# Patient Record
Sex: MALE | Race: WHITE | Employment: OTHER | URBAN - METROPOLITAN AREA
[De-identification: names, ages, dates, MRNs, and addresses within clinical notes are randomized per-mention and may not be internally consistent; named-entity substitution may affect disease eponyms.]

---

## 2017-06-01 ENCOUNTER — OFFICE VISIT (OUTPATIENT)
Dept: ORTHOPEDIC SURGERY | Age: 57
End: 2017-06-01

## 2017-06-01 VITALS
HEIGHT: 67 IN | BODY MASS INDEX: 32.96 KG/M2 | DIASTOLIC BLOOD PRESSURE: 79 MMHG | HEART RATE: 75 BPM | SYSTOLIC BLOOD PRESSURE: 125 MMHG | WEIGHT: 210 LBS

## 2017-06-01 DIAGNOSIS — M54.16 LUMBAR RADICULOPATHY: Primary | ICD-10-CM

## 2017-06-01 PROCEDURE — 99203 OFFICE O/P NEW LOW 30 MIN: CPT | Performed by: ORTHOPAEDIC SURGERY

## 2017-06-05 ENCOUNTER — TELEPHONE (OUTPATIENT)
Dept: PAIN MANAGEMENT | Age: 57
End: 2017-06-05

## 2017-06-14 ENCOUNTER — OFFICE VISIT (OUTPATIENT)
Dept: PAIN MANAGEMENT | Age: 57
End: 2017-06-14

## 2017-06-14 VITALS
DIASTOLIC BLOOD PRESSURE: 92 MMHG | HEART RATE: 74 BPM | WEIGHT: 208 LBS | SYSTOLIC BLOOD PRESSURE: 127 MMHG | BODY MASS INDEX: 32.58 KG/M2

## 2017-06-14 DIAGNOSIS — M51.36 DDD (DEGENERATIVE DISC DISEASE), LUMBAR: ICD-10-CM

## 2017-06-14 DIAGNOSIS — M79.18 MYOFASCIAL PAIN: ICD-10-CM

## 2017-06-14 DIAGNOSIS — M54.16 LUMBAR RADICULOPATHY: ICD-10-CM

## 2017-06-14 DIAGNOSIS — G89.4 CHRONIC PAIN SYNDROME: ICD-10-CM

## 2017-06-14 PROBLEM — M19.90 OSTEOARTHRITIS: Status: ACTIVE | Noted: 2017-06-14

## 2017-06-14 PROBLEM — M51.369 DDD (DEGENERATIVE DISC DISEASE), LUMBAR: Status: ACTIVE | Noted: 2017-06-14

## 2017-06-14 PROBLEM — M16.9 OSTEOARTHRITIS OF HIP: Status: ACTIVE | Noted: 2017-06-14

## 2017-06-14 PROCEDURE — 99244 OFF/OP CNSLTJ NEW/EST MOD 40: CPT | Performed by: INTERNAL MEDICINE

## 2017-06-14 RX ORDER — MELOXICAM 15 MG/1
15 TABLET ORAL DAILY
Qty: 30 TABLET | Refills: 1 | Status: SHIPPED | OUTPATIENT
Start: 2017-06-14 | End: 2017-07-26 | Stop reason: SDUPTHER

## 2017-06-14 RX ORDER — GABAPENTIN 300 MG/1
300 CAPSULE ORAL 3 TIMES DAILY
Qty: 90 CAPSULE | Refills: 0 | Status: SHIPPED | OUTPATIENT
Start: 2017-06-14 | End: 2017-07-26 | Stop reason: SDUPTHER

## 2017-06-20 ENCOUNTER — HOSPITAL ENCOUNTER (OUTPATIENT)
Dept: PHYSICAL THERAPY | Age: 57
Discharge: HOME | End: 2017-06-30
Admitting: ORTHOPAEDIC SURGERY

## 2017-06-21 ENCOUNTER — TELEPHONE (OUTPATIENT)
Dept: PAIN MANAGEMENT | Age: 57
End: 2017-06-21

## 2017-06-21 DIAGNOSIS — G89.4 CHRONIC PAIN SYNDROME: ICD-10-CM

## 2017-06-22 ENCOUNTER — TELEPHONE (OUTPATIENT)
Dept: PAIN MANAGEMENT | Age: 57
End: 2017-06-22

## 2017-06-23 ENCOUNTER — HOSPITAL ENCOUNTER (OUTPATIENT)
Dept: PHYSICAL THERAPY | Age: 57
Discharge: HOME | End: 2017-06-24
Admitting: ORTHOPAEDIC SURGERY

## 2017-06-24 ENCOUNTER — HOSPITAL ENCOUNTER (OUTPATIENT)
Dept: MRI IMAGING | Age: 57
Discharge: HOME | End: 2017-06-24
Attending: INTERNAL MEDICINE | Admitting: INTERNAL MEDICINE

## 2017-06-24 DIAGNOSIS — G89.4 CHRONIC PAIN SYNDROME: ICD-10-CM

## 2017-06-26 ENCOUNTER — TELEPHONE (OUTPATIENT)
Dept: PAIN MANAGEMENT | Age: 57
End: 2017-06-26

## 2017-06-27 ENCOUNTER — HOSPITAL ENCOUNTER (OUTPATIENT)
Dept: OTHER | Age: 57
Discharge: HOME | End: 2017-07-04
Attending: ORTHOPAEDIC SURGERY | Admitting: ORTHOPAEDIC SURGERY

## 2017-06-28 ENCOUNTER — TELEPHONE (OUTPATIENT)
Dept: PAIN MANAGEMENT | Age: 57
End: 2017-06-28

## 2017-06-28 DIAGNOSIS — G89.4 CHRONIC PAIN SYNDROME: ICD-10-CM

## 2017-06-29 ENCOUNTER — HOSPITAL ENCOUNTER (OUTPATIENT)
Dept: PHYSICAL THERAPY | Age: 57
Discharge: HOME | End: 2017-06-30
Admitting: ORTHOPAEDIC SURGERY

## 2017-07-05 RX ORDER — HYDROCODONE BITARTRATE AND ACETAMINOPHEN 5; 325 MG/1; MG/1
1 TABLET ORAL EVERY 6 HOURS PRN
Qty: 40 TABLET | Refills: 0 | Status: SHIPPED | OUTPATIENT
Start: 2017-07-05 | End: 2017-07-26 | Stop reason: SDUPTHER

## 2017-07-07 ENCOUNTER — OFFICE VISIT (OUTPATIENT)
Dept: ORTHOPEDIC SURGERY | Age: 57
End: 2017-07-07

## 2017-07-07 VITALS
HEART RATE: 69 BPM | WEIGHT: 209 LBS | SYSTOLIC BLOOD PRESSURE: 139 MMHG | HEIGHT: 66 IN | DIASTOLIC BLOOD PRESSURE: 88 MMHG | BODY MASS INDEX: 33.59 KG/M2

## 2017-07-07 DIAGNOSIS — M48.061 LUMBAR STENOSIS: ICD-10-CM

## 2017-07-07 DIAGNOSIS — M54.16 LUMBAR RADICULOPATHY: Primary | ICD-10-CM

## 2017-07-07 PROCEDURE — 99213 OFFICE O/P EST LOW 20 MIN: CPT | Performed by: ORTHOPAEDIC SURGERY

## 2017-07-07 RX ORDER — LORATADINE 10 MG/1
10 TABLET ORAL DAILY
COMMUNITY
Start: 2017-06-26

## 2017-07-07 RX ORDER — METHOCARBAMOL 500 MG/1
TABLET, FILM COATED ORAL
COMMUNITY
Start: 2017-06-24 | End: 2018-03-11 | Stop reason: ALTCHOICE

## 2017-07-07 RX ORDER — METHYLPREDNISOLONE 4 MG/1
TABLET ORAL
COMMUNITY
Start: 2017-06-24 | End: 2017-07-26

## 2017-07-07 RX ORDER — PEN NEEDLE, DIABETIC 31 G X1/4"
NEEDLE, DISPOSABLE MISCELLANEOUS
COMMUNITY
Start: 2017-06-26 | End: 2019-10-08 | Stop reason: SDUPTHER

## 2017-07-07 RX ORDER — LANCETS
EACH MISCELLANEOUS
COMMUNITY
Start: 2017-06-26 | End: 2020-01-22

## 2017-07-07 RX ORDER — MORPHINE SULFATE 15 MG/1
TABLET ORAL
COMMUNITY
Start: 2017-05-28 | End: 2017-07-26

## 2017-07-07 RX ORDER — BLOOD-GLUCOSE METER
KIT MISCELLANEOUS
COMMUNITY
Start: 2017-06-27 | End: 2018-09-18 | Stop reason: SDUPTHER

## 2017-07-07 RX ORDER — BLOOD-GLUCOSE METER
KIT MISCELLANEOUS
COMMUNITY
Start: 2017-06-26 | End: 2018-09-18 | Stop reason: SDUPTHER

## 2017-07-07 ASSESSMENT — ENCOUNTER SYMPTOMS: BACK PAIN: 1

## 2017-07-18 ENCOUNTER — HOSPITAL ENCOUNTER (OUTPATIENT)
Dept: PHYSICAL THERAPY | Age: 57
Discharge: HOME | End: 2017-07-19
Admitting: ORTHOPAEDIC SURGERY

## 2017-07-25 ENCOUNTER — HOSPITAL ENCOUNTER (OUTPATIENT)
Dept: PHYSICAL THERAPY | Age: 57
Discharge: HOME | End: 2017-07-26
Admitting: ORTHOPAEDIC SURGERY

## 2017-07-26 ENCOUNTER — OFFICE VISIT (OUTPATIENT)
Dept: PAIN MANAGEMENT | Age: 57
End: 2017-07-26

## 2017-07-26 VITALS
SYSTOLIC BLOOD PRESSURE: 127 MMHG | WEIGHT: 211.4 LBS | BODY MASS INDEX: 34.12 KG/M2 | DIASTOLIC BLOOD PRESSURE: 87 MMHG | HEART RATE: 69 BPM

## 2017-07-26 DIAGNOSIS — M54.16 LUMBAR RADICULOPATHY: ICD-10-CM

## 2017-07-26 DIAGNOSIS — M79.18 MYOFASCIAL PAIN: ICD-10-CM

## 2017-07-26 DIAGNOSIS — G89.4 CHRONIC PAIN SYNDROME: ICD-10-CM

## 2017-07-26 DIAGNOSIS — M51.36 DDD (DEGENERATIVE DISC DISEASE), LUMBAR: ICD-10-CM

## 2017-07-26 PROCEDURE — 99213 OFFICE O/P EST LOW 20 MIN: CPT | Performed by: INTERNAL MEDICINE

## 2017-07-26 RX ORDER — HYDROCODONE BITARTRATE AND ACETAMINOPHEN 5; 325 MG/1; MG/1
1 TABLET ORAL EVERY 6 HOURS PRN
Qty: 70 TABLET | Refills: 0 | Status: SHIPPED | OUTPATIENT
Start: 2017-07-26 | End: 2017-08-30 | Stop reason: SDUPTHER

## 2017-07-26 RX ORDER — GABAPENTIN 300 MG/1
300 CAPSULE ORAL 3 TIMES DAILY
Qty: 90 CAPSULE | Refills: 0 | Status: SHIPPED | OUTPATIENT
Start: 2017-07-26 | End: 2017-08-30 | Stop reason: SDUPTHER

## 2017-07-26 RX ORDER — MELOXICAM 15 MG/1
15 TABLET ORAL DAILY
Qty: 30 TABLET | Refills: 1 | Status: SHIPPED | OUTPATIENT
Start: 2017-07-26 | End: 2017-08-30 | Stop reason: SDUPTHER

## 2017-07-27 ENCOUNTER — HOSPITAL ENCOUNTER (OUTPATIENT)
Dept: PHYSICAL THERAPY | Age: 57
Discharge: HOME | End: 2017-07-28
Admitting: ORTHOPAEDIC SURGERY

## 2017-08-01 ENCOUNTER — HOSPITAL ENCOUNTER (OUTPATIENT)
Dept: PHYSICAL THERAPY | Age: 57
Discharge: HOME | End: 2017-08-02
Admitting: ORTHOPAEDIC SURGERY

## 2017-08-03 ENCOUNTER — HOSPITAL ENCOUNTER (OUTPATIENT)
Dept: PHYSICAL THERAPY | Age: 57
Discharge: HOME | End: 2017-08-04
Admitting: ORTHOPAEDIC SURGERY

## 2017-08-10 ENCOUNTER — HOSPITAL ENCOUNTER (OUTPATIENT)
Dept: PHYSICAL THERAPY | Age: 57
Discharge: HOME | End: 2017-08-11
Admitting: ORTHOPAEDIC SURGERY

## 2017-08-15 ENCOUNTER — HOSPITAL ENCOUNTER (OUTPATIENT)
Dept: PHYSICAL THERAPY | Age: 57
Discharge: HOME | End: 2017-08-16
Admitting: ORTHOPAEDIC SURGERY

## 2017-08-17 ENCOUNTER — HOSPITAL ENCOUNTER (OUTPATIENT)
Dept: PHYSICAL THERAPY | Age: 57
Discharge: HOME | End: 2017-08-18
Admitting: ORTHOPAEDIC SURGERY

## 2017-08-22 ENCOUNTER — HOSPITAL ENCOUNTER (OUTPATIENT)
Dept: PHYSICAL THERAPY | Age: 57
Discharge: HOME | End: 2017-08-23
Admitting: ORTHOPAEDIC SURGERY

## 2017-08-30 ENCOUNTER — OFFICE VISIT (OUTPATIENT)
Dept: PAIN MANAGEMENT | Age: 57
End: 2017-08-30

## 2017-08-30 VITALS
SYSTOLIC BLOOD PRESSURE: 136 MMHG | HEART RATE: 76 BPM | WEIGHT: 208 LBS | BODY MASS INDEX: 33.57 KG/M2 | DIASTOLIC BLOOD PRESSURE: 87 MMHG

## 2017-08-30 DIAGNOSIS — G89.4 CHRONIC PAIN SYNDROME: ICD-10-CM

## 2017-08-30 DIAGNOSIS — M79.18 MYOFASCIAL PAIN: ICD-10-CM

## 2017-08-30 DIAGNOSIS — M51.36 DDD (DEGENERATIVE DISC DISEASE), LUMBAR: ICD-10-CM

## 2017-08-30 DIAGNOSIS — M16.12 PRIMARY OSTEOARTHRITIS OF LEFT HIP: ICD-10-CM

## 2017-08-30 DIAGNOSIS — M54.16 LUMBAR RADICULOPATHY: ICD-10-CM

## 2017-08-30 PROCEDURE — 99213 OFFICE O/P EST LOW 20 MIN: CPT | Performed by: INTERNAL MEDICINE

## 2017-08-30 RX ORDER — MELOXICAM 15 MG/1
15 TABLET ORAL DAILY
Qty: 30 TABLET | Refills: 1 | Status: SHIPPED | OUTPATIENT
Start: 2017-08-30 | End: 2017-09-27 | Stop reason: SDUPTHER

## 2017-08-30 RX ORDER — GABAPENTIN 300 MG/1
300 CAPSULE ORAL 3 TIMES DAILY
Qty: 90 CAPSULE | Refills: 0 | Status: SHIPPED | OUTPATIENT
Start: 2017-08-30 | End: 2017-08-30 | Stop reason: CLARIF

## 2017-08-30 RX ORDER — HYDROCODONE BITARTRATE AND ACETAMINOPHEN 5; 325 MG/1; MG/1
1 TABLET ORAL EVERY 6 HOURS PRN
Qty: 70 TABLET | Refills: 0 | Status: SHIPPED | OUTPATIENT
Start: 2017-08-30 | End: 2017-09-27 | Stop reason: SDUPTHER

## 2017-08-30 RX ORDER — GABAPENTIN 300 MG/1
300 CAPSULE ORAL 3 TIMES DAILY
Qty: 90 CAPSULE | Refills: 0 | Status: SHIPPED | OUTPATIENT
Start: 2017-08-30 | End: 2017-09-27 | Stop reason: SDUPTHER

## 2017-08-31 ENCOUNTER — HOSPITAL ENCOUNTER (OUTPATIENT)
Dept: PHYSICAL THERAPY | Age: 57
Discharge: HOME | End: 2017-09-01
Admitting: ORTHOPAEDIC SURGERY

## 2017-09-01 ENCOUNTER — HOSPITAL ENCOUNTER (OUTPATIENT)
Dept: OTHER | Age: 57
Discharge: HOME | End: 2017-09-30
Attending: ORTHOPAEDIC SURGERY | Admitting: ORTHOPAEDIC SURGERY

## 2017-09-05 ENCOUNTER — HOSPITAL ENCOUNTER (OUTPATIENT)
Dept: PHYSICAL THERAPY | Age: 57
Discharge: HOME | End: 2017-09-06
Admitting: ORTHOPAEDIC SURGERY

## 2017-09-07 ENCOUNTER — HOSPITAL ENCOUNTER (OUTPATIENT)
Dept: PHYSICAL THERAPY | Age: 57
Discharge: HOME | End: 2017-09-08
Admitting: ORTHOPAEDIC SURGERY

## 2017-09-11 ENCOUNTER — TELEPHONE (OUTPATIENT)
Dept: ORTHOPEDIC SURGERY | Age: 57
End: 2017-09-11

## 2017-09-11 ENCOUNTER — TELEPHONE (OUTPATIENT)
Dept: PAIN MANAGEMENT | Age: 57
End: 2017-09-11

## 2017-09-11 DIAGNOSIS — M54.16 LUMBAR RADICULOPATHY: ICD-10-CM

## 2017-09-11 DIAGNOSIS — G89.4 CHRONIC PAIN SYNDROME: ICD-10-CM

## 2017-09-11 DIAGNOSIS — M79.18 MYOFASCIAL PAIN: ICD-10-CM

## 2017-09-11 DIAGNOSIS — M16.12 PRIMARY OSTEOARTHRITIS OF LEFT HIP: ICD-10-CM

## 2017-09-11 DIAGNOSIS — M51.36 DDD (DEGENERATIVE DISC DISEASE), LUMBAR: ICD-10-CM

## 2017-09-12 ENCOUNTER — HOSPITAL ENCOUNTER (OUTPATIENT)
Dept: PHYSICAL THERAPY | Age: 57
Discharge: HOME | End: 2017-09-13
Admitting: ORTHOPAEDIC SURGERY

## 2017-09-19 ENCOUNTER — HOSPITAL ENCOUNTER (OUTPATIENT)
Dept: PHYSICAL THERAPY | Age: 57
Discharge: HOME | End: 2017-09-20
Admitting: ORTHOPAEDIC SURGERY

## 2017-09-27 ENCOUNTER — OFFICE VISIT (OUTPATIENT)
Dept: PAIN MANAGEMENT | Age: 57
End: 2017-09-27

## 2017-09-27 VITALS
SYSTOLIC BLOOD PRESSURE: 146 MMHG | DIASTOLIC BLOOD PRESSURE: 91 MMHG | HEART RATE: 71 BPM | BODY MASS INDEX: 33.57 KG/M2 | WEIGHT: 208 LBS

## 2017-09-27 DIAGNOSIS — M16.12 PRIMARY OSTEOARTHRITIS OF LEFT HIP: ICD-10-CM

## 2017-09-27 DIAGNOSIS — G89.4 CHRONIC PAIN SYNDROME: ICD-10-CM

## 2017-09-27 DIAGNOSIS — M51.36 DDD (DEGENERATIVE DISC DISEASE), LUMBAR: ICD-10-CM

## 2017-09-27 DIAGNOSIS — M54.16 LUMBAR RADICULOPATHY: ICD-10-CM

## 2017-09-27 DIAGNOSIS — M79.18 MYOFASCIAL PAIN: ICD-10-CM

## 2017-09-27 PROCEDURE — 99213 OFFICE O/P EST LOW 20 MIN: CPT | Performed by: INTERNAL MEDICINE

## 2017-09-27 RX ORDER — HYDROCODONE BITARTRATE AND ACETAMINOPHEN 5; 325 MG/1; MG/1
1 TABLET ORAL EVERY 6 HOURS PRN
Qty: 80 TABLET | Refills: 0 | Status: SHIPPED | OUTPATIENT
Start: 2017-09-27 | End: 2017-11-01 | Stop reason: SDUPTHER

## 2017-09-27 RX ORDER — GABAPENTIN 300 MG/1
300 CAPSULE ORAL 3 TIMES DAILY
Qty: 90 CAPSULE | Refills: 0 | Status: SHIPPED | OUTPATIENT
Start: 2017-09-27 | End: 2017-11-01 | Stop reason: SDUPTHER

## 2017-09-27 RX ORDER — MELOXICAM 15 MG/1
15 TABLET ORAL DAILY
Qty: 30 TABLET | Refills: 1 | Status: SHIPPED | OUTPATIENT
Start: 2017-09-27 | End: 2017-11-01 | Stop reason: SDUPTHER

## 2017-09-29 ENCOUNTER — HOSPITAL ENCOUNTER (OUTPATIENT)
Dept: PHYSICAL THERAPY | Age: 57
Discharge: HOME | End: 2017-09-30
Admitting: ORTHOPAEDIC SURGERY

## 2017-10-03 ENCOUNTER — HOSPITAL ENCOUNTER (OUTPATIENT)
Dept: PHYSICAL THERAPY | Age: 57
Discharge: HOME | End: 2017-10-04
Admitting: ORTHOPAEDIC SURGERY

## 2017-10-06 ENCOUNTER — HOSPITAL ENCOUNTER (OUTPATIENT)
Dept: PHYSICAL THERAPY | Age: 57
Discharge: HOME | End: 2017-10-07
Admitting: ORTHOPAEDIC SURGERY

## 2017-10-10 ENCOUNTER — HOSPITAL ENCOUNTER (OUTPATIENT)
Dept: PHYSICAL THERAPY | Age: 57
Discharge: HOME | End: 2017-10-11
Admitting: ORTHOPAEDIC SURGERY

## 2017-10-10 DIAGNOSIS — G89.4 CHRONIC PAIN SYNDROME: ICD-10-CM

## 2017-10-10 DIAGNOSIS — M51.36 DDD (DEGENERATIVE DISC DISEASE), LUMBAR: ICD-10-CM

## 2017-10-10 DIAGNOSIS — M54.16 LUMBAR RADICULOPATHY: ICD-10-CM

## 2017-10-10 DIAGNOSIS — M79.18 MYOFASCIAL PAIN: ICD-10-CM

## 2017-10-11 DIAGNOSIS — G89.4 CHRONIC PAIN SYNDROME: ICD-10-CM

## 2017-10-11 DIAGNOSIS — M79.18 MYOFASCIAL PAIN: ICD-10-CM

## 2017-10-11 DIAGNOSIS — M54.16 LUMBAR RADICULOPATHY: ICD-10-CM

## 2017-10-11 DIAGNOSIS — M51.36 DDD (DEGENERATIVE DISC DISEASE), LUMBAR: ICD-10-CM

## 2017-10-11 RX ORDER — MELOXICAM 15 MG/1
TABLET ORAL
Qty: 30 TABLET | Refills: 0 | OUTPATIENT
Start: 2017-10-11

## 2017-10-13 ENCOUNTER — HOSPITAL ENCOUNTER (OUTPATIENT)
Dept: PHYSICAL THERAPY | Age: 57
Discharge: HOME | End: 2017-10-14
Admitting: ORTHOPAEDIC SURGERY

## 2017-10-13 RX ORDER — MELOXICAM 15 MG/1
TABLET ORAL
Qty: 30 TABLET | Refills: 0 | OUTPATIENT
Start: 2017-10-13

## 2017-10-17 ENCOUNTER — HOSPITAL ENCOUNTER (OUTPATIENT)
Dept: PHYSICAL THERAPY | Age: 57
Discharge: HOME | End: 2017-10-18
Admitting: ORTHOPAEDIC SURGERY

## 2017-10-23 ENCOUNTER — HOSPITAL ENCOUNTER (OUTPATIENT)
Dept: PHYSICAL THERAPY | Age: 57
Discharge: HOME | End: 2017-10-24
Admitting: ORTHOPAEDIC SURGERY

## 2017-10-27 ENCOUNTER — HOSPITAL ENCOUNTER (OUTPATIENT)
Dept: PHYSICAL THERAPY | Age: 57
Discharge: HOME | End: 2017-10-28
Admitting: ORTHOPAEDIC SURGERY

## 2017-10-31 ENCOUNTER — HOSPITAL ENCOUNTER (OUTPATIENT)
Dept: PHYSICAL THERAPY | Age: 57
Discharge: HOME | End: 2017-11-01
Admitting: ORTHOPAEDIC SURGERY

## 2017-11-01 ENCOUNTER — HOSPITAL ENCOUNTER (OUTPATIENT)
Dept: OTHER | Age: 57
Discharge: HOME | End: 2017-11-30
Attending: ORTHOPAEDIC SURGERY | Admitting: ORTHOPAEDIC SURGERY

## 2017-11-01 ENCOUNTER — OFFICE VISIT (OUTPATIENT)
Dept: PAIN MANAGEMENT | Age: 57
End: 2017-11-01

## 2017-11-01 VITALS
DIASTOLIC BLOOD PRESSURE: 92 MMHG | HEART RATE: 88 BPM | WEIGHT: 211 LBS | BODY MASS INDEX: 34.06 KG/M2 | SYSTOLIC BLOOD PRESSURE: 139 MMHG

## 2017-11-01 DIAGNOSIS — M51.36 DDD (DEGENERATIVE DISC DISEASE), LUMBAR: ICD-10-CM

## 2017-11-01 DIAGNOSIS — M79.18 MYOFASCIAL PAIN: ICD-10-CM

## 2017-11-01 DIAGNOSIS — M54.16 LUMBAR RADICULOPATHY: ICD-10-CM

## 2017-11-01 DIAGNOSIS — G89.4 CHRONIC PAIN SYNDROME: ICD-10-CM

## 2017-11-01 DIAGNOSIS — M16.12 PRIMARY OSTEOARTHRITIS OF LEFT HIP: ICD-10-CM

## 2017-11-01 PROCEDURE — G8427 DOCREV CUR MEDS BY ELIG CLIN: HCPCS | Performed by: INTERNAL MEDICINE

## 2017-11-01 PROCEDURE — G8417 CALC BMI ABV UP PARAM F/U: HCPCS | Performed by: INTERNAL MEDICINE

## 2017-11-01 PROCEDURE — 3017F COLORECTAL CA SCREEN DOC REV: CPT | Performed by: INTERNAL MEDICINE

## 2017-11-01 PROCEDURE — 1036F TOBACCO NON-USER: CPT | Performed by: INTERNAL MEDICINE

## 2017-11-01 PROCEDURE — 99213 OFFICE O/P EST LOW 20 MIN: CPT | Performed by: INTERNAL MEDICINE

## 2017-11-01 PROCEDURE — G8484 FLU IMMUNIZE NO ADMIN: HCPCS | Performed by: INTERNAL MEDICINE

## 2017-11-01 RX ORDER — GABAPENTIN 300 MG/1
300 CAPSULE ORAL 3 TIMES DAILY
Qty: 90 CAPSULE | Refills: 0 | Status: SHIPPED | OUTPATIENT
Start: 2017-11-01 | End: 2017-12-13 | Stop reason: SDUPTHER

## 2017-11-01 RX ORDER — HYDROCODONE BITARTRATE AND ACETAMINOPHEN 5; 325 MG/1; MG/1
1 TABLET ORAL EVERY 6 HOURS PRN
Qty: 90 TABLET | Refills: 0 | Status: SHIPPED | OUTPATIENT
Start: 2017-11-01 | End: 2017-12-13 | Stop reason: SDUPTHER

## 2017-11-01 RX ORDER — MELOXICAM 15 MG/1
15 TABLET ORAL DAILY
Qty: 30 TABLET | Refills: 1 | Status: SHIPPED | OUTPATIENT
Start: 2017-11-01 | End: 2017-12-13 | Stop reason: SDUPTHER

## 2017-11-01 NOTE — PROGRESS NOTES
Cristian Knee  1960  [id number]    HISTORY OF PRESENT ILLNESS:  Mr. Shital Chou is a 61 y.o. male returns for a follow up visit for multiple medical problems. His current presenting problems are   1. Chronic pain syndrome    2. DDD (degenerative disc disease), lumbar    3. Primary osteoarthritis of left hip    4. Lumbar radiculopathy    5. Myofascial pain    . As per information/history obtained from the PADT(patient assessment and documentation tool) - He complains of pain in the lower back with radiation to the buttocks, hips Bilateral and upper leg Left He rates the pain 6/10 and describes it as sharp, aching, burning. Pain is made worse by: walking, standing, sitting. Current treatment regimen has helped relieve about 50% of the pain. He denies side effects from the current pain regimen. Patient reports that since the last follow up visit the physical functioning is unchanged, family/social relationships are unchanged, mood is unchanged and sleep patterns are unchanged, and that the overall functioning is unchanged. Patient denies neurological bowel or bladder. Patient denies misusing/abusing his narcotic pain medications or using any illegal drugs. There are No indicators for possible drug abuse, addiction or diversion problems. Upon obtaining the medical history from Mr. Shital Chou regarding today's office visit for his presenting problems, Patient states his pain has been worse. He complains of increased pain with changes in weather. Extreme temperatures- cold and damp weather causes increased pain. He states he started PT exercises, which has been helping some. Mr. Shital Chou mentions using Norco along with Mobic and Neurontin. He mentions he has been swimming everyday for 20-30 minutes. Patient denies any GERD symptoms. He reports he lives with his parents and has been managing his home chores. ALLERGIES: Patients list of allergies were reviewed     MEDICATIONS: Mr. Shital Chou list of medications were reviewed. His current medications are   Outpatient Medications Prior to Visit   Medication Sig Dispense Refill    HYDROcodone-acetaminophen (NORCO) 5-325 MG per tablet Take 1 tablet by mouth every 6 hours as needed for Pain (max 2-3 per day) . 80 tablet 0    meloxicam (MOBIC) 15 MG tablet Take 1 tablet by mouth daily 30 tablet 1    gabapentin (NEURONTIN) 300 MG capsule Take 1 capsule by mouth 3 times daily Take one tablet po in the morning and two tablets po in the evening 90 capsule 0    Blood Glucose Monitoring Suppl (FREESTYLE LITE) RO       FREESTYLE LITE strip       Insulin Pen Needle (PEN NEEDLES) 31G X 6 MM MISC       KROGER LANCETS ULTRATHIN 30G MISC       loratadine (CLARITIN) 10 MG tablet       methocarbamol (ROBAXIN) 500 MG tablet       lisinopril (PRINIVIL;ZESTRIL) 5 MG tablet Take 5 mg by mouth daily      metFORMIN (GLUCOPHAGE-XR) 500 MG extended release tablet Take 500 mg by mouth daily (with breakfast)      atorvastatin (LIPITOR) 80 MG tablet Take 80 mg by mouth daily. No facility-administered medications prior to visit. SOCIAL/FAMILY/PAST MEDICAL HISTORY: Mr. Liza Castillo, family and past medical history was reviewed. REVIEW OF SYSTEMS:    Respiratory: Negative for apnea, chest tightness and shortness of breath or change in baseline breathing. Gastrointestinal: Negative for nausea, vomiting, abdominal pain, diarrhea, constipation, blood in stool and abdominal distention. PHYSICAL EXAM:   Nursing note and vitals reviewed. BP (!) 139/92 (Site: Left Arm, Position: Sitting)   Pulse 88   Wt 211 lb (95.7 kg)   BMI 34.06 kg/m²   Constitutional: He appears well-developed and well-nourished. No acute distress. Skin: Skin is warm and dry, good turgor. No rash noted. He is not diaphoretic. Cardiovascular: Normal rate, regular rhythm, normal heart sounds, and does not have murmur. Pulmonary/Chest: Effort normal. No respiratory distress.  He does not have wheezes in the lung fields. He has no rales. Neurological/Psychiatric:He is alert and oriented to person, place, and time. Coordination is  normal. His mood isAppropriate and affect is Flat/blunted and Anxious . IMPRESSION:   1. Chronic pain syndrome    2. DDD (degenerative disc disease), lumbar    3. Primary osteoarthritis of left hip    4. Lumbar radiculopathy    5. Myofascial pain        PLAN:  Informed verbal consent was obtained  -Continue with current regimen   -ROM/stretching exercises as advised   -Continue with Mobic and Neurontin   -He was advised to increase fluids ( 5-7  glasses of fluid daily), limit caffeine, avoid cheese products, increase dietary fiber, increase activity and exercise as tolerated and relax regularly and enjoy meals  -He was advised weight reduction, diet changes- 800-1200 shad diet, diet diary, exercising, nutritional  consult increased physical activity as tolerated     Current Outpatient Prescriptions   Medication Sig Dispense Refill    HYDROcodone-acetaminophen (NORCO) 5-325 MG per tablet Take 1 tablet by mouth every 6 hours as needed for Pain (max 2-3 per day) . 80 tablet 0    meloxicam (MOBIC) 15 MG tablet Take 1 tablet by mouth daily 30 tablet 1    gabapentin (NEURONTIN) 300 MG capsule Take 1 capsule by mouth 3 times daily Take one tablet po in the morning and two tablets po in the evening 90 capsule 0    Blood Glucose Monitoring Suppl (FREESTYLE LITE) RO       FREESTYLE LITE strip       Insulin Pen Needle (PEN NEEDLES) 31G X 6 MM MISC       KROGER LANCETS ULTRATHIN 30G MISC       loratadine (CLARITIN) 10 MG tablet       methocarbamol (ROBAXIN) 500 MG tablet       lisinopril (PRINIVIL;ZESTRIL) 5 MG tablet Take 5 mg by mouth daily      metFORMIN (GLUCOPHAGE-XR) 500 MG extended release tablet Take 500 mg by mouth daily (with breakfast)      atorvastatin (LIPITOR) 80 MG tablet Take 80 mg by mouth daily. No current facility-administered medications for this visit.       I will continue his current medication regimen  which is part of the above treatment schedule. It has been helping with Mr. Juni Alva chronic  medical problems which for this visit include:   Diagnoses of Chronic pain syndrome, DDD (degenerative disc disease), lumbar, Primary osteoarthritis of left hip, Lumbar radiculopathy, and Myofascial pain were pertinent to this visit. Risks and benefits of the medications and other alternative treatments  including no treatment were discussed with the patient. The common side effects of these medications were also explained to the patient. Informed verbal consent was obtained. Goals of current treatment regimen include improvement in pain, restoration of functioning- with focus on improvement in physical performance, general activity, work or disability,emotional distress, health care utilization and  decreased medication consumption. Will continue to monitor progress towards achieving/maintaining therapeutic goals with special emphasis on  1. Improvement in perceived interfernce  of pain with ADL's. Ability to do home exercises independently. Ability to do household chores indoor and/or outdoor work and social and leisure activities. Improve psychosocial and physical functioning. - he is showing progression towards this treatment goal with the current regimen. He was advised against drinking alcohol with the narcotic pain medicines, advised against driving or handling machinery while adjusting the dose of medicines or if having cognitive  issues related to the current medications. Risk of overdose and death, if medicines not taken as prescribed, were also discussed. If the patient develops new symptoms or if the symptoms worsen, the patient should call the office. While transcribing every attempt was made to maintain the accuracy of the note in terms of it's contents,there may have been some errors made inadvertently.   Thank you for allowing me to participate in the care of this patient. Orville Gitelman, MD.    Cc: Cassie Escoto MD    I, Diego Germain am scribing for and in the presence of Dr. Orville Gitelman.    11/01/17  11:56 AM  Edd Burt MA   I, Dr. Orville Gitelman, personally performed the services described in this documentation as scribed by   Diego Germain MA in my presence and it is both accurate and complete

## 2017-11-03 ENCOUNTER — HOSPITAL ENCOUNTER (OUTPATIENT)
Dept: PHYSICAL THERAPY | Age: 57
Discharge: HOME | End: 2017-11-04
Admitting: ORTHOPAEDIC SURGERY

## 2017-11-07 ENCOUNTER — HOSPITAL ENCOUNTER (OUTPATIENT)
Dept: PHYSICAL THERAPY | Age: 57
Discharge: HOME | End: 2017-11-08
Admitting: ORTHOPAEDIC SURGERY

## 2017-11-10 ENCOUNTER — HOSPITAL ENCOUNTER (OUTPATIENT)
Dept: PHYSICAL THERAPY | Age: 57
Discharge: HOME | End: 2017-11-11
Admitting: ORTHOPAEDIC SURGERY

## 2017-12-13 ENCOUNTER — OFFICE VISIT (OUTPATIENT)
Dept: PAIN MANAGEMENT | Age: 57
End: 2017-12-13

## 2017-12-13 VITALS
WEIGHT: 211 LBS | SYSTOLIC BLOOD PRESSURE: 133 MMHG | BODY MASS INDEX: 34.06 KG/M2 | HEART RATE: 72 BPM | DIASTOLIC BLOOD PRESSURE: 89 MMHG

## 2017-12-13 DIAGNOSIS — M54.16 LUMBAR RADICULOPATHY: ICD-10-CM

## 2017-12-13 DIAGNOSIS — M79.18 MYOFASCIAL PAIN: ICD-10-CM

## 2017-12-13 DIAGNOSIS — G89.4 CHRONIC PAIN SYNDROME: ICD-10-CM

## 2017-12-13 DIAGNOSIS — M51.36 DDD (DEGENERATIVE DISC DISEASE), LUMBAR: ICD-10-CM

## 2017-12-13 DIAGNOSIS — M16.12 PRIMARY OSTEOARTHRITIS OF LEFT HIP: ICD-10-CM

## 2017-12-13 PROCEDURE — G8428 CUR MEDS NOT DOCUMENT: HCPCS | Performed by: INTERNAL MEDICINE

## 2017-12-13 PROCEDURE — 99213 OFFICE O/P EST LOW 20 MIN: CPT | Performed by: INTERNAL MEDICINE

## 2017-12-13 PROCEDURE — G8417 CALC BMI ABV UP PARAM F/U: HCPCS | Performed by: INTERNAL MEDICINE

## 2017-12-13 PROCEDURE — G8484 FLU IMMUNIZE NO ADMIN: HCPCS | Performed by: INTERNAL MEDICINE

## 2017-12-13 PROCEDURE — 1036F TOBACCO NON-USER: CPT | Performed by: INTERNAL MEDICINE

## 2017-12-13 PROCEDURE — 3017F COLORECTAL CA SCREEN DOC REV: CPT | Performed by: INTERNAL MEDICINE

## 2017-12-13 RX ORDER — MELOXICAM 15 MG/1
15 TABLET ORAL DAILY
Qty: 30 TABLET | Refills: 1 | Status: SHIPPED | OUTPATIENT
Start: 2017-12-13 | End: 2018-02-21 | Stop reason: SDUPTHER

## 2017-12-13 RX ORDER — GABAPENTIN 300 MG/1
300 CAPSULE ORAL 3 TIMES DAILY
Qty: 90 CAPSULE | Refills: 0 | Status: SHIPPED | OUTPATIENT
Start: 2017-12-13 | End: 2018-02-21 | Stop reason: SDUPTHER

## 2017-12-13 RX ORDER — HYDROCODONE BITARTRATE AND ACETAMINOPHEN 5; 325 MG/1; MG/1
1 TABLET ORAL EVERY 6 HOURS PRN
Qty: 90 TABLET | Refills: 0 | Status: SHIPPED | OUTPATIENT
Start: 2017-12-13 | End: 2018-01-17 | Stop reason: SDUPTHER

## 2017-12-13 NOTE — PROGRESS NOTES
Jessica Brando  1960  [id number]    HISTORY OF PRESENT ILLNESS:  Mr. Aida Onofre is a 61 y.o. male returns for a follow up visit for multiple medical problems. His current presenting problems are   1. Chronic pain syndrome    2. Primary osteoarthritis of left hip    3. Myofascial pain    4. DDD (degenerative disc disease), lumbar    5. Lumbar radiculopathy    . As per information/history obtained from the PADT(patient assessment and documentation tool) - He complains of pain in the upper back and lower back with radiation to the buttocks, hips Bilateral, upper leg Left, knees Left, lower leg Left and feet Bilateral He rates the pain 6/10 and describes it as sharp, aching, burning. Pain is made worse by: movement. Current treatment regimen has helped relieve about 40% of the pain. He denies side effects from the current pain regimen. Patient reports that since the last follow up visit the physical functioning is unchanged, family/social relationships are unchanged, mood is unchanged and sleep patterns are unchanged, and that the overall functioning is unchanged. Patient denies neurological bowel or bladder. Patient denies misusing/abusing his narcotic pain medications or using any illegal drugs. There are No indicators for possible drug abuse, addiction or diversion problems. Upon obtaining the medical history from Mr. Aida Onofre regarding today's office visit for his presenting problems, Patient states his pain has been tolerable with the medications. He complains of pain in the left shoulder, hurts to do his ADL's. Mr. Aida Onofre mentions he did finish Pt on the back. He mentions using Norco 2-3 times per day. Patient denies any constipation symptoms. ALLERGIES: Patients list of allergies were reviewed     MEDICATIONS: Mr. Aida Onofre list of medications were reviewed. His current medications are   Outpatient Medications Prior to Visit   Medication Sig Dispense Refill    HYDROcodone-acetaminophen (NORCO) 5-325 MG per tablet Take 1 tablet by mouth every 6 hours as needed for Pain (max 2-3 per day) . Earliest Fill Date: 11/1/17 90 tablet 0    meloxicam (MOBIC) 15 MG tablet Take 1 tablet by mouth daily 30 tablet 1    gabapentin (NEURONTIN) 300 MG capsule Take 1 capsule by mouth 3 times daily Take one tablet po in the morning and two tablets po in the evening 90 capsule 0    Blood Glucose Monitoring Suppl (FREESTYLE LITE) RO       FREESTYLE LITE strip       Insulin Pen Needle (PEN NEEDLES) 31G X 6 MM MISC       KROGER LANCETS ULTRATHIN 30G MISC       loratadine (CLARITIN) 10 MG tablet       methocarbamol (ROBAXIN) 500 MG tablet       lisinopril (PRINIVIL;ZESTRIL) 5 MG tablet Take 5 mg by mouth daily      metFORMIN (GLUCOPHAGE-XR) 500 MG extended release tablet Take 500 mg by mouth daily (with breakfast)      atorvastatin (LIPITOR) 80 MG tablet Take 80 mg by mouth daily. No facility-administered medications prior to visit. SOCIAL/FAMILY/PAST MEDICAL HISTORY: Mr. Mer Hernandez, family and past medical history was reviewed. REVIEW OF SYSTEMS:    Respiratory: Negative for apnea, chest tightness and shortness of breath or change in baseline breathing. Gastrointestinal: Negative for nausea, vomiting, abdominal pain, diarrhea, constipation, blood in stool and abdominal distention. PHYSICAL EXAM:   Nursing note and vitals reviewed. /89   Pulse 72   Wt 211 lb (95.7 kg)   BMI 34.06 kg/m²   Constitutional: He appears well-developed and well-nourished. No acute distress. Skin: Skin is warm and dry, good turgor. No rash noted. He is not diaphoretic. Cardiovascular: Normal rate, regular rhythm, normal heart sounds, and does not have murmur. Pulmonary/Chest: Effort normal. No respiratory distress. He does not have wheezes in the lung fields. He has no rales. Neurological/Psychiatric:He is alert and oriented to person, place, and time.  Coordination is  normal. His mood isAppropriate and affect is Lumbar radiculopathy were pertinent to this visit. Risks and benefits of the medications and other alternative treatments  including no treatment were discussed with the patient. The common side effects of these medications were also explained to the patient. Informed verbal consent was obtained. Goals of current treatment regimen include improvement in pain, restoration of functioning- with focus on improvement in physical performance, general activity, work or disability,emotional distress, health care utilization and  decreased medication consumption. Will continue to monitor progress towards achieving/maintaining therapeutic goals with special emphasis on  1. Improvement in perceived interfernce  of pain with ADL's. Ability to do home exercises independently. Ability to do household chores indoor and/or outdoor work and social and leisure activities. Improve psychosocial and physical functioning. - he is showing progression towards this treatment goal with the current regimen. 2. Ability to focus/concentrate at work and perform the duties required of him at work  Sit through a workday without lower extremity symptoms. Stand 30-60 minutes without lower extremity symptoms. Ability to lift up to 10-20 lbs. Ability to go up and down stairs. Sit 30-60 minutes  Without having to stand up frequently. - he is maintaining/progressing towards his work related goals with the current regimen. He was advised against drinking alcohol with the narcotic pain medicines, advised against driving or handling machinery while adjusting the dose of medicines or if having cognitive  issues related to the current medications. Risk of overdose and death, if medicines not taken as prescribed, were also discussed. If the patient develops new symptoms or if the symptoms worsen, the patient should call the office.     While transcribing every attempt was made to maintain the accuracy of the note in terms of it's contents,there may

## 2018-01-08 ENCOUNTER — OFFICE VISIT (OUTPATIENT)
Dept: ENDOCRINOLOGY | Age: 58
End: 2018-01-08

## 2018-01-08 VITALS
WEIGHT: 206.6 LBS | SYSTOLIC BLOOD PRESSURE: 132 MMHG | BODY MASS INDEX: 33.35 KG/M2 | TEMPERATURE: 97.8 F | DIASTOLIC BLOOD PRESSURE: 78 MMHG

## 2018-01-08 LAB
CREATININE URINE: 25.6 MG/DL (ref 39–259)
HBA1C MFR BLD: 13.9 %
MICROALBUMIN UR-MCNC: <1.2 MG/DL
MICROALBUMIN/CREAT UR-RTO: ABNORMAL MG/G (ref 0–30)

## 2018-01-08 PROCEDURE — 3046F HEMOGLOBIN A1C LEVEL >9.0%: CPT | Performed by: INTERNAL MEDICINE

## 2018-01-08 PROCEDURE — 3017F COLORECTAL CA SCREEN DOC REV: CPT | Performed by: INTERNAL MEDICINE

## 2018-01-08 PROCEDURE — G8484 FLU IMMUNIZE NO ADMIN: HCPCS | Performed by: INTERNAL MEDICINE

## 2018-01-08 PROCEDURE — 83036 HEMOGLOBIN GLYCOSYLATED A1C: CPT | Performed by: INTERNAL MEDICINE

## 2018-01-08 PROCEDURE — G8417 CALC BMI ABV UP PARAM F/U: HCPCS | Performed by: INTERNAL MEDICINE

## 2018-01-08 PROCEDURE — 1036F TOBACCO NON-USER: CPT | Performed by: INTERNAL MEDICINE

## 2018-01-08 PROCEDURE — 99205 OFFICE O/P NEW HI 60 MIN: CPT | Performed by: INTERNAL MEDICINE

## 2018-01-08 PROCEDURE — G8427 DOCREV CUR MEDS BY ELIG CLIN: HCPCS | Performed by: INTERNAL MEDICINE

## 2018-01-08 RX ORDER — GLIPIZIDE 10 MG/1
10 TABLET, FILM COATED, EXTENDED RELEASE ORAL DAILY
Qty: 30 TABLET | Refills: 3 | Status: SHIPPED | OUTPATIENT
Start: 2018-01-08 | End: 2018-06-14 | Stop reason: SDUPTHER

## 2018-01-08 RX ORDER — METFORMIN HYDROCHLORIDE 500 MG/1
1000 TABLET, EXTENDED RELEASE ORAL
Qty: 60 TABLET | Refills: 2 | Status: SHIPPED | OUTPATIENT
Start: 2018-01-08 | End: 2018-06-14 | Stop reason: SDUPTHER

## 2018-01-08 NOTE — PROGRESS NOTES
daily (with breakfast)      atorvastatin (LIPITOR) 80 MG tablet Take 80 mg by mouth daily.  methocarbamol (ROBAXIN) 500 MG tablet        No current facility-administered medications for this visit. Review of Systems  Please see scanned document dated and signed      Objective:      /78   Temp 97.8 °F (36.6 °C) (Oral)   Wt 206 lb 9.6 oz (93.7 kg)   BMI 33.35 kg/m²   Wt Readings from Last 3 Encounters:   01/08/18 206 lb 9.6 oz (93.7 kg)   12/13/17 211 lb (95.7 kg)   11/01/17 211 lb (95.7 kg)     Constitutional: Well-developed, alert, appears stated age, cooperative, in no acute distress  H/E/N/M/T:atraumatic, normocephalic, external ears, nose, lips normal without lesions  Eyes: Arcus Senilis is not present, extraocular muscles are intact  Neck: supple, trachea midline, acanthosis nigricance is not present. Thyroid: gland size is normal, non-tender on palpation  Respiratory: breathing is unlabored, lungs are clear to auscultations. Cardiovascular: regular rate and rhythm, S1, S2, regular rate and rhythm, no murmur, rub or gallop. Skeletal muscular: no kyphosis, no gross abnormalities  Skin: Xanthoma/Xanthelasmas are  not present  Psychiatric: Judgement and Insight:  judgement and insight appear normal  Neuro: Normal without focal findings, speech is spontaneous, and movements are coordinated, alert and oriented x3   Skeletal foot exam is normal, no skin lesions, toenails are normal, pulses are normal, 10 g monofilament is 8/10 on the right and 8/10 on the left , 128 Hz vibration sense is diminishesd    Lab Reviewed   No components found for: CHLPL  No results found for: TRIG  No results found for: HDL  No results found for: LDLCALC  No results found for: LABVLDL  Lab Results   Component Value Date    LABA1C 6.3 01/16/2015       Assessment:     Ivana Bush is a 61 y.o. male with :    1.T2DM : Longstanding, uncontrolled. On metformin. A1c very high.  Discussed needs insulin given

## 2018-01-17 ENCOUNTER — OFFICE VISIT (OUTPATIENT)
Dept: PAIN MANAGEMENT | Age: 58
End: 2018-01-17

## 2018-01-17 VITALS
HEART RATE: 83 BPM | DIASTOLIC BLOOD PRESSURE: 72 MMHG | BODY MASS INDEX: 33.41 KG/M2 | WEIGHT: 207 LBS | SYSTOLIC BLOOD PRESSURE: 116 MMHG

## 2018-01-17 DIAGNOSIS — M16.12 PRIMARY OSTEOARTHRITIS OF LEFT HIP: ICD-10-CM

## 2018-01-17 DIAGNOSIS — M54.16 LUMBAR RADICULOPATHY: ICD-10-CM

## 2018-01-17 DIAGNOSIS — M51.36 DDD (DEGENERATIVE DISC DISEASE), LUMBAR: ICD-10-CM

## 2018-01-17 DIAGNOSIS — G89.4 CHRONIC PAIN SYNDROME: ICD-10-CM

## 2018-01-17 DIAGNOSIS — M79.18 MYOFASCIAL PAIN: ICD-10-CM

## 2018-01-17 PROCEDURE — 3017F COLORECTAL CA SCREEN DOC REV: CPT | Performed by: INTERNAL MEDICINE

## 2018-01-17 PROCEDURE — 99213 OFFICE O/P EST LOW 20 MIN: CPT | Performed by: INTERNAL MEDICINE

## 2018-01-17 PROCEDURE — G8417 CALC BMI ABV UP PARAM F/U: HCPCS | Performed by: INTERNAL MEDICINE

## 2018-01-17 PROCEDURE — G8427 DOCREV CUR MEDS BY ELIG CLIN: HCPCS | Performed by: INTERNAL MEDICINE

## 2018-01-17 PROCEDURE — G8484 FLU IMMUNIZE NO ADMIN: HCPCS | Performed by: INTERNAL MEDICINE

## 2018-01-17 PROCEDURE — 1036F TOBACCO NON-USER: CPT | Performed by: INTERNAL MEDICINE

## 2018-01-17 RX ORDER — HYDROCODONE BITARTRATE AND ACETAMINOPHEN 5; 325 MG/1; MG/1
1 TABLET ORAL EVERY 6 HOURS PRN
Qty: 90 TABLET | Refills: 0 | Status: SHIPPED | OUTPATIENT
Start: 2018-01-17 | End: 2018-02-21 | Stop reason: SDUPTHER

## 2018-01-17 NOTE — PROGRESS NOTES
glipiZIDE (GLUCOTROL XL) 10 MG extended release tablet Take 1 tablet by mouth daily 30 tablet 3    insulin glargine (BASAGLAR KWIKPEN) 100 UNIT/ML injection pen 20 units daily 5 pen 3    metFORMIN (GLUCOPHAGE-XR) 500 MG extended release tablet Take 2 tablets by mouth daily (with breakfast) 60 tablet 2    HYDROcodone-acetaminophen (NORCO) 5-325 MG per tablet Take 1 tablet by mouth every 6 hours as needed for Pain (max 2-3 per day) . 90 tablet 0    meloxicam (MOBIC) 15 MG tablet Take 1 tablet by mouth daily 30 tablet 1    gabapentin (NEURONTIN) 300 MG capsule Take 1 capsule by mouth 3 times daily Take one tablet po in the morning and two tablets po in the evening 90 capsule 0    Blood Glucose Monitoring Suppl (FREESTYLE LITE) RO       FREESTYLE LITE strip       Insulin Pen Needle (PEN NEEDLES) 31G X 6 MM MISC       KROGER LANCETS ULTRATHIN 30G MISC       loratadine (CLARITIN) 10 MG tablet       methocarbamol (ROBAXIN) 500 MG tablet       lisinopril (PRINIVIL;ZESTRIL) 5 MG tablet Take 5 mg by mouth daily      atorvastatin (LIPITOR) 80 MG tablet Take 80 mg by mouth daily. No facility-administered medications prior to visit. SOCIAL/FAMILY/PAST MEDICAL HISTORY: Mr. Briones, family and past medical history was reviewed. REVIEW OF SYSTEMS:    Respiratory: Negative for apnea, chest tightness and shortness of breath or change in baseline breathing. Gastrointestinal: Negative for nausea, vomiting, abdominal pain, diarrhea, constipation, blood in stool and abdominal distention. PHYSICAL EXAM:   Nursing note and vitals reviewed. /72   Pulse 83   Wt 207 lb (93.9 kg)   BMI 33.41 kg/m²   Constitutional: He appears well-developed and well-nourished. No acute distress. Skin: Skin is warm and dry, good turgor. No rash noted. He is not diaphoretic. Cardiovascular: Normal rate, regular rhythm, normal heart sounds, and does not have murmur.      Pulmonary/Chest: Effort normal. No respiratory distress. He does not have wheezes in the lung fields. He has no rales. Neurological/Psychiatric:He is alert and oriented to person, place, and time. Coordination is  normal. His mood isAppropriate and affect is Neutral/Euthymic(normal) . IMPRESSION:   1. Chronic pain syndrome    2. Myofascial pain    3. DDD (degenerative disc disease), lumbar    4. Primary osteoarthritis of left hip    5. Lumbar radiculopathy        PLAN:  Informed verbal consent was obtained  -Continue with current regimen   -ROM/Stretching exercises   -he was advised proper sleep hygiene-told to avoid:use of caffeine or other stimulants after noon, alcohol use near bedtime, long or frequent naps during the day, erratic sleep schedule, heavy meals near bedtime, vigorous exercise near bedtime and use of electronic devices near bedtime   -Continue with Norco 2-3 per day   -[employer] exercises    -Records from Endocrinologist  Reviewed   Current Outpatient Prescriptions   Medication Sig Dispense Refill    Insulin Pen Needle 32G X 4 MM MISC 1 each by Does not apply route daily 100 each 3    glipiZIDE (GLUCOTROL XL) 10 MG extended release tablet Take 1 tablet by mouth daily 30 tablet 3    insulin glargine (BASAGLAR KWIKPEN) 100 UNIT/ML injection pen 20 units daily 5 pen 3    metFORMIN (GLUCOPHAGE-XR) 500 MG extended release tablet Take 2 tablets by mouth daily (with breakfast) 60 tablet 2    HYDROcodone-acetaminophen (NORCO) 5-325 MG per tablet Take 1 tablet by mouth every 6 hours as needed for Pain (max 2-3 per day) .  90 tablet 0    meloxicam (MOBIC) 15 MG tablet Take 1 tablet by mouth daily 30 tablet 1    gabapentin (NEURONTIN) 300 MG capsule Take 1 capsule by mouth 3 times daily Take one tablet po in the morning and two tablets po in the evening 90 capsule 0    Blood Glucose Monitoring Suppl (FREESTYLE LITE) RO       FREESTYLE LITE strip       Insulin Pen Needle (PEN NEEDLES) 31G X 6 MM MISC       KROGER LANCETS ULTRATHIN 30G MISC       loratadine (CLARITIN) 10 MG tablet       methocarbamol (ROBAXIN) 500 MG tablet       lisinopril (PRINIVIL;ZESTRIL) 5 MG tablet Take 5 mg by mouth daily      atorvastatin (LIPITOR) 80 MG tablet Take 80 mg by mouth daily. No current facility-administered medications for this visit. I will continue his current medication regimen  which is part of the above treatment schedule. It has been helping with Mr. Anabella Hernandez chronic  medical problems which for this visit include:   Diagnoses of Chronic pain syndrome, Myofascial pain, DDD (degenerative disc disease), lumbar, Primary osteoarthritis of left hip, and Lumbar radiculopathy were pertinent to this visit. Risks and benefits of the medications and other alternative treatments  including no treatment were discussed with the patient. The common side effects of these medications were also explained to the patient. Informed verbal consent was obtained. Goals of current treatment regimen include improvement in pain, restoration of functioning- with focus on improvement in physical performance, general activity, work or disability,emotional distress, health care utilization and  decreased medication consumption. Will continue to monitor progress towards achieving/maintaining therapeutic goals with special emphasis on  1. Improvement in perceived interfernce  of pain with ADL's. Ability to do home exercises independently. Ability to do household chores indoor and/or outdoor work and social and leisure activities. Improve psychosocial and physical functioning. - he is showing progression towards this treatment goal with the current regimen. He was advised against drinking alcohol with the narcotic pain medicines, advised against driving or handling machinery while adjusting the dose of medicines or if having cognitive  issues related to the current medications. Risk of overdose and death, if medicines not taken as prescribed, were also

## 2018-02-14 ENCOUNTER — HOSPITAL ENCOUNTER (OUTPATIENT)
Dept: DIABETES SERVICES | Age: 58
Discharge: HOME | End: 2018-02-28
Admitting: FAMILY MEDICINE

## 2018-02-14 DIAGNOSIS — E11.65 TYPE 2 DIABETES MELLITUS WITH HYPERGLYCEMIA (HCC): ICD-10-CM

## 2018-02-14 ASSESSMENT — PATIENT HEALTH QUESTIONNAIRE - PHQ9
1. LITTLE INTEREST OR PLEASURE IN DOING THINGS: 0
SUM OF ALL RESPONSES TO PHQ9 QUESTIONS 1 & 2: 0
2. FEELING DOWN, DEPRESSED OR HOPELESS: 0
SUM OF ALL RESPONSES TO PHQ QUESTIONS 1-9: 0

## 2018-02-21 ENCOUNTER — OFFICE VISIT (OUTPATIENT)
Dept: PAIN MANAGEMENT | Age: 58
End: 2018-02-21

## 2018-02-21 VITALS
HEART RATE: 74 BPM | BODY MASS INDEX: 32.6 KG/M2 | WEIGHT: 202 LBS | DIASTOLIC BLOOD PRESSURE: 77 MMHG | SYSTOLIC BLOOD PRESSURE: 127 MMHG

## 2018-02-21 DIAGNOSIS — M54.16 LUMBAR RADICULOPATHY: ICD-10-CM

## 2018-02-21 DIAGNOSIS — G89.4 CHRONIC PAIN SYNDROME: ICD-10-CM

## 2018-02-21 DIAGNOSIS — M79.18 MYOFASCIAL PAIN: ICD-10-CM

## 2018-02-21 DIAGNOSIS — M51.36 DDD (DEGENERATIVE DISC DISEASE), LUMBAR: ICD-10-CM

## 2018-02-21 DIAGNOSIS — M16.12 PRIMARY OSTEOARTHRITIS OF LEFT HIP: ICD-10-CM

## 2018-02-21 PROCEDURE — G8428 CUR MEDS NOT DOCUMENT: HCPCS | Performed by: INTERNAL MEDICINE

## 2018-02-21 PROCEDURE — G8484 FLU IMMUNIZE NO ADMIN: HCPCS | Performed by: INTERNAL MEDICINE

## 2018-02-21 PROCEDURE — G8417 CALC BMI ABV UP PARAM F/U: HCPCS | Performed by: INTERNAL MEDICINE

## 2018-02-21 PROCEDURE — 1036F TOBACCO NON-USER: CPT | Performed by: INTERNAL MEDICINE

## 2018-02-21 PROCEDURE — 99213 OFFICE O/P EST LOW 20 MIN: CPT | Performed by: INTERNAL MEDICINE

## 2018-02-21 PROCEDURE — 3017F COLORECTAL CA SCREEN DOC REV: CPT | Performed by: INTERNAL MEDICINE

## 2018-02-21 RX ORDER — MELOXICAM 15 MG/1
15 TABLET ORAL DAILY
Qty: 30 TABLET | Refills: 1 | Status: SHIPPED | OUTPATIENT
Start: 2018-02-21 | End: 2018-03-28 | Stop reason: SDUPTHER

## 2018-02-21 RX ORDER — HYDROCODONE BITARTRATE AND ACETAMINOPHEN 5; 325 MG/1; MG/1
1 TABLET ORAL EVERY 6 HOURS PRN
Qty: 90 TABLET | Refills: 0 | Status: SHIPPED | OUTPATIENT
Start: 2018-02-21 | End: 2018-03-28 | Stop reason: SDUPTHER

## 2018-02-21 RX ORDER — GABAPENTIN 300 MG/1
CAPSULE ORAL
Qty: 90 CAPSULE | Refills: 1 | Status: SHIPPED | OUTPATIENT
Start: 2018-02-21 | End: 2018-03-28 | Stop reason: SDUPTHER

## 2018-02-21 NOTE — PROGRESS NOTES
MELATONIN PO Take by mouth      HYDROcodone-acetaminophen (NORCO) 5-325 MG per tablet Take 1 tablet by mouth every 6 hours as needed for Pain (max 2-3 per day) for up to 35 days. 90 tablet 0    Insulin Pen Needle 32G X 4 MM MISC 1 each by Does not apply route daily 100 each 3    glipiZIDE (GLUCOTROL XL) 10 MG extended release tablet Take 1 tablet by mouth daily 30 tablet 3    insulin glargine (BASAGLAR KWIKPEN) 100 UNIT/ML injection pen 20 units daily 5 pen 3    metFORMIN (GLUCOPHAGE-XR) 500 MG extended release tablet Take 2 tablets by mouth daily (with breakfast) 60 tablet 2    meloxicam (MOBIC) 15 MG tablet Take 1 tablet by mouth daily 30 tablet 1    gabapentin (NEURONTIN) 300 MG capsule Take 1 capsule by mouth 3 times daily Take one tablet po in the morning and two tablets po in the evening 90 capsule 0    Blood Glucose Monitoring Suppl (FREESTYLE LITE) RO       FREESTYLE LITE strip       Insulin Pen Needle (PEN NEEDLES) 31G X 6 MM MISC       KROGER LANCETS ULTRATHIN 30G MISC       loratadine (CLARITIN) 10 MG tablet       methocarbamol (ROBAXIN) 500 MG tablet       lisinopril (PRINIVIL;ZESTRIL) 5 MG tablet Take 5 mg by mouth daily      atorvastatin (LIPITOR) 80 MG tablet Take 80 mg by mouth daily. No facility-administered medications prior to visit. SOCIAL/FAMILY/PAST MEDICAL HISTORY: Mr. Valentine Kinsey, family and past medical history was reviewed. REVIEW OF SYSTEMS:    Respiratory: Negative for apnea, chest tightness and shortness of breath or change in baseline breathing. Gastrointestinal: Negative for nausea, vomiting, abdominal pain, diarrhea, constipation, blood in stool and abdominal distention. PHYSICAL EXAM:   Nursing note and vitals reviewed. /77 (Site: Left Arm, Position: Sitting, Cuff Size: Medium Adult)   Pulse 74   Wt 202 lb (91.6 kg)   BMI 32.60 kg/m²   Constitutional: He appears well-developed and well-nourished. No acute distress.    Skin: Skin is warm and dry, good turgor. No rash noted. He is not diaphoretic. Cardiovascular: Normal rate, regular rhythm, normal heart sounds, and does not have murmur. Pulmonary/Chest: Effort normal. No respiratory distress. He does not have wheezes in the lung fields. He has no rales. Neurological/Psychiatric:He is alert and oriented to person, place, and time. Coordination is  normal. His mood isAppropriate and affect is Neutral/Euthymic(normal) . IMPRESSION:   1. Chronic pain syndrome    2. Primary osteoarthritis of left hip    3. Myofascial pain    4. DDD (degenerative disc disease), lumbar    5. Lumbar radiculopathy        PLAN:  Informed verbal consent was obtained  -Continue with current regimen   -ROM/Stretching exercises   -Advised caffeine reduction, dietary changes, elevate head end of bed, NPO after supper, if using alcohol advised reduction of alcohol intake, tobacco cessation if smoking, weight loss   -Continue with Mobic as needed   -He was advised to increase fluids ( 5-7  glasses of fluid daily), limit caffeine, avoid cheese products, increase dietary fiber, increase activity and exercise as tolerated and relax regularly and enjoy meals    Current Outpatient Prescriptions   Medication Sig Dispense Refill    MELATONIN PO Take by mouth      HYDROcodone-acetaminophen (NORCO) 5-325 MG per tablet Take 1 tablet by mouth every 6 hours as needed for Pain (max 2-3 per day) for up to 35 days.  90 tablet 0    Insulin Pen Needle 32G X 4 MM MISC 1 each by Does not apply route daily 100 each 3    glipiZIDE (GLUCOTROL XL) 10 MG extended release tablet Take 1 tablet by mouth daily 30 tablet 3    insulin glargine (BASAGLAR KWIKPEN) 100 UNIT/ML injection pen 20 units daily 5 pen 3    metFORMIN (GLUCOPHAGE-XR) 500 MG extended release tablet Take 2 tablets by mouth daily (with breakfast) 60 tablet 2    meloxicam (MOBIC) 15 MG tablet Take 1 tablet by mouth daily 30 tablet 1    gabapentin (NEURONTIN) 300 MG advised against drinking alcohol with the narcotic pain medicines, advised against driving or handling machinery while adjusting the dose of medicines or if having cognitive  issues related to the current medications. Risk of overdose and death, if medicines not taken as prescribed, were also discussed. If the patient develops new symptoms or if the symptoms worsen, the patient should call the office. While transcribing every attempt was made to maintain the accuracy of the note in terms of it's contents,there may have been some errors made inadvertently. Thank you for allowing me to participate in the care of this patient.     Fallon Carlisle MD.    Cc: Aranza Rich MD    I, Mackenzie Huizar, am scribing for and in the presence of Dr. Fallon Carlisle.   02/21/18  11:19 AM  JAVIER Spence, Dr. Fallon Carlisle, personally performed the services described in this documentation as scribed by   Mackenzie Huizar MA in my presence and it is both accurate and complete

## 2018-03-01 ENCOUNTER — HOSPITAL ENCOUNTER (OUTPATIENT)
Dept: OTHER | Age: 58
Discharge: HOME | End: 2018-03-31
Attending: FAMILY MEDICINE | Admitting: FAMILY MEDICINE

## 2018-03-28 ENCOUNTER — OFFICE VISIT (OUTPATIENT)
Dept: PAIN MANAGEMENT | Age: 58
End: 2018-03-28

## 2018-03-28 VITALS
DIASTOLIC BLOOD PRESSURE: 82 MMHG | HEART RATE: 67 BPM | SYSTOLIC BLOOD PRESSURE: 130 MMHG | WEIGHT: 202 LBS | BODY MASS INDEX: 31.64 KG/M2

## 2018-03-28 DIAGNOSIS — M54.16 LUMBAR RADICULOPATHY: ICD-10-CM

## 2018-03-28 DIAGNOSIS — M16.12 PRIMARY OSTEOARTHRITIS OF LEFT HIP: ICD-10-CM

## 2018-03-28 DIAGNOSIS — M51.36 DDD (DEGENERATIVE DISC DISEASE), LUMBAR: ICD-10-CM

## 2018-03-28 DIAGNOSIS — M79.18 MYOFASCIAL PAIN: ICD-10-CM

## 2018-03-28 DIAGNOSIS — G89.4 CHRONIC PAIN SYNDROME: ICD-10-CM

## 2018-03-28 PROCEDURE — 99213 OFFICE O/P EST LOW 20 MIN: CPT | Performed by: INTERNAL MEDICINE

## 2018-03-28 PROCEDURE — G8417 CALC BMI ABV UP PARAM F/U: HCPCS | Performed by: INTERNAL MEDICINE

## 2018-03-28 PROCEDURE — 3017F COLORECTAL CA SCREEN DOC REV: CPT | Performed by: INTERNAL MEDICINE

## 2018-03-28 PROCEDURE — G8427 DOCREV CUR MEDS BY ELIG CLIN: HCPCS | Performed by: INTERNAL MEDICINE

## 2018-03-28 PROCEDURE — 1036F TOBACCO NON-USER: CPT | Performed by: INTERNAL MEDICINE

## 2018-03-28 PROCEDURE — G8484 FLU IMMUNIZE NO ADMIN: HCPCS | Performed by: INTERNAL MEDICINE

## 2018-03-28 RX ORDER — HYDROCODONE BITARTRATE AND ACETAMINOPHEN 5; 325 MG/1; MG/1
1 TABLET ORAL EVERY 6 HOURS PRN
Qty: 90 TABLET | Refills: 0 | Status: SHIPPED | OUTPATIENT
Start: 2018-03-28 | End: 2018-05-02 | Stop reason: SDUPTHER

## 2018-03-28 RX ORDER — GABAPENTIN 300 MG/1
CAPSULE ORAL
Qty: 90 CAPSULE | Refills: 1 | Status: SHIPPED | OUTPATIENT
Start: 2018-03-28 | End: 2018-05-02 | Stop reason: SDUPTHER

## 2018-03-28 RX ORDER — MELOXICAM 15 MG/1
15 TABLET ORAL DAILY
Qty: 30 TABLET | Refills: 1 | Status: SHIPPED | OUTPATIENT
Start: 2018-03-28 | End: 2018-05-02 | Stop reason: SDUPTHER

## 2018-03-28 RX ORDER — IBUPROFEN AND FAMOTIDINE 26.6; 8 MG/1; MG/1
1 TABLET, FILM COATED ORAL 2 TIMES DAILY
Qty: 90 TABLET | Refills: 0 | Status: SHIPPED | OUTPATIENT
Start: 2018-03-28 | End: 2018-05-02

## 2018-03-28 NOTE — PROGRESS NOTES
medications. Risk of overdose and death, if medicines not taken as prescribed, were also discussed. If the patient develops new symptoms or if the symptoms worsen, the patient should call the office. While transcribing every attempt was made to maintain the accuracy of the note in terms of it's contents,there may have been some errors made inadvertently. Thank you for allowing me to participate in the care of this patient.     Hipolito Zavala MD.    Cc: Nasreen Urban MD    I, Carlito Mckeon, am scribing for and in the presence of Dr. Hipolito Zavala.   03/28/18  10:44 AM  JAVIER Haynes, Dr. Hipolito Zavala, personally performed the services described in this documentation as scribed by   Carlito Mckeon MA in my presence and it is both accurate and complete

## 2018-05-02 ENCOUNTER — OFFICE VISIT (OUTPATIENT)
Dept: PAIN MANAGEMENT | Age: 58
End: 2018-05-02

## 2018-05-02 VITALS
WEIGHT: 199 LBS | BODY MASS INDEX: 31.17 KG/M2 | DIASTOLIC BLOOD PRESSURE: 83 MMHG | HEART RATE: 70 BPM | SYSTOLIC BLOOD PRESSURE: 131 MMHG

## 2018-05-02 DIAGNOSIS — G89.4 CHRONIC PAIN SYNDROME: ICD-10-CM

## 2018-05-02 DIAGNOSIS — M54.16 LUMBAR RADICULOPATHY: ICD-10-CM

## 2018-05-02 DIAGNOSIS — M16.12 PRIMARY OSTEOARTHRITIS OF LEFT HIP: ICD-10-CM

## 2018-05-02 DIAGNOSIS — M51.36 DDD (DEGENERATIVE DISC DISEASE), LUMBAR: ICD-10-CM

## 2018-05-02 DIAGNOSIS — M79.18 MYOFASCIAL PAIN: ICD-10-CM

## 2018-05-02 PROCEDURE — 1036F TOBACCO NON-USER: CPT | Performed by: INTERNAL MEDICINE

## 2018-05-02 PROCEDURE — 99213 OFFICE O/P EST LOW 20 MIN: CPT | Performed by: INTERNAL MEDICINE

## 2018-05-02 PROCEDURE — G8427 DOCREV CUR MEDS BY ELIG CLIN: HCPCS | Performed by: INTERNAL MEDICINE

## 2018-05-02 PROCEDURE — 3017F COLORECTAL CA SCREEN DOC REV: CPT | Performed by: INTERNAL MEDICINE

## 2018-05-02 PROCEDURE — G8417 CALC BMI ABV UP PARAM F/U: HCPCS | Performed by: INTERNAL MEDICINE

## 2018-05-02 RX ORDER — GABAPENTIN 300 MG/1
CAPSULE ORAL
Qty: 90 CAPSULE | Refills: 1 | Status: SHIPPED | OUTPATIENT
Start: 2018-05-02 | End: 2018-06-06 | Stop reason: SDUPTHER

## 2018-05-02 RX ORDER — MELOXICAM 15 MG/1
15 TABLET ORAL DAILY
Qty: 30 TABLET | Refills: 1 | Status: SHIPPED | OUTPATIENT
Start: 2018-05-02 | End: 2018-06-06 | Stop reason: SDUPTHER

## 2018-05-02 RX ORDER — HYDROCODONE BITARTRATE AND ACETAMINOPHEN 5; 325 MG/1; MG/1
1 TABLET ORAL EVERY 6 HOURS PRN
Qty: 90 TABLET | Refills: 0 | Status: SHIPPED | OUTPATIENT
Start: 2018-05-02 | End: 2018-06-06 | Stop reason: SDUPTHER

## 2018-06-06 ENCOUNTER — OFFICE VISIT (OUTPATIENT)
Dept: PAIN MANAGEMENT | Age: 58
End: 2018-06-06

## 2018-06-06 VITALS
SYSTOLIC BLOOD PRESSURE: 139 MMHG | HEART RATE: 72 BPM | WEIGHT: 199 LBS | DIASTOLIC BLOOD PRESSURE: 89 MMHG | BODY MASS INDEX: 31.17 KG/M2

## 2018-06-06 DIAGNOSIS — G89.4 CHRONIC PAIN SYNDROME: ICD-10-CM

## 2018-06-06 DIAGNOSIS — M16.12 PRIMARY OSTEOARTHRITIS OF LEFT HIP: ICD-10-CM

## 2018-06-06 DIAGNOSIS — M79.18 MYOFASCIAL PAIN: ICD-10-CM

## 2018-06-06 DIAGNOSIS — M54.16 LUMBAR RADICULOPATHY: ICD-10-CM

## 2018-06-06 DIAGNOSIS — M51.36 DDD (DEGENERATIVE DISC DISEASE), LUMBAR: ICD-10-CM

## 2018-06-06 PROCEDURE — G8427 DOCREV CUR MEDS BY ELIG CLIN: HCPCS | Performed by: INTERNAL MEDICINE

## 2018-06-06 PROCEDURE — 99213 OFFICE O/P EST LOW 20 MIN: CPT | Performed by: INTERNAL MEDICINE

## 2018-06-06 PROCEDURE — 3017F COLORECTAL CA SCREEN DOC REV: CPT | Performed by: INTERNAL MEDICINE

## 2018-06-06 PROCEDURE — G8417 CALC BMI ABV UP PARAM F/U: HCPCS | Performed by: INTERNAL MEDICINE

## 2018-06-06 PROCEDURE — 1036F TOBACCO NON-USER: CPT | Performed by: INTERNAL MEDICINE

## 2018-06-06 RX ORDER — GABAPENTIN 300 MG/1
CAPSULE ORAL
Qty: 90 CAPSULE | Refills: 1 | Status: SHIPPED | OUTPATIENT
Start: 2018-06-06 | End: 2018-07-11 | Stop reason: SDUPTHER

## 2018-06-06 RX ORDER — MELOXICAM 15 MG/1
15 TABLET ORAL DAILY
Qty: 30 TABLET | Refills: 1 | Status: SHIPPED | OUTPATIENT
Start: 2018-06-06 | End: 2018-07-11 | Stop reason: SDUPTHER

## 2018-06-06 RX ORDER — HYDROCODONE BITARTRATE AND ACETAMINOPHEN 5; 325 MG/1; MG/1
1 TABLET ORAL EVERY 6 HOURS PRN
Qty: 90 TABLET | Refills: 0 | Status: SHIPPED | OUTPATIENT
Start: 2018-06-06 | End: 2018-07-11 | Stop reason: SDUPTHER

## 2018-06-14 ENCOUNTER — TELEPHONE (OUTPATIENT)
Dept: ENDOCRINOLOGY | Age: 58
End: 2018-06-14

## 2018-06-14 RX ORDER — METFORMIN HYDROCHLORIDE 500 MG/1
1000 TABLET, EXTENDED RELEASE ORAL
Qty: 60 TABLET | Refills: 2 | Status: SHIPPED | OUTPATIENT
Start: 2018-06-14 | End: 2018-10-15 | Stop reason: SDUPTHER

## 2018-06-14 RX ORDER — GLIPIZIDE 10 MG/1
10 TABLET, FILM COATED, EXTENDED RELEASE ORAL DAILY
Qty: 30 TABLET | Refills: 2 | Status: SHIPPED | OUTPATIENT
Start: 2018-06-14 | End: 2019-07-30

## 2018-07-11 ENCOUNTER — OFFICE VISIT (OUTPATIENT)
Dept: PAIN MANAGEMENT | Age: 58
End: 2018-07-11

## 2018-07-11 VITALS — SYSTOLIC BLOOD PRESSURE: 138 MMHG | DIASTOLIC BLOOD PRESSURE: 81 MMHG | HEART RATE: 79 BPM

## 2018-07-11 DIAGNOSIS — G89.4 CHRONIC PAIN SYNDROME: ICD-10-CM

## 2018-07-11 DIAGNOSIS — M54.16 LUMBAR RADICULOPATHY: ICD-10-CM

## 2018-07-11 DIAGNOSIS — M79.18 MYOFASCIAL PAIN: ICD-10-CM

## 2018-07-11 DIAGNOSIS — M16.12 PRIMARY OSTEOARTHRITIS OF LEFT HIP: ICD-10-CM

## 2018-07-11 DIAGNOSIS — M51.36 DDD (DEGENERATIVE DISC DISEASE), LUMBAR: ICD-10-CM

## 2018-07-11 PROCEDURE — 99213 OFFICE O/P EST LOW 20 MIN: CPT | Performed by: INTERNAL MEDICINE

## 2018-07-11 PROCEDURE — 3017F COLORECTAL CA SCREEN DOC REV: CPT | Performed by: INTERNAL MEDICINE

## 2018-07-11 PROCEDURE — G8417 CALC BMI ABV UP PARAM F/U: HCPCS | Performed by: INTERNAL MEDICINE

## 2018-07-11 PROCEDURE — G8427 DOCREV CUR MEDS BY ELIG CLIN: HCPCS | Performed by: INTERNAL MEDICINE

## 2018-07-11 PROCEDURE — 1036F TOBACCO NON-USER: CPT | Performed by: INTERNAL MEDICINE

## 2018-07-11 RX ORDER — GABAPENTIN 300 MG/1
CAPSULE ORAL
Qty: 90 CAPSULE | Refills: 1 | Status: SHIPPED | OUTPATIENT
Start: 2018-07-11 | End: 2018-08-15 | Stop reason: SDUPTHER

## 2018-07-11 RX ORDER — MELOXICAM 15 MG/1
15 TABLET ORAL DAILY
Qty: 30 TABLET | Refills: 1 | Status: SHIPPED | OUTPATIENT
Start: 2018-07-11 | End: 2018-08-15 | Stop reason: SDUPTHER

## 2018-07-11 RX ORDER — HYDROCODONE BITARTRATE AND ACETAMINOPHEN 5; 325 MG/1; MG/1
1 TABLET ORAL EVERY 6 HOURS PRN
Qty: 90 TABLET | Refills: 0 | Status: SHIPPED | OUTPATIENT
Start: 2018-07-11 | End: 2018-08-15 | Stop reason: SDUPTHER

## 2018-07-11 NOTE — PROGRESS NOTES
Insulin Pen Needle (PEN NEEDLES) 31G X 6 MM MISC       KROGER LANCETS ULTRATHIN 30G MISC       loratadine (CLARITIN) 10 MG tablet       lisinopril (PRINIVIL;ZESTRIL) 5 MG tablet Take 5 mg by mouth daily      atorvastatin (LIPITOR) 80 MG tablet Take 80 mg by mouth daily.  gabapentin (NEURONTIN) 300 MG capsule Take one tablet po in the morning and two tablets po in the evening. 90 capsule 1     No current facility-administered medications for this visit. I will continue his current medication regimen  which is part of the above treatment schedule. It has been helping with Mr. Phuc Roberto chronic  medical problems which for this visit include:   Diagnoses of Chronic pain syndrome, Primary osteoarthritis of left hip, DDD (degenerative disc disease), lumbar, and Lumbar radiculopathy were pertinent to this visit. Risks and benefits of the medications and other alternative treatments  including no treatment were discussed with the patient. The common side effects of these medications were also explained to the patient. Informed verbal consent was obtained. Goals of current treatment regimen include improvement in pain, restoration of functioning- with focus on improvement in physical performance, general activity, work or disability,emotional distress, health care utilization and  decreased medication consumption. Will continue to monitor progress towards achieving/maintaining therapeutic goals with special emphasis on  1. Improvement in perceived interfernce  of pain with ADL's. Ability to do home exercises independently. Ability to do household chores indoor and/or outdoor work and social and leisure activities. Improve psychosocial and physical functioning. - he is showing progression towards this treatment goal with the current regimen.       He was advised against drinking alcohol with the narcotic pain medicines, advised against driving or handling machinery while adjusting the dose of medicines or if having cognitive  issues related to the current medications. Risk of overdose and death, if medicines not taken as prescribed, were also discussed. If the patient develops new symptoms or if the symptoms worsen, the patient should call the office. While transcribing every attempt was made to maintain the accuracy of the note in terms of it's contents,there may have been some errors made inadvertently. Thank you for allowing me to participate in the care of this patient.     Afshin Baca MD.    Cc: Aster Holloway MD    I, Chris Ennis, am scribing for and in the presence of Dr. Afshin Baca.   07/11/18  2:56 PM  JAVIER Ferro, Dr. Afshin Baca, personally performed the services described in this documentation as scribed by   Chris Ennis MA in my presence and it is both accurate and complete

## 2018-08-15 ENCOUNTER — OFFICE VISIT (OUTPATIENT)
Dept: PAIN MANAGEMENT | Age: 58
End: 2018-08-15

## 2018-08-15 VITALS
HEART RATE: 68 BPM | BODY MASS INDEX: 33.2 KG/M2 | DIASTOLIC BLOOD PRESSURE: 89 MMHG | SYSTOLIC BLOOD PRESSURE: 135 MMHG | WEIGHT: 212 LBS

## 2018-08-15 DIAGNOSIS — M54.16 LUMBAR RADICULOPATHY: ICD-10-CM

## 2018-08-15 DIAGNOSIS — M79.18 MYOFASCIAL PAIN: ICD-10-CM

## 2018-08-15 DIAGNOSIS — M16.12 PRIMARY OSTEOARTHRITIS OF LEFT HIP: ICD-10-CM

## 2018-08-15 DIAGNOSIS — G89.4 CHRONIC PAIN SYNDROME: ICD-10-CM

## 2018-08-15 DIAGNOSIS — M51.36 DDD (DEGENERATIVE DISC DISEASE), LUMBAR: ICD-10-CM

## 2018-08-15 PROCEDURE — 99213 OFFICE O/P EST LOW 20 MIN: CPT | Performed by: INTERNAL MEDICINE

## 2018-08-15 PROCEDURE — 3017F COLORECTAL CA SCREEN DOC REV: CPT | Performed by: INTERNAL MEDICINE

## 2018-08-15 PROCEDURE — G8417 CALC BMI ABV UP PARAM F/U: HCPCS | Performed by: INTERNAL MEDICINE

## 2018-08-15 PROCEDURE — G8427 DOCREV CUR MEDS BY ELIG CLIN: HCPCS | Performed by: INTERNAL MEDICINE

## 2018-08-15 PROCEDURE — 1036F TOBACCO NON-USER: CPT | Performed by: INTERNAL MEDICINE

## 2018-08-15 RX ORDER — GABAPENTIN 300 MG/1
CAPSULE ORAL
Qty: 90 CAPSULE | Refills: 1 | Status: SHIPPED | OUTPATIENT
Start: 2018-08-15 | End: 2018-09-19 | Stop reason: SDUPTHER

## 2018-08-15 RX ORDER — MELOXICAM 15 MG/1
15 TABLET ORAL DAILY
Qty: 30 TABLET | Refills: 1 | Status: SHIPPED | OUTPATIENT
Start: 2018-08-15 | End: 2018-09-19 | Stop reason: SDUPTHER

## 2018-08-15 RX ORDER — HYDROCODONE BITARTRATE AND ACETAMINOPHEN 5; 325 MG/1; MG/1
1 TABLET ORAL EVERY 6 HOURS PRN
Qty: 90 TABLET | Refills: 0 | Status: SHIPPED | OUTPATIENT
Start: 2018-08-15 | End: 2018-09-19 | Stop reason: SDUPTHER

## 2018-08-15 NOTE — PROGRESS NOTES
were reviewed     MEDICATIONS: Mr. Emiliano Martines list of medications were reviewed. His current medications are   Outpatient Medications Prior to Visit   Medication Sig Dispense Refill    HYDROcodone-acetaminophen (NORCO) 5-325 MG per tablet Take 1 tablet by mouth every 6 hours as needed for Pain (max 2-3 per day) for up to 35 days. Alysa Atwood Date: 7/11/18 90 tablet 0    meloxicam (MOBIC) 15 MG tablet Take 1 tablet by mouth daily 30 tablet 1    glipiZIDE (GLUCOTROL XL) 10 MG extended release tablet Take 1 tablet by mouth daily 30 tablet 2    insulin glargine (BASAGLAR KWIKPEN) 100 UNIT/ML injection pen 20 units daily 15 mL 1    metFORMIN (GLUCOPHAGE-XR) 500 MG extended release tablet Take 2 tablets by mouth daily (with breakfast) 60 tablet 2    MELATONIN PO Take by mouth      Insulin Pen Needle 32G X 4 MM MISC 1 each by Does not apply route daily 100 each 3    Blood Glucose Monitoring Suppl (FREESTYLE LITE) RO       FREESTYLE LITE strip       Insulin Pen Needle (PEN NEEDLES) 31G X 6 MM MISC       KROGER LANCETS ULTRATHIN 30G MISC       loratadine (CLARITIN) 10 MG tablet       lisinopril (PRINIVIL;ZESTRIL) 5 MG tablet Take 5 mg by mouth daily      atorvastatin (LIPITOR) 80 MG tablet Take 80 mg by mouth daily.  gabapentin (NEURONTIN) 300 MG capsule Take one tablet po in the morning and two tablets po in the evening. 90 capsule 1     No facility-administered medications prior to visit. SOCIAL/FAMILY/PAST MEDICAL HISTORY: Mr. [facility], family and past medical history was reviewed. REVIEW OF SYSTEMS:    Respiratory: Negative for apnea, chest tightness and shortness of breath or change in baseline breathing. Gastrointestinal: Negative for nausea, vomiting, abdominal pain, diarrhea, constipation, blood in stool and abdominal distention. PHYSICAL EXAM:   Nursing note and vitals reviewed.  /89   Pulse 68   Wt 212 lb (96.2 kg)   BMI 33.20 kg/m²   Constitutional: He appears well-developed and well-nourished. No acute distress. Skin: Skin is warm and dry, good turgor. No rash noted. He is not diaphoretic. Cardiovascular: Normal rate, regular rhythm, normal heart sounds, and does not have murmur. Pulmonary/Chest: Effort normal. No respiratory distress. He does not have wheezes in the lung fields. He has no rales. Neurological/Psychiatric:He is alert and oriented to person, place, and time. Coordination is  normal. His mood isAppropriate and affect is Neutral/Euthymic(normal) . IMPRESSION:   1. Myofascial pain    2. Chronic pain syndrome    3. Primary osteoarthritis of left hip    4. DDD (degenerative disc disease), lumbar    5. Lumbar radiculopathy        PLAN:  Informed verbal consent was obtained  -Continue with current regimen   -ROM/stretching exercises as advised   -Walk 20 minutes daily   -He was advised weight reduction, diet changes- 800-1200 shda diet, diet diary, exercising, nutritional  consult increased physical activity as tolerated  -He was advised to increase fluids ( 5-7  glasses of fluid daily), limit caffeine, avoid cheese products, increase dietary fiber, increase activity and exercise as tolerated and relax regularly and enjoy meals     Current Outpatient Prescriptions   Medication Sig Dispense Refill    HYDROcodone-acetaminophen (NORCO) 5-325 MG per tablet Take 1 tablet by mouth every 6 hours as needed for Pain (max 2-3 per day) for up to 35 days. Jose Lopez Date: 7/11/18 90 tablet 0    meloxicam (MOBIC) 15 MG tablet Take 1 tablet by mouth daily 30 tablet 1    glipiZIDE (GLUCOTROL XL) 10 MG extended release tablet Take 1 tablet by mouth daily 30 tablet 2    insulin glargine (BASAGLAR KWIKPEN) 100 UNIT/ML injection pen 20 units daily 15 mL 1    metFORMIN (GLUCOPHAGE-XR) 500 MG extended release tablet Take 2 tablets by mouth daily (with breakfast) 60 tablet 2    MELATONIN PO Take by mouth      Insulin Pen Needle 32G X 4 MM MISC 1 each by Does Statement Selected

## 2018-09-18 ENCOUNTER — OFFICE VISIT (OUTPATIENT)
Dept: ENDOCRINOLOGY | Age: 58
End: 2018-09-18

## 2018-09-18 ENCOUNTER — TELEPHONE (OUTPATIENT)
Dept: ENDOCRINOLOGY | Age: 58
End: 2018-09-18

## 2018-09-18 VITALS
HEIGHT: 67 IN | RESPIRATION RATE: 16 BRPM | SYSTOLIC BLOOD PRESSURE: 139 MMHG | WEIGHT: 211.6 LBS | DIASTOLIC BLOOD PRESSURE: 84 MMHG | BODY MASS INDEX: 33.21 KG/M2 | OXYGEN SATURATION: 98 % | HEART RATE: 96 BPM

## 2018-09-18 DIAGNOSIS — E78.49 OTHER HYPERLIPIDEMIA: ICD-10-CM

## 2018-09-18 PROCEDURE — 3046F HEMOGLOBIN A1C LEVEL >9.0%: CPT | Performed by: INTERNAL MEDICINE

## 2018-09-18 PROCEDURE — G8427 DOCREV CUR MEDS BY ELIG CLIN: HCPCS | Performed by: INTERNAL MEDICINE

## 2018-09-18 PROCEDURE — 3017F COLORECTAL CA SCREEN DOC REV: CPT | Performed by: INTERNAL MEDICINE

## 2018-09-18 PROCEDURE — 1036F TOBACCO NON-USER: CPT | Performed by: INTERNAL MEDICINE

## 2018-09-18 PROCEDURE — 99214 OFFICE O/P EST MOD 30 MIN: CPT | Performed by: INTERNAL MEDICINE

## 2018-09-18 PROCEDURE — G8417 CALC BMI ABV UP PARAM F/U: HCPCS | Performed by: INTERNAL MEDICINE

## 2018-09-18 PROCEDURE — 2022F DILAT RTA XM EVC RTNOPTHY: CPT | Performed by: INTERNAL MEDICINE

## 2018-09-18 RX ORDER — FLASH GLUCOSE SENSOR
KIT MISCELLANEOUS
Qty: 3 EACH | Refills: 2 | Status: SHIPPED | OUTPATIENT
Start: 2018-09-18 | End: 2019-07-30

## 2018-09-18 RX ORDER — LANCETS 30 GAUGE
1 EACH MISCELLANEOUS 3 TIMES DAILY
Qty: 100 EACH | Refills: 3 | Status: SHIPPED | OUTPATIENT
Start: 2018-09-18 | End: 2018-10-16 | Stop reason: SDUPTHER

## 2018-09-18 RX ORDER — BLOOD-GLUCOSE METER
KIT MISCELLANEOUS
Qty: 1 DEVICE | Refills: 3 | Status: SHIPPED | OUTPATIENT
Start: 2018-09-18 | End: 2020-11-12 | Stop reason: SDUPTHER

## 2018-09-18 RX ORDER — BLOOD-GLUCOSE METER
KIT MISCELLANEOUS
Qty: 100 EACH | Refills: 2 | Status: SHIPPED | OUTPATIENT
Start: 2018-09-18 | End: 2018-10-16 | Stop reason: SDUPTHER

## 2018-09-18 RX ORDER — FLASH GLUCOSE SENSOR
KIT MISCELLANEOUS
Qty: 1 DEVICE | Refills: 0 | Status: SHIPPED | OUTPATIENT
Start: 2018-09-18 | End: 2019-07-30

## 2018-09-18 NOTE — TELEPHONE ENCOUNTER
Pt has questions about his insulin and how to take it. He would like a call back this afternoon as he is due to take his insulin soon and needs to know how to take it.

## 2018-09-18 NOTE — PROGRESS NOTES
Seen as  patient for diabetes    Interim: Seen after 1/18  Not following regularly  Busy with taking care of parents/issues  Er yesterday , felt manic  Blood glucose 364    Diagnosed with Type 2 diabetes mellitus in  2010  Known diabetic complications: none     Current diabetic medications     Metformin ER 500mg 2 tab daily  Glipizide ER 10mg  lantus 20 units    He was started on insulin, did not start it yet, wanted to avoid    Last A1c 11.4% <-----13.9%<----- 14.1<--- 11.4 <--- 9.9 <------6.3%     Prior visit with dietician: no  Current diet: on average, 3 meals per day  No CHO counting  Current exercise:yes  Current monitoring regimen: home blood tests - 1  times daily     Has brought blood glucose log/meter: No   Home blood sugar records:200-300  Any episodes of hypoglycemia? 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 . 90 tablet 0    meloxicam (MOBIC) 15 MG tablet Take 1 tablet by mouth daily 30 tablet 1    glipiZIDE (GLUCOTROL XL) 10 MG extended release tablet Take 1 tablet by mouth daily 30 tablet 2    insulin glargine (BASAGLAR KWIKPEN) 100 UNIT/ML injection pen 20 units daily 15 mL 1    metFORMIN (GLUCOPHAGE-XR) 500 MG extended release tablet Take 2 tablets by mouth daily (with breakfast) 60 tablet 2    MELATONIN PO Take by mouth      Insulin Pen Needle 32G X 4 MM MISC 1 each by Does not apply route daily 100 each 3    Blood Glucose Monitoring Suppl (FREESTYLE LITE) RO       FREESTYLE LITE strip       Insulin Pen Needle (PEN NEEDLES) 31G X 6 MM MISC       KROGER LANCETS ULTRATHIN 30G MISC       loratadine (CLARITIN) 10 MG tablet       lisinopril (PRINIVIL;ZESTRIL) 5 MG tablet Take 20 mg by mouth daily       atorvastatin (LIPITOR) 80 MG tablet Take 80 mg by mouth daily.  gabapentin (NEURONTIN) 300 MG capsule Take one tablet po in the morning and two tablets po in the evening. 90 capsule 1     No current facility-administered medications for this visit. Review of Systems  Please see scanned document dated and signed      Objective:      /84   Pulse 96   Resp 16   Ht 5' 7\" (1.702 m)   Wt 211 lb 9.6 oz (96 kg)   SpO2 98%   BMI 33.14 kg/m²   Wt Readings from Last 3 Encounters:   09/18/18 211 lb 9.6 oz (96 kg)   08/15/18 212 lb (96.2 kg)   06/06/18 199 lb (90.3 kg)     Constitutional: Well-developed, appears stated age, cooperative, in no acute distress  H/E/N/M/T:atraumatic, normocephalic, external ears, nose, lips normal without lesions  Eyes: Lids, lashes, conjunctivae and sclerae normal, No proptosis, no redness  Neck: supple, symmetrical, no swelling  Skin: No obvious rashes or lesions present.   Skin and hair texture normal  Psychiatric: Judgement and Insight:  judgement and insight appear normal  Neuro: Normal without focal findings, speech is normal normal, speech is spontaneous  Chest: No labored breathing, no chest deformity, no stridor  Musculoskeletal: No joint deformity, swelling    Lab Reviewed   No components found for: CHLPL  No results found for: TRIG  No results found for: HDL  No results found for: LDLCALC  No results found for: LABVLDL  Lab Results   Component Value Date    LABA1C 13.9 01/08/2018       Assessment:     Daniel Velasquez is a 60 y.o. male with :    1.T2DM : Longstanding,

## 2018-09-19 ENCOUNTER — OFFICE VISIT (OUTPATIENT)
Dept: PAIN MANAGEMENT | Age: 58
End: 2018-09-19

## 2018-09-19 VITALS
DIASTOLIC BLOOD PRESSURE: 85 MMHG | HEART RATE: 75 BPM | SYSTOLIC BLOOD PRESSURE: 139 MMHG | WEIGHT: 214 LBS | BODY MASS INDEX: 33.52 KG/M2

## 2018-09-19 DIAGNOSIS — M51.36 DDD (DEGENERATIVE DISC DISEASE), LUMBAR: ICD-10-CM

## 2018-09-19 DIAGNOSIS — M54.16 LUMBAR RADICULOPATHY: ICD-10-CM

## 2018-09-19 DIAGNOSIS — M16.12 PRIMARY OSTEOARTHRITIS OF LEFT HIP: ICD-10-CM

## 2018-09-19 DIAGNOSIS — G89.4 CHRONIC PAIN SYNDROME: ICD-10-CM

## 2018-09-19 DIAGNOSIS — M79.18 MYOFASCIAL PAIN: ICD-10-CM

## 2018-09-19 PROCEDURE — G8417 CALC BMI ABV UP PARAM F/U: HCPCS | Performed by: INTERNAL MEDICINE

## 2018-09-19 PROCEDURE — G8427 DOCREV CUR MEDS BY ELIG CLIN: HCPCS | Performed by: INTERNAL MEDICINE

## 2018-09-19 PROCEDURE — 3017F COLORECTAL CA SCREEN DOC REV: CPT | Performed by: INTERNAL MEDICINE

## 2018-09-19 PROCEDURE — 99213 OFFICE O/P EST LOW 20 MIN: CPT | Performed by: INTERNAL MEDICINE

## 2018-09-19 PROCEDURE — 1036F TOBACCO NON-USER: CPT | Performed by: INTERNAL MEDICINE

## 2018-09-19 RX ORDER — HYDROCODONE BITARTRATE AND ACETAMINOPHEN 5; 325 MG/1; MG/1
1 TABLET ORAL EVERY 6 HOURS PRN
Qty: 120 TABLET | Refills: 0 | Status: SHIPPED | OUTPATIENT
Start: 2018-09-19 | End: 2018-10-31 | Stop reason: SDUPTHER

## 2018-09-19 RX ORDER — GABAPENTIN 300 MG/1
CAPSULE ORAL
Qty: 90 CAPSULE | Refills: 1 | Status: SHIPPED | OUTPATIENT
Start: 2018-09-19 | End: 2018-10-31 | Stop reason: SDUPTHER

## 2018-09-19 RX ORDER — MELOXICAM 15 MG/1
15 TABLET ORAL DAILY
Qty: 30 TABLET | Refills: 1 | Status: SHIPPED | OUTPATIENT
Start: 2018-09-19 | End: 2018-10-31 | Stop reason: SDUPTHER

## 2018-09-19 NOTE — PROGRESS NOTES
Makenzie Davenport  1960  [id number]    HISTORY OF PRESENT ILLNESS:  Mr. Nelda Pereira is a 60 y.o. male returns for a follow up visit for multiple medical problems. His current presenting problems are   1. Chronic pain syndrome    2. DDD (degenerative disc disease), lumbar    3. Myofascial pain    4. Lumbar radiculopathy    . As per information/history obtained from the PADT(patient assessment and documentation tool) - He complains of pain in the shoulders Left, lower back and knees Bilateral with radiation to the buttocks, hips Bilateral, upper leg Bilateral, ankles Bilateral and feet Bilateral He rates the pain 7/10 and describes it as aching, burning. Pain is made worse by: walking, standing, sitting, lifting. Current treatment regimen has helped relieve about 60% of the pain. He denies side effects from the current pain regimen. Patient reports that since the last follow up visit the physical functioning is worse, family/social relationships are unchanged, mood is unchanged and sleep patterns are unchanged, and that the overall functioning is unchanged. Patient denies neurological bowel or bladder. Patient denies misusing/abusing his narcotic pain medications or using any illegal drugs. There are No indicators for possible drug abuse, addiction or diversion problems. Dinesh Jones Upon obtaining the medical history from Mr. Nelda Pereira regarding today's office visit for his presenting problems, patient states he has been doing fair, managing with the medications. Mr. Nelda Pereira states he has been using Norco along with Mobic and Neurontin. Patient denies any GERD symptoms. He says he was in the ER for increase blood sugar, he is on insulin also. Patient is complaining of some increase pain in the back/shoulders. ALLERGIES: Patients list of allergies were reviewed     MEDICATIONS: Mr. Nelda Pereira list of medications were reviewed. His current medications are   Outpatient Medications Prior to Visit   Medication Sig Dispense Refill    release tablet Take 1 tablet by mouth daily 30 tablet 2    insulin glargine (BASAGLAR KWIKPEN) 100 UNIT/ML injection pen 20 units daily 15 mL 1    metFORMIN (GLUCOPHAGE-XR) 500 MG extended release tablet Take 2 tablets by mouth daily (with breakfast) 60 tablet 2    MELATONIN PO Take by mouth      Insulin Pen Needle 32G X 4 MM MISC 1 each by Does not apply route daily 100 each 3    Insulin Pen Needle (PEN NEEDLES) 31G X 6 MM MISC       KROGER LANCETS ULTRATHIN 30G MISC       loratadine (CLARITIN) 10 MG tablet       lisinopril (PRINIVIL;ZESTRIL) 5 MG tablet Take 20 mg by mouth daily       atorvastatin (LIPITOR) 80 MG tablet Take 80 mg by mouth daily.  gabapentin (NEURONTIN) 300 MG capsule Take one tablet po in the morning and two tablets po in the evening. 90 capsule 1     No current facility-administered medications for this visit. I will continue his current medication regimen  which is part of the above treatment schedule. It has been helping with Mr. Tijerina Jerson chronic  medical problems which for this visit include:   Diagnoses of Chronic pain syndrome, DDD (degenerative disc disease), lumbar, Myofascial pain, and Lumbar radiculopathy were pertinent to this visit. Risks and benefits of the medications and other alternative treatments  including no treatment were discussed with the patient. The common side effects of these medications were also explained to the patient. Informed verbal consent was obtained. Goals of current treatment regimen include improvement in pain, restoration of functioning- with focus on improvement in physical performance, general activity, work or disability,emotional distress, health care utilization and  decreased medication consumption. Will continue to monitor progress towards achieving/maintaining therapeutic goals with special emphasis on  1. Improvement in perceived interfernce  of pain with ADL's. Ability to do home exercises independently.  Ability to do

## 2018-09-26 ENCOUNTER — HOSPITAL ENCOUNTER (OUTPATIENT)
Dept: PHYSICAL THERAPY | Age: 58
Setting detail: THERAPIES SERIES
Discharge: HOME | End: 2018-09-26
Admitting: INTERNAL MEDICINE
Payer: COMMERCIAL

## 2018-09-26 PROCEDURE — G0283 ELEC STIM OTHER THAN WOUND: HCPCS

## 2018-09-26 PROCEDURE — 97110 THERAPEUTIC EXERCISES: CPT

## 2018-09-26 PROCEDURE — 97140 MANUAL THERAPY 1/> REGIONS: CPT

## 2018-09-26 NOTE — FLOWSHEET NOTE
Physical Therapy Daily Treatment Note  Date:  2018    Patient Name:  Roxana Parker    :  1960  MRN: [id number]    Restrictions/Precautions: Restrictions/Precautions  Restrictions/Precautions: Fall Risk (none)    Pertinent Medical History: Additional Pertinent Hx: DM, OA    Medical/Treatment Diagnosis Information:  · Diagnosis: low back pain: shoulder pain  · Treatment Diagnosis: pain, dec mobility and strength    Insurance/Certification information:     Physician Information:  Referring Practitioner: Dr. Michel Bajwa of care signed (Y/N): Cosign sent    Visit# / total visits:    Pain level: 8/10 LB,  010 shoulder    G-Code (if applicable):      Date / Visit # G-Code Applied:  /  PT G-Codes  Functional Assessment Tool Used: UEFS  Score: 15  Functional Limitation: Changing and maintaining body position  Changing and Maintaining Body Position Discharge Status (): At least 80 percent but less than 100 percent impaired, limited or restricted    Progress Note: []  Yes  [x]  No  Next due by: Visit #10      History of Injury: Patient has history of chronic pain in low back and gluts, he has been under a lot of stresswtih caregiving for his parents, blood sugars out of control and  feels like he is getting close to a manic depression state. He talked about admitting himself to the hospital  Subjective:   Increased LBP, did not take pain meds last night    Objective:  Observation:   Test measurements:  See objective data      Exercises:  Exercise/Equipment Resistance/Repetitions Other comments        NT #7 6 min L-0    Door stretch  20 sec x 3 hep   Codman'sPendulum  x 10 each hep   Chair stretch 20 sec x 3 hep   Wall wash 4 way  X 10 each hep   Theraband  yellow rows  Ext  IR/ER Add          bridging x10                         Manual cervical traction 10 sec x10                 STM  B LB    IFC/HP 15 min  B LB      Other Therapeutic Activities:  Patient was educated on diagnosis, plan of

## 2018-10-02 ENCOUNTER — HOSPITAL ENCOUNTER (OUTPATIENT)
Dept: PHYSICAL THERAPY | Age: 58
Setting detail: THERAPIES SERIES
Discharge: HOME | End: 2018-10-02
Admitting: INTERNAL MEDICINE
Payer: COMMERCIAL

## 2018-10-02 PROCEDURE — G0283 ELEC STIM OTHER THAN WOUND: HCPCS

## 2018-10-02 PROCEDURE — 97110 THERAPEUTIC EXERCISES: CPT

## 2018-10-02 PROCEDURE — 97140 MANUAL THERAPY 1/> REGIONS: CPT

## 2018-10-02 NOTE — FLOWSHEET NOTE
diagnosis, plan of care and prognosis of their complaint. Also, frequency and duration of treatments was discussed. Patient was informed of the attendance policy and issued a copy for their records. Home Exercise Program: Patient given home exercise program and demonstrates correct technique. HEP booklet also issued. Timed Code Treatment Minutes:  35    Total Treatment Minutes:  50  Treatment/Activity Tolerance:  [x] Patient tolerated treatment well [] Patient limited by fatigue  [] Patient limited by pain  [] Patient limited by other medical complications  [] Other:     Prognosis: [x] Good [] Fair  [] Poor    Goals:    Short term goals  Time Frame for Short term goals: 2 weeks  Short term goal 1: Decrease pain to 6/10 at worst  Short term goal 2: Patient independent with hep   Short term goal 3: Patient has increased ROM by 10° all planes      Long term goals  Time Frame for Long term goals : 4 weeks  Long term goal 1: Decrease pain to 3/10 at worst with activity  Long term goal 2: patient able to resume swimming  with overhead motion no increased pain  Long term goal 3: Patient able to control DM with meds, exercises and diet       Patient Requires Follow-up: [x] Yes  [] No    Plan:   [] Continue per plan of care [] Alter current plan (see comments)  [x] Plan of care initiated [] Hold pending MD visit [] Discharge    Plan for Next Session: See above    Electronically signed by:   Sukhwinder Alaniz, [address] [place] [address]

## 2018-10-04 ENCOUNTER — HOSPITAL ENCOUNTER (OUTPATIENT)
Dept: PHYSICAL THERAPY | Age: 58
Setting detail: THERAPIES SERIES
Discharge: HOME | End: 2018-10-04
Admitting: INTERNAL MEDICINE
Payer: COMMERCIAL

## 2018-10-04 PROCEDURE — 97140 MANUAL THERAPY 1/> REGIONS: CPT

## 2018-10-04 PROCEDURE — 97110 THERAPEUTIC EXERCISES: CPT

## 2018-10-04 PROCEDURE — G0283 ELEC STIM OTHER THAN WOUND: HCPCS

## 2018-10-08 ENCOUNTER — HOSPITAL ENCOUNTER (OUTPATIENT)
Dept: PHYSICAL THERAPY | Age: 58
Setting detail: THERAPIES SERIES
Discharge: HOME | End: 2018-10-08
Admitting: INTERNAL MEDICINE
Payer: COMMERCIAL

## 2018-10-08 NOTE — FLOWSHEET NOTE
Physical Therapy  Cancellation/No-show Note  Patient Name:  Herman Oquendo  :  1960   Date:  10/8/2018  Cancelled visits to date: 1  No-shows to date: 0    For today's appointment patient:  [x]  Cancelled  []  Rescheduled appointment  []  No-show     Reason given by patient:  []  Patient ill  []  Conflicting appointment  []  No transportation    []  Conflict with work  [x]  No reason given  []  Other:     Comments:  Not sure why his appointment was on Monday  Electronically signed by:   Brian Andersen [id number]

## 2018-10-10 ENCOUNTER — HOSPITAL ENCOUNTER (OUTPATIENT)
Dept: PHYSICAL THERAPY | Age: 58
Setting detail: THERAPIES SERIES
Discharge: HOME | End: 2018-10-10
Admitting: INTERNAL MEDICINE
Payer: COMMERCIAL

## 2018-10-10 PROCEDURE — G0283 ELEC STIM OTHER THAN WOUND: HCPCS

## 2018-10-10 PROCEDURE — 97140 MANUAL THERAPY 1/> REGIONS: CPT

## 2018-10-10 PROCEDURE — 97110 THERAPEUTIC EXERCISES: CPT

## 2018-10-10 NOTE — FLOWSHEET NOTE
Physical Therapy Daily Treatment Note  Date:  10/10/2018    Patient Name:  Christal Ramirez    :  1960  MRN: [id number]    Restrictions/Precautions: Restrictions/Precautions  Restrictions/Precautions: Fall Risk (none)    Pertinent Medical History: Additional Pertinent Hx: DM, OA    Medical/Treatment Diagnosis Information:  · Diagnosis: low back pain: shoulder pain  · Treatment Diagnosis: pain, dec mobility and strength    Insurance/Certification information:     Physician Information:  Referring Practitioner: Dr. Sima Bell of care signed (Y/N): Cosign sent    Visit# / total visits:    Pain level: 6/10 LB,  0/10 shoulder    G-Code (if applicable):      Date / Visit # G-Code Applied:  /  PT G-Codes  Functional Assessment Tool Used: UEFS  Score: 15  Functional Limitation: Changing and maintaining body position  Changing and Maintaining Body Position Discharge Status (): At least 80 percent but less than 100 percent impaired, limited or restricted    Progress Note: []  Yes  [x]  No  Next due by: Visit #10      History of Injury: Patient has history of chronic pain in low back and gluts, he has been under a lot of stresswtih caregiving for his parents, blood sugars out of control and  feels like he is getting close to a manic depression state. He talked about admitting himself to the hospital  Subjective:  Pain bad yesterday but he thinks he forgot his meds the previous evening.   Better today  Objective:  Observation:   Test measurements:  See objective data      Exercises:  Exercise/Equipment Resistance/Repetitions Other comments        NT #7 6 min L-0         HSS/GSS  Door stretch  20 sec x 3 hep   hep   hep   hep   Wall slides x10      quadriped x5 alt arm/leg Add opp arm/leg   Bridging abd/add x10     Swiss Ball  green 1 min NS  4 way x 10 each  Rows x 10 green  Marching x 10    Seated abdominal 5 sec x 10 Add diagonals   LTR 5 sec x 10                         STM  B LB    IFC/HP 15 min  B LB      Other Therapeutic Activities:  Patient was educated on diagnosis, plan of care and prognosis of their complaint. Also, frequency and duration of treatments was discussed. Patient was informed of the attendance policy and issued a copy for their records. Home Exercise Program: Patient given home exercise program and demonstrates correct technique. HEP booklet also issued. Timed Code Treatment Minutes:  40    Total Treatment Minutes:55  Treatment/Activity Tolerance:  [x] Patient tolerated treatment well [] Patient limited by fatigue  [] Patient limited by pain  [] Patient limited by other medical complications  [] Other:     Prognosis: [x] Good [] Fair  [] Poor    Goals:    Short term goals  Time Frame for Short term goals: 2 weeks  Short term goal 1: Decrease pain to 6/10 at worst  Short term goal 2: Patient independent with hep   Short term goal 3: Patient has increased ROM by 10° all planes      Long term goals  Time Frame for Long term goals : 4 weeks  Long term goal 1: Decrease pain to 3/10 at worst with activity  Long term goal 2: patient able to resume swimming  with overhead motion no increased pain  Long term goal 3: Patient able to control DM with meds, exercises and diet       Patient Requires Follow-up: [x] Yes  [] No    Plan:   [] Continue per plan of care [] Alter current plan (see comments)  [x] Plan of care initiated [] Hold pending MD visit [] Discharge    Plan for Next Session: See above    Electronically signed by:   Tristan Nixon, [address] [place] [address]

## 2018-10-15 ENCOUNTER — HOSPITAL ENCOUNTER (OUTPATIENT)
Dept: PHYSICAL THERAPY | Age: 58
Setting detail: THERAPIES SERIES
Discharge: HOME | End: 2018-10-15
Admitting: INTERNAL MEDICINE
Payer: COMMERCIAL

## 2018-10-15 PROCEDURE — 97140 MANUAL THERAPY 1/> REGIONS: CPT

## 2018-10-15 PROCEDURE — 97110 THERAPEUTIC EXERCISES: CPT

## 2018-10-15 PROCEDURE — G0283 ELEC STIM OTHER THAN WOUND: HCPCS

## 2018-10-15 RX ORDER — METFORMIN HYDROCHLORIDE 500 MG/1
TABLET, EXTENDED RELEASE ORAL
Qty: 60 TABLET | Refills: 3 | Status: SHIPPED | OUTPATIENT
Start: 2018-10-15 | End: 2019-09-13 | Stop reason: SDUPTHER

## 2018-10-16 RX ORDER — LANCETS 30 GAUGE
1 EACH MISCELLANEOUS 3 TIMES DAILY
Qty: 100 EACH | Refills: 3 | Status: SHIPPED | OUTPATIENT
Start: 2018-10-16

## 2018-10-16 RX ORDER — BLOOD-GLUCOSE METER
KIT MISCELLANEOUS
Qty: 100 EACH | Refills: 2 | Status: SHIPPED | OUTPATIENT
Start: 2018-10-16 | End: 2019-08-13 | Stop reason: SDUPTHER

## 2018-10-17 ENCOUNTER — HOSPITAL ENCOUNTER (OUTPATIENT)
Dept: PHYSICAL THERAPY | Age: 58
Setting detail: THERAPIES SERIES
Discharge: HOME | End: 2018-10-17
Admitting: INTERNAL MEDICINE
Payer: COMMERCIAL

## 2018-10-17 PROCEDURE — G0283 ELEC STIM OTHER THAN WOUND: HCPCS

## 2018-10-17 PROCEDURE — 97140 MANUAL THERAPY 1/> REGIONS: CPT

## 2018-10-17 PROCEDURE — 97110 THERAPEUTIC EXERCISES: CPT

## 2018-10-17 NOTE — FLOWSHEET NOTE
Physical Therapy Daily Treatment Note  Date:  10/17/2018    Patient Name:  Tran Mcdaniels    :  1960  MRN: [id number]    Restrictions/Precautions: Restrictions/Precautions  Restrictions/Precautions: Fall Risk (none)    Pertinent Medical History: Additional Pertinent Hx: DM, OA    Medical/Treatment Diagnosis Information:  · Diagnosis: low back pain: shoulder pain  · Treatment Diagnosis: pain, dec mobility and strength    Insurance/Certification information:     Physician Information:  Referring Practitioner: Dr. Peg Peters of care signed (Y/N): Cosign sent    Visit# / total visits:  -  Pain level: 4/10 LB,  0/10 shoulder    G-Code (if applicable):      Date / Visit # G-Code Applied:  /  PT G-Codes  Functional Assessment Tool Used: UEFS  Score: 15  Functional Limitation: Changing and maintaining body position  Changing and Maintaining Body Position Discharge Status (): At least 80 percent but less than 100 percent impaired, limited or restricted    Progress Note: []  Yes  [x]  No  Next due by: Visit #10      History of Injury: Patient has history of chronic pain in low back and gluts, he has been under a lot of stresswtih caregiving for his parents, blood sugars out of control and  feels like he is getting close to a manic depression state. He talked about admitting himself to the hospital  Subjective:  Pain bad yesterday but he thinks he forgot his meds the previous evening.   Better today  Objective:  Observation:   Test measurements:  See objective data      Exercises:  Exercise/Equipment Resistance/Repetitions Other comments        NS #7 6 min L-0         HSS/GSS  Door stretch  20 sec x 3 hep   hep   hep   hep   Wall slides x10      quadriped x5 alt arm/leg Add opp arm/leg   Bridging abd/add x10     Swiss Ball  green 1 min NS  4 way x 10 each  Rows x 10 green  Marching x 10    Seated abdominal 5 sec x 10 Add diagonals   LTR 5 sec x 10                         STM  B LB    IFC/HP 15 min

## 2018-10-24 ENCOUNTER — HOSPITAL ENCOUNTER (OUTPATIENT)
Dept: PHYSICAL THERAPY | Age: 58
Setting detail: THERAPIES SERIES
Discharge: HOME | End: 2018-10-24
Admitting: INTERNAL MEDICINE
Payer: COMMERCIAL

## 2018-10-24 PROCEDURE — 97140 MANUAL THERAPY 1/> REGIONS: CPT

## 2018-10-24 PROCEDURE — G0283 ELEC STIM OTHER THAN WOUND: HCPCS

## 2018-10-24 PROCEDURE — 97110 THERAPEUTIC EXERCISES: CPT

## 2018-10-24 NOTE — FLOWSHEET NOTE
Physical Therapy Daily Treatment Note  Date:  10/24/2018    Patient Name:  Alphonse Hill    :  1960  MRN: [id number]    Restrictions/Precautions: Restrictions/Precautions  Restrictions/Precautions: Fall Risk (none)    Pertinent Medical History: Additional Pertinent Hx: DM, OA    Medical/Treatment Diagnosis Information:  · Diagnosis: low back pain: shoulder pain  · Treatment Diagnosis: pain, dec mobility and strength    Insurance/Certification information:     Physician Information:  Referring Practitioner: Dr. Nieto Peoples of care signed (Y/N): Cosign sent    Visit# / total visits:  -  Pain level: 6/10 LB,  0/10 shoulder    G-Code (if applicable):      Date / Visit # G-Code Applied:  /  PT G-Codes  Functional Assessment Tool Used: UEFS  Score: 15  Functional Limitation: Changing and maintaining body position  Changing and Maintaining Body Position Discharge Status (): At least 80 percent but less than 100 percent impaired, limited or restricted    Progress Note: []  Yes  [x]  No  Next due by: Visit #10      History of Injury: Patient has history of chronic pain in low back and gluts, he has been under a lot of stresswtih caregiving for his parents, blood sugars out of control and  feels like he is getting close to a manic depression state. He talked about admitting himself to the hospital  Subjective:   Increased pain did not take meds this morning  Objective:  Observation:   Test measurements:  See objective data      Exercises:  Exercise/Equipment Resistance/Repetitions Other comments        NS #7 6 min L-0         HSS/GSS  Door stretch  20 sec x 3 hep   hep   hep   hep   Wall slides x10      quadriped x5 opp arm/leg Verbal cues for technique   Bridging abd/add x10     Swiss Ball  green 1 min NS  4 way x 10 each  Rows x 10 green  Marching x 10    Seated abdominal 5 sec x 10 Add diagonals   LTR 5 sec x 10                         STM  B LB    IFC/HP 15 min  B LB      Other Therapeutic Activities:  Patient was educated on diagnosis, plan of care and prognosis of their complaint. Also, frequency and duration of treatments was discussed. Patient was informed of the attendance policy and issued a copy for their records. Home Exercise Program: Patient given home exercise program and demonstrates correct technique. HEP booklet also issued. Timed Code Treatment Minutes:  40    Total Treatment Minutes:55  Treatment/Activity Tolerance:  [x] Patient tolerated treatment well [] Patient limited by fatigue  [] Patient limited by pain  [] Patient limited by other medical complications  [] Other:     Prognosis: [x] Good [] Fair  [] Poor    Goals:    Short term goals  Time Frame for Short term goals: 2 weeks  Short term goal 1: Decrease pain to 6/10 at worst  Short term goal 2: Patient independent with hep   Short term goal 3: Patient has increased ROM by 10° all planes      Long term goals  Time Frame for Long term goals : 4 weeks  Long term goal 1: Decrease pain to 3/10 at worst with activity  Long term goal 2: patient able to resume swimming  with overhead motion no increased pain  Long term goal 3: Patient able to control DM with meds, exercises and diet       Patient Requires Follow-up: [x] Yes  [] No    Plan:   [] Continue per plan of care [] Alter current plan (see comments)  [x] Plan of care initiated [] Hold pending MD visit [] Discharge    Plan for Next Session: See above  TAKE  To [facility] to review exercises    Electronically signed by:   Anjana Linder, [address] [place] [address]

## 2018-10-26 ENCOUNTER — HOSPITAL ENCOUNTER (OUTPATIENT)
Dept: PHYSICAL THERAPY | Age: 58
Setting detail: THERAPIES SERIES
Discharge: HOME | End: 2018-10-26
Admitting: INTERNAL MEDICINE
Payer: COMMERCIAL

## 2018-10-26 NOTE — FLOWSHEET NOTE
Physical Therapy  Cancellation  Patient Name:  Oswaldo Merritt  :  1960   Date:  10/26/2018  Cancelled visits to date: 1  No-shows to date: 0    For today's appointment patient:  [x]  Cancelled  []  Rescheduled appointment  []  No-show     Reason given by patient:  []  Patient ill  []  Conflicting appointment  []  No transportation    []  Conflict with work  [x]  No reason given  []  Other:     Comments:   Electronically signed by:  Tere Toro, [address] [facility] [place]

## 2018-10-29 ENCOUNTER — APPOINTMENT (OUTPATIENT)
Dept: PHYSICAL THERAPY | Age: 58
End: 2018-10-29
Payer: COMMERCIAL

## 2018-10-30 ENCOUNTER — APPOINTMENT (OUTPATIENT)
Dept: PHYSICAL THERAPY | Age: 58
End: 2018-10-30
Payer: COMMERCIAL

## 2018-10-31 ENCOUNTER — OFFICE VISIT (OUTPATIENT)
Dept: PAIN MANAGEMENT | Age: 58
End: 2018-10-31
Payer: COMMERCIAL

## 2018-10-31 ENCOUNTER — APPOINTMENT (OUTPATIENT)
Dept: PHYSICAL THERAPY | Age: 58
End: 2018-10-31
Payer: COMMERCIAL

## 2018-10-31 VITALS
BODY MASS INDEX: 33.83 KG/M2 | SYSTOLIC BLOOD PRESSURE: 139 MMHG | DIASTOLIC BLOOD PRESSURE: 88 MMHG | HEART RATE: 75 BPM | WEIGHT: 216 LBS

## 2018-10-31 DIAGNOSIS — M51.36 DDD (DEGENERATIVE DISC DISEASE), LUMBAR: ICD-10-CM

## 2018-10-31 DIAGNOSIS — M16.12 PRIMARY OSTEOARTHRITIS OF LEFT HIP: ICD-10-CM

## 2018-10-31 DIAGNOSIS — G89.4 CHRONIC PAIN SYNDROME: ICD-10-CM

## 2018-10-31 DIAGNOSIS — M79.18 MYOFASCIAL PAIN: ICD-10-CM

## 2018-10-31 DIAGNOSIS — M54.16 LUMBAR RADICULOPATHY: ICD-10-CM

## 2018-10-31 PROCEDURE — G8484 FLU IMMUNIZE NO ADMIN: HCPCS | Performed by: INTERNAL MEDICINE

## 2018-10-31 PROCEDURE — G8417 CALC BMI ABV UP PARAM F/U: HCPCS | Performed by: INTERNAL MEDICINE

## 2018-10-31 PROCEDURE — 99213 OFFICE O/P EST LOW 20 MIN: CPT | Performed by: INTERNAL MEDICINE

## 2018-10-31 PROCEDURE — 1036F TOBACCO NON-USER: CPT | Performed by: INTERNAL MEDICINE

## 2018-10-31 PROCEDURE — 3017F COLORECTAL CA SCREEN DOC REV: CPT | Performed by: INTERNAL MEDICINE

## 2018-10-31 PROCEDURE — G8427 DOCREV CUR MEDS BY ELIG CLIN: HCPCS | Performed by: INTERNAL MEDICINE

## 2018-10-31 RX ORDER — GABAPENTIN 300 MG/1
CAPSULE ORAL
Qty: 90 CAPSULE | Refills: 1 | Status: SHIPPED | OUTPATIENT
Start: 2018-10-31 | End: 2018-12-05 | Stop reason: SDUPTHER

## 2018-10-31 RX ORDER — HYDROCODONE BITARTRATE AND ACETAMINOPHEN 5; 325 MG/1; MG/1
1 TABLET ORAL EVERY 6 HOURS PRN
Qty: 90 TABLET | Refills: 0 | Status: SHIPPED | OUTPATIENT
Start: 2018-10-31 | End: 2018-12-05 | Stop reason: SDUPTHER

## 2018-10-31 RX ORDER — MELOXICAM 15 MG/1
15 TABLET ORAL DAILY
Qty: 30 TABLET | Refills: 1 | Status: SHIPPED | OUTPATIENT
Start: 2018-10-31 | End: 2018-12-05 | Stop reason: SDUPTHER

## 2018-10-31 NOTE — PROGRESS NOTES
obtained. Goals of current treatment regimen include improvement in pain, restoration of functioning- with focus on improvement in physical performance, general activity, work or disability,emotional distress, health care utilization and  decreased medication consumption. Will continue to monitor progress towards achieving/maintaining therapeutic goals with special emphasis on  1. Improvement in perceived interfernce  of pain with ADL's. Ability to do home exercises independently. Ability to do household chores indoor and/or outdoor work and social and leisure activities. Improve psychosocial and physical functioning. - he is showing progression towards this treatment goal with the current regimen. He was advised against drinking alcohol with the narcotic pain medicines, advised against driving or handling machinery while adjusting the dose of medicines or if having cognitive  issues related to the current medications. Risk of overdose and death, if medicines not taken as prescribed, were also discussed. If the patient develops new symptoms or if the symptoms worsen, the patient should call the office. While transcribing every attempt was made to maintain the accuracy of the note in terms of it's contents,there may have been some errors made inadvertently. Thank you for allowing me to participate in the care of this patient. Kb Bolton MD.    Cc: Robert Obregon MD    I, Pradeep Gaytan, am scribing for and in the presence of Dr. Kb Bolton.    10/31/18  9:16 AM  JAVIER Barillas, Dr. Kb Bolton, personally performed the services described in this documentation as scribed by   Pradeep Gaytan MA in my presence and it is both accurate and complete

## 2018-12-05 ENCOUNTER — OFFICE VISIT (OUTPATIENT)
Dept: PAIN MANAGEMENT | Age: 58
End: 2018-12-05
Payer: COMMERCIAL

## 2018-12-05 VITALS
HEART RATE: 77 BPM | WEIGHT: 206 LBS | DIASTOLIC BLOOD PRESSURE: 95 MMHG | SYSTOLIC BLOOD PRESSURE: 157 MMHG | BODY MASS INDEX: 32.26 KG/M2

## 2018-12-05 DIAGNOSIS — M54.16 LUMBAR RADICULOPATHY: ICD-10-CM

## 2018-12-05 DIAGNOSIS — M16.12 PRIMARY OSTEOARTHRITIS OF LEFT HIP: ICD-10-CM

## 2018-12-05 DIAGNOSIS — M79.18 MYOFASCIAL PAIN: ICD-10-CM

## 2018-12-05 DIAGNOSIS — M51.36 DDD (DEGENERATIVE DISC DISEASE), LUMBAR: ICD-10-CM

## 2018-12-05 DIAGNOSIS — G89.4 CHRONIC PAIN SYNDROME: ICD-10-CM

## 2018-12-05 PROCEDURE — 1036F TOBACCO NON-USER: CPT | Performed by: INTERNAL MEDICINE

## 2018-12-05 PROCEDURE — 99213 OFFICE O/P EST LOW 20 MIN: CPT | Performed by: INTERNAL MEDICINE

## 2018-12-05 PROCEDURE — G8417 CALC BMI ABV UP PARAM F/U: HCPCS | Performed by: INTERNAL MEDICINE

## 2018-12-05 PROCEDURE — 3017F COLORECTAL CA SCREEN DOC REV: CPT | Performed by: INTERNAL MEDICINE

## 2018-12-05 PROCEDURE — G8484 FLU IMMUNIZE NO ADMIN: HCPCS | Performed by: INTERNAL MEDICINE

## 2018-12-05 PROCEDURE — G8427 DOCREV CUR MEDS BY ELIG CLIN: HCPCS | Performed by: INTERNAL MEDICINE

## 2018-12-05 RX ORDER — MELOXICAM 15 MG/1
15 TABLET ORAL DAILY
Qty: 30 TABLET | Refills: 1 | Status: SHIPPED | OUTPATIENT
Start: 2018-12-05 | End: 2019-01-09 | Stop reason: SDUPTHER

## 2018-12-05 RX ORDER — HYDROCODONE BITARTRATE AND ACETAMINOPHEN 5; 325 MG/1; MG/1
1 TABLET ORAL EVERY 6 HOURS PRN
Qty: 90 TABLET | Refills: 0 | Status: SHIPPED | OUTPATIENT
Start: 2018-12-05 | End: 2019-01-09 | Stop reason: SDUPTHER

## 2018-12-05 RX ORDER — GABAPENTIN 300 MG/1
CAPSULE ORAL
Qty: 90 CAPSULE | Refills: 1 | Status: SHIPPED | OUTPATIENT
Start: 2018-12-05 | End: 2019-01-09 | Stop reason: SDUPTHER

## 2018-12-05 NOTE — PROGRESS NOTES
1    MELATONIN PO Take by mouth      Insulin Pen Needle 32G X 4 MM MISC 1 each by Does not apply route daily 100 each 3    Insulin Pen Needle (PEN NEEDLES) 31G X 6 MM MISC       KROGER LANCETS ULTRATHIN 30G MISC       loratadine (CLARITIN) 10 MG tablet       lisinopril (PRINIVIL;ZESTRIL) 5 MG tablet Take 20 mg by mouth daily       atorvastatin (LIPITOR) 80 MG tablet Take 80 mg by mouth daily.  gabapentin (NEURONTIN) 300 MG capsule Take one tablet po in the morning and two tablets po in the evening. 90 capsule 1     No current facility-administered medications for this visit. I will continue his current medication regimen  which is part of the above treatment schedule. It has been helping with Mr. Kimberly Mesa chronic  medical problems which for this visit include:   Diagnoses of Chronic pain syndrome, Myofascial pain, DDD (degenerative disc disease), lumbar, and Lumbar radiculopathy were pertinent to this visit. Risks and benefits of the medications and other alternative treatments  including no treatment were discussed with the patient. The common side effects of these medications were also explained to the patient. Informed verbal consent was obtained. Goals of current treatment regimen include improvement in pain, restoration of functioning- with focus on improvement in physical performance, general activity, work or disability,emotional distress, health care utilization and  decreased medication consumption. Will continue to monitor progress towards achieving/maintaining therapeutic goals with special emphasis on  1. Improvement in perceived interfernce  of pain with ADL's. Ability to do home exercises independently. Ability to do household chores indoor and/or outdoor work and social and leisure activities. Improve psychosocial and physical functioning. - he is showing progression towards this treatment goal with the current regimen.     He was advised against drinking alcohol with the narcotic pain medicines, advised against driving or handling machinery while adjusting the dose of medicines or if having cognitive  issues related to the current medications. Risk of overdose and death, if medicines not taken as prescribed, were also discussed. If the patient develops new symptoms or if the symptoms worsen, the patient should call the office. While transcribing every attempt was made to maintain the accuracy of the note in terms of it's contents,there may have been some errors made inadvertently. Thank you for allowing me to participate in the care of this patient.     Roger Lee MD.    Cc: Jimmy Rae MD

## 2018-12-22 NOTE — DISCHARGE SUMMARY
Physical Therapy Discharge Note  Date: 2018    Patient Name: Jozef Doyle  : 1960  MRN: [id number]    Restrictions/Precautions: Restrictions/Precautions  Restrictions/Precautions: Fall Risk (none)    Pertinent Medical History: Additional Pertinent Hx: DM, OA     Medical/Treatment Diagnosis Information:  · Diagnosis: low back pain: shoulder pain  · Treatment Diagnosis: pain, dec mobility and strength     Insurance/Certification information:     Physician Information:  Referring Practitioner: Dr. Richmond Alvarado of care signed (Y/N): Cosign sent     Visit# / total visits:  -  Pain level:      6/10     LB,  0/10 shoulder  Treatment to Date:  [] Therapeutic Exercise  [] Modalities:  [] Therapeutic Activity     [] Ultrasound  [] Electrical Stim   [] Gait Training      [] Cervical Traction    [] Lumbar Traction  [] Neuromuscular Re-education  [] Cold/hotpack [] Iontophoresis  [] Instruction in HEP      Other:  [] Manual Therapy       []    [] Aquatic Therapy       []                    ? Significant Findings At Last Visit:    Subjective:     patient did not return after cancelling on 10/26/18   Objective:  Observation:  Test measurements:  No re-evaluation performed secondary to patient not returning       Goal Status:  [] Achieved [x] Partially Achieved  [] Not Achieved     Reason for Discharge:  [] Goals Met   [x] Patient did not return after visit on 10/24/18  [] Progress Plateaued [] Missed _____ scheduled appointments   [] No insurance coverage [] Patient terminated therapy   [] Other:        PT recommendation:   [x] Patient now discharged with HEP      [] Other:            Electronically signed by: Haven Nielsen [id number]    If you have any questions or concerns, please don't hesitate to call.   Thank you for your referral.

## 2019-01-09 ENCOUNTER — OFFICE VISIT (OUTPATIENT)
Dept: PAIN MANAGEMENT | Age: 59
End: 2019-01-09
Payer: COMMERCIAL

## 2019-01-09 VITALS
HEART RATE: 75 BPM | DIASTOLIC BLOOD PRESSURE: 72 MMHG | BODY MASS INDEX: 34.3 KG/M2 | WEIGHT: 219 LBS | SYSTOLIC BLOOD PRESSURE: 138 MMHG

## 2019-01-09 DIAGNOSIS — M79.18 MYOFASCIAL PAIN: ICD-10-CM

## 2019-01-09 DIAGNOSIS — G89.4 CHRONIC PAIN SYNDROME: ICD-10-CM

## 2019-01-09 DIAGNOSIS — M16.12 PRIMARY OSTEOARTHRITIS OF LEFT HIP: ICD-10-CM

## 2019-01-09 DIAGNOSIS — M54.16 LUMBAR RADICULOPATHY: ICD-10-CM

## 2019-01-09 DIAGNOSIS — M51.36 DDD (DEGENERATIVE DISC DISEASE), LUMBAR: ICD-10-CM

## 2019-01-09 PROCEDURE — 3017F COLORECTAL CA SCREEN DOC REV: CPT | Performed by: INTERNAL MEDICINE

## 2019-01-09 PROCEDURE — G8427 DOCREV CUR MEDS BY ELIG CLIN: HCPCS | Performed by: INTERNAL MEDICINE

## 2019-01-09 PROCEDURE — G8417 CALC BMI ABV UP PARAM F/U: HCPCS | Performed by: INTERNAL MEDICINE

## 2019-01-09 PROCEDURE — G8484 FLU IMMUNIZE NO ADMIN: HCPCS | Performed by: INTERNAL MEDICINE

## 2019-01-09 PROCEDURE — 1036F TOBACCO NON-USER: CPT | Performed by: INTERNAL MEDICINE

## 2019-01-09 PROCEDURE — 99213 OFFICE O/P EST LOW 20 MIN: CPT | Performed by: INTERNAL MEDICINE

## 2019-01-09 RX ORDER — MELOXICAM 15 MG/1
15 TABLET ORAL DAILY
Qty: 30 TABLET | Refills: 1 | Status: SHIPPED | OUTPATIENT
Start: 2019-01-09 | End: 2019-02-13 | Stop reason: SDUPTHER

## 2019-01-09 RX ORDER — GABAPENTIN 300 MG/1
CAPSULE ORAL
Qty: 90 CAPSULE | Refills: 1 | Status: SHIPPED | OUTPATIENT
Start: 2019-01-09 | End: 2019-02-13 | Stop reason: SDUPTHER

## 2019-01-09 RX ORDER — HYDROCODONE BITARTRATE AND ACETAMINOPHEN 5; 325 MG/1; MG/1
1 TABLET ORAL EVERY 6 HOURS PRN
Qty: 90 TABLET | Refills: 0 | Status: SHIPPED | OUTPATIENT
Start: 2019-01-09 | End: 2019-02-13 | Stop reason: SDUPTHER

## 2019-02-13 ENCOUNTER — OFFICE VISIT (OUTPATIENT)
Dept: PAIN MANAGEMENT | Age: 59
End: 2019-02-13
Payer: COMMERCIAL

## 2019-02-13 VITALS
SYSTOLIC BLOOD PRESSURE: 137 MMHG | WEIGHT: 205 LBS | HEART RATE: 71 BPM | DIASTOLIC BLOOD PRESSURE: 85 MMHG | BODY MASS INDEX: 32.11 KG/M2

## 2019-02-13 DIAGNOSIS — M79.18 MYOFASCIAL PAIN: ICD-10-CM

## 2019-02-13 DIAGNOSIS — M51.36 DDD (DEGENERATIVE DISC DISEASE), LUMBAR: ICD-10-CM

## 2019-02-13 DIAGNOSIS — M16.12 PRIMARY OSTEOARTHRITIS OF LEFT HIP: ICD-10-CM

## 2019-02-13 DIAGNOSIS — M54.16 LUMBAR RADICULOPATHY: ICD-10-CM

## 2019-02-13 DIAGNOSIS — G89.4 CHRONIC PAIN SYNDROME: ICD-10-CM

## 2019-02-13 DIAGNOSIS — M25.512 CHRONIC LEFT SHOULDER PAIN: ICD-10-CM

## 2019-02-13 DIAGNOSIS — G89.29 CHRONIC LEFT SHOULDER PAIN: ICD-10-CM

## 2019-02-13 PROCEDURE — 99213 OFFICE O/P EST LOW 20 MIN: CPT | Performed by: INTERNAL MEDICINE

## 2019-02-13 PROCEDURE — G8417 CALC BMI ABV UP PARAM F/U: HCPCS | Performed by: INTERNAL MEDICINE

## 2019-02-13 PROCEDURE — 3017F COLORECTAL CA SCREEN DOC REV: CPT | Performed by: INTERNAL MEDICINE

## 2019-02-13 PROCEDURE — G8484 FLU IMMUNIZE NO ADMIN: HCPCS | Performed by: INTERNAL MEDICINE

## 2019-02-13 PROCEDURE — 1036F TOBACCO NON-USER: CPT | Performed by: INTERNAL MEDICINE

## 2019-02-13 PROCEDURE — G8427 DOCREV CUR MEDS BY ELIG CLIN: HCPCS | Performed by: INTERNAL MEDICINE

## 2019-02-13 RX ORDER — GABAPENTIN 300 MG/1
CAPSULE ORAL
Qty: 90 CAPSULE | Refills: 1 | Status: SHIPPED | OUTPATIENT
Start: 2019-02-13 | End: 2019-03-20 | Stop reason: SDUPTHER

## 2019-02-13 RX ORDER — HYDROCODONE BITARTRATE AND ACETAMINOPHEN 5; 325 MG/1; MG/1
1 TABLET ORAL EVERY 6 HOURS PRN
Qty: 90 TABLET | Refills: 0 | Status: SHIPPED | OUTPATIENT
Start: 2019-02-13 | End: 2019-03-20 | Stop reason: SDUPTHER

## 2019-02-13 RX ORDER — MELOXICAM 15 MG/1
15 TABLET ORAL DAILY
Qty: 30 TABLET | Refills: 1 | Status: SHIPPED | OUTPATIENT
Start: 2019-02-13 | End: 2019-03-20 | Stop reason: SDUPTHER

## 2019-03-01 ENCOUNTER — TELEPHONE (OUTPATIENT)
Dept: PAIN MANAGEMENT | Age: 59
End: 2019-03-01

## 2019-03-20 ENCOUNTER — OFFICE VISIT (OUTPATIENT)
Dept: PAIN MANAGEMENT | Age: 59
End: 2019-03-20
Payer: COMMERCIAL

## 2019-03-20 VITALS
SYSTOLIC BLOOD PRESSURE: 115 MMHG | WEIGHT: 207 LBS | DIASTOLIC BLOOD PRESSURE: 81 MMHG | HEART RATE: 71 BPM | BODY MASS INDEX: 32.42 KG/M2

## 2019-03-20 DIAGNOSIS — M51.36 DDD (DEGENERATIVE DISC DISEASE), LUMBAR: ICD-10-CM

## 2019-03-20 DIAGNOSIS — M79.18 MYOFASCIAL PAIN: ICD-10-CM

## 2019-03-20 DIAGNOSIS — G89.4 CHRONIC PAIN SYNDROME: ICD-10-CM

## 2019-03-20 DIAGNOSIS — M16.12 PRIMARY OSTEOARTHRITIS OF LEFT HIP: ICD-10-CM

## 2019-03-20 DIAGNOSIS — M25.512 CHRONIC LEFT SHOULDER PAIN: ICD-10-CM

## 2019-03-20 DIAGNOSIS — G89.29 CHRONIC LEFT SHOULDER PAIN: ICD-10-CM

## 2019-03-20 DIAGNOSIS — M54.16 LUMBAR RADICULOPATHY: ICD-10-CM

## 2019-03-20 PROCEDURE — G8484 FLU IMMUNIZE NO ADMIN: HCPCS | Performed by: INTERNAL MEDICINE

## 2019-03-20 PROCEDURE — 1036F TOBACCO NON-USER: CPT | Performed by: INTERNAL MEDICINE

## 2019-03-20 PROCEDURE — G8427 DOCREV CUR MEDS BY ELIG CLIN: HCPCS | Performed by: INTERNAL MEDICINE

## 2019-03-20 PROCEDURE — 3017F COLORECTAL CA SCREEN DOC REV: CPT | Performed by: INTERNAL MEDICINE

## 2019-03-20 PROCEDURE — 99213 OFFICE O/P EST LOW 20 MIN: CPT | Performed by: INTERNAL MEDICINE

## 2019-03-20 PROCEDURE — G8417 CALC BMI ABV UP PARAM F/U: HCPCS | Performed by: INTERNAL MEDICINE

## 2019-03-20 RX ORDER — GABAPENTIN 300 MG/1
CAPSULE ORAL
Qty: 90 CAPSULE | Refills: 1 | Status: SHIPPED | OUTPATIENT
Start: 2019-03-20 | End: 2019-04-24 | Stop reason: SDUPTHER

## 2019-03-20 RX ORDER — HYDROCODONE BITARTRATE AND ACETAMINOPHEN 5; 325 MG/1; MG/1
1 TABLET ORAL EVERY 6 HOURS PRN
Qty: 90 TABLET | Refills: 0 | Status: SHIPPED | OUTPATIENT
Start: 2019-03-20 | End: 2019-04-24 | Stop reason: SDUPTHER

## 2019-03-20 RX ORDER — MELOXICAM 15 MG/1
15 TABLET ORAL DAILY
Qty: 30 TABLET | Refills: 1 | Status: SHIPPED | OUTPATIENT
Start: 2019-03-20 | End: 2019-04-24 | Stop reason: SDUPTHER

## 2019-04-24 ENCOUNTER — OFFICE VISIT (OUTPATIENT)
Dept: PAIN MANAGEMENT | Age: 59
End: 2019-04-24
Payer: COMMERCIAL

## 2019-04-24 VITALS
SYSTOLIC BLOOD PRESSURE: 119 MMHG | WEIGHT: 214 LBS | HEART RATE: 71 BPM | DIASTOLIC BLOOD PRESSURE: 79 MMHG | BODY MASS INDEX: 33.52 KG/M2

## 2019-04-24 DIAGNOSIS — M25.512 CHRONIC LEFT SHOULDER PAIN: ICD-10-CM

## 2019-04-24 DIAGNOSIS — M16.12 PRIMARY OSTEOARTHRITIS OF LEFT HIP: ICD-10-CM

## 2019-04-24 DIAGNOSIS — G89.29 CHRONIC LEFT SHOULDER PAIN: ICD-10-CM

## 2019-04-24 DIAGNOSIS — M51.36 DDD (DEGENERATIVE DISC DISEASE), LUMBAR: ICD-10-CM

## 2019-04-24 DIAGNOSIS — M54.16 LUMBAR RADICULOPATHY: ICD-10-CM

## 2019-04-24 DIAGNOSIS — G89.4 CHRONIC PAIN SYNDROME: ICD-10-CM

## 2019-04-24 DIAGNOSIS — M79.18 MYOFASCIAL PAIN: ICD-10-CM

## 2019-04-24 PROCEDURE — G8427 DOCREV CUR MEDS BY ELIG CLIN: HCPCS | Performed by: INTERNAL MEDICINE

## 2019-04-24 PROCEDURE — 1036F TOBACCO NON-USER: CPT | Performed by: INTERNAL MEDICINE

## 2019-04-24 PROCEDURE — 3017F COLORECTAL CA SCREEN DOC REV: CPT | Performed by: INTERNAL MEDICINE

## 2019-04-24 PROCEDURE — 99213 OFFICE O/P EST LOW 20 MIN: CPT | Performed by: INTERNAL MEDICINE

## 2019-04-24 PROCEDURE — G8417 CALC BMI ABV UP PARAM F/U: HCPCS | Performed by: INTERNAL MEDICINE

## 2019-04-24 RX ORDER — HYDROCODONE BITARTRATE AND ACETAMINOPHEN 5; 325 MG/1; MG/1
1 TABLET ORAL EVERY 6 HOURS PRN
Qty: 90 TABLET | Refills: 0 | Status: SHIPPED | OUTPATIENT
Start: 2019-04-24 | End: 2019-05-29 | Stop reason: SDUPTHER

## 2019-04-24 RX ORDER — MELOXICAM 15 MG/1
15 TABLET ORAL DAILY
Qty: 30 TABLET | Refills: 1 | Status: SHIPPED | OUTPATIENT
Start: 2019-04-24 | End: 2019-05-29 | Stop reason: SDUPTHER

## 2019-04-24 RX ORDER — GABAPENTIN 300 MG/1
CAPSULE ORAL
Qty: 90 CAPSULE | Refills: 1 | Status: SHIPPED | OUTPATIENT
Start: 2019-04-24 | End: 2019-05-29 | Stop reason: SDUPTHER

## 2019-04-24 NOTE — PROGRESS NOTES
Rosalba Roberts  1960  [id number]    HISTORY OF PRESENT ILLNESS:  Mr. Mainor Red is a 60 y.o. male returns for a follow up visit for multiple medical problems. His current presenting problems are   1. Chronic pain syndrome    2. Myofascial pain    3. DDD (degenerative disc disease), lumbar    4. Lumbar radiculopathy    5. Chronic left shoulder pain    6. Primary osteoarthritis of left hip    . As per information/history obtained from the PADT(patient assessment and documentation tool) - He complains of pain in the neck and lower back with radiation to the shoulders Bilateral, arms Bilateral, elbows Bilateral, hands Bilateral, buttocks and hips Right He rates the pain 6/10 and describes it as sharp, aching, burning. Pain is made worse by: movement, walking, standing. Current treatment regimen has helped relieve about 60% of the pain. He denies side effects from the current pain regimen. Patient reports that since the last follow up visit the physical functioning is unchanged, family/social relationships are unchanged, mood is unchanged and sleep patterns are unchanged, and that the overall functioning is unchanged. Patient denies neurological bowel or bladder. Patient denies misusing/abusing his narcotic pain medications or using any illegal drugs. There are No indicators for possible drug abuse, addiction or diversion problems. Upon obtaining the medical history from Mr. Mainor Red regarding today's office visit for his presenting problems, Patient states he is doing fair with the medications. He complains his back and shoulder hurt. He says he is doing water exercises and swimming. He denies any side effects with the medication, and constipation symptoms. He reports he is using Norco 2-3 per day. ALLERGIES: Patients list of allergies were reviewed     MEDICATIONS: Mr. Mainor Red list of medications were reviewed. His current medications are   Outpatient Medications Prior to Visit   Medication Sig Dispense Refill    well-developed and well-nourished. No acute distress. Skin: Skin is warm and dry, good turgor. No rash noted. He is not diaphoretic. Cardiovascular: Normal rate, regular rhythm, normal heart sounds, and does not have murmur. Pulmonary/Chest: Effort normal. No respiratory distress. He does not have wheezes in the lung fields. He has no rales. Neurological/Psychiatric:He is alert and oriented to person, place, and time. Coordination is  normal.  His mood isAppropriate and affect is Neutral/Euthymic(normal) . IMPRESSION:   1. Chronic pain syndrome    2. Myofascial pain    3. DDD (degenerative disc disease), lumbar    4. Lumbar radiculopathy    5. Chronic left shoulder pain    6. Primary osteoarthritis of left hip        PLAN:  Informed verbal consent was obtained   -Urine drug screen with GC/MS confirmation for opiates and drugs of abuse was reviewed and the results are negative for drugs of abuse and the prescribed medications were absent.   He says he usually stretches for 5 weeks   -Urine drug screen with GC/MS for opiates and drugs of abuse was ordered and will follow up on results.   -Will call for a pill count in 2 weeks  -He was advised to increase fluids ( 5-7  glasses of fluid daily), limit caffeine, avoid cheese products, increase dietary fiber, increase activity and exercise as tolerated and relax regularly and enjoy meals   -monitor closely   -Walking/Stretching exercises    Current Outpatient Medications   Medication Sig Dispense Refill    meloxicam (MOBIC) 15 MG tablet Take 1 tablet by mouth daily 30 tablet 1    insulin lispro (HUMALOG KWIKPEN) 100 UNIT/ML pen 4-10 units AC TID 5 pen 3    FREESTYLE LITE strip  each 2    Lancets MISC 1 each by Does not apply route 3 times daily 100 each 3    metFORMIN (GLUCOPHAGE-XR) 500 MG extended release tablet TAKE TWO TABLETS BY MOUTH DAILY WITH BREAKFAST 60 tablet 3    Continuous Blood Gluc  (FREESTYLE MANE READER) RO As needed 1 achieving/maintaining therapeutic goals with special emphasis on  1. Improvement in perceived interfernce  of pain with ADL's. Ability to do home exercises independently. Ability to do household chores indoor and/or outdoor work and social and leisure activities. Improve psychosocial and physical functioning. - he is showing progression towards this treatment goal with the current regimen. He was advised against drinking alcohol with the narcotic pain medicines, advised against driving or handling machinery while adjusting the dose of medicines or if having cognitive  issues related to the current medications. Risk of overdose and death, if medicines not taken as prescribed, were also discussed. If the patient develops new symptoms or if the symptoms worsen, the patient should call the office. While transcribing every attempt was made to maintain the accuracy of the note in terms of it's contents,there may have been some errors made inadvertently. Thank you for allowing me to participate in the care of this patient.     Gideon Conklin MD.    Cc: MD SEAMUS Elliott, Cheryle Arndt, am scribing for and in the presence of Dr. Gideon Conklin.   04/24/19  9:03 AM  JAVIER Del Valle, Dr. Gideon Conklin, personally performed the services described in this documentation as scribed by   Cheryle Arndt MA in my presence and it is both accurate and complete

## 2019-05-29 ENCOUNTER — OFFICE VISIT (OUTPATIENT)
Dept: PAIN MANAGEMENT | Age: 59
End: 2019-05-29
Payer: COMMERCIAL

## 2019-05-29 VITALS
WEIGHT: 214 LBS | HEART RATE: 80 BPM | BODY MASS INDEX: 33.52 KG/M2 | DIASTOLIC BLOOD PRESSURE: 92 MMHG | SYSTOLIC BLOOD PRESSURE: 160 MMHG

## 2019-05-29 DIAGNOSIS — M16.12 PRIMARY OSTEOARTHRITIS OF LEFT HIP: ICD-10-CM

## 2019-05-29 DIAGNOSIS — G89.4 CHRONIC PAIN SYNDROME: ICD-10-CM

## 2019-05-29 DIAGNOSIS — M79.18 MYOFASCIAL PAIN: ICD-10-CM

## 2019-05-29 DIAGNOSIS — M54.16 LUMBAR RADICULOPATHY: ICD-10-CM

## 2019-05-29 DIAGNOSIS — M51.36 DDD (DEGENERATIVE DISC DISEASE), LUMBAR: ICD-10-CM

## 2019-05-29 DIAGNOSIS — G89.29 CHRONIC LEFT SHOULDER PAIN: ICD-10-CM

## 2019-05-29 DIAGNOSIS — M25.512 CHRONIC LEFT SHOULDER PAIN: ICD-10-CM

## 2019-05-29 PROCEDURE — 99213 OFFICE O/P EST LOW 20 MIN: CPT | Performed by: INTERNAL MEDICINE

## 2019-05-29 PROCEDURE — G8417 CALC BMI ABV UP PARAM F/U: HCPCS | Performed by: INTERNAL MEDICINE

## 2019-05-29 PROCEDURE — 3017F COLORECTAL CA SCREEN DOC REV: CPT | Performed by: INTERNAL MEDICINE

## 2019-05-29 PROCEDURE — 1036F TOBACCO NON-USER: CPT | Performed by: INTERNAL MEDICINE

## 2019-05-29 PROCEDURE — G8427 DOCREV CUR MEDS BY ELIG CLIN: HCPCS | Performed by: INTERNAL MEDICINE

## 2019-05-29 RX ORDER — GABAPENTIN 300 MG/1
CAPSULE ORAL
Qty: 90 CAPSULE | Refills: 1 | Status: SHIPPED | OUTPATIENT
Start: 2019-05-29 | End: 2019-07-03 | Stop reason: SDUPTHER

## 2019-05-29 RX ORDER — HYDROCODONE BITARTRATE AND ACETAMINOPHEN 5; 325 MG/1; MG/1
1 TABLET ORAL EVERY 6 HOURS PRN
Qty: 90 TABLET | Refills: 0 | Status: SHIPPED | OUTPATIENT
Start: 2019-05-29 | End: 2019-07-03 | Stop reason: SDUPTHER

## 2019-05-29 RX ORDER — MELOXICAM 15 MG/1
15 TABLET ORAL DAILY
Qty: 30 TABLET | Refills: 1 | Status: SHIPPED | OUTPATIENT
Start: 2019-05-29 | End: 2019-07-03 | Stop reason: SDUPTHER

## 2019-05-29 NOTE — PROGRESS NOTES
[facility]  1960  [id number]    HISTORY OF PRESENT ILLNESS:  Mr. Anuj Harrison is a 60 y.o. male returns for a follow up visit for multiple medical problems. His current presenting problems are   1. Chronic pain syndrome    2. Myofascial pain    3. DDD (degenerative disc disease), lumbar    4. Lumbar radiculopathy    5. Chronic left shoulder pain    6. Primary osteoarthritis of left hip    . As per information/history obtained from the PADT(patient assessment and documentation tool) - He complains of pain in the shoulders Bilateral, lower back and knees Left with radiation to the buttocks, hips Left and upper leg Left He rates the pain 7/10 and describes it as aching, throbbing. Pain is made worse by: walking, standing, sitting. Current treatment regimen has helped relieve about 40% of the pain. He denies side effects from the current pain regimen. Patient reports that since the last follow up visit the physical functioning is unchanged, family/social relationships are unchanged, mood is unchanged and sleep patterns are unchanged, and that the overall functioning is unchanged. Patient denies neurological bowel or bladder. Patient denies misusing/abusing his narcotic pain medications or using any illegal drugs. There are No indicators for possible drug abuse, addiction or diversion problems. Upon obtaining the medical history from Mr. Anuj Harrison regarding today's office visit for his presenting problems, Mr. Anuj Harrison states he has been doing fair, managing ok with medications. He says he has been staying active but having a lot of social stressors. He mentions he is using Norco 2-3 per day along with Mobic and Neurontin. Patient reports her weight has been stable. He reports his blood sugar has been high, his A1C was 11.2. ALLERGIES: Patients list of allergies were reviewed     MEDICATIONS: Mr. Anuj Harrison list of medications were reviewed. His current medications are   Outpatient Medications Prior to Visit   Medication Sig Dispense Refill    meloxicam (MOBIC) 15 MG tablet Take 1 tablet by mouth daily 30 tablet 1    HYDROcodone-acetaminophen (NORCO) 5-325 MG per tablet Take 1 tablet by mouth every 6 hours as needed for Pain (max 2-3 per day) for up to 35 days. 90 tablet 0    insulin lispro (HUMALOG KWIKPEN) 100 UNIT/ML pen 4-10 units AC TID 5 pen 3    FREESTYLE LITE strip  each 2    Lancets MISC 1 each by Does not apply route 3 times daily 100 each 3    metFORMIN (GLUCOPHAGE-XR) 500 MG extended release tablet TAKE TWO TABLETS BY MOUTH DAILY WITH BREAKFAST 60 tablet 3    Continuous Blood Gluc  (FREESTYLE MANE READER) RO As needed 1 Device 0    Continuous Blood Gluc Sensor (FREESTYLE MANE SENSOR SYSTEM) MISC As needed every 10 days 3 each 2    Blood Glucose Monitoring Suppl (FREESTYLE LITE) RO As needed 1 Device 3    glipiZIDE (GLUCOTROL XL) 10 MG extended release tablet Take 1 tablet by mouth daily 30 tablet 2    insulin glargine (BASAGLAR KWIKPEN) 100 UNIT/ML injection pen 20 units daily 15 mL 1    MELATONIN PO Take by mouth      Insulin Pen Needle 32G X 4 MM MISC 1 each by Does not apply route daily 100 each 3    Insulin Pen Needle (PEN NEEDLES) 31G X 6 MM MISC       KROGER LANCETS ULTRATHIN 30G MISC       loratadine (CLARITIN) 10 MG tablet       lisinopril (PRINIVIL;ZESTRIL) 5 MG tablet Take 20 mg by mouth daily       atorvastatin (LIPITOR) 80 MG tablet Take 80 mg by mouth daily.  gabapentin (NEURONTIN) 300 MG capsule Take one tablet po in the morning and two tablets po in the evening 90 capsule 1     No facility-administered medications prior to visit. SOCIAL/FAMILY/PAST MEDICAL HISTORY: Mr. Hanley Nim, family and past medical history was reviewed. REVIEW OF SYSTEMS:    Respiratory: Negative for apnea, chest tightness and shortness of breath or change in baseline breathing.     Gastrointestinal: Negative for nausea, vomiting, abdominal pain, diarrhea, constipation, blood in stool and abdominal distention. PHYSICAL EXAM:   Nursing note and vitals reviewed. BP (!) 160/92   Pulse 80   Wt 214 lb (97.1 kg)   BMI 33.52 kg/m²   Constitutional: He appears well-developed and well-nourished. No acute distress. Skin: Skin is warm and dry, good turgor. No rash noted. He is not diaphoretic. Cardiovascular: Normal rate, regular rhythm, normal heart sounds, and does not have murmur. Pulmonary/Chest: Effort normal. No respiratory distress. He does not have wheezes in the lung fields. He has no rales. Neurological/Psychiatric:He is alert and oriented to person, place, and time. Coordination is  normal.  His mood isAppropriate and affect is Flat/blunted . IMPRESSION:   1. Chronic pain syndrome    2. Myofascial pain    3. DDD (degenerative disc disease), lumbar    4. Lumbar radiculopathy    5. Chronic left shoulder pain    6. Primary osteoarthritis of left hip        PLAN:  Informed verbal consent was obtained  [place] was obtained and reviewed  for the last one year and no indicators of drug misuse  were found. Any other controlled substance prescriptions  seen on the record have been accounted for, I am aware of the patient receiving these medications. José Luis Aguirre [place] record will be rechecked as part of office protocol.    -He was advised to increase fluids ( 5-7  glasses of fluid daily), limit caffeine, avoid cheese products, increase dietary fiber, increase activity and exercise as tolerated and relax regularly and enjoy meals   -Patient's urine drug screen results with GC/MS confirmation were obtained and reviewed and were negative for any illicit drugs. Prescribed medications were within acceptable range.   -He was advised weight reduction, diet changes- 800-1200 shad diet, diet diary, exercising, nutritional  consult increased physical activity as tolerated  -continue with Norco 2-3 per day   -Adv Biofeedback, relaxation and meditation techniques.  Referral to psychologist for CBT was also discussed with patient    Current Outpatient Medications   Medication Sig Dispense Refill    meloxicam (MOBIC) 15 MG tablet Take 1 tablet by mouth daily 30 tablet 1    HYDROcodone-acetaminophen (NORCO) 5-325 MG per tablet Take 1 tablet by mouth every 6 hours as needed for Pain (max 2-3 per day) for up to 35 days. 90 tablet 0    insulin lispro (HUMALOG KWIKPEN) 100 UNIT/ML pen 4-10 units AC TID 5 pen 3    FREESTYLE LITE strip  each 2    Lancets MISC 1 each by Does not apply route 3 times daily 100 each 3    metFORMIN (GLUCOPHAGE-XR) 500 MG extended release tablet TAKE TWO TABLETS BY MOUTH DAILY WITH BREAKFAST 60 tablet 3    Continuous Blood Gluc  (FREESTYLE MANE READER) RO As needed 1 Device 0    Continuous Blood Gluc Sensor (FREESTYLE MANE SENSOR SYSTEM) MISC As needed every 10 days 3 each 2    Blood Glucose Monitoring Suppl (FREESTYLE LITE) RO As needed 1 Device 3    glipiZIDE (GLUCOTROL XL) 10 MG extended release tablet Take 1 tablet by mouth daily 30 tablet 2    insulin glargine (BASAGLAR KWIKPEN) 100 UNIT/ML injection pen 20 units daily 15 mL 1    MELATONIN PO Take by mouth      Insulin Pen Needle 32G X 4 MM MISC 1 each by Does not apply route daily 100 each 3    Insulin Pen Needle (PEN NEEDLES) 31G X 6 MM MISC       KROGER LANCETS ULTRATHIN 30G MISC       loratadine (CLARITIN) 10 MG tablet       lisinopril (PRINIVIL;ZESTRIL) 5 MG tablet Take 20 mg by mouth daily       atorvastatin (LIPITOR) 80 MG tablet Take 80 mg by mouth daily.  gabapentin (NEURONTIN) 300 MG capsule Take one tablet po in the morning and two tablets po in the evening 90 capsule 1     No current facility-administered medications for this visit. I will continue his current medication regimen  which is part of the above treatment schedule.  It has been helping with Mr. Brittany Jansen chronic  medical problems which for this visit include:   Diagnoses of Chronic pain syndrome, Myofascial pain, DDD (degenerative disc disease), lumbar, Lumbar radiculopathy, Chronic left shoulder pain, and Primary osteoarthritis of left hip were pertinent to this visit. Risks and benefits of the medications and other alternative treatments  including no treatment were discussed with the patient. The common side effects of these medications were also explained to the patient. Informed verbal consent was obtained. Goals of current treatment regimen include improvement in pain, restoration of functioning- with focus on improvement in physical performance, general activity, work or disability,emotional distress, health care utilization and  decreased medication consumption. Will continue to monitor progress towards achieving/maintaining therapeutic goals with special emphasis on  1. Improvement in perceived interfernce  of pain with ADL's. Ability to do home exercises independently. Ability to do household chores indoor and/or outdoor work and social and leisure activities. Improve psychosocial and physical functioning. - he is showing progression towards this treatment goal with the current regimen. He was advised against drinking alcohol with the narcotic pain medicines, advised against driving or handling machinery while adjusting the dose of medicines or if having cognitive  issues related to the current medications. Risk of overdose and death, if medicines not taken as prescribed, were also discussed. If the patient develops new symptoms or if the symptoms worsen, the patient should call the office. While transcribing every attempt was made to maintain the accuracy of the note in terms of it's contents,there may have been some errors made inadvertently. Thank you for allowing me to participate in the care of this patient. Marcelino Quiroz MD.    Cc: Larry Patino MD    I, Josette Hood, scribing for in the presence  of Dr. Marcelino Quiroz.   05/29/19  9:26 AM  Dr. Nathanael Fox Cate, personally performed the services described in this documentation as scribed by  Josette Hood MA in my presence and it is both accurate and complete

## 2019-06-05 ENCOUNTER — TELEPHONE (OUTPATIENT)
Dept: ENDOCRINOLOGY | Age: 59
End: 2019-06-05

## 2019-06-05 RX ORDER — BLOOD-GLUCOSE METER
1 EACH MISCELLANEOUS DAILY
Qty: 1 KIT | Refills: 0 | Status: SHIPPED | OUTPATIENT
Start: 2019-06-05

## 2019-06-05 NOTE — TELEPHONE ENCOUNTER
Spoke to patient advised that I would have the doctor change his insulin to something else that may be covered, he said that his one touch meter also stopped working today so will need a new one sent in. Please send for these.  Advised patient that any blood sugars over 300 or having symptoms such as n/v, confusion, ect he needs to go to the ER

## 2019-06-05 NOTE — TELEPHONE ENCOUNTER
Patient stated that his Humalog needs a PA    Also the patient wants to know at what blood sugar should he go to the ER.

## 2019-07-03 ENCOUNTER — OFFICE VISIT (OUTPATIENT)
Dept: PAIN MANAGEMENT | Age: 59
End: 2019-07-03
Payer: COMMERCIAL

## 2019-07-03 VITALS
DIASTOLIC BLOOD PRESSURE: 82 MMHG | BODY MASS INDEX: 33.05 KG/M2 | HEART RATE: 82 BPM | SYSTOLIC BLOOD PRESSURE: 137 MMHG | WEIGHT: 211 LBS

## 2019-07-03 DIAGNOSIS — M16.12 PRIMARY OSTEOARTHRITIS OF LEFT HIP: ICD-10-CM

## 2019-07-03 DIAGNOSIS — M25.512 CHRONIC LEFT SHOULDER PAIN: ICD-10-CM

## 2019-07-03 DIAGNOSIS — M51.36 DDD (DEGENERATIVE DISC DISEASE), LUMBAR: ICD-10-CM

## 2019-07-03 DIAGNOSIS — G89.4 CHRONIC PAIN SYNDROME: ICD-10-CM

## 2019-07-03 DIAGNOSIS — M54.16 LUMBAR RADICULOPATHY: ICD-10-CM

## 2019-07-03 DIAGNOSIS — M79.18 MYOFASCIAL PAIN: ICD-10-CM

## 2019-07-03 DIAGNOSIS — G89.29 CHRONIC LEFT SHOULDER PAIN: ICD-10-CM

## 2019-07-03 PROCEDURE — 3017F COLORECTAL CA SCREEN DOC REV: CPT | Performed by: INTERNAL MEDICINE

## 2019-07-03 PROCEDURE — G8427 DOCREV CUR MEDS BY ELIG CLIN: HCPCS | Performed by: INTERNAL MEDICINE

## 2019-07-03 PROCEDURE — G8417 CALC BMI ABV UP PARAM F/U: HCPCS | Performed by: INTERNAL MEDICINE

## 2019-07-03 PROCEDURE — 1036F TOBACCO NON-USER: CPT | Performed by: INTERNAL MEDICINE

## 2019-07-03 PROCEDURE — 99213 OFFICE O/P EST LOW 20 MIN: CPT | Performed by: INTERNAL MEDICINE

## 2019-07-03 RX ORDER — MELOXICAM 15 MG/1
15 TABLET ORAL DAILY
Qty: 30 TABLET | Refills: 1 | Status: SHIPPED | OUTPATIENT
Start: 2019-07-03 | End: 2019-08-07 | Stop reason: SDUPTHER

## 2019-07-03 RX ORDER — GABAPENTIN 300 MG/1
CAPSULE ORAL
Qty: 90 CAPSULE | Refills: 1 | Status: SHIPPED | OUTPATIENT
Start: 2019-07-03 | End: 2019-08-07 | Stop reason: SDUPTHER

## 2019-07-03 RX ORDER — HYDROCODONE BITARTRATE AND ACETAMINOPHEN 5; 325 MG/1; MG/1
1 TABLET ORAL EVERY 6 HOURS PRN
Qty: 90 TABLET | Refills: 0 | Status: SHIPPED | OUTPATIENT
Start: 2019-07-03 | End: 2019-08-07 | Stop reason: SDUPTHER

## 2019-07-03 NOTE — PROGRESS NOTES
medications were also explained to the patient. Informed verbal consent was obtained. Goals of current treatment regimen include improvement in pain, restoration of functioning- with focus on improvement in physical performance, general activity, work or disability,emotional distress, health care utilization and  decreased medication consumption. Will continue to monitor progress towards achieving/maintaining therapeutic goals with special emphasis on  1. Improvement in perceived interfernce  of pain with ADL's. Ability to do home exercises independently. Ability to do household chores indoor and/or outdoor work and social and leisure activities. Improve psychosocial and physical functioning. - he is showing progression towards this treatment goal with the current regimen. 2. Ability to focus/concentrate at work and perform the duties required of him at work  Sit through a workday without lower extremity symptoms. Stand 30-60 minutes without lower extremity symptoms. Ability to lift up to 10-20 lbs. Ability to go up and down stairs. Sit 30-60 minutes  Without having to stand up frequently. - he is maintaining/progressing towards his work related goals with the current regimen. He was advised against drinking alcohol with the narcotic pain medicines, advised against driving or handling machinery while adjusting the dose of medicines or if having cognitive  issues related to the current medications. Risk of overdose and death, if medicines not taken as prescribed, were also discussed. If the patient develops new symptoms or if the symptoms worsen, the patient should call the office. While transcribing every attempt was made to maintain the accuracy of the note in terms of it's contents,there may have been some errors made inadvertently. Thank you for allowing me to participate in the care of this patient.     Cassandra Matias MD.    Cc: MD SEAMUS Beatty, Audi [place], scribing for in the presence

## 2019-07-30 ENCOUNTER — OFFICE VISIT (OUTPATIENT)
Dept: ENDOCRINOLOGY | Age: 59
End: 2019-07-30
Payer: COMMERCIAL

## 2019-07-30 VITALS
OXYGEN SATURATION: 97 % | SYSTOLIC BLOOD PRESSURE: 146 MMHG | WEIGHT: 221.2 LBS | BODY MASS INDEX: 35.55 KG/M2 | DIASTOLIC BLOOD PRESSURE: 83 MMHG | HEART RATE: 86 BPM | HEIGHT: 66 IN | RESPIRATION RATE: 16 BRPM

## 2019-07-30 DIAGNOSIS — R03.0 ELEVATED BLOOD PRESSURE READING: ICD-10-CM

## 2019-07-30 LAB — HBA1C MFR BLD: 9.7 %

## 2019-07-30 PROCEDURE — G8417 CALC BMI ABV UP PARAM F/U: HCPCS | Performed by: INTERNAL MEDICINE

## 2019-07-30 PROCEDURE — 3017F COLORECTAL CA SCREEN DOC REV: CPT | Performed by: INTERNAL MEDICINE

## 2019-07-30 PROCEDURE — 1036F TOBACCO NON-USER: CPT | Performed by: INTERNAL MEDICINE

## 2019-07-30 PROCEDURE — 83036 HEMOGLOBIN GLYCOSYLATED A1C: CPT | Performed by: INTERNAL MEDICINE

## 2019-07-30 PROCEDURE — G8427 DOCREV CUR MEDS BY ELIG CLIN: HCPCS | Performed by: INTERNAL MEDICINE

## 2019-07-30 PROCEDURE — 2022F DILAT RTA XM EVC RTNOPTHY: CPT | Performed by: INTERNAL MEDICINE

## 2019-07-30 PROCEDURE — 99214 OFFICE O/P EST MOD 30 MIN: CPT | Performed by: INTERNAL MEDICINE

## 2019-07-30 PROCEDURE — 3046F HEMOGLOBIN A1C LEVEL >9.0%: CPT | Performed by: INTERNAL MEDICINE

## 2019-07-30 NOTE — PROGRESS NOTES
w/Device KIT 1 kit by Does not apply route daily 1 kit 0    FREESTYLE LITE strip  each 2    Lancets MISC 1 each by Does not apply route 3 times daily 100 each 3    metFORMIN (GLUCOPHAGE-XR) 500 MG extended release tablet TAKE TWO TABLETS BY MOUTH DAILY WITH BREAKFAST 60 tablet 3    Blood Glucose Monitoring Suppl (FREESTYLE LITE) RO As needed 1 Device 3    insulin glargine (BASAGLAR KWIKPEN) 100 UNIT/ML injection pen 20 units daily (Patient taking differently: 30 Units 20 units daily) 15 mL 1    MELATONIN PO Take by mouth      Insulin Pen Needle 32G X 4 MM MISC 1 each by Does not apply route daily 100 each 3    Insulin Pen Needle (PEN NEEDLES) 31G X 6 MM MISC       KROGER LANCETS ULTRATHIN 30G MISC       loratadine (CLARITIN) 10 MG tablet       lisinopril (PRINIVIL;ZESTRIL) 5 MG tablet Take 20 mg by mouth daily       atorvastatin (LIPITOR) 80 MG tablet Take 80 mg by mouth daily. No current facility-administered medications for this visit. Review of Systems  Please see scanned document dated and signed      Objective:      BP (!) 146/83   Pulse 86   Resp 16   Ht 5' 6\" (1.676 m)   Wt 221 lb 3.2 oz (100.3 kg)   SpO2 97%   BMI 35.70 kg/m²   Wt Readings from Last 3 Encounters:   07/30/19 221 lb 3.2 oz (100.3 kg)   07/03/19 211 lb (95.7 kg)   05/29/19 214 lb (97.1 kg)     Constitutional: Well-developed, appears stated age, cooperative, in no acute distress  H/E/N/M/T:atraumatic, normocephalic, external ears, nose, lips normal without lesions  Eyes: Lids, lashes, conjunctivae and sclerae normal, No proptosis, no redness  Neck: supple, symmetrical, no swelling  Skin: No obvious rashes or lesions present.   Skin and hair texture normal  Psychiatric: Judgement and Insight:  judgement and insight appear normal  Neuro: Normal without focal findings, speech is normal normal, speech is spontaneous  Chest: No labored breathing, no chest deformity, no stridor  Musculoskeletal: No joint

## 2019-08-07 ENCOUNTER — OFFICE VISIT (OUTPATIENT)
Dept: PAIN MANAGEMENT | Age: 59
End: 2019-08-07
Payer: COMMERCIAL

## 2019-08-07 VITALS
HEART RATE: 64 BPM | DIASTOLIC BLOOD PRESSURE: 85 MMHG | BODY MASS INDEX: 34.22 KG/M2 | WEIGHT: 212 LBS | SYSTOLIC BLOOD PRESSURE: 139 MMHG

## 2019-08-07 DIAGNOSIS — M16.12 PRIMARY OSTEOARTHRITIS OF LEFT HIP: ICD-10-CM

## 2019-08-07 DIAGNOSIS — G89.29 CHRONIC LEFT SHOULDER PAIN: ICD-10-CM

## 2019-08-07 DIAGNOSIS — M25.512 CHRONIC LEFT SHOULDER PAIN: ICD-10-CM

## 2019-08-07 DIAGNOSIS — G89.4 CHRONIC PAIN SYNDROME: ICD-10-CM

## 2019-08-07 DIAGNOSIS — M54.16 LUMBAR RADICULOPATHY: ICD-10-CM

## 2019-08-07 DIAGNOSIS — M51.36 DDD (DEGENERATIVE DISC DISEASE), LUMBAR: ICD-10-CM

## 2019-08-07 DIAGNOSIS — M79.18 MYOFASCIAL PAIN: ICD-10-CM

## 2019-08-07 PROCEDURE — 3017F COLORECTAL CA SCREEN DOC REV: CPT | Performed by: INTERNAL MEDICINE

## 2019-08-07 PROCEDURE — 1036F TOBACCO NON-USER: CPT | Performed by: INTERNAL MEDICINE

## 2019-08-07 PROCEDURE — G8427 DOCREV CUR MEDS BY ELIG CLIN: HCPCS | Performed by: INTERNAL MEDICINE

## 2019-08-07 PROCEDURE — G8417 CALC BMI ABV UP PARAM F/U: HCPCS | Performed by: INTERNAL MEDICINE

## 2019-08-07 PROCEDURE — 99213 OFFICE O/P EST LOW 20 MIN: CPT | Performed by: INTERNAL MEDICINE

## 2019-08-07 RX ORDER — GABAPENTIN 300 MG/1
CAPSULE ORAL
Qty: 90 CAPSULE | Refills: 1 | Status: SHIPPED | OUTPATIENT
Start: 2019-08-07 | End: 2019-09-11 | Stop reason: SDUPTHER

## 2019-08-07 RX ORDER — HYDROCODONE BITARTRATE AND ACETAMINOPHEN 5; 325 MG/1; MG/1
1 TABLET ORAL EVERY 6 HOURS PRN
Qty: 90 TABLET | Refills: 0 | Status: SHIPPED | OUTPATIENT
Start: 2019-08-07 | End: 2019-09-11 | Stop reason: SDUPTHER

## 2019-08-07 RX ORDER — MELOXICAM 15 MG/1
15 TABLET ORAL DAILY
Qty: 30 TABLET | Refills: 1 | Status: SHIPPED | OUTPATIENT
Start: 2019-08-07 | End: 2019-09-11 | Stop reason: SDUPTHER

## 2019-08-07 NOTE — PROGRESS NOTES
 Blood Glucose Monitoring Suppl (ONE TOUCH ULTRA 2) w/Device KIT 1 kit by Does not apply route daily 1 kit 0    FREESTYLE LITE strip  each 2    Lancets MISC 1 each by Does not apply route 3 times daily 100 each 3    metFORMIN (GLUCOPHAGE-XR) 500 MG extended release tablet TAKE TWO TABLETS BY MOUTH DAILY WITH BREAKFAST 60 tablet 3    Blood Glucose Monitoring Suppl (FREESTYLE LITE) RO As needed 1 Device 3    insulin glargine (BASAGLAR KWIKPEN) 100 UNIT/ML injection pen 20 units daily (Patient taking differently: 30 Units 20 units daily) 15 mL 1    MELATONIN PO Take by mouth      Insulin Pen Needle 32G X 4 MM MISC 1 each by Does not apply route daily 100 each 3    Insulin Pen Needle (PEN NEEDLES) 31G X 6 MM MISC       KROGER LANCETS ULTRATHIN 30G MISC       loratadine (CLARITIN) 10 MG tablet       lisinopril (PRINIVIL;ZESTRIL) 5 MG tablet Take 20 mg by mouth daily       atorvastatin (LIPITOR) 80 MG tablet Take 80 mg by mouth daily.  gabapentin (NEURONTIN) 300 MG capsule Take one tablet po in the morning and two tablets po in the evening 90 capsule 1     No facility-administered medications prior to visit. SOCIAL/FAMILY/PAST MEDICAL HISTORY: Mr. Carmelo Art, family and past medical history was reviewed. REVIEW OF SYSTEMS:    Respiratory: Negative for apnea, chest tightness and shortness of breath or change in baseline breathing. Gastrointestinal: Negative for nausea, vomiting, abdominal pain, diarrhea, constipation, blood in stool and abdominal distention. PHYSICAL EXAM:   Nursing note and vitals reviewed. /85   Pulse 64   Wt 212 lb (96.2 kg)   BMI 34.22 kg/m²   Constitutional: He appears well-developed and well-nourished. No acute distress. Skin: Skin is warm and dry, good turgor. No rash noted. He is not diaphoretic. Cardiovascular: Normal rate, regular rhythm, normal heart sounds, and does not have murmur.      Pulmonary/Chest: Effort normal. No respiratory distress. He does not have wheezes in the lung fields. He has no rales. Neurological/Psychiatric:He is alert and oriented to person, place, and time. Coordination is  normal.  His mood isAppropriate and affect is Neutral/Euthymic(normal) . IMPRESSION:   1. Chronic pain syndrome    2. Primary osteoarthritis of left hip    3. Myofascial pain    4. DDD (degenerative disc disease), lumbar    5. Lumbar radiculopathy    6. Chronic left shoulder pain        PLAN:  Informed verbal consent was obtained  -continue with current opioid regimen   -He was advised weight reduction, diet changes- 800-1200 shad diet, diet diary, exercising, nutritional  consult increased physical activity as tolerated  -Adv Biofeedback, relaxation and meditation techniques. Referral to psychologist for CBT was also discussed with patient  -continue with Neurontin and Mobic  -Monitor blood sugar regularly, diabetic control- adv diabetic diet. Goal for fasting blood sugars around 120. Follow up with Endocrinologist/PCP also for on going management    -He was advised to increase fluids ( 5-7  glasses of fluid daily), limit caffeine, avoid cheese products, increase dietary fiber, increase activity and exercise as tolerated and relax regularly and enjoy meals      Current Outpatient Medications   Medication Sig Dispense Refill    insulin aspart (NOVOLOG FLEXPEN) 100 UNIT/ML injection pen 8-14 units  AC TID 5 pen 3    meloxicam (MOBIC) 15 MG tablet Take 1 tablet by mouth daily 30 tablet 1    HYDROcodone-acetaminophen (NORCO) 5-325 MG per tablet Take 1 tablet by mouth every 6 hours as needed for Pain (max 2-3 per day) for up to 35 days.  90 tablet 0    Blood Glucose Monitoring Suppl (ONE TOUCH ULTRA 2) w/Device KIT 1 kit by Does not apply route daily 1 kit 0    FREESTYLE LITE strip  each 2    Lancets MISC 1 each by Does not apply route 3 times daily 100 each 3    metFORMIN (GLUCOPHAGE-XR) 500 MG extended release tablet TAKE TWO TABLETS Ability to do home exercises independently. Ability to do household chores indoor and/or outdoor work and social and leisure activities. Improve psychosocial and physical functioning. - he is showing progression towards this treatment goal with the current regimen. 2. Ability to focus/concentrate at work and perform the duties required of him at work  Sit through a workday without lower extremity symptoms. Stand 30-60 minutes without lower extremity symptoms. Ability to lift up to 10-20 lbs. Ability to go up and down stairs. Sit 30-60 minutes  Without having to stand up frequently. - he is maintaining/progressing towards his work related goals with the current regimen. He was advised against drinking alcohol with the narcotic pain medicines, advised against driving or handling machinery while adjusting the dose of medicines or if having cognitive  issues related to the current medications. Risk of overdose and death, if medicines not taken as prescribed, were also discussed. If the patient develops new symptoms or if the symptoms worsen, the patient should call the office. While transcribing every attempt was made to maintain the accuracy of the note in terms of it's contents,there may have been some errors made inadvertently. Thank you for allowing me to participate in the care of this patient.     Topher Amor MD.    Cc: Imelda Fischer MD

## 2019-08-13 RX ORDER — BLOOD-GLUCOSE METER
1 KIT MISCELLANEOUS 4 TIMES DAILY
Qty: 120 EACH | Refills: 5 | Status: SHIPPED | OUTPATIENT
Start: 2019-08-13 | End: 2020-01-22

## 2019-08-21 ENCOUNTER — TELEPHONE (OUTPATIENT)
Dept: PAIN MANAGEMENT | Age: 59
End: 2019-08-21

## 2019-08-21 DIAGNOSIS — G89.29 CHRONIC LEFT SHOULDER PAIN: ICD-10-CM

## 2019-08-21 DIAGNOSIS — M25.512 CHRONIC LEFT SHOULDER PAIN: ICD-10-CM

## 2019-08-21 DIAGNOSIS — G89.4 CHRONIC PAIN SYNDROME: Primary | ICD-10-CM

## 2019-08-21 DIAGNOSIS — M54.16 LUMBAR RADICULOPATHY: ICD-10-CM

## 2019-08-21 DIAGNOSIS — M51.36 DDD (DEGENERATIVE DISC DISEASE), LUMBAR: ICD-10-CM

## 2019-08-23 ENCOUNTER — HOSPITAL ENCOUNTER (OUTPATIENT)
Dept: PHYSICAL THERAPY | Age: 59
Setting detail: THERAPIES SERIES
Discharge: HOME | End: 2019-08-23
Payer: COMMERCIAL

## 2019-08-23 PROCEDURE — 97530 THERAPEUTIC ACTIVITIES: CPT

## 2019-08-23 PROCEDURE — 97162 PT EVAL MOD COMPLEX 30 MIN: CPT

## 2019-08-23 ASSESSMENT — PAIN SCALES - QUEBEC BACK PAIN DISABILITY SCALE
TURN OVER IN BED: 3
GET OUT OF BED: 2
QUEBEC CMS MODIFIER: CK
RUN ONE BLOCK OR 100M: 5
SIT IN A CHAIR FOR SEVERAL HOURS: 2
STAND UP FOR 20 TO 30 MINUTES: 2
BEND OVER TO CLEAN THE BATHTUB: 2
CLIMB ONE FLIGHT OF STAIRS: 3
WALK SEVERAL KILOMETERS  OR MILES: 5
MOVE A CHAIR: 3
TOTAL SCORE: 55
REACH UP TO HIGH SHELVES: 3
CARRY TWO BAGS OF GROCERIES: 2
PUT ON SOCKS OR PANYHOSE: 2
WALK A FEW BLOCKS OR 300 TO 400M: 3
PULL OR PUSH HEAVY DOORS: 2
SLEEP THROUGH THE NIGHT: 4
LIFT AND CARRY A HEAVY SUITCASE: 3
MAKE YOUR BED: 2
THROW A BALL: 3
RIDE IN A CAR: 2
TAKE FOOD OUT OF THE REFRIGERATOR: 2
QUEBEC DISABILITY INDEX: 40-59%

## 2019-08-23 NOTE — PLAN OF CARE
Outpatient Physical Therapy  [x] [facility]    Phone: [phone]   Fax: [phone]   [] [facility]  Phone: [phone]              Fax: [phone]  [] [facility]   Phone: [phone]   Fax: [phone]     To: Referring Practitioner: Farnaz Gomez MD      Patient: Yifan Bradley   : 1960   MRN: [id number]  Evaluation Date: 2019      Diagnosis Information:  · Diagnosis: chronic pain syndrome. ·   Decreased functional mobility 2/2 chronic pain. Physical Therapy Certification  Dear Merlene Carvalho  The following patient has been evaluated for physical therapy services and for therapy to continue, Medicare requires monthly physician review of the treatment plan. Please review the attached evaluation and/or summary of the patient's plan of care, and verify that you agree therapy should continue by signing the attached document and sending it back to our office. Plan of Care/Treatment to date:  [] Therapeutic Exercise    [] Modalities:  [] Therapeutic Activity     [] Ultrasound  [] Electrical Stimulation  [] Gait Training      [] Cervical Traction [] Lumbar Traction  [] Neuromuscular Re-education    [] Cold/hotpack [] Iontophoresis   [] Instruction in HEP     Other:  [] Manual Therapy      []             [x] Aquatic Therapy      []           ? Frequency/Duration:  # Days per week: [] 1 day # Weeks: [] 1 week [] 5 weeks     [x] 2 days? [] 2 weeks [] 6 weeks     [] 3 days   [] 3 weeks [] 7 weeks     [] 4 days   [] 4 weeks [x] 8 weeks    Rehab Potential: [] Excellent [x] Good [] Fair  [] Poor       Electronically signed by:  Myrna Kitchen, PT [id number]      If you have any questions or concerns, please don't hesitate to call.   Thank you for your referral.      Physician Signature:________________________________Date:__________________  By signing above, therapists plan is approved by physician

## 2019-08-29 ENCOUNTER — HOSPITAL ENCOUNTER (OUTPATIENT)
Dept: PHYSICAL THERAPY | Age: 59
Setting detail: THERAPIES SERIES
End: 2019-08-29
Payer: COMMERCIAL

## 2019-09-11 ENCOUNTER — OFFICE VISIT (OUTPATIENT)
Dept: PAIN MANAGEMENT | Age: 59
End: 2019-09-11
Payer: COMMERCIAL

## 2019-09-11 VITALS
DIASTOLIC BLOOD PRESSURE: 73 MMHG | BODY MASS INDEX: 35.67 KG/M2 | HEART RATE: 78 BPM | WEIGHT: 221 LBS | SYSTOLIC BLOOD PRESSURE: 110 MMHG

## 2019-09-11 DIAGNOSIS — M25.512 CHRONIC LEFT SHOULDER PAIN: ICD-10-CM

## 2019-09-11 DIAGNOSIS — M54.16 LUMBAR RADICULOPATHY: ICD-10-CM

## 2019-09-11 DIAGNOSIS — G89.29 CHRONIC LEFT SHOULDER PAIN: ICD-10-CM

## 2019-09-11 DIAGNOSIS — G89.4 CHRONIC PAIN SYNDROME: ICD-10-CM

## 2019-09-11 DIAGNOSIS — M16.12 PRIMARY OSTEOARTHRITIS OF LEFT HIP: ICD-10-CM

## 2019-09-11 DIAGNOSIS — M51.36 DDD (DEGENERATIVE DISC DISEASE), LUMBAR: ICD-10-CM

## 2019-09-11 DIAGNOSIS — M79.18 MYOFASCIAL PAIN: ICD-10-CM

## 2019-09-11 PROCEDURE — 3017F COLORECTAL CA SCREEN DOC REV: CPT | Performed by: INTERNAL MEDICINE

## 2019-09-11 PROCEDURE — 99213 OFFICE O/P EST LOW 20 MIN: CPT | Performed by: INTERNAL MEDICINE

## 2019-09-11 PROCEDURE — 1036F TOBACCO NON-USER: CPT | Performed by: INTERNAL MEDICINE

## 2019-09-11 PROCEDURE — G8417 CALC BMI ABV UP PARAM F/U: HCPCS | Performed by: INTERNAL MEDICINE

## 2019-09-11 PROCEDURE — G8427 DOCREV CUR MEDS BY ELIG CLIN: HCPCS | Performed by: INTERNAL MEDICINE

## 2019-09-11 RX ORDER — GABAPENTIN 300 MG/1
CAPSULE ORAL
Qty: 90 CAPSULE | Refills: 1 | Status: SHIPPED | OUTPATIENT
Start: 2019-09-11 | End: 2019-10-16 | Stop reason: SDUPTHER

## 2019-09-11 RX ORDER — MELOXICAM 15 MG/1
15 TABLET ORAL DAILY
Qty: 30 TABLET | Refills: 1 | Status: SHIPPED | OUTPATIENT
Start: 2019-09-11 | End: 2019-10-16 | Stop reason: SDUPTHER

## 2019-09-11 RX ORDER — HYDROCODONE BITARTRATE AND ACETAMINOPHEN 5; 325 MG/1; MG/1
1 TABLET ORAL EVERY 6 HOURS PRN
Qty: 90 TABLET | Refills: 0 | Status: SHIPPED | OUTPATIENT
Start: 2019-09-11 | End: 2019-10-16 | Stop reason: SDUPTHER

## 2019-09-11 NOTE — PROGRESS NOTES
Cadence Deal  1960  [id number]      HISTORY OF PRESENT ILLNESS:  Mr. Dewayne Weinstein is a 60 y.o. male returns for a follow up visit for pain management  He has a diagnosis of   1. Chronic pain syndrome    2. DDD (degenerative disc disease), lumbar    3. Lumbar radiculopathy    4. Chronic left shoulder pain    5. Primary osteoarthritis of left hip    6. Myofascial pain    . He complains of pain in the chest, left shoulder, lower back, buttock, bilateral hips, bilateral ankles/feet He rates the pain 6/10 and describes it as throbbing. Current treatment regimen has helped relieve about 50% of the pain. He denies any side effects from the current pain regimen. Patient reports that since the last follow up visit the physical functioning is unchanged, family/social relationships are unchanged, mood is unchanged sleep patterns are unchanged, and that the overall functioning is unchanged. Patient denies misusing/abusing his narcotic pain medications or using any illegal drugs. There are No indicators for possible drug abuse, addiction or diversion problems. Mr. Dewayne Weinstein states he has been doing fair. He says he was in the hospital for possible CHF/CP's, he had a stress test done, all was ok. He mentions he had some medication changes. He says he is using Mobic still along with Neurontin, Cardiology was ok with him taking Mobic. ALLERGIES: Patients list of allergies were reviewed     MEDICATIONS: Mr. Dewayne Weinstein list of medications were reviewed. His current medications are   Outpatient Medications Prior to Visit   Medication Sig Dispense Refill    FREESTYLE LITE strip 1 each by Does not apply route 4 times daily  each 5    meloxicam (MOBIC) 15 MG tablet Take 1 tablet by mouth daily 30 tablet 1    HYDROcodone-acetaminophen (NORCO) 5-325 MG per tablet Take 1 tablet by mouth every 6 hours as needed for Pain (max 2-3 per day) for up to 35 days.  90 tablet 0    insulin aspart (NOVOLOG FLEXPEN) 100 UNIT/ML injection pen 8-14 units  AC TID 5 pen 3    Blood Glucose Monitoring Suppl (ONE TOUCH ULTRA 2) w/Device KIT 1 kit by Does not apply route daily 1 kit 0    Lancets MISC 1 each by Does not apply route 3 times daily 100 each 3    metFORMIN (GLUCOPHAGE-XR) 500 MG extended release tablet TAKE TWO TABLETS BY MOUTH DAILY WITH BREAKFAST 60 tablet 3    Blood Glucose Monitoring Suppl (FREESTYLE LITE) RO As needed 1 Device 3    insulin glargine (BASAGLAR KWIKPEN) 100 UNIT/ML injection pen 20 units daily (Patient taking differently: 30 Units 20 units daily) 15 mL 1    MELATONIN PO Take by mouth      Insulin Pen Needle 32G X 4 MM MISC 1 each by Does not apply route daily 100 each 3    Insulin Pen Needle (PEN NEEDLES) 31G X 6 MM MISC       KROGER LANCETS ULTRATHIN 30G MISC       loratadine (CLARITIN) 10 MG tablet       lisinopril (PRINIVIL;ZESTRIL) 5 MG tablet Take 20 mg by mouth daily       atorvastatin (LIPITOR) 80 MG tablet Take 80 mg by mouth daily.  gabapentin (NEURONTIN) 300 MG capsule Take one tablet po in the morning and two tablets po in the evening 90 capsule 1     No facility-administered medications prior to visit. SOCIAL/FAMILY/PAST MEDICAL HISTORY: Mr. Loulou Huerta, family and past medical history was reviewed. REVIEW OF SYSTEMS:    Respiratory: Negative for apnea, chest tightness and shortness of breath or change in baseline breathing. Gastrointestinal: Negative for nausea, vomiting, abdominal pain, diarrhea, constipation, blood in stool and abdominal distention. PHYSICAL EXAM:   Nursing note and vitals reviewed. /73   Pulse 78   Wt 221 lb (100.2 kg)   BMI 35.67 kg/m²   Constitutional: He appears well-developed and well-nourished. No acute distress. Skin: Skin is warm and dry, good turgor. No rash noted. He is not diaphoretic. Cardiovascular: Normal rate, regular rhythm, normal heart sounds, and does not have murmur. Pulmonary/Chest: Effort normal. No respiratory distress.  He does not have wheezes in the lung fields. He has no rales. Neurological/Psychiatric:He is alert and oriented to person, place, and time. Coordination is  normal.  His mood isAppropriate and affect is Neutral/Euthymic(normal) . IMPRESSION:   1. Chronic pain syndrome    2. DDD (degenerative disc disease), lumbar    3. Lumbar radiculopathy    4. Chronic left shoulder pain    5. Primary osteoarthritis of left hip    6. Myofascial pain        PLAN:  Informed verbal consent was obtained  -[place] record was obtained and reviewed  for the last one year and no indicators of drug misuse  were found. Any other controlled substance prescriptions  seen on the record have been accounted for, I am aware of the patient receiving these medications. Domingo Garcia [place] record will be rechecked as part of office protocol.   -continue with current opioid regimen   -Adv Biofeedback, relaxation and meditation techniques. Referral to psychologist for CBT was also discussed with patient  -continue with Norco 2-3 per day  -records from Cardiology/hospital reviewed  -Most recent labs were reviewed and are within normal limits. Will repeat them within next 9-12 months if there is no status change, ok/baseline  -Monitor blood sugar regularly, diabetic control- adv diabetic diet. Goal for fasting blood sugars around 120. Follow up with Endocrinologist/PCP also for on going management     Current Outpatient Medications   Medication Sig Dispense Refill    FREESTYLE LITE strip 1 each by Does not apply route 4 times daily  each 5    meloxicam (MOBIC) 15 MG tablet Take 1 tablet by mouth daily 30 tablet 1    HYDROcodone-acetaminophen (NORCO) 5-325 MG per tablet Take 1 tablet by mouth every 6 hours as needed for Pain (max 2-3 per day) for up to 35 days.  90 tablet 0    insulin aspart (NOVOLOG FLEXPEN) 100 UNIT/ML injection pen 8-14 units  AC TID 5 pen 3    Blood Glucose Monitoring Suppl (ONE TOUCH ULTRA 2) w/Device KIT 1 kit by Does not apply route daily 1 kit 0    Lancets MISC 1 each by Does not apply route 3 times daily 100 each 3    metFORMIN (GLUCOPHAGE-XR) 500 MG extended release tablet TAKE TWO TABLETS BY MOUTH DAILY WITH BREAKFAST 60 tablet 3    Blood Glucose Monitoring Suppl (FREESTYLE LITE) RO As needed 1 Device 3    insulin glargine (BASAGLAR KWIKPEN) 100 UNIT/ML injection pen 20 units daily (Patient taking differently: 30 Units 20 units daily) 15 mL 1    MELATONIN PO Take by mouth      Insulin Pen Needle 32G X 4 MM MISC 1 each by Does not apply route daily 100 each 3    Insulin Pen Needle (PEN NEEDLES) 31G X 6 MM MISC       KROGER LANCETS ULTRATHIN 30G MISC       loratadine (CLARITIN) 10 MG tablet       lisinopril (PRINIVIL;ZESTRIL) 5 MG tablet Take 20 mg by mouth daily       atorvastatin (LIPITOR) 80 MG tablet Take 80 mg by mouth daily.  gabapentin (NEURONTIN) 300 MG capsule Take one tablet po in the morning and two tablets po in the evening 90 capsule 1     No current facility-administered medications for this visit. I will continue his current medication regimen  which is part of the above treatment schedule. It has been helping with Mr. David Becker chronic  medical problems which for this visit include:   Diagnoses of Chronic pain syndrome, DDD (degenerative disc disease), lumbar, Lumbar radiculopathy, Chronic left shoulder pain, Primary osteoarthritis of left hip, and Myofascial pain were pertinent to this visit. Risks and benefits of the medications and other alternative treatments  including no treatment were discussed with the patient. The common side effects of these medications were also explained to the patient. Informed verbal consent was obtained. Goals of current treatment regimen include improvement in pain, restoration of functioning- with focus on improvement in physical performance, general activity, work or disability,emotional distress, health care utilization and  decreased medication consumption.  Will continue to monitor progress towards achieving/maintaining therapeutic goals with special emphasis on  1. Improvement in perceived interfernce  of pain with ADL's. Ability to do home exercises independently. Ability to do household chores indoor and/or outdoor work and social and leisure activities. Improve psychosocial and physical functioning. - he is showing progression towards this treatment goal with the current regimen. He was advised against drinking alcohol with the narcotic pain medicines, advised against driving or handling machinery while adjusting the dose of medicines or if having cognitive  issues related to the current medications. Risk of overdose and death, if medicines not taken as prescribed, were also discussed. If the patient develops new symptoms or if the symptoms worsen, the patient should call the office. While transcribing every attempt was made to maintain the accuracy of the note in terms of it's contents,there may have been some errors made inadvertently. Thank you for allowing me to participate in the care of this patient. Tiki Allen MD.    Cc: MD SEAMUS Mascorro, Christine Valdes, scribing for in the presence  of Dr. Tiki Allen.   09/11/19  9:02 AM  [facility]   I, Dr. Tiki Allen, personally performed the services described in this documentation as scribed by  Christine Valdes MA in my presence and it is both accurate and complete

## 2019-09-16 RX ORDER — METFORMIN HYDROCHLORIDE 500 MG/1
TABLET, EXTENDED RELEASE ORAL
Qty: 60 TABLET | Refills: 3 | Status: SHIPPED | OUTPATIENT
Start: 2019-09-16 | End: 2020-04-04

## 2019-10-01 ENCOUNTER — APPOINTMENT (OUTPATIENT)
Dept: PHYSICAL THERAPY | Age: 59
End: 2019-10-01
Payer: COMMERCIAL

## 2019-10-08 RX ORDER — PEN NEEDLE, DIABETIC 31 G X1/4"
1 NEEDLE, DISPOSABLE MISCELLANEOUS 4 TIMES DAILY
Qty: 200 EACH | Refills: 3 | Status: SHIPPED | OUTPATIENT
Start: 2019-10-08 | End: 2020-09-18 | Stop reason: SDUPTHER

## 2019-10-10 ENCOUNTER — HOSPITAL ENCOUNTER (OUTPATIENT)
Dept: CARDIAC REHAB | Age: 59
Setting detail: THERAPIES SERIES
Discharge: HOME | End: 2019-10-10
Payer: COMMERCIAL

## 2019-10-10 VITALS
OXYGEN SATURATION: 99 % | BODY MASS INDEX: 37.07 KG/M2 | RESPIRATION RATE: 16 BRPM | SYSTOLIC BLOOD PRESSURE: 152 MMHG | DIASTOLIC BLOOD PRESSURE: 88 MMHG | HEART RATE: 72 BPM | WEIGHT: 230.7 LBS | HEIGHT: 66 IN

## 2019-10-10 RX ORDER — INSULIN GLARGINE 100 [IU]/ML
34 INJECTION, SOLUTION SUBCUTANEOUS NIGHTLY
COMMUNITY
End: 2019-10-29

## 2019-10-10 RX ORDER — FUROSEMIDE 20 MG/1
40 TABLET ORAL DAILY
COMMUNITY

## 2019-10-10 ASSESSMENT — PATIENT HEALTH QUESTIONNAIRE - PHQ9: SUM OF ALL RESPONSES TO PHQ QUESTIONS 1-9: 6

## 2019-10-14 RX ORDER — BLOOD-GLUCOSE METER
KIT MISCELLANEOUS
Qty: 100 STRIP | Refills: 5 | Status: SHIPPED | OUTPATIENT
Start: 2019-10-14 | End: 2020-04-27

## 2019-10-16 ENCOUNTER — OFFICE VISIT (OUTPATIENT)
Dept: PAIN MANAGEMENT | Age: 59
End: 2019-10-16
Payer: COMMERCIAL

## 2019-10-16 VITALS
SYSTOLIC BLOOD PRESSURE: 130 MMHG | WEIGHT: 231 LBS | HEART RATE: 80 BPM | BODY MASS INDEX: 37.28 KG/M2 | DIASTOLIC BLOOD PRESSURE: 81 MMHG

## 2019-10-16 DIAGNOSIS — M79.18 MYOFASCIAL PAIN: ICD-10-CM

## 2019-10-16 DIAGNOSIS — M51.36 DDD (DEGENERATIVE DISC DISEASE), LUMBAR: ICD-10-CM

## 2019-10-16 DIAGNOSIS — M25.512 CHRONIC LEFT SHOULDER PAIN: ICD-10-CM

## 2019-10-16 DIAGNOSIS — M16.12 PRIMARY OSTEOARTHRITIS OF LEFT HIP: ICD-10-CM

## 2019-10-16 DIAGNOSIS — M54.16 LUMBAR RADICULOPATHY: ICD-10-CM

## 2019-10-16 DIAGNOSIS — G89.4 CHRONIC PAIN SYNDROME: ICD-10-CM

## 2019-10-16 DIAGNOSIS — G89.29 CHRONIC LEFT SHOULDER PAIN: ICD-10-CM

## 2019-10-16 PROCEDURE — G8417 CALC BMI ABV UP PARAM F/U: HCPCS | Performed by: INTERNAL MEDICINE

## 2019-10-16 PROCEDURE — 3017F COLORECTAL CA SCREEN DOC REV: CPT | Performed by: INTERNAL MEDICINE

## 2019-10-16 PROCEDURE — 1036F TOBACCO NON-USER: CPT | Performed by: INTERNAL MEDICINE

## 2019-10-16 PROCEDURE — G8427 DOCREV CUR MEDS BY ELIG CLIN: HCPCS | Performed by: INTERNAL MEDICINE

## 2019-10-16 PROCEDURE — 99213 OFFICE O/P EST LOW 20 MIN: CPT | Performed by: INTERNAL MEDICINE

## 2019-10-16 PROCEDURE — G8484 FLU IMMUNIZE NO ADMIN: HCPCS | Performed by: INTERNAL MEDICINE

## 2019-10-16 RX ORDER — HYDROCODONE BITARTRATE AND ACETAMINOPHEN 5; 325 MG/1; MG/1
1 TABLET ORAL EVERY 6 HOURS PRN
Qty: 126 TABLET | Refills: 0 | Status: SHIPPED | OUTPATIENT
Start: 2019-10-16 | End: 2019-11-26 | Stop reason: SDUPTHER

## 2019-10-16 RX ORDER — MELOXICAM 15 MG/1
15 TABLET ORAL DAILY
Qty: 30 TABLET | Refills: 1 | Status: SHIPPED | OUTPATIENT
Start: 2019-10-16 | End: 2019-11-26 | Stop reason: SDUPTHER

## 2019-10-16 RX ORDER — GABAPENTIN 300 MG/1
CAPSULE ORAL
Qty: 90 CAPSULE | Refills: 1 | Status: SHIPPED | OUTPATIENT
Start: 2019-10-16 | End: 2019-11-26 | Stop reason: SDUPTHER

## 2019-10-29 ENCOUNTER — OFFICE VISIT (OUTPATIENT)
Dept: ENDOCRINOLOGY | Age: 59
End: 2019-10-29
Payer: COMMERCIAL

## 2019-10-29 VITALS
SYSTOLIC BLOOD PRESSURE: 149 MMHG | RESPIRATION RATE: 20 BRPM | HEIGHT: 66 IN | HEART RATE: 81 BPM | WEIGHT: 224.2 LBS | OXYGEN SATURATION: 96 % | DIASTOLIC BLOOD PRESSURE: 94 MMHG | BODY MASS INDEX: 36.03 KG/M2

## 2019-10-29 DIAGNOSIS — R03.0 ELEVATED BLOOD PRESSURE READING: ICD-10-CM

## 2019-10-29 DIAGNOSIS — E78.49 OTHER HYPERLIPIDEMIA: ICD-10-CM

## 2019-10-29 LAB — HBA1C MFR BLD: 9.7 %

## 2019-10-29 PROCEDURE — G8417 CALC BMI ABV UP PARAM F/U: HCPCS | Performed by: INTERNAL MEDICINE

## 2019-10-29 PROCEDURE — 83036 HEMOGLOBIN GLYCOSYLATED A1C: CPT | Performed by: INTERNAL MEDICINE

## 2019-10-29 PROCEDURE — G8484 FLU IMMUNIZE NO ADMIN: HCPCS | Performed by: INTERNAL MEDICINE

## 2019-10-29 PROCEDURE — 3046F HEMOGLOBIN A1C LEVEL >9.0%: CPT | Performed by: INTERNAL MEDICINE

## 2019-10-29 PROCEDURE — 99214 OFFICE O/P EST MOD 30 MIN: CPT | Performed by: INTERNAL MEDICINE

## 2019-10-29 PROCEDURE — 2022F DILAT RTA XM EVC RTNOPTHY: CPT | Performed by: INTERNAL MEDICINE

## 2019-10-29 PROCEDURE — G8427 DOCREV CUR MEDS BY ELIG CLIN: HCPCS | Performed by: INTERNAL MEDICINE

## 2019-10-29 PROCEDURE — 1036F TOBACCO NON-USER: CPT | Performed by: INTERNAL MEDICINE

## 2019-10-29 PROCEDURE — 3017F COLORECTAL CA SCREEN DOC REV: CPT | Performed by: INTERNAL MEDICINE

## 2019-11-01 ENCOUNTER — HOSPITAL ENCOUNTER (OUTPATIENT)
Dept: PHYSICAL THERAPY | Age: 59
Setting detail: THERAPIES SERIES
Discharge: HOME | End: 2019-11-01
Payer: COMMERCIAL

## 2019-11-01 PROCEDURE — 97162 PT EVAL MOD COMPLEX 30 MIN: CPT

## 2019-11-01 PROCEDURE — 97530 THERAPEUTIC ACTIVITIES: CPT

## 2019-11-04 ENCOUNTER — APPOINTMENT (OUTPATIENT)
Dept: PHYSICAL THERAPY | Age: 59
End: 2019-11-04
Payer: COMMERCIAL

## 2019-11-04 ENCOUNTER — TELEPHONE (OUTPATIENT)
Dept: ENDOCRINOLOGY | Age: 59
End: 2019-11-04

## 2019-11-04 NOTE — TELEPHONE ENCOUNTER
Patient telephoned to report is having terrible reflux from medication Trulicity. Not vomiting yet. Wants suggestions or possible script from Dr. Denae Jose.

## 2019-11-04 NOTE — TELEPHONE ENCOUNTER
Patient called back to inquire about response. Read doctor note to patient. Patient said will continue to use medication and see if side effect lessens.

## 2019-11-05 ENCOUNTER — HOSPITAL ENCOUNTER (OUTPATIENT)
Dept: PHYSICAL THERAPY | Age: 59
Setting detail: THERAPIES SERIES
Discharge: HOME | End: 2019-11-05
Payer: COMMERCIAL

## 2019-11-05 PROCEDURE — 97113 AQUATIC THERAPY/EXERCISES: CPT

## 2019-11-07 ENCOUNTER — HOSPITAL ENCOUNTER (OUTPATIENT)
Dept: PHYSICAL THERAPY | Age: 59
Setting detail: THERAPIES SERIES
Discharge: HOME | End: 2019-11-07
Payer: COMMERCIAL

## 2019-11-07 PROCEDURE — 97113 AQUATIC THERAPY/EXERCISES: CPT

## 2019-11-07 PROCEDURE — 97150 GROUP THERAPEUTIC PROCEDURES: CPT

## 2019-11-08 ENCOUNTER — APPOINTMENT (OUTPATIENT)
Dept: PHYSICAL THERAPY | Age: 59
End: 2019-11-08
Payer: COMMERCIAL

## 2019-11-11 ENCOUNTER — APPOINTMENT (OUTPATIENT)
Dept: PHYSICAL THERAPY | Age: 59
End: 2019-11-11
Payer: COMMERCIAL

## 2019-11-12 ENCOUNTER — HOSPITAL ENCOUNTER (OUTPATIENT)
Dept: PHYSICAL THERAPY | Age: 59
Setting detail: THERAPIES SERIES
Discharge: HOME | End: 2019-11-12
Payer: COMMERCIAL

## 2019-11-12 PROCEDURE — 97150 GROUP THERAPEUTIC PROCEDURES: CPT

## 2019-11-12 PROCEDURE — 97113 AQUATIC THERAPY/EXERCISES: CPT

## 2019-11-14 ENCOUNTER — HOSPITAL ENCOUNTER (OUTPATIENT)
Dept: PHYSICAL THERAPY | Age: 59
Setting detail: THERAPIES SERIES
Discharge: HOME | End: 2019-11-14
Payer: COMMERCIAL

## 2019-11-14 PROCEDURE — 97113 AQUATIC THERAPY/EXERCISES: CPT

## 2019-11-14 PROCEDURE — 97150 GROUP THERAPEUTIC PROCEDURES: CPT

## 2019-11-15 ENCOUNTER — APPOINTMENT (OUTPATIENT)
Dept: PHYSICAL THERAPY | Age: 59
End: 2019-11-15
Payer: COMMERCIAL

## 2019-11-18 ENCOUNTER — APPOINTMENT (OUTPATIENT)
Dept: PHYSICAL THERAPY | Age: 59
End: 2019-11-18
Payer: COMMERCIAL

## 2019-11-19 ENCOUNTER — HOSPITAL ENCOUNTER (OUTPATIENT)
Dept: PHYSICAL THERAPY | Age: 59
Setting detail: THERAPIES SERIES
Discharge: HOME | End: 2019-11-19
Payer: COMMERCIAL

## 2019-11-19 PROCEDURE — 97113 AQUATIC THERAPY/EXERCISES: CPT

## 2019-11-19 PROCEDURE — 97150 GROUP THERAPEUTIC PROCEDURES: CPT

## 2019-11-21 ENCOUNTER — APPOINTMENT (OUTPATIENT)
Dept: PHYSICAL THERAPY | Age: 59
End: 2019-11-21
Payer: COMMERCIAL

## 2019-11-22 ENCOUNTER — APPOINTMENT (OUTPATIENT)
Dept: PHYSICAL THERAPY | Age: 59
End: 2019-11-22
Payer: COMMERCIAL

## 2019-11-26 ENCOUNTER — HOSPITAL ENCOUNTER (OUTPATIENT)
Dept: PHYSICAL THERAPY | Age: 59
Setting detail: THERAPIES SERIES
Discharge: HOME | End: 2019-11-26
Payer: COMMERCIAL

## 2019-11-26 ENCOUNTER — OFFICE VISIT (OUTPATIENT)
Dept: PAIN MANAGEMENT | Age: 59
End: 2019-11-26
Payer: COMMERCIAL

## 2019-11-26 ENCOUNTER — APPOINTMENT (OUTPATIENT)
Dept: PHYSICAL THERAPY | Age: 59
End: 2019-11-26
Payer: COMMERCIAL

## 2019-11-26 VITALS
BODY MASS INDEX: 36.48 KG/M2 | WEIGHT: 226 LBS | DIASTOLIC BLOOD PRESSURE: 90 MMHG | HEART RATE: 80 BPM | SYSTOLIC BLOOD PRESSURE: 154 MMHG

## 2019-11-26 DIAGNOSIS — M79.18 MYOFASCIAL PAIN: ICD-10-CM

## 2019-11-26 DIAGNOSIS — G89.4 CHRONIC PAIN SYNDROME: ICD-10-CM

## 2019-11-26 DIAGNOSIS — M51.36 DDD (DEGENERATIVE DISC DISEASE), LUMBAR: ICD-10-CM

## 2019-11-26 DIAGNOSIS — G89.29 CHRONIC LEFT SHOULDER PAIN: ICD-10-CM

## 2019-11-26 DIAGNOSIS — M54.16 LUMBAR RADICULOPATHY: ICD-10-CM

## 2019-11-26 DIAGNOSIS — M25.512 CHRONIC LEFT SHOULDER PAIN: ICD-10-CM

## 2019-11-26 DIAGNOSIS — M16.12 PRIMARY OSTEOARTHRITIS OF LEFT HIP: ICD-10-CM

## 2019-11-26 PROCEDURE — 1036F TOBACCO NON-USER: CPT | Performed by: INTERNAL MEDICINE

## 2019-11-26 PROCEDURE — G8417 CALC BMI ABV UP PARAM F/U: HCPCS | Performed by: INTERNAL MEDICINE

## 2019-11-26 PROCEDURE — 3017F COLORECTAL CA SCREEN DOC REV: CPT | Performed by: INTERNAL MEDICINE

## 2019-11-26 PROCEDURE — 99213 OFFICE O/P EST LOW 20 MIN: CPT | Performed by: INTERNAL MEDICINE

## 2019-11-26 PROCEDURE — G8427 DOCREV CUR MEDS BY ELIG CLIN: HCPCS | Performed by: INTERNAL MEDICINE

## 2019-11-26 PROCEDURE — G8484 FLU IMMUNIZE NO ADMIN: HCPCS | Performed by: INTERNAL MEDICINE

## 2019-11-26 RX ORDER — HYDROCODONE BITARTRATE AND ACETAMINOPHEN 5; 325 MG/1; MG/1
1 TABLET ORAL EVERY 6 HOURS PRN
Qty: 126 TABLET | Refills: 0 | Status: SHIPPED | OUTPATIENT
Start: 2019-11-26 | End: 2020-01-07 | Stop reason: SDUPTHER

## 2019-11-26 RX ORDER — GABAPENTIN 300 MG/1
CAPSULE ORAL
Qty: 90 CAPSULE | Refills: 1 | Status: SHIPPED | OUTPATIENT
Start: 2019-11-26 | End: 2020-01-07 | Stop reason: SDUPTHER

## 2019-11-26 RX ORDER — MELOXICAM 15 MG/1
15 TABLET ORAL DAILY
Qty: 30 TABLET | Refills: 1 | Status: SHIPPED | OUTPATIENT
Start: 2019-11-26 | End: 2020-01-07 | Stop reason: SDUPTHER

## 2019-12-03 ENCOUNTER — HOSPITAL ENCOUNTER (OUTPATIENT)
Dept: PHYSICAL THERAPY | Age: 59
Setting detail: THERAPIES SERIES
Discharge: HOME | End: 2019-12-03
Payer: COMMERCIAL

## 2019-12-03 PROCEDURE — 97113 AQUATIC THERAPY/EXERCISES: CPT

## 2019-12-03 PROCEDURE — 97530 THERAPEUTIC ACTIVITIES: CPT

## 2019-12-05 ENCOUNTER — HOSPITAL ENCOUNTER (OUTPATIENT)
Dept: PHYSICAL THERAPY | Age: 59
Setting detail: THERAPIES SERIES
Discharge: HOME | End: 2019-12-05
Payer: COMMERCIAL

## 2019-12-05 PROCEDURE — 97113 AQUATIC THERAPY/EXERCISES: CPT

## 2019-12-10 ENCOUNTER — HOSPITAL ENCOUNTER (OUTPATIENT)
Dept: PHYSICAL THERAPY | Age: 59
Setting detail: THERAPIES SERIES
Discharge: HOME | End: 2019-12-10
Payer: COMMERCIAL

## 2019-12-10 PROCEDURE — 97150 GROUP THERAPEUTIC PROCEDURES: CPT

## 2019-12-10 PROCEDURE — 97113 AQUATIC THERAPY/EXERCISES: CPT

## 2019-12-12 ENCOUNTER — HOSPITAL ENCOUNTER (OUTPATIENT)
Dept: PHYSICAL THERAPY | Age: 59
Setting detail: THERAPIES SERIES
Discharge: HOME | End: 2019-12-12
Payer: COMMERCIAL

## 2019-12-17 ENCOUNTER — HOSPITAL ENCOUNTER (OUTPATIENT)
Dept: PHYSICAL THERAPY | Age: 59
Setting detail: THERAPIES SERIES
Discharge: HOME | End: 2019-12-17
Payer: COMMERCIAL

## 2019-12-17 PROCEDURE — 97113 AQUATIC THERAPY/EXERCISES: CPT

## 2019-12-17 PROCEDURE — 97150 GROUP THERAPEUTIC PROCEDURES: CPT

## 2019-12-19 ENCOUNTER — HOSPITAL ENCOUNTER (OUTPATIENT)
Dept: PHYSICAL THERAPY | Age: 59
Setting detail: THERAPIES SERIES
Discharge: HOME | End: 2019-12-19
Payer: COMMERCIAL

## 2019-12-19 PROCEDURE — 97113 AQUATIC THERAPY/EXERCISES: CPT

## 2019-12-19 PROCEDURE — 97150 GROUP THERAPEUTIC PROCEDURES: CPT

## 2019-12-26 ENCOUNTER — HOSPITAL ENCOUNTER (OUTPATIENT)
Dept: PHYSICAL THERAPY | Age: 59
Setting detail: THERAPIES SERIES
Discharge: HOME | End: 2019-12-26
Payer: COMMERCIAL

## 2019-12-26 PROCEDURE — 97113 AQUATIC THERAPY/EXERCISES: CPT

## 2019-12-27 RX ORDER — LANCETS 28 GAUGE
EACH MISCELLANEOUS
Qty: 100 EACH | Refills: 0 | Status: SHIPPED | OUTPATIENT
Start: 2019-12-27 | End: 2020-02-03 | Stop reason: SDUPTHER

## 2020-01-02 ENCOUNTER — HOSPITAL ENCOUNTER (OUTPATIENT)
Dept: PHYSICAL THERAPY | Age: 60
Setting detail: THERAPIES SERIES
Discharge: HOME | End: 2020-01-02
Payer: COMMERCIAL

## 2020-01-02 NOTE — FLOWSHEET NOTE
Physical Therapy  Cancellation/No-show Note  Patient Name:  Guanakito Betancur  :  1960   Date:  2020  Cancelled visits to date: 2  No-shows to date: 0    For today's appointment patient:  [x]  Cancelled  []  Rescheduled appointment  []  No-show     Reason given by patient:  []  Patient ill  []  Conflicting appointment  []  No transportation    []  Conflict with work  []  No reason given  []  Other:     Comments:      Electronically signed by:  Lowell Ma, PTA  [id number]

## 2020-01-07 ENCOUNTER — HOSPITAL ENCOUNTER (OUTPATIENT)
Dept: PHYSICAL THERAPY | Age: 60
Setting detail: THERAPIES SERIES
Discharge: HOME | End: 2020-01-07
Payer: COMMERCIAL

## 2020-01-07 ENCOUNTER — OFFICE VISIT (OUTPATIENT)
Dept: PAIN MANAGEMENT | Age: 60
End: 2020-01-07
Payer: COMMERCIAL

## 2020-01-07 VITALS
BODY MASS INDEX: 36.48 KG/M2 | SYSTOLIC BLOOD PRESSURE: 156 MMHG | DIASTOLIC BLOOD PRESSURE: 90 MMHG | WEIGHT: 226 LBS | HEART RATE: 82 BPM

## 2020-01-07 PROCEDURE — G8427 DOCREV CUR MEDS BY ELIG CLIN: HCPCS | Performed by: INTERNAL MEDICINE

## 2020-01-07 PROCEDURE — 97113 AQUATIC THERAPY/EXERCISES: CPT

## 2020-01-07 PROCEDURE — 97530 THERAPEUTIC ACTIVITIES: CPT

## 2020-01-07 PROCEDURE — G8417 CALC BMI ABV UP PARAM F/U: HCPCS | Performed by: INTERNAL MEDICINE

## 2020-01-07 PROCEDURE — 99213 OFFICE O/P EST LOW 20 MIN: CPT | Performed by: INTERNAL MEDICINE

## 2020-01-07 PROCEDURE — 3017F COLORECTAL CA SCREEN DOC REV: CPT | Performed by: INTERNAL MEDICINE

## 2020-01-07 PROCEDURE — G8484 FLU IMMUNIZE NO ADMIN: HCPCS | Performed by: INTERNAL MEDICINE

## 2020-01-07 PROCEDURE — 1036F TOBACCO NON-USER: CPT | Performed by: INTERNAL MEDICINE

## 2020-01-07 PROCEDURE — 97150 GROUP THERAPEUTIC PROCEDURES: CPT

## 2020-01-07 RX ORDER — METHOCARBAMOL 500 MG/1
500 TABLET, FILM COATED ORAL 2 TIMES DAILY PRN
Qty: 30 TABLET | Refills: 0 | Status: SHIPPED | OUTPATIENT
Start: 2020-01-07 | End: 2020-02-05 | Stop reason: SDUPTHER

## 2020-01-07 RX ORDER — MELOXICAM 15 MG/1
15 TABLET ORAL DAILY
Qty: 30 TABLET | Refills: 0 | Status: SHIPPED | OUTPATIENT
Start: 2020-01-07 | End: 2020-02-04 | Stop reason: SDUPTHER

## 2020-01-07 RX ORDER — HYDROCODONE BITARTRATE AND ACETAMINOPHEN 5; 325 MG/1; MG/1
1 TABLET ORAL EVERY 6 HOURS PRN
Qty: 84 TABLET | Refills: 0 | Status: SHIPPED | OUTPATIENT
Start: 2020-01-07 | End: 2020-02-04 | Stop reason: SDUPTHER

## 2020-01-07 RX ORDER — GABAPENTIN 300 MG/1
CAPSULE ORAL
Qty: 90 CAPSULE | Refills: 0 | Status: SHIPPED | OUTPATIENT
Start: 2020-01-07 | End: 2020-02-04 | Stop reason: SDUPTHER

## 2020-01-07 NOTE — FLOWSHEET NOTE
Next Session:  Continue aquatic therapy for pain reduction and functional mobility for another month      Charges: Therapeutic Exercise:  [] (65745) Provided verbal/tactile cueing for activities to restore or maintain strength, flexibility, endurance, ROM for improvements with self-care, mobility, lifting and ambulation. Neuromuscular Re-Education  [] (29546) Provided verbal/tactile cueing for activities to restore or maintain balance, coordination, kinesthetic sense, posture, motor skill, proprioception for self-care, mobility, lifting, and ambulation. Therapeutic Activities:    [x] (69785) Provided verbal/tactile cueing to address functional limitations related to loss of mobility, strength, balance, and coordination. Gait Training:  [] (24063) Provided training and instruction to the patient for proper postural muscle recruitment and positioning with ambulation re-education     Home Exercise Program:    [] (48687) Reviewed/Progressed HEP activities related to strengthening, flexibility, endurance, ROM for functional self-care, mobility, lifting and ambulation   [] (38975) Reviewed/Progressed HEP activities related to improving balance, coordination, kinesthetic sense, posture, motor skill, proprioception for self-care, mobility, lifting, and ambulation      Manual Treatments:  MFR / STM / Oscillations-Mobs:  G-I, II, III, IV / Manipulation / MLD  [] (92701) Provided manual therapy to mobilize  soft tissue/joints/fluid for the purpose of modulating pain, promoting relaxation, increasing ROM, reducing/eliminating soft tissue swelling/inflammation/restriction, improving soft tissue extensibility and allowing for proper ROM for normal function with self- care, mobility, lifting and ambulation.       Timed Code Treatment Minutes: 25   Total Treatment Minutes: 25     [] EVAL (LOW) 62407   [] EVAL (MOD) 46265   [] EVAL (HIGH) 88250   [] RE-EVAL   [] TE (23666) x     [] Aquatic (89024) x  [] NMR (83870) x  [] Aquatic Group (27289) x  [] Manual (42555) x    [] Ultrasound (52185) x  [x] TA (01951) x 2  [] Mech Traction (22894)  [] Ionto (05946)           [] ES () (86426):   [] Vasopump (49529) [] Other:            Electronically signed by:  Jj Stroud, [address]

## 2020-01-07 NOTE — PROGRESS NOTES
tablet Take 1 tablet by mouth daily 30 tablet 1    Dulaglutide (TRULICITY) 8.00 BE/5.2KC SOPN Inject 0.75 mg into the skin once a week 4 pen 2    FREESTYLE LITE strip TEST THREE TIMES DAILY 100 strip 5    insulin aspart (NOVOLOG FLEXPEN) 100 UNIT/ML injection pen Inject 8-18 Units into the skin 3 times daily (before meals) Insulin sliding scale: BS  = 8units                                         151-200=10 units                                          201-250=12 units                                          251-300=14units                                          301-350=16 units                                          Greater than 350= 18units  Per pt per  Dr. Joshua You Cholecalciferol (VITAMIN D PO) Take 1 tablet by mouth every 7 days Pt to clarify dose      furosemide (LASIX) 20 MG tablet Take 40 mg by mouth daily       Fluticasone Propionate (FLONASE NA) 1 spray by Nasal route daily Each nostril      SALINE MIST SPRAY NA 1 spray by Nasal route 3 times daily as needed Each nostril 2-3 times per day      Insulin Pen Needle (PEN NEEDLES) 31G X 6 MM MISC 1 each by In Vitro route 4 times daily 200 each 3    metFORMIN (GLUCOPHAGE-XR) 500 MG extended release tablet TAKE TWO  TABLETS BY MOUTH DAILY WITH BREAKFAST 60 tablet 3    FREESTYLE LITE strip 1 each by Does not apply route 4 times daily  each 5    Blood Glucose Monitoring Suppl (ONE TOUCH ULTRA 2) w/Device KIT 1 kit by Does not apply route daily 1 kit 0    Lancets MISC 1 each by Does not apply route 3 times daily 100 each 3    Blood Glucose Monitoring Suppl (FREESTYLE LITE) RO As needed 1 Device 3    insulin glargine (BASAGLAR KWIKPEN) 100 UNIT/ML injection pen 20 units daily (Patient taking differently: 34 Units 20 units daily) 15 mL 1    MELATONIN PO Take 1 tablet by mouth nightly       Insulin Pen Needle 32G X 4 MM MISC 1 each by Does not apply route daily 100 each 3    KROGER LANCETS ULTRATHIN 30G MISC       loratadine (CLARITIN) 10 MG tablet Take 10 mg by mouth daily       lisinopril (PRINIVIL;ZESTRIL) 5 MG tablet Take 20 mg by mouth daily       atorvastatin (LIPITOR) 80 MG tablet Take 80 mg by mouth daily.  gabapentin (NEURONTIN) 300 MG capsule Take one tablet po in the morning and two tablets po in the evening 90 capsule 1     No facility-administered medications prior to visit. SOCIAL/FAMILY/PAST MEDICAL HISTORY: Mr. Medina Simms, family and past medical history was reviewed. REVIEW OF SYSTEMS:    Respiratory: Negative for apnea, chest tightness and shortness of breath or change in baseline breathing. Gastrointestinal: Negative for nausea, vomiting, abdominal pain, diarrhea, constipation, blood in stool and abdominal distention. PHYSICAL EXAM:   Nursing note and vitals reviewed. BP (!) 156/90   Pulse 82   Wt 226 lb (102.5 kg)   BMI 36.48 kg/m²   Constitutional: He appears well-developed and well-nourished. No acute distress. Skin: Skin is warm and dry, good turgor. No rash noted. He is not diaphoretic. Cardiovascular: Normal rate, regular rhythm, normal heart sounds, and does not have murmur. Pulmonary/Chest: Effort normal. No respiratory distress. He does not have wheezes in the lung fields. He has no rales. Neurological/Psychiatric:He is alert and oriented to person, place, and time. Coordination is  normal.  His mood isAppropriate and affect is Neutral/Euthymic(normal) . IMPRESSION:   1. Chronic pain syndrome    2. DDD (degenerative disc disease), lumbar    3. Lumbar radiculopathy    4. Chronic left shoulder pain    5. Primary osteoarthritis of left hip    6. Myofascial pain        PLAN:  Informed verbal consent was obtained  -[place] record was obtained and reviewed  for the last one year and no indicators of drug misuse  were found. Any other controlled substance prescriptions  seen on the record have been accounted for, I am aware of the patient receiving these medications. Shi Neal [place] record will be rechecked as part of office protocol.    -continue with current opioid regimen, [place] 3 per day  -Adv Biofeedback, relaxation and meditation techniques.  Referral to psychologist for CBT was also discussed with patient  -continue with Mobic and start Robaxin 500 BID for 10 days      Current Outpatient Medications   Medication Sig Dispense Refill    FREESTYLE LANCETS MISC USE AS DIRECTED 100 each 0    meloxicam (MOBIC) 15 MG tablet Take 1 tablet by mouth daily 30 tablet 1    Dulaglutide (TRULICITY) 4.92 AC/4.7GM SOPN Inject 0.75 mg into the skin once a week 4 pen 2    FREESTYLE LITE strip TEST THREE TIMES DAILY 100 strip 5    insulin aspart (NOVOLOG FLEXPEN) 100 UNIT/ML injection pen Inject 8-18 Units into the skin 3 times daily (before meals) Insulin sliding scale: BS  = 8units                                         151-200=10 units                                          201-250=12 units                                          251-300=14units                                          301-350=16 units                                          Greater than 350= 18units  Per pt per  Dr. Perez Like Cholecalciferol (VITAMIN D PO) Take 1 tablet by mouth every 7 days Pt to clarify dose      furosemide (LASIX) 20 MG tablet Take 40 mg by mouth daily       Fluticasone Propionate (FLONASE NA) 1 spray by Nasal route daily Each nostril      SALINE MIST SPRAY NA 1 spray by Nasal route 3 times daily as needed Each nostril 2-3 times per day      Insulin Pen Needle (PEN NEEDLES) 31G X 6 MM MISC 1 each by In Vitro route 4 times daily 200 each 3    metFORMIN (GLUCOPHAGE-XR) 500 MG extended release tablet TAKE TWO  TABLETS BY MOUTH DAILY WITH BREAKFAST 60 tablet 3    FREESTYLE LITE strip 1 each by Does not apply route 4 times daily  each 5    Blood Glucose Monitoring Suppl (ONE TOUCH ULTRA 2) w/Device KIT 1 kit by Does not apply route daily 1 kit 0    Lancets MISC 1 each by Does not
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
## 2020-01-09 ENCOUNTER — APPOINTMENT (OUTPATIENT)
Dept: PHYSICAL THERAPY | Age: 60
End: 2020-01-09
Payer: COMMERCIAL

## 2020-01-09 ENCOUNTER — HOSPITAL ENCOUNTER (OUTPATIENT)
Dept: PHYSICAL THERAPY | Age: 60
Setting detail: THERAPIES SERIES
Discharge: HOME | End: 2020-01-09
Payer: COMMERCIAL

## 2020-01-09 PROCEDURE — 97113 AQUATIC THERAPY/EXERCISES: CPT

## 2020-01-09 PROCEDURE — 97150 GROUP THERAPEUTIC PROCEDURES: CPT

## 2020-01-09 NOTE — FLOWSHEET NOTE
extended  10   Pec   Small ball pull downs                   Lifts  10   10            Cervical:       AROM Flex       AROM Extension     LEs exercises:   AROM Side Bending    Marching  10 AROM Rotation    Heel Raises  10 Chin tucks    Toe Raises  10 Chin nods     Squats  10      Hamstring Curls  10      Hip Flexion  10 Balance:      Hip Abduction  10 SLS    Hip Circles  10 Tandem stance  30sec L/R   TA set  10 NBOS eyes open    Glut Set  10 NBOS eyes closed    Hip Extension  Hand to Opposite Knee  10   Hip Adduction    Box Step  3 L/R   Hip IR   Noodle Stance  30 sec   Hip ER  Stop/Go Gait    Fig 8's  Switch Gait                Seated:  Functional:    Ankle Pumps  30 Step up forward  10   Ankle circles  10 Step up lateral  10   Knee flex & ext  30 Step down    Hip Abd & Adduction  30 [place] squats  10   Bicycle   30  Crate Lifts  10   Add Set with ball  10 Lunges forward    LX stab with med ball throws  Lunges lateral    Ankle INV  Lunges retro    Ankle EV  Lower ab curl with noodle      Upper ab curl with ball      Med ball straight lifts      Med ball diagonal lifts      Hydrorider          Noodle:      Leg Press  10 Deep Water:    Noodle hang at wall  Jog    Noodle hang deep water  Jumping Jacks    Noodle Bicycle  Heel to toe      Hand to opposite knee      Cross country skier      Rocking Horse        Charges:  Individual Aquatic Therapy:  [x] ([id number]) Provided verbal and tactile cueing for strengthening, flexibility, ROM using the therapeutic properties of water (buoyancy, resistance) for improvements in core control, mobility and ambulation     Aquatic Group:  [x] ([id number]) Provided intermittent verbal and tactile cueing for strengthening, endurance, flexibility and ROM for 2-4 individuals that do not require one-on one patient contact by the therapist     Individual Aquatic Minutes: 20   Aquatic Group Minutes: 30     [x] Aquatic ([id number]) x   [x] Aquatic Group ([id number]) x 1    Treatment/Activity Tolerance:  [x] Patient tolerated treatment well [] Patient limited by fatique  [] Patient limited by pain  [] Patient limited by other medical complications  [x] Other: Cues to count reps     Prognosis: [] Good [] Fair  [] Poor    Patient Requires Follow-up: [] Yes  [] No    Plan:   [x] Continue per plan of care [] Alter current plan (see comments)  [] Plan of care initiated [] Hold pending MD visit [] Discharge    Plan for Next Session:  With emphasis on stabilization, good posture and exercise technique, gradually progress spinal stabilization exercises to increase strength, flexibility and endurance and to decrease pain and improve function.   Add:    ALLEN, figure 8s    Electronically signed by: Olga Delcid, PTA [id number]

## 2020-01-14 ENCOUNTER — HOSPITAL ENCOUNTER (OUTPATIENT)
Dept: PHYSICAL THERAPY | Age: 60
Setting detail: THERAPIES SERIES
Discharge: HOME | End: 2020-01-14
Payer: COMMERCIAL

## 2020-01-14 PROCEDURE — 97113 AQUATIC THERAPY/EXERCISES: CPT

## 2020-01-14 PROCEDURE — 97150 GROUP THERAPEUTIC PROCEDURES: CPT

## 2020-01-14 NOTE — FLOWSHEET NOTE
AROM Side Bending    Marching  10 AROM Rotation    Heel Raises  10 Chin tucks    Toe Raises  10 Chin nods     Squats  10      Hamstring Curls  10      Hip Flexion  10 Balance:      Hip Abduction  10 SLS  30sec L/R   Hip Circles  10 Tandem stance  30sec L/R   TA set  10 NBOS eyes open    Glut Set  10 NBOS eyes closed    Hip Extension  Hand to Opposite Knee  10   Hip Adduction    Box Step  3 L/R   Hip IR   Noodle Stance  30 sec   Hip ER  Stop/Go Gait    Fig 8's  Switch Gait                Seated:  Functional:    Ankle Pumps  30 Step up forward  10   Ankle circles  10 Step up lateral  10   Knee flex & ext  30 Step down  10   Hip Abd & Adduction  30 [place] squats  10   Bicycle   30  Crate Lifts  10   Add Set with ball  10 Lunges forward    LX stab with med ball throws  Lunges lateral    Ankle INV  Lunges retro    Ankle EV  Lower ab curl with noodle      Upper ab curl with ball      Med ball straight lifts      Med ball diagonal lifts      Hydrorider          Noodle:      Leg Press  20 Deep Water:    Noodle hang at wall  Jog    Noodle hang deep water  Jumping Jacks    Noodle Bicycle  Heel to toe      Hand to opposite knee      Cross country skier      Rocking Horse        Charges:  Individual Aquatic Therapy:  [x] ([id number]) Provided verbal and tactile cueing for strengthening, flexibility, ROM using the therapeutic properties of water (buoyancy, resistance) for improvements in core control, mobility and ambulation     Aquatic Group:  [x] ([id number]) Provided intermittent verbal and tactile cueing for strengthening, endurance, flexibility and ROM for 2-4 individuals that do not require one-on one patient contact by the therapist     Individual Aquatic Minutes: 30   Aquatic Group Minutes: 25     [x] Aquatic ([phone]) x   [x] Aquatic Group ([id number]) x 1    Treatment/Activity Tolerance:  [x] Patient tolerated treatment well [] Patient limited by fatique  [] Patient limited by pain  [] Patient limited by other medical complications  [x] Other: Cues to count reps     Prognosis: [] Good [] Fair  [] Poor    Patient Requires Follow-up: [] Yes  [] No    Plan:   [x] Continue per plan of care [] Alter current plan (see comments)  [] Plan of care initiated [] Hold pending MD visit [] Discharge    Plan for Next Session:  With emphasis on stabilization, good posture and exercise technique, gradually progress spinal stabilization exercises to increase strength, flexibility and endurance and to decrease pain and improve function.   Add:   UE oscillations     Electronically signed by: Omar Becker, PTA [id number]

## 2020-01-16 ENCOUNTER — APPOINTMENT (OUTPATIENT)
Dept: PHYSICAL THERAPY | Age: 60
End: 2020-01-16
Payer: COMMERCIAL

## 2020-01-21 ENCOUNTER — HOSPITAL ENCOUNTER (OUTPATIENT)
Dept: PHYSICAL THERAPY | Age: 60
Setting detail: THERAPIES SERIES
Discharge: HOME | End: 2020-01-21
Payer: COMMERCIAL

## 2020-01-21 PROCEDURE — 97150 GROUP THERAPEUTIC PROCEDURES: CPT

## 2020-01-21 PROCEDURE — 97113 AQUATIC THERAPY/EXERCISES: CPT

## 2020-01-21 RX ORDER — DULAGLUTIDE 0.75 MG/.5ML
INJECTION, SOLUTION SUBCUTANEOUS
Qty: 2 ML | Refills: 3 | Status: SHIPPED | OUTPATIENT
Start: 2020-01-21 | End: 2020-05-26

## 2020-01-22 ENCOUNTER — OFFICE VISIT (OUTPATIENT)
Dept: ENDOCRINOLOGY | Age: 60
End: 2020-01-22
Payer: COMMERCIAL

## 2020-01-22 VITALS
HEIGHT: 66 IN | OXYGEN SATURATION: 97 % | HEART RATE: 91 BPM | SYSTOLIC BLOOD PRESSURE: 139 MMHG | BODY MASS INDEX: 36.1 KG/M2 | DIASTOLIC BLOOD PRESSURE: 86 MMHG | WEIGHT: 224.6 LBS

## 2020-01-22 LAB — HBA1C MFR BLD: 8.1 %

## 2020-01-22 PROCEDURE — 2022F DILAT RTA XM EVC RTNOPTHY: CPT | Performed by: INTERNAL MEDICINE

## 2020-01-22 PROCEDURE — G8417 CALC BMI ABV UP PARAM F/U: HCPCS | Performed by: INTERNAL MEDICINE

## 2020-01-22 PROCEDURE — 99214 OFFICE O/P EST MOD 30 MIN: CPT | Performed by: INTERNAL MEDICINE

## 2020-01-22 PROCEDURE — G8427 DOCREV CUR MEDS BY ELIG CLIN: HCPCS | Performed by: INTERNAL MEDICINE

## 2020-01-22 PROCEDURE — 3017F COLORECTAL CA SCREEN DOC REV: CPT | Performed by: INTERNAL MEDICINE

## 2020-01-22 PROCEDURE — 83036 HEMOGLOBIN GLYCOSYLATED A1C: CPT | Performed by: INTERNAL MEDICINE

## 2020-01-22 PROCEDURE — G8484 FLU IMMUNIZE NO ADMIN: HCPCS | Performed by: INTERNAL MEDICINE

## 2020-01-22 PROCEDURE — 1036F TOBACCO NON-USER: CPT | Performed by: INTERNAL MEDICINE

## 2020-01-22 RX ORDER — OMEPRAZOLE 20 MG/1
CAPSULE, DELAYED RELEASE ORAL
COMMUNITY
Start: 2019-12-28

## 2020-01-22 NOTE — PROGRESS NOTES
Seen as  patient for diabetes    Interim:     Tolerating trulicity  Checking glucose once a day   States hospitalized 8/19 for CHF  Busy with taking care of parents/issues    Diagnosed with Type 2 diabetes mellitus in  2010  Known diabetic complications: none     Current diabetic medications     Metformin ER 500mg 2 tab daily  basaglar 40 units   Humalog 15 units AC TID   SSI 2 for 50 >150 combined scale    Trulicity    He was started on insulin, did not start it yet, wanted to avoid    Last A1c 8.1%<------9.7%<----11.4% <-----13.9%<----- 14.1<--- 11.4 <--- 9.9 <------6.3%     Prior visit with dietician: no  Current diet: on average, 3 meals per day  No CHO counting  Current exercise:yes  Current monitoring regimen: home blood tests - 1-3  times daily     Has brought blood glucose log/meter: yes  Home blood sugar records:[id number]  Any episodes of hypoglycemia? 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    SH: Retied [employer], takes care of parents    Past Medical History:   Diagnosis Date    Arthritis     Back, L hip and L shoulder    [facility]estive [facility]) [facility] [address]     diastolic    Diabetes mellitus ([facility] [address]     Hyperlipidemia     Hypertension      Past Surgical History:   Procedure Laterality Date    APPENDECTOMY  1969     Current Outpatient Medications   Medication Sig Dispense Refill    omeprazole (PRILOSEC) 20 MG delayed release capsule TAKE 1 CAPSULE BY MOUTH EVERY DAY      TRULICITY 4.27 ZH/3.9KN SOPN INJECT 0.75 MG INTO THE SKIN ONCE A WEEK.  2 mL 3    meloxicam (MOBIC) 15 MG tablet Take 1 tablet by mouth daily 30 tablet 0    gabapentin (NEURONTIN) 300 MG capsule Take one tablet po in the morning and two tablets po in the evening 90 capsule 0 each by Does not apply route daily 100 each 3    loratadine (CLARITIN) 10 MG tablet Take 10 mg by mouth daily       lisinopril (PRINIVIL;ZESTRIL) 5 MG tablet Take 20 mg by mouth daily       atorvastatin (LIPITOR) 80 MG tablet Take 80 mg by mouth daily. No current facility-administered medications for this visit. Review of Systems  Please see scanned document dated and signed      Objective:      /86 (Site: Right Upper Arm, Position: Sitting, Cuff Size: Large Adult)   Pulse 91   Ht 5' 6\" (1.676 m)   Wt 224 lb 9.6 oz (101.9 kg)   SpO2 97%   BMI 36.25 kg/m²   Wt Readings from Last 3 Encounters:   01/22/20 224 lb 9.6 oz (101.9 kg)   01/07/20 226 lb (102.5 kg)   11/26/19 226 lb (102.5 kg)     Constitutional: Well-developed, appears stated age, cooperative, in no acute distress  H/E/N/M/T:atraumatic, normocephalic, external ears, nose, lips normal without lesions  Eyes: Lids, lashes, conjunctivae and sclerae normal, No proptosis, no redness  Neck: supple, symmetrical, no swelling  Skin: No obvious rashes or lesions present. Skin and hair texture normal  Psychiatric: Judgement and Insight:  judgement and insight appear normal  Neuro: Normal without focal findings, speech is normal normal, speech is spontaneous  Chest: No labored breathing, no chest deformity, no stridor  Musculoskeletal: No joint deformity, swelling    7/19 foot exam : monofilament detected, no ulcer    Lab Reviewed   No components found for: CHLPL  No results found for: TRIG  No results found for: HDL  No results found for: LDLCALC  No results found for: LABVLDL  Lab Results   Component Value Date    LABA1C 9.7 10/29/2019       Assessment:     Clint Bishop is a 60 y.o. male with :    1.T2DM : Longstanding, uncontrolled. On metformin. A1c very high. Discussed needs insulin given hyperglycemia. On basal bolus recommend dietary changes. SGLT-2 inhibitor is not covered, off glipizide.   Reviewed log, given hyperglycemia, adjust dose

## 2020-01-23 ENCOUNTER — HOSPITAL ENCOUNTER (OUTPATIENT)
Dept: PHYSICAL THERAPY | Age: 60
Setting detail: THERAPIES SERIES
Discharge: HOME | End: 2020-01-23
Payer: COMMERCIAL

## 2020-01-23 PROCEDURE — 97150 GROUP THERAPEUTIC PROCEDURES: CPT

## 2020-01-23 PROCEDURE — 97113 AQUATIC THERAPY/EXERCISES: CPT

## 2020-01-28 ENCOUNTER — HOSPITAL ENCOUNTER (OUTPATIENT)
Dept: PHYSICAL THERAPY | Age: 60
Setting detail: THERAPIES SERIES
Discharge: HOME | End: 2020-01-28
Payer: COMMERCIAL

## 2020-01-30 ENCOUNTER — HOSPITAL ENCOUNTER (OUTPATIENT)
Dept: PHYSICAL THERAPY | Age: 60
Setting detail: THERAPIES SERIES
Discharge: HOME | End: 2020-01-30
Payer: COMMERCIAL

## 2020-01-30 PROCEDURE — 97150 GROUP THERAPEUTIC PROCEDURES: CPT

## 2020-01-30 PROCEDURE — 97113 AQUATIC THERAPY/EXERCISES: CPT

## 2020-01-30 NOTE — FLOWSHEET NOTE
Physical Therapy Aquatic Flow Sheet   Date:  2020    Patient Name:  Kenny Hawkins    :  1960    Restrictions/Precautions:    Pertinent Medical History:     Diagnosis:  Chronic pain, lumbar DDD  Treatment Diagnosis:  Decreased functional mobility 2/2 LBP    Insurance/Certification information: [facility]  Referring Physician: Mirella Portillo NP  Plan of care signed (Y/N):      Visit# / total visits:  19/  Pain level: 8/10 LBP     Progress Note: []  Yes  [x]  No      History of Injury:  Currently in pain management for chronic pain syndrome, left hip OA, DDD LS, shoulder pain. Myofascial pain. Per MD note, has about 60% improvement with current pain management regimen. In July, fell and flared up symptoms. Has had to lift father off the floor as well. Was in the hospital in August with new onset CHF. Subjective: In a lot of pain today. Always worse when we're supposed to get rain or anything.  Took muscle relaxers today for pain    Objective:   Observation:  See eval   Test measurements:      Key  B= Belt DB= Dumbells T= Theratube   G=Gloves N= Noodles W= Weights   P= Paddles WW=Water Walker K= Kickboard        Transfers:   steps  (Ind)      % Immersion:  75            Ambulation:  Laps Exercises UE:      Forwards  8 Shoulder Shrugs     Lateral  4 Shoulder circles  10    Marching  2 Scapular Retraction  10    Retro   Rolling  10    C[place]ocas  Push Downs  10   Multifidus walk w/ TB  Red 2 steps x3 ea direct IR/ER  10     Punching  10   Stretching:  Rowing  10   Gastroc/Soleus  [id number] Elbow Flex/Ext  10   Hamstring  [id number] Shldr Flex/Ext  10   Knee flex stretch   Shldr Abd/Add  10   Piriformis   Horiz Abd/Add  10   Hip flexor    Arm Circles  10   SKTC   PNF Diagonals  10   DKTC  UE oscillations f/b, s/s  10 sec each   ITB   Wall Push-ups    Quad  Figure 8's  10   Mid back   Buoy pull/push downs    UT  Tband rows    Rhom  Tband lats    Post Shoulder  Lumbar Rot w/ arms extended  10   Pec   Small 1    Treatment/Activity Tolerance:  [x] Patient tolerated treatment well [] Patient limited by fatique  [] Patient limited by pain  [] Patient limited by other medical complications  [x] Other: Cues for exercise progression and improved technique with exercises    Prognosis: [] Good [] Fair  [] Poor    Patient Requires Follow-up: [] Yes  [] No    Plan:   [x] Continue per plan of care [] Alter current plan (see comments)  [] Plan of care initiated [] Hold pending MD visit [] Discharge    Plan for Next Session:  With emphasis on stabilization, good posture and exercise technique, gradually progress spinal stabilization exercises to increase strength, flexibility and endurance and to decrease pain and improve function.   Add:    Mike bennett and ext    Electronically signed by: Darroll Sport, PTA [id number]

## 2020-02-03 RX ORDER — LANCETS 28 GAUGE
EACH MISCELLANEOUS
Qty: 100 EACH | Refills: 3 | Status: SHIPPED | OUTPATIENT
Start: 2020-02-03 | End: 2020-09-18 | Stop reason: SDUPTHER

## 2020-02-04 ENCOUNTER — HOSPITAL ENCOUNTER (OUTPATIENT)
Dept: PHYSICAL THERAPY | Age: 60
Setting detail: THERAPIES SERIES
Discharge: HOME | End: 2020-02-04
Payer: COMMERCIAL

## 2020-02-04 ENCOUNTER — TELEPHONE (OUTPATIENT)
Dept: PAIN MANAGEMENT | Age: 60
End: 2020-02-04

## 2020-02-04 ENCOUNTER — OFFICE VISIT (OUTPATIENT)
Dept: PAIN MANAGEMENT | Age: 60
End: 2020-02-04
Payer: COMMERCIAL

## 2020-02-04 VITALS
SYSTOLIC BLOOD PRESSURE: 169 MMHG | HEART RATE: 78 BPM | WEIGHT: 224 LBS | BODY MASS INDEX: 36.15 KG/M2 | DIASTOLIC BLOOD PRESSURE: 92 MMHG

## 2020-02-04 PROCEDURE — G8417 CALC BMI ABV UP PARAM F/U: HCPCS | Performed by: INTERNAL MEDICINE

## 2020-02-04 PROCEDURE — 97530 THERAPEUTIC ACTIVITIES: CPT

## 2020-02-04 PROCEDURE — 99213 OFFICE O/P EST LOW 20 MIN: CPT | Performed by: INTERNAL MEDICINE

## 2020-02-04 PROCEDURE — 3017F COLORECTAL CA SCREEN DOC REV: CPT | Performed by: INTERNAL MEDICINE

## 2020-02-04 PROCEDURE — G8427 DOCREV CUR MEDS BY ELIG CLIN: HCPCS | Performed by: INTERNAL MEDICINE

## 2020-02-04 PROCEDURE — 97150 GROUP THERAPEUTIC PROCEDURES: CPT

## 2020-02-04 PROCEDURE — 97113 AQUATIC THERAPY/EXERCISES: CPT

## 2020-02-04 PROCEDURE — G8484 FLU IMMUNIZE NO ADMIN: HCPCS | Performed by: INTERNAL MEDICINE

## 2020-02-04 PROCEDURE — 1036F TOBACCO NON-USER: CPT | Performed by: INTERNAL MEDICINE

## 2020-02-04 RX ORDER — HYDROCODONE BITARTRATE AND ACETAMINOPHEN 5; 325 MG/1; MG/1
1 TABLET ORAL EVERY 6 HOURS PRN
Qty: 84 TABLET | Refills: 0 | Status: SHIPPED | OUTPATIENT
Start: 2020-02-04 | End: 2020-03-03 | Stop reason: SDUPTHER

## 2020-02-04 RX ORDER — MELOXICAM 15 MG/1
TABLET ORAL
Qty: 30 TABLET | Refills: 0 | Status: SHIPPED | OUTPATIENT
Start: 2020-02-04 | End: 2020-03-03 | Stop reason: SDUPTHER

## 2020-02-04 RX ORDER — GABAPENTIN 300 MG/1
CAPSULE ORAL
Qty: 90 CAPSULE | Refills: 0 | Status: SHIPPED | OUTPATIENT
Start: 2020-02-04 | End: 2020-03-03 | Stop reason: SDUPTHER

## 2020-02-04 NOTE — FLOWSHEET NOTE
Physical Therapy Aquatic Flow Sheet   Date:  2020    Patient Name:  Chuyita Carrillo    :  1960    Restrictions/Precautions:    Pertinent Medical History:     Diagnosis:  Chronic pain, lumbar DDD  Treatment Diagnosis:  Decreased functional mobility 2/2 LBP    Insurance/Certification information: [facility]  Referring Physician: Margie Licea NP  Plan of care signed (Y/N):      Visit# / total visits:  20/  Pain level: 5/10 LBP     Progress Note: []  Yes  [x]  No      History of Injury:  Currently in pain management for chronic pain syndrome, left hip OA, DDD LS, shoulder pain. Myofascial pain. Per MD note, has about 60% improvement with current pain management regimen. In July, fell and flared up symptoms. Has had to lift father off the floor as well. Was in the hospital in August with new onset CHF.     Subjective:    See re-eval    Objective:   Observation:  See eval   Test measurements:      Key  B= Belt DB= Dumbells T= Theratube   G=Gloves N= Noodles W= Weights   P= Paddles WW=Water Walker K= Kickboard        Transfers:   steps  (Ind)      % Immersion:  75            Ambulation:  Laps Exercises UE:      Forwards  8 Shoulder Shrugs     Lateral  4 Shoulder circles  10    Marching  2 Scapular Retraction  10    Retro   Rolling  10    [place]  Push Downs  10   Multifidus walk w/ TB  Red 2 steps x3 ea direct IR/ER  10     Punching  10   Stretching:  Rowing  10   Gastroc/Soleus  [id number] Elbow Flex/Ext  10   Hamstring  3g91dal Shldr Flex/Ext  10   Knee flex stretch   Shldr Abd/Add  10   Piriformis   Horiz Abd/Add  10   Hip flexor    Arm Circles  10   SKTC   PNF Diagonals  10   DKTC  UE oscillations f/b, s/s  10 sec each   ITB   Wall Push-ups  10   Quad  Figure 8's  10   Mid back   Buoy pull/push downs    UT  Tband rows    Rhom  Tband lats    Post Shoulder  Lumbar Rot w/ arms extended  10   Pec   Small ball pull downs                    diagonals                    Lifts           10   10   10 Cervical:       AROM Flex       AROM Extension     LEs exercises:   AROM Side Bending    Marching  10 AROM Rotation    Heel Raises  10 Chin tucks    Toe Raises  10 Chin nods     Squats  10      Hamstring Curls  10      Hip Flexion  10 Balance:      Hip Abduction  10 SLS  30sec L/R   Hip Circles  10 Tandem stance  30sec L/R   TA set  10 NBOS eyes open    Glut Set  10 NBOS eyes closed    Hip Extension  Hand to Opposite Knee  10   Hip Adduction    Box Step  3 L/R   Hip IR   Noodle Stance  30 sec   Hip ER  Stop/Go Gait    Fig 8's  Switch Gait                Seated:  Functional:    Ankle Pumps  30 Step up forward  10   Ankle circles  10 Step up lateral  10   Knee flex & ext  30 Step down  10   Hip Abd & Adduction  30 [place] squats  10   Bicycle   30  Crate Lifts  10   Add Set with ball  10 Lunges forward    LX stab with med ball throws  Lunges lateral    Ankle INV  Lunges retro    Ankle EV  Lower ab curl with noodle      Upper ab curl with ball  10     Med ball straight lifts      Med ball diagonal lifts      Hydrorider          Noodle:      Leg Press  20 Deep Water:    Noodle hang at wall  Jog    Noodle hang deep water  Jumping Jacks    Noodle Bicycle  Heel to toe      Hand to opposite knee      Cross country skier      Rocking Horse        Charges:  Individual Aquatic Therapy:  [x] ([id number]) Provided verbal and tactile cueing for strengthening, flexibility, ROM using the therapeutic properties of water (buoyancy, resistance) for improvements in core control, mobility and ambulation     Aquatic Group:  [x] ([id number]) Provided intermittent verbal and tactile cueing for strengthening, endurance, flexibility and ROM for 2-4 individuals that do not require one-on one patient contact by the therapist     Individual Aquatic Minutes: 30   Aquatic Group Minutes: 30     [x] Aquatic ([id number]) x   [x] Aquatic Group ([id number]) x 1    Treatment/Activity Tolerance:  [x] Patient tolerated treatment well [] Patient limited by fatique  [] Patient limited by pain  [] Patient limited by other medical complications  [x] Other: Cues for exercise progression and improved technique with exercises    Prognosis: [] Good [] Fair  [] Poor    Patient Requires Follow-up: [] Yes  [] No    Plan:   [x] Continue per plan of care [] Alter current plan (see comments)  [] Plan of care initiated [] Hold pending MD visit [] Discharge    Plan for Next Session:  With emphasis on stabilization, good posture and exercise technique, gradually progress spinal stabilization exercises to increase strength, flexibility and endurance and to decrease pain and improve function.   Continue in pool x1 visit and then transfer to land    Electronically signed by: Santiago Gonzalez, PTA [id number]

## 2020-02-04 NOTE — PROGRESS NOTES
week  -advised to monitor leg edema, he is on Lasix  -Monitor blood sugar regularly, diabetic control- adv diabetic diet. Goal for fasting blood sugars around 120. Follow up with Endocrinologist/PCP also for on going management       Current Outpatient Medications   Medication Sig Dispense Refill    insulin aspart (NOVOLOG FLEXPEN) 100 UNIT/ML injection pen 18-24 units AC TID 10 pen 3    FreeStyle Lancets MISC USE AS DIRECTED 100 each 3    omeprazole (PRILOSEC) 20 MG delayed release capsule TAKE 1 CAPSULE BY MOUTH EVERY DAY      TRULICITY 8.40 CK/0.1NL SOPN INJECT 0.75 MG INTO THE SKIN ONCE A WEEK. 2 mL 3    meloxicam (MOBIC) 15 MG tablet Take 1 tablet by mouth daily 30 tablet 0    gabapentin (NEURONTIN) 300 MG capsule Take one tablet po in the morning and two tablets po in the evening 90 capsule 0    HYDROcodone-acetaminophen (NORCO) 5-325 MG per tablet Take 1 tablet by mouth every 6 hours as needed for Pain (max 2-3/day) for up to 28 days.  84 tablet 0    methocarbamol (ROBAXIN) 500 MG tablet Take 1 tablet by mouth 2 times daily as needed (muscle stiffness) 30 tablet 0    FREESTYLE LITE strip TEST THREE TIMES DAILY 100 strip 5    Cholecalciferol (VITAMIN D PO) Take 1 tablet by mouth every 7 days Pt to clarify dose      furosemide (LASIX) 20 MG tablet Take 40 mg by mouth daily       Fluticasone Propionate (FLONASE NA) 1 spray by Nasal route daily Each nostril      SALINE MIST SPRAY NA 1 spray by Nasal route 3 times daily as needed Each nostril 2-3 times per day      Insulin Pen Needle (PEN NEEDLES) 31G X 6 MM MISC 1 each by In Vitro route 4 times daily 200 each 3    metFORMIN (GLUCOPHAGE-XR) 500 MG extended release tablet TAKE TWO  TABLETS BY MOUTH DAILY WITH BREAKFAST 60 tablet 3    Blood Glucose Monitoring Suppl (ONE TOUCH ULTRA 2) w/Device KIT 1 kit by Does not apply route daily 1 kit 0    Lancets MISC 1 each by Does not apply route 3 times daily 100 each 3    Blood Glucose Monitoring Suppl (FREESTYLE LITE) RO As needed 1 Device 3    insulin glargine (BASAGLAR KWIKPEN) 100 UNIT/ML injection pen 20 units daily (Patient taking differently: 34 Units 20 units daily) 15 mL 1    MELATONIN PO Take 1 tablet by mouth nightly       Insulin Pen Needle 32G X 4 MM MISC 1 each by Does not apply route daily 100 each 3    loratadine (CLARITIN) 10 MG tablet Take 10 mg by mouth daily       lisinopril (PRINIVIL;ZESTRIL) 5 MG tablet Take 20 mg by mouth daily       atorvastatin (LIPITOR) 80 MG tablet Take 80 mg by mouth daily. No current facility-administered medications for this visit. I will continue his current medication regimen  which is part of the above treatment schedule. It has been helping with Mr. Cueva Bring chronic  medical problems which for this visit include:   Diagnoses of Chronic pain syndrome, DDD (degenerative disc disease), lumbar, Lumbar radiculopathy, Chronic left shoulder pain, Primary osteoarthritis of left hip, and Myofascial pain were pertinent to this visit. Risks and benefits of the medications and other alternative treatments  including no treatment were discussed with the patient. The common side effects of these medications were also explained to the patient. Informed verbal consent was obtained. Goals of current treatment regimen include improvement in pain, restoration of functioning- with focus on improvement in physical performance, general activity, work or disability,emotional distress, health care utilization and  decreased medication consumption. Will continue to monitor progress towards achieving/maintaining therapeutic goals with special emphasis on  1. Improvement in perceived interfernce  of pain with ADL's. Ability to do home exercises independently. Ability to do household chores indoor and/or outdoor work and social and leisure activities. Improve psychosocial and physical functioning. - he is showing progression towards this treatment goal with the current regimen. 2. Ability to focus/concentrate at work and perform the duties required of him at work  Sit through a workday without lower extremity symptoms. Stand 30-60 minutes without lower extremity symptoms. Ability to lift up to 10-20 lbs. Ability to go up and down stairs. Sit 30-60 minutes  Without having to stand up frequently. - he is maintaining/progressing towards his work related goals with the current regimen. He was advised against drinking alcohol with the narcotic pain medicines, advised against driving or handling machinery while adjusting the dose of medicines or if having cognitive  issues related to the current medications. Risk of overdose and death, if medicines not taken as prescribed, were also discussed. If the patient develops new symptoms or if the symptoms worsen, the patient should call the office. While transcribing every attempt was made to maintain the accuracy of the note in terms of it's contents,there may have been some errors made inadvertently. Thank you for allowing me to participate in the care of this patient. Alaina Griffin MD.    Cc: Reginald Lau MD    I, Uma Baptiste, scribing for in the presence  of Dr. Alaina Griffin. 02/04/20  10:56 AM  Maria Luisa Aguayo Assistant    I, Dr. Alaina Griffin, personally performed the services described in this documentation as scribed by  Uma Baptiste MA in my presence and it is both accurate and complete
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
## 2020-02-05 ENCOUNTER — TELEPHONE (OUTPATIENT)
Dept: PAIN MANAGEMENT | Age: 60
End: 2020-02-05

## 2020-02-05 RX ORDER — METHOCARBAMOL 500 MG/1
500 TABLET, FILM COATED ORAL 2 TIMES DAILY PRN
Qty: 30 TABLET | Refills: 0 | Status: SHIPPED | OUTPATIENT
Start: 2020-02-05 | End: 2020-03-03 | Stop reason: SDUPTHER

## 2020-02-05 NOTE — TELEPHONE ENCOUNTER
Patient requesting refill for ROBAXIN) 500 MG to [facility] in [facility].

## 2020-02-06 ENCOUNTER — HOSPITAL ENCOUNTER (OUTPATIENT)
Dept: PHYSICAL THERAPY | Age: 60
Setting detail: THERAPIES SERIES
Discharge: HOME | End: 2020-02-06
Payer: COMMERCIAL

## 2020-02-06 PROCEDURE — 97150 GROUP THERAPEUTIC PROCEDURES: CPT

## 2020-02-06 PROCEDURE — 97113 AQUATIC THERAPY/EXERCISES: CPT

## 2020-02-06 NOTE — FLOWSHEET NOTE
Physical Therapy Aquatic Flow Sheet   Date:  2020    Patient Name:  Keon Ambriz    :  1960    Restrictions/Precautions:    Pertinent Medical History:     Diagnosis:  Chronic pain, lumbar DDD  Treatment Diagnosis:  Decreased functional mobility 2/2 LBP    Insurance/Certification information: [facility]  Referring Physician: Palak Hutchinson NP  Plan of care signed (Y/N):      Visit# / total visits:  21/  Pain level: 7/10 LBP     Progress Note: []  Yes  [x]  No      History of Injury:  Currently in pain management for chronic pain syndrome, left hip OA, DDD LS, shoulder pain. Myofascial pain. Per MD note, has about 60% improvement with current pain management regimen. In July, fell and flared up symptoms. Has had to lift father off the floor as well. Was in the hospital in August with new onset CHF. Subjective:   Doctor prescribed more flexoril, which he only takes at night.  His gabapentin and miloxican has been decreased     Objective:   Observation:  See eval   Test measurements:      Key  B= Belt DB= Dumbells T= Theratube   G=Gloves N= Noodles W= Weights   P= Paddles WW=Water Walker K= Kickboard        Transfers:   steps  (Ind)      % Immersion:  75            Ambulation:  Laps Exercises UE:      Forwards  8 Shoulder Shrugs     Lateral  4 Shoulder circles  10    Marching  2 Scapular Retraction  10    Retro   Rolling  10    [place]as  Push Downs  10   Multifidus walk w/ TB  Red 2 steps x3 ea direct IR/ER  10     Punching  10   Stretching:  Rowing  10   Gastroc/Soleus  [id number] Elbow Flex/Ext  10   Hamstring  [id number] Shldr Flex/Ext  10   Knee flex stretch   Shldr Abd/Add  10   Piriformis   Horiz Abd/Add  10   Hip flexor    Arm Circles  10   SKTC   PNF Diagonals  10   DKTC  UE oscillations f/b, s/s  10 sec each   ITB   Wall Push-ups  10   Quad  Figure 8's  10   Mid back   Buoy pull/push downs    UT  Tband rows    Rhom  Tband lats    Post Shoulder  Lumbar Rot w/ arms extended  10   Pec   Small ball pull downs                    diagonals                    Lifts           10   10   10            Cervical:       AROM Flex       AROM Extension     LEs exercises:   AROM Side Bending    Marching  10 AROM Rotation    Heel Raises  10 Chin tucks    Toe Raises  10 Chin nods     Squats  10      Hamstring Curls  10      Hip Flexion  10 Balance:      Hip Abduction  10 SLS  30sec L/R   Hip Circles  10 Tandem stance  30sec L/R   TA set  10 NBOS eyes open    Glut Set  10 NBOS eyes closed    Hip Extension  Hand to Opposite Knee  10   Hip Adduction    Box Step  3 L/R   Hip IR   Noodle Stance  30 sec   Hip ER  Stop/Go Gait    Fig 8's  Switch Gait                Seated:  Functional:    Ankle Pumps  30 Step up forward  10   Ankle circles  10 Step up lateral  10   Knee flex & ext  30 Step down  10   Hip Abd & Adduction  30 [place] squats  10   Bicycle   30  Crate Lifts  10   Add Set with ball  10 Lunges forward    LX stab with med ball throws  Lunges lateral    Ankle INV  Lunges retro    Ankle EV  Lower ab curl with noodle      Upper ab curl with ball  10     Med ball straight lifts      Med ball diagonal lifts      Hydrorider          Noodle:      Leg Press  20 Deep Water:    Noodle hang at wall  Jog    Noodle hang deep water  Jumping Jacks    Noodle Bicycle  Heel to toe      Hand to opposite knee      Cross country skier      Rocking Horse        Charges:  Individual Aquatic Therapy:  [x] (63356) Provided verbal and tactile cueing for strengthening, flexibility, ROM using the therapeutic properties of water (buoyancy, resistance) for improvements in core control, mobility and ambulation     Aquatic Group:  [x] (44847) Provided intermittent verbal and tactile cueing for strengthening, endurance, flexibility and ROM for 2-4 individuals that do not require one-on one patient contact by the therapist     Individual Aquatic Minutes: 30   Aquatic Group Minutes: 20     [x] Aquatic (05844) x   [x] Aquatic Group (05153) x
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
## 2020-02-11 ENCOUNTER — HOSPITAL ENCOUNTER (OUTPATIENT)
Dept: PHYSICAL THERAPY | Age: 60
Setting detail: THERAPIES SERIES
Discharge: HOME | End: 2020-02-11
Payer: COMMERCIAL

## 2020-02-11 PROCEDURE — 97110 THERAPEUTIC EXERCISES: CPT

## 2020-02-11 RX ORDER — LANCETS 28 GAUGE
EACH MISCELLANEOUS
Qty: 100 EACH | Refills: 5 | Status: SHIPPED | OUTPATIENT
Start: 2020-02-11 | End: 2020-03-27

## 2020-02-13 ENCOUNTER — HOSPITAL ENCOUNTER (OUTPATIENT)
Dept: PHYSICAL THERAPY | Age: 60
Setting detail: THERAPIES SERIES
Discharge: HOME | End: 2020-02-13
Payer: COMMERCIAL

## 2020-02-13 PROCEDURE — 97110 THERAPEUTIC EXERCISES: CPT
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
## 2020-02-18 ENCOUNTER — HOSPITAL ENCOUNTER (OUTPATIENT)
Dept: PHYSICAL THERAPY | Age: 60
Setting detail: THERAPIES SERIES
Discharge: HOME | End: 2020-02-18
Payer: COMMERCIAL

## 2020-02-18 PROCEDURE — 97110 THERAPEUTIC EXERCISES: CPT
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
## 2020-02-20 ENCOUNTER — HOSPITAL ENCOUNTER (OUTPATIENT)
Dept: PHYSICAL THERAPY | Age: 60
Setting detail: THERAPIES SERIES
Discharge: HOME | End: 2020-02-20
Payer: COMMERCIAL

## 2020-02-20 PROCEDURE — 97110 THERAPEUTIC EXERCISES: CPT
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
## 2020-02-21 NOTE — FLOWSHEET NOTE
Physical Therapy Progress  Date:  2020    Patient Name:  Kenny Hawkins    :  1960  MRN: [id number]    Restrictions/Precautions:   low fall risk     Pertinent Medical History: Additional Pertinent Hx: arthritis, DM, HTN,  CHF 2019. Medical/Treatment Diagnosis Information:  · Diagnosis: Chronic pain, Lumbar DDD  · Treatment Diagnosis: Decreased functional mobility 2/ LBP    [facility]    [facility] [place]  Physician Information:  Referring Practitioner: Mirella Portillo NP  Plan of care signed (Y/N):Yes    Visit# / total visits: 22  Pain level: 6/10     Functional Assessment:   Test:  Score: 30 eval -               35  Re-eval    History of Injury:   Currently in pain management for Chronic pain syndrome, left hip OA, DDD LS, shoulder pain. myofascial pain. Per MD note, has about 60% imporvement with current pain  management regimen. In July, fell and flared up symptoms. Has had to lift father off the floor as well. .  Was in the hospital in August with new onset CHF. Subjective:  - notes he will be able to do aquatic indep, would like to be given instruction in land based program for progression to independence. - pt scheduled.  - pain about 6/10 due to cold and rainy weather    - had to pick his father up off the floor again. Objective:   Lumbar ROM  50%, side bending and rotation 50%   LE's  WFL     Cervical ROM- flexion  WFL, side bending right 50%, left 25%, rotation right 75%, left 50%  Still having difficulty lifting his father up off the floor when he falls. Assessment  - Pt is progressing well and is independent with both aquatic therapy and  Land based exercise. He would like several weeks of modality based treatment to decrease his continued LBP    Functional Progress  improved from 30 to 35 on LEFs,  Able to walk a bit more easily, get in and out of shower more easily,and sit more comfortably.   Improved from 60-79% disability to 40-59%.     2/18 -  Doing well at [facility] using nustep and pool      Plan: Plan to add US/ STM/  MHP to decrease pain 2x/week x 3 weeks, then DC onto HEP    [] Continue per plan of care [x] Alter current plan (see comments)   [] Plan of care initiated [] Hold pending MD visit [] Discharge          Please Sign and return to [phone]        Electronically signed by:  Angeles Rosenberg, [address]

## 2020-02-25 ENCOUNTER — HOSPITAL ENCOUNTER (OUTPATIENT)
Dept: PHYSICAL THERAPY | Age: 60
Setting detail: THERAPIES SERIES
Discharge: HOME | End: 2020-02-25
Payer: COMMERCIAL

## 2020-02-25 PROCEDURE — 97140 MANUAL THERAPY 1/> REGIONS: CPT

## 2020-02-25 PROCEDURE — 97035 APP MDLTY 1+ULTRASOUND EA 15: CPT
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
## 2020-02-27 ENCOUNTER — HOSPITAL ENCOUNTER (OUTPATIENT)
Dept: PHYSICAL THERAPY | Age: 60
Setting detail: THERAPIES SERIES
Discharge: HOME | End: 2020-02-27
Payer: COMMERCIAL

## 2020-02-27 PROCEDURE — 97035 APP MDLTY 1+ULTRASOUND EA 15: CPT | Performed by: CHIROPRACTOR

## 2020-02-27 PROCEDURE — 97140 MANUAL THERAPY 1/> REGIONS: CPT | Performed by: CHIROPRACTOR
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
## 2020-03-03 ENCOUNTER — OFFICE VISIT (OUTPATIENT)
Dept: PAIN MANAGEMENT | Age: 60
End: 2020-03-03
Payer: COMMERCIAL

## 2020-03-03 VITALS
HEART RATE: 80 BPM | SYSTOLIC BLOOD PRESSURE: 136 MMHG | WEIGHT: 230 LBS | BODY MASS INDEX: 37.12 KG/M2 | DIASTOLIC BLOOD PRESSURE: 78 MMHG

## 2020-03-03 PROCEDURE — G8417 CALC BMI ABV UP PARAM F/U: HCPCS | Performed by: INTERNAL MEDICINE

## 2020-03-03 PROCEDURE — 99213 OFFICE O/P EST LOW 20 MIN: CPT | Performed by: INTERNAL MEDICINE

## 2020-03-03 PROCEDURE — G8427 DOCREV CUR MEDS BY ELIG CLIN: HCPCS | Performed by: INTERNAL MEDICINE

## 2020-03-03 PROCEDURE — 1036F TOBACCO NON-USER: CPT | Performed by: INTERNAL MEDICINE

## 2020-03-03 PROCEDURE — 3017F COLORECTAL CA SCREEN DOC REV: CPT | Performed by: INTERNAL MEDICINE

## 2020-03-03 PROCEDURE — G8484 FLU IMMUNIZE NO ADMIN: HCPCS | Performed by: INTERNAL MEDICINE

## 2020-03-03 RX ORDER — METHOCARBAMOL 500 MG/1
500 TABLET, FILM COATED ORAL 2 TIMES DAILY PRN
Qty: 30 TABLET | Refills: 0 | Status: SHIPPED | OUTPATIENT
Start: 2020-03-03 | End: 2020-04-01 | Stop reason: SDUPTHER

## 2020-03-03 RX ORDER — MELOXICAM 15 MG/1
TABLET ORAL
Qty: 30 TABLET | Refills: 0 | Status: SHIPPED | OUTPATIENT
Start: 2020-03-03 | End: 2020-04-01 | Stop reason: SDUPTHER

## 2020-03-03 RX ORDER — GABAPENTIN 300 MG/1
CAPSULE ORAL
Qty: 90 CAPSULE | Refills: 0 | Status: SHIPPED | OUTPATIENT
Start: 2020-03-03 | End: 2020-04-01 | Stop reason: SDUPTHER

## 2020-03-03 RX ORDER — HYDROCODONE BITARTRATE AND ACETAMINOPHEN 5; 325 MG/1; MG/1
1 TABLET ORAL EVERY 6 HOURS PRN
Qty: 84 TABLET | Refills: 0 | Status: SHIPPED | OUTPATIENT
Start: 2020-03-03 | End: 2020-04-01 | Stop reason: SDUPTHER
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
## 2020-03-05 ENCOUNTER — HOSPITAL ENCOUNTER (OUTPATIENT)
Dept: PHYSICAL THERAPY | Age: 60
Setting detail: THERAPIES SERIES
Discharge: HOME | End: 2020-03-05
Payer: COMMERCIAL

## 2020-03-05 PROCEDURE — 97035 APP MDLTY 1+ULTRASOUND EA 15: CPT

## 2020-03-05 PROCEDURE — 97140 MANUAL THERAPY 1/> REGIONS: CPT
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
## 2020-03-10 ENCOUNTER — HOSPITAL ENCOUNTER (OUTPATIENT)
Dept: PHYSICAL THERAPY | Age: 60
Setting detail: THERAPIES SERIES
Discharge: HOME | End: 2020-03-10
Payer: COMMERCIAL

## 2020-03-10 PROCEDURE — 97140 MANUAL THERAPY 1/> REGIONS: CPT

## 2020-03-10 PROCEDURE — 97035 APP MDLTY 1+ULTRASOUND EA 15: CPT
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
## 2020-03-12 ENCOUNTER — HOSPITAL ENCOUNTER (OUTPATIENT)
Dept: PHYSICAL THERAPY | Age: 60
Setting detail: THERAPIES SERIES
Discharge: HOME | End: 2020-03-12
Payer: COMMERCIAL

## 2020-03-12 PROCEDURE — 97140 MANUAL THERAPY 1/> REGIONS: CPT

## 2020-03-12 PROCEDURE — 97035 APP MDLTY 1+ULTRASOUND EA 15: CPT
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
## 2020-03-17 ENCOUNTER — HOSPITAL ENCOUNTER (OUTPATIENT)
Dept: PHYSICAL THERAPY | Age: 60
Setting detail: THERAPIES SERIES
Discharge: HOME | End: 2020-03-17
Payer: COMMERCIAL

## 2020-03-27 ENCOUNTER — APPOINTMENT (OUTPATIENT)
Dept: PHYSICAL THERAPY | Age: 60
End: 2020-03-27
Payer: COMMERCIAL

## 2020-03-27 RX ORDER — LANCETS 28 GAUGE
EACH MISCELLANEOUS
Qty: 100 EACH | Refills: 0 | Status: SHIPPED | OUTPATIENT
Start: 2020-03-27 | End: 2020-05-20

## 2020-04-01 ENCOUNTER — OFFICE VISIT (OUTPATIENT)
Dept: PAIN MANAGEMENT | Age: 60
End: 2020-04-01
Payer: COMMERCIAL

## 2020-04-01 VITALS
WEIGHT: 230 LBS | TEMPERATURE: 97.7 F | DIASTOLIC BLOOD PRESSURE: 84 MMHG | HEART RATE: 88 BPM | BODY MASS INDEX: 37.12 KG/M2 | SYSTOLIC BLOOD PRESSURE: 139 MMHG

## 2020-04-01 PROCEDURE — G8427 DOCREV CUR MEDS BY ELIG CLIN: HCPCS | Performed by: INTERNAL MEDICINE

## 2020-04-01 PROCEDURE — G8417 CALC BMI ABV UP PARAM F/U: HCPCS | Performed by: INTERNAL MEDICINE

## 2020-04-01 PROCEDURE — 1036F TOBACCO NON-USER: CPT | Performed by: INTERNAL MEDICINE

## 2020-04-01 PROCEDURE — 3017F COLORECTAL CA SCREEN DOC REV: CPT | Performed by: INTERNAL MEDICINE

## 2020-04-01 PROCEDURE — 99213 OFFICE O/P EST LOW 20 MIN: CPT | Performed by: INTERNAL MEDICINE

## 2020-04-01 RX ORDER — GABAPENTIN 300 MG/1
CAPSULE ORAL
Qty: 90 CAPSULE | Refills: 0 | Status: SHIPPED | OUTPATIENT
Start: 2020-04-01 | End: 2020-04-29 | Stop reason: SDUPTHER

## 2020-04-01 RX ORDER — MELOXICAM 15 MG/1
TABLET ORAL
Qty: 30 TABLET | Refills: 0 | Status: SHIPPED | OUTPATIENT
Start: 2020-04-01 | End: 2020-04-29 | Stop reason: SDUPTHER

## 2020-04-01 RX ORDER — HYDROCODONE BITARTRATE AND ACETAMINOPHEN 5; 325 MG/1; MG/1
1 TABLET ORAL EVERY 6 HOURS PRN
Qty: 84 TABLET | Refills: 0 | Status: SHIPPED | OUTPATIENT
Start: 2020-04-01 | End: 2020-04-29 | Stop reason: SDUPTHER

## 2020-04-01 RX ORDER — METHOCARBAMOL 500 MG/1
500 TABLET, FILM COATED ORAL 2 TIMES DAILY PRN
Qty: 30 TABLET | Refills: 0 | Status: SHIPPED | OUTPATIENT
Start: 2020-04-01 | End: 2020-04-29 | Stop reason: SDUPTHER

## 2020-04-01 NOTE — PROGRESS NOTES
Monday,wednesday, and Friday 30 tablet 0    gabapentin (NEURONTIN) 300 MG capsule Take one tablet po in the morning and two tablets po in the evening 90 capsule 0    insulin glargine (LANTUS) 100 UNIT/ML injection pen INJECT 46 UNITS INTO THE SKIN ONCE DAILY 15 pen 0    insulin aspart (NOVOLOG FLEXPEN) 100 UNIT/ML injection pen 18-24 units AC TID 10 pen 3    FreeStyle Lancets MISC USE AS DIRECTED 100 each 3    omeprazole (PRILOSEC) 20 MG delayed release capsule TAKE 1 CAPSULE BY MOUTH EVERY DAY      TRULICITY 4.23 CR/2.9GV SOPN INJECT 0.75 MG INTO THE SKIN ONCE A WEEK. 2 mL 3    FREESTYLE LITE strip TEST THREE TIMES DAILY 100 strip 5    Cholecalciferol (VITAMIN D PO) Take 1 tablet by mouth every 7 days Pt to clarify dose      furosemide (LASIX) 20 MG tablet Take 40 mg by mouth daily       Fluticasone Propionate (FLONASE NA) 1 spray by Nasal route daily Each nostril      SALINE MIST SPRAY NA 1 spray by Nasal route 3 times daily as needed Each nostril 2-3 times per day      Insulin Pen Needle (PEN NEEDLES) 31G X 6 MM MISC 1 each by In Vitro route 4 times daily 200 each 3    metFORMIN (GLUCOPHAGE-XR) 500 MG extended release tablet TAKE TWO  TABLETS BY MOUTH DAILY WITH BREAKFAST 60 tablet 3    Blood Glucose Monitoring Suppl (ONE TOUCH ULTRA 2) w/Device KIT 1 kit by Does not apply route daily 1 kit 0    Lancets MISC 1 each by Does not apply route 3 times daily 100 each 3    Blood Glucose Monitoring Suppl (FREESTYLE LITE) RO As needed 1 Device 3    MELATONIN PO Take 1 tablet by mouth nightly       Insulin Pen Needle 32G X 4 MM MISC 1 each by Does not apply route daily 100 each 3    loratadine (CLARITIN) 10 MG tablet Take 10 mg by mouth daily       lisinopril (PRINIVIL;ZESTRIL) 5 MG tablet Take 20 mg by mouth daily       atorvastatin (LIPITOR) 80 MG tablet Take 80 mg by mouth daily. No facility-administered medications prior to visit.         SOCIAL/FAMILY/PAST MEDICAL HISTORY: Mr. Fallon Rashid, stiffness) 30 tablet 0    meloxicam (MOBIC) 15 MG tablet Take one tablet po every Monday,wednesday, and Friday 30 tablet 0    gabapentin (NEURONTIN) 300 MG capsule Take one tablet po in the morning and two tablets po in the evening 90 capsule 0    insulin glargine (LANTUS) 100 UNIT/ML injection pen INJECT 46 UNITS INTO THE SKIN ONCE DAILY 15 pen 0    insulin aspart (NOVOLOG FLEXPEN) 100 UNIT/ML injection pen 18-24 units AC TID 10 pen 3    FreeStyle Lancets MISC USE AS DIRECTED 100 each 3    omeprazole (PRILOSEC) 20 MG delayed release capsule TAKE 1 CAPSULE BY MOUTH EVERY DAY      TRULICITY 1.80 FU/6.8UL SOPN INJECT 0.75 MG INTO THE SKIN ONCE A WEEK. 2 mL 3    FREESTYLE LITE strip TEST THREE TIMES DAILY 100 strip 5    Cholecalciferol (VITAMIN D PO) Take 1 tablet by mouth every 7 days Pt to clarify dose      furosemide (LASIX) 20 MG tablet Take 40 mg by mouth daily       Fluticasone Propionate (FLONASE NA) 1 spray by Nasal route daily Each nostril      SALINE MIST SPRAY NA 1 spray by Nasal route 3 times daily as needed Each nostril 2-3 times per day      Insulin Pen Needle (PEN NEEDLES) 31G X 6 MM MISC 1 each by In Vitro route 4 times daily 200 each 3    metFORMIN (GLUCOPHAGE-XR) 500 MG extended release tablet TAKE TWO  TABLETS BY MOUTH DAILY WITH BREAKFAST 60 tablet 3    Blood Glucose Monitoring Suppl (ONE TOUCH ULTRA 2) w/Device KIT 1 kit by Does not apply route daily 1 kit 0    Lancets MISC 1 each by Does not apply route 3 times daily 100 each 3    Blood Glucose Monitoring Suppl (FREESTYLE LITE) RO As needed 1 Device 3    MELATONIN PO Take 1 tablet by mouth nightly       Insulin Pen Needle 32G X 4 MM MISC 1 each by Does not apply route daily 100 each 3    loratadine (CLARITIN) 10 MG tablet Take 10 mg by mouth daily       lisinopril (PRINIVIL;ZESTRIL) 5 MG tablet Take 20 mg by mouth daily       atorvastatin (LIPITOR) 80 MG tablet Take 80 mg by mouth daily.        No current facility-administered medications for this visit. I will continue his current medication regimen  which is part of the above treatment schedule. It has been helping with Mr. Vamsi Tristan chronic  medical problems which for this visit include:   Diagnoses of Chronic pain syndrome, DDD (degenerative disc disease), lumbar, Lumbar radiculopathy, Chronic left shoulder pain, Primary osteoarthritis of left hip, and Myofascial pain were pertinent to this visit. Risks and benefits of the medications and other alternative treatments  including no treatment were discussed with the patient. The common side effects of these medications were also explained to the patient. Informed verbal consent was obtained. Goals of current treatment regimen include improvement in pain, restoration of functioning- with focus on improvement in physical performance, general activity, work or disability,emotional distress, health care utilization and  decreased medication consumption. Will continue to monitor progress towards achieving/maintaining therapeutic goals with special emphasis on  1. Improvement in perceived interfernce  of pain with ADL's. Ability to do home exercises independently. Ability to do household chores indoor and/or outdoor work and social and leisure activities. Improve psychosocial and physical functioning. - he is showing progression towards this treatment goal with the current regimen. He was advised against drinking alcohol with the narcotic pain medicines, advised against driving or handling machinery while adjusting the dose of medicines or if having cognitive  issues related to the current medications. Risk of overdose and death, if medicines not taken as prescribed, were also discussed. If the patient develops new symptoms or if the symptoms worsen, the patient should call the office.     While transcribing every attempt was made to maintain the accuracy of the note in terms of it's contents,there may have been

## 2020-04-04 RX ORDER — METFORMIN HYDROCHLORIDE 500 MG/1
TABLET, EXTENDED RELEASE ORAL
Qty: 60 TABLET | Refills: 2 | Status: SHIPPED | OUTPATIENT
Start: 2020-04-04 | End: 2020-04-14 | Stop reason: SDUPTHER

## 2020-04-14 RX ORDER — METFORMIN HYDROCHLORIDE 500 MG/1
TABLET, EXTENDED RELEASE ORAL
Qty: 60 TABLET | Refills: 2 | Status: SHIPPED | OUTPATIENT
Start: 2020-04-14 | End: 2020-09-18 | Stop reason: SDUPTHER

## 2020-04-22 ENCOUNTER — VIRTUAL VISIT (OUTPATIENT)
Dept: ENDOCRINOLOGY | Age: 60
End: 2020-04-22
Payer: COMMERCIAL

## 2020-04-22 PROCEDURE — 3052F HG A1C>EQUAL 8.0%<EQUAL 9.0%: CPT | Performed by: INTERNAL MEDICINE

## 2020-04-22 PROCEDURE — 3017F COLORECTAL CA SCREEN DOC REV: CPT | Performed by: INTERNAL MEDICINE

## 2020-04-22 PROCEDURE — 2022F DILAT RTA XM EVC RTNOPTHY: CPT | Performed by: INTERNAL MEDICINE

## 2020-04-22 PROCEDURE — 99214 OFFICE O/P EST MOD 30 MIN: CPT | Performed by: INTERNAL MEDICINE

## 2020-04-22 PROCEDURE — G8427 DOCREV CUR MEDS BY ELIG CLIN: HCPCS | Performed by: INTERNAL MEDICINE

## 2020-04-22 RX ORDER — CEPHALEXIN 500 MG/1
500 CAPSULE ORAL EVERY 12 HOURS
COMMUNITY
Start: 2020-04-21 | End: 2020-04-28

## 2020-04-22 NOTE — PROGRESS NOTES
Diabetes Care: recommend yearly eye exam, foot exam and urine microalbumin to   creatinine ratio.  Patient isdue    -Hyperlipidemia, LDL goal is <100 mg/dl   -Daily ASA: 81mg daily

## 2020-04-27 RX ORDER — BLOOD-GLUCOSE METER
KIT MISCELLANEOUS
Qty: 100 STRIP | Refills: 0 | Status: SHIPPED | OUTPATIENT
Start: 2020-04-27 | End: 2020-05-20

## 2020-04-27 NOTE — TELEPHONE ENCOUNTER
Medication:   Requested Prescriptions     Pending Prescriptions Disp Refills    Insulin Pen Needle 32G X 4 MM MISC 120 each 3     Si each by Does not apply route 4 times daily         Last appt: 2020   Next appt: Visit date not found    Last OARRS:   RX Monitoring 2019   Attestation The Prescription Monitoring Report for this patient was reviewed today.

## 2020-04-29 ENCOUNTER — VIRTUAL VISIT (OUTPATIENT)
Dept: PAIN MANAGEMENT | Age: 60
End: 2020-04-29
Payer: COMMERCIAL

## 2020-04-29 PROCEDURE — 99213 OFFICE O/P EST LOW 20 MIN: CPT | Performed by: INTERNAL MEDICINE

## 2020-04-29 PROCEDURE — G8427 DOCREV CUR MEDS BY ELIG CLIN: HCPCS | Performed by: INTERNAL MEDICINE

## 2020-04-29 PROCEDURE — 3017F COLORECTAL CA SCREEN DOC REV: CPT | Performed by: INTERNAL MEDICINE

## 2020-04-29 RX ORDER — METHOCARBAMOL 500 MG/1
500 TABLET, FILM COATED ORAL 2 TIMES DAILY PRN
Qty: 30 TABLET | Refills: 0 | Status: SHIPPED | OUTPATIENT
Start: 2020-04-29 | End: 2020-05-27 | Stop reason: SDUPTHER

## 2020-04-29 RX ORDER — MELOXICAM 15 MG/1
TABLET ORAL
Qty: 30 TABLET | Refills: 0 | Status: SHIPPED | OUTPATIENT
Start: 2020-04-29 | End: 2020-05-27 | Stop reason: SDUPTHER

## 2020-04-29 RX ORDER — HYDROCODONE BITARTRATE AND ACETAMINOPHEN 5; 325 MG/1; MG/1
1 TABLET ORAL EVERY 6 HOURS PRN
Qty: 84 TABLET | Refills: 0 | Status: SHIPPED | OUTPATIENT
Start: 2020-04-29 | End: 2020-05-27 | Stop reason: SDUPTHER

## 2020-04-29 RX ORDER — GABAPENTIN 300 MG/1
CAPSULE ORAL
Qty: 90 CAPSULE | Refills: 0 | Status: SHIPPED | OUTPATIENT
Start: 2020-04-29 | End: 2020-05-27 | Stop reason: SDUPTHER

## 2020-04-29 NOTE — PROGRESS NOTES
evening 90 capsule 0    HYDROcodone-acetaminophen (NORCO) 5-325 MG per tablet Take 1 tablet by mouth every 6 hours as needed for Pain (max 2-3/day) for up to 28 days. 84 tablet 0    FreeStyle Lancets MISC USE FOUR TIMES A  each 0    insulin glargine (LANTUS) 100 UNIT/ML injection pen INJECT 46 UNITS INTO THE SKIN ONCE DAILY 15 pen 0    insulin aspart (NOVOLOG FLEXPEN) 100 UNIT/ML injection pen 18-24 units AC TID 10 pen 3    FreeStyle Lancets MISC USE AS DIRECTED 100 each 3    omeprazole (PRILOSEC) 20 MG delayed release capsule TAKE 1 CAPSULE BY MOUTH EVERY DAY      TRULICITY 2.84 PB/6.5BW SOPN INJECT 0.75 MG INTO THE SKIN ONCE A WEEK. 2 mL 3    Cholecalciferol (VITAMIN D PO) Take 1 tablet by mouth every 7 days Pt to clarify dose      furosemide (LASIX) 20 MG tablet Take 40 mg by mouth daily       Fluticasone Propionate (FLONASE NA) 1 spray by Nasal route daily Each nostril      SALINE MIST SPRAY NA 1 spray by Nasal route 3 times daily as needed Each nostril 2-3 times per day      Insulin Pen Needle (PEN NEEDLES) 31G X 6 MM MISC 1 each by In Vitro route 4 times daily 200 each 3    Blood Glucose Monitoring Suppl (ONE TOUCH ULTRA 2) w/Device KIT 1 kit by Does not apply route daily 1 kit 0    Lancets MISC 1 each by Does not apply route 3 times daily 100 each 3    Blood Glucose Monitoring Suppl (FREESTYLE LITE) RO As needed 1 Device 3    MELATONIN PO Take 1 tablet by mouth nightly       loratadine (CLARITIN) 10 MG tablet Take 10 mg by mouth daily       lisinopril (PRINIVIL;ZESTRIL) 5 MG tablet Take 20 mg by mouth daily       atorvastatin (LIPITOR) 80 MG tablet Take 80 mg by mouth daily. No current facility-administered medications for this visit. I will continue his current medication regimen  which is part of the above treatment schedule.  It has been helping with Mr. Sherrell Celeste chronic  medical problems which for this visit include:   Diagnoses of Chronic pain syndrome, DDD (degenerative disc disease), lumbar, Lumbar radiculopathy, Chronic left shoulder pain, Primary osteoarthritis of left hip, and Myofascial pain were pertinent to this visit. Risks and benefits of the medications and other alternative treatments  including no treatment were discussed with the patient. The common side effects of these medications were also explained to the patient. Informed verbal consent was obtained. Goals of current treatment regimen include improvement in pain, restoration of functioning- with focus on improvement in physical performance, general activity, work or disability,emotional distress, health care utilization and  decreased medication consumption. Will continue to monitor progress towards achieving/maintaining therapeutic goals with special emphasis on  1. Improvement in perceived interfernce  of pain with ADL's. Ability to do home exercises independently. Ability to do household chores indoor and/or outdoor work and social and leisure activities. Improve psychosocial and physical functioning. - he is showing progression towards this treatment goal with the current regimen. He was advised against drinking alcohol with the narcotic pain medicines, advised against driving or handling machinery while adjusting the dose of medicines or if having cognitive  issues related to the current medications. Risk of overdose and death, if medicines not taken as prescribed, were also discussed. If the patient develops new symptoms or if the symptoms worsen, the patient should call the office. While transcribing every attempt was made to maintain the accuracy of the note in terms of it's contents,there may have been some errors made inadvertently. Thank you for allowing me to participate in the care of this patient.     Ju Juarez MD.    Cc: Dinorah Rowell MD

## 2020-05-20 RX ORDER — BLOOD-GLUCOSE METER
KIT MISCELLANEOUS
Qty: 100 STRIP | Refills: 0 | Status: SHIPPED | OUTPATIENT
Start: 2020-05-20 | End: 2020-09-18 | Stop reason: SDUPTHER

## 2020-05-20 RX ORDER — LANCETS 28 GAUGE
EACH MISCELLANEOUS
Qty: 100 EACH | Refills: 0 | Status: SHIPPED | OUTPATIENT
Start: 2020-05-20 | End: 2020-09-18 | Stop reason: SDUPTHER

## 2020-05-26 RX ORDER — DULAGLUTIDE 0.75 MG/.5ML
INJECTION, SOLUTION SUBCUTANEOUS
Qty: 4 PEN | Refills: 1 | Status: SHIPPED | OUTPATIENT
Start: 2020-05-26 | End: 2020-09-15

## 2020-05-27 ENCOUNTER — VIRTUAL VISIT (OUTPATIENT)
Dept: PAIN MANAGEMENT | Age: 60
End: 2020-05-27
Payer: COMMERCIAL

## 2020-05-27 PROCEDURE — 99213 OFFICE O/P EST LOW 20 MIN: CPT | Performed by: INTERNAL MEDICINE

## 2020-05-27 PROCEDURE — 3017F COLORECTAL CA SCREEN DOC REV: CPT | Performed by: INTERNAL MEDICINE

## 2020-05-27 PROCEDURE — G8427 DOCREV CUR MEDS BY ELIG CLIN: HCPCS | Performed by: INTERNAL MEDICINE

## 2020-05-27 RX ORDER — HYDROCODONE BITARTRATE AND ACETAMINOPHEN 5; 325 MG/1; MG/1
1 TABLET ORAL EVERY 6 HOURS PRN
Qty: 84 TABLET | Refills: 0 | Status: SHIPPED | OUTPATIENT
Start: 2020-05-27 | End: 2020-06-26 | Stop reason: SDUPTHER

## 2020-05-27 RX ORDER — GABAPENTIN 300 MG/1
CAPSULE ORAL
Qty: 90 CAPSULE | Refills: 0 | Status: SHIPPED | OUTPATIENT
Start: 2020-05-27 | End: 2020-06-26 | Stop reason: SDUPTHER

## 2020-05-27 RX ORDER — METHOCARBAMOL 500 MG/1
500 TABLET, FILM COATED ORAL 2 TIMES DAILY PRN
Qty: 30 TABLET | Refills: 0 | Status: SHIPPED | OUTPATIENT
Start: 2020-05-27 | End: 2020-06-26 | Stop reason: SDUPTHER

## 2020-05-27 RX ORDER — MELOXICAM 15 MG/1
TABLET ORAL
Qty: 30 TABLET | Refills: 0 | Status: SHIPPED | OUTPATIENT
Start: 2020-05-27 | End: 2020-06-26 | Stop reason: SDUPTHER

## 2020-05-27 NOTE — PROGRESS NOTES
UNIT/ML injection pen 18-24 units AC TID 10 pen 3    FreeStyle Lancets MISC USE AS DIRECTED 100 each 3    omeprazole (PRILOSEC) 20 MG delayed release capsule TAKE 1 CAPSULE BY MOUTH EVERY DAY      Cholecalciferol (VITAMIN D PO) Take 1 tablet by mouth every 7 days Pt to clarify dose      furosemide (LASIX) 20 MG tablet Take 40 mg by mouth daily       Fluticasone Propionate (FLONASE NA) 1 spray by Nasal route daily Each nostril      SALINE MIST SPRAY NA 1 spray by Nasal route 3 times daily as needed Each nostril 2-3 times per day      Insulin Pen Needle (PEN NEEDLES) 31G X 6 MM MISC 1 each by In Vitro route 4 times daily 200 each 3    Blood Glucose Monitoring Suppl (ONE TOUCH ULTRA 2) w/Device KIT 1 kit by Does not apply route daily 1 kit 0    Lancets MISC 1 each by Does not apply route 3 times daily 100 each 3    Blood Glucose Monitoring Suppl (FREESTYLE LITE) RO As needed 1 Device 3    MELATONIN PO Take 1 tablet by mouth nightly       loratadine (CLARITIN) 10 MG tablet Take 10 mg by mouth daily       lisinopril (PRINIVIL;ZESTRIL) 5 MG tablet Take 20 mg by mouth daily       atorvastatin (LIPITOR) 80 MG tablet Take 80 mg by mouth daily. No facility-administered medications prior to visit. SOCIAL/FAMILY/PAST MEDICAL HISTORY: Mr. Raji Sanchez, family and past medical history was reviewed. REVIEW OF SYSTEMS:    Respiratory: Negative for apnea, chest tightness and shortness of breath or change in baseline breathing. Gastrointestinal: Negative for nausea, vomiting, abdominal pain, diarrhea, constipation, blood in stool and abdominal distention. PHYSICAL EXAM:   Nursing note and vitals per patient reviewed. There were no vitals taken for this visit. Constitutional: He appears well-developed and well-nourished. No acute distress. No respiratory distress. Skin: Skin appears to be warm and dry. No rashes or any other marks noted. He is not diaphoretic.   Respiratory/Pulmonary: NO

## 2020-05-29 ENCOUNTER — TELEPHONE (OUTPATIENT)
Dept: PAIN MANAGEMENT | Age: 60
End: 2020-05-29

## 2020-06-03 ENCOUNTER — HOSPITAL ENCOUNTER (OUTPATIENT)
Dept: PHYSICAL THERAPY | Age: 60
Setting detail: THERAPIES SERIES
Discharge: HOME | End: 2020-06-03
Payer: COMMERCIAL

## 2020-06-03 PROCEDURE — 97530 THERAPEUTIC ACTIVITIES: CPT

## 2020-06-03 PROCEDURE — 97161 PT EVAL LOW COMPLEX 20 MIN: CPT

## 2020-06-03 PROCEDURE — 97110 THERAPEUTIC EXERCISES: CPT

## 2020-06-03 NOTE — FLOWSHEET NOTE
[place]:    Mid Back   Jog    UT  Jumping Jacks    Post Shoulder  Heel to Toe    Ladder Pull  Hand to Opposite Knee    Pec Stretch  Rocking Horse      [employer] Skier          Cervical:   Other:     AROM Flexion      AROM Extension      AROM Side Bending      AROM Rotation      Chin Tucks      Chin Nods        Aquatic Abbreviation Key  B= Belt DB= Dumbells T= Theratube   H= Hydrotone N= Noodles W= Weights   P= Paddles S= Speedo equipment K= Kickboard       Pt. Education:  6/3:  -pt educated on diagnosis, prognosis and expectations for rehab  -all pt questions were answered  -Gave pt tour of pool, explained how to use lockers, what to wear, that it would be in group setting, and showed pt aquatic equipment. HEP instruction:   6/3:  -pt provided with written and illustrated instructions for HEP (see scanned image in )   Access Code: [email or web address]   URL: [email or web address]   Date: 06/03/2020   Prepared by: Joon Martinez     Exercises   · Seated Piriformis Stretch with Trunk Bend - 3 sets - 30 hold - 3x daily - 7x weekly   · Seated Scapular Retraction - 10-15 reps - 2x daily - 7x weekly   · Standing Shoulder Horizontal Abduction with Resistance - 10-15 reps - 2x daily - 7x weekly   · Supine Transversus Abdominis Bracing - Hands on Stomach - 10 reps - 2x daily - 7x weekly   · Supine Bridge - 10-15 reps - 2x daily - 7x weekly   · Clamshell - 10-15 reps - 2x daily - 7x weekly     Therapeutic Exercise and NMR EXR  [x] (76114) Provided verbal/tactile cueing for activities related to strengthening, flexibility, endurance, ROM for improvements in  [x] LE / Lumbar: LE, proximal hip, and core control with self care, mobility, lifting, ambulation.   [x] UE / Cervical: cervical, postural, scapular, scapulothoracic and UE control with self care, reaching, carrying, lifting, house/yardwork, driving, computer work.  [] (32186) Provided verbal/tactile cueing for activities related to improving balance, Minutes: 45     [x] EVAL - LOW (05658)   [] EVAL - MOD (93830)  [] EVAL - HIGH (10621)  [] RE-EVAL (90124)  [x] TE (55175) x   1   [] Ionto  [] NMR (94332) x      [] Vaso  [] Manual (40541) x      [] Ultrasound  [x] TA x 1      [] Mech Traction (38843)  [] Gait Training x     [] ES (un) (08564):   [] Aquatic therapy x   [] Other:   [] Group:     GOALS:   Patient stated goal: improve flexibility, manage pain  [] Progressing: [] Met: [] Not Met: [] Adjusted    Therapist goals for Patient:   Short Term Goals: To be achieved in: 2 weeks  1. Independent in HEP and progression per patient tolerance, in order to prevent re-injury. [] Progressing: [] Met: [] Not Met: [] Adjusted  2. Patient will have a decrease in pain to facilitate improvement in movement, function, and ADLs as indicated by Functional Deficits. [] Progressing: [] Met: [] Not Met: [] Adjusted    Long Term Goals: To be achieved in: 6 weeks  1. Disability index score of 25% or less for the NIKUNJ to assist with reaching prior level of function. [] Progressing: [] Met: [] Not Met: [] Adjusted  2. Disability index score of 25% or less for the LEFS to assist with reaching prior level of function. [] Progressing: [] Met: [] Not Met: [] Adjusted  3. Patient will demonstrate increased piriformis AROM to WNL, good LS mobility, good hip ROM to allow for proper joint functioning as indicated by patients Functional Deficits. [] Progressing: [] Met: [] Not Met: [] Adjusted  4. Patient will demonstrate an increase in B hip abduction & extensor strength to 4/5 and good core activation to allow for proper functional mobility as indicated by patients Functional Deficits. [] Progressing: [] Met: [] Not Met: [] Adjusted  5. Patient will return to functional activities including sleeping & driving without increased symptoms or restriction. [] Progressing: [] Met: [] Not Met: [] Adjusted  6.  Patient to improve B hamstring length to lacking 35 deg in order to improve

## 2020-06-10 ENCOUNTER — HOSPITAL ENCOUNTER (OUTPATIENT)
Dept: PHYSICAL THERAPY | Age: 60
Setting detail: THERAPIES SERIES
Discharge: HOME | End: 2020-06-10
Payer: COMMERCIAL

## 2020-06-10 PROCEDURE — 97035 APP MDLTY 1+ULTRASOUND EA 15: CPT

## 2020-06-10 PROCEDURE — 97112 NEUROMUSCULAR REEDUCATION: CPT

## 2020-06-10 PROCEDURE — 97110 THERAPEUTIC EXERCISES: CPT

## 2020-06-10 NOTE — FLOWSHEET NOTE
[address] [facility] Physical Therapy  Phone: [phone]   Fax: [phone]    Physical Therapy Daily Treatment Note  Date:  6/10/2020    Patient Name:  Rachelle Downs    :  1960  MRN: [id number]  Medical/Treatment Diagnosis Information:  · Diagnosis: G89.4 (ICD-10-CM) - Chronic pain syndrome;   M51.36 (ICD-10-CM) - DDD (degenerative disc disease), lumbar;    M54.16 (ICD-10-CM) - Lumbar radiculopathy;    M16.12 (ICD-10-CM) - Primary osteoarthritis of left hip  · Treatment Diagnosis: Decreased B hip and core stability, Decreased B hamstring & piriformis length, Forward rounded shoulder posture  Insurance/Certification information:  [place] [facility] [place]ician Information:  Referring Practitioner: Teressa Connor MD  Plan of care signed (Y/N): []  Yes [x]  No     Date of Patient follow up with Physician:      Progress Report: []  Yes  [x]  No     Date Range for reporting period:  Beginnin/3/2020  Ending    Progress report due (10 Rx/or 30 days whichever is less):      Recertification due (POC duration/ or 90 days whichever is less):     Visit # Insurance Allowable Auth required? Date Range    30 - used 16 prior to COVID-19, 14 remains for this EOC []  Yes  [x]  No pcy     Latex Allergy:  [x]NO      []YES  Preferred Language for Healthcare:   [x]English       []other:    Functional Scale:          Date assessed:     Oswestry: raw score = 24; dysfunction = 53.33%  6/3/2020                                      LEFS: raw score = 24; dysfunction = 70%    6/3/2020    Pain level:  6/10 lumbar spine    SUBJECTIVE:   Having some stiffness this am, not any pain. Felt fine following last session. Was able to perform HEP, no increased difficulty with them. OBJECTIVE: See eval      RESTRICTIONS/PRECAUTIONS: CHF, HTN, DM type II;   Allergic to Tramadol    Land Based Treatment Dates:    6/3, 6/10    Exercises/Interventions:   Therapeutic Exercises (42692) WNL, good LS mobility, good hip ROM to allow for proper joint functioning as indicated by patients Functional Deficits. [] Progressing: [] Met: [] Not Met: [] Adjusted  4. Patient will demonstrate an increase in B hip abduction & extensor strength to 4/5 and good core activation to allow for proper functional mobility as indicated by patients Functional Deficits. [] Progressing: [] Met: [] Not Met: [] Adjusted  5. Patient will return to functional activities including sleeping & driving without increased symptoms or restriction. [] Progressing: [] Met: [] Not Met: [] Adjusted  6. Patient to improve B hamstring length to lacking 35 deg in order to improve standing and walking tolerance to >45 mins   [] Progressing: [] Met: [] Not Met: [] Adjusted    Overall Progression Towards Functional goals/ Treatment Progress Update:  [] Patient is progressing as expected towards functional goals listed. [] Progression is slowed due to complexities/Impairments listed. [] Progression has been slowed due to co-morbidities.   [x] Plan just implemented, too soon to assess goals progression <30days   [] Goals require adjustment due to lack of progress  [] Patient is not progressing as expected and requires additional follow up with physician  [] Other    Persisting Functional Limitations/Impairments:  []Sleeping [x]Sitting               [x]Standing []Transfers        [x]Walking []Kneeling               []Stairs [x]Squatting / bending   []ADLs []Reaching  []Lifting  []Housework  [x]Driving []Job related tasks  []Sports/Recreation []Other:        ASSESSMENT:  See eval    Treatment/Activity Tolerance:  [x] Patient able to complete tx  [] Patient limited by fatique  [] Patient limited by pain  [] Patient limited by other medical complications  [] Other:     Prognosis: [x] Good [] Fair  [] Poor    Patient Requires Follow-up: [x] Yes  [] No         Plan for next treatment session:  Hip & core stabs, LE stretches    PLAN: See eval.

## 2020-06-17 ENCOUNTER — HOSPITAL ENCOUNTER (OUTPATIENT)
Dept: PHYSICAL THERAPY | Age: 60
Setting detail: THERAPIES SERIES
Discharge: HOME | End: 2020-06-17
Payer: COMMERCIAL

## 2020-06-19 RX ORDER — INSULIN GLARGINE 100 [IU]/ML
INJECTION, SOLUTION SUBCUTANEOUS
Qty: 5 PEN | Refills: 0 | Status: SHIPPED | OUTPATIENT
Start: 2020-06-19 | End: 2020-09-18 | Stop reason: SDUPTHER

## 2020-06-24 ENCOUNTER — HOSPITAL ENCOUNTER (OUTPATIENT)
Dept: PHYSICAL THERAPY | Age: 60
Setting detail: THERAPIES SERIES
Discharge: HOME | End: 2020-06-24
Payer: COMMERCIAL

## 2020-06-24 PROCEDURE — 97112 NEUROMUSCULAR REEDUCATION: CPT

## 2020-06-24 PROCEDURE — 97110 THERAPEUTIC EXERCISES: CPT

## 2020-06-24 NOTE — FLOWSHEET NOTE
[address] [facility] Physical Therapy  Phone: [phone]   Fax: [phone]    Physical Therapy Daily Treatment Note  Date:  2020    Patient Name:  Hemanth Montes    :  1960  MRN: [id number]  Medical/Treatment Diagnosis Information:  · Diagnosis: G89.4 (ICD-10-CM) - Chronic pain syndrome;   M51.36 (ICD-10-CM) - DDD (degenerative disc disease), lumbar;    M54.16 (ICD-10-CM) - Lumbar radiculopathy;    M16.12 (ICD-10-CM) - Primary osteoarthritis of left hip  · Treatment Diagnosis: Decreased B hip and core stability, Decreased B hamstring & piriformis length, Forward rounded shoulder posture  Insurance/Certification information:  PT Insurance Information: [facility]  Physician Information:  Referring Practitioner: Augusto Cortez MD  Plan of care signed (Y/N): []  Yes [x]  No     Date of Patient follow up with Physician:      Progress Report: []  Yes  [x]  No     Date Range for reporting period:  Beginnin/3/2020  Ending    Progress report due (10 Rx/or 30 days whichever is less):      Recertification due (POC duration/ or 90 days whichever is less):     Visit # Insurance Allowable Auth required? Date Range   3/12 30 - used 16 prior to COVID-19, 14 remains for this EOC []  Yes  [x]  No pcy     Latex Allergy:  [x]NO      []YES  Preferred Language for Healthcare:   [x]English       []other:    Functional Scale:          Date assessed:     Oswestry: raw score = 24; dysfunction = 53.33%  6/3/2020                                      LEFS: raw score = 24; dysfunction = 70%    6/3/2020    Pain level:  6/10 lumbar spine    SUBJECTIVE:   Woke up with more pain this am, had to take 2 norco pain medications to help manage pain. Feels that he didn't feel well last session due to his GM medications. Feels that land based treatment has been helping but would like to add 1 aquatic session each week.       OBJECTIVE: See eval      RESTRICTIONS/PRECAUTIONS: CHF, HTN, DM type II; scapular, scapulothoracic and UE control with self care, reaching, carrying, lifting, house/yardwork, driving, computer work.      NMR and Therapeutic Activities:    [x] (02323 or 71165) Provided verbal/tactile cueing for activities related to improving balance, coordination, kinesthetic sense, posture, motor skill, proprioception and motor activation to allow for proper function of   [x] LE: / Lumbar core, proximal hip and LE with self care and ADLs  [x] UE / Cervical: cervical, postural, scapular, scapulothoracic and UE control with self care, carrying, lifting, driving, computer work.   [] (68772) Gait Re-education- Provided training and instruction to the patient for proper LE, core and proximal hip recruitment and positioning and eccentric body weight control with ambulation re-education including up and down stairs     Home Exercise Program:    [x] (38459) Reviewed/Progressed HEP activities related to strengthening, flexibility, endurance, ROM of   [x] LE / Lumbar: core, proximal hip and LE for functional self-care, mobility, lifting and ambulation/stair navigation   [x] UE / Cervical: cervical, postural, scapular, scapulothoracic and UE control with self care, reaching, carrying, lifting, house/yardwork, driving, computer work  [] (48799)Reviewed/Progressed HEP activities related to improving balance, coordination, kinesthetic sense, posture, motor skill, proprioception of   [] LE: core, proximal hip and LE for self care, mobility, lifting, and ambulation/stair navigation    [] UE / Cervical: cervical, postural,  scapular, scapulothoracic and UE control with self care, reaching, carrying, lifting, house/yardwork, driving, computer work    Manual Treatments:  PROM / STM / Oscillations-Mobs:  G-I, II, III, IV (PA's, Inf., Post.)  [] (09217) Provided manual therapy to mobilize LE, proximal hip and/or LS spine soft tissue/joints for the purpose of modulating pain, promoting relaxation,  increasing ROM, reducing/eliminating soft tissue swelling/inflammation/restriction, improving soft tissue extensibility and allowing for proper ROM for normal function with   [] LE / lumbar: self care, mobility, lifting and ambulation. [] UE / Cervical: self care, reaching, carrying, lifting, house/yardwork, driving, computer work. Modalities:  [] (83852) Vasopneumatic compression: Utilized vasopneumatic compression to decrease edema / swelling for the purpose of improving mobility and quad tone / recruitment which will allow for increased overall function including but not limited to self-care, transfers, ambulation, and ascending / descending stairs. Modalities:    6/24:  Seated MHP to lower back x15 mins  6/10:  Prone US to L QL, 1.0 MHz, continuous, 1.2 w/cm2 x8 mins    Charges:  Timed Code Treatment Minutes: 42   Total Treatment Minutes: 58     [] EVAL - LOW (45739)   [] EVAL - MOD (52170)  [] EVAL - HIGH (36497)  [] RE-EVAL (17197)  [x] TE (78222) x   2   [] Ionto  [x] NMR (67564) x 1     [] Vaso  [] Manual (16587) x      [x] Ultrasound  [] TA x 1      [] Mech Traction (45172)  [] Gait Training x     [] ES (un) (15178):   [] Aquatic therapy x   [] Other:   [] Group:     GOALS:   Patient stated goal: improve flexibility, manage pain  [] Progressing: [] Met: [] Not Met: [] Adjusted    Therapist goals for Patient:   Short Term Goals: To be achieved in: 2 weeks  1. Independent in HEP and progression per patient tolerance, in order to prevent re-injury. [] Progressing: [] Met: [] Not Met: [] Adjusted  2. Patient will have a decrease in pain to facilitate improvement in movement, function, and ADLs as indicated by Functional Deficits. [] Progressing: [] Met: [] Not Met: [] Adjusted    Long Term Goals: To be achieved in: 6 weeks  1. Disability index score of 25% or less for the NIKUNJ to assist with reaching prior level of function. [] Progressing: [] Met: [] Not Met: [] Adjusted  2.  Disability index score of 25% or

## 2020-06-26 ENCOUNTER — HOSPITAL ENCOUNTER (OUTPATIENT)
Dept: PHYSICAL THERAPY | Age: 60
Setting detail: THERAPIES SERIES
Discharge: HOME | End: 2020-06-26
Payer: COMMERCIAL

## 2020-06-26 ENCOUNTER — VIRTUAL VISIT (OUTPATIENT)
Dept: PAIN MANAGEMENT | Age: 60
End: 2020-06-26
Payer: COMMERCIAL

## 2020-06-26 PROCEDURE — 97113 AQUATIC THERAPY/EXERCISES: CPT

## 2020-06-26 PROCEDURE — 99213 OFFICE O/P EST LOW 20 MIN: CPT | Performed by: INTERNAL MEDICINE

## 2020-06-26 PROCEDURE — G8427 DOCREV CUR MEDS BY ELIG CLIN: HCPCS | Performed by: INTERNAL MEDICINE

## 2020-06-26 PROCEDURE — 3017F COLORECTAL CA SCREEN DOC REV: CPT | Performed by: INTERNAL MEDICINE

## 2020-06-26 RX ORDER — METHOCARBAMOL 500 MG/1
500 TABLET, FILM COATED ORAL 2 TIMES DAILY PRN
Qty: 30 TABLET | Refills: 0 | Status: SHIPPED | OUTPATIENT
Start: 2020-06-26 | End: 2020-07-24 | Stop reason: SDUPTHER

## 2020-06-26 RX ORDER — MELOXICAM 15 MG/1
TABLET ORAL
Qty: 30 TABLET | Refills: 0 | Status: SHIPPED | OUTPATIENT
Start: 2020-06-26 | End: 2020-07-24 | Stop reason: SDUPTHER

## 2020-06-26 RX ORDER — HYDROCODONE BITARTRATE AND ACETAMINOPHEN 5; 325 MG/1; MG/1
1 TABLET ORAL EVERY 6 HOURS PRN
Qty: 84 TABLET | Refills: 0 | Status: SHIPPED | OUTPATIENT
Start: 2020-06-26 | End: 2020-07-24 | Stop reason: SDUPTHER

## 2020-06-26 RX ORDER — GABAPENTIN 300 MG/1
CAPSULE ORAL
Qty: 90 CAPSULE | Refills: 0 | Status: SHIPPED | OUTPATIENT
Start: 2020-06-26 | End: 2020-08-21 | Stop reason: SDUPTHER

## 2020-06-26 NOTE — FLOWSHEET NOTE
10    Pelvic Tilts  10\" x 10  Step down     Fig 8's   Lunges Forward    LE PNF  Lunges Retro      Lunges Lateral     Balance:   Lower ab curl with noodle     SLS  30\" x 2      Tandem Stance 30\" x 2       NBOS eyes open  Seated:     NBOS eyes closed  Ankle pumps     Hand to Opposite Knee X 10  Ankle Circles     Fwd Step ups to SLS  Knee Flex/Ext    Lateral Step ups to SLS  Hip aBd/aDd    Stop/Go Gait   Bicycle       Ankle DF/PF      Ankle Inv/Ev    Stretching:       Gastroc/Soleus 30\" x 2     Hamstring  30\" x 2  Noodle:     Knee Flex Stretch  Leg Press    Piriformis   Noodle Hang at [address]    Hip Flexor  Noodle Hang Deep Water X 5 mins    SKTC  Noodle Bicycle  X ~ 20 along with noodle hang    DKTC       ITB      Quad  Deep Water:    Mid Back   Jog    UT  Jumping Jacks    Post Shoulder  Heel to Toe    Ladder Pull  Hand to Opposite Knee    Pec Stretch  Rocking Horse      [employer] Skier          Cervical:   Other:     AROM Flexion      AROM Extension      AROM Side Bending      AROM Rotation      Chin Tucks      Chin Nods        Aquatic Abbreviation Key  B= Belt DB= Dumbells T= Theratube   H= Hydrotone N= Noodles W= Weights   P= Paddles S= Speedo equipment K= Kickboard       Pt. Education:  6/3:  -pt educated on diagnosis, prognosis and expectations for rehab  -all pt questions were answered  -Gave pt tour of pool, explained how to use lockers, what to wear, that it would be in group setting, and showed pt aquatic equipment. HEP instruction:   6/3:  -pt provided with written and illustrated instructions for HEP (see scanned image in )   Access Code: [email or web address]   URL: [email or web address]   Date: 06/03/2020   Prepared by: Rich Galo     Exercises   · Seated Piriformis Stretch with Trunk Bend - 3 sets - 30 hold - 3x daily - 7x weekly   · Seated Scapular Retraction - 10-15 reps - 2x daily - 7x weekly   · Standing Shoulder Horizontal Abduction with Resistance - 10-15 reps - 2x daily - 7x reducing/eliminating soft tissue swelling/inflammation/restriction, improving soft tissue extensibility and allowing for proper ROM for normal function with   [] LE / lumbar: self care, mobility, lifting and ambulation. [] UE / Cervical: self care, reaching, carrying, lifting, house/yardwork, driving, computer work. Modalities:  [] (38094) Vasopneumatic compression: Utilized vasopneumatic compression to decrease edema / swelling for the purpose of improving mobility and quad tone / recruitment which will allow for increased overall function including but not limited to self-care, transfers, ambulation, and ascending / descending stairs. Modalities:    6/24:  Seated MHP to lower back x15 mins  6/10:  Prone US to L QL, 1.0 MHz, continuous, 1.2 w/cm2 x8 mins    Charges:  Timed Code Treatment Minutes: 60   Total Treatment Minutes: 60     [] EVAL - LOW (84444)   [] EVAL - MOD (58437)  [] EVAL - HIGH (31888)  [] RE-EVAL (84318)  [] TE (05045) x   2   [] Ionto  [] NMR (97251) x 1     [] Vaso  [] Manual (07998) x      [x] Ultrasound  [] TA x 1      [] Mech Traction (07384)  [] Gait Training x     [] ES (un) (82341):   [x] Aquatic therapy x 4  [] Other:   [] Group:     GOALS:   Patient stated goal: improve flexibility, manage pain  [] Progressing: [] Met: [] Not Met: [] Adjusted    Therapist goals for Patient:   Short Term Goals: To be achieved in: 2 weeks  1. Independent in HEP and progression per patient tolerance, in order to prevent re-injury. [] Progressing: [] Met: [] Not Met: [] Adjusted  2. Patient will have a decrease in pain to facilitate improvement in movement, function, and ADLs as indicated by Functional Deficits. [] Progressing: [] Met: [] Not Met: [] Adjusted    Long Term Goals: To be achieved in: 6 weeks  1. Disability index score of 25% or less for the NIKUNJ to assist with reaching prior level of function. [] Progressing: [] Met: [] Not Met: [] Adjusted  2.  Disability index score of 25% or less for the LEFS to assist with reaching prior level of function. [] Progressing: [] Met: [] Not Met: [] Adjusted  3. Patient will demonstrate increased piriformis AROM to WNL, good LS mobility, good hip ROM to allow for proper joint functioning as indicated by patients Functional Deficits. [] Progressing: [] Met: [] Not Met: [] Adjusted  4. Patient will demonstrate an increase in B hip abduction & extensor strength to 4/5 and good core activation to allow for proper functional mobility as indicated by patients Functional Deficits. [] Progressing: [] Met: [] Not Met: [] Adjusted  5. Patient will return to functional activities including sleeping & driving without increased symptoms or restriction. [] Progressing: [] Met: [] Not Met: [] Adjusted  6. Patient to improve B hamstring length to lacking 35 deg in order to improve standing and walking tolerance to >45 mins   [] Progressing: [] Met: [] Not Met: [] Adjusted    Overall Progression Towards Functional goals/ Treatment Progress Update:  [] Patient is progressing as expected towards functional goals listed. [] Progression is slowed due to complexities/Impairments listed. [] Progression has been slowed due to co-morbidities. [x] Plan just implemented, too soon to assess goals progression <30days   [] Goals require adjustment due to lack of progress  [] Patient is not progressing as expected and requires additional follow up with physician  [] Other    Persisting Functional Limitations/Impairments:  []Sleeping [x]Sitting               [x]Standing []Transfers        [x]Walking []Kneeling               []Stairs [x]Squatting / bending   []ADLs []Reaching  []Lifting  []Housework  [x]Driving []Job related tasks  []Sports/Recreation []Other:        ASSESSMENT:  Pt appeared to tolerate first aquatic session well, with no increase in pain or difficulty with performing.   Progress reps/sets as tolerated and as patient's pain allows     Treatment/Activity Tolerance:  [x] Patient able to complete tx  [] Patient limited by fatique  [] Patient limited by pain  [] Patient limited by other medical complications  [] Other:     Prognosis: [x] Good [] Fair  [] Poor    Patient Requires Follow-up: [x] Yes  [] No         Plan for next treatment session:  Hip & core stabs, LE stretches    PLAN: See eval. PT 2x / week for 6 weeks. Patient scheduled sessions at 1x/week x 5 weeks until therapy pool reopens, then will schedule 2x/week for remaining sessions. [x] Continue per plan of care [] Alter current plan (see comments)  [] Plan of care initiated [] Hold pending MD visit [] Discharge    Electronically signed by: Sangeetha Roper PT    Note: If patient does not return for scheduled/ recommended follow up visits, his note will serve as a discharge from care along with most recent update on progress.

## 2020-06-26 NOTE — PROGRESS NOTES
TELE HEALTH VISIT (AUDIO-VISUAL)    Pursuant to the emergency declaration under the [facility], [facility] waiver authority and the [employer] Act, this Virtual  Visit was conducted, with patient's/legal guardian's consent, to reduce the patient's risk of exposure to COVID-19 and provide continuity of care for an established patient. Service is  provided through a video synchronous discussion virtually to substitute for in-person clinic visit. Due to this being a TeleHealth encounter (During EACNB-14 public health emergency), evaluation of the following organ systems was limited: Vitals/Constitutional/EENT/Resp/CV/GI//MS/Neuro/Skin/Tecd-Qnqt-Sqo. [facility]  1960  [id number]    Mr. Nelda Pereira is being seen virtually for a follow up visit using one of the following techniques  Face time   Informed verbal consent to the virtual visit was obtained from Mr. Nelda Pereira. Risks associated with HIPPA compliance with the virtual visit was explained to the patient. Mr. Nelda Pereira is at his residence and Dr. Jamir Larose is in his office. HISTORY OF PRESENT ILLNESS:  Mr. Nelda Pereira is a 63 y.o. male  being assessed for a follow up visit for pain management for evaluation of ongoing care regarding his symptoms and monitoring of compliance with long term use high risk medications. He has a diagnosis of   1. Chronic pain syndrome    2. DDD (degenerative disc disease), lumbar    3. Lumbar radiculopathy    4. Primary osteoarthritis of left hip    5. Myofascial pain    . He complains of pain in the Low back   with radiation to the buttocks and hips Left . He rates the pain 6/10 and describes it as aching, tingling. Current treatment regimen has helped relieve about 60% of the pain. He denies any side effects from the current pain regimen.  Patient reports that since the last follow up visit the physical functioning is unchanged, family/social relationships are worse, mood is better sleep patterns are unchanged, and that the overall functioning is unchanged. Patient denies misusing/abusing his narcotic pain medications or using any illegal drugs. There are No indicators for possible drug abuse, addiction or diversion problems.  reports he has been doing fair and the pain has been tolerable somewhat. He says he is using Norco 3 per day. He mentions his blood sugar has been okay. He states he is using Neurontin along with Robaxin. He denies any constipation symptoms. ALLERGIES: Patients list of allergies were reviewed     MEDICATIONS: Mr. Wendy De Los Santos list of medications were reviewed. His current medications are   Outpatient Medications Prior to Visit   Medication Sig Dispense Refill    insulin glargine (LANTUS SOLOSTAR) 100 UNIT/ML injection pen INJECT 46 UNITS UNDER THE SKIN DAILY 5 pen 0    methocarbamol (ROBAXIN) 500 MG tablet Take 1 tablet by mouth 2 times daily as needed (muscle stiffness) 30 tablet 0    meloxicam (MOBIC) 15 MG tablet Take one tablet po every Monday,wednesday, and Friday 30 tablet 0    TRULICITY 4.84 AB/4.3OD SOPN INJECT 0.75 MG UNDER THE SKIN ONCE WEEKLY 4 pen 1    blood glucose test strips (FREESTYLE LITE) strip USE TO TEST FOUR TIMES A  strip 0    FreeStyle Lancets MISC USE FOUR TIMES A  each 0    Insulin Pen Needle 32G X 4 MM MISC 1 each by Does not apply route 4 times daily 120 each 3    metFORMIN (GLUCOPHAGE-XR) 500 MG extended release tablet 2 tab daily with breakfast 60 tablet 2    insulin aspart (NOVOLOG FLEXPEN) 100 UNIT/ML injection pen 18-24 units AC TID 10 pen 3    FreeStyle Lancets MISC USE AS DIRECTED 100 each 3    omeprazole (PRILOSEC) 20 MG delayed release capsule TAKE 1 CAPSULE BY MOUTH EVERY DAY      Cholecalciferol (VITAMIN D PO) Take 1 tablet by mouth every 7 days Pt to clarify dose      furosemide (LASIX) 20 MG tablet Take 40 mg by mouth daily       Fluticasone Propionate (FLONASE mouth daily       Fluticasone Propionate (FLONASE NA) 1 spray by Nasal route daily Each nostril      SALINE MIST SPRAY NA 1 spray by Nasal route 3 times daily as needed Each nostril 2-3 times per day      Insulin Pen Needle (PEN NEEDLES) 31G X 6 MM MISC 1 each by In Vitro route 4 times daily 200 each 3    Blood Glucose Monitoring Suppl (ONE TOUCH ULTRA 2) w/Device KIT 1 kit by Does not apply route daily 1 kit 0    Lancets MISC 1 each by Does not apply route 3 times daily 100 each 3    Blood Glucose Monitoring Suppl (FREESTYLE LITE) RO As needed 1 Device 3    MELATONIN PO Take 1 tablet by mouth nightly       loratadine (CLARITIN) 10 MG tablet Take 10 mg by mouth daily       lisinopril (PRINIVIL;ZESTRIL) 5 MG tablet Take 20 mg by mouth daily       atorvastatin (LIPITOR) 80 MG tablet Take 80 mg by mouth daily.  gabapentin (NEURONTIN) 300 MG capsule Take one tablet po in the morning and two tablets po in the evening 90 capsule 0     No current facility-administered medications for this visit. I will continue his current medication regimen  which is part of the above treatment schedule. It has been helping with Mr. Tello Kebede chronic  medical problems which for this visit include:   Diagnoses of Chronic pain syndrome, DDD (degenerative disc disease), lumbar, Lumbar radiculopathy, Primary osteoarthritis of left hip, and Myofascial pain were pertinent to this visit. Risks and benefits of the medications and other alternative treatments  including no treatment were discussed with the patient. The common side effects of these medications were also explained to the patient. Informed verbal consent was obtained. Goals of current treatment regimen include improvement in pain, restoration of functioning- with focus on improvement in physical performance, general activity, work or disability,emotional distress, health care utilization and  decreased medication consumption.  Will continue to monitor progress towards achieving/maintaining therapeutic goals with special emphasis on  1. Improvement in perceived interfernce  of pain with ADL's. Ability to do home exercises independently. Ability to do household chores indoor and/or outdoor work and social and leisure activities. Improve psychosocial and physical functioning. - he is showing progression towards this treatment goal with the current regimen. He was advised against drinking alcohol with the narcotic pain medicines, advised against driving or handling machinery while adjusting the dose of medicines or if having cognitive  issues related to the current medications. Risk of overdose and death, if medicines not taken as prescribed, were also discussed. If the patient develops new symptoms or if the symptoms worsen, the patient should call the office. While transcribing every attempt was made to maintain the accuracy of the note in terms of it's contents,there may have been some errors made inadvertently. Thank you for allowing me to participate in the care of this patient.     Ethan Snow MD.    Cc: Henrique Ace MD

## 2020-07-01 ENCOUNTER — HOSPITAL ENCOUNTER (OUTPATIENT)
Dept: PHYSICAL THERAPY | Age: 60
Setting detail: THERAPIES SERIES
Discharge: HOME | End: 2020-07-01
Payer: COMMERCIAL

## 2020-07-01 PROCEDURE — 97110 THERAPEUTIC EXERCISES: CPT

## 2020-07-01 NOTE — FLOWSHEET NOTE
[facility] Physical Therapy  Phone: [phone]   Fax: [phone]    Physical Therapy Daily Treatment Note  Date:  2020    Patient Name:  Chiara Lemos    :  1960  MRN: [id number]  Medical/Treatment Diagnosis Information:  · Diagnosis: G89.4 (ICD-10-CM) - Chronic pain syndrome;   M51.36 (ICD-10-CM) - DDD (degenerative disc disease), lumbar;    M54.16 (ICD-10-CM) - Lumbar radiculopathy;    M16.12 (ICD-10-CM) - Primary osteoarthritis of left hip  · Treatment Diagnosis: Decreased B hip and core stability, Decreased B hamstring & piriformis length, Forward rounded shoulder posture  Insurance/Certification information:  PT Insurance Information: [facility]  Physician Information:  Referring Practitioner: Katelynn Underwood MD  Plan of care signed (Y/N): []  Yes [x]  No     Date of Patient follow up with Physician:      Progress Report: []  Yes  [x]  No     Date Range for reporting period:  Beginnin/3/2020  Ending    Progress report due (10 Rx/or 30 days whichever is less):      Recertification due (POC duration/ or 90 days whichever is less):     Visit # Insurance Allowable Auth required? Date Range    30 - used 16 prior to COVID-19, 14 remains for this EOC []  Yes  [x]  No pcy     Latex Allergy:  [x]NO      []YES  Preferred Language for Healthcare:   [x]English       []other:    Functional Scale:          Date assessed:     Oswestry: raw score = 24; dysfunction = 53.33%  6/3/2020                                      LEFS: raw score = 24; dysfunction = 70%    6/3/2020    Pain level:  8-9/10 L posterolateral hip    SUBJECTIVE:    Reporting increased L hip pain today and has been present since Monday. Can't think of anything that he did over the weekend to aggravate his hip. Pain medication is working so far today, but medication did little to help the last 2 days.   Felt okay following the pool, was hopeful to try the hot tub after treatment to help manage pain but has an activity to get to afterwards. OBJECTIVE: See eval      RESTRICTIONS/PRECAUTIONS: CHF, HTN, DM type II; Allergic to Tramadol    Land Based Treatment Dates:    6/3, 6/10, 6/24, 7/1    Exercises/Interventions:   Therapeutic Exercises (57502) Resistance / level Sets/sec Reps Notes   Bike 1.0 7 mins     IB  HR  HS stretch at step  2x30\"  2     15    Total Gym - squats    B knee joint pain developed at end of set   Swiss ball pelvic tilts  · A/P, M/L, CW & CCW circles  · Marches  · Alt shlder flex     15 ea  15  15    Cable Column  · 3-way hip      · Mid row  · High row  · LPD   1 pl      3.0 pl  3.0 pl  3.0 pl    10 B      15  15  15    Mat table  Hooklying  · TrA Activation  · Bridge    S/L  · Clamshells    Seated  · Piriformis stretch  · B scap retract  · B shoulder horiz abd                                 Therapeutic Activities (97762)       6/3: Discussed improving B shoulder posture with patient       Fwd step-ups  Lat step-ups 6\"  6\"                    Neuromuscular Re-ed (89250)       Kadyjuan Fleming  Blue foam:  · 3-way hip  · Marches  · SLS                         2 finger support                                      Manual Intervention (38248)       Supine stretches:  · Piriformis  · Hamstring      Hip joint long-axis distraction                                      AquaticTherapy Dates of Service:   Aquatic Visits Exercises/Activities: 6/26   94559 and / or 48432   Transfers:          % Immersion:            Ambulation:   UE Exercises:       Forwards  X 1 Shoulder Shrugs      Lateral   x 1 Shoulder Circles      Retro   x 1 Scapular Retraction       Marching   Push Downs       [place]as   Punching     Jog    Rowing     Multifidi walkouts with paddle   Elbow Flex/Ext       Shldr Flex/Ext X 10      Shldr aBd/aDd X 10    LE Exercises:  Shldr Horiz aBd/aDd X 10    HR/TR X 10  Shldr IR/ER    Marches X 10  Arm Circles X 10    Squats X 10  PNF Diagonals    Hamstring Curls X 10  Wall Push Ups    Hip Flexion (SLR) X 10   Figure 8's    Hip aBduction (SLR) X 10 Bilateral Pull Downs    Hip Extension (SLR)       Hip aDduction (SLR) X 10     Hip Circles X 10  Functional:    Hip IR/Er  Step up forward X 10    TrA Set   Step up lateral  X 10    Pelvic Tilts  10\" x 10  Step down     Fig 8's   Lunges Forward    LE PNF  Lunges Retro      Lunges Lateral     Balance:   Lower ab curl with noodle     SLS  30\" x 2      Tandem Stance 30\" x 2       NBOS eyes open  Seated:     NBOS eyes closed  Ankle pumps     Hand to Opposite Knee X 10  Ankle Circles     Fwd Step ups to SLS  Knee Flex/Ext    Lateral Step ups to SLS  Hip aBd/aDd    Stop/Go Gait   Bicycle       Ankle DF/PF      Ankle Inv/Ev    Stretching:       Gastroc/Soleus 30\" x 2     Hamstring  30\" x 2  Noodle:     Knee Flex Stretch  Leg Press    Piriformis   Noodle Hang at [facility]    Hip Flexor  Noodle Hang Deep Water X 5 mins    SKTC  Noodle Bicycle  X ~ 20 along with noodle hang    DKTC       ITB      Quad  Deep Water:    Mid Back   Jog    UT  Jumping Jacks    Post Shoulder  Heel to Toe    Ladder Pull  Hand to Opposite Knee    Pec Stretch  Rocking Horse      [employer] Skier          Cervical:   Other:     AROM Flexion      AROM Extension      AROM Side Bending      AROM Rotation      Chin Tucks      Chin Nods        Aquatic Abbreviation Key  B= Belt DB= Dumbells T= Theratube   H= Hydrotone N= Noodles W= Weights   P= Paddles S= Speedo equipment K= Kickboard       Pt. Education:  6/3:  -pt educated on diagnosis, prognosis and expectations for rehab  -all pt questions were answered  -Gave pt tour of pool, explained how to use lockers, what to wear, that it would be in group setting, and showed pt aquatic equipment. HEP instruction:   6/3:  -pt provided with written and illustrated instructions for HEP (see scanned image in )   Access Code: [email or web address]   URL: [email or web address]   Date: 06/03/2020   Prepared by: Alina Randle   · Seated Piriformis Stretch with Trunk Bend - 3 sets - 30 hold - 3x daily - 7x weekly   · Seated Scapular Retraction - 10-15 reps - 2x daily - 7x weekly   · Standing Shoulder Horizontal Abduction with Resistance - 10-15 reps - 2x daily - 7x weekly   · Supine Transversus Abdominis Bracing - Hands on Stomach - 10 reps - 2x daily - 7x weekly   · Supine Bridge - 10-15 reps - 2x daily - 7x weekly   · Clamshell - 10-15 reps - 2x daily - 7x weekly     Therapeutic Exercise and NMR EXR  [x] (91366) Provided verbal/tactile cueing for activities related to strengthening, flexibility, endurance, ROM for improvements in  [x] LE / Lumbar: LE, proximal hip, and core control with self care, mobility, lifting, ambulation. [x] UE / Cervical: cervical, postural, scapular, scapulothoracic and UE control with self care, reaching, carrying, lifting, house/yardwork, driving, computer work.  [] (82758) Provided verbal/tactile cueing for activities related to improving balance, coordination, kinesthetic sense, posture, motor skill, proprioception to assist with   [] LE / lumbar: LE, proximal hip, and core control in self care, mobility, lifting, ambulation and eccentric single leg control. [] UE / cervical: cervical, scapular, scapulothoracic and UE control with self care, reaching, carrying, lifting, house/yardwork, driving, computer work.   [] (39345) Aquatic therapy with therapeutic exercise. Provided verbal and tactile cueing for activities related to strengthening, flexibility, endurance, ROM for improvements in  [] LE / lumbar: LE, proximal hip, and core control in self care, mobility, lifting, ambulation and eccentric single leg control.    [] UE / cervical: cervical, scapular, scapulothoracic and UE control with self care, reaching, carrying, lifting, house/yardwork, driving, computer work.   [] (21022) Therapist is in constant attendance of 2 or more patients providing skilled therapy interventions, but not providing any significant amount of measurable one-on-one time to either patient, for improvements in  [] LE / lumbar: LE, proximal hip, and core control in self care, mobility, lifting, ambulation and eccentric single leg control. [] UE / cervical: cervical, scapular, scapulothoracic and UE control with self care, reaching, carrying, lifting, house/yardwork, driving, computer work.      NMR and Therapeutic Activities:    [x] (91571 or 59176) Provided verbal/tactile cueing for activities related to improving balance, coordination, kinesthetic sense, posture, motor skill, proprioception and motor activation to allow for proper function of   [x] LE: / Lumbar core, proximal hip and LE with self care and ADLs  [x] UE / Cervical: cervical, postural, scapular, scapulothoracic and UE control with self care, carrying, lifting, driving, computer work.   [] (93147) Gait Re-education- Provided training and instruction to the patient for proper LE, core and proximal hip recruitment and positioning and eccentric body weight control with ambulation re-education including up and down stairs     Home Exercise Program:    [x] (21581) Reviewed/Progressed HEP activities related to strengthening, flexibility, endurance, ROM of   [x] LE / Lumbar: core, proximal hip and LE for functional self-care, mobility, lifting and ambulation/stair navigation   [x] UE / Cervical: cervical, postural, scapular, scapulothoracic and UE control with self care, reaching, carrying, lifting, house/yardwork, driving, computer work  [] (10753)Reviewed/Progressed HEP activities related to improving balance, coordination, kinesthetic sense, posture, motor skill, proprioception of   [] LE: core, proximal hip and LE for self care, mobility, lifting, and ambulation/stair navigation    [] UE / Cervical: cervical, postural,  scapular, scapulothoracic and UE control with self care, reaching, carrying, lifting, house/yardwork, driving, computer work    Manual Treatments:  PROM / STM / Oscillations-Mobs:  G-I, II, III, IV (PA's, Inf., Post.)  [] (54941) Provided manual therapy to mobilize LE, proximal hip and/or LS spine soft tissue/joints for the purpose of modulating pain, promoting relaxation,  increasing ROM, reducing/eliminating soft tissue swelling/inflammation/restriction, improving soft tissue extensibility and allowing for proper ROM for normal function with   [] LE / lumbar: self care, mobility, lifting and ambulation. [] UE / Cervical: self care, reaching, carrying, lifting, house/yardwork, driving, computer work. Modalities:  [] (96823) Vasopneumatic compression: Utilized vasopneumatic compression to decrease edema / swelling for the purpose of improving mobility and quad tone / recruitment which will allow for increased overall function including but not limited to self-care, transfers, ambulation, and ascending / descending stairs. Modalities:    7/1:  Seated MHP to lower back x10 mins    6/24:  Seated MHP to lower back x15 mins  6/10:  Prone US to L QL, 1.0 MHz, continuous, 1.2 w/cm2 x8 mins    Charges:  Timed Code Treatment Minutes: 35   Total Treatment Minutes: 46     [] EVAL - LOW (30807)   [] EVAL - MOD (39897)  [] EVAL - HIGH (77597)  [] RE-EVAL (47900)  [x] TE (75103) x   2   [] Ionto  [] NMR (10625) x 1     [] Vaso  [] Manual (08603) x      [] Ultrasound  [] TA x 1      [] Mech Traction (64425)  [] Gait Training x     [] ES (un) (80381):   [] Aquatic therapy x 4  [] Other:   [] Group:     GOALS:   Patient stated goal: improve flexibility, manage pain  [] Progressing: [] Met: [] Not Met: [] Adjusted    Therapist goals for Patient:   Short Term Goals: To be achieved in: 2 weeks  1. Independent in HEP and progression per patient tolerance, in order to prevent re-injury. [] Progressing: [] Met: [] Not Met: [] Adjusted  2. Patient will have a decrease in pain to facilitate improvement in movement, function, and ADLs as indicated by Functional Deficits.   [] Progressing: [] Met: [] Not Met: [] Adjusted    Long Term Goals: To be achieved in: 6 weeks  1. Disability index score of 25% or less for the NIKUNJ to assist with reaching prior level of function. [] Progressing: [] Met: [] Not Met: [] Adjusted  2. Disability index score of 25% or less for the LEFS to assist with reaching prior level of function. [] Progressing: [] Met: [] Not Met: [] Adjusted  3. Patient will demonstrate increased piriformis AROM to WNL, good LS mobility, good hip ROM to allow for proper joint functioning as indicated by patients Functional Deficits. [] Progressing: [] Met: [] Not Met: [] Adjusted  4. Patient will demonstrate an increase in B hip abduction & extensor strength to 4/5 and good core activation to allow for proper functional mobility as indicated by patients Functional Deficits. [] Progressing: [] Met: [] Not Met: [] Adjusted  5. Patient will return to functional activities including sleeping & driving without increased symptoms or restriction. [] Progressing: [] Met: [] Not Met: [] Adjusted  6. Patient to improve B hamstring length to lacking 35 deg in order to improve standing and walking tolerance to >45 mins   [] Progressing: [] Met: [] Not Met: [] Adjusted    Overall Progression Towards Functional goals/ Treatment Progress Update:  [] Patient is progressing as expected towards functional goals listed. [] Progression is slowed due to complexities/Impairments listed. [] Progression has been slowed due to co-morbidities.   [x] Plan just implemented, too soon to assess goals progression <30days   [] Goals require adjustment due to lack of progress  [] Patient is not progressing as expected and requires additional follow up with physician  [] Other    Persisting Functional Limitations/Impairments:  []Sleeping [x]Sitting               [x]Standing []Transfers        [x]Walking []Kneeling               []Stairs [x]Squatting / bending   []ADLs []Reaching  []Lifting []Housework  [x]Driving []Job related tasks  []Sports/Recreation []Other:        ASSESSMENT:  Increased L lateral hip pain limited participation this date, can continue to benefit from core and hip stability activities. Treatment/Activity Tolerance:  [x] Patient able to complete tx  [] Patient limited by fatique  [] Patient limited by pain  [] Patient limited by other medical complications  [] Other:     Prognosis: [x] Good [] Fair  [] Poor    Patient Requires Follow-up: [x] Yes  [] No         Plan for next treatment session:  Hip & core stabs, LE stretches    PLAN: See eval. PT 2x / week for 6 weeks. Land based & aquatic each weekly  [x] Continue per plan of care [] Alter current plan (see comments)  [] Plan of care initiated [] Hold pending MD visit [] Discharge    Electronically signed by: Alina Nails PT    Note: If patient does not return for scheduled/ recommended follow up visits, his note will serve as a discharge from care along with most recent update on progress.

## 2020-07-08 ENCOUNTER — HOSPITAL ENCOUNTER (OUTPATIENT)
Dept: PHYSICAL THERAPY | Age: 60
Setting detail: THERAPIES SERIES
Discharge: HOME | End: 2020-07-08
Payer: COMMERCIAL

## 2020-07-08 PROCEDURE — 97112 NEUROMUSCULAR REEDUCATION: CPT

## 2020-07-08 PROCEDURE — 97110 THERAPEUTIC EXERCISES: CPT

## 2020-07-08 NOTE — FLOWSHEET NOTE
[facility]  Phone: [phone]   Fax: [phone]    Physical Therapy Daily Treatment Note  Date:  2020    Patient Name:  Belen Burciaga    :  1960  MRN: [id number]  Medical/Treatment Diagnosis Information:  · Diagnosis: G89.4 (ICD-10-CM) - Chronic pain syndrome;   M51.36 (ICD-10-CM) - DDD (degenerative disc disease), lumbar;    M54.16 (ICD-10-CM) - Lumbar radiculopathy;    M16.12 (ICD-10-CM) - Primary osteoarthritis of left hip  · Treatment Diagnosis: Decreased B hip and core stability, Decreased B hamstring & piriformis length, Forward rounded shoulder posture  Insurance/Certification information:  [place] [facility] [place]ician Information:  Referring Practitioner: Kalen Cullen MD  Plan of care signed (Y/N): []  Yes [x]  No     Date of Patient follow up with Physician:      Progress Report: [x]  Yes  []  No     Date Range for reporting period:  Beginnin/3/2020  Ending 2020    Progress report due (10 Rx/or 30 days whichever is less):      Recertification due (POC duration/ or 90 days whichever is less):  2020    Visit # Insurance Allowable Auth required? Date Range    30 - used 16 prior to COVID-19, 14 remains for this EOC []  Yes  [x]  No pcy     Latex Allergy:  [x]NO      []YES  Preferred Language for Healthcare:   [x]English       []other:    Functional Scale:          Date assessed:     Oswestry: raw score = 24; dysfunction = 53.33%  6/3/2020                                      LEFS: raw score = 24; dysfunction = 70%    6/3/2020    Oswestry raw score = 26;  Dysfunction = 52%       LEFS: raw score = 20; dysfunction = 75%        Pain level:  6/10     SUBJECTIVE:     Doing okay now, was having quite a bit of pain earlier today but took 2 Norco pain pills and is better now. Had some B shoulder blade pain. Felt okay following last session.          OBJECTIVE:   :  Seated MMT:  Hip extension 3+/5 Flex/Ext       Shldr Flex/Ext X 10      Shldr aBd/aDd X 10    LE Exercises:  Shldr Horiz aBd/aDd X 10    HR/TR X 10  Shldr IR/ER    Marches X 10  Arm Circles X 10    Squats X 10  PNF Diagonals    Hamstring Curls X 10  Wall Push Ups    Hip Flexion (SLR) X 10   Figure 8's    Hip aBduction (SLR) X 10 Bilateral Pull Downs    Hip Extension (SLR)       Hip aDduction (SLR) X 10     Hip Circles X 10  Functional:    Hip IR/Er  Step up forward X 10    TrA Set   Step up lateral  X 10    Pelvic Tilts  10\" x 10  Step down     Fig 8's   Lunges Forward    LE PNF  Lunges Retro      Lunges Lateral     Balance:   Lower ab curl with noodle     SLS  30\" x 2      Tandem Stance 30\" x 2       NBOS eyes open  Seated:     NBOS eyes closed  Ankle pumps     Hand to Opposite Knee X 10  Ankle Circles     Fwd Step ups to SLS  Knee Flex/Ext    Lateral Step ups to SLS  Hip aBd/aDd    Stop/Go Gait   Bicycle       Ankle DF/PF      Ankle Inv/Ev    Stretching:       Gastroc/Soleus 30\" x 2     Hamstring  30\" x 2  Noodle:     Knee Flex Stretch  Leg Press    Piriformis   Noodle Hang at [employer]    Hip Flexor  Noodle Hang Deep Water X 5 mins    SKTC  Noodle Bicycle  X ~ 20 along with noodle hang    DKTC       ITB      Quad  Deep Water:    Mid Back   Jog    UT  Jumping Jacks    Post Shoulder  Heel to Toe    Ladder Pull  Hand to Opposite Knee    Pec Stretch  Rocking Horse      [employer] Skier          Cervical:   Other:     AROM Flexion      AROM Extension      AROM Side Bending      AROM Rotation      Chin Tucks      Chin Nods        Aquatic Abbreviation Key  B= Belt DB= Dumbells T= Theratube   H= Hydrotone N= Noodles W= Weights   P= Paddles S= Speedo equipment K= Kickboard       Pt. Education:  6/3:  -pt educated on diagnosis, prognosis and expectations for rehab  -all pt questions were answered  -Gave pt tour of pool, explained how to use lockers, what to wear, that it would be in group setting, and showed pt aquatic equipment.     HEP instruction: 6/3:  -pt provided with written and illustrated instructions for HEP (see scanned image in )   Access Code: [email or web address]   URL: [email or web address]   Date: 06/03/2020   Prepared by: Kaleb Leone     Exercises   · Seated Piriformis Stretch with Trunk Bend - 3 sets - 30 hold - 3x daily - 7x weekly   · Seated Scapular Retraction - 10-15 reps - 2x daily - 7x weekly   · Standing Shoulder Horizontal Abduction with Resistance - 10-15 reps - 2x daily - 7x weekly   · Supine Transversus Abdominis Bracing - Hands on Stomach - 10 reps - 2x daily - 7x weekly   · Supine Bridge - 10-15 reps - 2x daily - 7x weekly   · Clamshell - 10-15 reps - 2x daily - 7x weekly     Therapeutic Exercise and NMR EXR  [x] (97719) Provided verbal/tactile cueing for activities related to strengthening, flexibility, endurance, ROM for improvements in  [x] LE / Lumbar: LE, proximal hip, and core control with self care, mobility, lifting, ambulation. [x] UE / Cervical: cervical, postural, scapular, scapulothoracic and UE control with self care, reaching, carrying, lifting, house/yardwork, driving, computer work.  [] (18305) Provided verbal/tactile cueing for activities related to improving balance, coordination, kinesthetic sense, posture, motor skill, proprioception to assist with   [] LE / lumbar: LE, proximal hip, and core control in self care, mobility, lifting, ambulation and eccentric single leg control. [] UE / cervical: cervical, scapular, scapulothoracic and UE control with self care, reaching, carrying, lifting, house/yardwork, driving, computer work.   [] (95304) Aquatic therapy with therapeutic exercise. Provided verbal and tactile cueing for activities related to strengthening, flexibility, endurance, ROM for improvements in  [] LE / lumbar: LE, proximal hip, and core control in self care, mobility, lifting, ambulation and eccentric single leg control.    [] UE / cervical: cervical, scapular, scapulothoracic and UE control with self care, reaching, carrying, lifting, house/yardwork, driving, computer work.   [] (66391) Therapist is in constant attendance of 2 or more patients providing skilled therapy interventions, but not providing any significant amount of measurable one-on-one time to either patient, for improvements in  [] LE / lumbar: LE, proximal hip, and core control in self care, mobility, lifting, ambulation and eccentric single leg control. [] UE / cervical: cervical, scapular, scapulothoracic and UE control with self care, reaching, carrying, lifting, house/yardwork, driving, computer work.      NMR and Therapeutic Activities:    [x] (47403 or 59530) Provided verbal/tactile cueing for activities related to improving balance, coordination, kinesthetic sense, posture, motor skill, proprioception and motor activation to allow for proper function of   [x] LE: / Lumbar core, proximal hip and LE with self care and ADLs  [x] UE / Cervical: cervical, postural, scapular, scapulothoracic and UE control with self care, carrying, lifting, driving, computer work.   [] (26903) Gait Re-education- Provided training and instruction to the patient for proper LE, core and proximal hip recruitment and positioning and eccentric body weight control with ambulation re-education including up and down stairs     Home Exercise Program:    [x] (88819) Reviewed/Progressed HEP activities related to strengthening, flexibility, endurance, ROM of   [x] LE / Lumbar: core, proximal hip and LE for functional self-care, mobility, lifting and ambulation/stair navigation   [x] UE / Cervical: cervical, postural, scapular, scapulothoracic and UE control with self care, reaching, carrying, lifting, house/yardwork, driving, computer work  [] (49626)Reviewed/Progressed HEP activities related to improving balance, coordination, kinesthetic sense, posture, motor skill, proprioception of   [] LE: core, proximal hip and LE for self care, mobility, lifting, and ambulation/stair navigation    [] UE / Cervical: cervical, postural,  scapular, scapulothoracic and UE control with self care, reaching, carrying, lifting, house/yardwork, driving, computer work    Manual Treatments:  PROM / STM / Oscillations-Mobs:  G-I, II, III, IV (PA's, Inf., Post.)  [] (66846) Provided manual therapy to mobilize LE, proximal hip and/or LS spine soft tissue/joints for the purpose of modulating pain, promoting relaxation,  increasing ROM, reducing/eliminating soft tissue swelling/inflammation/restriction, improving soft tissue extensibility and allowing for proper ROM for normal function with   [] LE / lumbar: self care, mobility, lifting and ambulation. [] UE / Cervical: self care, reaching, carrying, lifting, house/yardwork, driving, computer work. Modalities:  [] (33780) Vasopneumatic compression: Utilized vasopneumatic compression to decrease edema / swelling for the purpose of improving mobility and quad tone / recruitment which will allow for increased overall function including but not limited to self-care, transfers, ambulation, and ascending / descending stairs. Modalities:    7/8:  Seated MHP to lower back x15 mins    7/1:  Seated MHP to lower back x10 mins  6/24:  Seated MHP to lower back x15 mins  6/10:  Prone US to L QL, 1.0 MHz, continuous, 1.2 w/cm2 x8 mins    Charges:  Timed Code Treatment Minutes: 40   Total Treatment Minutes: 55     [] EVAL - LOW (29806)   [] EVAL - MOD (95604)  [] EVAL - HIGH (15195)  [] RE-EVAL (51350)  [x] TE (31004) x   2   [] Ionto  [x] NMR (92527) x 1     [] Vaso  [] Manual (75709) x      [] Ultrasound  [] TA x 1      [] Mech Traction (16356)  [] Gait Training x     [] ES (un) ([id number]):   [] Aquatic therapy x 4  [] Other:   [] Group:     GOALS:   Patient stated goal: improve flexibility, manage pain  [] Progressing: [] Met: [x] Not Met: [] Adjusted    Therapist goals for Patient:   Short Term Goals: To be achieved in: 2 weeks  1. and requires additional follow up with physician  [] Other    Persisting Functional Limitations/Impairments:  []Sleeping [x]Sitting               [x]Standing []Transfers        [x]Walking []Kneeling               []Stairs [x]Squatting / bending   []ADLs []Reaching  []Lifting  []Housework  [x]Driving []Job related tasks  []Sports/Recreation []Other:        ASSESSMENT:     Good tolerance to activity this date with manage pain. Overall, pain levels remain unchanged w/PT but is controlled by pain medications; pt's strength and balance are slowly improving. Treatment/Activity Tolerance:  [x] Patient able to complete tx  [] Patient limited by fatique  [] Patient limited by pain  [] Patient limited by other medical complications  [] Other:     Prognosis: [x] Good [] Fair  [] Poor    Patient Requires Follow-up: [x] Yes  [] No         Plan for next treatment session:  Hip & core stabs, LE stretches    PLAN: See eval. PT 2x / week for 6 weeks. Land based & aquatic each weekly  [x] Continue per plan of care [] Alter current plan (see comments)  [] Plan of care initiated [] Hold pending MD visit [] Discharge    Electronically signed by: Micheal Mariscal PT    Note: If patient does not return for scheduled/ recommended follow up visits, his note will serve as a discharge from care along with most recent update on progress.

## 2020-07-10 ENCOUNTER — HOSPITAL ENCOUNTER (OUTPATIENT)
Dept: PHYSICAL THERAPY | Age: 60
Setting detail: THERAPIES SERIES
Discharge: HOME | End: 2020-07-10
Payer: COMMERCIAL

## 2020-07-10 PROCEDURE — 97113 AQUATIC THERAPY/EXERCISES: CPT

## 2020-07-10 PROCEDURE — 97150 GROUP THERAPEUTIC PROCEDURES: CPT

## 2020-07-10 NOTE — FLOWSHEET NOTE
[address] [facility] Physical Therapy  Phone: [phone]   Fax: [phone]    Physical Therapy Daily Treatment Note  Date:  7/10/2020    Patient Name:  Vanessa Diaz    :  1960  MRN: [id number]  Medical/Treatment Diagnosis Information:  · Diagnosis: G89.4 (ICD-10-CM) - Chronic pain syndrome;   M51.36 (ICD-10-CM) - DDD (degenerative disc disease), lumbar;    M54.16 (ICD-10-CM) - Lumbar radiculopathy;    M16.12 (ICD-10-CM) - Primary osteoarthritis of left hip  · Treatment Diagnosis: Decreased B hip and core stability, Decreased B hamstring & piriformis length, Forward rounded shoulder posture  Insurance/Certification information:  PT Insurance Information: [facility]  Physician Information:  Referring Practitioner: Brenda Cabral MD  Plan of care signed (Y/N): []  Yes [x]  No     Date of Patient follow up with Physician:      Progress Report: []  Yes  [x]  No     Date Range for reporting period:  Beginnin/3/2020  Ending 2020    Progress report due (10 Rx/or 30 days whichever is less):      Recertification due (POC duration/ or 90 days whichever is less):  2020    Visit # Insurance Allowable Auth required? Date Range    30 - used 16 prior to COVID-19, 14 remains for this EOC []  Yes  [x]  No pcy     Latex Allergy:  [x]NO      []YES  Preferred Language for Healthcare:   [x]English       []other:    Functional Scale:          Date assessed:     Oswestry: raw score = 24; dysfunction = 53.33%  6/3/2020                                      LEFS: raw score = 24; dysfunction = 70%    6/3/2020    Oswestry raw score = 26;  Dysfunction = 52%       LEFS: raw score = 20; dysfunction = 75%        Pain level:  6/10     SUBJECTIVE:     Doing okay. He states he  took 2 Norco pain pills and is better now. Felt okay following last session.          OBJECTIVE:   :  Seated MMT:  Hip extension 3+/5 R, 3+/5 L     Hip abduction 4-/5 R, 4-/5 L   Piriformis length:  L LE Exercises:  Shldr Horiz aBd/aDd X 12   HR/TR X 12 Shldr IR/ER    Marches X 12  Arm Circles X 12   Squats X 12  PNF Diagonals    Hamstring Curls X 12  Wall Push Ups    Hip Flexion (SLR) X 12   Figure 8's    Hip aBduction (SLR) X 12 Bilateral Pull Downs    Hip Extension (SLR) X12      Hip aDduction (SLR) X 12     Hip Circles X 12  Functional:    Hip IR/Er  Step up forward X 12   TrA Set   Step up lateral  X 12    Pelvic Tilts  10\" x 12 Step down     Fig 8's   Lunges Forward    LE PNF  Lunges Retro      Lunges Lateral     Balance:   Lower ab curl with noodle     SLS  30\" x 2      Tandem Stance 30\" x 2       NBOS eyes open  Seated:     NBOS eyes closed  Ankle pumps     Hand to Opposite Knee X 10  Ankle Circles     Fwd Step ups to SLS  Knee Flex/Ext    Lateral Step ups to SLS  Hip aBd/aDd    Stop/Go Gait   Bicycle       Ankle DF/PF      Ankle Inv/Ev    Stretching:       Gastroc/Soleus 30\" x 2     Hamstring  30\" x 2  Noodle:     Knee Flex Stretch  Leg Press    Piriformis   Noodle Hang at [employer]    Hip Flexor  Noodle Hang Deep Water X 5 mins    SKTC  Noodle Bicycle  X ~ 20 along with noodle hang    DKTC       ITB      Quad  Deep Water:    Mid Back   Jog    UT  Jumping Jacks    Post Shoulder  Heel to Toe    Ladder Pull  Hand to Opposite Knee    Pec Stretch  Rocking Horse      [employer] Skier          Cervical:   Other:     AROM Flexion      AROM Extension      AROM Side Bending      AROM Rotation      Chin Tucks      Chin Nods        Aquatic Abbreviation Key  B= Belt DB= Dumbells T= Theratube   H= Hydrotone N= Noodles W= Weights   P= Paddles S= Speedo equipment K= Kickboard       Pt. Education:  6/3:  -pt educated on diagnosis, prognosis and expectations for rehab  -all pt questions were answered  -Gave pt tour of pool, explained how to use lockers, what to wear, that it would be in group setting, and showed pt aquatic equipment.     HEP instruction:   6/3:  -pt provided with written and illustrated instructions for HEP (see scanned image in )   Access Code: [email or web address]   URL: [email or web address]   Date: 06/03/2020   Prepared by: Dena Krueger     Exercises   · Seated Piriformis Stretch with Trunk Bend - 3 sets - 30 hold - 3x daily - 7x weekly   · Seated Scapular Retraction - 10-15 reps - 2x daily - 7x weekly   · Standing Shoulder Horizontal Abduction with Resistance - 10-15 reps - 2x daily - 7x weekly   · Supine Transversus Abdominis Bracing - Hands on Stomach - 10 reps - 2x daily - 7x weekly   · Supine Bridge - 10-15 reps - 2x daily - 7x weekly   · Clamshell - 10-15 reps - 2x daily - 7x weekly     Therapeutic Exercise and NMR EXR  [x] (17943) Provided verbal/tactile cueing for activities related to strengthening, flexibility, endurance, ROM for improvements in  [x] LE / Lumbar: LE, proximal hip, and core control with self care, mobility, lifting, ambulation. [x] UE / Cervical: cervical, postural, scapular, scapulothoracic and UE control with self care, reaching, carrying, lifting, house/yardwork, driving, computer work.  [] (35972) Provided verbal/tactile cueing for activities related to improving balance, coordination, kinesthetic sense, posture, motor skill, proprioception to assist with   [] LE / lumbar: LE, proximal hip, and core control in self care, mobility, lifting, ambulation and eccentric single leg control. [] UE / cervical: cervical, scapular, scapulothoracic and UE control with self care, reaching, carrying, lifting, house/yardwork, driving, computer work.   [] (91911) Aquatic therapy with therapeutic exercise. Provided verbal and tactile cueing for activities related to strengthening, flexibility, endurance, ROM for improvements in  [] LE / lumbar: LE, proximal hip, and core control in self care, mobility, lifting, ambulation and eccentric single leg control.    [] UE / cervical: cervical, scapular, scapulothoracic and UE control with self care, reaching, carrying, lifting, house/yardwork, driving, computer work.   [] (59143) Therapist is in constant attendance of 2 or more patients providing skilled therapy interventions, but not providing any significant amount of measurable one-on-one time to either patient, for improvements in  [] LE / lumbar: LE, proximal hip, and core control in self care, mobility, lifting, ambulation and eccentric single leg control. [] UE / cervical: cervical, scapular, scapulothoracic and UE control with self care, reaching, carrying, lifting, house/yardwork, driving, computer work.      NMR and Therapeutic Activities:    [x] (57804 or 18285) Provided verbal/tactile cueing for activities related to improving balance, coordination, kinesthetic sense, posture, motor skill, proprioception and motor activation to allow for proper function of   [x] LE: / Lumbar core, proximal hip and LE with self care and ADLs  [x] UE / Cervical: cervical, postural, scapular, scapulothoracic and UE control with self care, carrying, lifting, driving, computer work.   [] (73123) Gait Re-education- Provided training and instruction to the patient for proper LE, core and proximal hip recruitment and positioning and eccentric body weight control with ambulation re-education including up and down stairs     Home Exercise Program:    [x] (74041) Reviewed/Progressed HEP activities related to strengthening, flexibility, endurance, ROM of   [x] LE / Lumbar: core, proximal hip and LE for functional self-care, mobility, lifting and ambulation/stair navigation   [x] UE / Cervical: cervical, postural, scapular, scapulothoracic and UE control with self care, reaching, carrying, lifting, house/yardwork, driving, computer work  [] (68460)Reviewed/Progressed HEP activities related to improving balance, coordination, kinesthetic sense, posture, motor skill, proprioception of   [] LE: core, proximal hip and LE for self care, mobility, lifting, and ambulation/stair navigation    [] UE / Cervical: cervical, postural,  scapular, scapulothoracic and UE control with self care, reaching, carrying, lifting, house/yardwork, driving, computer work    Manual Treatments:  PROM / STM / Oscillations-Mobs:  G-I, II, III, IV (PA's, Inf., Post.)  [] (35857) Provided manual therapy to mobilize LE, proximal hip and/or LS spine soft tissue/joints for the purpose of modulating pain, promoting relaxation,  increasing ROM, reducing/eliminating soft tissue swelling/inflammation/restriction, improving soft tissue extensibility and allowing for proper ROM for normal function with   [] LE / lumbar: self care, mobility, lifting and ambulation. [] UE / Cervical: self care, reaching, carrying, lifting, house/yardwork, driving, computer work. Modalities:  [] (50860) Vasopneumatic compression: Utilized vasopneumatic compression to decrease edema / swelling for the purpose of improving mobility and quad tone / recruitment which will allow for increased overall function including but not limited to self-care, transfers, ambulation, and ascending / descending stairs. Modalities:    7/8:  Seated MHP to lower back x15 mins    7/1:  Seated MHP to lower back x10 mins  6/24:  Seated MHP to lower back x15 mins  6/10:  Prone US to L QL, 1.0 MHz, continuous, 1.2 w/cm2 x8 mins    Charges:  Timed Code Treatment Minutes: 35   Total Treatment Minutes: 35     [] EVAL - LOW (32743)   [] EVAL - MOD (20045)  [] EVAL - HIGH (98552)  [] RE-EVAL (63128)  [] TE (39855) x   2   [] Ionto  [] NMR (20796) x 1     [] Vaso  [] Manual (11833) x      [] Ultrasound  [] TA x 1      [] Mech Traction (49277)  [] Gait Training x     [] ES ([phone]):   [x] Aquatic therapy x 1  [] Other:   [x] Group: 1  GOALS:   Patient stated goal: improve flexibility, manage pain  [] Progressing: [] Met: [x] Not Met: [] Adjusted    Therapist goals for Patient:   Short Term Goals: To be achieved in: 2 weeks  1.  Independent in HEP and progression per patient tolerance, in order to prevent re-injury. [] Progressing: [x] Met: [] Not Met: [] Adjusted  2. Patient will have a decrease in pain to facilitate improvement in movement, function, and ADLs as indicated by Functional Deficits. [] Progressing: [] Met: [x] Not Met: [] Adjusted    Long Term Goals: To be achieved in: 6 weeks  1. Disability index score of 25% or less for the NIKUNJ to assist with reaching prior level of function. [] Progressing: [] Met: [x] Not Met: [] Adjusted  2. Disability index score of 25% or less for the LEFS to assist with reaching prior level of function. [] Progressing: [] Met: [x] Not Met: [] Adjusted  3. Patient will demonstrate increased piriformis AROM to WNL, good LS mobility, good hip ROM to allow for proper joint functioning as indicated by patients Functional Deficits. [x] Progressing: [] Met: [] Not Met: [] Adjusted  4. Patient will demonstrate an increase in B hip abduction & extensor strength to 4/5 and good core activation to allow for proper functional mobility as indicated by patients Functional Deficits. [x] Progressing: [] Met: [] Not Met: [] Adjusted  5. Patient will return to functional activities including sleeping & driving without increased symptoms or restriction. [x] Progressing: [] Met: [] Not Met: [] Adjusted  6. Patient to improve B hamstring length to lacking 35 deg in order to improve standing and walking tolerance to >45 mins   [] Progressing: [] Met: [x] Not Met: [] Adjusted    Overall Progression Towards Functional goals/ Treatment Progress Update:  [] Patient is progressing as expected towards functional goals listed. [x] Progression is slowed due to complexities/Impairments listed. [] Progression has been slowed due to co-morbidities.   [] Plan just implemented, too soon to assess goals progression <30days   [] Goals require adjustment due to lack of progress  [] Patient is not progressing as expected and requires additional follow up with physician  [] Other    Persisting Functional Limitations/Impairments:  []Sleeping [x]Sitting               [x]Standing []Transfers        [x]Walking []Kneeling               []Stairs [x]Squatting / bending   []ADLs []Reaching  []Lifting  []Housework  [x]Driving []Job related tasks  []Sports/Recreation []Other:        ASSESSMENT:     Good tolerance to activity this date with increased repetitions performed this date. Overall, pain levels continue  to remain unchanged but is controlled by pain medications. Patient feels that aquatic PT seems the best way for him to improve core strength without increasing pain levels when exercising. Treatment/Activity Tolerance:  [x] Patient able to complete tx  [] Patient limited by fatique  [] Patient limited by pain  [] Patient limited by other medical complications  [] Other:     Prognosis: [x] Good [] Fair  [] Poor    Patient Requires Follow-up: [x] Yes  [] No         Plan for next treatment session:  Hip & core stabs, LE stretches    PLAN: See eval. PT 2x / week for 6 weeks. Land based & aquatic each weekly  [x] Continue per plan of care [] Alter current plan (see comments)  [] Plan of care initiated [] Hold pending MD visit [] Discharge    Electronically signed by: Gila Taylor PT    Note: If patient does not return for scheduled/ recommended follow up visits, his note will serve as a discharge from care along with most recent update on progress.

## 2020-07-15 ENCOUNTER — HOSPITAL ENCOUNTER (OUTPATIENT)
Dept: PHYSICAL THERAPY | Age: 60
Setting detail: THERAPIES SERIES
Discharge: HOME | End: 2020-07-15
Payer: COMMERCIAL

## 2020-07-15 PROCEDURE — 97110 THERAPEUTIC EXERCISES: CPT

## 2020-07-15 PROCEDURE — 97112 NEUROMUSCULAR REEDUCATION: CPT

## 2020-07-15 NOTE — FLOWSHEET NOTE
maximally impaired      RESTRICTIONS/PRECAUTIONS: CHF, HTN, DM type II; Allergic to Tramadol    Land Based Treatment Dates:    6/3, 6/10, 6/24, 7/1, 7/8, 7/15    Exercises/Interventions:   Therapeutic Exercises (45007) Resistance / level Sets/sec Reps Notes   Bike 1.0 6 mins     IB  HR  HS stretch at step  2x30\"  2     15    Total Gym - squats    B knee joint pain developed at end of set   Swiss ball pelvic tilts  · A/P, M/L, CW & CCW circles  · Marches  · Alt shlder flex         Cable Column  · 3-way hip  · Side steps w/bar      Tandem stance:  · Mid row  · High row  · LPD   1.5 pl  2.5 pl        3.5 pl  3.5 pl  3.5 pl           2  2  2     3 ea way        15  15  15    Mat table  Hooklying  · TrA Activation  · Bridge    S/L  · Clamshells    Seated  · Piriformis stretch  · B scap retract  · B shoulder horiz abd                                 Therapeutic Activities (01521)       6/3: Discussed improving B shoulder posture with patient       Fwd step-ups  Lat step-ups 6\"  6\"                    Neuromuscular Re-ed (53403)       //bars  Blue foam:  · 3-way hip  · Marches  · SLS    BOSU  · Fwd step-ups to SLS  · Marches  · Hip abd  · SLS  · Squats                Blue  Blue  Blue  Blue  Black                2\"      30\"x2 B             12 B  10  10 B    15          2 finger support            Frequent use of hand for balance  Mod cues for improved technique   Shuttle balance  · Fwd step ups  · Rhomberg stance      2x30\"   10 B                                Manual Intervention (81253)       Supine stretches:  · Piriformis  · Hamstring      Hip joint long-axis distraction                                      AquaticTherapy Dates of Service:   Aquatic Visits Exercises/Activities: 6/26, 7/10  82634 and / or 59492   Transfers:          % Immersion:            Ambulation:   UE Exercises:       Forwards  X 2 Shoulder Shrugs      Lateral   x 2 Shoulder Circles      Retro   x 2 Scapular Retraction       Marching   Push Marcia Weiss Cariocas   Punching     Jog    Rowing     Multifidi walkouts with paddle   Elbow Flex/Ext       Shldr Flex/Ext X 12      Shldr aBd/aDd X 12   LE Exercises:  Shldr Horiz aBd/aDd X 12   HR/TR X 12 Shldr IR/ER    Marches X 12  Arm Circles X 12   Squats X 12  PNF Diagonals    Hamstring Curls X 12  Wall Push Ups    Hip Flexion (SLR) X 12   Figure 8's    Hip aBduction (SLR) X 12 Bilateral Pull Downs    Hip Extension (SLR) X12      Hip aDduction (SLR) X 12     Hip Circles X 12  Functional:    Hip IR/Er  Step up forward X 12   TrA Set   Step up lateral  X 12    Pelvic Tilts  10\" x 12 Step down     Fig 8's   Lunges Forward    LE PNF  Lunges Retro      Lunges Lateral     Balance:   Lower ab curl with noodle     SLS  30\" x 2      Tandem Stance 30\" x 2       NBOS eyes open  Seated:     NBOS eyes closed  Ankle pumps     Hand to Opposite Knee X 10  Ankle Circles     Fwd Step ups to SLS  Knee Flex/Ext    Lateral Step ups to SLS  Hip aBd/aDd    Stop/Go Gait   Bicycle       Ankle DF/PF      Ankle Inv/Ev    Stretching:       Gastroc/Soleus 30\" x 2     Hamstring  30\" x 2  Noodle:     Knee Flex Stretch  Leg Press    Piriformis   Noodle Hang at [facility] [employer]    Hip Flexor  Noodle Hang Deep Water X 5 mins    SKTC  Noodle Bicycle  X ~ 20 along with noodle hang    DKTC       ITB      Quad  Deep Water:    Mid Back   Jog    UT  Jumping Jacks    Post Shoulder  Heel to Toe    Ladder Pull  Hand to Opposite Knee    Pec Stretch  Rocking Horse      [employer] Skier          Cervical:   Other:     AROM Flexion      AROM Extension      AROM Side Bending      AROM Rotation      Chin Tucks      Chin Nods        Aquatic Abbreviation Key  B= Belt DB= Dumbells T= Theratube   H= Hydrotone N= Noodles W= Weights   P= Paddles S= Speedo equipment K= Kickboard       Pt.  Education:  6/3:  -pt educated on diagnosis, prognosis and expectations for rehab  -all pt questions were answered  -Gave pt tour of pool, explained how to use lockers, what to wear, that it would be in group setting, and showed pt aquatic equipment. HEP instruction:   6/3:  -pt provided with written and illustrated instructions for HEP (see scanned image in )   Access Code: [email or web address]   URL: [email or web address]   Date: 06/03/2020   Prepared by: Leidy Goyal     Exercises   · Seated Piriformis Stretch with Trunk Bend - 3 sets - 30 hold - 3x daily - 7x weekly   · Seated Scapular Retraction - 10-15 reps - 2x daily - 7x weekly   · Standing Shoulder Horizontal Abduction with Resistance - 10-15 reps - 2x daily - 7x weekly   · Supine Transversus Abdominis Bracing - Hands on Stomach - 10 reps - 2x daily - 7x weekly   · Supine Bridge - 10-15 reps - 2x daily - 7x weekly   · Clamshell - 10-15 reps - 2x daily - 7x weekly     Therapeutic Exercise and NMR EXR  [x] (46230) Provided verbal/tactile cueing for activities related to strengthening, flexibility, endurance, ROM for improvements in  [x] LE / Lumbar: LE, proximal hip, and core control with self care, mobility, lifting, ambulation. [x] UE / Cervical: cervical, postural, scapular, scapulothoracic and UE control with self care, reaching, carrying, lifting, house/yardwork, driving, computer work.  [] (26621) Provided verbal/tactile cueing for activities related to improving balance, coordination, kinesthetic sense, posture, motor skill, proprioception to assist with   [] LE / lumbar: LE, proximal hip, and core control in self care, mobility, lifting, ambulation and eccentric single leg control. [] UE / cervical: cervical, scapular, scapulothoracic and UE control with self care, reaching, carrying, lifting, house/yardwork, driving, computer work.   [] (52899) Aquatic therapy with therapeutic exercise.  Provided verbal and tactile cueing for activities related to strengthening, flexibility, endurance, ROM for improvements in  [] LE / lumbar: LE, proximal hip, and core control in self care, mobility, lifting, ambulation and eccentric single leg control. [] UE / cervical: cervical, scapular, scapulothoracic and UE control with self care, reaching, carrying, lifting, house/yardwork, driving, computer work.   [] (45833) Therapist is in constant attendance of 2 or more patients providing skilled therapy interventions, but not providing any significant amount of measurable one-on-one time to either patient, for improvements in  [] LE / lumbar: LE, proximal hip, and core control in self care, mobility, lifting, ambulation and eccentric single leg control. [] UE / cervical: cervical, scapular, scapulothoracic and UE control with self care, reaching, carrying, lifting, house/yardwork, driving, computer work.      NMR and Therapeutic Activities:    [x] (83432 or 53491) Provided verbal/tactile cueing for activities related to improving balance, coordination, kinesthetic sense, posture, motor skill, proprioception and motor activation to allow for proper function of   [x] LE: / Lumbar core, proximal hip and LE with self care and ADLs  [x] UE / Cervical: cervical, postural, scapular, scapulothoracic and UE control with self care, carrying, lifting, driving, computer work.   [] (75753) Gait Re-education- Provided training and instruction to the patient for proper LE, core and proximal hip recruitment and positioning and eccentric body weight control with ambulation re-education including up and down stairs     Home Exercise Program:    [x] (66324) Reviewed/Progressed HEP activities related to strengthening, flexibility, endurance, ROM of   [x] LE / Lumbar: core, proximal hip and LE for functional self-care, mobility, lifting and ambulation/stair navigation   [x] UE / Cervical: cervical, postural, scapular, scapulothoracic and UE control with self care, reaching, carrying, lifting, house/yardwork, driving, computer work  [] (92185)Reviewed/Progressed HEP activities related to improving balance, coordination, kinesthetic sense, posture, motor skill, proprioception of   [] LE: core, proximal hip and LE for self care, mobility, lifting, and ambulation/stair navigation    [] UE / Cervical: cervical, postural,  scapular, scapulothoracic and UE control with self care, reaching, carrying, lifting, house/yardwork, driving, computer work    Manual Treatments:  PROM / STM / Oscillations-Mobs:  G-I, II, III, IV (PA's, Inf., Post.)  [] (64380) Provided manual therapy to mobilize LE, proximal hip and/or LS spine soft tissue/joints for the purpose of modulating pain, promoting relaxation,  increasing ROM, reducing/eliminating soft tissue swelling/inflammation/restriction, improving soft tissue extensibility and allowing for proper ROM for normal function with   [] LE / lumbar: self care, mobility, lifting and ambulation. [] UE / Cervical: self care, reaching, carrying, lifting, house/yardwork, driving, computer work. Modalities:  [] (36773) Vasopneumatic compression: Utilized vasopneumatic compression to decrease edema / swelling for the purpose of improving mobility and quad tone / recruitment which will allow for increased overall function including but not limited to self-care, transfers, ambulation, and ascending / descending stairs.        Modalities:     7/15:  Seated MHP to lower back x10 mins    7/8:  Seated MHP to lower back x15 mins  7/1:  Seated MHP to lower back x10 mins  6/24:  Seated MHP to lower back x15 mins  6/10:  Prone US to L QL, 1.0 MHz, continuous, 1.2 w/cm2 x8 mins    Charges:  Timed Code Treatment Minutes: 35   Total Treatment Minutes: 42     [] EVAL - LOW (62199)   [] EVAL - MOD (07133)  [] EVAL - HIGH (27148)  [] RE-EVAL (10361)  [x] TE (25353) x   1   [] Ionto  [x] NMR (00879) x 1     [] Vaso  [] Manual (03637) x      [] Ultrasound  [] TA x 1      [] Mech Traction (49667)  [] Gait Training x     [] ES (un) (06077):   [] Aquatic therapy x 1  [] Other:   [] Group: 1    GOALS:   Patient stated goal: improve flexibility, manage pain  [] Progressing: [] Met: [x] Not Met: [] Adjusted    Therapist goals for Patient:   Short Term Goals: To be achieved in: 2 weeks  1. Independent in HEP and progression per patient tolerance, in order to prevent re-injury. [] Progressing: [x] Met: [] Not Met: [] Adjusted  2. Patient will have a decrease in pain to facilitate improvement in movement, function, and ADLs as indicated by Functional Deficits. [] Progressing: [] Met: [x] Not Met: [] Adjusted    Long Term Goals: To be achieved in: 6 weeks  1. Disability index score of 25% or less for the NIKUNJ to assist with reaching prior level of function. [] Progressing: [] Met: [x] Not Met: [] Adjusted  2. Disability index score of 25% or less for the LEFS to assist with reaching prior level of function. [] Progressing: [] Met: [x] Not Met: [] Adjusted  3. Patient will demonstrate increased piriformis AROM to WNL, good LS mobility, good hip ROM to allow for proper joint functioning as indicated by patients Functional Deficits. [x] Progressing: [] Met: [] Not Met: [] Adjusted  4. Patient will demonstrate an increase in B hip abduction & extensor strength to 4/5 and good core activation to allow for proper functional mobility as indicated by patients Functional Deficits. [x] Progressing: [] Met: [] Not Met: [] Adjusted  5. Patient will return to functional activities including sleeping & driving without increased symptoms or restriction. [x] Progressing: [] Met: [] Not Met: [] Adjusted  6. Patient to improve B hamstring length to lacking 35 deg in order to improve standing and walking tolerance to >45 mins   [] Progressing: [] Met: [x] Not Met: [] Adjusted    Overall Progression Towards Functional goals/ Treatment Progress Update:  [] Patient is progressing as expected towards functional goals listed. [x] Progression is slowed due to complexities/Impairments listed. [] Progression has been slowed due to co-morbidities.   [] Plan just implemented, too soon to assess goals progression <30days   [] Goals require adjustment due to lack of progress  [] Patient is not progressing as expected and requires additional follow up with physician  [] Other    Persisting Functional Limitations/Impairments:  []Sleeping [x]Sitting               [x]Standing []Transfers        [x]Walking []Kneeling               []Stairs [x]Squatting / bending   []ADLs []Reaching  []Lifting  []Housework  [x]Driving []Job related tasks  []Sports/Recreation []Other:        ASSESSMENT:       Patient demo'd improved tolerance to activity, still reported fatigue during treatment but did not affect his ability to complete tasks. Treatment/Activity Tolerance:  [x] Patient able to complete tx  [] Patient limited by fatique  [] Patient limited by pain  [] Patient limited by other medical complications  [] Other:     Prognosis: [x] Good [] Fair  [] Poor    Patient Requires Follow-up: [x] Yes  [] No         Plan for next treatment session:  Hip & core stabs, LE stretches    PLAN: See eval. PT 2x / week for 6 weeks. Land based & aquatic each weekly  [x] Continue per plan of care [] Alter current plan (see comments)  [] Plan of care initiated [] Hold pending MD visit [] Discharge    Electronically signed by: Freedom Nam PT    Note: If patient does not return for scheduled/ recommended follow up visits, his note will serve as a discharge from care along with most recent update on progress.

## 2020-07-17 ENCOUNTER — APPOINTMENT (OUTPATIENT)
Dept: PHYSICAL THERAPY | Age: 60
End: 2020-07-17
Payer: COMMERCIAL

## 2020-07-22 ENCOUNTER — HOSPITAL ENCOUNTER (OUTPATIENT)
Dept: PHYSICAL THERAPY | Age: 60
Setting detail: THERAPIES SERIES
Discharge: HOME | End: 2020-07-22
Payer: COMMERCIAL

## 2020-07-22 NOTE — PROGRESS NOTES
[facility]    Physical Therapy  Cancellation/No-show Note  Patient Name:  Elvin Alvarenga  :  1960   Date:  2020    Cancelled visits to date: 2  No-shows to date: 0    For today's appointment patient:  [x]  Cancelled  ,   []  Rescheduled appointment  []  No-show     Reason given by patient:  []  Patient ill  []  Conflicting appointment  []  No transportation    []  Conflict with work  [x]  No reason given  []  Other:     Comments:      Phone call information:   []  Phone call made today to patient at _ time at number provided:      []  Patient answered, conversation as follows:    []  Patient did not answer, message left as follows:  []  Phone call not made today  [x]  Phone call not needed - pt contacted us to cancel and provided reason for cancellation.      Electronically signed by:  Erika Rosenberg PT

## 2020-07-24 ENCOUNTER — APPOINTMENT (OUTPATIENT)
Dept: PHYSICAL THERAPY | Age: 60
End: 2020-07-24
Payer: COMMERCIAL

## 2020-07-24 ENCOUNTER — VIRTUAL VISIT (OUTPATIENT)
Dept: PAIN MANAGEMENT | Age: 60
End: 2020-07-24
Payer: COMMERCIAL

## 2020-07-24 PROCEDURE — 3017F COLORECTAL CA SCREEN DOC REV: CPT | Performed by: INTERNAL MEDICINE

## 2020-07-24 PROCEDURE — 99213 OFFICE O/P EST LOW 20 MIN: CPT | Performed by: INTERNAL MEDICINE

## 2020-07-24 PROCEDURE — G8427 DOCREV CUR MEDS BY ELIG CLIN: HCPCS | Performed by: INTERNAL MEDICINE

## 2020-07-24 RX ORDER — HYDROCODONE BITARTRATE AND ACETAMINOPHEN 5; 325 MG/1; MG/1
1 TABLET ORAL EVERY 6 HOURS PRN
Qty: 84 TABLET | Refills: 0 | Status: SHIPPED | OUTPATIENT
Start: 2020-07-24 | End: 2020-08-21 | Stop reason: SDUPTHER

## 2020-07-24 RX ORDER — MELOXICAM 15 MG/1
TABLET ORAL
Qty: 30 TABLET | Refills: 0 | Status: SHIPPED | OUTPATIENT
Start: 2020-07-24 | End: 2020-08-21 | Stop reason: SDUPTHER

## 2020-07-24 RX ORDER — METHOCARBAMOL 500 MG/1
500 TABLET, FILM COATED ORAL 2 TIMES DAILY PRN
Qty: 30 TABLET | Refills: 0 | Status: SHIPPED | OUTPATIENT
Start: 2020-07-24 | End: 2020-08-21 | Stop reason: SDUPTHER

## 2020-07-24 NOTE — PROGRESS NOTES
TELE HEALTH VISIT (AUDIO-VISUAL)    Pursuant to the emergency declaration under the [facility] waiver authority and the [employer] Act, this Virtual  Visit was conducted, with patient's/legal guardian's consent, to reduce the patient's risk of exposure to COVID-19 and provide continuity of care for an established patient. Service is  provided through a video synchronous discussion virtually to substitute for in-person clinic visit. Due to this being a TeleHealth encounter (During [address] emergency), evaluation of the following organ systems was limited: Vitals/Constitutional/EENT/Resp/CV/GI//MS/Neuro/Skin/Awyw-Tocc-Nfn. Emily Levy  1960  [id number]    Mr. Kit Packer is being seen virtually for a follow up visit using one of the following techniques  Face time   Informed verbal consent to the virtual visit was obtained from Mr. Kit Packer. Risks associated with HIPPA compliance with the virtual visit was explained to the patient. Mr. Kit Packer is at his residence and Dr. Kemar Escobedo is in his office. HISTORY OF PRESENT ILLNESS:  Mr. Kit Packer is a 63 y.o. male  being assessed for a follow up visit for pain management for evaluation of ongoing care regarding his symptoms and monitoring of compliance with long term use high risk medications. He has a diagnosis of   1. Chronic pain syndrome    2. DDD (degenerative disc disease), lumbar    3. Lumbar radiculopathy    4. Primary osteoarthritis of left hip    5. Myofascial pain    6. Chronic left shoulder pain    . He complains of pain in the Low back   with radiation to the buttocks, hips Left, upper leg Left, knees Left, lower leg Left, ankles Left and feet Left . He rates the pain 7/10 and describes it as aching. Current treatment regimen has helped relieve about 50% of the pain. He denies any side effects from the current pain regimen.  Patient reports that since 3    FreeStyle Lancets MISC USE AS DIRECTED 100 each 3    omeprazole (PRILOSEC) 20 MG delayed release capsule TAKE 1 CAPSULE BY MOUTH EVERY DAY      Cholecalciferol (VITAMIN D PO) Take 1 tablet by mouth every 7 days Pt to clarify dose      furosemide (LASIX) 20 MG tablet Take 40 mg by mouth daily       Fluticasone Propionate (FLONASE NA) 1 spray by Nasal route daily Each nostril      SALINE MIST SPRAY NA 1 spray by Nasal route 3 times daily as needed Each nostril 2-3 times per day      Insulin Pen Needle (PEN NEEDLES) 31G X 6 MM MISC 1 each by In Vitro route 4 times daily 200 each 3    Blood Glucose Monitoring Suppl (ONE TOUCH ULTRA 2) w/Device KIT 1 kit by Does not apply route daily 1 kit 0    Lancets MISC 1 each by Does not apply route 3 times daily 100 each 3    Blood Glucose Monitoring Suppl (FREESTYLE LITE) RO As needed 1 Device 3    MELATONIN PO Take 1 tablet by mouth nightly       loratadine (CLARITIN) 10 MG tablet Take 10 mg by mouth daily       lisinopril (PRINIVIL;ZESTRIL) 5 MG tablet Take 20 mg by mouth daily       atorvastatin (LIPITOR) 80 MG tablet Take 80 mg by mouth daily. No facility-administered medications prior to visit. SOCIAL/FAMILY/PAST MEDICAL HISTORY: Mr. Rachelle Gibson, family and past medical history was reviewed. REVIEW OF SYSTEMS:    Respiratory: Negative for apnea, chest tightness and shortness of breath or change in baseline breathing. Gastrointestinal: Negative for nausea, vomiting, abdominal pain, diarrhea, constipation, blood in stool and abdominal distention. PHYSICAL EXAM:   Nursing note and vitals per patient reviewed. There were no vitals taken for this visit. Constitutional: He appears well-developed and well-nourished. No acute distress. No respiratory distress. Skin: Skin appears to be warm and dry. No rashes or any other marks noted. He is not diaphoretic.   Respiratory/Pulmonary: NO conversational dyspnea, no accessory muscle use, no coughing during exam. He does not appear to be in labored breathing. Neurological/Psychiatric:He is alert and oriented to person, place, and time. Coordination is  normal.  His mood isAppropriate and affect is Neutral/Euthymic(normal). Musculoskeletal / Extremities: Range of motion is normal. Gait is normal, assistive devices use: none. IMPRESSION:   1. Chronic pain syndrome    2. DDD (degenerative disc disease), lumbar    3. Lumbar radiculopathy    4. Primary osteoarthritis of left hip    5. Myofascial pain    6. Chronic left shoulder pain        PLAN:  Informed verbal consent regarding treatment was obtained  -Continue with current opioid regimen Norco 3 per day   -Monitor blood sugar regularly, diabetic control- adv diabetic diet. Goal for fasting blood sugars around 120. Follow up with Endocrinologist/PCP also for on going management    -He was advised weight reduction, diet changes- 800-1200 shad diet, diet diary, exercising, nutritional  consult increased physical activity as tolerated   -Walking/Stretching exercises as advised    Current Outpatient Medications   Medication Sig Dispense Refill    methocarbamol (ROBAXIN) 500 MG tablet Take 1 tablet by mouth 2 times daily as needed (muscle stiffness) 30 tablet 0    meloxicam (MOBIC) 15 MG tablet Take one tablet po every Monday,wednesday, and Friday 30 tablet 0    gabapentin (NEURONTIN) 300 MG capsule Take one tablet po in the morning and two tablets po in the evening 90 capsule 0    HYDROcodone-acetaminophen (NORCO) 5-325 MG per tablet Take 1 tablet by mouth every 6 hours as needed for Pain (max 2-3/day) for up to 28 days.  84 tablet 0    insulin glargine (LANTUS SOLOSTAR) 100 UNIT/ML injection pen INJECT 46 UNITS UNDER THE SKIN DAILY 5 pen 0    TRULICITY 0.66 LM/0.0ZH SOPN INJECT 0.75 MG UNDER THE SKIN ONCE WEEKLY 4 pen 1    blood glucose test strips (FREESTYLE LITE) strip USE TO TEST FOUR TIMES A  strip 0    FreeStyle Lancets MISC USE FOUR TIMES A  each 0    Insulin Pen Needle 32G X 4 MM MISC 1 each by Does not apply route 4 times daily 120 each 3    metFORMIN (GLUCOPHAGE-XR) 500 MG extended release tablet 2 tab daily with breakfast 60 tablet 2    insulin aspart (NOVOLOG FLEXPEN) 100 UNIT/ML injection pen 18-24 units AC TID 10 pen 3    FreeStyle Lancets MISC USE AS DIRECTED 100 each 3    omeprazole (PRILOSEC) 20 MG delayed release capsule TAKE 1 CAPSULE BY MOUTH EVERY DAY      Cholecalciferol (VITAMIN D PO) Take 1 tablet by mouth every 7 days Pt to clarify dose      furosemide (LASIX) 20 MG tablet Take 40 mg by mouth daily       Fluticasone Propionate (FLONASE NA) 1 spray by Nasal route daily Each nostril      SALINE MIST SPRAY NA 1 spray by Nasal route 3 times daily as needed Each nostril 2-3 times per day      Insulin Pen Needle (PEN NEEDLES) 31G X 6 MM MISC 1 each by In Vitro route 4 times daily 200 each 3    Blood Glucose Monitoring Suppl (ONE TOUCH ULTRA 2) w/Device KIT 1 kit by Does not apply route daily 1 kit 0    Lancets MISC 1 each by Does not apply route 3 times daily 100 each 3    Blood Glucose Monitoring Suppl (FREESTYLE LITE) RO As needed 1 Device 3    MELATONIN PO Take 1 tablet by mouth nightly       loratadine (CLARITIN) 10 MG tablet Take 10 mg by mouth daily       lisinopril (PRINIVIL;ZESTRIL) 5 MG tablet Take 20 mg by mouth daily       atorvastatin (LIPITOR) 80 MG tablet Take 80 mg by mouth daily. No current facility-administered medications for this visit. I will continue his current medication regimen  which is part of the above treatment schedule. It has been helping with Mr. Linda Myrick chronic  medical problems which for this visit include:   Diagnoses of Chronic pain syndrome, DDD (degenerative disc disease), lumbar, Lumbar radiculopathy, Primary osteoarthritis of left hip, Myofascial pain, and Chronic left shoulder pain were pertinent to this visit.    Risks and benefits of the medications and other alternative treatments  including no treatment were discussed with the patient. The common side effects of these medications were also explained to the patient. Informed verbal consent was obtained. Goals of current treatment regimen include improvement in pain, restoration of functioning- with focus on improvement in physical performance, general activity, work or disability,emotional distress, health care utilization and  decreased medication consumption. Will continue to monitor progress towards achieving/maintaining therapeutic goals with special emphasis on  1. Improvement in perceived interfernce  of pain with ADL's. Ability to do home exercises independently. Ability to do household chores indoor and/or outdoor work and social and leisure activities. Improve psychosocial and physical functioning. - he is showing progression towards this treatment goal with the current regimen. He was advised against drinking alcohol with the narcotic pain medicines, advised against driving or handling machinery while adjusting the dose of medicines or if having cognitive  issues related to the current medications. Risk of overdose and death, if medicines not taken as prescribed, were also discussed. If the patient develops new symptoms or if the symptoms worsen, the patient should call the office. While transcribing every attempt was made to maintain the accuracy of the note in terms of it's contents,there may have been some errors made inadvertently. Thank you for allowing me to participate in the care of this patient.     Chapis Bennett MD.    Cc: Oleg Guzman MD

## 2020-07-27 ENCOUNTER — HOSPITAL ENCOUNTER (OUTPATIENT)
Dept: PHYSICAL THERAPY | Age: 60
Setting detail: THERAPIES SERIES
Discharge: HOME | End: 2020-07-27
Payer: COMMERCIAL

## 2020-07-27 NOTE — PROGRESS NOTES
[address] [facility]    Physical Therapy  Cancellation/No-show Note  Patient Name:  Tu Bass  :  1960   Date:  2020    Cancelled visits to date: 3  No-shows to date: 0    For today's appointment patient:  [x]  Cancelled  , ,   []  Rescheduled appointment  []  No-show     Reason given by patient:  []  Patient ill  [x]  Conflicting appointment  []  No transportation    []  Conflict with work  []  No reason given  []  Other:     Comments:      Phone call information:   []  Phone call made today to patient at _ time at number provided:      []  Patient answered, conversation as follows:    []  Patient did not answer, message left as follows:  []  Phone call not made today  [x]  Phone call not needed - pt contacted us to cancel and provided reason for cancellation.      Electronically signed by:  Margaret Chamorro PT

## 2020-07-29 ENCOUNTER — HOSPITAL ENCOUNTER (OUTPATIENT)
Dept: PHYSICAL THERAPY | Age: 60
Setting detail: THERAPIES SERIES
Discharge: HOME | End: 2020-07-29
Payer: COMMERCIAL

## 2020-07-29 NOTE — PROGRESS NOTES
[address] [facility]    Physical Therapy  Cancellation/No-show Note  Patient Name:  Caleb Diaz  :  1960   Date:  2020    Cancelled visits to date: 4  No-shows to date: 0    For today's appointment patient:  [x]  Cancelled  , , ,   []  Rescheduled appointment  []  No-show     Reason given by patient:  []  Patient ill  []  Conflicting appointment  []  No transportation    []  Conflict with work  []  No reason given  [x]  Other:  Did not want to wear mask appropriately, chose to leave instead of receiving treatment    Comments:      Phone call information:   []  Phone call made today to patient at _ time at number provided:      []  Patient answered, conversation as follows:    []  Patient did not answer, message left as follows:  []  Phone call not made today  [x]  Phone call not needed -.     Electronically signed by:  Liu Love, PT

## 2020-08-21 ENCOUNTER — VIRTUAL VISIT (OUTPATIENT)
Dept: PAIN MANAGEMENT | Age: 60
End: 2020-08-21
Payer: COMMERCIAL

## 2020-08-21 PROCEDURE — G8427 DOCREV CUR MEDS BY ELIG CLIN: HCPCS | Performed by: INTERNAL MEDICINE

## 2020-08-21 PROCEDURE — 99213 OFFICE O/P EST LOW 20 MIN: CPT | Performed by: INTERNAL MEDICINE

## 2020-08-21 PROCEDURE — 3017F COLORECTAL CA SCREEN DOC REV: CPT | Performed by: INTERNAL MEDICINE

## 2020-08-21 RX ORDER — GABAPENTIN 300 MG/1
CAPSULE ORAL
Qty: 90 CAPSULE | Refills: 0 | Status: SHIPPED | OUTPATIENT
Start: 2020-08-21 | End: 2020-09-18 | Stop reason: SDUPTHER

## 2020-08-21 RX ORDER — MELOXICAM 15 MG/1
TABLET ORAL
Qty: 30 TABLET | Refills: 0 | Status: SHIPPED | OUTPATIENT
Start: 2020-08-21 | End: 2020-09-18 | Stop reason: SDUPTHER

## 2020-08-21 RX ORDER — METHOCARBAMOL 500 MG/1
500 TABLET, FILM COATED ORAL 2 TIMES DAILY PRN
Qty: 30 TABLET | Refills: 0 | Status: SHIPPED | OUTPATIENT
Start: 2020-08-21 | End: 2020-09-20

## 2020-08-21 RX ORDER — HYDROCODONE BITARTRATE AND ACETAMINOPHEN 5; 325 MG/1; MG/1
1 TABLET ORAL EVERY 6 HOURS PRN
Qty: 84 TABLET | Refills: 0 | Status: SHIPPED | OUTPATIENT
Start: 2020-08-21 | End: 2020-09-18 | Stop reason: SDUPTHER

## 2020-08-21 NOTE — PROGRESS NOTES
TELE HEALTH VISIT (AUDIO-VISUAL)    Pursuant to the emergency declaration under the [facility] waiver authority and the [employer] and [employer] Act, this Virtual  Visit was conducted, with patient's/legal guardian's consent, to reduce the patient's risk of exposure to COVID-19 and provide continuity of care for an established patient. Service is  provided through a video synchronous discussion virtually to substitute for in-person clinic visit. Due to this being a TeleHealth encounter (During TYJJJ-09 public health emergency), evaluation of the following organ systems was limited: Vitals/Constitutional/EENT/Resp/CV/GI//MS/Neuro/Skin/Msyr-Ulba-Ngr. Reji Lockhart  1960  [id number]    Mr. Sin Webb is being seen virtually for a follow up visit using one of the following techniques  Face time   Informed verbal consent to the virtual visit was obtained from Mr. Sin Webb. Risks associated with HIPPA compliance with the virtual visit was explained to the patient. Mr. Sin Webb is at his residence and Dr. Latia Durham is in his office. HISTORY OF PRESENT ILLNESS:  Mr. Sin Webb is a 63 y.o. male  being assessed for a follow up visit for pain management for evaluation of ongoing care regarding his symptoms and monitoring of compliance with long term use high risk medications. He has a diagnosis of   1. Chronic pain syndrome    2. DDD (degenerative disc disease), lumbar    3. Lumbar radiculopathy    4. Myofascial pain    . He complains of pain in the Low back  with radiation to the buttocks, hips Left, upper leg Left, knees Left, lower leg Left, ankles Left and feet Left . He rates the pain 6/10 and describes it as sharp, aching, burning. Current treatment regimen has helped relieve about 50% of the pain. He denies any side effects from the current pain regimen.  Patient reports that since the last follow up visit the physical functioning is better, family/social relationships are better, mood is better sleep patterns are better, and that the overall functioning is better. Patient denies misusing/abusing his narcotic pain medications or using any illegal drugs. There are No indicators for possible drug abuse, addiction or diversion problems.  reports he is doing fair, still working at [employer]. He mentions he is using Mobic along with Norco along with Neurontin. He denies any constipation symptoms. He says his weight has been stable. ALLERGIES: Patients list of allergies were reviewed     MEDICATIONS: Mr. Csae Zarate list of medications were reviewed. His current medications are   Outpatient Medications Prior to Visit   Medication Sig Dispense Refill    methocarbamol (ROBAXIN) 500 MG tablet Take 1 tablet by mouth 2 times daily as needed (muscle stiffness) 30 tablet 0    meloxicam (MOBIC) 15 MG tablet Take one tablet po every Monday,wednesday, and Friday 30 tablet 0    HYDROcodone-acetaminophen (NORCO) 5-325 MG per tablet Take 1 tablet by mouth every 6 hours as needed for Pain (max 2-3/day) for up to 28 days.  84 tablet 0    insulin glargine (LANTUS SOLOSTAR) 100 UNIT/ML injection pen INJECT 46 UNITS UNDER THE SKIN DAILY 5 pen 0    TRULICITY 5.29 YJ/7.1DE SOPN INJECT 0.75 MG UNDER THE SKIN ONCE WEEKLY 4 pen 1    blood glucose test strips (FREESTYLE LITE) strip USE TO TEST FOUR TIMES A  strip 0    FreeStyle Lancets MISC USE FOUR TIMES A  each 0    Insulin Pen Needle 32G X 4 MM MISC 1 each by Does not apply route 4 times daily 120 each 3    metFORMIN (GLUCOPHAGE-XR) 500 MG extended release tablet 2 tab daily with breakfast 60 tablet 2    insulin aspart (NOVOLOG FLEXPEN) 100 UNIT/ML injection pen 18-24 units AC TID 10 pen 3    FreeStyle Lancets MISC USE AS DIRECTED 100 each 3    omeprazole (PRILOSEC) 20 MG delayed release capsule TAKE 1 CAPSULE BY MOUTH EVERY DAY      Cholecalciferol (VITAMIN D PO) Take 1 tablet by mouth every 7 days Pt to clarify dose      furosemide (LASIX) 20 MG tablet Take 40 mg by mouth daily       Fluticasone Propionate (FLONASE NA) 1 spray by Nasal route daily Each nostril      SALINE MIST SPRAY NA 1 spray by Nasal route 3 times daily as needed Each nostril 2-3 times per day      Insulin Pen Needle (PEN NEEDLES) 31G X 6 MM MISC 1 each by In Vitro route 4 times daily 200 each 3    Blood Glucose Monitoring Suppl (ONE TOUCH ULTRA 2) w/Device KIT 1 kit by Does not apply route daily 1 kit 0    Lancets MISC 1 each by Does not apply route 3 times daily 100 each 3    Blood Glucose Monitoring Suppl (FREESTYLE LITE) RO As needed 1 Device 3    MELATONIN PO Take 1 tablet by mouth nightly       loratadine (CLARITIN) 10 MG tablet Take 10 mg by mouth daily       lisinopril (PRINIVIL;ZESTRIL) 5 MG tablet Take 20 mg by mouth daily       atorvastatin (LIPITOR) 80 MG tablet Take 80 mg by mouth daily.  gabapentin (NEURONTIN) 300 MG capsule Take one tablet po in the morning and two tablets po in the evening 90 capsule 0     No facility-administered medications prior to visit. SOCIAL/FAMILY/PAST MEDICAL HISTORY: Mr. Aleman Livers, family and past medical history was reviewed. REVIEW OF SYSTEMS:    Respiratory: Negative for apnea, chest tightness and shortness of breath or change in baseline breathing. Gastrointestinal: Negative for nausea, vomiting, abdominal pain, diarrhea, constipation, blood in stool and abdominal distention. PHYSICAL EXAM:   Nursing note and vitals per patient reviewed. There were no vitals taken for this visit. Constitutional: He appears well-developed and well-nourished. No acute distress. No respiratory distress. Skin: Skin appears to be warm and dry. No rashes or any other marks noted. He is not diaphoretic.   Respiratory/Pulmonary: NO conversational dyspnea, no accessory muscle use, no coughing during exam. He does not appear to be in labored breakfast 60 tablet 2    insulin aspart (NOVOLOG FLEXPEN) 100 UNIT/ML injection pen 18-24 units AC TID 10 pen 3    FreeStyle Lancets MISC USE AS DIRECTED 100 each 3    omeprazole (PRILOSEC) 20 MG delayed release capsule TAKE 1 CAPSULE BY MOUTH EVERY DAY      Cholecalciferol (VITAMIN D PO) Take 1 tablet by mouth every 7 days Pt to clarify dose      furosemide (LASIX) 20 MG tablet Take 40 mg by mouth daily       Fluticasone Propionate (FLONASE NA) 1 spray by Nasal route daily Each nostril      SALINE MIST SPRAY NA 1 spray by Nasal route 3 times daily as needed Each nostril 2-3 times per day      Insulin Pen Needle (PEN NEEDLES) 31G X 6 MM MISC 1 each by In Vitro route 4 times daily 200 each 3    Blood Glucose Monitoring Suppl (ONE TOUCH ULTRA 2) w/Device KIT 1 kit by Does not apply route daily 1 kit 0    Lancets MISC 1 each by Does not apply route 3 times daily 100 each 3    Blood Glucose Monitoring Suppl (FREESTYLE LITE) RO As needed 1 Device 3    MELATONIN PO Take 1 tablet by mouth nightly       loratadine (CLARITIN) 10 MG tablet Take 10 mg by mouth daily       lisinopril (PRINIVIL;ZESTRIL) 5 MG tablet Take 20 mg by mouth daily       atorvastatin (LIPITOR) 80 MG tablet Take 80 mg by mouth daily.  gabapentin (NEURONTIN) 300 MG capsule Take one tablet po in the morning and two tablets po in the evening 90 capsule 0     No current facility-administered medications for this visit. I will continue his current medication regimen  which is part of the above treatment schedule. It has been helping with Mr. Traci Hernandez chronic  medical problems which for this visit include:   Diagnoses of Chronic pain syndrome, DDD (degenerative disc disease), lumbar, Lumbar radiculopathy, and Myofascial pain were pertinent to this visit. Risks and benefits of the medications and other alternative treatments  including no treatment were discussed with the patient. The common side effects of these medications were also explained to the patient. Informed verbal consent was obtained. Goals of current treatment regimen include improvement in pain, restoration of functioning- with focus on improvement in physical performance, general activity, work or disability,emotional distress, health care utilization and  decreased medication consumption. Will continue to monitor progress towards achieving/maintaining therapeutic goals with special emphasis on  1. Improvement in perceived interfernce  of pain with ADL's. Ability to do home exercises independently. Ability to do household chores indoor and/or outdoor work and social and leisure activities. Improve psychosocial and physical functioning. - he is showing progression towards this treatment goal with the current regimen. He was advised against drinking alcohol with the narcotic pain medicines, advised against driving or handling machinery while adjusting the dose of medicines or if having cognitive  issues related to the current medications. Risk of overdose and death, if medicines not taken as prescribed, were also discussed. If the patient develops new symptoms or if the symptoms worsen, the patient should call the office. While transcribing every attempt was made to maintain the accuracy of the note in terms of it's contents,there may have been some errors made inadvertently. Thank you for allowing me to participate in the care of this patient.     Eddy Squires MD.    Cc: Barbara Russ MD

## 2020-09-15 RX ORDER — DULAGLUTIDE 0.75 MG/.5ML
INJECTION, SOLUTION SUBCUTANEOUS
Qty: 4 PEN | Refills: 3 | Status: SHIPPED | OUTPATIENT
Start: 2020-09-15 | End: 2020-09-18 | Stop reason: SDUPTHER

## 2020-09-18 ENCOUNTER — VIRTUAL VISIT (OUTPATIENT)
Dept: PAIN MANAGEMENT | Age: 60
End: 2020-09-18
Payer: COMMERCIAL

## 2020-09-18 ENCOUNTER — PATIENT MESSAGE (OUTPATIENT)
Dept: ENDOCRINOLOGY | Age: 60
End: 2020-09-18

## 2020-09-18 PROCEDURE — G8427 DOCREV CUR MEDS BY ELIG CLIN: HCPCS | Performed by: INTERNAL MEDICINE

## 2020-09-18 PROCEDURE — 99213 OFFICE O/P EST LOW 20 MIN: CPT | Performed by: INTERNAL MEDICINE

## 2020-09-18 PROCEDURE — 3017F COLORECTAL CA SCREEN DOC REV: CPT | Performed by: INTERNAL MEDICINE

## 2020-09-18 RX ORDER — GABAPENTIN 300 MG/1
CAPSULE ORAL
Qty: 90 CAPSULE | Refills: 0 | Status: SHIPPED | OUTPATIENT
Start: 2020-09-18 | End: 2020-10-15 | Stop reason: SDUPTHER

## 2020-09-18 RX ORDER — METHOCARBAMOL 500 MG/1
500 TABLET, FILM COATED ORAL 2 TIMES DAILY PRN
Qty: 30 TABLET | Refills: 0 | Status: SHIPPED | OUTPATIENT
Start: 2020-09-18 | End: 2020-10-15 | Stop reason: SDUPTHER

## 2020-09-18 RX ORDER — LANCETS 28 GAUGE
EACH MISCELLANEOUS
Qty: 100 EACH | Refills: 3 | Status: SHIPPED | OUTPATIENT
Start: 2020-09-18

## 2020-09-18 RX ORDER — DULAGLUTIDE 0.75 MG/.5ML
0.75 INJECTION, SOLUTION SUBCUTANEOUS WEEKLY
Qty: 4 PEN | Refills: 3 | Status: SHIPPED | OUTPATIENT
Start: 2020-09-18 | End: 2020-11-12

## 2020-09-18 RX ORDER — BLOOD-GLUCOSE METER
KIT MISCELLANEOUS
Qty: 100 STRIP | Refills: 0 | Status: SHIPPED | OUTPATIENT
Start: 2020-09-18 | End: 2020-11-12 | Stop reason: SDUPTHER

## 2020-09-18 RX ORDER — LANCETS 28 GAUGE
EACH MISCELLANEOUS
Qty: 100 EACH | Refills: 0 | Status: SHIPPED | OUTPATIENT
Start: 2020-09-18 | End: 2021-11-08 | Stop reason: SDUPTHER

## 2020-09-18 RX ORDER — METFORMIN HYDROCHLORIDE 500 MG/1
TABLET, EXTENDED RELEASE ORAL
Qty: 60 TABLET | Refills: 2 | Status: SHIPPED | OUTPATIENT
Start: 2020-09-18 | End: 2021-03-23

## 2020-09-18 RX ORDER — MELOXICAM 15 MG/1
TABLET ORAL
Qty: 30 TABLET | Refills: 0 | Status: SHIPPED | OUTPATIENT
Start: 2020-09-18 | End: 2020-10-15 | Stop reason: SDUPTHER

## 2020-09-18 RX ORDER — HYDROCODONE BITARTRATE AND ACETAMINOPHEN 5; 325 MG/1; MG/1
1 TABLET ORAL EVERY 6 HOURS PRN
Qty: 84 TABLET | Refills: 0 | Status: SHIPPED | OUTPATIENT
Start: 2020-09-18 | End: 2020-10-15 | Stop reason: SDUPTHER

## 2020-09-18 RX ORDER — INSULIN GLARGINE 100 [IU]/ML
INJECTION, SOLUTION SUBCUTANEOUS
Qty: 5 PEN | Refills: 0 | Status: SHIPPED | OUTPATIENT
Start: 2020-09-18 | End: 2021-05-28 | Stop reason: SDUPTHER

## 2020-09-18 RX ORDER — PEN NEEDLE, DIABETIC 31 G X1/4"
1 NEEDLE, DISPOSABLE MISCELLANEOUS 4 TIMES DAILY
Qty: 200 EACH | Refills: 3 | Status: SHIPPED | OUTPATIENT
Start: 2020-09-18

## 2020-09-18 RX ORDER — INSULIN ASPART 100 [IU]/ML
INJECTION, SOLUTION INTRAVENOUS; SUBCUTANEOUS
Qty: 10 PEN | Refills: 3 | Status: SHIPPED | OUTPATIENT
Start: 2020-09-18 | End: 2021-07-01 | Stop reason: SDUPTHER

## 2020-09-18 NOTE — PROGRESS NOTES
TELE HEALTH VISIT (AUDIO-VISUAL)    Pursuant to the emergency declaration under the [address] [facility], [facility] waiver authority and the [employer] and [employer] Act, this Virtual  Visit was conducted, with patient's/legal guardian's consent, to reduce the patient's risk of exposure to COVID-19 and provide continuity of care for an established patient. Service is  provided through a video synchronous discussion virtually to substitute for in-person clinic visit. Due to this being a TeleHealth encounter (During IVBPD-67 public health emergency), evaluation of the following organ systems was limited: Vitals/Constitutional/EENT/Resp/CV/GI//MS/Neuro/Skin/Uiwp-Gdaw-Aou. Emily Levy  1960  [id number]    Mr. Kit Packer is being seen virtually for a follow up visit using one of the following techniques  Face time   Informed verbal consent to the virtual visit was obtained from Mr. Kit Packer. Risks associated with HIPPA compliance with the virtual visit was explained to the patient. Mr. Kit Packer is at his residence and Dr. Kemar Escobedo is in his office. HISTORY OF PRESENT ILLNESS:  Mr. Kit Packer is a 63 y.o. male  being assessed for a follow up visit for pain management for evaluation of ongoing care regarding his symptoms and monitoring of compliance with long term use high risk medications. He has a diagnosis of   1. Chronic pain syndrome    2. DDD (degenerative disc disease), lumbar    3. Lumbar radiculopathy    4. Myofascial pain    5. Chronic left shoulder pain    6. Primary osteoarthritis of left hip    . He complains of pain in the Low back   with radiation to the buttocks, hips Left, upper leg Left, knees Left, lower leg Left, ankles Left and feet Left . He rates the pain 6/10 and describes it as sharp, aching. Current treatment regimen has helped relieve about 50% of the pain. He denies any side effects from the current pain regimen.  Patient reports that since the last follow up visit the physical functioning is unchanged, family/social relationships are worse, mood is worse sleep patterns are worse, and that the overall functioning is unchanged. Patient denies misusing/abusing his narcotic pain medications or using any illegal drugs. There are No indicators for possible drug abuse, addiction or diversion problems.  states he has been doing fair. He says he is having a lot of social stressors, has been taking care of his mother. He mention he is using Neurontin along with Mobic and Norco 3 per day. He denies any constipation symptoms. He reports he is working as a  still. ALLERGIES: Patients list of allergies were reviewed     MEDICATIONS: Mr. Librado Mcdermott list of medications were reviewed. His current medications are   Outpatient Medications Prior to Visit   Medication Sig Dispense Refill    FreeStyle Lancets MISC USE AS DIRECTED 100 each 3    insulin aspart (NOVOLOG FLEXPEN) 100 UNIT/ML injection pen 18-24 units AC TID 10 pen 3    metFORMIN (GLUCOPHAGE-XR) 500 MG extended release tablet 2 tab daily with breakfast 60 tablet 2    Insulin Pen Needle 32G X 4 MM MISC 1 each by Does not apply route 4 times daily 120 each 3    blood glucose test strips (FREESTYLE LITE) strip USE TO TEST FOUR TIMES A  strip 0    FreeStyle Lancets MISC USE FOUR TIMES A  each 0    insulin glargine (LANTUS SOLOSTAR) 100 UNIT/ML injection pen INJECT 46 UNITS UNDER THE SKIN DAILY 5 pen 0    Dulaglutide (TRULICITY) 1.48 NZ/4.5FW SOPN Inject 0.75 mg into the skin once a week 4 pen 3    Insulin Pen Needle (PEN NEEDLES) 31G X 6 MM MISC 1 each by In Vitro route 4 times daily 200 each 3    methocarbamol (ROBAXIN) 500 MG tablet Take 1 tablet by mouth 2 times daily as needed (muscle stiffness) 30 tablet 0    meloxicam (MOBIC) 15 MG tablet Take one tablet po every Monday,wednesday, and Friday 30 tablet 0    HYDROcodone-acetaminophen (NORCO) 5-325 MG per tablet Take 1 tablet by mouth every 6 hours as needed for Pain (max 2-3/day) for up to 28 days. 84 tablet 0    gabapentin (NEURONTIN) 300 MG capsule Take one tablet po in the morning and two tablets po in the evening 90 capsule 0    omeprazole (PRILOSEC) 20 MG delayed release capsule TAKE 1 CAPSULE BY MOUTH EVERY DAY      Cholecalciferol (VITAMIN D PO) Take 1 tablet by mouth every 7 days Pt to clarify dose      furosemide (LASIX) 20 MG tablet Take 40 mg by mouth daily       Fluticasone Propionate (FLONASE NA) 1 spray by Nasal route daily Each nostril      SALINE MIST SPRAY NA 1 spray by Nasal route 3 times daily as needed Each nostril 2-3 times per day      Blood Glucose Monitoring Suppl (ONE TOUCH ULTRA 2) w/Device KIT 1 kit by Does not apply route daily 1 kit 0    Lancets MISC 1 each by Does not apply route 3 times daily 100 each 3    Blood Glucose Monitoring Suppl (FREESTYLE LITE) RO As needed 1 Device 3    MELATONIN PO Take 1 tablet by mouth nightly       loratadine (CLARITIN) 10 MG tablet Take 10 mg by mouth daily       lisinopril (PRINIVIL;ZESTRIL) 5 MG tablet Take 20 mg by mouth daily       atorvastatin (LIPITOR) 80 MG tablet Take 80 mg by mouth daily. No facility-administered medications prior to visit. SOCIAL/FAMILY/PAST MEDICAL HISTORY: Mr. Aicha Gil, family and past medical history was reviewed. REVIEW OF SYSTEMS:    Respiratory: Negative for apnea, chest tightness and shortness of breath or change in baseline breathing. Gastrointestinal: Negative for nausea, vomiting, abdominal pain, diarrhea, constipation, blood in stool and abdominal distention. PHYSICAL EXAM:   Nursing note and vitals per patient reviewed. There were no vitals taken for this visit. Constitutional: He appears well-developed and well-nourished. No acute distress. No respiratory distress. Skin: Skin appears to be warm and dry. No rashes or any other marks noted.  He is not current medication regimen  which is part of the above treatment schedule. It has been helping with Mr. Urbano Ramírez chronic  medical problems which for this visit include:   Diagnoses of Chronic pain syndrome, DDD (degenerative disc disease), lumbar, Lumbar radiculopathy, Myofascial pain, Chronic left shoulder pain, and Primary osteoarthritis of left hip were pertinent to this visit. Risks and benefits of the medications and other alternative treatments  including no treatment were discussed with the patient. The common side effects of these medications were also explained to the patient. Informed verbal consent was obtained. Goals of current treatment regimen include improvement in pain, restoration of functioning- with focus on improvement in physical performance, general activity, work or disability,emotional distress, health care utilization and  decreased medication consumption. Will continue to monitor progress towards achieving/maintaining therapeutic goals with special emphasis on  1. Improvement in perceived interfernce  of pain with ADL's. Ability to do home exercises independently. Ability to do household chores indoor and/or outdoor work and social and leisure activities. Improve psychosocial and physical functioning. - he is showing progression towards this treatment goal with the current regimen. 2.Ability to focus/concentrate at work and perform the duties required of him at work  Sit through a workday without lower extremity symptoms. Stand 30-60 minutes without lower extremity symptoms. Ability to lift up to 10-20 lbs. Ability to go up and down stairs. Sit 30-60 minutes  Without having to stand up frequently. - he is maintaining/progressing towards his work related goals with the current regimen.      He was advised against drinking alcohol with the narcotic pain medicines, advised against driving or handling machinery while adjusting the dose of medicines or if having cognitive  issues related to the current medications. Risk of overdose and death, if medicines not taken as prescribed, were also discussed. If the patient develops new symptoms or if the symptoms worsen, the patient should call the office. While transcribing every attempt was made to maintain the accuracy of the note in terms of it's contents,there may have been some errors made inadvertently. Thank you for allowing me to participate in the care of this patient.     Kalen Cullen MD.    Cc: Kai Weinstein MD

## 2020-09-18 NOTE — TELEPHONE ENCOUNTER
From: Desi Weathers  To: Jessica Tate MD  Sent: 9/18/2020 9:29 AM EDT  Subject: Prescription Question    Can you please specify name brand only for diabetic pen needles. The [employer] generic pen needles malfunction often and due to this I do not know if the dosed injection was received in my body. I need refills ASAP on pen needles as [employer] auto malfunction sent request in error to Dr. Chinyere Nicole and not your office.     Eloy Harmon [phone]

## 2020-09-22 RX ORDER — PEN NEEDLE, DIABETIC 31 G X1/4"
1 NEEDLE, DISPOSABLE MISCELLANEOUS 4 TIMES DAILY
Qty: 200 EACH | Refills: 3 | Status: CANCELLED | OUTPATIENT
Start: 2020-09-22

## 2020-10-15 ENCOUNTER — VIRTUAL VISIT (OUTPATIENT)
Dept: PAIN MANAGEMENT | Age: 60
End: 2020-10-15
Payer: COMMERCIAL

## 2020-10-15 PROCEDURE — G8427 DOCREV CUR MEDS BY ELIG CLIN: HCPCS | Performed by: INTERNAL MEDICINE

## 2020-10-15 PROCEDURE — 99213 OFFICE O/P EST LOW 20 MIN: CPT | Performed by: INTERNAL MEDICINE

## 2020-10-15 PROCEDURE — 3017F COLORECTAL CA SCREEN DOC REV: CPT | Performed by: INTERNAL MEDICINE

## 2020-10-15 RX ORDER — GABAPENTIN 300 MG/1
CAPSULE ORAL
Qty: 90 CAPSULE | Refills: 0 | Status: SHIPPED | OUTPATIENT
Start: 2020-10-15 | End: 2020-11-12 | Stop reason: SDUPTHER

## 2020-10-15 RX ORDER — METHOCARBAMOL 500 MG/1
500 TABLET, FILM COATED ORAL 2 TIMES DAILY PRN
Qty: 30 TABLET | Refills: 0 | Status: SHIPPED | OUTPATIENT
Start: 2020-10-15 | End: 2020-11-12 | Stop reason: SDUPTHER

## 2020-10-15 RX ORDER — HYDROCODONE BITARTRATE AND ACETAMINOPHEN 5; 325 MG/1; MG/1
1 TABLET ORAL EVERY 6 HOURS PRN
Qty: 84 TABLET | Refills: 0 | Status: SHIPPED | OUTPATIENT
Start: 2020-10-15 | End: 2020-11-12 | Stop reason: SDUPTHER

## 2020-10-15 RX ORDER — MELOXICAM 15 MG/1
TABLET ORAL
Qty: 30 TABLET | Refills: 0 | Status: SHIPPED | OUTPATIENT
Start: 2020-10-15 | End: 2020-11-12 | Stop reason: SDUPTHER

## 2020-10-15 NOTE — PROGRESS NOTES
the last follow up visit the physical functioning is unchanged, family/social relationships are unchanged, mood is unchanged sleep patterns are unchanged, and that the overall functioning is unchanged. Patient denies misusing/abusing his narcotic pain medications or using any illegal drugs. There are No indicators for possible drug abuse, addiction or diversion problems. Patient states he has been doing fair. He mentions the pain has been manageable with the medications. He denies any side effects with the medication. He reports he is using Norco along with Mobic and Neurontin. He denies any constipation symptoms. He reports his weight has been stable. ALLERGIES: Patients list of allergies were reviewed     MEDICATIONS: Mr. David Jose list of medications were reviewed. His current medications are   Outpatient Medications Prior to Visit   Medication Sig Dispense Refill    FreeStyle Lancets MISC USE AS DIRECTED 100 each 3    insulin aspart (NOVOLOG FLEXPEN) 100 UNIT/ML injection pen 18-24 units AC TID 10 pen 3    metFORMIN (GLUCOPHAGE-XR) 500 MG extended release tablet 2 tab daily with breakfast 60 tablet 2    Insulin Pen Needle 32G X 4 MM MISC 1 each by Does not apply route 4 times daily 120 each 3    blood glucose test strips (FREESTYLE LITE) strip USE TO TEST FOUR TIMES A  strip 0    FreeStyle Lancets MISC USE FOUR TIMES A  each 0    insulin glargine (LANTUS SOLOSTAR) 100 UNIT/ML injection pen INJECT 46 UNITS UNDER THE SKIN DAILY 5 pen 0    Dulaglutide (TRULICITY) 2.48 RJ/8.4KZ SOPN Inject 0.75 mg into the skin once a week 4 pen 3    Insulin Pen Needle (PEN NEEDLES) 31G X 6 MM MISC 1 each by In Vitro route 4 times daily 200 each 3    meloxicam (MOBIC) 15 MG tablet Take one tablet po every Monday,wednesday, and Friday 30 tablet 0    HYDROcodone-acetaminophen (NORCO) 5-325 MG per tablet Take 1 tablet by mouth every 6 hours as needed for Pain (max 2-3/day) for up to 28 days.  84 tablet 0    gabapentin (NEURONTIN) 300 MG capsule Take one tablet po in the morning and two tablets po in the evening 90 capsule 0    methocarbamol (ROBAXIN) 500 MG tablet Take 1 tablet by mouth 2 times daily as needed (muscle stiffness) 30 tablet 0    omeprazole (PRILOSEC) 20 MG delayed release capsule TAKE 1 CAPSULE BY MOUTH EVERY DAY      Cholecalciferol (VITAMIN D PO) Take 1 tablet by mouth every 7 days Pt to clarify dose      furosemide (LASIX) 20 MG tablet Take 40 mg by mouth daily       Fluticasone Propionate (FLONASE NA) 1 spray by Nasal route daily Each nostril      SALINE MIST SPRAY NA 1 spray by Nasal route 3 times daily as needed Each nostril 2-3 times per day      Blood Glucose Monitoring Suppl (ONE TOUCH ULTRA 2) w/Device KIT 1 kit by Does not apply route daily 1 kit 0    Lancets MISC 1 each by Does not apply route 3 times daily 100 each 3    Blood Glucose Monitoring Suppl (FREESTYLE LITE) RO As needed 1 Device 3    MELATONIN PO Take 1 tablet by mouth nightly       loratadine (CLARITIN) 10 MG tablet Take 10 mg by mouth daily       lisinopril (PRINIVIL;ZESTRIL) 5 MG tablet Take 20 mg by mouth daily       atorvastatin (LIPITOR) 80 MG tablet Take 80 mg by mouth daily. No facility-administered medications prior to visit. SOCIAL/FAMILY/PAST MEDICAL HISTORY: Mr. Pablo Flanagan, family and past medical history was reviewed. REVIEW OF SYSTEMS:    Respiratory: Negative for apnea, chest tightness and shortness of breath or change in baseline breathing. Gastrointestinal: Negative for nausea, vomiting, abdominal pain, diarrhea, constipation, blood in stool and abdominal distention. PHYSICAL EXAM:   Nursing note and vitals per patient reviewed. There were no vitals taken for this visit. Constitutional: He appears well-developed and well-nourished. No acute distress. No respiratory distress. Skin: Skin appears to be warm and dry. No rashes or any other marks noted.  He is not diaphoretic. Respiratory/Pulmonary: NO conversational dyspnea, no accessory muscle use, no coughing during exam. He does not appear to be in labored breathing. Neurological/Psychiatric:He is alert and oriented to person, place, and time. Coordination is  normal.  His mood isAppropriate and affect is Neutral/Euthymic(normal). Musculoskeletal / Extremities: Range of motion is normal. Gait is normal, assistive devices use: none. IMPRESSION:   1. Chronic pain syndrome    2. DDD (degenerative disc disease), lumbar    3. Lumbar radiculopathy    4. Primary osteoarthritis of left hip    5. Chronic left shoulder pain    6. Myofascial pain        PLAN:  Informed verbal consent regarding treatment was obtained  -continue with current opioid regimen Norco 3 per day  -back postural exercises as advised  -Adv Biofeedback, relaxation and meditation techniques.  Referral to psychologist for CBT was also discussed with patient  -He was advised to increase fluids ( 5-7  glasses of fluid daily), limit caffeine, avoid cheese products, increase dietary fiber, increase activity and exercise as tolerated and relax regularly and enjoy meals      Current Outpatient Medications   Medication Sig Dispense Refill    FreeStyle Lancets MISC USE AS DIRECTED 100 each 3    insulin aspart (NOVOLOG FLEXPEN) 100 UNIT/ML injection pen 18-24 units AC TID 10 pen 3    metFORMIN (GLUCOPHAGE-XR) 500 MG extended release tablet 2 tab daily with breakfast 60 tablet 2    Insulin Pen Needle 32G X 4 MM MISC 1 each by Does not apply route 4 times daily 120 each 3    blood glucose test strips (FREESTYLE LITE) strip USE TO TEST FOUR TIMES A  strip 0    FreeStyle Lancets MISC USE FOUR TIMES A  each 0    insulin glargine (LANTUS SOLOSTAR) 100 UNIT/ML injection pen INJECT 46 UNITS UNDER THE SKIN DAILY 5 pen 0    Dulaglutide (TRULICITY) 0.91 PJ/9.8QD SOPN Inject 0.75 mg into the skin once a week 4 pen 3    Insulin Pen Needle (PEN NEEDLES) 31G X 6 MM MISC 1 each by In Vitro route 4 times daily 200 each 3    meloxicam (MOBIC) 15 MG tablet Take one tablet po every Monday,wednesday, and Friday 30 tablet 0    HYDROcodone-acetaminophen (NORCO) 5-325 MG per tablet Take 1 tablet by mouth every 6 hours as needed for Pain (max 2-3/day) for up to 28 days. 84 tablet 0    gabapentin (NEURONTIN) 300 MG capsule Take one tablet po in the morning and two tablets po in the evening 90 capsule 0    methocarbamol (ROBAXIN) 500 MG tablet Take 1 tablet by mouth 2 times daily as needed (muscle stiffness) 30 tablet 0    omeprazole (PRILOSEC) 20 MG delayed release capsule TAKE 1 CAPSULE BY MOUTH EVERY DAY      Cholecalciferol (VITAMIN D PO) Take 1 tablet by mouth every 7 days Pt to clarify dose      furosemide (LASIX) 20 MG tablet Take 40 mg by mouth daily       Fluticasone Propionate (FLONASE NA) 1 spray by Nasal route daily Each nostril      SALINE MIST SPRAY NA 1 spray by Nasal route 3 times daily as needed Each nostril 2-3 times per day      Blood Glucose Monitoring Suppl (ONE TOUCH ULTRA 2) w/Device KIT 1 kit by Does not apply route daily 1 kit 0    Lancets MISC 1 each by Does not apply route 3 times daily 100 each 3    Blood Glucose Monitoring Suppl (FREESTYLE LITE) RO As needed 1 Device 3    MELATONIN PO Take 1 tablet by mouth nightly       loratadine (CLARITIN) 10 MG tablet Take 10 mg by mouth daily       lisinopril (PRINIVIL;ZESTRIL) 5 MG tablet Take 20 mg by mouth daily       atorvastatin (LIPITOR) 80 MG tablet Take 80 mg by mouth daily. No current facility-administered medications for this visit. I will continue his current medication regimen  which is part of the above treatment schedule.  It has been helping with Mr. Nallely Youssef chronic  medical problems which for this visit include:   Diagnoses of Chronic pain syndrome, DDD (degenerative disc disease), lumbar, Lumbar radiculopathy, Primary osteoarthritis of left hip, Chronic left shoulder pain, and Myofascial pain were pertinent to this visit. Risks and benefits of the medications and other alternative treatments  including no treatment were discussed with the patient. The common side effects of these medications were also explained to the patient. Informed verbal consent was obtained. Goals of current treatment regimen include improvement in pain, restoration of functioning- with focus on improvement in physical performance, general activity, work or disability,emotional distress, health care utilization and  decreased medication consumption. Will continue to monitor progress towards achieving/maintaining therapeutic goals with special emphasis on  1. Improvement in perceived interfernce  of pain with ADL's. Ability to do home exercises independently. Ability to do household chores indoor and/or outdoor work and social and leisure activities. Improve psychosocial and physical functioning. - he is showing progression towards this treatment goal with the current regimen. He was advised against drinking alcohol with the narcotic pain medicines, advised against driving or handling machinery while adjusting the dose of medicines or if having cognitive  issues related to the current medications. Risk of overdose and death, if medicines not taken as prescribed, were also discussed. If the patient develops new symptoms or if the symptoms worsen, the patient should call the office. While transcribing every attempt was made to maintain the accuracy of the note in terms of it's contents,there may have been some errors made inadvertently. Thank you for allowing me to participate in the care of this patient.     Katelynn Ascencio MD.    Cc: Amilcar George MD

## 2020-11-12 ENCOUNTER — VIRTUAL VISIT (OUTPATIENT)
Dept: PAIN MANAGEMENT | Age: 60
End: 2020-11-12
Payer: COMMERCIAL

## 2020-11-12 ENCOUNTER — OFFICE VISIT (OUTPATIENT)
Dept: ENDOCRINOLOGY | Age: 60
End: 2020-11-12
Payer: COMMERCIAL

## 2020-11-12 VITALS
SYSTOLIC BLOOD PRESSURE: 138 MMHG | WEIGHT: 235.2 LBS | HEART RATE: 82 BPM | BODY MASS INDEX: 37.8 KG/M2 | RESPIRATION RATE: 18 BRPM | DIASTOLIC BLOOD PRESSURE: 74 MMHG | OXYGEN SATURATION: 97 % | HEIGHT: 66 IN

## 2020-11-12 LAB — HBA1C MFR BLD: 9.1 %

## 2020-11-12 PROCEDURE — 99214 OFFICE O/P EST MOD 30 MIN: CPT | Performed by: INTERNAL MEDICINE

## 2020-11-12 PROCEDURE — 99213 OFFICE O/P EST LOW 20 MIN: CPT | Performed by: INTERNAL MEDICINE

## 2020-11-12 PROCEDURE — 3017F COLORECTAL CA SCREEN DOC REV: CPT | Performed by: INTERNAL MEDICINE

## 2020-11-12 PROCEDURE — 1036F TOBACCO NON-USER: CPT | Performed by: INTERNAL MEDICINE

## 2020-11-12 PROCEDURE — G8484 FLU IMMUNIZE NO ADMIN: HCPCS | Performed by: INTERNAL MEDICINE

## 2020-11-12 PROCEDURE — G8417 CALC BMI ABV UP PARAM F/U: HCPCS | Performed by: INTERNAL MEDICINE

## 2020-11-12 PROCEDURE — G8427 DOCREV CUR MEDS BY ELIG CLIN: HCPCS | Performed by: INTERNAL MEDICINE

## 2020-11-12 PROCEDURE — 2022F DILAT RTA XM EVC RTNOPTHY: CPT | Performed by: INTERNAL MEDICINE

## 2020-11-12 PROCEDURE — 3052F HG A1C>EQUAL 8.0%<EQUAL 9.0%: CPT | Performed by: INTERNAL MEDICINE

## 2020-11-12 PROCEDURE — 83036 HEMOGLOBIN GLYCOSYLATED A1C: CPT | Performed by: INTERNAL MEDICINE

## 2020-11-12 RX ORDER — DULAGLUTIDE 1.5 MG/.5ML
1.5 INJECTION, SOLUTION SUBCUTANEOUS WEEKLY
Qty: 4 PEN | Refills: 2 | Status: SHIPPED | OUTPATIENT
Start: 2020-11-12 | End: 2021-06-21

## 2020-11-12 RX ORDER — GABAPENTIN 300 MG/1
CAPSULE ORAL
Qty: 90 CAPSULE | Refills: 0 | Status: SHIPPED | OUTPATIENT
Start: 2020-11-12 | End: 2020-12-17 | Stop reason: SDUPTHER

## 2020-11-12 RX ORDER — MELOXICAM 15 MG/1
TABLET ORAL
Qty: 30 TABLET | Refills: 0 | Status: SHIPPED | OUTPATIENT
Start: 2020-11-12 | End: 2020-12-17 | Stop reason: SDUPTHER

## 2020-11-12 RX ORDER — BLOOD-GLUCOSE METER
KIT MISCELLANEOUS
Qty: 100 STRIP | Refills: 0 | Status: SHIPPED | OUTPATIENT
Start: 2020-11-12 | End: 2021-11-08 | Stop reason: SDUPTHER

## 2020-11-12 RX ORDER — HYDROCODONE BITARTRATE AND ACETAMINOPHEN 5; 325 MG/1; MG/1
1 TABLET ORAL EVERY 6 HOURS PRN
Qty: 120 TABLET | Refills: 0 | Status: SHIPPED | OUTPATIENT
Start: 2020-11-12 | End: 2020-12-17 | Stop reason: SDUPTHER

## 2020-11-12 RX ORDER — METHOCARBAMOL 500 MG/1
500 TABLET, FILM COATED ORAL 2 TIMES DAILY PRN
Qty: 30 TABLET | Refills: 0 | Status: SHIPPED | OUTPATIENT
Start: 2020-11-12 | End: 2020-12-17 | Stop reason: SDUPTHER

## 2020-11-12 RX ORDER — BLOOD-GLUCOSE METER
KIT MISCELLANEOUS
Qty: 1 DEVICE | Refills: 3 | Status: SHIPPED | OUTPATIENT
Start: 2020-11-12 | End: 2021-11-08 | Stop reason: SDUPTHER

## 2020-11-12 NOTE — PROGRESS NOTES
Seen as  patient for diabetes      Interim:     Seen after 4/20  Needs meter  Less active    Dad passed away  Tolerating trulicity      Diagnosed with Type 2 diabetes mellitus in  2010  Known diabetic complications: none     Current diabetic medications     Metformin ER 500mg 2 tab daily  basaglar 47 units   Humalog 20 units AC TID   SSI 2 for 50 >150 combined scale    Trulicity        Last R4N 9.1%<----8.1%<------9.7%<----11.4% <-----13.9%<----- 14.1<--- 11.4 <--- 9.9 <------6.3%     Prior visit with dietician: no  Current diet: on average, 3 meals per day  No CHO counting  Current exercise:yes  Current monitoring regimen: home blood tests - 1-3  times daily     Has brought blood glucose log/meter:no  Home blood sugar records:100-200s  Any episodes of hypoglycemia? 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    SH: [employer], takes care of parents    Past Medical History:   Diagnosis Date    Arthritis     Back, L hip and L shoulder    CHF (congestive heart failure) ([facility] [address]     diastolic    Diabetes mellitus ([facility] [address]     Hyperlipidemia     Hypertension      Past Surgical History:   Procedure Laterality Date    APPENDECTOMY  1969     Current Outpatient Medications   Medication Sig Dispense Refill    meloxicam (MOBIC) 15 MG tablet Take one tablet po every Monday,wednesday, and Friday 30 tablet 0    HYDROcodone-acetaminophen (NORCO) 5-325 MG per tablet Take 1 tablet by mouth every 6 hours as needed for Pain (max 2-3/day) for up to 28 days.  84 tablet 0    methocarbamol (ROBAXIN) 500 MG tablet Take 1 tablet by mouth 2 times daily as needed (muscle stiffness) 30 tablet 0    gabapentin (NEURONTIN) 300 MG capsule Take one tablet po in the morning and two tablets po in the evening 90 capsule 0    FreeStyle Lancets MISC USE AS DIRECTED 100 each 3    insulin aspart (NOVOLOG FLEXPEN) 100 UNIT/ML injection pen 18-24 units AC TID 10 pen 3    metFORMIN (GLUCOPHAGE-XR) 500 MG extended release tablet 2 tab daily with breakfast 60 tablet 2    Insulin Pen Needle 32G X 4 MM MISC 1 each by Does not apply route 4 times daily 120 each 3    blood glucose test strips (FREESTYLE LITE) strip USE TO TEST FOUR TIMES A  strip 0    FreeStyle Lancets MISC USE FOUR TIMES A  each 0    insulin glargine (LANTUS SOLOSTAR) 100 UNIT/ML injection pen INJECT 46 UNITS UNDER THE SKIN DAILY 5 pen 0    Dulaglutide (TRULICITY) 1.58 JL/9.7JQ SOPN Inject 0.75 mg into the skin once a week 4 pen 3    Insulin Pen Needle (PEN NEEDLES) 31G X 6 MM MISC 1 each by In Vitro route 4 times daily 200 each 3    omeprazole (PRILOSEC) 20 MG delayed release capsule TAKE 1 CAPSULE BY MOUTH EVERY DAY      Cholecalciferol (VITAMIN D PO) Take 1 tablet by mouth every 7 days Pt to clarify dose      furosemide (LASIX) 20 MG tablet Take 40 mg by mouth daily       Fluticasone Propionate (FLONASE NA) 1 spray by Nasal route daily Each nostril      SALINE MIST SPRAY NA 1 spray by Nasal route 3 times daily as needed Each nostril 2-3 times per day      Blood Glucose Monitoring Suppl (ONE TOUCH ULTRA 2) w/Device KIT 1 kit by Does not apply route daily 1 kit 0    Lancets MISC 1 each by Does not apply route 3 times daily 100 each 3    Blood Glucose Monitoring Suppl (FREESTYLE LITE) RO As needed 1 Device 3    MELATONIN PO Take 1 tablet by mouth nightly       loratadine (CLARITIN) 10 MG tablet Take 10 mg by mouth daily       lisinopril (PRINIVIL;ZESTRIL) 5 MG tablet Take 20 mg by mouth daily       atorvastatin (LIPITOR) 80 MG tablet Take 80 mg by mouth daily. No current facility-administered medications for this visit.         Review of Systems  Scanned, reviewed    Vitals:    11/12/20 1220   BP:

## 2020-11-13 NOTE — PROGRESS NOTES
injection pen 18-24 units AC TID 10 pen 3    metFORMIN (GLUCOPHAGE-XR) 500 MG extended release tablet 2 tab daily with breakfast 60 tablet 2    Insulin Pen Needle 32G X 4 MM MISC 1 each by Does not apply route 4 times daily 120 each 3    FreeStyle Lancets MISC USE FOUR TIMES A  each 0    insulin glargine (LANTUS SOLOSTAR) 100 UNIT/ML injection pen INJECT 46 UNITS UNDER THE SKIN DAILY 5 pen 0    Insulin Pen Needle (PEN NEEDLES) 31G X 6 MM MISC 1 each by In Vitro route 4 times daily 200 each 3    omeprazole (PRILOSEC) 20 MG delayed release capsule TAKE 1 CAPSULE BY MOUTH EVERY DAY      Cholecalciferol (VITAMIN D PO) Take 1 tablet by mouth every 7 days Pt to clarify dose      furosemide (LASIX) 20 MG tablet Take 40 mg by mouth daily       Fluticasone Propionate (FLONASE NA) 1 spray by Nasal route daily Each nostril      SALINE MIST SPRAY NA 1 spray by Nasal route 3 times daily as needed Each nostril 2-3 times per day      Blood Glucose Monitoring Suppl (ONE TOUCH ULTRA 2) w/Device KIT 1 kit by Does not apply route daily 1 kit 0    Lancets MISC 1 each by Does not apply route 3 times daily 100 each 3    MELATONIN PO Take 1 tablet by mouth nightly       loratadine (CLARITIN) 10 MG tablet Take 10 mg by mouth daily       lisinopril (PRINIVIL;ZESTRIL) 5 MG tablet Take 20 mg by mouth daily       atorvastatin (LIPITOR) 80 MG tablet Take 80 mg by mouth daily.  meloxicam (MOBIC) 15 MG tablet Take one tablet po every Monday,wednesday, and Friday 30 tablet 0    HYDROcodone-acetaminophen (NORCO) 5-325 MG per tablet Take 1 tablet by mouth every 6 hours as needed for Pain (max 2-3/day) for up to 28 days.  84 tablet 0    methocarbamol (ROBAXIN) 500 MG tablet Take 1 tablet by mouth 2 times daily as needed (muscle stiffness) 30 tablet 0    gabapentin (NEURONTIN) 300 MG capsule Take one tablet po in the morning and two tablets po in the evening 90 capsule 0     No facility-administered medications prior to visit.        SOCIAL/FAMILY/PAST MEDICAL HISTORY: Mr. Martinez Favorite, family and past medical history was reviewed. REVIEW OF SYSTEMS:    Respiratory: Negative for apnea, chest tightness and shortness of breath or change in baseline breathing. Gastrointestinal: Negative for nausea, vomiting, abdominal pain, diarrhea, constipation, blood in stool and abdominal distention. PHYSICAL EXAM:   Nursing note and vitals reviewed. There were no vitals taken for this visit. Constitutional: He appears well-developed and well-nourished. No acute distress. Skin: Skin is warm and dry, good turgor. No rash noted. He is not diaphoretic. Cardiovascular: Normal rate, regular rhythm, normal heart sounds, and does not have murmur. Pulmonary/Chest: Effort normal. No respiratory distress. He does not have wheezes in the lung fields. He has no rales. Neurological/Psychiatric:He is alert and oriented to person, place, and time. Coordination is  normal.  His mood isAppropriate and affect is Neutral/Euthymic(normal) . IMPRESSION:   1. Chronic pain syndrome    2. Lumbar radiculopathy    3. Myofascial pain    4. Primary osteoarthritis of left hip    5. Chronic left shoulder pain    6. DDD (degenerative disc disease), lumbar        PLAN:  Informed verbal consent was obtained  -He was advised weight reduction, diet changes- 800-1200 shad diet, diet diary, exercising, nutritional  consult increased physical activity as tolerated   -Continue with current opioid regimen Norco 3-4 per day   -Adv Biofeedback, relaxation and meditation techniques.  Referral to psychologist for CBT was also discussed with patient   -He was advised to increase fluids ( 5-7  glasses of fluid daily), limit caffeine, avoid cheese products, increase dietary fiber, increase activity and exercise as tolerated and relax regularly and enjoy meals   -Walking/Stretching exercises as advised    Current Outpatient Medications   Medication Sig Dispense Refill  blood glucose test strips (FREESTYLE LITE) strip USE TO TEST FOUR TIMES A  strip 0    Blood Glucose Monitoring Suppl (FREESTYLE LITE) RO As needed 1 Device 3    Dulaglutide (TRULICITY) 1.5 NQ/9.6ZM SOPN Inject 1.5 mg into the skin once a week 4 pen 2    meloxicam (MOBIC) 15 MG tablet Take one tablet po every Monday,wednesday, and Friday 30 tablet 0    HYDROcodone-acetaminophen (NORCO) 5-325 MG per tablet Take 1 tablet by mouth every 6 hours as needed for Pain (max 3-4 day) for up to 35 days.  120 tablet 0    methocarbamol (ROBAXIN) 500 MG tablet Take 1 tablet by mouth 2 times daily as needed (muscle stiffness) 30 tablet 0    gabapentin (NEURONTIN) 300 MG capsule Take one tablet po in the morning and two tablets po in the evening 90 capsule 0    FreeStyle Lancets MISC USE AS DIRECTED 100 each 3    insulin aspart (NOVOLOG FLEXPEN) 100 UNIT/ML injection pen 18-24 units AC TID 10 pen 3    metFORMIN (GLUCOPHAGE-XR) 500 MG extended release tablet 2 tab daily with breakfast 60 tablet 2    Insulin Pen Needle 32G X 4 MM MISC 1 each by Does not apply route 4 times daily 120 each 3    FreeStyle Lancets MISC USE FOUR TIMES A  each 0    insulin glargine (LANTUS SOLOSTAR) 100 UNIT/ML injection pen INJECT 46 UNITS UNDER THE SKIN DAILY 5 pen 0    Insulin Pen Needle (PEN NEEDLES) 31G X 6 MM MISC 1 each by In Vitro route 4 times daily 200 each 3    omeprazole (PRILOSEC) 20 MG delayed release capsule TAKE 1 CAPSULE BY MOUTH EVERY DAY      Cholecalciferol (VITAMIN D PO) Take 1 tablet by mouth every 7 days Pt to clarify dose      furosemide (LASIX) 20 MG tablet Take 40 mg by mouth daily       Fluticasone Propionate (FLONASE NA) 1 spray by Nasal route daily Each nostril      SALINE MIST SPRAY NA 1 spray by Nasal route 3 times daily as needed Each nostril 2-3 times per day      Blood Glucose Monitoring Suppl (ONE TOUCH ULTRA 2) w/Device KIT 1 kit by Does not apply route daily 1 kit 0    Lancets MISC 1 each by Does not apply route 3 times daily 100 each 3    MELATONIN PO Take 1 tablet by mouth nightly       loratadine (CLARITIN) 10 MG tablet Take 10 mg by mouth daily       lisinopril (PRINIVIL;ZESTRIL) 5 MG tablet Take 20 mg by mouth daily       atorvastatin (LIPITOR) 80 MG tablet Take 80 mg by mouth daily. No current facility-administered medications for this visit. I will continue his current medication regimen  which is part of the above treatment schedule. It has been helping with Mr. Luciano Mars chronic  medical problems which for this visit include:   Diagnoses of Chronic pain syndrome, Lumbar radiculopathy, Myofascial pain, Primary osteoarthritis of left hip, Chronic left shoulder pain, and DDD (degenerative disc disease), lumbar were pertinent to this visit. Risks and benefits of the medications and other alternative treatments  including no treatment were discussed with the patient. The common side effects of these medications were also explained to the patient. Informed verbal consent was obtained. Goals of current treatment regimen include improvement in pain, restoration of functioning- with focus on improvement in physical performance, general activity, work or disability,emotional distress, health care utilization and  decreased medication consumption. Will continue to monitor progress towards achieving/maintaining therapeutic goals with special emphasis on  1. Improvement in perceived interfernce  of pain with ADL's. Ability to do home exercises independently. Ability to do household chores indoor and/or outdoor work and social and leisure activities. Improve psychosocial and physical functioning. - he is showing progression towards this treatment goal with the current regimen.        He was advised against drinking alcohol with the narcotic pain medicines, advised against driving or handling machinery while adjusting the dose of medicines or if having cognitive  issues related to the current medications. Risk of overdose and death, if medicines not taken as prescribed, were also discussed. If the patient develops new symptoms or if the symptoms worsen, the patient should call the office. While transcribing every attempt was made to maintain the accuracy of the note in terms of it's contents,there may have been some errors made inadvertently. Thank you for allowing me to participate in the care of this patient.     Sadie Porras MD.    Cc: Niles Gaines MD

## 2020-12-17 ENCOUNTER — VIRTUAL VISIT (OUTPATIENT)
Dept: PAIN MANAGEMENT | Age: 60
End: 2020-12-17
Payer: COMMERCIAL

## 2020-12-17 PROCEDURE — 3017F COLORECTAL CA SCREEN DOC REV: CPT | Performed by: INTERNAL MEDICINE

## 2020-12-17 PROCEDURE — 99213 OFFICE O/P EST LOW 20 MIN: CPT | Performed by: INTERNAL MEDICINE

## 2020-12-17 PROCEDURE — G8427 DOCREV CUR MEDS BY ELIG CLIN: HCPCS | Performed by: INTERNAL MEDICINE

## 2020-12-17 RX ORDER — HYDROCODONE BITARTRATE AND ACETAMINOPHEN 5; 325 MG/1; MG/1
1 TABLET ORAL EVERY 6 HOURS PRN
Qty: 120 TABLET | Refills: 0 | Status: SHIPPED | OUTPATIENT
Start: 2020-12-17 | End: 2021-01-21 | Stop reason: SDUPTHER

## 2020-12-17 RX ORDER — MELOXICAM 15 MG/1
TABLET ORAL
Qty: 30 TABLET | Refills: 1 | Status: SHIPPED | OUTPATIENT
Start: 2020-12-17 | End: 2021-02-25 | Stop reason: SDUPTHER

## 2020-12-17 RX ORDER — GABAPENTIN 300 MG/1
CAPSULE ORAL
Qty: 90 CAPSULE | Refills: 1 | Status: SHIPPED | OUTPATIENT
Start: 2020-12-17 | End: 2021-03-25 | Stop reason: SDUPTHER

## 2020-12-17 RX ORDER — METHOCARBAMOL 500 MG/1
500 TABLET, FILM COATED ORAL 2 TIMES DAILY PRN
Qty: 60 TABLET | Refills: 1 | Status: SHIPPED | OUTPATIENT
Start: 2020-12-17 | End: 2021-02-25 | Stop reason: SDUPTHER

## 2020-12-17 NOTE — PROGRESS NOTES
As per information/history obtained from the PADT(patient assessment and documentation tool) - He complains of pain in the shoulders Right, mid back and lower back with radiation to the upper leg Left and lower leg Left He rates the pain 5/10 and describes it as aching, burning, numbness. Pain is made worse by: walking. Current treatment regimen has helped relieve about 60% of the pain. He denies side effects from the current pain regimen. Patient reports that since the last follow up visit the physical functioning is unchanged, family/social relationships are unchanged, mood is unchanged and sleep patterns are unchanged, and that the overall functioning is unchanged. Patient denies neurological bowel or bladder. Patient denies misusing/abusing his narcotic pain medications or using any illegal drugs. There are No indicators for possible drug abuse, addiction or diversion problems. Upon obtaining the medical history from Mr. Megan Mendiola regarding today's office visit for his presenting problems.  states he has been doing fair and the pian has been baseline. He says he has been gaining weight. He denies any constipation symptoms. He mentions he has been using Norco 3-4 per day. He reports he has been 10-15 pills left. He denies any GERD symptoms. He states he has been managing his ADLs. ALLERGIES: Patients list of allergies were reviewed     MEDICATIONS: Mr. Megan Mendiola list of medications were reviewed. His current medications are   Outpatient Medications Prior to Visit   Medication Sig Dispense Refill    blood glucose test strips (FREESTYLE LITE) strip USE TO TEST FOUR TIMES A  strip 0    Blood Glucose Monitoring Suppl (FREESTYLE LITE) RO As needed 1 Device 3    Dulaglutide (TRULICITY) 1.5 SI/0.4JC SOPN Inject 1.5 mg into the skin once a week 4 pen 2    meloxicam (MOBIC) 15 MG tablet Take one tablet po every Monday,wednesday, and Friday 30 tablet 0  HYDROcodone-acetaminophen (NORCO) 5-325 MG per tablet Take 1 tablet by mouth every 6 hours as needed for Pain (max 3-4 day) for up to 35 days. 120 tablet 0    FreeStyle Lancets MISC USE AS DIRECTED 100 each 3    insulin aspart (NOVOLOG FLEXPEN) 100 UNIT/ML injection pen 18-24 units AC TID 10 pen 3    metFORMIN (GLUCOPHAGE-XR) 500 MG extended release tablet 2 tab daily with breakfast 60 tablet 2    Insulin Pen Needle 32G X 4 MM MISC 1 each by Does not apply route 4 times daily 120 each 3    FreeStyle Lancets MISC USE FOUR TIMES A  each 0    insulin glargine (LANTUS SOLOSTAR) 100 UNIT/ML injection pen INJECT 46 UNITS UNDER THE SKIN DAILY 5 pen 0    Insulin Pen Needle (PEN NEEDLES) 31G X 6 MM MISC 1 each by In Vitro route 4 times daily 200 each 3    omeprazole (PRILOSEC) 20 MG delayed release capsule TAKE 1 CAPSULE BY MOUTH EVERY DAY      Cholecalciferol (VITAMIN D PO) Take 1 tablet by mouth every 7 days Pt to clarify dose      furosemide (LASIX) 20 MG tablet Take 40 mg by mouth daily       Fluticasone Propionate (FLONASE NA) 1 spray by Nasal route daily Each nostril      SALINE MIST SPRAY NA 1 spray by Nasal route 3 times daily as needed Each nostril 2-3 times per day      Blood Glucose Monitoring Suppl (ONE TOUCH ULTRA 2) w/Device KIT 1 kit by Does not apply route daily 1 kit 0    Lancets MISC 1 each by Does not apply route 3 times daily 100 each 3    MELATONIN PO Take 1 tablet by mouth nightly       loratadine (CLARITIN) 10 MG tablet Take 10 mg by mouth daily       lisinopril (PRINIVIL;ZESTRIL) 5 MG tablet Take 20 mg by mouth daily       atorvastatin (LIPITOR) 80 MG tablet Take 80 mg by mouth daily.  gabapentin (NEURONTIN) 300 MG capsule Take one tablet po in the morning and two tablets po in the evening 90 capsule 0     No facility-administered medications prior to visit.          REVIEW OF SYSTEMS: Respiratory: Negative for apnea, chest tightness and shortness of breath or change in baseline breathing. PHYSICAL EXAM:   Nursing note and vitals reviewed. There were no vitals taken for this visit. Constitutional: He appears well-developed and well-nourished. No acute distress. Cardiovascular: Normal rate, regular rhythm, normal heart sounds, and does not have murmur. Pulmonary/Chest: Effort normal. No respiratory distress. He does not have wheezes in the lung fields. He has no rales. Neurological/Psychiatric:He is alert and oriented to person, place, and time. Coordination is  normal.  His mood isAppropriate and affect is Neutral/Euthymic(normal) . His    IMPRESSION:   1. Chronic pain syndrome    2. Lumbar radiculopathy    3. Myofascial pain    4. Primary osteoarthritis of left hip    5. Chronic left shoulder pain    6.  DDD (degenerative disc disease), lumbar        PLAN:  Informed verbal consent was obtained  -continue with current opioid regimen Norco 3-4 per day  -He was advised weight reduction, diet changes- 800-1200 shad diet, diet diary, exercising, nutritional  consult increased physical activity as tolerated   -walking/stretching exercises as advised    -He was advised to increase fluids ( 5-7  glasses of fluid daily), limit caffeine, avoid cheese products, increase dietary fiber, increase activity and exercise as tolerated and relax regularly and enjoy meals    Current Outpatient Medications   Medication Sig Dispense Refill    blood glucose test strips (FREESTYLE LITE) strip USE TO TEST FOUR TIMES A  strip 0    Blood Glucose Monitoring Suppl (FREESTYLE LITE) RO As needed 1 Device 3    Dulaglutide (TRULICITY) 1.5 MN/1.8IV SOPN Inject 1.5 mg into the skin once a week 4 pen 2    meloxicam (MOBIC) 15 MG tablet Take one tablet po every Monday,wednesday, and Friday 30 tablet 0  HYDROcodone-acetaminophen (NORCO) 5-325 MG per tablet Take 1 tablet by mouth every 6 hours as needed for Pain (max 3-4 day) for up to 35 days. 120 tablet 0    FreeStyle Lancets MISC USE AS DIRECTED 100 each 3    insulin aspart (NOVOLOG FLEXPEN) 100 UNIT/ML injection pen 18-24 units AC TID 10 pen 3    metFORMIN (GLUCOPHAGE-XR) 500 MG extended release tablet 2 tab daily with breakfast 60 tablet 2    Insulin Pen Needle 32G X 4 MM MISC 1 each by Does not apply route 4 times daily 120 each 3    FreeStyle Lancets MISC USE FOUR TIMES A  each 0    insulin glargine (LANTUS SOLOSTAR) 100 UNIT/ML injection pen INJECT 46 UNITS UNDER THE SKIN DAILY 5 pen 0    Insulin Pen Needle (PEN NEEDLES) 31G X 6 MM MISC 1 each by In Vitro route 4 times daily 200 each 3    omeprazole (PRILOSEC) 20 MG delayed release capsule TAKE 1 CAPSULE BY MOUTH EVERY DAY      Cholecalciferol (VITAMIN D PO) Take 1 tablet by mouth every 7 days Pt to clarify dose      furosemide (LASIX) 20 MG tablet Take 40 mg by mouth daily       Fluticasone Propionate (FLONASE NA) 1 spray by Nasal route daily Each nostril      SALINE MIST SPRAY NA 1 spray by Nasal route 3 times daily as needed Each nostril 2-3 times per day      Blood Glucose Monitoring Suppl (ONE TOUCH ULTRA 2) w/Device KIT 1 kit by Does not apply route daily 1 kit 0    Lancets MISC 1 each by Does not apply route 3 times daily 100 each 3    MELATONIN PO Take 1 tablet by mouth nightly       loratadine (CLARITIN) 10 MG tablet Take 10 mg by mouth daily       lisinopril (PRINIVIL;ZESTRIL) 5 MG tablet Take 20 mg by mouth daily       atorvastatin (LIPITOR) 80 MG tablet Take 80 mg by mouth daily.  gabapentin (NEURONTIN) 300 MG capsule Take one tablet po in the morning and two tablets po in the evening 90 capsule 0     No current facility-administered medications for this visit. I will continue his current medication regimen  which is part of the above treatment schedule. It has been helping with Mr. Posada Forget chronic  medical problems which for this visit include:   Diagnoses of Chronic pain syndrome, Lumbar radiculopathy, Myofascial pain, Primary osteoarthritis of left hip, Chronic left shoulder pain, and DDD (degenerative disc disease), lumbar were pertinent to this visit. Risks and benefits of the medications and other alternative treatments  including no treatment were discussed with the patient. The common side effects of these medications were also explained to the patient. Informed verbal consent was obtained. Goals of current treatment regimen include improvement in pain, restoration of functioning- with focus on improvement in physical performance, general activity, work or disability,emotional distress, health care utilization and  decreased medication consumption. Will continue to monitor progress towards achieving/maintaining therapeutic goals with special emphasis on  1. Improvement in perceived interfernce  of pain with ADL's. Ability to do home exercises independently. Ability to do household chores indoor and/or outdoor work and social and leisure activities. Improve psychosocial and physical functioning. - he is showing progression towards this treatment goal with the current regimen. He was advised against drinking alcohol with the narcotic pain medicines, advised against driving or handling machinery while adjusting the dose of medicines or if having cognitive  issues related to the current medications. Risk of overdose and death, if medicines not taken as prescribed, were also discussed. If the patient develops new symptoms or if the symptoms worsen, the patient should call the office. While transcribing every attempt was made to maintain the accuracy of the note in terms of it's contents,there may have been some errors made inadvertently. Thank you for allowing me to participate in the care of this patient.     Shan Dunham MD.    Cc: Christiane Martinez MD

## 2021-01-21 ENCOUNTER — OFFICE VISIT (OUTPATIENT)
Dept: PAIN MANAGEMENT | Age: 61
End: 2021-01-21
Payer: COMMERCIAL

## 2021-01-21 VITALS
BODY MASS INDEX: 38.22 KG/M2 | SYSTOLIC BLOOD PRESSURE: 154 MMHG | OXYGEN SATURATION: 95 % | TEMPERATURE: 98.2 F | HEART RATE: 99 BPM | DIASTOLIC BLOOD PRESSURE: 88 MMHG | HEIGHT: 66 IN | WEIGHT: 237.8 LBS

## 2021-01-21 DIAGNOSIS — M54.16 LUMBAR RADICULOPATHY: ICD-10-CM

## 2021-01-21 DIAGNOSIS — G89.29 CHRONIC LEFT SHOULDER PAIN: ICD-10-CM

## 2021-01-21 DIAGNOSIS — M79.18 MYOFASCIAL PAIN: ICD-10-CM

## 2021-01-21 DIAGNOSIS — G89.4 CHRONIC PAIN SYNDROME: ICD-10-CM

## 2021-01-21 DIAGNOSIS — M16.12 PRIMARY OSTEOARTHRITIS OF LEFT HIP: ICD-10-CM

## 2021-01-21 DIAGNOSIS — Z51.81 ENCOUNTER FOR THERAPEUTIC DRUG MONITORING: Primary | ICD-10-CM

## 2021-01-21 DIAGNOSIS — M51.36 DDD (DEGENERATIVE DISC DISEASE), LUMBAR: ICD-10-CM

## 2021-01-21 DIAGNOSIS — M25.512 CHRONIC LEFT SHOULDER PAIN: ICD-10-CM

## 2021-01-21 PROCEDURE — G8427 DOCREV CUR MEDS BY ELIG CLIN: HCPCS | Performed by: INTERNAL MEDICINE

## 2021-01-21 PROCEDURE — 1036F TOBACCO NON-USER: CPT | Performed by: INTERNAL MEDICINE

## 2021-01-21 PROCEDURE — 3017F COLORECTAL CA SCREEN DOC REV: CPT | Performed by: INTERNAL MEDICINE

## 2021-01-21 PROCEDURE — G8417 CALC BMI ABV UP PARAM F/U: HCPCS | Performed by: INTERNAL MEDICINE

## 2021-01-21 PROCEDURE — G8484 FLU IMMUNIZE NO ADMIN: HCPCS | Performed by: INTERNAL MEDICINE

## 2021-01-21 PROCEDURE — 99213 OFFICE O/P EST LOW 20 MIN: CPT | Performed by: INTERNAL MEDICINE

## 2021-01-21 RX ORDER — LISINOPRIL 30 MG/1
30 TABLET ORAL DAILY
COMMUNITY
Start: 2020-12-22

## 2021-01-21 RX ORDER — HYDROCODONE BITARTRATE AND ACETAMINOPHEN 5; 325 MG/1; MG/1
1 TABLET ORAL EVERY 6 HOURS PRN
Qty: 120 TABLET | Refills: 0 | Status: SHIPPED | OUTPATIENT
Start: 2021-01-21 | End: 2021-02-25 | Stop reason: SDUPTHER

## 2021-01-21 NOTE — PROGRESS NOTES
Harsh Sales  1960  [id number]      HISTORY OF PRESENT ILLNESS:  Mr. Cecil Benites is a 63 y.o. male returns for a follow up visit for pain management  He has a diagnosis of   1. Chronic pain syndrome    2. Lumbar radiculopathy    3. Myofascial pain    4. Primary osteoarthritis of left hip    5. Chronic left shoulder pain    6. DDD (degenerative disc disease), lumbar    . He complains of pain in the right shoulder, upper and lower back, right upper leg, right knee He rates the pain 7/10 and describes it as sharp, aching, numbness. Current treatment regimen has helped relieve about 50% of the pain. He denies any side effects from the current pain regimen. Patient reports that since the last follow up visit the physical functioning is worse, family/social relationships are unchanged, mood is unchanged sleep patterns are unchanged, and that the overall functioning is unchanged. Patient denies misusing/abusing his narcotic pain medications or using any illegal drugs. There are No indicators for possible drug abuse, addiction or diversion problems. Mr. Cecil Benites states he has been doing fair, pain has been baseline, manageable with the medications. He states he has been compliant with his regimen. He mentions he is using Norco along with Neurontin. He states he saw his PCP recently. Patient reports he has been having elevated blood sugar. ALLERGIES: Patients list of allergies were reviewed     MEDICATIONS: Mr. Cecil Benites list of medications were reviewed. His current medications are   Outpatient Medications Prior to Visit   Medication Sig Dispense Refill    lisinopril (PRINIVIL;ZESTRIL) 30 MG tablet Take 30 mg by mouth daily      meloxicam (MOBIC) 15 MG tablet Take one tablet po every Monday,wednesday, and Friday 30 tablet 1    HYDROcodone-acetaminophen (NORCO) 5-325 MG per tablet Take 1 tablet by mouth every 6 hours as needed for Pain (max 3-4 day) for up to 35 days.  120 tablet 0  methocarbamol (ROBAXIN) 500 MG tablet Take 1 tablet by mouth 2 times daily as needed (muscle stiffness) 60 tablet 1    blood glucose test strips (FREESTYLE LITE) strip USE TO TEST FOUR TIMES A  strip 0    Blood Glucose Monitoring Suppl (FREESTYLE LITE) RO As needed 1 Device 3    Dulaglutide (TRULICITY) 1.5 STEPHANIE/0.9FK SOPN Inject 1.5 mg into the skin once a week 4 pen 2    FreeStyle Lancets MISC USE AS DIRECTED 100 each 3    insulin aspart (NOVOLOG FLEXPEN) 100 UNIT/ML injection pen 18-24 units AC TID 10 pen 3    metFORMIN (GLUCOPHAGE-XR) 500 MG extended release tablet 2 tab daily with breakfast 60 tablet 2    Insulin Pen Needle 32G X 4 MM MISC 1 each by Does not apply route 4 times daily 120 each 3    FreeStyle Lancets MISC USE FOUR TIMES A  each 0    insulin glargine (LANTUS SOLOSTAR) 100 UNIT/ML injection pen INJECT 46 UNITS UNDER THE SKIN DAILY 5 pen 0    Insulin Pen Needle (PEN NEEDLES) 31G X 6 MM MISC 1 each by In Vitro route 4 times daily 200 each 3    omeprazole (PRILOSEC) 20 MG delayed release capsule TAKE 1 CAPSULE BY MOUTH EVERY DAY      Cholecalciferol (VITAMIN D PO) Take 1 tablet by mouth every 7 days Pt to clarify dose      furosemide (LASIX) 20 MG tablet Take 40 mg by mouth daily       Fluticasone Propionate (FLONASE NA) 1 spray by Nasal route daily Each nostril      SALINE MIST SPRAY NA 1 spray by Nasal route 3 times daily as needed Each nostril 2-3 times per day      Blood Glucose Monitoring Suppl (ONE TOUCH ULTRA 2) w/Device KIT 1 kit by Does not apply route daily 1 kit 0    Lancets MISC 1 each by Does not apply route 3 times daily 100 each 3    MELATONIN PO Take 1 tablet by mouth nightly       loratadine (CLARITIN) 10 MG tablet Take 10 mg by mouth daily       atorvastatin (LIPITOR) 80 MG tablet Take 80 mg by mouth daily.       gabapentin (NEURONTIN) 300 MG capsule Take one tablet po in the morning and two tablets po in the evening 90 capsule 1  lisinopril (PRINIVIL;ZESTRIL) 5 MG tablet Take 20 mg by mouth daily        No facility-administered medications prior to visit. REVIEW OF SYSTEMS:    Respiratory: Negative for apnea, chest tightness and shortness of breath or change in baseline breathing. PHYSICAL EXAM:   Nursing note and vitals reviewed. BP (!) 154/88   Pulse 99   Temp 98.2 °F (36.8 °C)   Ht 5' 6\" (1.676 m)   Wt 237 lb 12.8 oz (107.9 kg)   SpO2 95%   BMI 38.38 kg/m²   Constitutional: He appears well-developed and well-nourished. No acute distress. Cardiovascular: Normal rate, regular rhythm, normal heart sounds, and does not have murmur. Pulmonary/Chest: Effort normal. No respiratory distress. He does not have wheezes in the lung fields. He has no rales. Neurological/Psychiatric:He is alert and oriented to person, place, and time. Coordination is  normal.  His mood isAppropriate and affect is Neutral/Euthymic(normal) . His    IMPRESSION:   1. Chronic pain syndrome    2. Lumbar radiculopathy    3. Myofascial pain    4. Primary osteoarthritis of left hip    5. Chronic left shoulder pain    6. DDD (degenerative disc disease), lumbar        PLAN:  Informed verbal consent was obtained  -ROM/Stretching exercises as advised   -He was advised weight reduction, diet changes- 800-1200 shad diet, diet diary, exercising, nutritional  consult increased physical activity as tolerated   -Most recent labs were reviewed and are abnormal. Patient was advised to see their PCP regarding follow up on the lab results for further plan of action, baseline. shows elevated TG and elevated A1C  -Urine drug screen with GC/MS for opiates and drugs of abuse was ordered and will follow up on results.   -continue with Norco 3-4 per day -[place] was obtained and reviewed  for the last one year and no indicators of drug misuse  were found. Any other controlled substance prescriptions  seen on the record have been accounted for, I am aware of the patient receiving these medications. Shanon Campbell [facility] record will be rechecked as part of office protocol. Current Outpatient Medications   Medication Sig Dispense Refill    lisinopril (PRINIVIL;ZESTRIL) 30 MG tablet Take 30 mg by mouth daily      meloxicam (MOBIC) 15 MG tablet Take one tablet po every Monday,wednesday, and Friday 30 tablet 1    HYDROcodone-acetaminophen (NORCO) 5-325 MG per tablet Take 1 tablet by mouth every 6 hours as needed for Pain (max 3-4 day) for up to 35 days.  120 tablet 0    methocarbamol (ROBAXIN) 500 MG tablet Take 1 tablet by mouth 2 times daily as needed (muscle stiffness) 60 tablet 1    blood glucose test strips (FREESTYLE LITE) strip USE TO TEST FOUR TIMES A  strip 0    Blood Glucose Monitoring Suppl (FREESTYLE LITE) RO As needed 1 Device 3    Dulaglutide (TRULICITY) 1.5 CK/2.1JH SOPN Inject 1.5 mg into the skin once a week 4 pen 2    FreeStyle Lancets MISC USE AS DIRECTED 100 each 3    insulin aspart (NOVOLOG FLEXPEN) 100 UNIT/ML injection pen 18-24 units AC TID 10 pen 3    metFORMIN (GLUCOPHAGE-XR) 500 MG extended release tablet 2 tab daily with breakfast 60 tablet 2    Insulin Pen Needle 32G X 4 MM MISC 1 each by Does not apply route 4 times daily 120 each 3    FreeStyle Lancets MISC USE FOUR TIMES A  each 0    insulin glargine (LANTUS SOLOSTAR) 100 UNIT/ML injection pen INJECT 46 UNITS UNDER THE SKIN DAILY 5 pen 0    Insulin Pen Needle (PEN NEEDLES) 31G X 6 MM MISC 1 each by In Vitro route 4 times daily 200 each 3    omeprazole (PRILOSEC) 20 MG delayed release capsule TAKE 1 CAPSULE BY MOUTH EVERY DAY      Cholecalciferol (VITAMIN D PO) Take 1 tablet by mouth every 7 days Pt to clarify dose  furosemide (LASIX) 20 MG tablet Take 40 mg by mouth daily       Fluticasone Propionate (FLONASE NA) 1 spray by Nasal route daily Each nostril      SALINE MIST SPRAY NA 1 spray by Nasal route 3 times daily as needed Each nostril 2-3 times per day      Blood Glucose Monitoring Suppl (ONE TOUCH ULTRA 2) w/Device KIT 1 kit by Does not apply route daily 1 kit 0    Lancets MISC 1 each by Does not apply route 3 times daily 100 each 3    MELATONIN PO Take 1 tablet by mouth nightly       loratadine (CLARITIN) 10 MG tablet Take 10 mg by mouth daily       atorvastatin (LIPITOR) 80 MG tablet Take 80 mg by mouth daily.  gabapentin (NEURONTIN) 300 MG capsule Take one tablet po in the morning and two tablets po in the evening 90 capsule 1     No current facility-administered medications for this visit. I will continue his current medication regimen  which is part of the above treatment schedule. It has been helping with Mr. Dominique Farah chronic  medical problems which for this visit include:   Diagnoses of Chronic pain syndrome, Lumbar radiculopathy, Myofascial pain, Primary osteoarthritis of left hip, Chronic left shoulder pain, and DDD (degenerative disc disease), lumbar were pertinent to this visit. Risks and benefits of the medications and other alternative treatments  including no treatment were discussed with the patient. The common side effects of these medications were also explained to the patient. Informed verbal consent was obtained. Goals of current treatment regimen include improvement in pain, restoration of functioning- with focus on improvement in physical performance, general activity, work or disability,emotional distress, health care utilization and  decreased medication consumption.  Will continue to monitor progress towards achieving/maintaining therapeutic goals with special emphasis on 1. Improvement in perceived interfernce  of pain with ADL's. Ability to do home exercises independently. Ability to do household chores indoor and/or outdoor work and social and leisure activities. Improve psychosocial and physical functioning. - he is showing progression towards this treatment goal with the current regimen. He was advised against drinking alcohol with the narcotic pain medicines, advised against driving or handling machinery while adjusting the dose of medicines or if having cognitive  issues related to the current medications. Risk of overdose and death, if medicines not taken as prescribed, were also discussed. If the patient develops new symptoms or if the symptoms worsen, the patient should call the office. While transcribing every attempt was made to maintain the accuracy of the note in terms of it's contents,there may have been some errors made inadvertently. Thank you for allowing me to participate in the care of this patient.     Darryl Mckeon MD.    Cc: Arya Flor MD

## 2021-02-25 ENCOUNTER — VIRTUAL VISIT (OUTPATIENT)
Dept: PAIN MANAGEMENT | Age: 61
End: 2021-02-25
Payer: COMMERCIAL

## 2021-02-25 DIAGNOSIS — M54.16 LUMBAR RADICULOPATHY: ICD-10-CM

## 2021-02-25 DIAGNOSIS — Z51.81 ENCOUNTER FOR THERAPEUTIC DRUG MONITORING: ICD-10-CM

## 2021-02-25 DIAGNOSIS — G89.4 CHRONIC PAIN SYNDROME: ICD-10-CM

## 2021-02-25 DIAGNOSIS — M16.12 PRIMARY OSTEOARTHRITIS OF LEFT HIP: ICD-10-CM

## 2021-02-25 DIAGNOSIS — M51.36 DDD (DEGENERATIVE DISC DISEASE), LUMBAR: ICD-10-CM

## 2021-02-25 DIAGNOSIS — M79.18 MYOFASCIAL PAIN: ICD-10-CM

## 2021-02-25 DIAGNOSIS — G89.29 CHRONIC LEFT SHOULDER PAIN: ICD-10-CM

## 2021-02-25 DIAGNOSIS — M25.512 CHRONIC LEFT SHOULDER PAIN: ICD-10-CM

## 2021-02-25 PROCEDURE — 3017F COLORECTAL CA SCREEN DOC REV: CPT | Performed by: INTERNAL MEDICINE

## 2021-02-25 PROCEDURE — 99213 OFFICE O/P EST LOW 20 MIN: CPT | Performed by: INTERNAL MEDICINE

## 2021-02-25 PROCEDURE — G8427 DOCREV CUR MEDS BY ELIG CLIN: HCPCS | Performed by: INTERNAL MEDICINE

## 2021-02-25 RX ORDER — HYDROCODONE BITARTRATE AND ACETAMINOPHEN 5; 325 MG/1; MG/1
1 TABLET ORAL EVERY 6 HOURS PRN
Qty: 100 TABLET | Refills: 0 | Status: SHIPPED | OUTPATIENT
Start: 2021-02-25 | End: 2021-03-25 | Stop reason: SDUPTHER

## 2021-02-25 RX ORDER — MELOXICAM 15 MG/1
TABLET ORAL
Qty: 30 TABLET | Refills: 1 | Status: SHIPPED | OUTPATIENT
Start: 2021-02-25 | End: 2021-04-22 | Stop reason: SDUPTHER

## 2021-02-25 RX ORDER — METHOCARBAMOL 500 MG/1
500 TABLET, FILM COATED ORAL 2 TIMES DAILY PRN
Qty: 60 TABLET | Refills: 1 | Status: SHIPPED | OUTPATIENT
Start: 2021-02-25 | End: 2021-04-22 | Stop reason: SDUPTHER

## 2021-02-25 NOTE — PROGRESS NOTES
TELE HEALTH VISIT (AUDIO-VISUAL)    Pursuant to the emergency declaration under the [facility], [facility] waiver authority and the [employer] Act, this Virtual  Visit was conducted, with patient's/legal guardian's consent, to reduce the patient's risk of exposure to COVID-19 and provide continuity of care for an established patient. Service is  provided through a video synchronous discussion virtually to substitute for in-person clinic visit. Due to this being a TeleHealth encounter (During UPYWO-99 public health emergency), evaluation of the following organ systems was limited: Vitals/Constitutional/EENT/Resp/CV/GI//MS/Neuro/Skin/Cpss-Irss-Xfh. Zabrina Frye  1960  [id number]    Mr. Sin Webb is being seen virtually for a follow up visit using one of the following techniques  Google Duo Face time Doxy. me  Informed verbal consent to the virtual visit was obtained from Mr. Sin Webb. Risks associated with HIPPA compliance with the virtual visit was explained to the patient. Mr. Sin Webb is at his residence and Dr. Dottie Krishnan is in his office. HISTORY OF PRESENT ILLNESS:  Mr. Sin Webb is a 57 y.o. male returns for a follow up visit for multiple medical problems. His current presenting problems are   1. Encounter for therapeutic drug monitoring    2. Chronic pain syndrome    3. Lumbar radiculopathy    4. Myofascial pain    5. Primary osteoarthritis of left hip    6. Chronic left shoulder pain    7. DDD (degenerative disc disease), lumbar    . As per information/history obtained from the PADT(patient assessment and documentation tool) - He complains of pain in the lower back with radiation to the hips Left, upper leg Left, knees Left and lower leg Left He rates the pain 6/10 and describes it as aching, stabbing. Pain is made worse by: walking, standing. Current treatment regimen has helped relieve about 60% of the pain. He denies side effects from the current pain regimen. Patient reports that since the last follow up visit the physical functioning is unchanged, family/social relationships are unchanged, mood is unchanged and sleep patterns are unchanged, and that the overall functioning is unchanged. Patient denies neurological bowel or bladder. Patient denies misusing/abusing his narcotic pain medications or using any illegal drugs. There are No indicators for possible drug abuse, addiction or diversion problems. Upon obtaining the medical history from Mr. Tim Bobby regarding today's office visit for his presenting problems.  reports he has been doing fair, pain has been manageable with the medications. He says he is using Norco and Neurontin along with Mobic. He denies any constipation symptoms. ALLERGIES: Patients list of allergies were reviewed     MEDICATIONS: Mr. Tim Bobby list of medications were reviewed. His current medications are   Outpatient Medications Prior to Visit   Medication Sig Dispense Refill    lisinopril (PRINIVIL;ZESTRIL) 30 MG tablet Take 30 mg by mouth daily      HYDROcodone-acetaminophen (NORCO) 5-325 MG per tablet Take 1 tablet by mouth every 6 hours as needed for Pain (max 3-4 day) for up to 35 days.  120 tablet 0    meloxicam (MOBIC) 15 MG tablet Take one tablet po every Monday,wednesday, and Friday 30 tablet 1    methocarbamol (ROBAXIN) 500 MG tablet Take 1 tablet by mouth 2 times daily as needed (muscle stiffness) 60 tablet 1  blood glucose test strips (FREESTYLE LITE) strip USE TO TEST FOUR TIMES A  strip 0    Blood Glucose Monitoring Suppl (FREESTYLE LITE) RO As needed 1 Device 3    Dulaglutide (TRULICITY) 1.5 TC/2.0LX SOPN Inject 1.5 mg into the skin once a week 4 pen 2    FreeStyle Lancets MISC USE AS DIRECTED 100 each 3    insulin aspart (NOVOLOG FLEXPEN) 100 UNIT/ML injection pen 18-24 units AC TID 10 pen 3    metFORMIN (GLUCOPHAGE-XR) 500 MG extended release tablet 2 tab daily with breakfast 60 tablet 2    Insulin Pen Needle 32G X 4 MM MISC 1 each by Does not apply route 4 times daily 120 each 3    FreeStyle Lancets MISC USE FOUR TIMES A  each 0    insulin glargine (LANTUS SOLOSTAR) 100 UNIT/ML injection pen INJECT 46 UNITS UNDER THE SKIN DAILY 5 pen 0    Insulin Pen Needle (PEN NEEDLES) 31G X 6 MM MISC 1 each by In Vitro route 4 times daily 200 each 3    omeprazole (PRILOSEC) 20 MG delayed release capsule TAKE 1 CAPSULE BY MOUTH EVERY DAY      Cholecalciferol (VITAMIN D PO) Take 1 tablet by mouth every 7 days Pt to clarify dose      furosemide (LASIX) 20 MG tablet Take 40 mg by mouth daily       Fluticasone Propionate (FLONASE NA) 1 spray by Nasal route daily Each nostril      SALINE MIST SPRAY NA 1 spray by Nasal route 3 times daily as needed Each nostril 2-3 times per day      Blood Glucose Monitoring Suppl (ONE TOUCH ULTRA 2) w/Device KIT 1 kit by Does not apply route daily 1 kit 0    Lancets MISC 1 each by Does not apply route 3 times daily 100 each 3    MELATONIN PO Take 1 tablet by mouth nightly       loratadine (CLARITIN) 10 MG tablet Take 10 mg by mouth daily       atorvastatin (LIPITOR) 80 MG tablet Take 80 mg by mouth daily.  gabapentin (NEURONTIN) 300 MG capsule Take one tablet po in the morning and two tablets po in the evening 90 capsule 1     No facility-administered medications prior to visit.          REVIEW OF SYSTEMS: Respiratory: Negative for apnea, chest tightness and shortness of breath or change in baseline breathing. PHYSICAL EXAM:   Nursing note and vitals reviewed. There were no vitals taken for this visit. Constitutional: He appears well-developed and well-nourished. No acute distress. Cardiovascular: Normal rate, regular rhythm, normal heart sounds, and does not have murmur. Pulmonary/Chest: Effort normal. No respiratory distress. He does not have wheezes in the lung fields. He has no rales. Neurological/Psychiatric:He is alert and oriented to person, place, and time. Coordination is  normal.  His mood isAppropriate and affect is Neutral/Euthymic(normal) . His    IMPRESSION:   1. Encounter for therapeutic drug monitoring    2. Chronic pain syndrome    3. Lumbar radiculopathy    4. Myofascial pain    5. Primary osteoarthritis of left hip    6. Chronic left shoulder pain    7. DDD (degenerative disc disease), lumbar        PLAN:  Informed verbal consent was obtained  -Continue with all other adjuvant medications as before   -Patient's urine drug screen results with GC/MS confirmation were obtained and reviewed and were negative for any illicit drugs. Prescribed medications were within acceptable range.   -continue with Norco 3-4 per day  -He was advised to increase fluids ( 5-7  glasses of fluid daily), limit caffeine, avoid cheese products, increase dietary fiber, increase activity and exercise as tolerated and relax regularly and enjoy meals      Current Outpatient Medications   Medication Sig Dispense Refill    lisinopril (PRINIVIL;ZESTRIL) 30 MG tablet Take 30 mg by mouth daily      HYDROcodone-acetaminophen (NORCO) 5-325 MG per tablet Take 1 tablet by mouth every 6 hours as needed for Pain (max 3-4 day) for up to 35 days.  120 tablet 0    meloxicam (MOBIC) 15 MG tablet Take one tablet po every Monday,wednesday, and Friday 30 tablet 1  methocarbamol (ROBAXIN) 500 MG tablet Take 1 tablet by mouth 2 times daily as needed (muscle stiffness) 60 tablet 1    blood glucose test strips (FREESTYLE LITE) strip USE TO TEST FOUR TIMES A  strip 0    Blood Glucose Monitoring Suppl (FREESTYLE LITE) RO As needed 1 Device 3    Dulaglutide (TRULICITY) 1.5 PK/8.2RH SOPN Inject 1.5 mg into the skin once a week 4 pen 2    FreeStyle Lancets MISC USE AS DIRECTED 100 each 3    insulin aspart (NOVOLOG FLEXPEN) 100 UNIT/ML injection pen 18-24 units AC TID 10 pen 3    metFORMIN (GLUCOPHAGE-XR) 500 MG extended release tablet 2 tab daily with breakfast 60 tablet 2    Insulin Pen Needle 32G X 4 MM MISC 1 each by Does not apply route 4 times daily 120 each 3    FreeStyle Lancets MISC USE FOUR TIMES A  each 0    insulin glargine (LANTUS SOLOSTAR) 100 UNIT/ML injection pen INJECT 46 UNITS UNDER THE SKIN DAILY 5 pen 0    Insulin Pen Needle (PEN NEEDLES) 31G X 6 MM MISC 1 each by In Vitro route 4 times daily 200 each 3    omeprazole (PRILOSEC) 20 MG delayed release capsule TAKE 1 CAPSULE BY MOUTH EVERY DAY      Cholecalciferol (VITAMIN D PO) Take 1 tablet by mouth every 7 days Pt to clarify dose      furosemide (LASIX) 20 MG tablet Take 40 mg by mouth daily       Fluticasone Propionate (FLONASE NA) 1 spray by Nasal route daily Each nostril      SALINE MIST SPRAY NA 1 spray by Nasal route 3 times daily as needed Each nostril 2-3 times per day      Blood Glucose Monitoring Suppl (ONE TOUCH ULTRA 2) w/Device KIT 1 kit by Does not apply route daily 1 kit 0    Lancets MISC 1 each by Does not apply route 3 times daily 100 each 3    MELATONIN PO Take 1 tablet by mouth nightly       loratadine (CLARITIN) 10 MG tablet Take 10 mg by mouth daily       atorvastatin (LIPITOR) 80 MG tablet Take 80 mg by mouth daily.       gabapentin (NEURONTIN) 300 MG capsule Take one tablet po in the morning and two tablets po in the evening 90 capsule 1 No current facility-administered medications for this visit. I will continue his current medication regimen  which is part of the above treatment schedule. It has been helping with Mr. Michela Norman chronic  medical problems which for this visit include:   Diagnoses of Encounter for therapeutic drug monitoring, Chronic pain syndrome, Lumbar radiculopathy, Myofascial pain, Primary osteoarthritis of left hip, Chronic left shoulder pain, and DDD (degenerative disc disease), lumbar were pertinent to this visit. Risks and benefits of the medications and other alternative treatments  including no treatment were discussed with the patient. The common side effects of these medications were also explained to the patient. Informed verbal consent was obtained. Goals of current treatment regimen include improvement in pain, restoration of functioning- with focus on improvement in physical performance, general activity, work or disability,emotional distress, health care utilization and  decreased medication consumption. Will continue to monitor progress towards achieving/maintaining therapeutic goals with special emphasis on  1. Improvement in perceived interfernce  of pain with ADL's. Ability to do home exercises independently. Ability to do household chores indoor and/or outdoor work and social and leisure activities. Improve psychosocial and physical functioning. - he is showing progression towards this treatment goal with the current regimen. He was advised against drinking alcohol with the narcotic pain medicines, advised against driving or handling machinery while adjusting the dose of medicines or if having cognitive  issues related to the current medications. Risk of overdose and death, if medicines not taken as prescribed, were also discussed. If the patient develops new symptoms or if the symptoms worsen, the patient should call the office. While transcribing every attempt was made to maintain the accuracy of the note in terms of it's contents,there may have been some errors made inadvertently. Thank you for allowing me to participate in the care of this patient.     Radha Obrien MD.    Cc: Gina Bailon MD

## 2021-03-23 RX ORDER — METFORMIN HYDROCHLORIDE 500 MG/1
TABLET, EXTENDED RELEASE ORAL
Qty: 60 TABLET | Refills: 1 | Status: SHIPPED | OUTPATIENT
Start: 2021-03-23 | End: 2021-05-21

## 2021-03-25 ENCOUNTER — VIRTUAL VISIT (OUTPATIENT)
Dept: PAIN MANAGEMENT | Age: 61
End: 2021-03-25
Payer: COMMERCIAL

## 2021-03-25 DIAGNOSIS — G89.4 CHRONIC PAIN SYNDROME: ICD-10-CM

## 2021-03-25 DIAGNOSIS — G89.29 CHRONIC LEFT SHOULDER PAIN: ICD-10-CM

## 2021-03-25 DIAGNOSIS — M16.12 PRIMARY OSTEOARTHRITIS OF LEFT HIP: ICD-10-CM

## 2021-03-25 DIAGNOSIS — M54.16 LUMBAR RADICULOPATHY: ICD-10-CM

## 2021-03-25 DIAGNOSIS — M51.36 DDD (DEGENERATIVE DISC DISEASE), LUMBAR: ICD-10-CM

## 2021-03-25 DIAGNOSIS — M79.18 MYOFASCIAL PAIN: ICD-10-CM

## 2021-03-25 DIAGNOSIS — M25.512 CHRONIC LEFT SHOULDER PAIN: ICD-10-CM

## 2021-03-25 PROCEDURE — 99213 OFFICE O/P EST LOW 20 MIN: CPT | Performed by: INTERNAL MEDICINE

## 2021-03-25 PROCEDURE — 3017F COLORECTAL CA SCREEN DOC REV: CPT | Performed by: INTERNAL MEDICINE

## 2021-03-25 PROCEDURE — G8428 CUR MEDS NOT DOCUMENT: HCPCS | Performed by: INTERNAL MEDICINE

## 2021-03-25 RX ORDER — GABAPENTIN 300 MG/1
CAPSULE ORAL
Qty: 90 CAPSULE | Refills: 1 | Status: SHIPPED | OUTPATIENT
Start: 2021-03-25 | End: 2021-04-22 | Stop reason: SDUPTHER

## 2021-03-25 RX ORDER — HYDROCODONE BITARTRATE AND ACETAMINOPHEN 5; 325 MG/1; MG/1
1 TABLET ORAL EVERY 6 HOURS PRN
Qty: 100 TABLET | Refills: 0 | Status: SHIPPED | OUTPATIENT
Start: 2021-03-25 | End: 2021-04-22 | Stop reason: SDUPTHER

## 2021-03-25 NOTE — PROGRESS NOTES
TELE HEALTH VISIT (AUDIO-VISUAL)    Pursuant to the emergency declaration under the [facility], [facility] waiver authority and the [employer] Act, this Virtual  Visit was conducted, with patient's/legal guardian's consent, to reduce the patient's risk of exposure to COVID-19 and provide continuity of care for an established patient. Service is  provided through a video synchronous discussion virtually to substitute for in-person clinic visit. Due to this being a TeleHealth encounter (During EEDYR-08 public health emergency), evaluation of the following organ systems was limited: Vitals/Constitutional/EENT/Resp/CV/GI//MS/Neuro/Skin/Mxrj-Ydfd-Pzk. Mike Thornton  1960  [id number]    Mr. Steve Hickman is being seen virtually for a follow up visit using one of the following techniques  Google Duo, Face time or Doxy. me  Informed verbal consent to the virtual visit was obtained from Mr. Steve Hickman. Risks associated with HIPPA compliance with the virtual visit was explained to the patient. Mr. Steve Hickman is at his residence and Dr. Aliza Blanchard is in his office. HISTORY OF PRESENT ILLNESS:  Mr. Steve Hickman is a 57 y.o. male returns for a follow up visit for multiple medical problems. His current presenting problems are   1. Chronic pain syndrome    2. Myofascial pain    3. Lumbar radiculopathy    4. Primary osteoarthritis of left hip    . As per information/history obtained from the PADT(patient assessment and documentation tool) - He complains of pain in the shoulders Left, lower back and lower leg Left  He rates the pain 7/10 and describes it as aching. Pain is made worse by: walking, standing. Current treatment regimen has helped relieve about 60% of the pain. He denies side effects from the current pain regimen. Patient reports that since the last follow up visit the physical functioning is better, family/social relationships are unchanged, mood is unchanged and sleep patterns are unchanged, and that the overall functioning is better. Patient denies neurological bowel or bladder. Patient denies misusing/abusing his narcotic pain medications or using any illegal drugs. There are No indicators for possible drug abuse, addiction or diversion problems. Upon obtaining the medical history from Mr. Moon Wagner regarding today's office visit for his presenting problems, patient states he has been doing fair. He states he ahs been managing his ADL's and working full time still. He mentions he is using Norco along with Mobic and Robaxin. Mr. Moon Wagner states he is still using Neurontin as before. Patient denies any constipation symptoms. He states he is thinking of starting Gym. ALLERGIES: Patients list of allergies were reviewed     MEDICATIONS: Mr. Moon Wagner list of medications were reviewed. His current medications are   Outpatient Medications Prior to Visit   Medication Sig Dispense Refill    metFORMIN (GLUCOPHAGE-XR) 500 MG extended release tablet TAKE TWO TABLETS BY MOUTH DAILY WITH BREAKFAST 60 tablet 1    HYDROcodone-acetaminophen (NORCO) 5-325 MG per tablet Take 1 tablet by mouth every 6 hours as needed for Pain (max 3-4 day) for up to 28 days.  100 tablet 0    meloxicam (MOBIC) 15 MG tablet Take one tablet po every Monday,wednesday, and Friday 30 tablet 1    methocarbamol (ROBAXIN) 500 MG tablet Take 1 tablet by mouth 2 times daily as needed (muscle stiffness) 60 tablet 1    lisinopril (PRINIVIL;ZESTRIL) 30 MG tablet Take 30 mg by mouth daily      blood glucose test strips (FREESTYLE LITE) strip USE TO TEST FOUR TIMES A  strip 0    Blood Glucose Monitoring Suppl (FREESTYLE LITE) RO As needed 1 Device 3    Dulaglutide (TRULICITY) 1.5 UU/3.1KD SOPN Inject 1.5 mg into the skin once a week 4 pen 2    FreeStyle Lancets MISC USE AS DIRECTED 100 each 3    insulin aspart (NOVOLOG FLEXPEN) 100 UNIT/ML injection pen 18-24 units AC TID 10 pen 3    Insulin place, and time. Coordination is  normal.  His mood isAppropriate and affect is Neutral/Euthymic(normal) . His    IMPRESSION:   1. Chronic pain syndrome    2. Myofascial pain    3. Lumbar radiculopathy    4. Primary osteoarthritis of left hip    5. DDD (degenerative disc disease), lumbar    6. Chronic left shoulder pain        PLAN:  Informed verbal consent was obtained  -continue with current opioid regimen Norco 3-4 per day  -He was advised weight reduction, diet changes- 800-1200 shad diet, diet diary, exercising, nutritional  consult increased physical activity as tolerated   -Monitor blood sugar regularly, diabetic control- adv diabetic diet. Goal for fasting blood sugars around 120. Follow up with Endocrinologist/PCP also for on going management    -walking/stretching exercises as advised    -He was advised to increase fluids ( 5-7  glasses of fluid daily), limit caffeine, avoid cheese products, increase dietary fiber, increase activity and exercise as tolerated and relax regularly and enjoy meals      Current Outpatient Medications   Medication Sig Dispense Refill    metFORMIN (GLUCOPHAGE-XR) 500 MG extended release tablet TAKE TWO TABLETS BY MOUTH DAILY WITH BREAKFAST 60 tablet 1    HYDROcodone-acetaminophen (NORCO) 5-325 MG per tablet Take 1 tablet by mouth every 6 hours as needed for Pain (max 3-4 day) for up to 28 days.  100 tablet 0    meloxicam (MOBIC) 15 MG tablet Take one tablet po every Monday,wednesday, and Friday 30 tablet 1    methocarbamol (ROBAXIN) 500 MG tablet Take 1 tablet by mouth 2 times daily as needed (muscle stiffness) 60 tablet 1    lisinopril (PRINIVIL;ZESTRIL) 30 MG tablet Take 30 mg by mouth daily      blood glucose test strips (FREESTYLE LITE) strip USE TO TEST FOUR TIMES A  strip 0    Blood Glucose Monitoring Suppl (FREESTYLE LITE) RO As needed 1 Device 3    Dulaglutide (TRULICITY) 1.5 AISSATOU/1.9OR SOPN Inject 1.5 mg into the skin once a week 4 pen 2    FreeStyle Lancets MISC USE AS DIRECTED 100 each 3    insulin aspart (NOVOLOG FLEXPEN) 100 UNIT/ML injection pen 18-24 units AC TID 10 pen 3    Insulin Pen Needle 32G X 4 MM MISC 1 each by Does not apply route 4 times daily 120 each 3    FreeStyle Lancets MISC USE FOUR TIMES A  each 0    insulin glargine (LANTUS SOLOSTAR) 100 UNIT/ML injection pen INJECT 46 UNITS UNDER THE SKIN DAILY 5 pen 0    Insulin Pen Needle (PEN NEEDLES) 31G X 6 MM MISC 1 each by In Vitro route 4 times daily 200 each 3    omeprazole (PRILOSEC) 20 MG delayed release capsule TAKE 1 CAPSULE BY MOUTH EVERY DAY      Cholecalciferol (VITAMIN D PO) Take 1 tablet by mouth every 7 days Pt to clarify dose      furosemide (LASIX) 20 MG tablet Take 40 mg by mouth daily       Fluticasone Propionate (FLONASE NA) 1 spray by Nasal route daily Each nostril      SALINE MIST SPRAY NA 1 spray by Nasal route 3 times daily as needed Each nostril 2-3 times per day      Blood Glucose Monitoring Suppl (ONE TOUCH ULTRA 2) w/Device KIT 1 kit by Does not apply route daily 1 kit 0    Lancets MISC 1 each by Does not apply route 3 times daily 100 each 3    MELATONIN PO Take 1 tablet by mouth nightly       loratadine (CLARITIN) 10 MG tablet Take 10 mg by mouth daily       atorvastatin (LIPITOR) 80 MG tablet Take 80 mg by mouth daily.  gabapentin (NEURONTIN) 300 MG capsule Take one tablet po in the morning and two tablets po in the evening 90 capsule 1     No current facility-administered medications for this visit. I will continue his current medication regimen  which is part of the above treatment schedule. It has been helping with Mr. Antonio Garcia chronic  medical problems which for this visit include:   Diagnoses of Chronic pain syndrome, Myofascial pain, Lumbar radiculopathy, and Primary osteoarthritis of left hip were pertinent to this visit.    Risks and benefits of the medications and other alternative treatments  including no treatment were discussed with the patient. The common side effects of these medications were also explained to the patient. Informed verbal consent was obtained. Goals of current treatment regimen include improvement in pain, restoration of functioning- with focus on improvement in physical performance, general activity, work or disability,emotional distress, health care utilization and  decreased medication consumption. Will continue to monitor progress towards achieving/maintaining therapeutic goals with special emphasis on  1. Improvement in perceived interfernce  of pain with ADL's. Ability to do home exercises independently. Ability to do household chores indoor and/or outdoor work and social and leisure activities. Improve psychosocial and physical functioning. - he is showing progression towards this treatment goal with the current regimen. He was advised against drinking alcohol with the narcotic pain medicines, advised against driving or handling machinery while adjusting the dose of medicines or if having cognitive  issues related to the current medications. Risk of overdose and death, if medicines not taken as prescribed, were also discussed. If the patient develops new symptoms or if the symptoms worsen, the patient should call the office. While transcribing every attempt was made to maintain the accuracy of the note in terms of it's contents,there may have been some errors made inadvertently. Thank you for allowing me to participate in the care of this patient.     Saji Yee MD.    Cc: Ronda Glover MD

## 2021-04-22 ENCOUNTER — VIRTUAL VISIT (OUTPATIENT)
Dept: PAIN MANAGEMENT | Age: 61
End: 2021-04-22
Payer: COMMERCIAL

## 2021-04-22 DIAGNOSIS — G89.29 CHRONIC LEFT SHOULDER PAIN: ICD-10-CM

## 2021-04-22 DIAGNOSIS — M16.12 PRIMARY OSTEOARTHRITIS OF LEFT HIP: ICD-10-CM

## 2021-04-22 DIAGNOSIS — M25.512 CHRONIC LEFT SHOULDER PAIN: ICD-10-CM

## 2021-04-22 DIAGNOSIS — M51.36 DDD (DEGENERATIVE DISC DISEASE), LUMBAR: ICD-10-CM

## 2021-04-22 DIAGNOSIS — M79.18 MYOFASCIAL PAIN: ICD-10-CM

## 2021-04-22 DIAGNOSIS — M54.16 LUMBAR RADICULOPATHY: ICD-10-CM

## 2021-04-22 DIAGNOSIS — G89.4 CHRONIC PAIN SYNDROME: ICD-10-CM

## 2021-04-22 PROCEDURE — 99213 OFFICE O/P EST LOW 20 MIN: CPT | Performed by: INTERNAL MEDICINE

## 2021-04-22 PROCEDURE — 3017F COLORECTAL CA SCREEN DOC REV: CPT | Performed by: INTERNAL MEDICINE

## 2021-04-22 PROCEDURE — G8427 DOCREV CUR MEDS BY ELIG CLIN: HCPCS | Performed by: INTERNAL MEDICINE

## 2021-04-22 RX ORDER — METHOCARBAMOL 500 MG/1
500 TABLET, FILM COATED ORAL 2 TIMES DAILY PRN
Qty: 60 TABLET | Refills: 1 | Status: SHIPPED | OUTPATIENT
Start: 2021-04-22 | End: 2021-05-27 | Stop reason: SDUPTHER

## 2021-04-22 RX ORDER — HYDROCODONE BITARTRATE AND ACETAMINOPHEN 5; 325 MG/1; MG/1
1 TABLET ORAL EVERY 6 HOURS PRN
Qty: 120 TABLET | Refills: 0 | Status: SHIPPED | OUTPATIENT
Start: 2021-04-22 | End: 2021-05-27 | Stop reason: SDUPTHER

## 2021-04-22 RX ORDER — GABAPENTIN 300 MG/1
CAPSULE ORAL
Qty: 90 CAPSULE | Refills: 1 | Status: SHIPPED | OUTPATIENT
Start: 2021-04-22 | End: 2021-05-27 | Stop reason: SDUPTHER

## 2021-04-22 RX ORDER — MELOXICAM 15 MG/1
TABLET ORAL
Qty: 30 TABLET | Refills: 1 | Status: SHIPPED | OUTPATIENT
Start: 2021-04-22 | End: 2021-05-27 | Stop reason: SDUPTHER

## 2021-04-22 NOTE — PROGRESS NOTES
skin once a week 4 pen 2    FreeStyle Lancets MISC USE AS DIRECTED 100 each 3    insulin aspart (NOVOLOG FLEXPEN) 100 UNIT/ML injection pen 18-24 units AC TID 10 pen 3    Insulin Pen Needle 32G X 4 MM MISC 1 each by Does not apply route 4 times daily 120 each 3    FreeStyle Lancets MISC USE FOUR TIMES A  each 0    insulin glargine (LANTUS SOLOSTAR) 100 UNIT/ML injection pen INJECT 46 UNITS UNDER THE SKIN DAILY 5 pen 0    Insulin Pen Needle (PEN NEEDLES) 31G X 6 MM MISC 1 each by In Vitro route 4 times daily 200 each 3    omeprazole (PRILOSEC) 20 MG delayed release capsule TAKE 1 CAPSULE BY MOUTH EVERY DAY      Cholecalciferol (VITAMIN D PO) Take 1 tablet by mouth every 7 days Pt to clarify dose      furosemide (LASIX) 20 MG tablet Take 40 mg by mouth daily       Fluticasone Propionate (FLONASE NA) 1 spray by Nasal route daily Each nostril      SALINE MIST SPRAY NA 1 spray by Nasal route 3 times daily as needed Each nostril 2-3 times per day      Blood Glucose Monitoring Suppl (ONE TOUCH ULTRA 2) w/Device KIT 1 kit by Does not apply route daily 1 kit 0    Lancets MISC 1 each by Does not apply route 3 times daily 100 each 3    MELATONIN PO Take 1 tablet by mouth nightly       loratadine (CLARITIN) 10 MG tablet Take 10 mg by mouth daily       atorvastatin (LIPITOR) 80 MG tablet Take 80 mg by mouth daily. No facility-administered medications prior to visit. REVIEW OF SYSTEMS:    Respiratory: Negative for apnea, chest tightness and shortness of breath or change in baseline breathing. PHYSICAL EXAM:   Nursing note and vitals reviewed. There were no vitals taken for this visit. Constitutional: He appears well-developed and well-nourished. No acute distress. Cardiovascular: Normal rate, regular rhythm, normal heart sounds, and does not have murmur. Pulmonary/Chest: Effort normal. No respiratory distress. He does not have wheezes in the lung fields. He has no rales. Neurological/Psychiatric:He is alert and oriented to person, place, and time. Coordination is  normal.  His mood isAppropriate and affect is Neutral/Euthymic(normal) . His    IMPRESSION:   1. Chronic pain syndrome    2. Myofascial pain    3. Lumbar radiculopathy    4. Chronic left shoulder pain    5. Primary osteoarthritis of left hip    6. DDD (degenerative disc disease), lumbar        PLAN:  Informed verbal consent was obtained  -[place] was obtained and reviewed  for the last one year and no indicators of drug misuse  were found. Any other controlled substance prescriptions  seen on the record have been accounted for, I am aware of the patient receiving these medications. Inda Brittle [place] record will be rechecked as part of office protocol.    -continue with current opioid regimen Norco 3-4 per day  -walking/stretching exercises as advised    -Continue with all other adjuvant medications as before   -continue with Neurontin 900 mg along with Mobic PRN      Current Outpatient Medications   Medication Sig Dispense Refill    HYDROcodone-acetaminophen (NORCO) 5-325 MG per tablet Take 1 tablet by mouth every 6 hours as needed for Pain (max 3-4 day) for up to 28 days.  100 tablet 0    gabapentin (NEURONTIN) 300 MG capsule Take one tablet po in the morning and two tablets po in the evening 90 capsule 1    metFORMIN (GLUCOPHAGE-XR) 500 MG extended release tablet TAKE TWO TABLETS BY MOUTH DAILY WITH BREAKFAST 60 tablet 1    meloxicam (MOBIC) 15 MG tablet Take one tablet po every Monday,wednesday, and Friday 30 tablet 1    methocarbamol (ROBAXIN) 500 MG tablet Take 1 tablet by mouth 2 times daily as needed (muscle stiffness) 60 tablet 1    lisinopril (PRINIVIL;ZESTRIL) 30 MG tablet Take 30 mg by mouth daily      blood glucose test strips (FREESTYLE LITE) strip USE TO TEST FOUR TIMES A  strip 0    Blood Glucose Monitoring Suppl (FREESTYLE LITE) RO As needed 1 Device 3    Dulaglutide (TRULICITY) 1.5 LV/1.5PP SOPN Inject 1.5 mg into the skin once a week 4 pen 2    FreeStyle Lancets MISC USE AS DIRECTED 100 each 3    insulin aspart (NOVOLOG FLEXPEN) 100 UNIT/ML injection pen 18-24 units AC TID 10 pen 3    Insulin Pen Needle 32G X 4 MM MISC 1 each by Does not apply route 4 times daily 120 each 3    FreeStyle Lancets MISC USE FOUR TIMES A  each 0    insulin glargine (LANTUS SOLOSTAR) 100 UNIT/ML injection pen INJECT 46 UNITS UNDER THE SKIN DAILY 5 pen 0    Insulin Pen Needle (PEN NEEDLES) 31G X 6 MM MISC 1 each by In Vitro route 4 times daily 200 each 3    omeprazole (PRILOSEC) 20 MG delayed release capsule TAKE 1 CAPSULE BY MOUTH EVERY DAY      Cholecalciferol (VITAMIN D PO) Take 1 tablet by mouth every 7 days Pt to clarify dose      furosemide (LASIX) 20 MG tablet Take 40 mg by mouth daily       Fluticasone Propionate (FLONASE NA) 1 spray by Nasal route daily Each nostril      SALINE MIST SPRAY NA 1 spray by Nasal route 3 times daily as needed Each nostril 2-3 times per day      Blood Glucose Monitoring Suppl (ONE TOUCH ULTRA 2) w/Device KIT 1 kit by Does not apply route daily 1 kit 0    Lancets MISC 1 each by Does not apply route 3 times daily 100 each 3    MELATONIN PO Take 1 tablet by mouth nightly       loratadine (CLARITIN) 10 MG tablet Take 10 mg by mouth daily       atorvastatin (LIPITOR) 80 MG tablet Take 80 mg by mouth daily. No current facility-administered medications for this visit. I will continue his current medication regimen  which is part of the above treatment schedule. It has been helping with Mr. Grace Campbell chronic  medical problems which for this visit include:   Diagnoses of Chronic pain syndrome, Myofascial pain, Lumbar radiculopathy, Chronic left shoulder pain, and Primary osteoarthritis of left hip were pertinent to this visit. Risks and benefits of the medications and other alternative treatments  including no treatment were discussed with the patient. The common side effects of these medications were also explained to the patient. Informed verbal consent was obtained. Goals of current treatment regimen include improvement in pain, restoration of functioning- with focus on improvement in physical performance, general activity, work or disability,emotional distress, health care utilization and  decreased medication consumption. Will continue to monitor progress towards achieving/maintaining therapeutic goals with special emphasis on  1. Improvement in perceived interfernce  of pain with ADL's. Ability to do home exercises independently. Ability to do household chores indoor and/or outdoor work and social and leisure activities. Improve psychosocial and physical functioning. - he is showing progression towards this treatment goal with the current regimen. He was advised against drinking alcohol with the narcotic pain medicines, advised against driving or handling machinery while adjusting the dose of medicines or if having cognitive  issues related to the current medications. Risk of overdose and death, if medicines not taken as prescribed, were also discussed. If the patient develops new symptoms or if the symptoms worsen, the patient should call the office. While transcribing every attempt was made to maintain the accuracy of the note in terms of it's contents,there may have been some errors made inadvertently. Thank you for allowing me to participate in the care of this patient.     Maggi Negro MD.    Cc: Liu Chiu MD

## 2021-05-21 RX ORDER — METFORMIN HYDROCHLORIDE 500 MG/1
TABLET, EXTENDED RELEASE ORAL
Qty: 60 TABLET | Refills: 0 | Status: SHIPPED | OUTPATIENT
Start: 2021-05-21 | End: 2021-06-23

## 2021-05-27 ENCOUNTER — VIRTUAL VISIT (OUTPATIENT)
Dept: PAIN MANAGEMENT | Age: 61
End: 2021-05-27
Payer: COMMERCIAL

## 2021-05-27 DIAGNOSIS — M54.16 LUMBAR RADICULOPATHY: ICD-10-CM

## 2021-05-27 DIAGNOSIS — G89.29 CHRONIC LEFT SHOULDER PAIN: ICD-10-CM

## 2021-05-27 DIAGNOSIS — G89.4 CHRONIC PAIN SYNDROME: ICD-10-CM

## 2021-05-27 DIAGNOSIS — M25.512 CHRONIC LEFT SHOULDER PAIN: ICD-10-CM

## 2021-05-27 DIAGNOSIS — M51.36 DDD (DEGENERATIVE DISC DISEASE), LUMBAR: ICD-10-CM

## 2021-05-27 DIAGNOSIS — M16.12 PRIMARY OSTEOARTHRITIS OF LEFT HIP: ICD-10-CM

## 2021-05-27 DIAGNOSIS — M79.18 MYOFASCIAL PAIN: ICD-10-CM

## 2021-05-27 PROCEDURE — 3017F COLORECTAL CA SCREEN DOC REV: CPT | Performed by: INTERNAL MEDICINE

## 2021-05-27 PROCEDURE — 99213 OFFICE O/P EST LOW 20 MIN: CPT | Performed by: INTERNAL MEDICINE

## 2021-05-27 PROCEDURE — G8428 CUR MEDS NOT DOCUMENT: HCPCS | Performed by: INTERNAL MEDICINE

## 2021-05-27 RX ORDER — GABAPENTIN 300 MG/1
CAPSULE ORAL
Qty: 90 CAPSULE | Refills: 1 | Status: SHIPPED | OUTPATIENT
Start: 2021-05-27 | End: 2021-07-01 | Stop reason: SDUPTHER

## 2021-05-27 RX ORDER — METHOCARBAMOL 500 MG/1
500 TABLET, FILM COATED ORAL 2 TIMES DAILY PRN
Qty: 60 TABLET | Refills: 1 | Status: SHIPPED | OUTPATIENT
Start: 2021-05-27 | End: 2021-07-01 | Stop reason: SDUPTHER

## 2021-05-27 RX ORDER — HYDROCODONE BITARTRATE AND ACETAMINOPHEN 5; 325 MG/1; MG/1
1 TABLET ORAL EVERY 6 HOURS PRN
Qty: 120 TABLET | Refills: 0 | Status: SHIPPED | OUTPATIENT
Start: 2021-05-27 | End: 2021-07-01 | Stop reason: SDUPTHER

## 2021-05-27 RX ORDER — MELOXICAM 15 MG/1
TABLET ORAL
Qty: 30 TABLET | Refills: 1 | Status: SHIPPED | OUTPATIENT
Start: 2021-05-27 | End: 2021-07-01 | Stop reason: SDUPTHER

## 2021-05-27 NOTE — PROGRESS NOTES
better, and that the overall functioning is better. Patient denies neurological bowel or bladder. Patient denies misusing/abusing his narcotic pain medications or using any illegal drugs. There are No indicators for possible drug abuse, addiction or diversion problems. Upon obtaining the medical history from Mr. Heather Moran regarding today's office visit for his presenting problems, patient states he has been doing fair, his pain has been manageable with the medications. He mentions he is using Norco 3 per day, he says he has around 30 pills left from before. Patient denies any constipation symptoms. Patient reports his weight has been stable. ALLERGIES: Patients list of allergies were reviewed     MEDICATIONS: Mr. Heather Moran list of medications were reviewed. His current medications are   Outpatient Medications Prior to Visit   Medication Sig Dispense Refill    metFORMIN (GLUCOPHAGE-XR) 500 MG extended release tablet TAKE TWO TABLETS BY MOUTH DAILY WITH BREAKFAST 60 tablet 0    HYDROcodone-acetaminophen (NORCO) 5-325 MG per tablet Take 1 tablet by mouth every 6 hours as needed for Pain (max 3-4 day) for up to 35 days.  120 tablet 0    gabapentin (NEURONTIN) 300 MG capsule Take one tablet po in the morning and two tablets po in the evening 90 capsule 1    meloxicam (MOBIC) 15 MG tablet Take one tablet po every Monday,wednesday, and Friday 30 tablet 1    methocarbamol (ROBAXIN) 500 MG tablet Take 1 tablet by mouth 2 times daily as needed (muscle stiffness) 60 tablet 1    lisinopril (PRINIVIL;ZESTRIL) 30 MG tablet Take 30 mg by mouth daily      blood glucose test strips (FREESTYLE LITE) strip USE TO TEST FOUR TIMES A  strip 0    Blood Glucose Monitoring Suppl (FREESTYLE LITE) RO As needed 1 Device 3    Dulaglutide (TRULICITY) 1.5 YY/7.3RT SOPN Inject 1.5 mg into the skin once a week 4 pen 2    FreeStyle Lancets MISC USE AS DIRECTED 100 each 3    insulin aspart (NOVOLOG FLEXPEN) 100 UNIT/ML injection Neutral/Euthymic(normal) . His    IMPRESSION:   1. Chronic pain syndrome    2. Myofascial pain    3. Lumbar radiculopathy    4. Chronic left shoulder pain    5. Primary osteoarthritis of left hip    6. DDD (degenerative disc disease), lumbar        PLAN:  Informed verbal consent was obtained  -continue with current opioid regimen Norco 3 per day  -He was advised to increase fluids ( 5-7  glasses of fluid daily), limit caffeine, avoid cheese products, increase dietary fiber, increase activity and exercise as tolerated and relax regularly and enjoy meals   -walking/stretching exercises as advised       Current Outpatient Medications   Medication Sig Dispense Refill    metFORMIN (GLUCOPHAGE-XR) 500 MG extended release tablet TAKE TWO TABLETS BY MOUTH DAILY WITH BREAKFAST 60 tablet 0    HYDROcodone-acetaminophen (NORCO) 5-325 MG per tablet Take 1 tablet by mouth every 6 hours as needed for Pain (max 3-4 day) for up to 35 days.  120 tablet 0    gabapentin (NEURONTIN) 300 MG capsule Take one tablet po in the morning and two tablets po in the evening 90 capsule 1    meloxicam (MOBIC) 15 MG tablet Take one tablet po every Monday,wednesday, and Friday 30 tablet 1    methocarbamol (ROBAXIN) 500 MG tablet Take 1 tablet by mouth 2 times daily as needed (muscle stiffness) 60 tablet 1    lisinopril (PRINIVIL;ZESTRIL) 30 MG tablet Take 30 mg by mouth daily      blood glucose test strips (FREESTYLE LITE) strip USE TO TEST FOUR TIMES A  strip 0    Blood Glucose Monitoring Suppl (FREESTYLE LITE) RO As needed 1 Device 3    Dulaglutide (TRULICITY) 1.5 GH/1.7YJ SOPN Inject 1.5 mg into the skin once a week 4 pen 2    FreeStyle Lancets MISC USE AS DIRECTED 100 each 3    insulin aspart (NOVOLOG FLEXPEN) 100 UNIT/ML injection pen 18-24 units AC TID 10 pen 3    Insulin Pen Needle 32G X 4 MM MISC 1 each by Does not apply route 4 times daily 120 each 3    FreeStyle Lancets MISC USE FOUR TIMES A  each 0    insulin glargine (LANTUS SOLOSTAR) 100 UNIT/ML injection pen INJECT 46 UNITS UNDER THE SKIN DAILY 5 pen 0    Insulin Pen Needle (PEN NEEDLES) 31G X 6 MM MISC 1 each by In Vitro route 4 times daily 200 each 3    omeprazole (PRILOSEC) 20 MG delayed release capsule TAKE 1 CAPSULE BY MOUTH EVERY DAY      Cholecalciferol (VITAMIN D PO) Take 1 tablet by mouth every 7 days Pt to clarify dose      furosemide (LASIX) 20 MG tablet Take 40 mg by mouth daily       Fluticasone Propionate (FLONASE NA) 1 spray by Nasal route daily Each nostril      SALINE MIST SPRAY NA 1 spray by Nasal route 3 times daily as needed Each nostril 2-3 times per day      Blood Glucose Monitoring Suppl (ONE TOUCH ULTRA 2) w/Device KIT 1 kit by Does not apply route daily 1 kit 0    Lancets MISC 1 each by Does not apply route 3 times daily 100 each 3    MELATONIN PO Take 1 tablet by mouth nightly       loratadine (CLARITIN) 10 MG tablet Take 10 mg by mouth daily       atorvastatin (LIPITOR) 80 MG tablet Take 80 mg by mouth daily. No current facility-administered medications for this visit. I will continue his current medication regimen  which is part of the above treatment schedule. It has been helping with Mr. Sai Basurto chronic  medical problems which for this visit include:   Diagnoses of Chronic pain syndrome, Myofascial pain, Lumbar radiculopathy, Chronic left shoulder pain, Primary osteoarthritis of left hip, and DDD (degenerative disc disease), lumbar were pertinent to this visit. Risks and benefits of the medications and other alternative treatments  including no treatment were discussed with the patient. The common side effects of these medications were also explained to the patient. Informed verbal consent was obtained.    Goals of current treatment regimen include improvement in pain, restoration of functioning- with focus on improvement in physical performance, general activity, work or disability,emotional distress, health care utilization and  decreased medication consumption. Will continue to monitor progress towards achieving/maintaining therapeutic goals with special emphasis on  1. Improvement in perceived interfernce  of pain with ADL's. Ability to do home exercises independently. Ability to do household chores indoor and/or outdoor work and social and leisure activities. Improve psychosocial and physical functioning. - he is showing progression towards this treatment goal with the current regimen. He was advised against drinking alcohol with the narcotic pain medicines, advised against driving or handling machinery while adjusting the dose of medicines or if having cognitive  issues related to the current medications. Risk of overdose and death, if medicines not taken as prescribed, were also discussed. If the patient develops new symptoms or if the symptoms worsen, the patient should call the office. While transcribing every attempt was made to maintain the accuracy of the note in terms of it's contents,there may have been some errors made inadvertently. Thank you for allowing me to participate in the care of this patient.     Bere Whatley MD.    Cc: Edna Baker MD

## 2021-05-28 ENCOUNTER — VIRTUAL VISIT (OUTPATIENT)
Dept: ENDOCRINOLOGY | Age: 61
End: 2021-05-28
Payer: COMMERCIAL

## 2021-05-28 DIAGNOSIS — E11.65 UNCONTROLLED TYPE 2 DIABETES MELLITUS WITH HYPERGLYCEMIA (HCC): Primary | ICD-10-CM

## 2021-05-28 PROCEDURE — 3046F HEMOGLOBIN A1C LEVEL >9.0%: CPT | Performed by: INTERNAL MEDICINE

## 2021-05-28 PROCEDURE — 3017F COLORECTAL CA SCREEN DOC REV: CPT | Performed by: INTERNAL MEDICINE

## 2021-05-28 PROCEDURE — 2022F DILAT RTA XM EVC RTNOPTHY: CPT | Performed by: INTERNAL MEDICINE

## 2021-05-28 PROCEDURE — G8427 DOCREV CUR MEDS BY ELIG CLIN: HCPCS | Performed by: INTERNAL MEDICINE

## 2021-05-28 PROCEDURE — 99214 OFFICE O/P EST MOD 30 MIN: CPT | Performed by: INTERNAL MEDICINE

## 2021-05-28 RX ORDER — INSULIN GLARGINE 100 [IU]/ML
INJECTION, SOLUTION SUBCUTANEOUS
Qty: 5 PEN | Refills: 2 | Status: SHIPPED | OUTPATIENT
Start: 2021-05-28 | End: 2021-11-08 | Stop reason: SDUPTHER

## 2021-05-28 NOTE — PROGRESS NOTES
Seen as  patient for diabetes      Interim:     Seen after 11/20  Tolerating higher dose of trulicity      Diagnosed with Type 2 diabetes mellitus in  2010  Known diabetic complications: none     Current diabetic medications     Metformin ER 500mg 2 tab daily  basaglar 40 units   Humalog 22 units AC TID  But starting at 17-18   SSI 2 for 50 >150 combined scale    Trulicity 1.5        Last A1c 9.1%<----8.1%<------9.7%<----11.4% <-----13.9%<----- 14.1<--- 11.4 <--- 9.9 <------6.3%     Prior visit with dietician: no  Current diet: on average, 3 meals per day  No CHO counting  Current exercise:yes  Current monitoring regimen: home blood tests - 1-3  times daily     Has brought blood glucose log/meter:yes  Home blood sugar records:100s  Any episodes of hypoglycemia? 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  120 tablet 0    gabapentin (NEURONTIN) 300 MG capsule Take one tablet po in the morning and two tablets po in the evening 90 capsule 1    meloxicam (MOBIC) 15 MG tablet Take one tablet po every Monday,wednesday, and Friday 30 tablet 1    methocarbamol (ROBAXIN) 500 MG tablet Take 1 tablet by mouth 2 times daily as needed (muscle stiffness) 60 tablet 1    metFORMIN (GLUCOPHAGE-XR) 500 MG extended release tablet TAKE TWO TABLETS BY MOUTH DAILY WITH BREAKFAST 60 tablet 0    lisinopril (PRINIVIL;ZESTRIL) 30 MG tablet Take 30 mg by mouth daily      blood glucose test strips (FREESTYLE LITE) strip USE TO TEST FOUR TIMES A  strip 0    Blood Glucose Monitoring Suppl (FREESTYLE LITE) RO As needed 1 Device 3    Dulaglutide (TRULICITY) 1.5 YE/5.3PJ SOPN Inject 1.5 mg into the skin once a week 4 pen 2    FreeStyle Lancets MISC USE AS DIRECTED 100 each 3    insulin aspart (NOVOLOG FLEXPEN) 100 UNIT/ML injection pen 18-24 units AC TID 10 pen 3    Insulin Pen Needle 32G X 4 MM MISC 1 each by Does not apply route 4 times daily 120 each 3    FreeStyle Lancets MISC USE FOUR TIMES A  each 0    insulin glargine (LANTUS SOLOSTAR) 100 UNIT/ML injection pen INJECT 46 UNITS UNDER THE SKIN DAILY 5 pen 0    Insulin Pen Needle (PEN NEEDLES) 31G X 6 MM MISC 1 each by In Vitro route 4 times daily 200 each 3    omeprazole (PRILOSEC) 20 MG delayed release capsule TAKE 1 CAPSULE BY MOUTH EVERY DAY      Cholecalciferol (VITAMIN D PO) Take 1 tablet by mouth every 7 days Pt to clarify dose      furosemide (LASIX) 20 MG tablet Take 40 mg by mouth daily       Fluticasone Propionate (FLONASE NA) 1 spray by Nasal route daily Each nostril      SALINE MIST SPRAY NA 1 spray by Nasal route 3 times daily as needed Each nostril 2-3 times per day      Lancets MISC 1 each by Does not apply route 3 times daily 100 each 3    MELATONIN PO Take 1 tablet by mouth nightly       loratadine (CLARITIN) 10 MG tablet Take 10 mg by mouth daily       atorvastatin (LIPITOR) 80 MG tablet Take 80 mg by mouth daily.  Blood Glucose Monitoring Suppl (ONE TOUCH ULTRA 2) w/Device KIT 1 kit by Does not apply route daily (Patient not taking: Reported on 5/28/2021) 1 kit 0     No current facility-administered medications for this visit.        Review of Systems  Scanned, reviewed    There were no vitals filed for this visit. Constitutional: Well-developed, appears stated age, cooperative, in no acute distress  H/E/N/M/T:atraumatic, normocephalic, external ears, nose, lips normal without lesions  Eyes: Lids, lashes, conjunctivae and sclerae normal, No proptosis, no redness  Neck: supple, symmetrical, no swelling  Skin: No obvious rashes or lesions present. Skin and hair texture normal  Psychiatric: Judgement and Insight:  judgement and insight appear normal  Neuro: Normal without focal findings, speech is normal normal, speech is spontaneous  Chest: No labored breathing, no chest deformity, no stridor  Musculoskeletal: No joint deformity, swelling      11/20 foot exam : monofilament detected, no ulcer    Lab Reviewed   No components found for: CHLPL  No results found for: TRIG  No results found for: HDL  No results found for: LDLCALC  No results found for: LABVLDL  Lab Results   Component Value Date    LABA1C 9.1 11/12/2020       Assessment:     Varsha Griffin is a 57 y.o. male with :    1.T2DM : Longstanding, uncontrolled. On metformin. A1c  high. Discussed needs insulin given hyperglycemia. On basal bolus recommend dietary changes. SGLT-2 inhibitor is not covered, off glipizide. Glucose better per log, check A1c  2. Chronic pain syndrome: On neurontin  3. HLD ; On lipitor, LDL at goal  4. Obesity  5. Elevated BP: At goal, on lisinopril     Plan:      Basaglar 40 units   Humalog 18 units AC TID   SSI 2 for 50 >150 combined scale   Advise to check blood sugar 3 times a day   Patient to send blood sugar log for titration every week   Advise to exercise regularly. Advise to low simple carbohydrate and protein with each  meal diet. Diabetes Care: recommend yearly eye exam, foot exam and urine microalbumin to   creatinine ratio.  Patient isdue    -Hyperlipidemia, LDL goal is <100 mg/dl   -Daily ASA: 81mg daily

## 2021-06-21 RX ORDER — DULAGLUTIDE 1.5 MG/.5ML
INJECTION, SOLUTION SUBCUTANEOUS
Qty: 4 PEN | Refills: 1 | Status: SHIPPED | OUTPATIENT
Start: 2021-06-21 | End: 2021-11-08 | Stop reason: SDUPTHER

## 2021-06-21 NOTE — TELEPHONE ENCOUNTER
Medication:   Requested Prescriptions     Pending Prescriptions Disp Refills    TRULICITY 1.5 QN/8.4TY SOPN [Pharmacy Med Name: Jason Finkell 1.5 VG/3.5 ML PEN] 4 pen 1     Sig: INJECT 1.5 MG UNDER THE SKIN ONCE WEEKLY       Last Filled:      Patient Phone Number: [phone] (home)     Last appt: 05/28/2021  Next appt: Visit date not found    Last Labs DM:   Lab Results   Component Value Date    LABA1C 9.1 11/12/2020

## 2021-06-23 DIAGNOSIS — E11.65 UNCONTROLLED TYPE 2 DIABETES MELLITUS WITH HYPERGLYCEMIA (HCC): Primary | ICD-10-CM

## 2021-06-23 RX ORDER — METFORMIN HYDROCHLORIDE 500 MG/1
TABLET, EXTENDED RELEASE ORAL
Qty: 60 TABLET | Refills: 0 | Status: SHIPPED | OUTPATIENT
Start: 2021-06-23 | End: 2021-07-28

## 2021-06-23 NOTE — TELEPHONE ENCOUNTER
Requested Prescriptions     Pending Prescriptions Disp Refills    metFORMIN (GLUCOPHAGE-XR) 500 MG extended release tablet [Pharmacy Med Name: metFORMIN HCL  MG TABLET] 60 tablet 0     Sig: TAKE TWO TABLETS BY MOUTH DAILY WITH BREAKFAST       FOV: none     LOV:  05/28/2021

## 2021-07-01 ENCOUNTER — VIRTUAL VISIT (OUTPATIENT)
Dept: PAIN MANAGEMENT | Age: 61
End: 2021-07-01
Payer: COMMERCIAL

## 2021-07-01 DIAGNOSIS — M54.16 LUMBAR RADICULOPATHY: ICD-10-CM

## 2021-07-01 DIAGNOSIS — M79.18 MYOFASCIAL PAIN: ICD-10-CM

## 2021-07-01 DIAGNOSIS — G89.4 CHRONIC PAIN SYNDROME: ICD-10-CM

## 2021-07-01 DIAGNOSIS — G89.29 CHRONIC LEFT SHOULDER PAIN: ICD-10-CM

## 2021-07-01 DIAGNOSIS — M16.12 PRIMARY OSTEOARTHRITIS OF LEFT HIP: ICD-10-CM

## 2021-07-01 DIAGNOSIS — M25.512 CHRONIC LEFT SHOULDER PAIN: ICD-10-CM

## 2021-07-01 DIAGNOSIS — M51.36 DDD (DEGENERATIVE DISC DISEASE), LUMBAR: ICD-10-CM

## 2021-07-01 DIAGNOSIS — E11.65 UNCONTROLLED TYPE 2 DIABETES MELLITUS WITH HYPERGLYCEMIA (HCC): ICD-10-CM

## 2021-07-01 PROCEDURE — G8427 DOCREV CUR MEDS BY ELIG CLIN: HCPCS | Performed by: INTERNAL MEDICINE

## 2021-07-01 PROCEDURE — 3017F COLORECTAL CA SCREEN DOC REV: CPT | Performed by: INTERNAL MEDICINE

## 2021-07-01 PROCEDURE — 99213 OFFICE O/P EST LOW 20 MIN: CPT | Performed by: INTERNAL MEDICINE

## 2021-07-01 RX ORDER — GABAPENTIN 300 MG/1
CAPSULE ORAL
Qty: 90 CAPSULE | Refills: 1 | Status: SHIPPED | OUTPATIENT
Start: 2021-07-01 | End: 2021-08-05 | Stop reason: SDUPTHER

## 2021-07-01 RX ORDER — HYDROCODONE BITARTRATE AND ACETAMINOPHEN 5; 325 MG/1; MG/1
1 TABLET ORAL EVERY 6 HOURS PRN
Qty: 120 TABLET | Refills: 0 | Status: SHIPPED | OUTPATIENT
Start: 2021-07-01 | End: 2021-08-05 | Stop reason: SDUPTHER

## 2021-07-01 RX ORDER — INSULIN ASPART 100 [IU]/ML
INJECTION, SOLUTION INTRAVENOUS; SUBCUTANEOUS
Qty: 10 PEN | Refills: 3 | Status: SHIPPED | OUTPATIENT
Start: 2021-07-01 | End: 2021-11-08 | Stop reason: SDUPTHER

## 2021-07-01 RX ORDER — MELOXICAM 15 MG/1
TABLET ORAL
Qty: 30 TABLET | Refills: 1 | Status: SHIPPED | OUTPATIENT
Start: 2021-07-01 | End: 2021-08-05 | Stop reason: SDUPTHER

## 2021-07-01 RX ORDER — METHOCARBAMOL 500 MG/1
500 TABLET, FILM COATED ORAL 2 TIMES DAILY PRN
Qty: 60 TABLET | Refills: 1 | Status: SHIPPED | OUTPATIENT
Start: 2021-07-01 | End: 2021-08-05 | Stop reason: SDUPTHER

## 2021-07-01 NOTE — PROGRESS NOTES
TELE HEALTH VISIT (AUDIO-VISUAL)    Pursuant to the emergency declaration under the [facility] waiver authority and the [email or web address] Act, this Virtual  Visit was conducted, with patient's/legal guardian's consent, to reduce the patient's risk of exposure to COVID-19 and provide continuity of care for an established patient. Service is  provided through a video synchronous discussion virtually to substitute for in-person clinic visit. Due to this being a TeleHealth encounter (During NWP-78 public health emergency), evaluation of the following organ systems was limited: Vitals/Constitutional/EENT/Resp/CV/GI//MS/Neuro/Skin/Ilkf-Rzle-Ryg. Brianevelyn Licea  1960  [id number]    Mr. Gage Crowell is being seen virtually for a follow up visit using one of the following techniques  Google Duo Face time [email or web address] me  Informed verbal consent to the virtual visit was obtained from Mr. Gage Crowell. Risks associated with HIPPA compliance with the virtual visit was explained to the patient. Mr. Gage Crowell is at his residence and Dr. April Blandon is in his office. HISTORY OF PRESENT ILLNESS:  Mr. Gage Crowell is a 57 y.o. male returns for a follow up visit for multiple medical problems. His current presenting problems are   1. Chronic pain syndrome    2. Myofascial pain    3. Lumbar radiculopathy    4. DDD (degenerative disc disease), lumbar    5. Primary osteoarthritis of left hip    6. Chronic left shoulder pain    . As per information/history obtained from the PADT(patient assessment and documentation tool) - He complains of pain in the lower back with radiation to the hips Left and lower leg Left He rates the pain 6/10 and describes it as aching. Pain is made worse by: walking, standing. Current treatment regimen has helped relieve about 70% of the pain. He denies side effects from the current pain regimen. Patient reports that since the last follow up visit the physical functioning is unchanged, family/social relationships are unchanged, mood is unchanged and sleep patterns are unchanged, and that the overall functioning is unchanged. Patient denies neurological bowel or bladder. Patient denies misusing/abusing his narcotic pain medications or using any illegal drugs. There are No indicators for possible drug abuse, addiction or diversion problems. Upon obtaining the medical history from Mr. Short regarding today's office visit for his presenting problems, Mr. Short states he has been doing fair. He states he has been using all his medications as before. Patient denies any constipation symptoms. He mentions he is using Mobic along with Robaxin. Patient reports he has been working at [employer] still. He states he started swimming again. ALLERGIES: Patients list of allergies were reviewed     MEDICATIONS: Mr. Short list of medications were reviewed. His current medications are   Outpatient Medications Prior to Visit   Medication Sig Dispense Refill    metFORMIN (GLUCOPHAGE-XR) 500 MG extended release tablet TAKE TWO TABLETS BY MOUTH DAILY WITH BREAKFAST 60 tablet 0    TRULICITY 1.5 JA/1.0IO SOPN INJECT 1.5 MG UNDER THE SKIN ONCE WEEKLY 4 pen 1    insulin glargine (LANTUS SOLOSTAR) 100 UNIT/ML injection pen INJECT 46 UNITS UNDER THE SKIN DAILY 5 pen 2    HYDROcodone-acetaminophen (NORCO) 5-325 MG per tablet Take 1 tablet by mouth every 6 hours as needed for Pain (max 3-4 day) for up to 35 days.  120 tablet 0    meloxicam (MOBIC) 15 MG tablet Take one tablet po every Monday,wednesday, and Friday 30 tablet 1    methocarbamol (ROBAXIN) 500 MG tablet Take 1 tablet by mouth 2 times daily as needed (muscle stiffness) 60 tablet 1    lisinopril (PRINIVIL;ZESTRIL) 30 MG tablet Take 30 mg by mouth daily      blood glucose test strips (FREESTYLE LITE) strip USE TO TEST FOUR TIMES A  strip 0    Blood Glucose Monitoring Suppl (FREESTYLE LITE) RO As needed 1 Device 3    FreeStyle Lancets MISC USE AS DIRECTED 100 each 3    insulin aspart (NOVOLOG FLEXPEN) 100 UNIT/ML injection pen 18-24 units AC TID 10 pen 3    Insulin Pen Needle 32G X 4 MM MISC 1 each by Does not apply route 4 times daily 120 each 3    FreeStyle Lancets MISC USE FOUR TIMES A  each 0    Insulin Pen Needle (PEN NEEDLES) 31G X 6 MM MISC 1 each by In Vitro route 4 times daily 200 each 3    omeprazole (PRILOSEC) 20 MG delayed release capsule TAKE 1 CAPSULE BY MOUTH EVERY DAY      Cholecalciferol (VITAMIN D PO) Take 1 tablet by mouth every 7 days Pt to clarify dose      furosemide (LASIX) 20 MG tablet Take 40 mg by mouth daily       Fluticasone Propionate (FLONASE NA) 1 spray by Nasal route daily Each nostril      SALINE MIST SPRAY NA 1 spray by Nasal route 3 times daily as needed Each nostril 2-3 times per day      Blood Glucose Monitoring Suppl (ONE TOUCH ULTRA 2) w/Device KIT 1 kit by Does not apply route daily 1 kit 0    Lancets MISC 1 each by Does not apply route 3 times daily 100 each 3    MELATONIN PO Take 1 tablet by mouth nightly       loratadine (CLARITIN) 10 MG tablet Take 10 mg by mouth daily       atorvastatin (LIPITOR) 80 MG tablet Take 80 mg by mouth daily.  gabapentin (NEURONTIN) 300 MG capsule Take one tablet po in the morning and two tablets po in the evening 90 capsule 1     No facility-administered medications prior to visit. REVIEW OF SYSTEMS:    Respiratory: Negative for apnea, chest tightness and shortness of breath or change in baseline breathing. PHYSICAL EXAM:   Nursing note and vitals reviewed. There were no vitals taken for this visit. Constitutional: He appears well-developed and well-nourished. No acute distress. Cardiovascular: Normal rate, regular rhythm, normal heart sounds, and does not have murmur. Pulmonary/Chest: Effort normal. No respiratory distress. He does not have wheezes in the lung fields. He has no rales. Neurological/Psychiatric:He is alert and oriented to person, place, and time. Coordination is  normal.  His mood isAppropriate and affect is Neutral/Euthymic(normal) . His    IMPRESSION:   1. Chronic pain syndrome    2. Myofascial pain    3. Lumbar radiculopathy    4. DDD (degenerative disc disease), lumbar    5. Primary osteoarthritis of left hip    6. Chronic left shoulder pain        PLAN:  Informed verbal consent was obtained  -continue with current opioid regimen Norco 3-4 per day  -He was advised to increase fluids ( 5-7  glasses of fluid daily), limit caffeine, avoid cheese products, increase dietary fiber, increase activity and exercise as tolerated and relax regularly and enjoy meals   -advised to walk 20-30 minutes daily as tolerated      Current Outpatient Medications   Medication Sig Dispense Refill    metFORMIN (GLUCOPHAGE-XR) 500 MG extended release tablet TAKE TWO TABLETS BY MOUTH DAILY WITH BREAKFAST 60 tablet 0    TRULICITY 1.5 HD/0.7OR SOPN INJECT 1.5 MG UNDER THE SKIN ONCE WEEKLY 4 pen 1    insulin glargine (LANTUS SOLOSTAR) 100 UNIT/ML injection pen INJECT 46 UNITS UNDER THE SKIN DAILY 5 pen 2    HYDROcodone-acetaminophen (NORCO) 5-325 MG per tablet Take 1 tablet by mouth every 6 hours as needed for Pain (max 3-4 day) for up to 35 days.  120 tablet 0    meloxicam (MOBIC) 15 MG tablet Take one tablet po every Monday,wednesday, and Friday 30 tablet 1    methocarbamol (ROBAXIN) 500 MG tablet Take 1 tablet by mouth 2 times daily as needed (muscle stiffness) 60 tablet 1    lisinopril (PRINIVIL;ZESTRIL) 30 MG tablet Take 30 mg by mouth daily      blood glucose test strips (FREESTYLE LITE) strip USE TO TEST FOUR TIMES A  strip 0    Blood Glucose Monitoring Suppl (FREESTYLE LITE) RO As needed 1 Device 3    FreeStyle Lancets MISC USE AS DIRECTED 100 each 3    insulin aspart (NOVOLOG FLEXPEN) 100 UNIT/ML injection pen 18-24 units AC TID 10 pen 3    Insulin Pen Needle 32G X 4 MM MISC 1 each by Does not apply route 4 times daily 120 each 3    FreeStyle Lancets MISC USE FOUR TIMES A  each 0    Insulin Pen Needle (PEN NEEDLES) 31G X 6 MM MISC 1 each by In Vitro route 4 times daily 200 each 3    omeprazole (PRILOSEC) 20 MG delayed release capsule TAKE 1 CAPSULE BY MOUTH EVERY DAY      Cholecalciferol (VITAMIN D PO) Take 1 tablet by mouth every 7 days Pt to clarify dose      furosemide (LASIX) 20 MG tablet Take 40 mg by mouth daily       Fluticasone Propionate (FLONASE NA) 1 spray by Nasal route daily Each nostril      SALINE MIST SPRAY NA 1 spray by Nasal route 3 times daily as needed Each nostril 2-3 times per day      Blood Glucose Monitoring Suppl (ONE TOUCH ULTRA 2) w/Device KIT 1 kit by Does not apply route daily 1 kit 0    Lancets MISC 1 each by Does not apply route 3 times daily 100 each 3    MELATONIN PO Take 1 tablet by mouth nightly       loratadine (CLARITIN) 10 MG tablet Take 10 mg by mouth daily       atorvastatin (LIPITOR) 80 MG tablet Take 80 mg by mouth daily.  gabapentin (NEURONTIN) 300 MG capsule Take one tablet po in the morning and two tablets po in the evening 90 capsule 1     No current facility-administered medications for this visit. I will continue his current medication regimen  which is part of the above treatment schedule. It has been helping with Mr. Nallely Youssef chronic  medical problems which for this visit include:   Diagnoses of Chronic pain syndrome, Myofascial pain, Lumbar radiculopathy, DDD (degenerative disc disease), lumbar, Primary osteoarthritis of left hip, and Chronic left shoulder pain were pertinent to this visit. Risks and benefits of the medications and other alternative treatments  including no treatment were discussed with the patient. The common side effects of these medications were also explained to the patient. Informed verbal consent was obtained.    Goals of current treatment regimen include improvement in pain, restoration of functioning- with focus on improvement in physical performance, general activity, work or disability,emotional distress, health care utilization and  decreased medication consumption. Will continue to monitor progress towards achieving/maintaining therapeutic goals with special emphasis on  1. Improvement in perceived interfernce  of pain with ADL's. Ability to do home exercises independently. Ability to do household chores indoor and/or outdoor work and social and leisure activities. Improve psychosocial and physical functioning. - he is showing progression towards this treatment goal with the current regimen. He was advised against drinking alcohol with the narcotic pain medicines, advised against driving or handling machinery while adjusting the dose of medicines or if having cognitive  issues related to the current medications. Risk of overdose and death, if medicines not taken as prescribed, were also discussed. If the patient develops new symptoms or if the symptoms worsen, the patient should call the office. While transcribing every attempt was made to maintain the accuracy of the note in terms of it's contents,there may have been some errors made inadvertently. Thank you for allowing me to participate in the care of this patient.     Nan Kurtz MD.    Cc: Tahira Molina MD

## 2021-07-26 DIAGNOSIS — E11.65 UNCONTROLLED TYPE 2 DIABETES MELLITUS WITH HYPERGLYCEMIA (HCC): ICD-10-CM

## 2021-07-28 RX ORDER — METFORMIN HYDROCHLORIDE 500 MG/1
TABLET, EXTENDED RELEASE ORAL
Qty: 60 TABLET | Refills: 0 | Status: SHIPPED | OUTPATIENT
Start: 2021-07-28 | End: 2021-08-02

## 2021-07-28 NOTE — TELEPHONE ENCOUNTER
Medication:   Requested Prescriptions     Pending Prescriptions Disp Refills    metFORMIN (GLUCOPHAGE-XR) 500 MG extended release tablet [Pharmacy Med Name: METFORMIN HCL  MG TABLET] 60 tablet 0     Sig: TAKE TWO TABLETS BY MOUTH DAILY WITH BREAKFAST       Last Filled:      Patient Phone Number: [phone] (home)     Last appt: 5/28/2021   Next appt: Visit date not found    Last Labs DM:   Lab Results   Component Value Date    LABA1C 9.1 11/12/2020

## 2021-07-31 DIAGNOSIS — E11.65 UNCONTROLLED TYPE 2 DIABETES MELLITUS WITH HYPERGLYCEMIA (HCC): ICD-10-CM

## 2021-08-02 RX ORDER — METFORMIN HYDROCHLORIDE 500 MG/1
TABLET, EXTENDED RELEASE ORAL
Qty: 60 TABLET | Refills: 0 | Status: SHIPPED | OUTPATIENT
Start: 2021-08-02 | End: 2021-08-25

## 2021-08-05 ENCOUNTER — OFFICE VISIT (OUTPATIENT)
Dept: PAIN MANAGEMENT | Age: 61
End: 2021-08-05
Payer: COMMERCIAL

## 2021-08-05 VITALS
BODY MASS INDEX: 36.45 KG/M2 | TEMPERATURE: 98.6 F | WEIGHT: 225.8 LBS | SYSTOLIC BLOOD PRESSURE: 151 MMHG | HEART RATE: 81 BPM | DIASTOLIC BLOOD PRESSURE: 93 MMHG

## 2021-08-05 DIAGNOSIS — M54.16 LUMBAR RADICULOPATHY: ICD-10-CM

## 2021-08-05 DIAGNOSIS — G89.4 CHRONIC PAIN SYNDROME: ICD-10-CM

## 2021-08-05 DIAGNOSIS — G89.29 CHRONIC LEFT SHOULDER PAIN: ICD-10-CM

## 2021-08-05 DIAGNOSIS — M16.12 PRIMARY OSTEOARTHRITIS OF LEFT HIP: ICD-10-CM

## 2021-08-05 DIAGNOSIS — M25.512 CHRONIC LEFT SHOULDER PAIN: ICD-10-CM

## 2021-08-05 DIAGNOSIS — M79.18 MYOFASCIAL PAIN: ICD-10-CM

## 2021-08-05 DIAGNOSIS — M51.36 DDD (DEGENERATIVE DISC DISEASE), LUMBAR: ICD-10-CM

## 2021-08-05 PROCEDURE — 3017F COLORECTAL CA SCREEN DOC REV: CPT | Performed by: INTERNAL MEDICINE

## 2021-08-05 PROCEDURE — G8417 CALC BMI ABV UP PARAM F/U: HCPCS | Performed by: INTERNAL MEDICINE

## 2021-08-05 PROCEDURE — 99213 OFFICE O/P EST LOW 20 MIN: CPT | Performed by: INTERNAL MEDICINE

## 2021-08-05 PROCEDURE — 1036F TOBACCO NON-USER: CPT | Performed by: INTERNAL MEDICINE

## 2021-08-05 PROCEDURE — G8427 DOCREV CUR MEDS BY ELIG CLIN: HCPCS | Performed by: INTERNAL MEDICINE

## 2021-08-05 RX ORDER — GABAPENTIN 300 MG/1
CAPSULE ORAL
Qty: 90 CAPSULE | Refills: 1 | Status: SHIPPED | OUTPATIENT
Start: 2021-08-05 | End: 2021-09-09 | Stop reason: SDUPTHER

## 2021-08-05 RX ORDER — METHOCARBAMOL 500 MG/1
500 TABLET, FILM COATED ORAL 2 TIMES DAILY PRN
Qty: 60 TABLET | Refills: 1 | Status: SHIPPED | OUTPATIENT
Start: 2021-08-05 | End: 2021-09-09 | Stop reason: SDUPTHER

## 2021-08-05 RX ORDER — HYDROCODONE BITARTRATE AND ACETAMINOPHEN 5; 325 MG/1; MG/1
1 TABLET ORAL EVERY 6 HOURS PRN
Qty: 120 TABLET | Refills: 0 | Status: SHIPPED | OUTPATIENT
Start: 2021-08-05 | End: 2021-09-09 | Stop reason: SDUPTHER

## 2021-08-05 RX ORDER — MELOXICAM 15 MG/1
TABLET ORAL
Qty: 30 TABLET | Refills: 1 | Status: SHIPPED | OUTPATIENT
Start: 2021-08-05 | End: 2021-09-09 | Stop reason: SDUPTHER

## 2021-08-05 NOTE — PROGRESS NOTES
tablet by mouth 2 times daily as needed (muscle stiffness) 60 tablet 1    insulin aspart (NOVOLOG FLEXPEN) 100 UNIT/ML injection pen 18-24 units AC TID 10 pen 3    Insulin Pen Needle 32G X 4 MM MISC 1 each by Does not apply route 4 times daily 092 each 3    TRULICITY 1.5 HL/4.4UF SOPN INJECT 1.5 MG UNDER THE SKIN ONCE WEEKLY 4 pen 1    insulin glargine (LANTUS SOLOSTAR) 100 UNIT/ML injection pen INJECT 46 UNITS UNDER THE SKIN DAILY 5 pen 2    lisinopril (PRINIVIL;ZESTRIL) 30 MG tablet Take 30 mg by mouth daily      blood glucose test strips (FREESTYLE LITE) strip USE TO TEST FOUR TIMES A  strip 0    Blood Glucose Monitoring Suppl (FREESTYLE LITE) RO As needed 1 Device 3    FreeStyle Lancets MISC USE AS DIRECTED 100 each 3    FreeStyle Lancets MISC USE FOUR TIMES A  each 0    Insulin Pen Needle (PEN NEEDLES) 31G X 6 MM MISC 1 each by In Vitro route 4 times daily 200 each 3    omeprazole (PRILOSEC) 20 MG delayed release capsule TAKE 1 CAPSULE BY MOUTH EVERY DAY      Cholecalciferol (VITAMIN D PO) Take 1 tablet by mouth every 7 days Pt to clarify dose      furosemide (LASIX) 20 MG tablet Take 40 mg by mouth daily       Fluticasone Propionate (FLONASE NA) 1 spray by Nasal route daily Each nostril      SALINE MIST SPRAY NA 1 spray by Nasal route 3 times daily as needed Each nostril 2-3 times per day      Blood Glucose Monitoring Suppl (ONE TOUCH ULTRA 2) w/Device KIT 1 kit by Does not apply route daily 1 kit 0    Lancets MISC 1 each by Does not apply route 3 times daily 100 each 3    MELATONIN PO Take 1 tablet by mouth nightly       loratadine (CLARITIN) 10 MG tablet Take 10 mg by mouth daily       atorvastatin (LIPITOR) 80 MG tablet Take 80 mg by mouth daily.  gabapentin (NEURONTIN) 300 MG capsule Take one tablet po in the morning and two tablets po in the evening 90 capsule 1     No facility-administered medications prior to visit.         REVIEW OF SYSTEMS:    Respiratory: Negative for apnea, chest tightness and shortness of breath or change in baseline breathing. PHYSICAL EXAM:   Nursing note and vitals reviewed. BP (!) 151/93   Pulse 81   Temp 98.6 °F (37 °C)   Wt 225 lb 12.8 oz (102.4 kg)   BMI 36.45 kg/m²   Constitutional: He appears well-developed and well-nourished. No acute distress. Cardiovascular: Normal rate, regular rhythm, normal heart sounds, and does not have murmur. Pulmonary/Chest: Effort normal. No respiratory distress. He does not have wheezes in the lung fields. He has no rales. Neurological/Psychiatric:He is alert and oriented to person, place, and time. Coordination is  normal.  His mood isAppropriate and affect is Neutral/Euthymic(normal) . His    IMPRESSION:   1. Chronic pain syndrome    2. Myofascial pain    3. Lumbar radiculopathy    4. Chronic left shoulder pain    5. Primary osteoarthritis of left hip    6. DDD (degenerative disc disease), lumbar        PLAN:  Informed verbal consent was obtained  -[place] was obtained and reviewed  for the last one year and no indicators of drug misuse  were found. Any other controlled substance prescriptions  seen on the record have been accounted for, I am aware of the patient receiving these medications. Dillon Ra  [place] record will be rechecked as part of office protocol.    -Continue with current opioid regimen   -Continue with Norco 3-4 per day  -He was advised to increase fluids ( 5-7  glasses of fluid daily), limit caffeine, avoid cheese products, increase dietary fiber, increase activity and exercise as tolerated and relax regularly and enjoy meals   -Advised to walk 20-30 minutes daily as tolerated      Current Outpatient Medications   Medication Sig Dispense Refill    metFORMIN (GLUCOPHAGE-XR) 500 MG extended release tablet TAKE TWO TABLETS BY MOUTH DAILY WITH BREAKFAST 60 tablet 0    HYDROcodone-acetaminophen (NORCO) 5-325 MG per tablet Take 1 tablet by mouth every 6 hours as needed for Pain (max 3-4 day) for up to 35 days. 120 tablet 0    meloxicam (MOBIC) 15 MG tablet Take one tablet po every Monday,wednesday, and Friday 30 tablet 1    methocarbamol (ROBAXIN) 500 MG tablet Take 1 tablet by mouth 2 times daily as needed (muscle stiffness) 60 tablet 1    insulin aspart (NOVOLOG FLEXPEN) 100 UNIT/ML injection pen 18-24 units AC TID 10 pen 3    Insulin Pen Needle 32G X 4 MM MISC 1 each by Does not apply route 4 times daily 143 each 3    TRULICITY 1.5 JI/3.3JJ SOPN INJECT 1.5 MG UNDER THE SKIN ONCE WEEKLY 4 pen 1    insulin glargine (LANTUS SOLOSTAR) 100 UNIT/ML injection pen INJECT 46 UNITS UNDER THE SKIN DAILY 5 pen 2    lisinopril (PRINIVIL;ZESTRIL) 30 MG tablet Take 30 mg by mouth daily      blood glucose test strips (FREESTYLE LITE) strip USE TO TEST FOUR TIMES A  strip 0    Blood Glucose Monitoring Suppl (FREESTYLE LITE) RO As needed 1 Device 3    FreeStyle Lancets MISC USE AS DIRECTED 100 each 3    FreeStyle Lancets MISC USE FOUR TIMES A  each 0    Insulin Pen Needle (PEN NEEDLES) 31G X 6 MM MISC 1 each by In Vitro route 4 times daily 200 each 3    omeprazole (PRILOSEC) 20 MG delayed release capsule TAKE 1 CAPSULE BY MOUTH EVERY DAY      Cholecalciferol (VITAMIN D PO) Take 1 tablet by mouth every 7 days Pt to clarify dose      furosemide (LASIX) 20 MG tablet Take 40 mg by mouth daily       Fluticasone Propionate (FLONASE NA) 1 spray by Nasal route daily Each nostril      SALINE MIST SPRAY NA 1 spray by Nasal route 3 times daily as needed Each nostril 2-3 times per day      Blood Glucose Monitoring Suppl (ONE TOUCH ULTRA 2) w/Device KIT 1 kit by Does not apply route daily 1 kit 0    Lancets MISC 1 each by Does not apply route 3 times daily 100 each 3    MELATONIN PO Take 1 tablet by mouth nightly       loratadine (CLARITIN) 10 MG tablet Take 10 mg by mouth daily       atorvastatin (LIPITOR) 80 MG tablet Take 80 mg by mouth daily.       gabapentin (NEURONTIN) 300 MG capsule Take one tablet po in the morning and two tablets po in the evening 90 capsule 1     No current facility-administered medications for this visit. I will continue his current medication regimen  which is part of the above treatment schedule. It has been helping with Mr. Kylie Zapien chronic  medical problems which for this visit include:   Diagnoses of Chronic pain syndrome, Myofascial pain, Lumbar radiculopathy, Chronic left shoulder pain, Primary osteoarthritis of left hip, and DDD (degenerative disc disease), lumbar were pertinent to this visit. Risks and benefits of the medications and other alternative treatments  including no treatment were discussed with the patient. The common side effects of these medications were also explained to the patient. Informed verbal consent was obtained. Goals of current treatment regimen include improvement in pain, restoration of functioning- with focus on improvement in physical performance, general activity, work or disability,emotional distress, health care utilization and  decreased medication consumption. Will continue to monitor progress towards achieving/maintaining therapeutic goals with special emphasis on  1. Improvement in perceived interfernce  of pain with ADL's. Ability to do home exercises independently. Ability to do household chores indoor and/or outdoor work and social and leisure activities. Improve psychosocial and physical functioning. - he is showing progression towards this treatment goal with the current regimen. He was advised against drinking alcohol with the narcotic pain medicines, advised against driving or handling machinery while adjusting the dose of medicines or if having cognitive  issues related to the current medications. Risk of overdose and death, if medicines not taken as prescribed, were also discussed. If the patient develops new symptoms or if the symptoms worsen, the patient should call the office.     While transcribing every attempt was made to maintain the accuracy of the note in terms of it's contents,there may have been some errors made inadvertently. Thank you for allowing me to participate in the care of this patient.     Alyssia Godoy MD.    Cc: Irving Lopes MD

## 2021-08-25 DIAGNOSIS — E11.65 UNCONTROLLED TYPE 2 DIABETES MELLITUS WITH HYPERGLYCEMIA (HCC): ICD-10-CM

## 2021-08-25 RX ORDER — METFORMIN HYDROCHLORIDE 500 MG/1
TABLET, EXTENDED RELEASE ORAL
Qty: 60 TABLET | Refills: 0 | Status: SHIPPED | OUTPATIENT
Start: 2021-08-25 | End: 2021-11-12 | Stop reason: SDUPTHER

## 2021-08-25 NOTE — TELEPHONE ENCOUNTER
Medication:   Requested Prescriptions     Pending Prescriptions Disp Refills    metFORMIN (GLUCOPHAGE-XR) 500 MG extended release tablet [Pharmacy Med Name: METFORMIN HCL  MG TABLET] 60 tablet 0     Sig: TAKE TWO TABLETS BY MOUTH DAILY WITH BREAKFAST       Last Filled:      Patient Phone Number: [phone] (home)     Last appt: 5/28/2021   Next appt: Visit date not found    Last Labs DM:   Lab Results   Component Value Date    LABA1C 9.1 11/12/2020

## 2021-09-09 ENCOUNTER — OFFICE VISIT (OUTPATIENT)
Dept: PAIN MANAGEMENT | Age: 61
End: 2021-09-09
Payer: COMMERCIAL

## 2021-09-09 VITALS
HEART RATE: 95 BPM | DIASTOLIC BLOOD PRESSURE: 94 MMHG | OXYGEN SATURATION: 98 % | BODY MASS INDEX: 37.06 KG/M2 | WEIGHT: 229.6 LBS | TEMPERATURE: 98.1 F | SYSTOLIC BLOOD PRESSURE: 175 MMHG

## 2021-09-09 DIAGNOSIS — M51.36 DDD (DEGENERATIVE DISC DISEASE), LUMBAR: ICD-10-CM

## 2021-09-09 DIAGNOSIS — M79.18 MYOFASCIAL PAIN: ICD-10-CM

## 2021-09-09 DIAGNOSIS — M54.16 LUMBAR RADICULOPATHY: ICD-10-CM

## 2021-09-09 DIAGNOSIS — M25.512 CHRONIC LEFT SHOULDER PAIN: ICD-10-CM

## 2021-09-09 DIAGNOSIS — G89.29 CHRONIC LEFT SHOULDER PAIN: ICD-10-CM

## 2021-09-09 DIAGNOSIS — G89.4 CHRONIC PAIN SYNDROME: ICD-10-CM

## 2021-09-09 DIAGNOSIS — M16.12 PRIMARY OSTEOARTHRITIS OF LEFT HIP: ICD-10-CM

## 2021-09-09 PROCEDURE — 3017F COLORECTAL CA SCREEN DOC REV: CPT | Performed by: INTERNAL MEDICINE

## 2021-09-09 PROCEDURE — 1036F TOBACCO NON-USER: CPT | Performed by: INTERNAL MEDICINE

## 2021-09-09 PROCEDURE — G8428 CUR MEDS NOT DOCUMENT: HCPCS | Performed by: INTERNAL MEDICINE

## 2021-09-09 PROCEDURE — G8417 CALC BMI ABV UP PARAM F/U: HCPCS | Performed by: INTERNAL MEDICINE

## 2021-09-09 PROCEDURE — 99213 OFFICE O/P EST LOW 20 MIN: CPT | Performed by: INTERNAL MEDICINE

## 2021-09-09 RX ORDER — GABAPENTIN 300 MG/1
CAPSULE ORAL
Qty: 90 CAPSULE | Refills: 1 | Status: SHIPPED | OUTPATIENT
Start: 2021-09-09 | End: 2021-10-14 | Stop reason: SDUPTHER

## 2021-09-09 RX ORDER — MELOXICAM 15 MG/1
TABLET ORAL
Qty: 30 TABLET | Refills: 1 | Status: SHIPPED | OUTPATIENT
Start: 2021-09-09 | End: 2021-10-14 | Stop reason: SDUPTHER

## 2021-09-09 RX ORDER — HYDROCODONE BITARTRATE AND ACETAMINOPHEN 5; 325 MG/1; MG/1
1 TABLET ORAL EVERY 6 HOURS PRN
Qty: 120 TABLET | Refills: 0 | Status: SHIPPED | OUTPATIENT
Start: 2021-09-09 | End: 2021-10-14 | Stop reason: SDUPTHER

## 2021-09-09 RX ORDER — METHOCARBAMOL 500 MG/1
500 TABLET, FILM COATED ORAL 2 TIMES DAILY PRN
Qty: 60 TABLET | Refills: 1 | Status: SHIPPED | OUTPATIENT
Start: 2021-09-09 | End: 2021-10-14 | Stop reason: SDUPTHER

## 2021-09-09 NOTE — PROGRESS NOTES
Karla Ham  1960  [id number]      HISTORY OF PRESENT ILLNESS:  Mr. Holley Winslow is a 57 y.o. male returns for a follow up visit for pain management  He has a diagnosis of   1. Chronic pain syndrome    2. Myofascial pain    3. Lumbar radiculopathy    4. Chronic left shoulder pain    5. Primary osteoarthritis of left hip    6. DDD (degenerative disc disease), lumbar    . He complains of pain in the right shoulder, lower back with radiation to the left knee, bilteral feet He rates the pain 7/10 and describes it as burning, pins and needles. Current treatment regimen has helped relieve about 60% of the pain. He denies any side effects from the current pain regimen. Patient reports that since the last follow up visit the physical functioning is unchanged, family/social relationships are worse, mood is unchanged sleep patterns are unchanged, and that the overall functioning is unchanged. Patient denies misusing/abusing his narcotic pain medications or using any illegal drugs. There are No indicators for possible drug abuse, addiction or diversion problems. Patient states his blood pressure has been high, he has been on medications and seeing his PCP. He states his pain has been baseline, he still hurts. Mr. Holley Winslow is complaining of his back and hips hurting. He mentions he is using Norco 3-4 per day along with the adjuvants. Patient reports he is working full time still. ALLERGIES: Patients list of allergies were reviewed     MEDICATIONS: Mr. Holley Winslow list of medications were reviewed. His current medications are   Outpatient Medications Prior to Visit   Medication Sig Dispense Refill    metFORMIN (GLUCOPHAGE-XR) 500 MG extended release tablet TAKE TWO TABLETS BY MOUTH DAILY WITH BREAKFAST 60 tablet 0    HYDROcodone-acetaminophen (NORCO) 5-325 MG per tablet Take 1 tablet by mouth every 6 hours as needed for Pain (max 3-4 day) for up to 35 days.  120 tablet 0    meloxicam (MOBIC) 15 MG tablet Take 1 tablet po every Monday,Wednesday, and Friday 30 tablet 1    methocarbamol (ROBAXIN) 500 MG tablet Take 1 tablet by mouth 2 times daily as needed (muscle stiffness) 60 tablet 1    insulin aspart (NOVOLOG FLEXPEN) 100 UNIT/ML injection pen 18-24 units AC TID 10 pen 3    Insulin Pen Needle 32G X 4 MM MISC 1 each by Does not apply route 4 times daily 327 each 3    TRULICITY 1.5 FW/4.9WR SOPN INJECT 1.5 MG UNDER THE SKIN ONCE WEEKLY 4 pen 1    insulin glargine (LANTUS SOLOSTAR) 100 UNIT/ML injection pen INJECT 46 UNITS UNDER THE SKIN DAILY 5 pen 2    lisinopril (PRINIVIL;ZESTRIL) 30 MG tablet Take 30 mg by mouth daily      blood glucose test strips (FREESTYLE LITE) strip USE TO TEST FOUR TIMES A  strip 0    Blood Glucose Monitoring Suppl (FREESTYLE LITE) RO As needed 1 Device 3    FreeStyle Lancets MISC USE AS DIRECTED 100 each 3    FreeStyle Lancets MISC USE FOUR TIMES A  each 0    Insulin Pen Needle (PEN NEEDLES) 31G X 6 MM MISC 1 each by In Vitro route 4 times daily 200 each 3    omeprazole (PRILOSEC) 20 MG delayed release capsule TAKE 1 CAPSULE BY MOUTH EVERY DAY      Cholecalciferol (VITAMIN D PO) Take 1 tablet by mouth every 7 days Pt to clarify dose      furosemide (LASIX) 20 MG tablet Take 40 mg by mouth daily       Fluticasone Propionate (FLONASE NA) 1 spray by Nasal route daily Each nostril      SALINE MIST SPRAY NA 1 spray by Nasal route 3 times daily as needed Each nostril 2-3 times per day      Blood Glucose Monitoring Suppl (ONE TOUCH ULTRA 2) w/Device KIT 1 kit by Does not apply route daily 1 kit 0    Lancets MISC 1 each by Does not apply route 3 times daily 100 each 3    MELATONIN PO Take 1 tablet by mouth nightly       loratadine (CLARITIN) 10 MG tablet Take 10 mg by mouth daily       atorvastatin (LIPITOR) 80 MG tablet Take 80 mg by mouth daily.       gabapentin (NEURONTIN) 300 MG capsule Take 1 tablet po in the morning and take 2 tablets po in the evening 90 capsule 1     No facility-administered medications prior to visit. REVIEW OF SYSTEMS:    Respiratory: Negative for apnea, chest tightness and shortness of breath or change in baseline breathing. PHYSICAL EXAM:   Nursing note and vitals reviewed. BP (!) 175/94   Pulse 95   Temp 98.1 °F (36.7 °C) (Temporal)   Wt 229 lb 9.6 oz (104.1 kg)   SpO2 98%   BMI 37.06 kg/m²   Constitutional: He appears well-developed and well-nourished. No acute distress. Cardiovascular: Normal rate, regular rhythm, normal heart sounds, and does not have murmur. Pulmonary/Chest: Effort normal. No respiratory distress. He does not have wheezes in the lung fields. He has no rales. Neurological/Psychiatric:He is alert and oriented to person, place, and time. Coordination is  normal.  His mood isAppropriate and affect is Neutral/Euthymic(normal) . His    IMPRESSION:   1. Chronic pain syndrome    2. Myofascial pain    3. Lumbar radiculopathy    4. Chronic left shoulder pain    5. Primary osteoarthritis of left hip    6. DDD (degenerative disc disease), lumbar        PLAN:  Informed verbal consent was obtained  -[place] was obtained and reviewed  for the last one year and no indicators of drug misuse  were found. Any other controlled substance prescriptions  seen on the record have been accounted for, I am aware of the patient receiving these medications. Barby Flanagan [place] record will be rechecked as part of office protocol.     -CBT techniques- relaxation therapies such as biofeedback, mindfulness based stress reduction, imagery, cognitive restructuring, problem solving discussed with patient   -ROM/Stretching exercises as advised   -He was advised to increase fluids ( 5-7  glasses of fluid daily), limit caffeine, avoid cheese products, increase dietary fiber, increase activity and exercise as tolerated and relax regularly and enjoy meals   -Continue with Norco 3-4 per day   -Walking 20-30 minutes daily as tolerated      Current Outpatient Medications   Medication Sig Dispense Refill    metFORMIN (GLUCOPHAGE-XR) 500 MG extended release tablet TAKE TWO TABLETS BY MOUTH DAILY WITH BREAKFAST 60 tablet 0    HYDROcodone-acetaminophen (NORCO) 5-325 MG per tablet Take 1 tablet by mouth every 6 hours as needed for Pain (max 3-4 day) for up to 35 days.  120 tablet 0    meloxicam (MOBIC) 15 MG tablet Take 1 tablet po every Monday,Wednesday, and Friday 30 tablet 1    methocarbamol (ROBAXIN) 500 MG tablet Take 1 tablet by mouth 2 times daily as needed (muscle stiffness) 60 tablet 1    insulin aspart (NOVOLOG FLEXPEN) 100 UNIT/ML injection pen 18-24 units AC TID 10 pen 3    Insulin Pen Needle 32G X 4 MM MISC 1 each by Does not apply route 4 times daily 959 each 3    TRULICITY 1.5 CH/2.4WO SOPN INJECT 1.5 MG UNDER THE SKIN ONCE WEEKLY 4 pen 1    insulin glargine (LANTUS SOLOSTAR) 100 UNIT/ML injection pen INJECT 46 UNITS UNDER THE SKIN DAILY 5 pen 2    lisinopril (PRINIVIL;ZESTRIL) 30 MG tablet Take 30 mg by mouth daily      blood glucose test strips (FREESTYLE LITE) strip USE TO TEST FOUR TIMES A  strip 0    Blood Glucose Monitoring Suppl (FREESTYLE LITE) RO As needed 1 Device 3    FreeStyle Lancets MISC USE AS DIRECTED 100 each 3    FreeStyle Lancets MISC USE FOUR TIMES A  each 0    Insulin Pen Needle (PEN NEEDLES) 31G X 6 MM MISC 1 each by In Vitro route 4 times daily 200 each 3    omeprazole (PRILOSEC) 20 MG delayed release capsule TAKE 1 CAPSULE BY MOUTH EVERY DAY      Cholecalciferol (VITAMIN D PO) Take 1 tablet by mouth every 7 days Pt to clarify dose      furosemide (LASIX) 20 MG tablet Take 40 mg by mouth daily       Fluticasone Propionate (FLONASE NA) 1 spray by Nasal route daily Each nostril      SALINE MIST SPRAY NA 1 spray by Nasal route 3 times daily as needed Each nostril 2-3 times per day      Blood Glucose Monitoring Suppl (ONE TOUCH ULTRA 2) w/Device KIT 1 kit by Does not apply route daily 1 kit 0    Lancets MISC 1 each by Does not apply route 3 times daily 100 each 3    MELATONIN PO Take 1 tablet by mouth nightly       loratadine (CLARITIN) 10 MG tablet Take 10 mg by mouth daily       atorvastatin (LIPITOR) 80 MG tablet Take 80 mg by mouth daily.  gabapentin (NEURONTIN) 300 MG capsule Take 1 tablet po in the morning and take 2 tablets po in the evening 90 capsule 1     No current facility-administered medications for this visit. I will continue his current medication regimen  which is part of the above treatment schedule. It has been helping with Mr. Addy David chronic  medical problems which for this visit include:   Diagnoses of Chronic pain syndrome, Myofascial pain, Lumbar radiculopathy, Chronic left shoulder pain, Primary osteoarthritis of left hip, and DDD (degenerative disc disease), lumbar were pertinent to this visit. Risks and benefits of the medications and other alternative treatments  including no treatment were discussed with the patient. The common side effects of these medications were also explained to the patient. Informed verbal consent was obtained. Goals of current treatment regimen include improvement in pain, restoration of functioning- with focus on improvement in physical performance, general activity, work or disability,emotional distress, health care utilization and  decreased medication consumption. Will continue to monitor progress towards achieving/maintaining therapeutic goals with special emphasis on  1. Improvement in perceived interfernce  of pain with ADL's. Ability to do home exercises independently. Ability to do household chores indoor and/or outdoor work and social and leisure activities. Improve psychosocial and physical functioning. - he is showing progression towards this treatment goal with the current regimen.     He was advised against drinking alcohol with the narcotic pain medicines, advised against driving or handling machinery while adjusting the dose of medicines or if having cognitive  issues related to the current medications. Risk of overdose and death, if medicines not taken as prescribed, were also discussed. If the patient develops new symptoms or if the symptoms worsen, the patient should call the office. While transcribing every attempt was made to maintain the accuracy of the note in terms of it's contents,there may have been some errors made inadvertently. Thank you for allowing me to participate in the care of this patient.     Malvin Clifton MD.    Cc: Jeff Case MD

## 2021-10-14 ENCOUNTER — OFFICE VISIT (OUTPATIENT)
Dept: PAIN MANAGEMENT | Age: 61
End: 2021-10-14
Payer: COMMERCIAL

## 2021-10-14 VITALS
TEMPERATURE: 97.9 F | SYSTOLIC BLOOD PRESSURE: 153 MMHG | HEART RATE: 87 BPM | OXYGEN SATURATION: 97 % | WEIGHT: 224.8 LBS | BODY MASS INDEX: 36.28 KG/M2 | DIASTOLIC BLOOD PRESSURE: 86 MMHG

## 2021-10-14 DIAGNOSIS — G89.29 CHRONIC LEFT SHOULDER PAIN: ICD-10-CM

## 2021-10-14 DIAGNOSIS — M54.16 LUMBAR RADICULOPATHY: ICD-10-CM

## 2021-10-14 DIAGNOSIS — M16.12 PRIMARY OSTEOARTHRITIS OF LEFT HIP: ICD-10-CM

## 2021-10-14 DIAGNOSIS — M51.36 DDD (DEGENERATIVE DISC DISEASE), LUMBAR: ICD-10-CM

## 2021-10-14 DIAGNOSIS — M79.18 MYOFASCIAL PAIN: ICD-10-CM

## 2021-10-14 DIAGNOSIS — G89.4 CHRONIC PAIN SYNDROME: ICD-10-CM

## 2021-10-14 DIAGNOSIS — M25.512 CHRONIC LEFT SHOULDER PAIN: ICD-10-CM

## 2021-10-14 PROCEDURE — 3017F COLORECTAL CA SCREEN DOC REV: CPT | Performed by: INTERNAL MEDICINE

## 2021-10-14 PROCEDURE — 99213 OFFICE O/P EST LOW 20 MIN: CPT | Performed by: INTERNAL MEDICINE

## 2021-10-14 PROCEDURE — 1036F TOBACCO NON-USER: CPT | Performed by: INTERNAL MEDICINE

## 2021-10-14 PROCEDURE — G8427 DOCREV CUR MEDS BY ELIG CLIN: HCPCS | Performed by: INTERNAL MEDICINE

## 2021-10-14 PROCEDURE — G8417 CALC BMI ABV UP PARAM F/U: HCPCS | Performed by: INTERNAL MEDICINE

## 2021-10-14 PROCEDURE — G8484 FLU IMMUNIZE NO ADMIN: HCPCS | Performed by: INTERNAL MEDICINE

## 2021-10-14 RX ORDER — METHOCARBAMOL 500 MG/1
500 TABLET, FILM COATED ORAL 2 TIMES DAILY PRN
Qty: 60 TABLET | Refills: 1 | Status: SHIPPED | OUTPATIENT
Start: 2021-10-14 | End: 2021-11-18 | Stop reason: SDUPTHER

## 2021-10-14 RX ORDER — HYDROCODONE BITARTRATE AND ACETAMINOPHEN 5; 325 MG/1; MG/1
1 TABLET ORAL EVERY 6 HOURS PRN
Qty: 120 TABLET | Refills: 0 | Status: SHIPPED | OUTPATIENT
Start: 2021-10-14 | End: 2021-11-18 | Stop reason: SDUPTHER

## 2021-10-14 RX ORDER — GABAPENTIN 300 MG/1
CAPSULE ORAL
Qty: 90 CAPSULE | Refills: 1 | Status: SHIPPED | OUTPATIENT
Start: 2021-10-14 | End: 2021-11-18 | Stop reason: SDUPTHER

## 2021-10-14 RX ORDER — MELOXICAM 15 MG/1
TABLET ORAL
Qty: 30 TABLET | Refills: 1 | Status: SHIPPED | OUTPATIENT
Start: 2021-10-14 | End: 2021-11-18 | Stop reason: SDUPTHER

## 2021-10-14 NOTE — PROGRESS NOTES
Vikash Duran  1960  [id number]      HISTORY OF PRESENT ILLNESS:  Mr. Short is a 57 y.o. male returns for a follow up visit for pain management  He has a diagnosis of   1. Chronic pain syndrome    2. Myofascial pain    3. Lumbar radiculopathy    4. Chronic left shoulder pain    5. Primary osteoarthritis of left hip    6. DDD (degenerative disc disease), lumbar    . He complains of pain in the left shoulder, right elbow, lower back with radiation to the left knee He rates the pain 7/10 and describes it as sharp, burning. Current treatment regimen has helped relieve about 50% of the pain. He denies any side effects from the current pain regimen. Patient reports that since the last follow up visit the physical functioning is worse, family/social relationships are worse, mood is unchanged sleep patterns are better, and that the overall functioning is better. Patient denies misusing/abusing his narcotic pain medications or using any illegal drugs. There are No indicators for possible drug abuse, addiction or diversion problems. Patient reports he is doing fair,, pain has been baseline. He mentions he is working full time still. He denies any constipations symptoms. He says he is using Norco 3-4 per day along with the other adjuvants. She he states his weight has been stable. ALLERGIES: Patients list of allergies were reviewed     MEDICATIONS: Mr. Short list of medications were reviewed. His current medications are   Outpatient Medications Prior to Visit   Medication Sig Dispense Refill    HYDROcodone-acetaminophen (NORCO) 5-325 MG per tablet Take 1 tablet by mouth every 6 hours as needed for Pain (max 3-4 day) for up to 35 days.  120 tablet 0    meloxicam (MOBIC) 15 MG tablet Take 1 tablet po every Monday,Wednesday, and Friday 30 tablet 1    methocarbamol (ROBAXIN) 500 MG tablet Take 1 tablet by mouth 2 times daily as needed (muscle stiffness) 60 tablet 1    metFORMIN (GLUCOPHAGE-XR) 500 MG extended release tablet TAKE TWO TABLETS BY MOUTH DAILY WITH BREAKFAST 60 tablet 0    insulin aspart (NOVOLOG FLEXPEN) 100 UNIT/ML injection pen 18-24 units AC TID 10 pen 3    Insulin Pen Needle 32G X 4 MM MISC 1 each by Does not apply route 4 times daily 047 each 3    TRULICITY 1.5 VA/0.4WI SOPN INJECT 1.5 MG UNDER THE SKIN ONCE WEEKLY 4 pen 1    insulin glargine (LANTUS SOLOSTAR) 100 UNIT/ML injection pen INJECT 46 UNITS UNDER THE SKIN DAILY 5 pen 2    lisinopril (PRINIVIL;ZESTRIL) 30 MG tablet Take 30 mg by mouth daily      blood glucose test strips (FREESTYLE LITE) strip USE TO TEST FOUR TIMES A  strip 0    Blood Glucose Monitoring Suppl (FREESTYLE LITE) RO As needed 1 Device 3    FreeStyle Lancets MISC USE AS DIRECTED 100 each 3    FreeStyle Lancets MISC USE FOUR TIMES A  each 0    Insulin Pen Needle (PEN NEEDLES) 31G X 6 MM MISC 1 each by In Vitro route 4 times daily 200 each 3    omeprazole (PRILOSEC) 20 MG delayed release capsule TAKE 1 CAPSULE BY MOUTH EVERY DAY      Cholecalciferol (VITAMIN D PO) Take 1 tablet by mouth every 7 days Pt to clarify dose      furosemide (LASIX) 20 MG tablet Take 40 mg by mouth daily       Fluticasone Propionate (FLONASE NA) 1 spray by Nasal route daily Each nostril      SALINE MIST SPRAY NA 1 spray by Nasal route 3 times daily as needed Each nostril 2-3 times per day      Blood Glucose Monitoring Suppl (ONE TOUCH ULTRA 2) w/Device KIT 1 kit by Does not apply route daily 1 kit 0    Lancets MISC 1 each by Does not apply route 3 times daily 100 each 3    MELATONIN PO Take 1 tablet by mouth nightly       loratadine (CLARITIN) 10 MG tablet Take 10 mg by mouth daily       atorvastatin (LIPITOR) 80 MG tablet Take 80 mg by mouth daily.  gabapentin (NEURONTIN) 300 MG capsule Take 1 tablet po in the morning and take 2 tablets po in the evening 90 capsule 1     No facility-administered medications prior to visit.         REVIEW OF SYSTEMS:    Respiratory: Negative for apnea, chest tightness and shortness of breath or change in baseline breathing. PHYSICAL EXAM:   Nursing note and vitals reviewed. BP (!) 153/86   Pulse 87   Temp 97.9 °F (36.6 °C) (Temporal)   Wt 224 lb 12.8 oz (102 kg)   SpO2 97%   BMI 36.28 kg/m²   Constitutional: He appears well-developed and well-nourished. No acute distress. Cardiovascular: Normal rate, regular rhythm, normal heart sounds, and does not have murmur. Pulmonary/Chest: Effort normal. No respiratory distress. He does not have wheezes in the lung fields. He has no rales. Neurological/Psychiatric:He is alert and oriented to person, place, and time. Coordination is  normal.  His mood isAppropriate and affect is Neutral/Euthymic(normal) . His    IMPRESSION:   1. Chronic pain syndrome    2. Myofascial pain    3. Lumbar radiculopathy    4. Chronic left shoulder pain    5. Primary osteoarthritis of left hip    6. DDD (degenerative disc disease), lumbar        PLAN:  Informed verbal consent was obtained  -ROM/Stretching exercises as advised   -He was advised to increase fluids ( 5-7  glasses of fluid daily), limit caffeine, avoid cheese products, increase dietary fiber, increase activity and exercise as tolerated and relax regularly and enjoy meals   -Continue with Norco 3-4 per day  -He was advised weight reduction, diet changes- 800-1200 shad diet, diet diary, exercising, nutritional  consult increased physical activity as tolerated   -Walking as tolerated   -Continue with all other adjuvant medications as before      Current Outpatient Medications   Medication Sig Dispense Refill    HYDROcodone-acetaminophen (NORCO) 5-325 MG per tablet Take 1 tablet by mouth every 6 hours as needed for Pain (max 3-4 day) for up to 35 days.  120 tablet 0    meloxicam (MOBIC) 15 MG tablet Take 1 tablet po every Monday,Wednesday, and Friday 30 tablet 1    methocarbamol (ROBAXIN) 500 MG tablet Take 1 tablet by mouth 2 times daily as needed (muscle stiffness) 60 tablet 1    metFORMIN (GLUCOPHAGE-XR) 500 MG extended release tablet TAKE TWO TABLETS BY MOUTH DAILY WITH BREAKFAST 60 tablet 0    insulin aspart (NOVOLOG FLEXPEN) 100 UNIT/ML injection pen 18-24 units AC TID 10 pen 3    Insulin Pen Needle 32G X 4 MM MISC 1 each by Does not apply route 4 times daily 667 each 3    TRULICITY 1.5 XR/7.0KU SOPN INJECT 1.5 MG UNDER THE SKIN ONCE WEEKLY 4 pen 1    insulin glargine (LANTUS SOLOSTAR) 100 UNIT/ML injection pen INJECT 46 UNITS UNDER THE SKIN DAILY 5 pen 2    lisinopril (PRINIVIL;ZESTRIL) 30 MG tablet Take 30 mg by mouth daily      blood glucose test strips (FREESTYLE LITE) strip USE TO TEST FOUR TIMES A  strip 0    Blood Glucose Monitoring Suppl (FREESTYLE LITE) RO As needed 1 Device 3    FreeStyle Lancets MISC USE AS DIRECTED 100 each 3    FreeStyle Lancets MISC USE FOUR TIMES A  each 0    Insulin Pen Needle (PEN NEEDLES) 31G X 6 MM MISC 1 each by In Vitro route 4 times daily 200 each 3    omeprazole (PRILOSEC) 20 MG delayed release capsule TAKE 1 CAPSULE BY MOUTH EVERY DAY      Cholecalciferol (VITAMIN D PO) Take 1 tablet by mouth every 7 days Pt to clarify dose      furosemide (LASIX) 20 MG tablet Take 40 mg by mouth daily       Fluticasone Propionate (FLONASE NA) 1 spray by Nasal route daily Each nostril      SALINE MIST SPRAY NA 1 spray by Nasal route 3 times daily as needed Each nostril 2-3 times per day      Blood Glucose Monitoring Suppl (ONE TOUCH ULTRA 2) w/Device KIT 1 kit by Does not apply route daily 1 kit 0    Lancets MISC 1 each by Does not apply route 3 times daily 100 each 3    MELATONIN PO Take 1 tablet by mouth nightly       loratadine (CLARITIN) 10 MG tablet Take 10 mg by mouth daily       atorvastatin (LIPITOR) 80 MG tablet Take 80 mg by mouth daily.       gabapentin (NEURONTIN) 300 MG capsule Take 1 tablet po in the morning and take 2 tablets po in the evening 90 capsule 1     No current facility-administered medications for this visit. I will continue his current medication regimen  which is part of the above treatment schedule. It has been helping with Mr. Jernigan Began chronic  medical problems which for this visit include:   Diagnoses of Chronic pain syndrome, Myofascial pain, Lumbar radiculopathy, Chronic left shoulder pain, Primary osteoarthritis of left hip, and DDD (degenerative disc disease), lumbar were pertinent to this visit. Risks and benefits of the medications and other alternative treatments  including no treatment were discussed with the patient. The common side effects of these medications were also explained to the patient. Informed verbal consent was obtained. Goals of current treatment regimen include improvement in pain, restoration of functioning- with focus on improvement in physical performance, general activity, work or disability,emotional distress, health care utilization and  decreased medication consumption. Will continue to monitor progress towards achieving/maintaining therapeutic goals with special emphasis on  1. Improvement in perceived interfernce  of pain with ADL's. Ability to do home exercises independently. Ability to do household chores indoor and/or outdoor work and social and leisure activities. Improve psychosocial and physical functioning. - he is showing progression towards this treatment goal with the current regimen. He was advised against drinking alcohol with the narcotic pain medicines, advised against driving or handling machinery while adjusting the dose of medicines or if having cognitive  issues related to the current medications. Risk of overdose and death, if medicines not taken as prescribed, were also discussed. If the patient develops new symptoms or if the symptoms worsen, the patient should call the office.     While transcribing every attempt was made to maintain the accuracy of the note in terms of it's contents,there may have been some errors made inadvertently. Thank you for allowing me to participate in the care of this patient.     Malathi Aguilar MD.    Cc: Felipe Allan MD

## 2021-10-19 ENCOUNTER — TELEPHONE (OUTPATIENT)
Dept: PAIN MANAGEMENT | Age: 61
End: 2021-10-19

## 2021-10-19 DIAGNOSIS — M54.16 LUMBAR RADICULOPATHY: ICD-10-CM

## 2021-10-19 DIAGNOSIS — G89.4 CHRONIC PAIN SYNDROME: Primary | ICD-10-CM

## 2021-10-19 DIAGNOSIS — M47.817 LUMBOSACRAL SPONDYLOSIS WITHOUT MYELOPATHY: ICD-10-CM

## 2021-10-19 DIAGNOSIS — M79.7 FIBROMYALGIA: ICD-10-CM

## 2021-10-19 DIAGNOSIS — M51.36 DDD (DEGENERATIVE DISC DISEASE), LUMBAR: ICD-10-CM

## 2021-10-19 NOTE — TELEPHONE ENCOUNTER
Patient called wondering if Dr. Sienna Mejia would be willing to write him an order for aquatic therapy.

## 2021-10-21 ENCOUNTER — TELEPHONE (OUTPATIENT)
Dept: PAIN MANAGEMENT | Age: 61
End: 2021-10-21

## 2021-10-21 DIAGNOSIS — M79.18 MYOFASCIAL PAIN: ICD-10-CM

## 2021-10-21 DIAGNOSIS — M51.36 DDD (DEGENERATIVE DISC DISEASE), LUMBAR: ICD-10-CM

## 2021-10-21 DIAGNOSIS — M16.12 PRIMARY OSTEOARTHRITIS OF LEFT HIP: ICD-10-CM

## 2021-10-21 DIAGNOSIS — M54.16 LUMBAR RADICULOPATHY: ICD-10-CM

## 2021-10-21 DIAGNOSIS — G89.4 CHRONIC PAIN SYNDROME: Primary | ICD-10-CM

## 2021-10-21 DIAGNOSIS — M47.817 LUMBOSACRAL SPONDYLOSIS WITHOUT MYELOPATHY: ICD-10-CM

## 2021-10-21 DIAGNOSIS — G89.29 CHRONIC LEFT SHOULDER PAIN: ICD-10-CM

## 2021-10-21 DIAGNOSIS — M79.7 FIBROMYALGIA: ICD-10-CM

## 2021-10-21 DIAGNOSIS — M25.512 CHRONIC LEFT SHOULDER PAIN: ICD-10-CM

## 2021-10-21 NOTE — TELEPHONE ENCOUNTER
Called patient to advise him that we placed the order for aquatic therapy.  He voiced his understanding

## 2021-10-21 NOTE — TELEPHONE ENCOUNTER
[address] called and [facility]  needs to change order in system to  read \"PT and aquatic therapy\" (patient Ju Croft )  For purposes of range of motion per the therapist.

## 2021-10-21 NOTE — TELEPHONE ENCOUNTER
Called patient to advise him that per [facility] okay to order PT also. Patient did not answer LVM.  PT order was placed

## 2021-11-03 ENCOUNTER — HOSPITAL ENCOUNTER (OUTPATIENT)
Dept: PHYSICAL THERAPY | Age: 61
Setting detail: THERAPIES SERIES
Discharge: HOME | End: 2021-11-03
Payer: COMMERCIAL

## 2021-11-03 PROCEDURE — 97162 PT EVAL MOD COMPLEX 30 MIN: CPT

## 2021-11-03 PROCEDURE — 97110 THERAPEUTIC EXERCISES: CPT

## 2021-11-03 NOTE — FLOWSHEET NOTE
[facility]  Phone: [phone]   Fax: [phone]    Physical Therapy Treatment Note/ Progress Report:     Date:  11/3/2021    Patient Name:  Marietta Bonilla    :  1960  MRN: [id number]  Restrictions/Precautions:    Medical/Treatment Diagnosis Information:  · Diagnosis: G89.4 (ICD-10-CM) - Chronic pain syndrome; M51.36 (ICD-10-CM) - DDD (degenerative disc disease), lumbar; M79.7 (ICD-10-CM) - Fibromyalgia; M54.16 (ICD-10-CM) - Lumbar radiculopathy; M47.817 (ICD-10-CM) - Lumbosacral spondylosis without myelopathy; M79.18 (ICD-10-CM) - Myofascial pain; M25.512, G89.29 (ICD-10-CM) - Chronic left shoulder pain;; M16.12 (ICD-10-CM) - Primary osteoarthritis of left hip  · Treatment Diagnosis: dec ROM and strength in B UE and LE, impaired posture, balance, and gait limiting functional mobility  Insurance/Certification information:  PT Insurance Information: [employer] - 30 visits/yr, auth req  Physician Information:  Referring Practitioner: Bonnee Siemens, MD  Plan of care signed (Y/N): []  Yes [x]  No    Date of Patient follow up with Physician:      Progress Report: []  Yes  [x]  No     Date Range for reporting period:  Beginnin/3  Ending:     Progress report due (10 Rx/or 30 days whichever is less): visit #10 or  (date)     Recertification due (POC duration/ or 90 days whichever is less): visit #12 or  (date)     Visit # Insurance Allowable Auth required?  Date Range    TBD [x]  Yes - caresource  []  No TBD       Units approved Units used Date Range          Latex Allergy:  [x]NO      []YES  Preferred Language for Healthcare:   [x]English       []other:    Functional Scale:       Date assessed:  Oswestry: raw score = 27/45; dysfunction = 60%  11/3/1    Pain level:  6-7/10     SUBJECTIVE:  See eval    OBJECTIVE: See eval   Observation:    Test measurements:      RESTRICTIONS/PRECAUTIONS: chronic pain syndrome    Exercises/Interventions:     Therapeutic Exercise (30184) Resistance / level Sets / Seconds Reps Notes / Cues                                                                  Therapeutic Activities (06784)                                          Neuromuscular Re-ed (18900)                                          Manual Intervention (53534)       Prone PA       GISTM/STM       Lumbar Manip       SI Manip       Hip belt mobs       Hip LA distraction                              AquaticTherapy Dates of Service:   Aquatic Visits Exercises/Activities:   Transfers:  [] Stairs   [] Ramp  [] Chair Lift   % Immersion:            Ambulation/ Warm up:   UE Exercises:       Forward   x Shoulder Shrugs      Lateral   x Shoulder Circles      Retro   x Scapular Retraction      [place]    Push Downs      Heel/Toe Walking    Punching       Rowing       Elbow Flex/Ext       Shldr Flex/Ext x      Shldr aBd/aDd x   LE Exercises:  Shldr Horiz aBd/aDd x   HR/TR x Shldr IR/ER    Marches x Arm Circles    Squats  x PNF Diagonals    Hamstring Curls x Wall Push Ups    Hip Flexion (SLR) x     Hip aBduction (SLR) x     Hip Extension (SLR) x      Hip aDduction (SLR)      Hip Circles  Functional:    Hip IR/Er  Step up forward    Hip Hikes  Step up lateral       Step down        Lunges Forward      Lunges Retro      Lunges Lateral     Balance:        SLS  x      Tandem Stance x      NBOS eyes open x Seated:     NBOS eyes closed x Ankle pumps     Hand to Opposite Knee  Ankle Circles     Fwd Step ups to SLS  Knee Flex/Ext    Lateral Step ups to SLS  Hip aBd/aDd    Stop/Go Gait   Bicycle       Ankle DF/PF      Ankle Inv/Ev    Stretching:       Gastroc/Soleus x     Hamstring  x Deep Water:    Knee Flex Stretch x Jog    Piriformis  x Noodle Hang x   Hip Flexor x Traction at [facility]       ITB  Cool Down:    Quad  Fwd Walking    Mid Back   Lat Walking    UT  Retro Walking    Post Shoulder  Noodle float    Ladder Pull      Pec Stretch            Core: Other:    TrA set x     Pelvic Tilts x     Multifidi Walk outs c paddle      PNF Chop/Lifts                          Aquatic Abbreviation Key  B= Belt DB= Dumbells T= Theratube   H= Hydrotone N= Noodles W= Weights   P= Paddles S= Speedo equipment K= Kickboard      Pt. Education: Gave pt tour of pool, explained how to use lockers, what to wear, that it would be in group setting, and showed pt aquatic equipment. Modalities:     Pt. Education:  -pt educated on diagnosis, prognosis and expectations for rehab  -all pt questions were answered    Home Exercise Prorgam:  Access Code: [email or web address]  URL: [email or web address]  Date: 11/03/2021  Prepared by: Ric Zaragoza Humjosselyn    Exercises  Seated Table Hamstring Stretch - 3 x daily - 7 x weekly - 1 sets - 3 reps - 20 hold  Supine Piriformis Stretch with Foot on Ground - 3 x daily - 7 x weekly - 1 sets - 3 reps - 20 hold  Supine Lower Trunk Rotation - 2 x daily - 7 x weekly - 1 sets - 20 reps  Supine Shoulder Flexion with Dowel - 2 x daily - 7 x weekly - 1 sets - 10-20 reps  Prone Press Up on Elbows - 2 x daily - 7 x weekly - 1 sets - 10-15 reps      Therapeutic Exercise and NMR EXR  [x] (04859) Provided verbal/tactile cueing for activities related to strengthening, flexibility, endurance, ROM  for improvements in proximal hip and core control with self care, mobility, lifting and ambulation.  [] (24136) Provided verbal/tactile cueing for activities related to improving balance, coordination, kinesthetic sense, posture, motor skill, proprioception  to assist with core control in self care, mobility, lifting, and ambulation.   [] (93520) Therapist is in constant attendance of 2 or more patients providing skilled therapy interventions, but not providing any significant amount of measurable one-on-one time to either patient, for improvements in LE, proximal hip, and core control in self care, mobility, lifting, ambulation and eccentric single leg control. Therapeutic Activities:    [] (85765 or 63831) Provided verbal/tactile cueing for activities related to improving balance, coordination, kinesthetic sense, posture, motor skill, proprioception and motor activation to allow for proper function  with self care and ADLs  [] (56945) Provided training and instruction to the patient for proper core and proximal hip recruitment and positioning with ambulation re-education     Home Exercise Program:    [x] (00465) Reviewed/Progressed HEP activities related to strengthening, flexibility, endurance, ROM of core, proximal hip and LE for functional self-care, mobility, lifting and ambulation   [] (59235) Reviewed/Progressed HEP activities related to improving balance, coordination, kinesthetic sense, posture, motor skill, proprioception of core, proximal hip and LE for self care, mobility, lifting, and ambulation      Manual Treatments:  PROM / STM / Oscillations-Mobs:  G-I, II, III, IV (PA's, Inf., Post.)  [] (59642) Provided manual therapy to mobilize proximal hip and LS spine soft tissue/joints for the purpose of modulating pain, promoting relaxation,  increasing ROM, reducing/eliminating soft tissue swelling/inflammation/restriction, improving soft tissue extensibility and allowing for proper ROM for normal function with self care, mobility, lifting and ambulation.        Charges:  Timed Code Treatment Minutes: 15   Total Treatment Minutes: 45       [] EVAL - LOW (29968)   [x] EVAL - MOD (93775)  [] EVAL - HIGH (48722)  [] RE-EVAL (47361)  [x] RH(64557) x   1    [] Ionto  [] NMR (47545) x       [] Vaso  [] Manual (80419) x       [] Ultrasound  [] TA x        [] Mech Traction (17821)  [] Aquatic Therapy x     [] ES (un) (72706):   [] Home Management Training x  [] ES(attended) (32884)   [] Dry Needling 1-2 muscles (74748):  [] Dry Needling 3+ muscles (487927  [] Group:      [] Other:     Goals:   Patient stated goal: independent with HEP to self-manage pain  [] Progressing: [] Met: [] Not Met: [] Adjusted    Therapist goals for Patient:   Short Term Goals: To be achieved in: 2 weeks  1. Independent in HEP and progression per patient tolerance, in order to prevent re-injury. [] Progressing: [] Met: [] Not Met: [] Adjusted  2. Patient will have a decrease in pain to facilitate improvement in movement, function, and ADLs as indicated by Functional Deficits. [] Progressing: [] Met: [] Not Met: [] Adjusted    Long Term Goals: To be achieved in: 6 weeks  1. Disability index score of 40% or less for the NIKUNJ to assist with reaching prior level of function. [] Progressing: [] Met: [] Not Met: [] Adjusted  2. Patient will demonstrate increased AROM to WNL, good LS mobility, good hip ROM to allow for proper joint functioning as indicated by patients Functional Deficits. [] Progressing: [] Met: [] Not Met: [] Adjusted  3. Patient will demonstrate an increase in Strength to good proximal hip and core activation to allow for proper functional mobility as indicated by patients Functional Deficits. [] Progressing: [] Met: [] Not Met: [] Adjusted  4. Patient will return to functional activities including sleeping, sitting, and walking without increased symptoms or restriction. [] Progressing: [] Met: [] Not Met: [] Adjusted    Overall Progression Towards Functional goals/ Treatment Progress Update:  [] Patient is progressing as expected towards functional goals listed. [] Progression is slowed due to complexities/Impairments listed. [] Progression has been slowed due to co-morbidities.   [x] Plan just implemented, too soon to assess goals progression <30days   [] Goals require adjustment due to lack of progress  [] Patient is not progressing as expected and requires additional follow up with physician  [] Other    Persisting Functional Limitations/Impairments:  [x]Sitting [x]Standing   [x]Walking []Squatting/bending    []Stairs []ADL's [x]Transfers []Reaching  []Housework []Job related tasks  []Driving []Sports/Recreation   [x]Sleeping []Other:    ASSESSMENT:  See eval  Treatment/Activity Tolerance:  [x] Patient able to complete tx  [] Patient limited by fatique  [] Patient limited by pain  [] Patient limited by other medical complications  [] Other:     Prognosis: [] Good [x] Fair  [] Poor    Patient Requires Follow-up: [x] Yes  [] No    PLAN: See eval. PT 2x / week for 6 weeks. [] Continue per plan of care [] Alter current plan (see comments)  [x] Plan of care initiated [] Hold pending MD visit [] Discharge    Electronically signed by: Eliseo Montes, PT PT, DPT      Note: If patient does not return for scheduled/ recommended follow up visits, this note will serve as a discharge from care along with most recent update on progress.

## 2021-11-03 NOTE — PLAN OF CARE
[place], [facility], [address]  Phone: [phone]   Fax: [phone]     Physical Therapy Certification    Dear Referring Practitioner: Elijah Su MD,    We had the pleasure of evaluating the following patient for physical therapy services at [address] [place] [address]. A summary of our findings can be found in the initial assessment below. This includes our plan of care. If you have any questions or concerns regarding these findings, please do not hesitate to contact me at the office phone number checked above.   Thank you for the referral.       Physician Signature:_______________________________Date:__________________  By signing above (or electronic signature), therapists plan is approved by physician      Patient: Luis Angel Naik   : 1960   MRN: [id number]  Referring Physician: Referring Practitioner: Elijah Su MD      Evaluation Date: 11/3/2021      Medical Diagnosis Information:  Diagnosis: G89.4 (ICD-10-CM) - Chronic pain syndrome; M51.36 (ICD-10-CM) - DDD (degenerative disc disease), lumbar; M79.7 (ICD-10-CM) - Fibromyalgia; M54.16 (ICD-10-CM) - Lumbar radiculopathy; M47.817 (ICD-10-CM) - Lumbosacral spondylosis without myelopathy; M79.18 (ICD-10-CM) - Myofascial pain; M25.512, G89.29 (ICD-10-CM) - Chronic left shoulder pain;; M16.12 (ICD-10-CM) - Primary osteoarthritis of left hip   Treatment Diagnosis: dec ROM and strength in B UE and LE, impaired posture, balance, and gait limiting functional mobility                                         Insurance information: PT Insurance Information: [employer] - 30 visits/yr, auth req     Precautions/ Contra-indications: chronic pain syndrome  Latex Allergy:  [x]NO      []YES  Preferred Language for Healthcare:   [x]English       []Other:    C-SSRS Triggered by Intake questionnaire (Past 2 wk assessment ):   [x] No, Questionnaire did not trigger screening.   [] Yes, Patient intake triggered C-SSRS Screening     [] Completed, no further action required. [] Completed, PCP notified via Epic    SUBJECTIVE: Per patient, patient with spinal stenosis L4, L5, and S1, OA in L hip. Pt has been less active since COVID-19 started as he is higher risk. Pt has history of L sciatica. Pt reports pain in low back, B hips L>R, B knees, L sciatica, and L shoulder currently. Pt has been cautious about his activity level to avoid exacerbating his pain. Pt is a member of the [employer] and would like to learn how to manage his pain independently. Fear avoidance: I should not do physical activities that (might) make my pain worse   [x] True   [] False     Relevant Medical History: DM, HTN, CHF, OA, anxiety  Functional Outcome: Oswestry: raw score = 27/45; dysfunction = 60%    Pain Scale: 6-7/10  Easing factors: pain medication, heating pad, position changes  Provocative factors: standing and sitting for long periods of time     Type: [x]Constant   []Intermittent  []Radiating []Localized []other:     Numbness/Tingling: diabetic neuropathy    Occupation/School: retired and on disability    Living Status/Prior Level of Function: Prior to this injury / incident, pt was independent with ADLs and IADLs, pt is a member of [employer].  Pain at mild-moderate levels at baseline    OBJECTIVE:   Palpation: TTP B lumber paraspinals, trigger point noted over R PSIS    Functional Mobility/Transfers: independent, slow to perform supine <> sit and sit <> stand transfers    Posture: dec lumbar lordosis    Gait: (include devices/WB status) slow mark, inc ANTONIO, no AD    Bandages/Dressings/Incisions: NA    Dermatomes Normal Abnormal Comments   inguinal area (L1)  x     anterior mid-thigh (L2) x     distal ant thigh/med knee (L3) x     medial lower leg and foot (L4) x     lateral lower leg and foot (L5) x     posterior calf (S1) x     medial calcaneus (S2) x         Reflexes Normal Abnormal primary care physician regarding ROS issues if not already being addressed at this time.       Co-morbidities/Complexities (which will affect course of rehabilitation):   []None        []Hx of COVID   Arthritic conditions   []Rheumatoid arthritis (M05.9)  [x]Osteoarthritis (M19.91)  []Gout   Cardiovascular conditions   [x]Hypertension (I10)  []Hyperlipidemia (E78.5)  []Angina pectoris (I20)  []Atherosclerosis (I70)  []Pacemaker  []Hx of CABG/stent/  cardiac surgeries   Musculoskeletal conditions   []Disc pathology   []Congenital spine pathologies   []Osteoporosis (M81.8)  []Osteopenia (M85.8)  []Scoliosis       Endocrine conditions   []Hypothyroid (E03.9)  []Hyperthyroid Gastrointestinal conditions   []Constipation (Q99.91)   Metabolic conditions   []Morbid obesity (E66.01)  [x]Diabetes type 1(E10.65) or 2 (E11.65)   []Neuropathy (G60.9)     Cardio/Pulmonary conditions   []Asthma (J45)  []Coughing   []COPD (J44.9)  [x]CHF  []A-fib   Psychological Disorders  [x]Anxiety (F41.9)  []Depression (F32.9)   []Other:   Developmental Disorders  []Autism (F84.0)  []CP (G80)  []Down Syndrome (Q90.9)  []Developmental delay     Neurological conditions  []Prior Stroke (I69.30)  []Parkinson's (G20)  []Encephalopathy (G93.40)  []MS (G35)  []Post-polio (G14)  []SCI  []TBI  []ALS Other conditions  [x]Fibromyalgia (M79.7)  []Vertigo  []Syncope  []Kidney Failure  []Cancer      []currently undergoing                treatment  []Pregnancy  []Incontinence   Prior surgeries  []involved limb  []previous spinal surgery  [] section birth  []hysterectomy  []bowel / bladder surgery  []other relevant surgeries   []Other:               Barriers to/and or personal factors that will affect rehab potential:              []Age  []Sex    []Smoker              []Motivation/Lack of Motivation                        [x]Co-Morbidities              []Cognitive Function, education/learning barriers              []Environmental, home barriers []profession/work barriers  [x]past PT/medical experience  []other:  Justification:     Falls Risk Assessment (30 days):   [x] Falls Risk assessed and no intervention required. [] Falls Risk assessed and Patient requires intervention due to being higher risk   TUG score (>12s at risk):     [] Falls education provided, including         ASSESSMENT: Leonard Chambers is a 57 y.o. male presenting to physical therapy with c/o LBP, L hip, B knees, and L shoulder pain, chronic in nature. Pt demonstrates dec ROM and strength in B UE and LE, impaired posture, balance, and gait limiting functional mobility. Pt would benefit from skilled PT to return to PLOF and dec pain with all functional activities.     Functional Impairments:     []Noted lumbar/proximal hip hypomobility   []Noted lumbosacral and/or generalized hypermobility   [x]Decreased Lumbosacral/hip/LE functional ROM   [x]Decreased core/proximal hip strength and neuromuscular control    [x]Decreased LE functional strength    []Abnormal reflexes/sensation/myotomal/dermatomal deficits  [x]Reduced balance/proprioceptive control    []other:      Functional Activity Limitations (from functional questionnaire and intake)   [x]Reduced ability to tolerate prolonged functional positions   [x]Reduced ability or difficulty with changes of positions or transfers between positions   []Reduced ability to maintain good posture and demonstrate good body mechanics with sitting, bending, and lifting   [x]Reduced ability to sleep   []Reduced ability or tolerance with driving and/or computer work   []Reduced ability to perform lifting, reaching, carrying tasks   []Reduced ability to squat   []Reduced ability to forward bend   [x]Reduced ability to ambulate prolonged functional periods/distances/surfaces   []Reduced ability to ascend/descend stairs   []other:       Participation Restrictions   []Reduced participation in self care activities   [x]Reduced participation in home management activities   [x]Reduced participation in work activities   [x]Reduced participation in social activities. []Reduced participation in sport/recreational activities. Classification:   []Signs/symptoms consistent with Lumbar instability/stabilization subgroup. []Signs/symptoms consistent with Lumbar mobilization/manipulation subgroup, myotomes and dermatomes intact. Meets manipulation criteria. []Signs/symptoms consistent with Lumbar direction specific/centralization subgroup   []Signs/symptoms consistent with Lumbar traction subgroup     []Signs/symptoms consistent with lumbar facet dysfunction   []Signs/symptoms consistent with lumbar stenosis type dysfunction   []Signs/symptoms consistent with nerve root involvement including myotome & dermatome dysfunction   []Signs/symptoms consistent with post-surgical status including: decreased ROM, strength and function.    []signs/symptoms consistent with pathology which may benefit from Dry needling     [x]other:  signs/symptoms consistent with myofascial dysfunction and multijoint OA    Prognosis/Rehab Potential:      []Excellent   []Good    [x]Fair   []Poor    Tolerance of evaluation/treatment:    []Excellent   [x]Good    []Fair   []Poor     Physical Therapy Evaluation Complexity Justification  [x] A history of present problem with:  [] no personal factors and/or comorbidities that impact the plan of care;  []1-2 personal factors and/or comorbidities that impact the plan of care  [x]3 personal factors and/or comorbidities that impact the plan of care  [x] An examination of body systems using standardized tests and measures addressing any of the following: body structures and functions (impairments), activity limitations, and/or participation restrictions;:  [] a total of 1-2 or more elements   [x] a total of 3 or more elements   [] a total of 4 or more elements   [x] A clinical presentation with:  [] stable and/or uncomplicated characteristics   [x] evolving clinical presentation with changing characteristics  [] unstable and unpredictable characteristics;   [x] Clinical decision making of [] low, [x] moderate, [] high complexity using standardized patient assessment instrument and/or measurable assessment of functional outcome. [] EVAL (LOW) 84619 (typically 15 minutes face-to-face)  [x] EVAL (MOD) 53752 (typically 30 minutes face-to-face)  [] EVAL (HIGH) 34837 (typically 45 minutes face-to-face)  [] RE-EVAL     PLAN: Begin PT focusing on: proximal hip mobilizations, LB mobs, LB core activation, proximal hip activation, and HEP    Frequency/Duration:  2 days per week for 6 Weeks:  Interventions:  [x]  Therapeutic exercise including: strength training, ROM, for LE, Glutes and core   [x]  NMR activation and proprioception for glutes , LE and Core   [x]  Manual therapy as indicated for Hip complex, LE and spine to include: Dry Needling/IASTM, STM, PROM, Gr I-IV mobilizations, manipulation. [x]  Modalities as needed that may include: thermal agents, E-stim, Biofeedback, US, iontophoresis as indicated  [x]  Patient education on joint protection, postural re-education, activity modification, progression of HEP. HEP instruction: Written HEP instructions provided and reviewed. GOALS:  Patient stated goal: independent with HEP to self-manage pain  [] Progressing: [] Met: [] Not Met: [] Adjusted    Therapist goals for Patient:   Short Term Goals: To be achieved in: 2 weeks  1. Independent in HEP and progression per patient tolerance, in order to prevent re-injury. [] Progressing: [] Met: [] Not Met: [] Adjusted  2. Patient will have a decrease in pain to facilitate improvement in movement, function, and ADLs as indicated by Functional Deficits. [] Progressing: [] Met: [] Not Met: [] Adjusted    Long Term Goals: To be achieved in: 6 weeks  1. Disability index score of 40% or less for the NIKUNJ to assist with reaching prior level of function.    [] Progressing: [] Met: [] Not Met: [] Adjusted  2. Patient will demonstrate increased AROM to WNL, good LS mobility, good hip ROM to allow for proper joint functioning as indicated by patients Functional Deficits. [] Progressing: [] Met: [] Not Met: [] Adjusted  3. Patient will demonstrate an increase in Strength to good proximal hip and core activation to allow for proper functional mobility as indicated by patients Functional Deficits. [] Progressing: [] Met: [] Not Met: [] Adjusted  4. Patient will return to functional activities including sleeping, sitting, and walking without increased symptoms or restriction.    [] Progressing: [] Met: [] Not Met: [] Adjusted       Electronically signed by:  Kylah Mendoza PT

## 2021-11-04 RX ORDER — DULAGLUTIDE 1.5 MG/.5ML
INJECTION, SOLUTION SUBCUTANEOUS
Qty: 4 PEN | Refills: 3 | OUTPATIENT
Start: 2021-11-04

## 2021-11-06 DIAGNOSIS — E11.65 UNCONTROLLED TYPE 2 DIABETES MELLITUS WITH HYPERGLYCEMIA (HCC): ICD-10-CM

## 2021-11-08 DIAGNOSIS — E11.65 UNCONTROLLED TYPE 2 DIABETES MELLITUS WITH HYPERGLYCEMIA (HCC): ICD-10-CM

## 2021-11-08 RX ORDER — INSULIN GLARGINE 100 [IU]/ML
INJECTION, SOLUTION SUBCUTANEOUS
Qty: 5 PEN | Refills: 3 | OUTPATIENT
Start: 2021-11-08

## 2021-11-08 RX ORDER — LANCETS 28 GAUGE
EACH MISCELLANEOUS
Qty: 100 EACH | Refills: 0 | Status: SHIPPED | OUTPATIENT
Start: 2021-11-08 | End: 2022-07-21 | Stop reason: SDUPTHER

## 2021-11-08 RX ORDER — INSULIN GLARGINE 100 [IU]/ML
INJECTION, SOLUTION SUBCUTANEOUS
Qty: 5 PEN | Refills: 2 | Status: SHIPPED | OUTPATIENT
Start: 2021-11-08 | End: 2021-11-10 | Stop reason: SDUPTHER

## 2021-11-08 RX ORDER — BLOOD-GLUCOSE METER
KIT MISCELLANEOUS
Qty: 100 STRIP | Refills: 0 | Status: SHIPPED | OUTPATIENT
Start: 2021-11-08 | End: 2022-07-21 | Stop reason: SDUPTHER

## 2021-11-08 RX ORDER — METFORMIN HYDROCHLORIDE 500 MG/1
TABLET, EXTENDED RELEASE ORAL
Qty: 60 TABLET | Refills: 0 | OUTPATIENT
Start: 2021-11-08

## 2021-11-08 RX ORDER — INSULIN ASPART 100 [IU]/ML
INJECTION, SOLUTION INTRAVENOUS; SUBCUTANEOUS
Qty: 10 PEN | Refills: 3 | Status: SHIPPED | OUTPATIENT
Start: 2021-11-08 | End: 2021-11-23 | Stop reason: SDUPTHER

## 2021-11-08 RX ORDER — BLOOD-GLUCOSE METER
KIT MISCELLANEOUS
Qty: 1 EACH | Refills: 0 | Status: SHIPPED | OUTPATIENT
Start: 2021-11-08

## 2021-11-08 RX ORDER — DULAGLUTIDE 1.5 MG/.5ML
INJECTION, SOLUTION SUBCUTANEOUS
Qty: 4 PEN | Refills: 1 | Status: SHIPPED | OUTPATIENT
Start: 2021-11-08 | End: 2022-03-24 | Stop reason: SDUPTHER

## 2021-11-08 NOTE — TELEPHONE ENCOUNTER
Medication:   Requested Prescriptions     Pending Prescriptions Disp Refills    metFORMIN (GLUCOPHAGE-XR) 500 MG extended release tablet [Pharmacy Med Name: METFORMIN HCL  MG TABLET] 60 tablet 0     Sig: TAKE TWO TABLETS BY MOUTH DAILY WITH BREAKFAST    LANTUS SOLOSTAR 100 UNIT/ML injection pen [Pharmacy Med Name: Estrella Conception 100 UNIT/ML] 5 pen 3     Sig: INJECT 46 UNITS UNDER THE SKIN DAILY       Last Filled:      Patient Phone Number: [phone] (home)     Last appt: 5/28/2021   Next appt: Visit date not found    Last Labs DM:   Lab Results   Component Value Date    LABA1C 9.1 11/12/2020

## 2021-11-10 DIAGNOSIS — E11.65 UNCONTROLLED TYPE 2 DIABETES MELLITUS WITH HYPERGLYCEMIA (HCC): ICD-10-CM

## 2021-11-10 RX ORDER — INSULIN GLARGINE 100 [IU]/ML
INJECTION, SOLUTION SUBCUTANEOUS
Qty: 5 PEN | Refills: 2 | Status: SHIPPED | OUTPATIENT
Start: 2021-11-10 | End: 2021-11-23 | Stop reason: SDUPTHER

## 2021-11-10 NOTE — TELEPHONE ENCOUNTER
Medication:   Requested Prescriptions      No prescriptions requested or ordered in this encounter       Last Filled:      Patient Phone Number: [phone] (home)     Last appt: 5/28/2021   Next appt: 11/23/2021    Last Labs DM:   Lab Results   Component Value Date    LABA1C 9.1 11/12/2020

## 2021-11-12 ENCOUNTER — HOSPITAL ENCOUNTER (OUTPATIENT)
Dept: PHYSICAL THERAPY | Age: 61
Setting detail: THERAPIES SERIES
Discharge: HOME | End: 2021-11-12
Payer: COMMERCIAL

## 2021-11-12 DIAGNOSIS — E11.65 UNCONTROLLED TYPE 2 DIABETES MELLITUS WITH HYPERGLYCEMIA (HCC): ICD-10-CM

## 2021-11-12 PROCEDURE — 97113 AQUATIC THERAPY/EXERCISES: CPT

## 2021-11-12 PROCEDURE — 97150 GROUP THERAPEUTIC PROCEDURES: CPT

## 2021-11-12 NOTE — FLOWSHEET NOTE
Flor Medley  Phone: [phone]   Fax: [phone]    Physical Therapy Treatment Note/ Progress Report:     Date:  2021    Patient Name:  Makayla Pablo    :  1960  MRN: [id number]  Restrictions/Precautions:    Medical/Treatment Diagnosis Information:  · Diagnosis: G89.4 (ICD-10-CM) - Chronic pain syndrome; M51.36 (ICD-10-CM) - DDD (degenerative disc disease), lumbar; M79.7 (ICD-10-CM) - Fibromyalgia; M54.16 (ICD-10-CM) - Lumbar radiculopathy; M47.817 (ICD-10-CM) - Lumbosacral spondylosis without myelopathy; M79.18 (ICD-10-CM) - Myofascial pain; M25.512, G89.29 (ICD-10-CM) - Chronic left shoulder pain;; M16.12 (ICD-10-CM) - Primary osteoarthritis of left hip  · Treatment Diagnosis: dec ROM and strength in B UE and LE, impaired posture, balance, and gait limiting functional mobility  Insurance/Certification information:  PT Insurance Information: [employer] - 30 visits/yr, auth req  Physician Information:  Referring Practitioner: Jenny Castrejon MD  Plan of care signed (Y/N): []  Yes [x]  No    Date of Patient follow up with Physician:      Progress Report: []  Yes  [x]  No     Date Range for reporting period:  Beginnin/3  Ending:     Progress report due (10 Rx/or 30 days whichever is less): visit #10 or  (date)     Recertification due (POC duration/ or 90 days whichever is less): visit #12 or  (date)     Visit # Insurance Allowable Auth required? Date Range    TBD [x]  Yes - [employer]  []  No TBD       Units approved Units used Date Range          Latex Allergy:  [x]NO      []YES  Preferred Language for Healthcare:   [x]English       []other:    Functional Scale:       Date assessed:  Oswestry: raw score = 27/45; dysfunction = 60%  11/3/1    Pain level:  7/10     SUBJECTIVE:  Reports having moderate LPB this morning. First aquatic session.      OBJECTIVE: See eval   Observation:    Test measurements: RESTRICTIONS/PRECAUTIONS: chronic pain syndrome    Exercises/Interventions:     Therapeutic Exercise (73036) Resistance / level Sets / Seconds Reps Notes / Cues                                                                  Therapeutic Activities (24952)                                          Neuromuscular Re-ed (85723)                                          Manual Intervention (11298)       Prone PA       GISTM/STM       Lumbar Manip       SI Manip       Hip belt mobs       Hip LA distraction                              AquaticTherapy Dates of Service: 11/12,   Aquatic Visits Exercises/Activities:   Transfers:  [] Stairs   [] Ramp  [] Chair Lift   % Immersion:            Ambulation/ Warm up:   UE Exercises:       Forward  x1 Shoulder Shrugs      Lateral   x1 Shoulder Circles      Retro   x1 Scapular Retraction      [place]    Push Downs      Heel/Toe Walking    Punching       Rowing x10      Elbow Flex/Ext       Shldr Flex/Ext x10      Shldr aBd/aDd x   LE Exercises:  Shldr Horiz aBd/aDd x   HR/TR x Shldr IR/ER    Marches x10 Arm Circles    Squats x10 PNF Diagonals    Hamstring Curls x10 Wall Push Ups    Hip Flexion (SLR) x10     Hip aBduction (SLR) x10     Hip Extension (SLR) x10      Hip aDduction (SLR)      Hip Circles x10 Functional:    Hip IR/Er  Step up forward    Hip Hikes  Step up lateral       Step down        Lunges Forward      Lunges Retro      Lunges Lateral     Balance:        SLS  10x10\"      Tandem Stance 2x20\"      NBOS eyes open x Seated:     NBOS eyes closed x Ankle pumps     Hand to Opposite Knee  Ankle Circles     Fwd Step ups to SLS  Knee Flex/Ext    Lateral Step ups to SLS  Hip aBd/aDd    Stop/Go Gait   Bicycle       Ankle DF/PF      Ankle Inv/Ev    Stretching:       Gastroc/Soleus x     Hamstring  x Deep Water:    Knee Flex Stretch x Jog    Piriformis  x Noodle Hang x10 minutes   Hip Flexor x Traction at Wall    [facility]      DKTC       ITB  Cool Down:    Quad  Fwd Walking    Mid Back Lat Walking    UT  Retro Walking    Post Shoulder  Noodle float    Ladder Pull      Pec Stretch            Core: Other:    TrA set 10x10\"     Pelvic Tilts x10     Multifidi Walk outs c paddle      PNF Chop/Lifts                          Aquatic Abbreviation Key  B= Belt DB= Dumbells T= Theratube   H= Hydrotone N= Noodles W= Weights   P= Paddles S= Speedo equipment K= Kickboard      Pt. Education: Gave pt tour of pool, explained how to use lockers, what to wear, that it would be in group setting, and showed pt aquatic equipment. Modalities:     Pt. Education:  -pt educated on diagnosis, prognosis and expectations for rehab  -all pt questions were answered    Home Exercise Prorgam:  Access Code: [email or web address]  URL: [email or web address]  Date: 11/03/2021  Prepared by: David Morley    Exercises  Seated Table Hamstring Stretch - 3 x daily - 7 x weekly - 1 sets - 3 reps - 20 hold  Supine Piriformis Stretch with Foot on Ground - 3 x daily - 7 x weekly - 1 sets - 3 reps - 20 hold  Supine Lower Trunk Rotation - 2 x daily - 7 x weekly - 1 sets - 20 reps  Supine Shoulder Flexion with Dowel - 2 x daily - 7 x weekly - 1 sets - 10-20 reps  Prone Press Up on Elbows - 2 x daily - 7 x weekly - 1 sets - 10-15 reps      Therapeutic Exercise and NMR EXR  [x] (49777) Provided verbal/tactile cueing for activities related to strengthening, flexibility, endurance, ROM  for improvements in proximal hip and core control with self care, mobility, lifting and ambulation.  [] (32243) Provided verbal/tactile cueing for activities related to improving balance, coordination, kinesthetic sense, posture, motor skill, proprioception  to assist with core control in self care, mobility, lifting, and ambulation.   [] (62355) Therapist is in constant attendance of 2 or more patients providing skilled therapy interventions, but not providing any significant amount of measurable one-on-one time to either patient, for improvements in LE, proximal hip, and core control in self care, mobility, lifting, ambulation and eccentric single leg control. Therapeutic Activities:    [] (24652 or 83673) Provided verbal/tactile cueing for activities related to improving balance, coordination, kinesthetic sense, posture, motor skill, proprioception and motor activation to allow for proper function  with self care and ADLs  [] (70561) Provided training and instruction to the patient for proper core and proximal hip recruitment and positioning with ambulation re-education     Home Exercise Program:    [x] (98250) Reviewed/Progressed HEP activities related to strengthening, flexibility, endurance, ROM of core, proximal hip and LE for functional self-care, mobility, lifting and ambulation   [] (68273) Reviewed/Progressed HEP activities related to improving balance, coordination, kinesthetic sense, posture, motor skill, proprioception of core, proximal hip and LE for self care, mobility, lifting, and ambulation      Manual Treatments:  PROM / STM / Oscillations-Mobs:  G-I, II, III, IV (PA's, Inf., Post.)  [] (69400) Provided manual therapy to mobilize proximal hip and LS spine soft tissue/joints for the purpose of modulating pain, promoting relaxation,  increasing ROM, reducing/eliminating soft tissue swelling/inflammation/restriction, improving soft tissue extensibility and allowing for proper ROM for normal function with self care, mobility, lifting and ambulation.        Charges:  Timed Code Treatment Minutes: 15   Total Treatment Minutes: 30       [] EVAL - LOW (25550)   [] EVAL - MOD (14454)  [] EVAL - HIGH (78859)  [] RE-EVAL (55282)  [] CS(06707) x       [] Ionto  [] NMR (79589) x       [] Vaso  [] Manual (23918) x       [] Ultrasound  [] TA x        [] Mech Traction (07527)  [x] Aquatic Therapy x 1     [] ES (un) (66574):   [] Home Management Training x  [] ES(attended) (48610)   [] Dry Needling 1-2 muscles (75603):  [] Dry Needling 3+ muscles (962989  [x] Group:      [] Other:     Goals:   Patient stated goal: independent with HEP to self-manage pain  [] Progressing: [] Met: [] Not Met: [] Adjusted    Therapist goals for Patient:   Short Term Goals: To be achieved in: 2 weeks  1. Independent in HEP and progression per patient tolerance, in order to prevent re-injury. [] Progressing: [] Met: [] Not Met: [] Adjusted  2. Patient will have a decrease in pain to facilitate improvement in movement, function, and ADLs as indicated by Functional Deficits. [] Progressing: [] Met: [] Not Met: [] Adjusted    Long Term Goals: To be achieved in: 6 weeks  1. Disability index score of 40% or less for the NIKUNJ to assist with reaching prior level of function. [] Progressing: [] Met: [] Not Met: [] Adjusted  2. Patient will demonstrate increased AROM to WNL, good LS mobility, good hip ROM to allow for proper joint functioning as indicated by patients Functional Deficits. [] Progressing: [] Met: [] Not Met: [] Adjusted  3. Patient will demonstrate an increase in Strength to good proximal hip and core activation to allow for proper functional mobility as indicated by patients Functional Deficits. [] Progressing: [] Met: [] Not Met: [] Adjusted  4. Patient will return to functional activities including sleeping, sitting, and walking without increased symptoms or restriction. [] Progressing: [] Met: [] Not Met: [] Adjusted    Overall Progression Towards Functional goals/ Treatment Progress Update:  [] Patient is progressing as expected towards functional goals listed. [] Progression is slowed due to complexities/Impairments listed. [] Progression has been slowed due to co-morbidities.   [x] Plan just implemented, too soon to assess goals progression <30days   [] Goals require adjustment due to lack of progress  [] Patient is not progressing as expected and requires additional follow up with physician  [] Other    Persisting Functional Limitations/Impairments:  [x]Sitting [x]Standing   [x]Walking []Squatting/bending    []Stairs []ADL's    [x]Transfers []Reaching  []Housework []Job related tasks  []Driving []Sports/Recreation   [x]Sleeping []Other:    ASSESSMENT: Pt's first visit in the pool went well today. Reports much relief post noodle hang. Continue with upgrades to program as tolerated for progression toward goals. Treatment/Activity Tolerance:  [x] Patient able to complete tx  [] Patient limited by fatique  [] Patient limited by pain  [] Patient limited by other medical complications  [] Other:     Prognosis: [] Good [x] Fair  [] Poor    Patient Requires Follow-up: [x] Yes  [] No    PLAN: See eval. PT 2x / week for 6 weeks. [] Continue per plan of care [] Alter current plan (see comments)  [x] Plan of care initiated [] Hold pending MD visit [] Discharge    Electronically signed by: Amalia De Leon PTA [id number]      Note: If patient does not return for scheduled/ recommended follow up visits, this note will serve as a discharge from care along with most recent update on progress.

## 2021-11-15 RX ORDER — METFORMIN HYDROCHLORIDE 500 MG/1
TABLET, EXTENDED RELEASE ORAL
Qty: 60 TABLET | Refills: 0 | Status: SHIPPED | OUTPATIENT
Start: 2021-11-15 | End: 2021-12-27

## 2021-11-15 NOTE — TELEPHONE ENCOUNTER
Medication:   Requested Prescriptions     Pending Prescriptions Disp Refills    metFORMIN (GLUCOPHAGE-XR) 500 MG extended release tablet 60 tablet 0       Last Filled:  08/25/2021    Patient Phone Number: [phone] (home)     Last appt: 5/28/2021   Next appt: 11/23/2021    Last Labs DM:   Lab Results   Component Value Date    LABA1C 9.1 11/12/2020

## 2021-11-18 ENCOUNTER — OFFICE VISIT (OUTPATIENT)
Dept: PAIN MANAGEMENT | Age: 61
End: 2021-11-18
Payer: COMMERCIAL

## 2021-11-18 VITALS
WEIGHT: 227 LBS | DIASTOLIC BLOOD PRESSURE: 85 MMHG | TEMPERATURE: 96.8 F | OXYGEN SATURATION: 96 % | BODY MASS INDEX: 36.64 KG/M2 | SYSTOLIC BLOOD PRESSURE: 135 MMHG | HEART RATE: 80 BPM

## 2021-11-18 DIAGNOSIS — M51.36 DDD (DEGENERATIVE DISC DISEASE), LUMBAR: ICD-10-CM

## 2021-11-18 DIAGNOSIS — M25.512 CHRONIC LEFT SHOULDER PAIN: ICD-10-CM

## 2021-11-18 DIAGNOSIS — G89.29 CHRONIC LEFT SHOULDER PAIN: ICD-10-CM

## 2021-11-18 DIAGNOSIS — M54.16 LUMBAR RADICULOPATHY: ICD-10-CM

## 2021-11-18 DIAGNOSIS — M16.12 PRIMARY OSTEOARTHRITIS OF LEFT HIP: ICD-10-CM

## 2021-11-18 DIAGNOSIS — M79.7 FIBROMYALGIA: ICD-10-CM

## 2021-11-18 DIAGNOSIS — G89.4 CHRONIC PAIN SYNDROME: ICD-10-CM

## 2021-11-18 DIAGNOSIS — M47.817 LUMBOSACRAL SPONDYLOSIS WITHOUT MYELOPATHY: ICD-10-CM

## 2021-11-18 PROCEDURE — 3017F COLORECTAL CA SCREEN DOC REV: CPT | Performed by: INTERNAL MEDICINE

## 2021-11-18 PROCEDURE — 99213 OFFICE O/P EST LOW 20 MIN: CPT | Performed by: INTERNAL MEDICINE

## 2021-11-18 PROCEDURE — G8417 CALC BMI ABV UP PARAM F/U: HCPCS | Performed by: INTERNAL MEDICINE

## 2021-11-18 PROCEDURE — G8427 DOCREV CUR MEDS BY ELIG CLIN: HCPCS | Performed by: INTERNAL MEDICINE

## 2021-11-18 PROCEDURE — G8484 FLU IMMUNIZE NO ADMIN: HCPCS | Performed by: INTERNAL MEDICINE

## 2021-11-18 PROCEDURE — 1036F TOBACCO NON-USER: CPT | Performed by: INTERNAL MEDICINE

## 2021-11-18 RX ORDER — HYDROCODONE BITARTRATE AND ACETAMINOPHEN 5; 325 MG/1; MG/1
1 TABLET ORAL EVERY 6 HOURS PRN
Qty: 120 TABLET | Refills: 0 | Status: SHIPPED | OUTPATIENT
Start: 2021-11-18 | End: 2021-12-23 | Stop reason: SDUPTHER

## 2021-11-18 RX ORDER — METHOCARBAMOL 500 MG/1
500 TABLET, FILM COATED ORAL 2 TIMES DAILY PRN
Qty: 60 TABLET | Refills: 1 | Status: SHIPPED | OUTPATIENT
Start: 2021-11-18 | End: 2021-12-23 | Stop reason: SDUPTHER

## 2021-11-18 RX ORDER — GABAPENTIN 300 MG/1
CAPSULE ORAL
Qty: 90 CAPSULE | Refills: 1 | Status: SHIPPED | OUTPATIENT
Start: 2021-11-18 | End: 2021-12-23 | Stop reason: SDUPTHER

## 2021-11-18 RX ORDER — MELOXICAM 15 MG/1
TABLET ORAL
Qty: 30 TABLET | Refills: 1 | Status: SHIPPED | OUTPATIENT
Start: 2021-11-18 | End: 2021-12-23 | Stop reason: SDUPTHER

## 2021-11-18 NOTE — PROGRESS NOTES
Selina Aviva  1960  [id number]    HISTORY OF PRESENT ILLNESS:  Mr. Omer More is a 57 y.o. male returns for a follow up visit for multiple medical problems. His current presenting problems are   1. Chronic pain syndrome    2. Lumbar radiculopathy    3. Chronic left shoulder pain    4. DDD (degenerative disc disease), lumbar    5. Lumbosacral spondylosis without myelopathy    6. Primary osteoarthritis of left hip    7. Fibromyalgia    8. Myofascial pain    . As per information/history obtained from the PADT(patient assessment and documentation tool) - He complains of pain in the shoulders Left and lower back with radiation to the hips Left, upper leg Left, knees Left, lower leg Left and ankles Left He rates the pain 6/10 and describes it as aching, throbbing, stabbing. Pain is made worse by: walking, standing. Current treatment regimen has helped relieve about 50%-60%of the pain. He denies side effects from the current pain regimen. Patient reports that since the last follow up visit the physical functioning is better, family/social relationships are unchanged, mood is better and sleep patterns are unchanged, and that the overall functioning is unchanged. Patient denies neurological bowel or bladder. Patient denies misusing/abusing his narcotic pain medications or using any illegal drugs. There are No indicators for possible drug abuse, addiction or diversion problems. Upon obtaining the medical history from Mr. Omer More regarding today's office visit for his presenting problems, patient states  He has been doing fair, He mentions the Aquatic therapy is helping with the pain. He says his blood sugar has been okay. He complains the pain is greater in the legs than the back. He says the pain goes in the left leg. He reports her weight has been has stable. He states he is using Norco 3-4 per day   ALLERGIES: Patients list of allergies were reviewed     MEDICATIONS: Mr. Omer More list of medications were reviewed. His current medications are   Outpatient Medications Prior to Visit   Medication Sig Dispense Refill    metFORMIN (GLUCOPHAGE-XR) 500 MG extended release tablet Two tablets with breakfast 60 tablet 0    insulin glargine (LANTUS SOLOSTAR) 100 UNIT/ML injection pen INJECT 46 UNITS UNDER THE SKIN DAILY 5 pen 2    insulin aspart (NOVOLOG FLEXPEN) 100 UNIT/ML injection pen 18-24 units AC TID 10 pen 3    Insulin Pen Needle 32G X 4 MM MISC 1 each by Does not apply route 4 times daily 120 each 3    Dulaglutide (TRULICITY) 1.5 XT/2.1AJ SOPN INJECT 1.5 MG UNDER THE SKIN ONCE WEEKLY 4 pen 1    Blood Glucose Monitoring Suppl (FREESTYLE LITE) RO As needed 1 each 0    blood glucose test strips (FREESTYLE LITE) strip USE TO TEST FOUR TIMES A  strip 0    FreeStyle Lancets MISC USE FOUR TIMES A  each 0    HYDROcodone-acetaminophen (NORCO) 5-325 MG per tablet Take 1 tablet by mouth every 6 hours as needed for Pain (max 3-4 day) for up to 35 days.  120 tablet 0    meloxicam (MOBIC) 15 MG tablet Take 1 tablet po every Monday,Wednesday, and Friday 30 tablet 1    methocarbamol (ROBAXIN) 500 MG tablet Take 1 tablet by mouth 2 times daily as needed (muscle stiffness) 60 tablet 1    lisinopril (PRINIVIL;ZESTRIL) 30 MG tablet Take 30 mg by mouth daily      FreeStyle Lancets MISC USE AS DIRECTED 100 each 3    Insulin Pen Needle (PEN NEEDLES) 31G X 6 MM MISC 1 each by In Vitro route 4 times daily 200 each 3    omeprazole (PRILOSEC) 20 MG delayed release capsule TAKE 1 CAPSULE BY MOUTH EVERY DAY      Cholecalciferol (VITAMIN D PO) Take 1 tablet by mouth every 7 days Pt to clarify dose      furosemide (LASIX) 20 MG tablet Take 40 mg by mouth daily       Fluticasone Propionate (FLONASE NA) 1 spray by Nasal route daily Each nostril      SALINE MIST SPRAY NA 1 spray by Nasal route 3 times daily as needed Each nostril 2-3 times per day      Blood Glucose Monitoring Suppl (ONE TOUCH ULTRA 2) w/Device KIT 1 kit by Does not apply route daily 1 kit 0    Lancets MISC 1 each by Does not apply route 3 times daily 100 each 3    MELATONIN PO Take 1 tablet by mouth nightly       loratadine (CLARITIN) 10 MG tablet Take 10 mg by mouth daily       atorvastatin (LIPITOR) 80 MG tablet Take 80 mg by mouth daily.  gabapentin (NEURONTIN) 300 MG capsule Take 1 tablet po in the morning and take 2 tablets po in the evening 90 capsule 1     No facility-administered medications prior to visit. REVIEW OF SYSTEMS:    Respiratory: Negative for apnea, chest tightness and shortness of breath or change in baseline breathing. PHYSICAL EXAM:   Nursing note and vitals reviewed. /85   Pulse 80   Temp 96.8 °F (36 °C)   Wt 227 lb (103 kg)   SpO2 96%   BMI 36.64 kg/m²   Constitutional: He appears well-developed and well-nourished. No acute distress. Cardiovascular: Normal rate, regular rhythm, normal heart sounds, and does not have murmur. Pulmonary/Chest: Effort normal. No respiratory distress. He does not have wheezes in the lung fields. He has no rales. Neurological/Psychiatric:He is alert and oriented to person, place, and time. Coordination is  normal.  His mood isAppropriate and affect is Neutral/Euthymic(normal) . IMPRESSION:   1. Chronic pain syndrome    2. Lumbar radiculopathy    3. Chronic left shoulder pain    4. DDD (degenerative disc disease), lumbar    5. Lumbosacral spondylosis without myelopathy    6. Primary osteoarthritis of left hip    7.  Fibromyalgia        PLAN:  Informed verbal consent was obtained  -ROM/Stretching exercises as advised   -He was advised weight reduction, diet changes- 800-1200 shad diet, diet diary, exercising, nutritional  consult increased physical activity as tolerated   -Continue with Norco 3-4 per day   -He was advised to increase fluids ( 5-7  glasses of fluid daily), limit caffeine, avoid cheese products, increase dietary fiber, increase activity and exercise as tolerated and relax regularly and enjoy meals   -Continue with all other adjuvants   Current Outpatient Medications   Medication Sig Dispense Refill    metFORMIN (GLUCOPHAGE-XR) 500 MG extended release tablet Two tablets with breakfast 60 tablet 0    insulin glargine (LANTUS SOLOSTAR) 100 UNIT/ML injection pen INJECT 46 UNITS UNDER THE SKIN DAILY 5 pen 2    insulin aspart (NOVOLOG FLEXPEN) 100 UNIT/ML injection pen 18-24 units AC TID 10 pen 3    Insulin Pen Needle 32G X 4 MM MISC 1 each by Does not apply route 4 times daily 120 each 3    Dulaglutide (TRULICITY) 1.5 AI/7.3JV SOPN INJECT 1.5 MG UNDER THE SKIN ONCE WEEKLY 4 pen 1    Blood Glucose Monitoring Suppl (FREESTYLE LITE) RO As needed 1 each 0    blood glucose test strips (FREESTYLE LITE) strip USE TO TEST FOUR TIMES A  strip 0    FreeStyle Lancets MISC USE FOUR TIMES A  each 0    HYDROcodone-acetaminophen (NORCO) 5-325 MG per tablet Take 1 tablet by mouth every 6 hours as needed for Pain (max 3-4 day) for up to 35 days.  120 tablet 0    meloxicam (MOBIC) 15 MG tablet Take 1 tablet po every Monday,Wednesday, and Friday 30 tablet 1    methocarbamol (ROBAXIN) 500 MG tablet Take 1 tablet by mouth 2 times daily as needed (muscle stiffness) 60 tablet 1    lisinopril (PRINIVIL;ZESTRIL) 30 MG tablet Take 30 mg by mouth daily      FreeStyle Lancets MISC USE AS DIRECTED 100 each 3    Insulin Pen Needle (PEN NEEDLES) 31G X 6 MM MISC 1 each by In Vitro route 4 times daily 200 each 3    omeprazole (PRILOSEC) 20 MG delayed release capsule TAKE 1 CAPSULE BY MOUTH EVERY DAY      Cholecalciferol (VITAMIN D PO) Take 1 tablet by mouth every 7 days Pt to clarify dose      furosemide (LASIX) 20 MG tablet Take 40 mg by mouth daily       Fluticasone Propionate (FLONASE NA) 1 spray by Nasal route daily Each nostril      SALINE MIST SPRAY NA 1 spray by Nasal route 3 times daily as needed Each nostril 2-3 times per day      Blood Glucose Monitoring Suppl (ONE TOUCH ULTRA 2) w/Device KIT 1 kit by Does not apply route daily 1 kit 0    Lancets MISC 1 each by Does not apply route 3 times daily 100 each 3    MELATONIN PO Take 1 tablet by mouth nightly       loratadine (CLARITIN) 10 MG tablet Take 10 mg by mouth daily       atorvastatin (LIPITOR) 80 MG tablet Take 80 mg by mouth daily.  gabapentin (NEURONTIN) 300 MG capsule Take 1 tablet po in the morning and take 2 tablets po in the evening 90 capsule 1     No current facility-administered medications for this visit. I will continue his current medication regimen  which is part of the above treatment schedule. It has been helping with Mr. Aisha Maciel chronic  medical problems which for this visit include:   Diagnoses of Chronic pain syndrome, Lumbar radiculopathy, Chronic left shoulder pain, DDD (degenerative disc disease), lumbar, Lumbosacral spondylosis without myelopathy, Primary osteoarthritis of left hip, Fibromyalgia, and Myofascial pain were pertinent to this visit. Risks and benefits of the medications and other alternative treatments  including no treatment were discussed with the patient. The common side effects of these medications were also explained to the patient. Informed verbal consent was obtained. Goals of current treatment regimen include improvement in pain, restoration of functioning- with focus on improvement in physical performance, general activity, work or disability,emotional distress, health care utilization and  decreased medication consumption. Will continue to monitor progress towards achieving/maintaining therapeutic goals with special emphasis on  1. Improvement in perceived interfernce  of pain with ADL's. Ability to do home exercises independently. Ability to do household chores indoor and/or outdoor work and social and leisure activities. Improve psychosocial and physical functioning. - he is showing progression towards this treatment goal with the current regimen.      He was advised against drinking alcohol with the narcotic pain medicines, advised against driving or handling machinery while adjusting the dose of medicines or if having cognitive  issues related to the current medications. Risk of overdose and death, if medicines not taken as prescribed, were also discussed. If the patient develops new symptoms or if the symptoms worsen, the patient should call the office. While transcribing every attempt was made to maintain the accuracy of the note in terms of it's contents,there may have been some errors made inadvertently. Thank you for allowing me to participate in the care of this patient.     Nelly De Dios MD.    Cc: Harika Atkins MD

## 2021-11-23 ENCOUNTER — OFFICE VISIT (OUTPATIENT)
Dept: ENDOCRINOLOGY | Age: 61
End: 2021-11-23
Payer: COMMERCIAL

## 2021-11-23 VITALS
OXYGEN SATURATION: 98 % | HEIGHT: 66 IN | HEART RATE: 91 BPM | DIASTOLIC BLOOD PRESSURE: 7 MMHG | BODY MASS INDEX: 36.83 KG/M2 | WEIGHT: 229.2 LBS | SYSTOLIC BLOOD PRESSURE: 131 MMHG

## 2021-11-23 DIAGNOSIS — E78.49 OTHER HYPERLIPIDEMIA: ICD-10-CM

## 2021-11-23 DIAGNOSIS — E11.65 UNCONTROLLED TYPE 2 DIABETES MELLITUS WITH HYPERGLYCEMIA (HCC): Primary | ICD-10-CM

## 2021-11-23 LAB — HBA1C MFR BLD: 8.4 %

## 2021-11-23 PROCEDURE — 1036F TOBACCO NON-USER: CPT | Performed by: INTERNAL MEDICINE

## 2021-11-23 PROCEDURE — G8417 CALC BMI ABV UP PARAM F/U: HCPCS | Performed by: INTERNAL MEDICINE

## 2021-11-23 PROCEDURE — 99214 OFFICE O/P EST MOD 30 MIN: CPT | Performed by: INTERNAL MEDICINE

## 2021-11-23 PROCEDURE — 2022F DILAT RTA XM EVC RTNOPTHY: CPT | Performed by: INTERNAL MEDICINE

## 2021-11-23 PROCEDURE — 83036 HEMOGLOBIN GLYCOSYLATED A1C: CPT | Performed by: INTERNAL MEDICINE

## 2021-11-23 PROCEDURE — 3017F COLORECTAL CA SCREEN DOC REV: CPT | Performed by: INTERNAL MEDICINE

## 2021-11-23 PROCEDURE — G8427 DOCREV CUR MEDS BY ELIG CLIN: HCPCS | Performed by: INTERNAL MEDICINE

## 2021-11-23 PROCEDURE — 3046F HEMOGLOBIN A1C LEVEL >9.0%: CPT | Performed by: INTERNAL MEDICINE

## 2021-11-23 PROCEDURE — G8484 FLU IMMUNIZE NO ADMIN: HCPCS | Performed by: INTERNAL MEDICINE

## 2021-11-23 RX ORDER — INSULIN GLARGINE 100 [IU]/ML
INJECTION, SOLUTION SUBCUTANEOUS
Qty: 5 PEN | Refills: 5 | Status: SHIPPED | OUTPATIENT
Start: 2021-11-23 | End: 2021-12-16 | Stop reason: SDUPTHER

## 2021-11-23 RX ORDER — INSULIN ASPART 100 [IU]/ML
INJECTION, SOLUTION INTRAVENOUS; SUBCUTANEOUS
Qty: 10 PEN | Refills: 5 | Status: SHIPPED | OUTPATIENT
Start: 2021-11-23 | End: 2022-07-21 | Stop reason: SDUPTHER

## 2021-11-23 NOTE — PROGRESS NOTES
Seen as  patient for diabetes      Interim:     Forgot log  Seen after 5/21    Diagnosed with Type 2 diabetes mellitus in  2010  Known diabetic complications: none     Current diabetic medications     Metformin ER 500mg 2 tab daily  basaglar 45 units   Humalog 18 units AC TID    SSI 2 for 50 >150 combined scale    Trulicity 1.5        Last A1c 8.4%<-----9.1%<----8.1%<------9.7%<----11.4% <-----13.9%<----- 14.1<--- 11.4 <--- 9.9 <------6.3%     Prior visit with dietician: no  Current diet: on average, 3 meals per day  No CHO counting  Current exercise:yes  Current monitoring regimen: home blood tests - 1-3  times daily     Has brought blood glucose log/meter:yes  Home blood sugar records:  Any episodes of hypoglycemia? 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    SH: [employer], takes care of parents    Past Medical History:   Diagnosis Date    Arthritis     Back, L hip and L shoulder    CHF [facility]congestive heart failure) [facility] [address]     diastolic    Diabetes mellitus ([facility] [address]     Hyperlipidemia     Hypertension      Past Surgical History:   Procedure Laterality Date    APPENDECTOMY  1969     Current Outpatient Medications   Medication Sig Dispense Refill    HYDROcodone-acetaminophen (NORCO) 5-325 MG per tablet Take 1 tablet by mouth every 6 hours as needed for Pain (max 3-4 day) for up to 35 days.  120 tablet 0    meloxicam (MOBIC) 15 MG tablet Take 1 tablet po every Monday,Wednesday, and Friday 30 tablet 1    methocarbamol (ROBAXIN) 500 MG tablet Take 1 tablet by mouth 2 times daily as needed (muscle stiffness) 60 tablet 1    gabapentin (NEURONTIN) 300 MG capsule Take 1 tablet po in the morning and take 2 tablets po in the evening 90 capsule 1    metFORMIN (GLUCOPHAGE-XR) 500 MG extended release tablet Two tablets with breakfast 60 tablet 0    insulin glargine (LANTUS SOLOSTAR) 100 UNIT/ML injection pen INJECT 46 UNITS UNDER THE SKIN DAILY 5 pen 2    insulin aspart (NOVOLOG FLEXPEN) 100 UNIT/ML injection pen 18-24 units AC TID 10 pen 3    Insulin Pen Needle 32G X 4 MM MISC 1 each by Does not apply route 4 times daily 120 each 3    Dulaglutide (TRULICITY) 1.5 MO/2.9BE SOPN INJECT 1.5 MG UNDER THE SKIN ONCE WEEKLY 4 pen 1    Blood Glucose Monitoring Suppl (FREESTYLE LITE) RO As needed 1 each 0    blood glucose test strips (FREESTYLE LITE) strip USE TO TEST FOUR TIMES A  strip 0    FreeStyle Lancets MISC USE FOUR TIMES A  each 0    lisinopril (PRINIVIL;ZESTRIL) 30 MG tablet Take 30 mg by mouth daily      FreeStyle Lancets MISC USE AS DIRECTED 100 each 3    Insulin Pen Needle (PEN NEEDLES) 31G X 6 MM MISC 1 each by In Vitro route 4 times daily 200 each 3    omeprazole (PRILOSEC) 20 MG delayed release capsule TAKE 1 CAPSULE BY MOUTH EVERY DAY      Cholecalciferol (VITAMIN D PO) Take 1 tablet by mouth every 7 days Pt to clarify dose      furosemide (LASIX) 20 MG tablet Take 40 mg by mouth daily       Fluticasone Propionate (FLONASE NA) 1 spray by Nasal route daily Each nostril      SALINE MIST SPRAY NA 1 spray by Nasal route 3 times daily as needed Each nostril 2-3 times per day      Blood Glucose Monitoring Suppl (ONE TOUCH ULTRA 2) w/Device KIT 1 kit by Does not apply route daily 1 kit 0    Lancets MISC 1 each by Does not apply route 3 times daily 100 each 3    MELATONIN PO Take 1 tablet by mouth nightly       loratadine (CLARITIN) 10 MG tablet Take 10 mg by mouth daily       atorvastatin (LIPITOR) 80 MG tablet Take 80 mg by mouth daily. No current facility-administered medications for this visit.        Review of Systems  Scanned, reviewed    Vitals:    11/23/21 1128   BP: (!) 131/7   Pulse: 91   SpO2: 98%

## 2021-11-24 ENCOUNTER — APPOINTMENT (OUTPATIENT)
Dept: PHYSICAL THERAPY | Age: 61
End: 2021-11-24
Payer: COMMERCIAL

## 2021-11-26 ENCOUNTER — HOSPITAL ENCOUNTER (OUTPATIENT)
Dept: PHYSICAL THERAPY | Age: 61
Setting detail: THERAPIES SERIES
Discharge: HOME | End: 2021-11-26
Payer: COMMERCIAL

## 2021-11-26 PROCEDURE — 97150 GROUP THERAPEUTIC PROCEDURES: CPT

## 2021-11-26 PROCEDURE — 97113 AQUATIC THERAPY/EXERCISES: CPT

## 2021-11-26 NOTE — FLOWSHEET NOTE
Flor Medley  Phone: [phone]   Fax: [phone]    Physical Therapy Treatment Note/ Progress Report:     Date:  2021    Patient Name:  Maria Isabel Kirby    :  1960  MRN: [id number]  Restrictions/Precautions:    Medical/Treatment Diagnosis Information:  · Diagnosis: G89.4 (ICD-10-CM) - Chronic pain syndrome; M51.36 (ICD-10-CM) - DDD (degenerative disc disease), lumbar; M79.7 (ICD-10-CM) - Fibromyalgia; M54.16 (ICD-10-CM) - Lumbar radiculopathy; M47.817 (ICD-10-CM) - Lumbosacral spondylosis without myelopathy; M79.18 (ICD-10-CM) - Myofascial pain; M25.512, G89.29 (ICD-10-CM) - Chronic left shoulder pain;; M16.12 (ICD-10-CM) - Primary osteoarthritis of left hip  · Treatment Diagnosis: dec ROM and strength in B UE and LE, impaired posture, balance, and gait limiting functional mobility  Insurance/Certification information:  PT Insurance Information: [employer] - 30 visits/yr, auth req  Physician Information:  Referring Practitioner: Sanket Schneider MD  Plan of care signed (Y/N): []  Yes [x]  No    Date of Patient follow up with Physician:      Progress Report: []  Yes  [x]  No     Date Range for reporting period:  Beginnin/3  Ending:     Progress report due (10 Rx/or 30 days whichever is less): visit #10 or  (date)     Recertification due (POC duration/ or 90 days whichever is less): visit #12 or  (date)     Visit # Insurance Allowable Auth required? Date Range   3/12 TBD [x]  Yes - [employer]  []  No TBD       Units approved Units used Date Range          Latex Allergy:  [x]NO      []YES  Preferred Language for Healthcare:   [x]English       []other:    Functional Scale:       Date assessed:  Oswestry: raw score = 27/45; dysfunction = 60%  11/3/1    Pain level:  7/10     SUBJECTIVE:  Still the pain in the back.      OBJECTIVE: See eval   Observation:    Test measurements:      RESTRICTIONS/PRECAUTIONS: chronic pain Fwd Walking    Mid Back   Lat Walking    UT  Retro Walking    Post Shoulder  Noodle float    Ladder Pull      Pec Stretch            Core: Other:    TrA set 10x10\"     Pelvic Tilts x10     Multifidi Walk outs c paddle      PNF Chop/Lifts                          Aquatic Abbreviation Key  B= Belt DB= Dumbells T= Theratube   H= Hydrotone N= Noodles W= Weights   P= Paddles S= Speedo equipment K= Kickboard      Pt. Education: Gave pt tour of pool, explained how to use lockers, what to wear, that it would be in group setting, and showed pt aquatic equipment. Modalities:     Pt. Education:  -pt educated on diagnosis, prognosis and expectations for rehab  -all pt questions were answered    Home Exercise Prorgam:  Access Code: [email or web address]  URL: [email or web address]  Date: 11/03/2021  Prepared by: Anmol Morley    Exercises  Seated Table Hamstring Stretch - 3 x daily - 7 x weekly - 1 sets - 3 reps - 20 hold  Supine Piriformis Stretch with Foot on Ground - 3 x daily - 7 x weekly - 1 sets - 3 reps - 20 hold  Supine Lower Trunk Rotation - 2 x daily - 7 x weekly - 1 sets - 20 reps  Supine Shoulder Flexion with Dowel - 2 x daily - 7 x weekly - 1 sets - 10-20 reps  Prone Press Up on Elbows - 2 x daily - 7 x weekly - 1 sets - 10-15 reps      Therapeutic Exercise and NMR EXR  [x] (97340) Provided verbal/tactile cueing for activities related to strengthening, flexibility, endurance, ROM  for improvements in proximal hip and core control with self care, mobility, lifting and ambulation.  [] (98335) Provided verbal/tactile cueing for activities related to improving balance, coordination, kinesthetic sense, posture, motor skill, proprioception  to assist with core control in self care, mobility, lifting, and ambulation.   [] (01840) Therapist is in constant attendance of 2 or more patients providing skilled therapy interventions, but not providing any significant amount of measurable one-on-one time to either patient, for improvements in LE, proximal hip, and core control in self care, mobility, lifting, ambulation and eccentric single leg control. Therapeutic Activities:    [] (28132 or 57259) Provided verbal/tactile cueing for activities related to improving balance, coordination, kinesthetic sense, posture, motor skill, proprioception and motor activation to allow for proper function  with self care and ADLs  [] (13412) Provided training and instruction to the patient for proper core and proximal hip recruitment and positioning with ambulation re-education     Home Exercise Program:    [x] (04018) Reviewed/Progressed HEP activities related to strengthening, flexibility, endurance, ROM of core, proximal hip and LE for functional self-care, mobility, lifting and ambulation   [] (78402) Reviewed/Progressed HEP activities related to improving balance, coordination, kinesthetic sense, posture, motor skill, proprioception of core, proximal hip and LE for self care, mobility, lifting, and ambulation      Manual Treatments:  PROM / STM / Oscillations-Mobs:  G-I, II, III, IV (PA's, Inf., Post.)  [] (46220) Provided manual therapy to mobilize proximal hip and LS spine soft tissue/joints for the purpose of modulating pain, promoting relaxation,  increasing ROM, reducing/eliminating soft tissue swelling/inflammation/restriction, improving soft tissue extensibility and allowing for proper ROM for normal function with self care, mobility, lifting and ambulation.        Charges:  Timed Code Treatment Minutes: 28   Total Treatment Minutes: 49       [] EVAL - LOW (67336)   [] EVAL - MOD (89657)  [] EVAL - HIGH (45237)  [] RE-EVAL (88296)  [] ZB(16760) x       [] Ionto  [] NMR (23467) x       [] Vaso  [] Manual (72929) x       [] Ultrasound  [] TA x        [] Mech Traction (14753)  [x] Aquatic Therapy x 2     [] ES (un) (54002):   [] Home Management Training x  [] ES(attended) (83966)   [] Dry Needling 1-2 muscles (54341):  [] Dry Needling 3+ muscles (021418  [x] Group:      [] Other:     Goals:   Patient stated goal: independent with HEP to self-manage pain  [] Progressing: [] Met: [] Not Met: [] Adjusted    Therapist goals for Patient:   Short Term Goals: To be achieved in: 2 weeks  1. Independent in HEP and progression per patient tolerance, in order to prevent re-injury. [] Progressing: [] Met: [] Not Met: [] Adjusted  2. Patient will have a decrease in pain to facilitate improvement in movement, function, and ADLs as indicated by Functional Deficits. [] Progressing: [] Met: [] Not Met: [] Adjusted    Long Term Goals: To be achieved in: 6 weeks  1. Disability index score of 40% or less for the NIKUNJ to assist with reaching prior level of function. [] Progressing: [] Met: [] Not Met: [] Adjusted  2. Patient will demonstrate increased AROM to WNL, good LS mobility, good hip ROM to allow for proper joint functioning as indicated by patients Functional Deficits. [] Progressing: [] Met: [] Not Met: [] Adjusted  3. Patient will demonstrate an increase in Strength to good proximal hip and core activation to allow for proper functional mobility as indicated by patients Functional Deficits. [] Progressing: [] Met: [] Not Met: [] Adjusted  4. Patient will return to functional activities including sleeping, sitting, and walking without increased symptoms or restriction. [] Progressing: [] Met: [] Not Met: [] Adjusted    Overall Progression Towards Functional goals/ Treatment Progress Update:  [] Patient is progressing as expected towards functional goals listed. [] Progression is slowed due to complexities/Impairments listed. [] Progression has been slowed due to co-morbidities.   [x] Plan just implemented, too soon to assess goals progression <30days   [] Goals require adjustment due to lack of progress  [] Patient is not progressing as expected and requires additional follow up with physician  [] Other    Persisting Functional Limitations/Impairments:  [x]Sitting [x]Standing   [x]Walking []Squatting/bending    []Stairs []ADL's    [x]Transfers []Reaching  []Housework []Job related tasks  []Driving []Sports/Recreation   [x]Sleeping []Other:    ASSESSMENT: Pt's second visit in the pool went well today. Reports much relief post noodle hang. Progressed exercises as noted in bold above. Continue to progress as tolerated     Treatment/Activity Tolerance:  [x] Patient able to complete tx  [] Patient limited by fatique  [] Patient limited by pain  [] Patient limited by other medical complications  [] Other:     Prognosis: [] Good [x] Fair  [] Poor    Patient Requires Follow-up: [x] Yes  [] No    PLAN: See renuka. PT 2x / week for 6 weeks. [x] Continue per plan of care [] Alter current plan (see comments)  [] Plan of care initiated [] Hold pending MD visit [] Discharge    Electronically signed by: Bharat Vaughan PT PT      Note: If patient does not return for scheduled/ recommended follow up visits, this note will serve as a discharge from care along with most recent update on progress.

## 2021-11-29 ENCOUNTER — HOSPITAL ENCOUNTER (OUTPATIENT)
Dept: PHYSICAL THERAPY | Age: 61
Setting detail: THERAPIES SERIES
Discharge: HOME | End: 2021-11-29
Payer: COMMERCIAL

## 2021-11-29 NOTE — PROGRESS NOTES
Physical Therapy    Physical Therapy  Cancellation/No-show Note  Patient Name:  Pearl Monreal  :  1960   Date:  2021  Cancelled visits to date: 1  No-shows to date: 0    Patient status for today's appointment patient:  [x]  Cancelled   []  Rescheduled appointment  []  No-show     Reason given by patient:  []  Patient ill  []  Conflicting appointment  []  No transportation    []  Conflict with work  []  No reason given  [x]  Other:     Comments:  No reason given    Phone call information:   []  Phone call made today to patient at _ time at number provided:      []  Patient answered, conversation as follows:    []  Patient did not answer, message left as follows:  []  Phone call not made today  [x]  Phone call not needed - pt contacted us to cancel and provided reason for cancellation.      Electronically signed by:  Shailesh Cruz PT

## 2021-12-03 ENCOUNTER — HOSPITAL ENCOUNTER (OUTPATIENT)
Dept: PHYSICAL THERAPY | Age: 61
Setting detail: THERAPIES SERIES
Discharge: HOME | End: 2021-12-03
Payer: COMMERCIAL

## 2021-12-03 NOTE — PROGRESS NOTES
Physical Therapy    Physical Therapy  Cancellation/No-show Note  Patient Name:  Selina Chicas  :  1960   Date:  12/3/2021  Cancelled visits to date: 2  No-shows to date:     Patient status for today's appointment patient:  [x]  Cancelled , 12/3  []  Rescheduled appointment  []  No-show     Reason given by patient:  []  Patient ill  []  Conflicting appointment  []  No transportation    []  Conflict with work  []  No reason given  [x]  Other:     Comments:  Pt was here for appointment, but was in the parking lot as he had a work phone call that occurred, and had to take the phone call. He came back 45 mins  Later to the pool and notified the therapist and apologized. Is aware of his next scheduled pool appointment date. Phone call information:   []  Phone call made today to patient at _ time at number provided:      []  Patient answered, conversation as follows:    []  Patient did not answer, message left as follows:  [x]  Phone call not made today  []  Phone call not needed - pt contacted us to cancel and provided reason for cancellation.      Electronically signed by:  Mark Diaz PT

## 2021-12-06 ENCOUNTER — HOSPITAL ENCOUNTER (OUTPATIENT)
Dept: PHYSICAL THERAPY | Age: 61
Setting detail: THERAPIES SERIES
Discharge: HOME | End: 2021-12-06
Payer: COMMERCIAL

## 2021-12-06 PROCEDURE — 97113 AQUATIC THERAPY/EXERCISES: CPT

## 2021-12-06 NOTE — FLOWSHEET NOTE
Flor Medley  Phone: [phone]   Fax: [phone]    Physical Therapy Treatment Note/ Progress Report:     Date:  2021    Patient Name:  Kimberly Zapien    :  1960  MRN: [id number]  Restrictions/Precautions:    Medical/Treatment Diagnosis Information:  · Diagnosis: G89.4 (ICD-10-CM) - Chronic pain syndrome; M51.36 (ICD-10-CM) - DDD (degenerative disc disease), lumbar; M79.7 (ICD-10-CM) - Fibromyalgia; M54.16 (ICD-10-CM) - Lumbar radiculopathy; M47.817 (ICD-10-CM) - Lumbosacral spondylosis without myelopathy; M79.18 (ICD-10-CM) - Myofascial pain; M25.512, G89.29 (ICD-10-CM) - Chronic left shoulder pain;; M16.12 (ICD-10-CM) - Primary osteoarthritis of left hip  · Treatment Diagnosis: dec ROM and strength in B UE and LE, impaired posture, balance, and gait limiting functional mobility  Insurance/Certification information:  PT Insurance Information: [employer] - 30 visits/yr, auth req  Physician Information:  Referring Practitioner: Amrit Jung MD  Plan of care signed (Y/N): []  Yes [x]  No    Date of Patient follow up with Physician:      Progress Report: []  Yes  [x]  No     Date Range for reporting period:  Beginnin/3  Ending:     Progress report due (10 Rx/or 30 days whichever is less): visit #10 or 58/0 (date)     Recertification due (POC duration/ or 90 days whichever is less): visit #12 or  (date)     Visit # Insurance Allowable Auth required? Date Range    TBD [x]  Yes - caresource  []  No TBD       Units approved Units used Date Range          Latex Allergy:  [x]NO      []YES  Preferred Language for Healthcare:   [x]English       []other:    Functional Scale:       Date assessed:  Oswestry: raw score = 27/45; dysfunction = 60%  11/3/1    Pain level:  7-8/10     SUBJECTIVE:  Pt had to run back out to his car right before apt so is a little more painful now.      OBJECTIVE: See eval   Observation:    Test measurements:      RESTRICTIONS/PRECAUTIONS: chronic pain syndrome    Exercises/Interventions:     Therapeutic Exercise (30977) Resistance / level Sets / Seconds Reps Notes / Cues                                                                  Therapeutic Activities (94195)                                          Neuromuscular Re-ed (85776)                                          Manual Intervention (40921)       Prone PA       GISTM/STM       Lumbar Manip       SI Manip       Hip belt mobs       Hip LA distraction                              AquaticTherapy Dates of Service: 11/12, 11/26, 12/6  Aquatic Visits Exercises/Activities:   Transfers:  [] Stairs   [] Ramp  [] Chair Lift   % Immersion:            Ambulation/ Warm up:   UE Exercises:       Forward  x1 N Shoulder Shrugs      Lateral   x1 N Shoulder Circles      Retro   x1 N Scapular Retraction      Cariocas    Push Downs      Heel/Toe Walking    Punching       Rowing X 10 P      Elbow Flex/Ext       Shldr Flex/Ext X 10 P      Shldr aBd/aDd X 10 P   LE Exercises:  Shldr Horiz aBd/aDd X 10 P   HR/TR X12 Shldr IR/ER    Marches x12 Arm Circles    Squats x12 PNF Diagonals    Hamstring Curls x12 Wall Push Ups    Hip Flexion (SLR) x12     Hip aBduction (SLR) x12     Hip Extension (SLR) x12      Hip aDduction (SLR)      Hip Circles x12 Functional:    Hip IR/Er  Step up forward    Hip Hikes  Step up lateral       Step down        Lunges Forward      Lunges Retro      Lunges Lateral     Balance:        SLS  10x10\"      Tandem Stance 2x30\"      NBOS eyes open x Seated:     NBOS eyes closed x Ankle pumps     Hand to Opposite Knee X 10  Ankle Circles     Fwd Step ups to SLS  Knee Flex/Ext    Lateral Step ups to SLS  Hip aBd/aDd    Stop/Go Gait   Bicycle       Ankle DF/PF      Ankle Inv/Ev    Stretching:       Gastroc/Soleus     Hamstring  Deep Water:    Knee Flex Stretch x Jog    Piriformis  x Noodle Hang X 10 minutes with 2 noodles    Hip Flexor x Traction at [facility]      DKTC       ITB  Cool Down:    Quad  Fwd Walking    Mid Back   Lat Walking    UT  Retro Walking    Post Shoulder  Noodle float    Ladder Pull      Pec Stretch            Core: Other:    TrA set 10x10\"     Pelvic Tilts x10     Multifidi Walk outs c paddle      PNF Chop/Lifts                          Aquatic Abbreviation Key  B= Belt DB= Dumbells T= Theratube   H= Hydrotone N= Noodles W= Weights   P= Paddles S= Speedo equipment K= Kickboard      Pt. Education: Gave pt tour of pool, explained how to use lockers, what to wear, that it would be in group setting, and showed pt aquatic equipment. Modalities:     Pt. Education:  -pt educated on diagnosis, prognosis and expectations for rehab  -all pt questions were answered    Home Exercise Prorgam:  Access Code: [email or web address]  URL: [email or web address]  Date: 11/03/2021  Prepared by: Lois Joseph Huml    Exercises  Seated Table Hamstring Stretch - 3 x daily - 7 x weekly - 1 sets - 3 reps - 20 hold  Supine Piriformis Stretch with Foot on Ground - 3 x daily - 7 x weekly - 1 sets - 3 reps - 20 hold  Supine Lower Trunk Rotation - 2 x daily - 7 x weekly - 1 sets - 20 reps  Supine Shoulder Flexion with Dowel - 2 x daily - 7 x weekly - 1 sets - 10-20 reps  Prone Press Up on Elbows - 2 x daily - 7 x weekly - 1 sets - 10-15 reps      Therapeutic Exercise and NMR EXR  [] (41942) Provided verbal/tactile cueing for activities related to strengthening, flexibility, endurance, ROM  for improvements in proximal hip and core control with self care, mobility, lifting and ambulation.  [] (83451) Provided verbal/tactile cueing for activities related to improving balance, coordination, kinesthetic sense, posture, motor skill, proprioception  to assist with core control in self care, mobility, lifting, and ambulation. [x] (06393) Aquatic therapy with therapeutic exercise.  Provided verbal and tactile cueing for activities related to strengthening, flexibility, endurance, ROM for improvements in  [x] LE / lumbar: LE, proximal hip, and core control in self care, mobility, lifting, ambulation and eccentric single leg control. [x] UE / cervical: cervical, scapular, scapulothoracic and UE control with self care, reaching, carrying, lifting, house/yardwork, driving, computer work. [x] (59617) Therapist is in constant attendance of 2 or more patients providing skilled therapy interventions, but not providing any significant amount of measurable one-on-one time to either patient, for improvements in  [x] LE / lumbar: LE, proximal hip, and core control in self care, mobility, lifting, ambulation and eccentric single leg control. [x] UE / cervical: cervical, scapular, scapulothoracic and UE control with self care, reaching, carrying, lifting, house/yardwork, driving, computer work.        Therapeutic Activities:    [] (30872 or 95253) Provided verbal/tactile cueing for activities related to improving balance, coordination, kinesthetic sense, posture, motor skill, proprioception and motor activation to allow for proper function  with self care and ADLs  [] (27924) Provided training and instruction to the patient for proper core and proximal hip recruitment and positioning with ambulation re-education     Home Exercise Program:    [] (17145) Reviewed/Progressed HEP activities related to strengthening, flexibility, endurance, ROM of core, proximal hip and LE for functional self-care, mobility, lifting and ambulation   [] (76412) Reviewed/Progressed HEP activities related to improving balance, coordination, kinesthetic sense, posture, motor skill, proprioception of core, proximal hip and LE for self care, mobility, lifting, and ambulation      Manual Treatments:  PROM / STM / Oscillations-Mobs:  G-I, II, III, IV (PA's, Inf., Post.)  [] (20161) Provided manual therapy to mobilize proximal hip and LS spine soft tissue/joints for the purpose of modulating pain, promoting relaxation,  increasing ROM, reducing/eliminating soft tissue swelling/inflammation/restriction, improving soft tissue extensibility and allowing for proper ROM for normal function with self care, mobility, lifting and ambulation. Charges:  Timed Code Treatment Minutes: 25   Total Treatment Minutes: 34       [] EVAL - LOW (49827)   [] EVAL - MOD (92857)  [] EVAL - HIGH (53587)  [] RE-EVAL (99450)  [] HL(56553) x       [] Ionto  [] NMR (19739) x       [] Vaso  [] Manual (52034) x       [] Ultrasound  [] TA x        [] Mech Traction (48980)  [x] Aquatic Therapy x 2     [] ES (un) (47257):   [] Home Management Training x  [] ES(attended) (20195)   [] Dry Needling 1-2 muscles (37454):  [] Dry Needling 3+ muscles (587418  [] Group:      [] Other:     Goals:   Patient stated goal: independent with HEP to self-manage pain  [] Progressing: [] Met: [] Not Met: [] Adjusted    Therapist goals for Patient:   Short Term Goals: To be achieved in: 2 weeks  1. Independent in HEP and progression per patient tolerance, in order to prevent re-injury. [] Progressing: [] Met: [] Not Met: [] Adjusted  2. Patient will have a decrease in pain to facilitate improvement in movement, function, and ADLs as indicated by Functional Deficits. [] Progressing: [] Met: [] Not Met: [] Adjusted    Long Term Goals: To be achieved in: 6 weeks  1. Disability index score of 40% or less for the NIKUNJ to assist with reaching prior level of function. [] Progressing: [] Met: [] Not Met: [] Adjusted  2. Patient will demonstrate increased AROM to WNL, good LS mobility, good hip ROM to allow for proper joint functioning as indicated by patients Functional Deficits. [] Progressing: [] Met: [] Not Met: [] Adjusted  3. Patient will demonstrate an increase in Strength to good proximal hip and core activation to allow for proper functional mobility as indicated by patients Functional Deficits. [] Progressing: [] Met: [] Not Met: [] Adjusted  4.  Patient will return to functional

## 2021-12-10 ENCOUNTER — HOSPITAL ENCOUNTER (OUTPATIENT)
Dept: PHYSICAL THERAPY | Age: 61
Setting detail: THERAPIES SERIES
Discharge: HOME | End: 2021-12-10
Payer: COMMERCIAL

## 2021-12-10 PROCEDURE — 97113 AQUATIC THERAPY/EXERCISES: CPT

## 2021-12-10 PROCEDURE — 97150 GROUP THERAPEUTIC PROCEDURES: CPT

## 2021-12-10 NOTE — FLOWSHEET NOTE
Flor Medley  Phone: [phone]   Fax: [phone]    Physical Therapy Treatment Note/ Progress Report:     Date:  12/10/2021    Patient Name:  Julian Candelaria    :  1960  MRN: [id number]  Restrictions/Precautions:    Medical/Treatment Diagnosis Information:  · Diagnosis: G89.4 (ICD-10-CM) - Chronic pain syndrome; M51.36 (ICD-10-CM) - DDD (degenerative disc disease), lumbar; M79.7 (ICD-10-CM) - Fibromyalgia; M54.16 (ICD-10-CM) - Lumbar radiculopathy; M47.817 (ICD-10-CM) - Lumbosacral spondylosis without myelopathy; M79.18 (ICD-10-CM) - Myofascial pain; M25.512, G89.29 (ICD-10-CM) - Chronic left shoulder pain;; M16.12 (ICD-10-CM) - Primary osteoarthritis of left hip  · Treatment Diagnosis: dec ROM and strength in B UE and LE, impaired posture, balance, and gait limiting functional mobility  Insurance/Certification information:  PT Insurance Information: [employer] - 30 visits/yr, auth req  Physician Information:  Referring Practitioner: Juan R Wagner MD  Plan of care signed (Y/N): []  Yes [x]  No    Date of Patient follow up with Physician:      Progress Report: []  Yes  [x]  No     Date Range for reporting period:  Beginnin/3  Ending:     Progress report due (10 Rx/or 30 days whichever is less): visit #10 or  (date)     Recertification due (POC duration/ or 90 days whichever is less): visit #12 or  (date)     Visit # Insurance Allowable Auth required? Date Range    TBD [x]  Yes - [employer]ource  []  No TBD       Units approved Units used Date Range          Latex Allergy:  [x]NO      []YES  Preferred Language for Healthcare:   [x]English       []other:    Functional Scale:       Date assessed:  Oswestry: raw score = 27/45; dysfunction = 60%  11/3/1    Pain level:  8/10     SUBJECTIVE:  Pt had to take norco prior to coming to therapy this date.      OBJECTIVE: See eval   Observation:    Test measurements: RESTRICTIONS/PRECAUTIONS: chronic pain syndrome    Exercises/Interventions:     Therapeutic Exercise (50472) Resistance / level Sets / Seconds Reps Notes / Cues                                                                  Therapeutic Activities (80309)                                          Neuromuscular Re-ed (70788)                                          Manual Intervention (98314)       Prone PA       GISTM/STM       Lumbar Manip       SI Manip       Hip belt mobs       Hip LA distraction                              AquaticTherapy Dates of Service: 11/12, 11/26, 12/6, 12/10  Aquatic Visits Exercises/Activities:   Transfers:  [] Stairs   [] Ramp  [] Chair Lift   % Immersion:            Ambulation/ Warm up:   UE Exercises:       Forward  x1 N Shoulder Shrugs      Lateral   x1 N Shoulder Circles      Retro   x1 N Scapular Retraction      Cariocas    Push Downs      Heel/Toe Walking    Punching       Rowing X 10 P      Elbow Flex/Ext       Shldr Flex/Ext X 10 P      Shldr aBd/aDd X 10 P   LE Exercises:  Shldr Horiz aBd/aDd X 10 P   HR/TR X10 Shldr IR/ER    Marches x10 Arm Circles    Squats x10 PNF Diagonals    Hamstring Curls x10 Wall Push Ups    Hip Flexion (SLR) x10     Hip aBduction (SLR) x10     Hip Extension (SLR) x10      Hip aDduction (SLR)      Hip Circles x10 Functional:    Hip IR/Er  Step up forward    Hip Hikes  Step up lateral       Step down        Lunges Forward      Lunges Retro      Lunges Lateral     Balance:        SLS  10x10\"      Tandem Stance 2x30\"      NBOS eyes open x Seated:     NBOS eyes closed x Ankle pumps     Hand to Opposite Knee X 10  Ankle Circles     Fwd Step ups to SLS  Knee Flex/Ext    Lateral Step ups to SLS  Hip aBd/aDd    Stop/Go Gait   Bicycle       Ankle DF/PF      Ankle Inv/Ev    Stretching:       Gastroc/Soleus     Hamstring  Deep Water:    Knee Flex Stretch x Jog    Piriformis  x Noodle Hang    Hip Flexor x Traction at [facility]       ITB  Cool Down: Quad  Fwd Walking    Mid Back   Lat Walking    UT  Retro Walking    Post Shoulder  Noodle float    Ladder Pull      Pec Stretch            Core: Other:    TrA set 10x10\"     Pelvic Tilts x10     Multifidi Walk outs c paddle      PNF Chop/Lifts                          Aquatic Abbreviation Key  B= Belt DB= Dumbells T= Theratube   H= Hydrotone N= Noodles W= Weights   P= Paddles S= Speedo equipment K= Kickboard      Pt. Education: Gave pt tour of pool, explained how to use lockers, what to wear, that it would be in group setting, and showed pt aquatic equipment. Modalities:     Pt. Education:  -pt educated on diagnosis, prognosis and expectations for rehab  -all pt questions were answered    Home Exercise ProrgaSpootr:  Access Code: [email or web address]  URL: [email or web address]  Date: 11/03/2021  Prepared by: [employer] Huml    Exercises  Seated Table Hamstring Stretch - 3 x daily - 7 x weekly - 1 sets - 3 reps - 20 hold  Supine Piriformis Stretch with Foot on Ground - 3 x daily - 7 x weekly - 1 sets - 3 reps - 20 hold  Supine Lower Trunk Rotation - 2 x daily - 7 x weekly - 1 sets - 20 reps  Supine Shoulder Flexion with Dowel - 2 x daily - 7 x weekly - 1 sets - 10-20 reps  Prone Press Up on Elbows - 2 x daily - 7 x weekly - 1 sets - 10-15 reps      Therapeutic Exercise and NMR EXR  [] (06344) Provided verbal/tactile cueing for activities related to strengthening, flexibility, endurance, ROM  for improvements in proximal hip and core control with self care, mobility, lifting and ambulation.  [] (68411) Provided verbal/tactile cueing for activities related to improving balance, coordination, kinesthetic sense, posture, motor skill, proprioception  to assist with core control in self care, mobility, lifting, and ambulation. [x] (10004) Aquatic therapy with therapeutic exercise.  Provided verbal and tactile cueing for activities related to strengthening, flexibility, endurance, ROM for improvements in  [x] LE / lumbar: LE, proximal hip, and core control in self care, mobility, lifting, ambulation and eccentric single leg control. [x] UE / cervical: cervical, scapular, scapulothoracic and UE control with self care, reaching, carrying, lifting, house/yardwork, driving, computer work. [x] (14289) Therapist is in constant attendance of 2 or more patients providing skilled therapy interventions, but not providing any significant amount of measurable one-on-one time to either patient, for improvements in  [x] LE / lumbar: LE, proximal hip, and core control in self care, mobility, lifting, ambulation and eccentric single leg control. [x] UE / cervical: cervical, scapular, scapulothoracic and UE control with self care, reaching, carrying, lifting, house/yardwork, driving, computer work.        Therapeutic Activities:    [] (01603 or 65989) Provided verbal/tactile cueing for activities related to improving balance, coordination, kinesthetic sense, posture, motor skill, proprioception and motor activation to allow for proper function  with self care and ADLs  [] (61806) Provided training and instruction to the patient for proper core and proximal hip recruitment and positioning with ambulation re-education     Home Exercise Program:    [] (25901) Reviewed/Progressed HEP activities related to strengthening, flexibility, endurance, ROM of core, proximal hip and LE for functional self-care, mobility, lifting and ambulation   [] (54885) Reviewed/Progressed HEP activities related to improving balance, coordination, kinesthetic sense, posture, motor skill, proprioception of core, proximal hip and LE for self care, mobility, lifting, and ambulation      Manual Treatments:  PROM / STM / Oscillations-Mobs:  G-I, II, III, IV (PA's, Inf., Post.)  [] (76165) Provided manual therapy to mobilize proximal hip and LS spine soft tissue/joints for the purpose of modulating pain, promoting relaxation,  increasing ROM, reducing/eliminating soft tissue swelling/inflammation/restriction, improving soft tissue extensibility and allowing for proper ROM for normal function with self care, mobility, lifting and ambulation. Charges:  Timed Code Treatment Minutes: 8   Total Treatment Minutes: 31       [] EVAL - LOW (16878)   [] EVAL - MOD (54918)  [] EVAL - HIGH (91647)  [] RE-EVAL (56027)  [] TQ(36483) x       [] Ionto  [] NMR (03500) x       [] Vaso  [] Manual (65444) x       [] Ultrasound  [] TA x        [] Mech Traction (39474)  [x] Aquatic Therapy x 1     [] ES (un) (63612):   [] Home Management Training x  [] ES(attended) (24371)   [] Dry Needling 1-2 muscles (40305):  [] Dry Needling 3+ muscles (836235  [x] Group: 1     [] Other:     Goals:   Patient stated goal: independent with HEP to self-manage pain  [] Progressing: [] Met: [] Not Met: [] Adjusted    Therapist goals for Patient:   Short Term Goals: To be achieved in: 2 weeks  1. Independent in HEP and progression per patient tolerance, in order to prevent re-injury. [] Progressing: [] Met: [] Not Met: [] Adjusted  2. Patient will have a decrease in pain to facilitate improvement in movement, function, and ADLs as indicated by Functional Deficits. [] Progressing: [] Met: [] Not Met: [] Adjusted    Long Term Goals: To be achieved in: 6 weeks  1. Disability index score of 40% or less for the NIKUNJ to assist with reaching prior level of function. [] Progressing: [] Met: [] Not Met: [] Adjusted  2. Patient will demonstrate increased AROM to WNL, good LS mobility, good hip ROM to allow for proper joint functioning as indicated by patients Functional Deficits. [] Progressing: [] Met: [] Not Met: [] Adjusted  3. Patient will demonstrate an increase in Strength to good proximal hip and core activation to allow for proper functional mobility as indicated by patients Functional Deficits. [] Progressing: [] Met: [] Not Met: [] Adjusted  4.  Patient will return to functional activities including sleeping, sitting, and walking without increased symptoms or restriction. [] Progressing: [] Met: [] Not Met: [] Adjusted    Overall Progression Towards Functional goals/ Treatment Progress Update:  [] Patient is progressing as expected towards functional goals listed. [] Progression is slowed due to complexities/Impairments listed. [] Progression has been slowed due to co-morbidities. [x] Plan just implemented, too soon to assess goals progression <30days   [] Goals require adjustment due to lack of progress  [] Patient is not progressing as expected and requires additional follow up with physician  [] Other    Persisting Functional Limitations/Impairments:  [x]Sitting [x]Standing   [x]Walking []Squatting/bending    []Stairs []ADL's    [x]Transfers []Reaching  []Housework []Job related tasks  []Driving []Sports/Recreation   [x]Sleeping []Other:    ASSESSMENT: Pt today modified reps of exercises down to 10 due to high pain levels. No increase in pain after session this date. Will progress as tolerated. Treatment/Activity Tolerance:  [x] Patient able to complete tx  [] Patient limited by fatique  [] Patient limited by pain  [] Patient limited by other medical complications  [] Other:     Prognosis: [] Good [x] Fair  [] Poor    Patient Requires Follow-up: [x] Yes  [] No    PLAN: See eval. PT 2x / week for 6 weeks. [x] Continue per plan of care [] Alter current plan (see comments)  [] Plan of care initiated [] Hold pending MD visit [] Discharge    Electronically signed by: Zhang Ritter, PT, DPT      Note: If patient does not return for scheduled/ recommended follow up visits, this note will serve as a discharge from care along with most recent update on progress.

## 2021-12-13 ENCOUNTER — HOSPITAL ENCOUNTER (OUTPATIENT)
Dept: PHYSICAL THERAPY | Age: 61
Setting detail: THERAPIES SERIES
Discharge: HOME | End: 2021-12-13
Payer: COMMERCIAL

## 2021-12-13 PROCEDURE — 97113 AQUATIC THERAPY/EXERCISES: CPT

## 2021-12-13 PROCEDURE — 97150 GROUP THERAPEUTIC PROCEDURES: CPT

## 2021-12-13 NOTE — FLOWSHEET NOTE
[facility]  Phone: [phone]   Fax: [phone]    Physical Therapy Treatment Note/ Progress Report:     Date:  2021    Patient Name:  Cholo Siddiqui    :  1960  MRN: [id number]  Restrictions/Precautions:    Medical/Treatment Diagnosis Information:  · Diagnosis: G89.4 (ICD-10-CM) - Chronic pain syndrome; M51.36 (ICD-10-CM) - DDD (degenerative disc disease), lumbar; M79.7 (ICD-10-CM) - Fibromyalgia; M54.16 (ICD-10-CM) - Lumbar radiculopathy; M47.817 (ICD-10-CM) - Lumbosacral spondylosis without myelopathy; M79.18 (ICD-10-CM) - Myofascial pain; M25.512, G89.29 (ICD-10-CM) - Chronic left shoulder pain;; M16.12 (ICD-10-CM) - Primary osteoarthritis of left hip  · Treatment Diagnosis: dec ROM and strength in B UE and LE, impaired posture, balance, and gait limiting functional mobility  Insurance/Certification information:  PT Insurance Information: [employer] - 30 visits/yr, auth req  Physician Information:  Referring Practitioner: Ayan Santamaria MD  Plan of care signed (Y/N): []  Yes [x]  No    Date of Patient follow up with Physician:      Progress Report: []  Yes  [x]  No     Date Range for reporting period:  Beginnin/3  Ending:     Progress report due (10 Rx/or 30 days whichever is less): visit #10 or  (date)     Recertification due (POC duration/ or 90 days whichever is less): visit #12 or  (date)     Visit # Insurance Allowable Auth required? Date Range    TBD [x]  Yes - caresource  []  No TBD       Units approved Units used Date Range          Latex Allergy:  [x]NO      []YES  Preferred Language for Healthcare:   [x]English       []other:    Functional Scale:       Date assessed:  Oswestry: raw score = 27/45; dysfunction = 60%  11/3/1    Pain level:  6/10     SUBJECTIVE:  Pt reports he took pain meds before he came. Was sore after last session.      OBJECTIVE: See eval   Observation:    Test measurements: RESTRICTIONS/PRECAUTIONS: chronic pain syndrome    Exercises/Interventions:     Therapeutic Exercise (83597) Resistance / level Sets / Seconds Reps Notes / Cues                                                                  Therapeutic Activities (40437)                                          Neuromuscular Re-ed (20737)                                          Manual Intervention (57165)       Prone PA       GISTM/STM       Lumbar Manip       SI Manip       Hip belt mobs       Hip LA distraction                              AquaticTherapy Dates of Service: 11/12, 11/26, 12/6, 12/10, 12/13  Aquatic Visits Exercises/Activities:   Transfers:  [x] Stairs   [] Ramp  [] Chair Lift   % Immersion:            Ambulation/ Warm up:   UE Exercises:       Forward  x2 N Shoulder Shrugs      Lateral   x2 N Shoulder Circles      Retro   x2 N Scapular Retraction      Cariocas    Push Downs      Heel/Toe Walking    Punching       Rowing X 10 P      Elbow Flex/Ext       Shldr Flex/Ext X 10 P      Shldr aBd/aDd X 10 P   LE Exercises:  Shldr Horiz aBd/aDd X 10 P   HR/TR X10 Shldr IR/ER    Marches x10 Arm Circles    Squats x10 PNF Diagonals    Hamstring Curls x10 Wall Push Ups    Hip Flexion (SLR) x10     Hip aBduction (SLR) x10     Hip Extension (SLR) x10      Hip aDduction (SLR)      Hip Circles x10 Functional:    Hip IR/Er  Step up forward    Hip Hikes  Step up lateral       Step down        Lunges Forward      Lunges Retro      Lunges Lateral     Balance:        SLS  10x10\"      Tandem Stance 2x30\"      NBOS eyes open x Seated:     NBOS eyes closed x Ankle pumps     Hand to Opposite Knee X 10  Ankle Circles     Fwd Step ups to SLS  Knee Flex/Ext    Lateral Step ups to SLS  Hip aBd/aDd    Stop/Go Gait   Bicycle       Ankle DF/PF      Ankle Inv/Ev    Stretching:       Gastroc/Soleus 30\" x 2     Hamstring  30\" x 2  Deep Water:    Knee Flex Stretch x Jog    Piriformis  x Noodle Hang    Hip Flexor x Traction at [address] [facility]       ITB  Cool Down:    Quad  Fwd Walking    Mid Back   Lat Walking    UT  Retro Walking    Post Shoulder  Noodle float    Ladder Pull      Pec Stretch            Core: Other:    TrA set     Pelvic Tilts     Multifidi Walk outs c paddle      PNF Chop/Lifts                          Aquatic Abbreviation Key  B= Belt DB= Dumbells T= Theratube   H= Hydrotone N= Noodles W= Weights   P= Paddles S= Speedo equipment K= Kickboard       Pt. Education: Gave pt tour of pool, explained how to use lockers, what to wear, that it would be in group setting, and showed pt aquatic equipment. Modalities:     Pt. Education:  -pt educated on diagnosis, prognosis and expectations for rehab  -all pt questions were answered    Home Exercise Prorgam:  Access Code: [email or web address]  URL: [email or web address]  Date: 11/03/2021  Prepared by: Samina Mooney Humjosselyn    Exercises  Seated Table Hamstring Stretch - 3 x daily - 7 x weekly - 1 sets - 3 reps - 20 hold  Supine Piriformis Stretch with Foot on Ground - 3 x daily - 7 x weekly - 1 sets - 3 reps - 20 hold  Supine Lower Trunk Rotation - 2 x daily - 7 x weekly - 1 sets - 20 reps  Supine Shoulder Flexion with Dowel - 2 x daily - 7 x weekly - 1 sets - 10-20 reps  Prone Press Up on Elbows - 2 x daily - 7 x weekly - 1 sets - 10-15 reps      Therapeutic Exercise and NMR EXR  [] (09171) Provided verbal/tactile cueing for activities related to strengthening, flexibility, endurance, ROM  for improvements in proximal hip and core control with self care, mobility, lifting and ambulation.  [] (72223) Provided verbal/tactile cueing for activities related to improving balance, coordination, kinesthetic sense, posture, motor skill, proprioception  to assist with core control in self care, mobility, lifting, and ambulation. [x] (22416) Aquatic therapy with therapeutic exercise.  Provided verbal and tactile cueing for activities related to strengthening, flexibility, endurance, ROM for improvements in  [x] LE / lumbar: LE, proximal hip, and core control in self care, mobility, lifting, ambulation and eccentric single leg control. [x] UE / cervical: cervical, scapular, scapulothoracic and UE control with self care, reaching, carrying, lifting, house/yardwork, driving, computer work. [x] (37888) Therapist is in constant attendance of 2 or more patients providing skilled therapy interventions, but not providing any significant amount of measurable one-on-one time to either patient, for improvements in  [x] LE / lumbar: LE, proximal hip, and core control in self care, mobility, lifting, ambulation and eccentric single leg control. [x] UE / cervical: cervical, scapular, scapulothoracic and UE control with self care, reaching, carrying, lifting, house/yardwork, driving, computer work.        Therapeutic Activities:    [] (63928 or 42666) Provided verbal/tactile cueing for activities related to improving balance, coordination, kinesthetic sense, posture, motor skill, proprioception and motor activation to allow for proper function  with self care and ADLs  [] (56446) Provided training and instruction to the patient for proper core and proximal hip recruitment and positioning with ambulation re-education     Home Exercise Program:    [] (33748) Reviewed/Progressed HEP activities related to strengthening, flexibility, endurance, ROM of core, proximal hip and LE for functional self-care, mobility, lifting and ambulation   [] (06474) Reviewed/Progressed HEP activities related to improving balance, coordination, kinesthetic sense, posture, motor skill, proprioception of core, proximal hip and LE for self care, mobility, lifting, and ambulation      Manual Treatments:  PROM / STM / Oscillations-Mobs:  G-I, II, III, IV (PA's, Inf., Post.)  [] (91062) Provided manual therapy to mobilize proximal hip and LS spine soft tissue/joints for the purpose of modulating pain, promoting relaxation,  increasing ROM, reducing/eliminating soft tissue swelling/inflammation/restriction, improving soft tissue extensibility and allowing for proper ROM for normal function with self care, mobility, lifting and ambulation. Charges:  Timed Code Treatment Minutes: 10   Total Treatment Minutes: 32       [] EVAL - LOW (72494)   [] EVAL - MOD (00539)  [] EVAL - HIGH (54398)  [] RE-EVAL (26399)  [] AX(69576) x       [] Ionto  [] NMR (22832) x       [] Vaso  [] Manual (64915) x       [] Ultrasound  [] TA x        [] Mech Traction (04299)  [x] Aquatic Therapy x 1     [] ES (un) (34669):   [] Home Management Training x  [] ES(attended) (06560)   [] Dry Needling 1-2 muscles (92902):  [] Dry Needling 3+ muscles (320597  [x] Group: 1     [] Other:     Goals:   Patient stated goal: independent with HEP to self-manage pain  [] Progressing: [] Met: [] Not Met: [] Adjusted    Therapist goals for Patient:   Short Term Goals: To be achieved in: 2 weeks  1. Independent in HEP and progression per patient tolerance, in order to prevent re-injury. [] Progressing: [] Met: [] Not Met: [] Adjusted  2. Patient will have a decrease in pain to facilitate improvement in movement, function, and ADLs as indicated by Functional Deficits. [] Progressing: [] Met: [] Not Met: [] Adjusted    Long Term Goals: To be achieved in: 6 weeks  1. Disability index score of 40% or less for the NIKUNJ to assist with reaching prior level of function. [] Progressing: [] Met: [] Not Met: [] Adjusted  2. Patient will demonstrate increased AROM to WNL, good LS mobility, good hip ROM to allow for proper joint functioning as indicated by patients Functional Deficits. [] Progressing: [] Met: [] Not Met: [] Adjusted  3. Patient will demonstrate an increase in Strength to good proximal hip and core activation to allow for proper functional mobility as indicated by patients Functional Deficits. [] Progressing: [] Met: [] Not Met: [] Adjusted  4.  Patient will return to functional activities including sleeping, sitting, and walking without increased symptoms or restriction. [] Progressing: [] Met: [] Not Met: [] Adjusted    Overall Progression Towards Functional goals/ Treatment Progress Update:  [] Patient is progressing as expected towards functional goals listed. [] Progression is slowed due to complexities/Impairments listed. [] Progression has been slowed due to co-morbidities. [x] Plan just implemented, too soon to assess goals progression <30days   [] Goals require adjustment due to lack of progress  [] Patient is not progressing as expected and requires additional follow up with physician  [] Other    Persisting Functional Limitations/Impairments:  [x]Sitting [x]Standing   [x]Walking []Squatting/bending    []Stairs []ADL's    [x]Transfers []Reaching  []Housework []Job related tasks  []Driving []Sports/Recreation   [x]Sleeping []Other:    ASSESSMENT: Pt did well with 10 reps today. Does feel better once leaving pool. Progressing slowly. Plan to continue as above. Treatment/Activity Tolerance:  [x] Patient able to complete tx  [] Patient limited by fatique  [] Patient limited by pain  [] Patient limited by other medical complications  [] Other:     Prognosis: [] Good [x] Fair  [] Poor    Patient Requires Follow-up: [x] Yes  [] No    PLAN: See eval. PT 2x / week for 6 weeks. [x] Continue per plan of care [] Alter current plan (see comments)  [] Plan of care initiated [] Hold pending MD visit [] Discharge    Electronically signed by: Macrina Roberts, PT, DPT      Note: If patient does not return for scheduled/ recommended follow up visits, this note will serve as a discharge from care along with most recent update on progress.

## 2021-12-16 DIAGNOSIS — E11.65 UNCONTROLLED TYPE 2 DIABETES MELLITUS WITH HYPERGLYCEMIA (HCC): ICD-10-CM

## 2021-12-16 RX ORDER — INSULIN GLARGINE 100 [IU]/ML
INJECTION, SOLUTION SUBCUTANEOUS
Qty: 5 PEN | Refills: 5 | Status: SHIPPED | OUTPATIENT
Start: 2021-12-16 | End: 2022-09-14 | Stop reason: SDUPTHER

## 2021-12-16 NOTE — TELEPHONE ENCOUNTER
Medication:   Requested Prescriptions     Pending Prescriptions Disp Refills    insulin glargine (LANTUS SOLOSTAR) 100 UNIT/ML injection pen 5 pen 5     Sig: INJECT 46 UNITS UNDER THE SKIN DAILY       Last Filled:  11/23/2021    Patient Phone Number: [phone] (home)     Last appt: 11/23/2021   Next appt: Visit date not found    Last Labs DM:   Lab Results   Component Value Date    LABA1C 8.4 11/23/2021

## 2021-12-17 ENCOUNTER — HOSPITAL ENCOUNTER (OUTPATIENT)
Dept: PHYSICAL THERAPY | Age: 61
Setting detail: THERAPIES SERIES
Discharge: HOME | End: 2021-12-17
Payer: COMMERCIAL

## 2021-12-17 PROCEDURE — 97150 GROUP THERAPEUTIC PROCEDURES: CPT

## 2021-12-17 PROCEDURE — 97113 AQUATIC THERAPY/EXERCISES: CPT

## 2021-12-17 NOTE — FLOWSHEET NOTE
Flor Medley  Phone: [phone]   Fax: [phone]    Physical Therapy Treatment Note/ Progress Report:     Date:  2021    Patient Name:  Adela Gu    :  1960  MRN: [id number]  Restrictions/Precautions:    Medical/Treatment Diagnosis Information:  · Diagnosis: G89.4 (ICD-10-CM) - Chronic pain syndrome; M51.36 (ICD-10-CM) - DDD (degenerative disc disease), lumbar; M79.7 (ICD-10-CM) - Fibromyalgia; M54.16 (ICD-10-CM) - Lumbar radiculopathy; M47.817 (ICD-10-CM) - Lumbosacral spondylosis without myelopathy; M79.18 (ICD-10-CM) - Myofascial pain; M25.512, G89.29 (ICD-10-CM) - Chronic left shoulder pain;; M16.12 (ICD-10-CM) - Primary osteoarthritis of left hip  · Treatment Diagnosis: dec ROM and strength in B UE and LE, impaired posture, balance, and gait limiting functional mobility  Insurance/Certification information:  PT Insurance Information: [employer] - 30 visits/yr, auth req  Physician Information:  Referring Practitioner: Dimitrios Trevizo MD  Plan of care signed (Y/N): []  Yes [x]  No    Date of Patient follow up with Physician:      Progress Report: []  Yes  [x]  No     Date Range for reporting period:  Beginnin/3  Ending:     Progress report due (10 Rx/or 30 days whichever is less): visit #10 or  (date)     Recertification due (POC duration/ or 90 days whichever is less): visit #12 or  (date)     Visit # Insurance Allowable Auth required? Date Range    TBD [x]  Yes - [employer]ource  []  No TBD       Units approved Units used Date Range          Latex Allergy:  [x]NO      []YES  Preferred Language for Healthcare:   [x]English       []other:    Functional Scale:       Date assessed:  Oswestry: raw score = 27/45; dysfunction = 60%  11/3/1    Pain level:  6/10 12/17     SUBJECTIVE:  Pt reports that his pain overall is not too bad today.      OBJECTIVE: See eval   Observation:    Test measurements: RESTRICTIONS/PRECAUTIONS: chronic pain syndrome    Exercises/Interventions:     Therapeutic Exercise (86961) Resistance / level Sets / Seconds Reps Notes / Cues                                                                  Therapeutic Activities (44508)                                          Neuromuscular Re-ed (77222)                                          Manual Intervention (69777)       Prone PA       GISTM/STM       Lumbar Manip       SI Manip       Hip belt mobs       Hip LA distraction                              AquaticTherapy Dates of Service: 11/12, 11/26, 12/6, 12/10, 12/13, 12/17  Aquatic Visits Exercises/Activities:   Transfers:  [x] Stairs   [] Ramp  [] Chair Lift   % Immersion:            Ambulation/ Warm up:   UE Exercises:       Forward  x2 N Shoulder Shrugs      Lateral   x2 N Shoulder Circles      Retro   x2 N Scapular Retraction      Cariocas    Push Downs      Heel/Toe Walking    Punching       Rowing X 10 P      Elbow Flex/Ext       Shldr Flex/Ext X 10 P      Shldr aBd/aDd X 10 P   LE Exercises:  Shldr Horiz aBd/aDd X 10 P   HR/TR X10 Shldr IR/ER    Marches x10 Arm Circles    Squats x10 PNF Diagonals    Hamstring Curls x10 Wall Push Ups    Hip Flexion (SLR) x10     Hip aBduction (SLR) x10     Hip Extension (SLR) x10      Hip aDduction (SLR)      Hip Circles x10 Functional:    Hip IR/Er  Step up forward 10 added 12/17   Hip Hikes  Step up lateral  10 added 12/17     Step down        Lunges Forward      Lunges Retro      Lunges Lateral     Balance:        SLS  10x10\"      Tandem Stance 2x30\"      NBOS eyes open x Seated:     NBOS eyes closed x Ankle pumps     Hand to Opposite Knee X 10  Ankle Circles     Fwd Step ups to SLS  Knee Flex/Ext    Lateral Step ups to SLS  Hip aBd/aDd    Stop/Go Gait   Bicycle       Ankle DF/PF      Ankle Inv/Ev    Stretching:       Gastroc/Soleus 30\" x 2     Hamstring  30\" x 2  Deep Water:    Knee Flex Stretch x Jog    Piriformis  x Noodle Hang    Hip Flexor x Traction at [address]       ITB  Cool Down:    Quad  Fwd Walking    Mid Back   Lat Walking    UT  Retro Walking    Post Shoulder  Noodle float    Ladder Pull      Pec Stretch            Core: Other:    TrA set     Pelvic Tilts     Multifidi Walk outs c paddle      PNF Chop/Lifts                          Aquatic Abbreviation Key  B= Belt DB= Dumbells T= Theratube   H= Hydrotone N= Noodles W= Weights   P= Paddles S= Speedo equipment K= Kickboard       Pt. Education: Gave pt tour of pool, explained how to use lockers, what to wear, that it would be in group setting, and showed pt aquatic equipment. Modalities:     Pt. Education:  -pt educated on diagnosis, prognosis and expectations for rehab  -all pt questions were answered    Home Exercise Prorgam:  Access Code: [email or web address]  URL: [email or web address]  Date: 11/03/2021  Prepared by: Hipolito Morley    Exercises  Seated Table Hamstring Stretch - 3 x daily - 7 x weekly - 1 sets - 3 reps - 20 hold  Supine Piriformis Stretch with Foot on Ground - 3 x daily - 7 x weekly - 1 sets - 3 reps - 20 hold  Supine Lower Trunk Rotation - 2 x daily - 7 x weekly - 1 sets - 20 reps  Supine Shoulder Flexion with Dowel - 2 x daily - 7 x weekly - 1 sets - 10-20 reps  Prone Press Up on Elbows - 2 x daily - 7 x weekly - 1 sets - 10-15 reps      Therapeutic Exercise and NMR EXR  [] (53654) Provided verbal/tactile cueing for activities related to strengthening, flexibility, endurance, ROM  for improvements in proximal hip and core control with self care, mobility, lifting and ambulation.  [] (37941) Provided verbal/tactile cueing for activities related to improving balance, coordination, kinesthetic sense, posture, motor skill, proprioception  to assist with core control in self care, mobility, lifting, and ambulation. [x] (81435) Aquatic therapy with therapeutic exercise.  Provided verbal and tactile cueing for activities related to strengthening, flexibility, endurance, ROM for improvements in  [x] LE / lumbar: LE, proximal hip, and core control in self care, mobility, lifting, ambulation and eccentric single leg control. [x] UE / cervical: cervical, scapular, scapulothoracic and UE control with self care, reaching, carrying, lifting, house/yardwork, driving, computer work. [x] (33446) Therapist is in constant attendance of 2 or more patients providing skilled therapy interventions, but not providing any significant amount of measurable one-on-one time to either patient, for improvements in  [x] LE / lumbar: LE, proximal hip, and core control in self care, mobility, lifting, ambulation and eccentric single leg control. [x] UE / cervical: cervical, scapular, scapulothoracic and UE control with self care, reaching, carrying, lifting, house/yardwork, driving, computer work.        Therapeutic Activities:    [] (76620 or 63055) Provided verbal/tactile cueing for activities related to improving balance, coordination, kinesthetic sense, posture, motor skill, proprioception and motor activation to allow for proper function  with self care and ADLs  [] (59413) Provided training and instruction to the patient for proper core and proximal hip recruitment and positioning with ambulation re-education     Home Exercise Program:    [] (30128) Reviewed/Progressed HEP activities related to strengthening, flexibility, endurance, ROM of core, proximal hip and LE for functional self-care, mobility, lifting and ambulation   [] (53198) Reviewed/Progressed HEP activities related to improving balance, coordination, kinesthetic sense, posture, motor skill, proprioception of core, proximal hip and LE for self care, mobility, lifting, and ambulation      Manual Treatments:  PROM / STM / Oscillations-Mobs:  G-I, II, III, IV (PA's, Inf., Post.)  [] (00821) Provided manual therapy to mobilize proximal hip and LS spine soft tissue/joints for the purpose of modulating pain, promoting relaxation,  increasing ROM, reducing/eliminating soft tissue swelling/inflammation/restriction, improving soft tissue extensibility and allowing for proper ROM for normal function with self care, mobility, lifting and ambulation. Charges:  Timed Code Treatment Minutes: 8   Total Treatment Minutes: 30       [] EVAL - LOW (76111)   [] EVAL - MOD (06448)  [] EVAL - HIGH (64034)  [] RE-EVAL (27251)  [] BA(45523) x       [] Ionto  [] NMR (51446) x       [] Vaso  [] Manual (68091) x       [] Ultrasound  [] TA x        [] Mech Traction (16503)  [x] Aquatic Therapy x 1     [] ES (un) (66124):   [] Home Management Training x  [] ES(attended) (47578)   [] Dry Needling 1-2 muscles (96095):  [] Dry Needling 3+ muscles (610890  [x] Group: 1     [] Other:     Goals:   Patient stated goal: independent with HEP to self-manage pain  [] Progressing: [] Met: [] Not Met: [] Adjusted    Therapist goals for Patient:   Short Term Goals: To be achieved in: 2 weeks  1. Independent in HEP and progression per patient tolerance, in order to prevent re-injury. [] Progressing: [] Met: [] Not Met: [] Adjusted  2. Patient will have a decrease in pain to facilitate improvement in movement, function, and ADLs as indicated by Functional Deficits. [] Progressing: [] Met: [] Not Met: [] Adjusted    Long Term Goals: To be achieved in: 6 weeks  1. Disability index score of 40% or less for the NIKUNJ to assist with reaching prior level of function. [] Progressing: [] Met: [] Not Met: [] Adjusted  2. Patient will demonstrate increased AROM to WNL, good LS mobility, good hip ROM to allow for proper joint functioning as indicated by patients Functional Deficits. [] Progressing: [] Met: [] Not Met: [] Adjusted  3. Patient will demonstrate an increase in Strength to good proximal hip and core activation to allow for proper functional mobility as indicated by patients Functional Deficits. [] Progressing: [] Met: [] Not Met: [] Adjusted  4.  Patient will return to functional activities including sleeping, sitting, and walking without increased symptoms or restriction. [] Progressing: [] Met: [] Not Met: [] Adjusted    Overall Progression Towards Functional goals/ Treatment Progress Update:  [] Patient is progressing as expected towards functional goals listed. [] Progression is slowed due to complexities/Impairments listed. [] Progression has been slowed due to co-morbidities. [x] Plan just implemented, too soon to assess goals progression <30days   [] Goals require adjustment due to lack of progress  [] Patient is not progressing as expected and requires additional follow up with physician  [] Other    Persisting Functional Limitations/Impairments:  [x]Sitting [x]Standing   [x]Walking []Squatting/bending    []Stairs []ADL's    [x]Transfers []Reaching  []Housework []Job related tasks  []Driving []Sports/Recreation   [x]Sleeping []Other:    ASSESSMENT: 12/17 Patient tolerated added FSU/ LSU on Box without complaints this date. Patient is beginning to get through exercises relatively quickly will need to progress as tolerated. Treatment/Activity Tolerance:  [x] Patient able to complete tx  [] Patient limited by fatique  [] Patient limited by pain  [] Patient limited by other medical complications  [] Other:     Prognosis: [] Good [x] Fair  [] Poor    Patient Requires Follow-up: [x] Yes  [] No    PLAN: See ije[facility] PT 2x / week for 6 weeks. [x] Continue per plan of care [] Alter current plan (see comments)  [] Plan of care initiated [] Hold pending MD visit [] Discharge    Electronically signed by: Andrea Pearce PT, DPT OMT-C      Note: If patient does not return for scheduled/ recommended follow up visits, this note will serve as a discharge from care along with most recent update on progress.

## 2021-12-20 ENCOUNTER — HOSPITAL ENCOUNTER (OUTPATIENT)
Dept: PHYSICAL THERAPY | Age: 61
Setting detail: THERAPIES SERIES
Discharge: HOME | End: 2021-12-20
Payer: COMMERCIAL

## 2021-12-20 PROCEDURE — 97530 THERAPEUTIC ACTIVITIES: CPT

## 2021-12-20 NOTE — PLAN OF CARE
Flor Martinez  Phone: [phone]   Fax: [phone]    Physical Therapy Re-Certification Plan of Care    Dear Ann Marie Rey MD,    We had the pleasure of treating the following patient for physical therapy services at [facility]. A summary of our findings can be found in the updated assessment below. This includes our plan of care. If you have any questions or concerns regarding these findings, please do not hesitate to contact me at the office phone number checked above. Thank you for the referral.     Physician Signature:________________________________Date:__________________  By signing above (or electronic signature), therapist's plan is approved by physician      Functional Outcome:                      Owsestry Disability Total Scores: 24                             Sub-sections:   ;   ;       Overall Response to Treatment:   []Patient is responding well to treatment and improvement is noted with regards  to goals   []Patient should continue to improve in reasonable time if they continue HEP   []Patient has plateaued and is no longer responding to skilled PT intervention    []Patient is getting worse and would benefit from return to referring MD   []Patient unable to adhere to initial POC   [x]Other: Pt is demonstrating improved ROM, strength, and flexibility and is reporting dec pain since [facility]. Pt states aquatic PT has been helping improve sleep, walking, and standing tolerance. Pt would like to continue PT to further address deficits to return to PLOF. Date range of Visits: 11/3-21  Total Visits: 7    Recommendation:    [x]Continue PT 2x / wk for 6 weeks.                []Hold PT, pending MD visit        Physical Therapy Treatment Note/ Progress Report:     Date:  2021    Patient Name:  Osiris Portillo    :  1960  MRN: [id number]  Restrictions/Precautions: Medical/Treatment Diagnosis Information:  · Diagnosis: G89.4 (ICD-10-CM) - Chronic pain syndrome; M51.36 (ICD-10-CM) - DDD (degenerative disc disease), lumbar; M79.7 (ICD-10-CM) - Fibromyalgia; M54.16 (ICD-10-CM) - Lumbar radiculopathy; M47.817 (ICD-10-CM) - Lumbosacral spondylosis without myelopathy; M79.18 (ICD-10-CM) - Myofascial pain; M25.512, G89.29 (ICD-10-CM) - Chronic left shoulder pain;; M16.12 (ICD-10-CM) - Primary osteoarthritis of left hip  · Treatment Diagnosis: dec ROM and strength in B UE and LE, impaired posture, balance, and gait limiting functional mobility  Insurance/Certification information:  PT Insurance Information: [employer] - 30 visits/yr, auth req  Physician Information:  Referring Practitioner: Roni Garcia MD  Plan of care signed (Y/N): []  Yes [x]  No    Date of Patient follow up with Physician: 21     Progress Report: []  Yes  [x]  No     Date Range for reporting period:  Beginnin/3  Ending:     Progress report due (10 Rx/or 30 days whichever is less): visit #10 or  (date)     Recertification due (POC duration/ or 90 days whichever is less): visit #12 or  (date)     Visit # Insurance Allowable Auth required? Date Range    TBD [x]  Yes - [employer]  []  No TBD       Units approved Units used Date Range          Latex Allergy:  [x]NO      []YES  Preferred Language for Healthcare:   [x]English       []other:    Functional Scale:       Date assessed:  Oswestry: raw score = 27/45; dysfunction = 60%  11/3/1  Oswestry: raw score = 24/45; dysfunction = 53%  21    Pain level:  6/10 12/17     SUBJECTIVE:  Pt reports his medicine hasn't kicked in yet today. Pt has been using pillow between his knees to help take pressure off while he's sleeping. Pt's sitting tolerance is about the same as when he started PT. Pt reports CHF limits his walking tolerance, but this has mildly improved [facility]. Pt states the pool has been helping a lot.  [facility]. OBJECTIVE:   12/20:  Palpation: TTP B lumber paraspinals  Functional Mobility/Transfers: independent, slow to perform supine <> sit and sit <> stand transfers  Posture: dec lumbar lordosis  Gait: (include devices/WB status) slow mark, inc ANTONIO, no AD    ROM   Comments   Lumbar Flex To top of ankles     Lumbar Ext Limited 25%        ROM LEFT RIGHT Comments   Lumbar Side Bend To lateral epicondyle (+) To lateral epicondyle L \"because of the hip\"    Lumbar Rotation         Hip Flexion [facility] WFL     Hip Abd         Hip ER [facility] WFL     Hip IR WFL WFL     Hip Extension         Knee Ext         Knee Flex         Hamstring Flex Limited 35 deg Limited 30 deg     Piriformis                   Shoulder flexion WFL (pain at [facility]) [facility]     MMT: grossly 4+/5 BLE's      RESTRICTIONS/PRECAUTIONS: chronic pain syndrome    Exercises/Interventions:     Therapeutic Exercise (04957) Resistance / level Sets / Seconds Reps Notes / Cues                                                                  Therapeutic Activities (30047)       Reassessment of objective measures and updated HEP to allow for independence with manage of symptoms  X 30 min  12/20                               Neuromuscular Re-ed (44443)                                          Manual Intervention (30174)       Prone PA       GISTM/STM       Lumbar Manip       SI Manip       Hip belt mobs       Hip LA distraction                              AquaticTherapy Dates of Service: 11/12, 11/26, 12/6, 12/10, 12/13, 12/17  Aquatic Visits Exercises/Activities:   Transfers:  [x] Stairs   [] Ramp  [] Chair Lift   % Immersion:            Ambulation/ Warm up:   UE Exercises:       Forward  x2 N Shoulder Shrugs      Lateral   x2 N Shoulder Circles      Retro   x2 N Scapular Retraction      [place]    Push Downs      Heel/Toe Walking    Punching       Rowing X 10 P      Elbow Flex/Ext       Shldr Flex/Ext X 10 P      Shldr aBd/aDd X 10 P   LE Exercises: Shldr Horiz aBd/aDd X 10 P   HR/TR X10 Shldr IR/ER    Marches x10 Arm Circles    Squats x10 PNF Diagonals    Hamstring Curls x10 Wall Push Ups    Hip Flexion (SLR) x10     Hip aBduction (SLR) x10     Hip Extension (SLR) x10      Hip aDduction (SLR)      Hip Circles x10 Functional:    Hip IR/Er  Step up forward 10 added 12/17   Hip Hikes  Step up lateral  10 added 12/17     Step down        Lunges Forward      Lunges Retro      Lunges Lateral     Balance:        SLS  10x10\"      Tandem Stance 2x30\"      NBOS eyes open x Seated:     NBOS eyes closed x Ankle pumps     Hand to Opposite Knee X 10  Ankle Circles     Fwd Step ups to SLS  Knee Flex/Ext    Lateral Step ups to SLS  Hip aBd/aDd    Stop/Go Gait   Bicycle       Ankle DF/PF      Ankle Inv/Ev    Stretching:       Gastroc/Soleus 30\" x 2     Hamstring  30\" x 2  Deep Water:    Knee Flex Stretch x Jog    Piriformis  x Noodle Hang    Hip Flexor x Traction at [facility]       ITB  Cool Down:    Quad  Fwd Walking    Mid Back   Lat Walking    UT  Retro Walking    Post Shoulder  Noodle float    Ladder Pull      Pec Stretch            Core: Other:    TrA set     Pelvic Tilts     Multifidi Walk outs c paddle      PNF Chop/Lifts                          Aquatic Abbreviation Key  B= Belt DB= Dumbells T= Theratube   H= Hydrotone N= Noodles W= Weights   P= Paddles S= Speedo equipment K= Kickboard       Pt. Education: Gave pt tour of pool, explained how to use lockers, what to wear, that it would be in group setting, and showed pt aquatic equipment. Modalities:     Pt. Education:  -pt educated on diagnosis, prognosis and expectations for rehab  -all pt questions were answered    Home Exercise Prorgam:  Access Code: [email or web address]  URL: [email or web address]  Date: 12/20/2021  Prepared by: Annabella Swan    Exercises  Forward and Backward Stepping at [facility] - 1 x daily - 7 x weekly - 3 sets - 10 reps  Lateral Stepping at [facility] - 1 x daily - 7 x weekly - 3 sets - 10 reps  Heel Toe Raises at [facility] - 1 x daily - 7 x weekly - 3 sets - 10 reps  Standing March at [facility] - 1 x daily - 7 x weekly - 3 sets - 10 reps  Standing Hip Abduction Adduction at [facility] - 1 x daily - 7 x weekly - 3 sets - 10 reps  Standing Hip Flexion Extension at [facility] - 1 x daily - 7 x weekly - 3 sets - 10 reps  Standing Hip Circles at [facility] - 1 x daily - 7 x weekly - 3 sets - 10 reps  Single Leg Stance at [facility] - 1 x daily - 7 x weekly - 1 sets - 10 reps - 10 hold  Tandem Stance - 1 x daily - 7 x weekly - 1 sets - 2 reps - 30 hold  Bilateral Shoulder Horizontal Abduction Adduction AROM at [facility] - 1 x daily - 7 x weekly - 3 sets - 10 reps  Bilateral Shoulder Flexion Extension AROM at [facility] - 1 x daily - 7 x weekly - 3 sets - 10 reps  Bilateral Shoulder Abduction Adduction AROM at Pool Wall - 1 x daily - 7 x weekly - 3 sets - 10 reps  Seated Table Hamstring Stretch - 1 x daily - 7 x weekly - 1 sets - 2 reps - 30 hold  Gastroc Stretch on Wall - 1 x daily - 7 x weekly - 1 sets - 2 reps - 30 hold  Step Up - 1 x daily - 7 x weekly - 3 sets - 10 reps  Lateral Step Up - 1 x daily - 7 x weekly - 3 sets - 10 reps      Access Code: [email or web address]  URL: [email or web address]  Date: 11/03/2021  Prepared by: Steph Morley    Exercises  Seated Table Hamstring Stretch - 3 x daily - 7 x weekly - 1 sets - 3 reps - 20 hold  Supine Piriformis Stretch with Foot on Ground - 3 x daily - 7 x weekly - 1 sets - 3 reps - 20 hold  Supine Lower Trunk Rotation - 2 x daily - 7 x weekly - 1 sets - 20 reps  Supine Shoulder Flexion with Dowel - 2 x daily - 7 x weekly - 1 sets - 10-20 reps  Prone Press Up on Elbows - 2 x daily - 7 x weekly - 1 sets - 10-15 reps      Therapeutic Exercise and NMR EXR  [] (23113) Provided verbal/tactile cueing for activities related to strengthening, flexibility, endurance, ROM  for improvements in proximal hip and core control with self care, mobility, lifting and ambulation.   [] (44895) Provided verbal/tactile cueing for activities related to improving balance, coordination, kinesthetic sense, posture, motor skill, proprioception  to assist with core control in self care, mobility, lifting, and ambulation. [x] (05845) Aquatic therapy with therapeutic exercise. Provided verbal and tactile cueing for activities related to strengthening, flexibility, endurance, ROM for improvements in  [x] LE / lumbar: LE, proximal hip, and core control in self care, mobility, lifting, ambulation and eccentric single leg control. [x] UE / cervical: cervical, scapular, scapulothoracic and UE control with self care, reaching, carrying, lifting, house/yardwork, driving, computer work. [x] (84959) Therapist is in constant attendance of 2 or more patients providing skilled therapy interventions, but not providing any significant amount of measurable one-on-one time to either patient, for improvements in  [x] LE / lumbar: LE, proximal hip, and core control in self care, mobility, lifting, ambulation and eccentric single leg control. [x] UE / cervical: cervical, scapular, scapulothoracic and UE control with self care, reaching, carrying, lifting, house/yardwork, driving, computer work.        Therapeutic Activities:    [] (01834 or 88381) Provided verbal/tactile cueing for activities related to improving balance, coordination, kinesthetic sense, posture, motor skill, proprioception and motor activation to allow for proper function  with self care and ADLs  [] (63432) Provided training and instruction to the patient for proper core and proximal hip recruitment and positioning with ambulation re-education     Home Exercise Program:    [] (74636) Reviewed/Progressed HEP activities related to strengthening, flexibility, endurance, ROM of core, proximal hip and LE for functional self-care, mobility, lifting and ambulation   [] (99813) Reviewed/Progressed HEP activities related to improving balance, coordination, kinesthetic sense, posture, motor skill, proprioception of core, proximal hip and LE for self care, mobility, lifting, and ambulation      Manual Treatments:  PROM / STM / Oscillations-Mobs:  G-I, II, III, IV (PA's, Inf., Post.)  [] (51696) Provided manual therapy to mobilize proximal hip and LS spine soft tissue/joints for the purpose of modulating pain, promoting relaxation,  increasing ROM, reducing/eliminating soft tissue swelling/inflammation/restriction, improving soft tissue extensibility and allowing for proper ROM for normal function with self care, mobility, lifting and ambulation. Charges:  Timed Code Treatment Minutes: 30   Total Treatment Minutes: 30       [] EVAL - LOW (21786)   [] EVAL - MOD (91516)  [] EVAL - HIGH (33557)  [] RE-EVAL (71420)  [] SQ(06871) x       [] Ionto  [] NMR (79956) x       [] Vaso  [] Manual (53467) x       [] Ultrasound  [x] TA x  2      [] Mech Traction (22133)  [] Aquatic Therapy x      [] ES (un) (31021):   [] Home Management Training x  [] ES(attended) (13473)   [] Dry Needling 1-2 muscles (23935):  [] Dry Needling 3+ muscles (139092  [] Group:      [] Other:     Goals:   Patient stated goal: independent with HEP to self-manage pain  [x] Progressing: [] Met: [] Not Met: [] Adjusted    Therapist goals for Patient:   Short Term Goals: To be achieved in: 2 weeks  1. Independent in HEP and progression per patient tolerance, in order to prevent re-injury. [] Progressing: [x] Met: [] Not Met: [] Adjusted  2. Patient will have a decrease in pain to facilitate improvement in movement, function, and ADLs as indicated by Functional Deficits. [] Progressing: [x] Met: [] Not Met: [] Adjusted    Long Term Goals: To be achieved in: 6 weeks  1. Disability index score of 40% or less for the NIKUNJ to assist with reaching prior level of function. [x] Progressing: [] Met: [] Not Met: [] Adjusted  2.  Patient will demonstrate increased AROM to WNL, good LS mobility, good hip ROM to allow for proper joint functioning as indicated by patients Functional Deficits. [] Progressing: [] Met: [] Not Met: [] Adjusted  3. Patient will demonstrate an increase in Strength to good proximal hip and core activation to allow for proper functional mobility as indicated by patients Functional Deficits. [] Progressing: [] Met: [] Not Met: [] Adjusted  4. Patient will return to functional activities including sleeping, sitting, and walking without increased symptoms or restriction. [x] Progressing:  [] Met: [] Not Met: [] Adjusted    Overall Progression Towards Functional goals/ Treatment Progress Update:  [] Patient is progressing as expected towards functional goals listed. [] Progression is slowed due to complexities/Impairments listed. [] Progression has been slowed due to co-morbidities. [x] Plan just implemented, too soon to assess goals progression <30days   [] Goals require adjustment due to lack of progress  [] Patient is not progressing as expected and requires additional follow up with physician  [] Other    Persisting Functional Limitations/Impairments:  [x]Sitting [x]Standing   [x]Walking []Squatting/bending    []Stairs []ADL's    [x]Transfers []Reaching  []Housework []Job related tasks  []Driving []Sports/Recreation   [x]Sleeping []Other:    ASSESSMENT: 12/20 POC updated. See above for details. Auth req through [facility] for continued PT.    12/17 Patient tolerated added FSU/ LSU on Box without complaints this date. Patient is beginning to get through exercises relatively quickly will need to progress as tolerated. Treatment/Activity Tolerance:  [x] Patient able to complete tx  [] Patient limited by fatique  [] Patient limited by pain  [] Patient limited by other medical complications  [] Other:     Prognosis: [] Good [x] Fair  [] Poor    Patient Requires Follow-up: [x] Yes  [] No    PLAN: See eval. PT 2x / week for 6 weeks.    [x] Continue per plan of care [] Alter current plan (see comments)  [] Plan of care initiated [] Hold pending MD visit [] Discharge    Electronically signed by: Shayy Boyd PT, DPT       Note: If patient does not return for scheduled/ recommended follow up visits, this note will serve as a discharge from care along with most recent update on progress.

## 2021-12-22 ENCOUNTER — HOSPITAL ENCOUNTER (OUTPATIENT)
Dept: PHYSICAL THERAPY | Age: 61
Setting detail: THERAPIES SERIES
Discharge: HOME | End: 2021-12-22
Payer: COMMERCIAL

## 2021-12-22 NOTE — PROGRESS NOTES
Physical Therapy    Physical Therapy  Cancellation/No-show Note  Patient Name:  Makayla Pablo  :  1960   Date:  2021  Cancelled visits to date: 3  No-shows to date:     Patient status for today's appointment patient:  [x]  Cancelled , 12/3,   []  Rescheduled appointment  []  No-show     Reason given by patient:  [x]  Patient ill  []  Conflicting appointment  []  No transportation    []  Conflict with work  []  No reason given  []  Other:     Comments:     Phone call information:   []  Phone call made today to patient at _ time at number provided:      []  Patient answered, conversation as follows:    []  Patient did not answer, message left as follows:  []  Phone call not made today  [x]  Phone call not needed - pt contacted us to cancel and provided reason for cancellation.      Electronically signed by:  Romelia Hernandez, PT DPT

## 2021-12-23 ENCOUNTER — OFFICE VISIT (OUTPATIENT)
Dept: PAIN MANAGEMENT | Age: 61
End: 2021-12-23
Payer: COMMERCIAL

## 2021-12-23 VITALS
SYSTOLIC BLOOD PRESSURE: 160 MMHG | BODY MASS INDEX: 36.8 KG/M2 | DIASTOLIC BLOOD PRESSURE: 90 MMHG | TEMPERATURE: 97.2 F | OXYGEN SATURATION: 97 % | HEART RATE: 73 BPM | WEIGHT: 228 LBS

## 2021-12-23 DIAGNOSIS — M47.817 LUMBOSACRAL SPONDYLOSIS WITHOUT MYELOPATHY: ICD-10-CM

## 2021-12-23 DIAGNOSIS — M16.12 PRIMARY OSTEOARTHRITIS OF LEFT HIP: ICD-10-CM

## 2021-12-23 DIAGNOSIS — G89.29 CHRONIC LEFT SHOULDER PAIN: ICD-10-CM

## 2021-12-23 DIAGNOSIS — G89.4 CHRONIC PAIN SYNDROME: ICD-10-CM

## 2021-12-23 DIAGNOSIS — M51.36 DDD (DEGENERATIVE DISC DISEASE), LUMBAR: ICD-10-CM

## 2021-12-23 DIAGNOSIS — M25.512 CHRONIC LEFT SHOULDER PAIN: ICD-10-CM

## 2021-12-23 DIAGNOSIS — M54.16 LUMBAR RADICULOPATHY: ICD-10-CM

## 2021-12-23 DIAGNOSIS — M79.7 FIBROMYALGIA: ICD-10-CM

## 2021-12-23 PROCEDURE — 99213 OFFICE O/P EST LOW 20 MIN: CPT | Performed by: INTERNAL MEDICINE

## 2021-12-23 PROCEDURE — G8484 FLU IMMUNIZE NO ADMIN: HCPCS | Performed by: INTERNAL MEDICINE

## 2021-12-23 PROCEDURE — 3017F COLORECTAL CA SCREEN DOC REV: CPT | Performed by: INTERNAL MEDICINE

## 2021-12-23 PROCEDURE — 1036F TOBACCO NON-USER: CPT | Performed by: INTERNAL MEDICINE

## 2021-12-23 PROCEDURE — G8427 DOCREV CUR MEDS BY ELIG CLIN: HCPCS | Performed by: INTERNAL MEDICINE

## 2021-12-23 PROCEDURE — G8417 CALC BMI ABV UP PARAM F/U: HCPCS | Performed by: INTERNAL MEDICINE

## 2021-12-23 RX ORDER — METHOCARBAMOL 500 MG/1
500 TABLET, FILM COATED ORAL 2 TIMES DAILY PRN
Qty: 60 TABLET | Refills: 1 | Status: SHIPPED | OUTPATIENT
Start: 2021-12-23 | End: 2022-01-20 | Stop reason: SDUPTHER

## 2021-12-23 RX ORDER — GABAPENTIN 300 MG/1
CAPSULE ORAL
Qty: 90 CAPSULE | Refills: 1 | Status: SHIPPED | OUTPATIENT
Start: 2021-12-23 | End: 2022-01-20 | Stop reason: SDUPTHER

## 2021-12-23 RX ORDER — HYDROCODONE BITARTRATE AND ACETAMINOPHEN 5; 325 MG/1; MG/1
1 TABLET ORAL EVERY 6 HOURS PRN
Qty: 120 TABLET | Refills: 0 | Status: SHIPPED | OUTPATIENT
Start: 2021-12-23 | End: 2022-01-20 | Stop reason: SDUPTHER

## 2021-12-23 RX ORDER — MELOXICAM 15 MG/1
TABLET ORAL
Qty: 30 TABLET | Refills: 1 | Status: SHIPPED | OUTPATIENT
Start: 2021-12-23 | End: 2022-01-20 | Stop reason: SDUPTHER

## 2021-12-23 NOTE — PROGRESS NOTES
Marietta Late  1960  [id number]    HISTORY OF PRESENT ILLNESS:  Mr. Radha Chiu is a 57 y.o. male returns for a follow up visit for multiple medical problems. His current presenting problems are   1. Chronic pain syndrome    2. Chronic left shoulder pain    3. Lumbosacral spondylosis without myelopathy    4. Fibromyalgia    5. Lumbar radiculopathy    6. DDD (degenerative disc disease), lumbar    7. Primary osteoarthritis of left hip    8. Myofascial pain    . As per information/history obtained from the PADT(patient assessment and documentation tool) - He complains of pain in the shoulders Right and lower back with radiation to the buttocks, hips Left and upper leg Left He rates the pain 6/10 and describes it as sharp, stabbing. Pain is made worse by: standing, sitting. Current treatment regimen has helped relieve about 50%-60% of the pain. He denies side effects from the current pain regimen. Patient reports that since the last follow up visit the physical functioning is better, family/social relationships are better, mood is better and sleep patterns are better, and that the overall functioning is better. Patient denies neurological bowel or bladder. Patient denies misusing/abusing his narcotic pain medications or using any illegal drugs. There are No indicators for possible drug abuse, addiction or diversion problems. Upon obtaining the medical history from Mr. Radha Chiu regarding today's office visit for his presenting problems, patient states he has been doing fair, his pain has been manageable with the medications. Mr. Radha Chiu mentions he is using Mobic along with the Neurontin and Norco 3-4 per day. Patient denies any constipation symptoms. ALLERGIES: Patients list of allergies were reviewed     MEDICATIONS: Mr. Radha Chiu list of medications were reviewed. His current medications are   Outpatient Medications Prior to Visit   Medication Sig Dispense Refill    insulin glargine (LANTUS SOLOSTAR) 100 UNIT/ML kit 0    Lancets MISC 1 each by Does not apply route 3 times daily 100 each 3    MELATONIN PO Take 1 tablet by mouth nightly       loratadine (CLARITIN) 10 MG tablet Take 10 mg by mouth daily       atorvastatin (LIPITOR) 80 MG tablet Take 80 mg by mouth daily. No facility-administered medications prior to visit. REVIEW OF SYSTEMS:    Respiratory: Negative for apnea, chest tightness and shortness of breath or change in baseline breathing. PHYSICAL EXAM:   Nursing note and vitals reviewed. BP (!) 160/90   Pulse 73   Temp 97.2 °F (36.2 °C)   Wt 228 lb (103.4 kg)   SpO2 97%   BMI 36.80 kg/m²   Constitutional: He appears well-developed and well-nourished. No acute distress. Cardiovascular: Normal rate, regular rhythm, normal heart sounds, and does not have murmur. Pulmonary/Chest: Effort normal. No respiratory distress. He does not have wheezes in the lung fields. He has no rales. Neurological/Psychiatric:He is alert and oriented to person, place, and time. Coordination is  normal.  His mood isAppropriate and affect is Neutral/Euthymic(normal) . IMPRESSION:   1. Chronic pain syndrome    2. Chronic left shoulder pain    3. Lumbosacral spondylosis without myelopathy    4. Fibromyalgia    5. Lumbar radiculopathy    6. DDD (degenerative disc disease), lumbar    7. Primary osteoarthritis of left hip        PLAN:  Informed verbal consent was obtained  -[place] record was obtained and reviewed  for the last one year and no indicators of drug misuse  were found. Any other controlled substance prescriptions  seen on the record have been accounted for, I am aware of the patient receiving these medications. [facility] [place] record will be rechecked as part of office protocol.     -ROM/Strething exercises as advised   -He was advised to increase fluids ( 5-7  glasses of fluid daily), limit caffeine, avoid cheese products, increase dietary fiber, increase activity and exercise as tolerated and relax regularly and enjoy meals   -Continue with Norco 3-4 per day   -Walking as tolerated 20-30 minutes daily    Current Outpatient Medications   Medication Sig Dispense Refill    insulin glargine (LANTUS SOLOSTAR) 100 UNIT/ML injection pen INJECT 46 UNITS UNDER THE SKIN DAILY 5 pen 5    insulin aspart (NOVOLOG FLEXPEN) 100 UNIT/ML injection pen 18-24 units AC TID 10 pen 5    HYDROcodone-acetaminophen (NORCO) 5-325 MG per tablet Take 1 tablet by mouth every 6 hours as needed for Pain (max 3-4 day) for up to 35 days.  120 tablet 0    meloxicam (MOBIC) 15 MG tablet Take 1 tablet po every Monday,Wednesday, and Friday 30 tablet 1    methocarbamol (ROBAXIN) 500 MG tablet Take 1 tablet by mouth 2 times daily as needed (muscle stiffness) 60 tablet 1    gabapentin (NEURONTIN) 300 MG capsule Take 1 tablet po in the morning and take 2 tablets po in the evening 90 capsule 1    metFORMIN (GLUCOPHAGE-XR) 500 MG extended release tablet Two tablets with breakfast 60 tablet 0    Insulin Pen Needle 32G X 4 MM MISC 1 each by Does not apply route 4 times daily 120 each 3    Dulaglutide (TRULICITY) 1.5 DW/2.6WF SOPN INJECT 1.5 MG UNDER THE SKIN ONCE WEEKLY 4 pen 1    Blood Glucose Monitoring Suppl (FREESTYLE LITE) RO As needed 1 each 0    blood glucose test strips (FREESTYLE LITE) strip USE TO TEST FOUR TIMES A  strip 0    FreeStyle Lancets MISC USE FOUR TIMES A  each 0    lisinopril (PRINIVIL;ZESTRIL) 30 MG tablet Take 30 mg by mouth daily      FreeStyle Lancets MISC USE AS DIRECTED 100 each 3    Insulin Pen Needle (PEN NEEDLES) 31G X 6 MM MISC 1 each by In Vitro route 4 times daily 200 each 3    omeprazole (PRILOSEC) 20 MG delayed release capsule TAKE 1 CAPSULE BY MOUTH EVERY DAY      Cholecalciferol (VITAMIN D PO) Take 1 tablet by mouth every 7 days Pt to clarify dose      furosemide (LASIX) 20 MG tablet Take 40 mg by mouth daily       Fluticasone Propionate (FLONASE NA) 1 spray by Nasal route daily Each nostril      SALINE MIST SPRAY NA 1 spray by Nasal route 3 times daily as needed Each nostril 2-3 times per day      Blood Glucose Monitoring Suppl (ONE TOUCH ULTRA 2) w/Device KIT 1 kit by Does not apply route daily 1 kit 0    Lancets MISC 1 each by Does not apply route 3 times daily 100 each 3    MELATONIN PO Take 1 tablet by mouth nightly       loratadine (CLARITIN) 10 MG tablet Take 10 mg by mouth daily       atorvastatin (LIPITOR) 80 MG tablet Take 80 mg by mouth daily. No current facility-administered medications for this visit. I will continue his current medication regimen  which is part of the above treatment schedule. It has been helping with Mr. Veronica Jeong chronic  medical problems which for this visit include:   Diagnoses of Chronic pain syndrome, Chronic left shoulder pain, Lumbosacral spondylosis without myelopathy, Fibromyalgia, Lumbar radiculopathy, DDD (degenerative disc disease), lumbar, Primary osteoarthritis of left hip, and Myofascial pain were pertinent to this visit. Risks and benefits of the medications and other alternative treatments  including no treatment were discussed with the patient. The common side effects of these medications were also explained to the patient. Informed verbal consent was obtained. Goals of current treatment regimen include improvement in pain, restoration of functioning- with focus on improvement in physical performance, general activity, work or disability,emotional distress, health care utilization and  decreased medication consumption. Will continue to monitor progress towards achieving/maintaining therapeutic goals with special emphasis on  1. Improvement in perceived interfernce  of pain with ADL's. Ability to do home exercises independently. Ability to do household chores indoor and/or outdoor work and social and leisure activities. Improve psychosocial and physical functioning. - he is showing progression towards this treatment goal with the current regimen. He was advised against drinking alcohol with the narcotic pain medicines, advised against driving or handling machinery while adjusting the dose of medicines or if having cognitive  issues related to the current medications. Risk of overdose and death, if medicines not taken as prescribed, were also discussed. If the patient develops new symptoms or if the symptoms worsen, the patient should call the office. While transcribing every attempt was made to maintain the accuracy of the note in terms of it's contents,there may have been some errors made inadvertently. Thank you for allowing me to participate in the care of this patient.     Norma Mistry MD.    Cc: Kristine Lala MD

## 2021-12-26 DIAGNOSIS — E11.65 UNCONTROLLED TYPE 2 DIABETES MELLITUS WITH HYPERGLYCEMIA (HCC): ICD-10-CM

## 2021-12-27 ENCOUNTER — HOSPITAL ENCOUNTER (OUTPATIENT)
Dept: PHYSICAL THERAPY | Age: 61
Setting detail: THERAPIES SERIES
Discharge: HOME | End: 2021-12-27
Payer: COMMERCIAL

## 2021-12-27 PROCEDURE — 97110 THERAPEUTIC EXERCISES: CPT

## 2021-12-27 RX ORDER — METFORMIN HYDROCHLORIDE 500 MG/1
TABLET, EXTENDED RELEASE ORAL
Qty: 60 TABLET | Refills: 0 | Status: SHIPPED | OUTPATIENT
Start: 2021-12-27 | End: 2022-01-31

## 2021-12-27 NOTE — FLOWSHEET NOTE
[facility]  Phone: [phone]   Fax: [phone]    Physical Therapy Treatment Note/ Progress Report:     Date:  2021    Patient Name:  Natalio Bautista    :  1960  MRN: [id number]  Restrictions/Precautions:    Medical/Treatment Diagnosis Information:  · Diagnosis: G89.4 (ICD-10-CM) - Chronic pain syndrome; M51.36 (ICD-10-CM) - DDD (degenerative disc disease), lumbar; M79.7 (ICD-10-CM) - Fibromyalgia; M54.16 (ICD-10-CM) - Lumbar radiculopathy; M47.817 (ICD-10-CM) - Lumbosacral spondylosis without myelopathy; M79.18 (ICD-10-CM) - Myofascial pain; M25.512, G89.29 (ICD-10-CM) - Chronic left shoulder pain;; M16.12 (ICD-10-CM) - Primary osteoarthritis of left hip  · Treatment Diagnosis: dec ROM and strength in B UE and LE, impaired posture, balance, and gait limiting functional mobility  Insurance/Certification information:  PT Insurance Information: [employer] - 30 visits/yr, auth req  Physician Information:  Referring Practitioner: Jesus Matos MD  Plan of care signed (Y/N): []  Yes [x]  No    Date of Patient follow up with Physician: 21     Progress Report: []  Yes  [x]  No     Date Range for reporting period:  Beginnin/3  POC:   Ending:     Progress report due (10 Rx/or 30 days whichever is less): visit #10 or / (date)     Recertification due (POC duration/ or 90 days whichever is less): visit #12 or  (date)     Visit # Insurance Allowable Auth required?  Date Range    + 0 TBD [x]  Yes - [employer]ource  []  No 21 -22     UPDATED 21  Units approved Units used Date Range   96 17 21 -22     Latex Allergy:  [x]NO      []YES  Preferred Language for Healthcare:   [x]English       []other:    Functional Scale:       Date assessed:  Oswestry: raw score = 27/45; dysfunction = 60%  11/3/1  Oswestry: raw score = 24/45; dysfunction = 53%  21    Pain level:  0/10      SUBJECTIVE:  Pt reports he is \"feeling the way he always feels\". No pain right now as he took a pain pill prior to therapy today. Pt planning on using the whirlpool/steam room following therapy session today. Patient is [employer] member.      OBJECTIVE:   12/20:  Palpation: TTP B lumber paraspinals  Functional Mobility/Transfers: independent, slow to perform supine <> sit and sit <> stand transfers  Posture: dec lumbar lordosis  Gait: (include devices/WB status) slow mark, inc ANTONIO, no AD    ROM   Comments   Lumbar Flex To top of ankles     Lumbar Ext Limited 25%        ROM LEFT RIGHT Comments   Lumbar Side Bend To lateral epicondyle (+) To lateral epicondyle L \"because of the hip\"    Lumbar Rotation         Hip Flexion [facility] WFL     Hip Abd         Hip ER [facility] WFL     Hip IR WFL WFL     Hip Extension         Knee Ext         Knee Flex         Hamstring Flex Limited 35 deg Limited 30 deg     Piriformis                   Shoulder flexion WFL (pain at [facility]) [facility]     MMT: grossly 4+/5 BLE's      RESTRICTIONS/PRECAUTIONS: chronic pain syndrome    Exercises/Interventions:     Therapeutic Exercise (25448) Resistance / level Sets / Seconds Reps Notes / Cues   Nustep L1 X 5 min      IB  HR  2X30\"   X10    HSS 6\" stairs 2x30\"      CC 3 way hip              [facility]:  -FM (silver/yellow)     - LP 80#     - HS curl 20#     - Quad 10#     - Chest Press 20#     - Lateral raise 10#     - Lat-high row 20#  Cybex (white/black)     - Hip abd 40#     - Hip add 40#  FM (silver/red)     - row 25#     See column to left 1 set of each     10  10  10  10  10  10    10  10    10                         Therapeutic Activities (83946)       Reassessment of objective measures and updated HEP to allow for independence with manage of symptoms    12/20                               Neuromuscular Re-ed (52511)                                          Manual Intervention (52451)       Prone PA       GISTM/STM       Lumbar Manip       SI Manip       Hip belt mobs       Hip LA tour of pool, explained how to use lockers, what to wear, that it would be in group setting, and showed pt aquatic equipment. Modalities:     Pt. Education:  -pt educated on diagnosis, prognosis and expectations for rehab  -all pt questions were answered    Home Exercise Prorgam:  Access Code: [email or web address]  URL: [email or web address]  Date: 12/20/2021  Prepared by: Tricia Lung    Exercises  Forward and Backward Stepping at [facility] - 1 x daily - 7 x weekly - 3 sets - 10 reps  Lateral Stepping at [facility] - 1 x daily - 7 x weekly - 3 sets - 10 reps  Heel Toe Raises at [facility] - 1 x daily - 7 x weekly - 3 sets - 10 reps  Standing March at [facility] - 1 x daily - 7 x weekly - 3 sets - 10 reps  Standing Hip Abduction Adduction at [facility] - 1 x daily - 7 x weekly - 3 sets - 10 reps  Standing Hip Flexion Extension at [facility] - 1 x daily - 7 x weekly - 3 sets - 10 reps  Standing Hip Circles at [facility] - 1 x daily - 7 x weekly - 3 sets - 10 reps  Single Leg Stance at [facility] - 1 x daily - 7 x weekly - 1 sets - 10 reps - 10 hold  Tandem Stance - 1 x daily - 7 x weekly - 1 sets - 2 reps - 30 hold  Bilateral Shoulder Horizontal Abduction Adduction AROM at [facility] - 1 x daily - 7 x weekly - 3 sets - 10 reps  Bilateral Shoulder Flexion Extension AROM at [facility] - 1 x daily - 7 x weekly - 3 sets - 10 reps  Bilateral Shoulder Abduction Adduction AROM at Pool Wall - 1 x daily - 7 x weekly - 3 sets - 10 reps  Seated Table Hamstring Stretch - 1 x daily - 7 x weekly - 1 sets - 2 reps - 30 hold  Gastroc Stretch on Wall - 1 x daily - 7 x weekly - 1 sets - 2 reps - 30 hold  Step Up - 1 x daily - 7 x weekly - 3 sets - 10 reps  Lateral Step Up - 1 x daily - 7 x weekly - 3 sets - 10 reps      Access Code: [email or web address]  URL: [email or web address]  Date: 11/03/2021  Prepared by: Teresa Garcia Huml    Exercises  Seated Table Hamstring Stretch - 3 x daily - 7 x weekly - 1 sets - 3 reps - 20 hold  Supine Piriformis Stretch with Foot on Ground - 3 x daily - 7 x weekly - 1 sets - 3 reps - 20 hold  Supine Lower Trunk Rotation - 2 x daily - 7 x weekly - 1 sets - 20 reps  Supine Shoulder Flexion with Dowel - 2 x daily - 7 x weekly - 1 sets - 10-20 reps  Prone Press Up on Elbows - 2 x daily - 7 x weekly - 1 sets - 10-15 reps      Therapeutic Exercise and NMR EXR  [] (90917) Provided verbal/tactile cueing for activities related to strengthening, flexibility, endurance, ROM  for improvements in proximal hip and core control with self care, mobility, lifting and ambulation.  [] (83138) Provided verbal/tactile cueing for activities related to improving balance, coordination, kinesthetic sense, posture, motor skill, proprioception  to assist with core control in self care, mobility, lifting, and ambulation. [x] (23240) Aquatic therapy with therapeutic exercise. Provided verbal and tactile cueing for activities related to strengthening, flexibility, endurance, ROM for improvements in  [x] LE / lumbar: LE, proximal hip, and core control in self care, mobility, lifting, ambulation and eccentric single leg control. [x] UE / cervical: cervical, scapular, scapulothoracic and UE control with self care, reaching, carrying, lifting, house/yardwork, driving, computer work. [x] (75992) Therapist is in constant attendance of 2 or more patients providing skilled therapy interventions, but not providing any significant amount of measurable one-on-one time to either patient, for improvements in  [x] LE / lumbar: LE, proximal hip, and core control in self care, mobility, lifting, ambulation and eccentric single leg control. [x] UE / cervical: cervical, scapular, scapulothoracic and UE control with self care, reaching, carrying, lifting, house/yardwork, driving, computer work.        Therapeutic Activities:    [] (93215 or 74414) Provided verbal/tactile cueing for activities related to improving balance, coordination, kinesthetic sense, posture, motor skill, proprioception and motor activation to allow for proper function  with self care and ADLs  [] (82024) Provided training and instruction to the patient for proper core and proximal hip recruitment and positioning with ambulation re-education     Home Exercise Program:    [] (54887) Reviewed/Progressed HEP activities related to strengthening, flexibility, endurance, ROM of core, proximal hip and LE for functional self-care, mobility, lifting and ambulation   [] (36433) Reviewed/Progressed HEP activities related to improving balance, coordination, kinesthetic sense, posture, motor skill, proprioception of core, proximal hip and LE for self care, mobility, lifting, and ambulation      Manual Treatments:  PROM / STM / Oscillations-Mobs:  G-I, II, III, IV (PA's, Inf., Post.)  [] (03022) Provided manual therapy to mobilize proximal hip and LS spine soft tissue/joints for the purpose of modulating pain, promoting relaxation,  increasing ROM, reducing/eliminating soft tissue swelling/inflammation/restriction, improving soft tissue extensibility and allowing for proper ROM for normal function with self care, mobility, lifting and ambulation. Charges:  Timed Code Treatment Minutes: 33   Total Treatment Minutes: 33       [] EVAL - LOW (64069)   [] EVAL - MOD (18560)  [] EVAL - HIGH (25308)  [] RE-EVAL (44356)  [x] IB(18313) x 2      [] Ionto  [] NMR (62546) x       [] Vaso  [] Manual (39063) x       [] Ultrasound  [] TA x       [] Mech Traction (45224)  [] Aquatic Therapy x      [] ES (un) (13062):   [] Home Management Training x  [] ES(attended) (19663)   [] Dry Needling 1-2 muscles (19906):  [] Dry Needling 3+ muscles (837575  [] Group:      [] Other:     Goals:   Patient stated goal: independent with HEP to self-manage pain  [x] Progressing: [] Met: [] Not Met: [] Adjusted    Therapist goals for Patient:   Short Term Goals: To be achieved in: 2 weeks  1.  Independent in HEP and progression per patient tolerance, in order to prevent re-injury. [] Progressing: [x] Met: [] Not Met: [] Adjusted  2. Patient will have a decrease in pain to facilitate improvement in movement, function, and ADLs as indicated by Functional Deficits. [] Progressing: [x] Met: [] Not Met: [] Adjusted    Long Term Goals: To be achieved in: 6 weeks  1. Disability index score of 40% or less for the NIKUNJ to assist with reaching prior level of function. [x] Progressing: [] Met: [] Not Met: [] Adjusted  2. Patient will demonstrate increased AROM to WNL, good LS mobility, good hip ROM to allow for proper joint functioning as indicated by patients Functional Deficits. [] Progressing: [] Met: [] Not Met: [] Adjusted  3. Patient will demonstrate an increase in Strength to good proximal hip and core activation to allow for proper functional mobility as indicated by patients Functional Deficits. [] Progressing: [] Met: [] Not Met: [] Adjusted  4. Patient will return to functional activities including sleeping, sitting, and walking without increased symptoms or restriction. [x] Progressing:  [] Met: [] Not Met: [] Adjusted    Overall Progression Towards Functional goals/ Treatment Progress Update:  [] Patient is progressing as expected towards functional goals listed. [] Progression is slowed due to complexities/Impairments listed. [] Progression has been slowed due to co-morbidities. [x] Plan just implemented, too soon to assess goals progression <30days   [] Goals require adjustment due to lack of progress  [] Patient is not progressing as expected and requires additional follow up with physician  [] Other    Persisting Functional Limitations/Impairments:  [x]Sitting [x]Standing   [x]Walking []Squatting/bending    []Stairs []ADL's    [x]Transfers []Reaching  []Housework []Job related tasks  []Driving []Sports/Recreation   [x]Sleeping []Other:    ASSESSMENT: Pt is [employer] member but generally just uses pool or steam room and whirlpool.  Instructed pt on set up and use of machines listed above so pt can trial on own. Pt agreeable to alternating land and pool for remainder of POC (as schedules allow). Will continue with strengthening and building home program as pt tolerates. Treatment/Activity Tolerance:  [x] Patient able to complete tx  [] Patient limited by fatique  [] Patient limited by pain  [] Patient limited by other medical complications  [] Other:     Prognosis: [] Good [x] Fair  [] Poor    Patient Requires Follow-up: [x] Yes  [] No    PLAN: See eval. PT 2x / week for 6 weeks. [x] Continue per plan of care [] Alter current plan (see comments)  [] Plan of care initiated [] Hold pending MD visit [] Discharge    Electronically signed by: Bony Hernandez, PT, DPT       Note: If patient does not return for scheduled/ recommended follow up visits, this note will serve as a discharge from care along with most recent update on progress.

## 2021-12-29 ENCOUNTER — HOSPITAL ENCOUNTER (OUTPATIENT)
Dept: PHYSICAL THERAPY | Age: 61
Setting detail: THERAPIES SERIES
Discharge: HOME | End: 2021-12-29
Payer: COMMERCIAL

## 2022-01-07 ENCOUNTER — HOSPITAL ENCOUNTER (OUTPATIENT)
Dept: PHYSICAL THERAPY | Age: 62
Setting detail: THERAPIES SERIES
Discharge: HOME | End: 2022-01-07
Payer: COMMERCIAL

## 2022-01-07 NOTE — FLOWSHEET NOTE
Physical Therapy    Physical Therapy  Cancellation/No-show Note  Patient Name:  Farideh Hernandez  :  1960   Date:  2022  Cancelled visits to date: 4  No-shows to date: 1    Patient status for today's appointment patient:  [x]  Cancelled , 12/3, , 22  []  Rescheduled appointment  [x]  No-show      Reason given by patient:  [x]  Patient ill  []  Conflicting appointment  []  No transportation    []  Conflict with work  []  No reason given  []  Other:     Comments:     Phone call information:   []  Phone call made today to patient at _ time at number provided:      []  Patient answered, conversation as follows:    []  Patient did not answer, message left as follows:  []  Phone call not made today  [x]  Phone call not needed - pt contacted us to cancel and provided reason for cancellation.      Electronically signed by:  Charmaine Burr PT DPT

## 2022-01-10 ENCOUNTER — HOSPITAL ENCOUNTER (OUTPATIENT)
Dept: PHYSICAL THERAPY | Age: 62
Setting detail: THERAPIES SERIES
Discharge: HOME | End: 2022-01-10
Payer: COMMERCIAL

## 2022-01-10 NOTE — FLOWSHEET NOTE
Physical Therapy    Physical Therapy  Cancellation/No-show Note  Patient Name:  Tameka Dunne  :  1960   Date:  1/10/2022  Cancelled visits to date: 5  No-shows to date: 1    Patient status for today's appointment patient:  [x]  Cancelled , 12/3, , 22, 1/10  []  Rescheduled appointment  [x]  No-show      Reason given by patient:  [x]  Patient ill  []  Conflicting appointment  []  No transportation    []  Conflict with work  []  No reason given  []  Other:     Comments:     Phone call information:   []  Phone call made today to patient at _ time at number provided:      []  Patient answered, conversation as follows:    []  Patient did not answer, message left as follows:  []  Phone call not made today  [x]  Phone call not needed - pt contacted us to cancel and provided reason for cancellation.      Electronically signed by:  Darylene Portal, PT DPT

## 2022-01-14 ENCOUNTER — APPOINTMENT (OUTPATIENT)
Dept: PHYSICAL THERAPY | Age: 62
End: 2022-01-14
Payer: COMMERCIAL

## 2022-01-17 ENCOUNTER — APPOINTMENT (OUTPATIENT)
Dept: PHYSICAL THERAPY | Age: 62
End: 2022-01-17
Payer: COMMERCIAL

## 2022-01-20 ENCOUNTER — OFFICE VISIT (OUTPATIENT)
Dept: PAIN MANAGEMENT | Age: 62
End: 2022-01-20
Payer: COMMERCIAL

## 2022-01-20 VITALS
WEIGHT: 233.4 LBS | BODY MASS INDEX: 37.67 KG/M2 | DIASTOLIC BLOOD PRESSURE: 95 MMHG | SYSTOLIC BLOOD PRESSURE: 159 MMHG | OXYGEN SATURATION: 98 % | HEART RATE: 99 BPM

## 2022-01-20 DIAGNOSIS — M79.7 FIBROMYALGIA: ICD-10-CM

## 2022-01-20 DIAGNOSIS — M47.817 LUMBOSACRAL SPONDYLOSIS WITHOUT MYELOPATHY: ICD-10-CM

## 2022-01-20 DIAGNOSIS — M25.512 CHRONIC LEFT SHOULDER PAIN: ICD-10-CM

## 2022-01-20 DIAGNOSIS — M54.16 LUMBAR RADICULOPATHY: ICD-10-CM

## 2022-01-20 DIAGNOSIS — G89.4 CHRONIC PAIN SYNDROME: ICD-10-CM

## 2022-01-20 DIAGNOSIS — M51.36 DDD (DEGENERATIVE DISC DISEASE), LUMBAR: ICD-10-CM

## 2022-01-20 DIAGNOSIS — G89.29 CHRONIC LEFT SHOULDER PAIN: ICD-10-CM

## 2022-01-20 DIAGNOSIS — M16.12 PRIMARY OSTEOARTHRITIS OF LEFT HIP: ICD-10-CM

## 2022-01-20 PROCEDURE — G8484 FLU IMMUNIZE NO ADMIN: HCPCS | Performed by: INTERNAL MEDICINE

## 2022-01-20 PROCEDURE — G8427 DOCREV CUR MEDS BY ELIG CLIN: HCPCS | Performed by: INTERNAL MEDICINE

## 2022-01-20 PROCEDURE — 99213 OFFICE O/P EST LOW 20 MIN: CPT | Performed by: INTERNAL MEDICINE

## 2022-01-20 PROCEDURE — 3017F COLORECTAL CA SCREEN DOC REV: CPT | Performed by: INTERNAL MEDICINE

## 2022-01-20 PROCEDURE — 1036F TOBACCO NON-USER: CPT | Performed by: INTERNAL MEDICINE

## 2022-01-20 PROCEDURE — G8417 CALC BMI ABV UP PARAM F/U: HCPCS | Performed by: INTERNAL MEDICINE

## 2022-01-20 RX ORDER — MELOXICAM 15 MG/1
TABLET ORAL
Qty: 30 TABLET | Refills: 1 | Status: SHIPPED | OUTPATIENT
Start: 2022-01-20 | End: 2022-02-24 | Stop reason: SDUPTHER

## 2022-01-20 RX ORDER — GABAPENTIN 300 MG/1
CAPSULE ORAL
Qty: 90 CAPSULE | Refills: 1 | Status: SHIPPED | OUTPATIENT
Start: 2022-01-20 | End: 2022-02-24 | Stop reason: SDUPTHER

## 2022-01-20 RX ORDER — METHOCARBAMOL 500 MG/1
500 TABLET, FILM COATED ORAL 2 TIMES DAILY PRN
Qty: 60 TABLET | Refills: 1 | Status: SHIPPED | OUTPATIENT
Start: 2022-01-20 | End: 2022-02-24 | Stop reason: SDUPTHER

## 2022-01-20 RX ORDER — HYDROCODONE BITARTRATE AND ACETAMINOPHEN 5; 325 MG/1; MG/1
1 TABLET ORAL EVERY 6 HOURS PRN
Qty: 120 TABLET | Refills: 0 | Status: SHIPPED | OUTPATIENT
Start: 2022-01-20 | End: 2022-02-24 | Stop reason: SDUPTHER

## 2022-01-20 NOTE — PROGRESS NOTES
Alexandra Robins  1960  [id number]    HISTORY OF PRESENT ILLNESS:  Mr. Sin Webb is a 57 y.o. male returns for a follow up visit for multiple medical problems. His current presenting problems are   1. Chronic pain syndrome    2. Lumbosacral spondylosis without myelopathy    3. Lumbar radiculopathy    4. Primary osteoarthritis of left hip    5. Chronic left shoulder pain    6. Fibromyalgia    7. DDD (degenerative disc disease), lumbar    8. Myofascial pain    . As per information/history obtained from the PADT(patient assessment and documentation tool) - He complains of pain in the lower back with radiation to the shoulders Right, hips Left, upper leg Left, knees Bilateral, lower leg Left and ankles Left He rates the pain 7/10 and describes it as aching. Pain is made worse by: walking, standing, lifting. Current treatment regimen has helped relieve about 60% of the pain. He denies side effects from the current pain regimen. Patient reports that since the last follow up visit the physical functioning is worse, family/social relationships are unchanged, mood is unchanged and sleep patterns are worse, and that the overall functioning is unchanged. Patient denies neurological bowel or bladder. Patient denies misusing/abusing his narcotic pain medications or using any illegal drugs. There are No indicators for possible drug abuse, addiction or diversion problems. Upon obtaining the medical history from Mr. Sin Webb regarding today's office visit for his presenting problems, patient states he has been doing fair. Mr. Sin Webb states he has been compliant with the medications. He mentions he is using Mobic along with Robaxin and Norco 3-4 per day. Patient denies any constipation symptoms. ALLERGIES: Patients list of allergies were reviewed     MEDICATIONS: Mr. Sin Webb list of medications were reviewed. His current medications are   Outpatient Medications Prior to Visit   Medication Sig Dispense Refill    metFORMIN (GLUCOPHAGE-XR) 500 MG extended release tablet TAKE TWO TABLETS BY MOUTH DAILY WITH BREAKFAST 60 tablet 0    HYDROcodone-acetaminophen (NORCO) 5-325 MG per tablet Take 1 tablet by mouth every 6 hours as needed for Pain (max 3-4 day) for up to 35 days.  120 tablet 0    meloxicam (MOBIC) 15 MG tablet Take 1 tablet po every Monday,Wednesday, and Friday 30 tablet 1    methocarbamol (ROBAXIN) 500 MG tablet Take 1 tablet by mouth 2 times daily as needed (muscle stiffness) 60 tablet 1    gabapentin (NEURONTIN) 300 MG capsule Take 1 tablet po in the morning and take 2 tablets po in the evening 90 capsule 1    insulin glargine (LANTUS SOLOSTAR) 100 UNIT/ML injection pen INJECT 46 UNITS UNDER THE SKIN DAILY 5 pen 5    insulin aspart (NOVOLOG FLEXPEN) 100 UNIT/ML injection pen 18-24 units AC TID 10 pen 5    Insulin Pen Needle 32G X 4 MM MISC 1 each by Does not apply route 4 times daily 120 each 3    Dulaglutide (TRULICITY) 1.5 FE/4.6UZ SOPN INJECT 1.5 MG UNDER THE SKIN ONCE WEEKLY 4 pen 1    Blood Glucose Monitoring Suppl (FREESTYLE LITE) RO As needed 1 each 0    blood glucose test strips (FREESTYLE LITE) strip USE TO TEST FOUR TIMES A  strip 0    FreeStyle Lancets MISC USE FOUR TIMES A  each 0    lisinopril (PRINIVIL;ZESTRIL) 30 MG tablet Take 30 mg by mouth daily      FreeStyle Lancets MISC USE AS DIRECTED 100 each 3    Insulin Pen Needle (PEN NEEDLES) 31G X 6 MM MISC 1 each by In Vitro route 4 times daily 200 each 3    omeprazole (PRILOSEC) 20 MG delayed release capsule TAKE 1 CAPSULE BY MOUTH EVERY DAY      Cholecalciferol (VITAMIN D PO) Take 1 tablet by mouth every 7 days Pt to clarify dose      furosemide (LASIX) 20 MG tablet Take 40 mg by mouth daily       Fluticasone Propionate (FLONASE NA) 1 spray by Nasal route daily Each nostril      SALINE MIST SPRAY NA 1 spray by Nasal route 3 times daily as needed Each nostril 2-3 times per day      Blood Glucose Monitoring Suppl (ONE TOUCH ULTRA 2) w/Device KIT 1 kit by Does not apply route daily 1 kit 0    Lancets MISC 1 each by Does not apply route 3 times daily 100 each 3    MELATONIN PO Take 1 tablet by mouth nightly       loratadine (CLARITIN) 10 MG tablet Take 10 mg by mouth daily       atorvastatin (LIPITOR) 80 MG tablet Take 80 mg by mouth daily. No facility-administered medications prior to visit. REVIEW OF SYSTEMS:    Respiratory: Negative for apnea, chest tightness and shortness of breath or change in baseline breathing. PHYSICAL EXAM:   Nursing note and vitals reviewed. BP (!) 159/95   Pulse 99   Wt 233 lb 6.4 oz (105.9 kg)   SpO2 98%   BMI 37.67 kg/m²   Constitutional: He appears well-developed and well-nourished. No acute distress. Cardiovascular: Normal rate, regular rhythm, normal heart sounds, and does not have murmur. Pulmonary/Chest: Effort normal. No respiratory distress. He does not have wheezes in the lung fields. He has no rales. Neurological/Psychiatric:He is alert and oriented to person, place, and time. Coordination is  normal.  His mood isAppropriate and affect is Neutral/Euthymic(normal) . Other: + 1 edema LE     IMPRESSION:   1. Chronic pain syndrome    2. Lumbosacral spondylosis without myelopathy    3. Lumbar radiculopathy    4. Primary osteoarthritis of left hip    5. Chronic left shoulder pain    6. Fibromyalgia    7. DDD (degenerative disc disease), lumbar        PLAN:  Informed verbal consent was obtained  -[place] was obtained and reviewed  for the last one year and no indicators of drug misuse  were found. Any other controlled substance prescriptions  seen on the record have been accounted for, I am aware of the patient receiving these medications. Denice Hannahivelisse  [place] record will be rechecked as part of office protocol.    -Interim history reviewed   -Continue with Norco 3-4 per day   -He was advised to increase fluids ( 5-7  glasses of fluid daily), limit caffeine, avoid cheese products, increase dietary fiber, increase activity and exercise as tolerated and relax regularly and enjoy meals   -He was advised weight reduction, diet changes- 800-1200 shad diet, diet diary, exercising, nutritional  consult increased physical activity as tolerated    Current Outpatient Medications   Medication Sig Dispense Refill    metFORMIN (GLUCOPHAGE-XR) 500 MG extended release tablet TAKE TWO TABLETS BY MOUTH DAILY WITH BREAKFAST 60 tablet 0    HYDROcodone-acetaminophen (NORCO) 5-325 MG per tablet Take 1 tablet by mouth every 6 hours as needed for Pain (max 3-4 day) for up to 35 days.  120 tablet 0    meloxicam (MOBIC) 15 MG tablet Take 1 tablet po every Monday,Wednesday, and Friday 30 tablet 1    methocarbamol (ROBAXIN) 500 MG tablet Take 1 tablet by mouth 2 times daily as needed (muscle stiffness) 60 tablet 1    gabapentin (NEURONTIN) 300 MG capsule Take 1 tablet po in the morning and take 2 tablets po in the evening 90 capsule 1    insulin glargine (LANTUS SOLOSTAR) 100 UNIT/ML injection pen INJECT 46 UNITS UNDER THE SKIN DAILY 5 pen 5    insulin aspart (NOVOLOG FLEXPEN) 100 UNIT/ML injection pen 18-24 units AC TID 10 pen 5    Insulin Pen Needle 32G X 4 MM MISC 1 each by Does not apply route 4 times daily 120 each 3    Dulaglutide (TRULICITY) 1.5 MZ/5.1PI SOPN INJECT 1.5 MG UNDER THE SKIN ONCE WEEKLY 4 pen 1    Blood Glucose Monitoring Suppl (FREESTYLE LITE) RO As needed 1 each 0    blood glucose test strips (FREESTYLE LITE) strip USE TO TEST FOUR TIMES A  strip 0    FreeStyle Lancets MISC USE FOUR TIMES A  each 0    lisinopril (PRINIVIL;ZESTRIL) 30 MG tablet Take 30 mg by mouth daily      FreeStyle Lancets MISC USE AS DIRECTED 100 each 3    Insulin Pen Needle (PEN NEEDLES) 31G X 6 MM MISC 1 each by In Vitro route 4 times daily 200 each 3    omeprazole (PRILOSEC) 20 MG delayed release capsule TAKE 1 CAPSULE BY MOUTH EVERY DAY      Cholecalciferol (VITAMIN D PO) Take 1 tablet by mouth every 7 days Pt to clarify dose      furosemide (LASIX) 20 MG tablet Take 40 mg by mouth daily       Fluticasone Propionate (FLONASE NA) 1 spray by Nasal route daily Each nostril      SALINE MIST SPRAY NA 1 spray by Nasal route 3 times daily as needed Each nostril 2-3 times per day      Blood Glucose Monitoring Suppl (ONE TOUCH ULTRA 2) w/Device KIT 1 kit by Does not apply route daily 1 kit 0    Lancets MISC 1 each by Does not apply route 3 times daily 100 each 3    MELATONIN PO Take 1 tablet by mouth nightly       loratadine (CLARITIN) 10 MG tablet Take 10 mg by mouth daily       atorvastatin (LIPITOR) 80 MG tablet Take 80 mg by mouth daily. No current facility-administered medications for this visit. I will continue his current medication regimen  which is part of the above treatment schedule. It has been helping with Mr. Andi Liang chronic  medical problems which for this visit include:   Diagnoses of Chronic pain syndrome, Lumbosacral spondylosis without myelopathy, Lumbar radiculopathy, Primary osteoarthritis of left hip, Chronic left shoulder pain, Fibromyalgia, DDD (degenerative disc disease), lumbar, and Myofascial pain were pertinent to this visit. Risks and benefits of the medications and other alternative treatments  including no treatment were discussed with the patient. The common side effects of these medications were also explained to the patient. Informed verbal consent was obtained. Goals of current treatment regimen include improvement in pain, restoration of functioning- with focus on improvement in physical performance, general activity, work or disability,emotional distress, health care utilization and  decreased medication consumption. Will continue to monitor progress towards achieving/maintaining therapeutic goals with special emphasis on  1. Improvement in perceived interfernce  of pain with ADL's. Ability to do home exercises independently.  Ability to do household chores indoor and/or outdoor work and social and leisure activities. Improve psychosocial and physical functioning. - he is showing progression towards this treatment goal with the current regimen. He was advised against drinking alcohol with the narcotic pain medicines, advised against driving or handling machinery while adjusting the dose of medicines or if having cognitive  issues related to the current medications. Risk of overdose and death, if medicines not taken as prescribed, were also discussed. If the patient develops new symptoms or if the symptoms worsen, the patient should call the office. While transcribing every attempt was made to maintain the accuracy of the note in terms of it's contents,there may have been some errors made inadvertently. Thank you for allowing me to participate in the care of this patient.     Odessa Luu MD.    Cc: Boone Bustillo MD

## 2022-01-21 ENCOUNTER — HOSPITAL ENCOUNTER (OUTPATIENT)
Dept: PHYSICAL THERAPY | Age: 62
Setting detail: THERAPIES SERIES
Discharge: HOME | End: 2022-01-21
Payer: COMMERCIAL

## 2022-01-21 PROCEDURE — 97150 GROUP THERAPEUTIC PROCEDURES: CPT

## 2022-01-21 PROCEDURE — 97113 AQUATIC THERAPY/EXERCISES: CPT

## 2022-01-21 NOTE — FLOWSHEET NOTE
Flor Medley  Phone: [phone]   Fax: [phone]    Physical Therapy Treatment Note/ Progress Report:     Date:  2022    Patient Name:  Maira Tyson    :  1960  MRN: [id number]  Restrictions/Precautions:    Medical/Treatment Diagnosis Information:  · Diagnosis: G89.4 (ICD-10-CM) - Chronic pain syndrome; M51.36 (ICD-10-CM) - DDD (degenerative disc disease), lumbar; M79.7 (ICD-10-CM) - Fibromyalgia; M54.16 (ICD-10-CM) - Lumbar radiculopathy; M47.817 (ICD-10-CM) - Lumbosacral spondylosis without myelopathy; M79.18 (ICD-10-CM) - Myofascial pain; M25.512, G89.29 (ICD-10-CM) - Chronic left shoulder pain;; M16.12 (ICD-10-CM) - Primary osteoarthritis of left hip  · Treatment Diagnosis: dec ROM and strength in B UE and LE, impaired posture, balance, and gait limiting functional mobility  Insurance/Certification information:  PT Insurance Information: [employer] - 30 visits/yr, auth req  Physician Information:  Referring Practitioner: Amanda Barraza MD  Plan of care signed (Y/N): []  Yes [x]  No    Date of Patient follow up with Physician: 21     Progress Report: []  Yes  [x]  No     Date Range for reporting period:  Beginnin/3  POC:   Ending:     Progress report due (10 Rx/or 30 days whichever is less): visit #10 or / (date)     Recertification due (POC duration/ or 90 days whichever is less): visit #12 or  (date)     Visit # Insurance Allowable Auth required?  Date Range    +  TBD [x]  Yes - [employer]ource  []  No 21 -22     UPDATED 21  Units approved Units used Date Range   96 17 21 -22     Latex Allergy:  [x]NO      []YES  Preferred Language for Healthcare:   [x]English       []other:    Functional Scale:       Date assessed:  Oswestry: raw score = 27/45; dysfunction = 60%  11/3/1  Oswestry: raw score = 24/45; dysfunction = 53%  21    Pain level:  6/10      SUBJECTIVE:  Pt is holding approx 20# extra weight from fluid retention with being sick, this has caused inc pain levels.  Pt would like today's session to be a little easier to manage pain    OBJECTIVE:   12/20:  Palpation: TTP B lumber paraspinals  Functional Mobility/Transfers: independent, slow to perform supine <> sit and sit <> stand transfers  Posture: dec lumbar lordosis  Gait: (include devices/WB status) slow mark, inc ANTONIO, no AD    ROM   Comments   Lumbar Flex To top of ankles     Lumbar Ext Limited 25%        ROM LEFT RIGHT Comments   Lumbar Side Bend To lateral epicondyle (+) To lateral epicondyle L \"because of the hip\"    Lumbar Rotation         Hip Flexion [facility] WFL     Hip Abd         Hip ER [facility] WFL     Hip IR WFL WFL     Hip Extension         Knee Ext         Knee Flex         Hamstring Flex Limited 35 deg Limited 30 deg     Piriformis                   Shoulder flexion WFL (pain at [facility]) [facility]     MMT: grossly 4+/5 BLE's      RESTRICTIONS/PRECAUTIONS: chronic pain syndrome    Exercises/Interventions:     Therapeutic Exercise (86634) Resistance / level Sets / Seconds Reps Notes / Cues   Nustep L1 X 5 min      IB  HR  2X30\"   X10    HSS 6\" stairs 2x30\"      CC 3 way hip              [facility]:  -FM (silver/yellow)     - LP 80#     - HS curl 20#     - Quad 10#     - Chest Press 20#     - Lateral raise 10#     - Lat-high row 20#  Cybex (white/black)     - Hip abd 40#     - Hip add 40#  FM (silver/red)     - row 25#     See column to left 1 set of each     10  10  10  10  10  10    10  10    10                         Therapeutic Activities (56403)       Reassessment of objective measures and updated HEP to allow for independence with manage of symptoms    12/20                               Neuromuscular Re-ed (36369)                                          Manual Intervention (73668)       Prone PA       GISTM/STM       Lumbar Manip       SI Manip       Hip belt mobs       Hip LA distraction [facility] Dates of Service: 11/12, 11/26, 12/6, 12/10, 12/13, 12/17, 1/21  Aquatic Visits Exercises/Activities:   Transfers:  [x] Stairs   [] Ramp  [] Chair Lift   % Immersion:            Ambulation/ Warm up:   UE Exercises: Forward  x3 N Shoulder Shrugs      Lateral   x3 N Shoulder Circles      Retro   x3 N Scapular Retraction      [place]ari[place]as    Push Downs      Heel/Toe Walking    Punching       Rowing      Elbow Flex/Ext      Shldr Flex/Ext      [employer] aBd/aDd   LE Exercises:  Shldr Horiz aBd/aDd   HR/TR x7 Shldr IR/ER    Marches x7 Arm Circles    Squats x7 PNF Diagonals    Hamstring Curls x7 Wall Push Ups    Hip Flexion (SLR) x7     Hip aBduction (SLR) x7     Hip Extension (SLR) x7      Hip aDduction (SLR)      Hip Circles x7 Functional:    Hip IR/Er  Step up forward 7 added 12/17   Hip Hikes  Step up lateral  7 added 12/17     Step down        Lunges Forward      Lunges Retro      Lunges Lateral     Balance:        SLS  10x10\"      Tandem Stance 2x30\"      NBOS eyes open x Seated:     NBOS eyes closed x Ankle pumps     Hand to Opposite Knee X 10  Ankle Circles     Fwd Step ups to SLS  Knee Flex/Ext    Lateral Step ups to SLS  Hip aBd/aDd    Stop/Go Gait   Bicycle       Ankle DF/PF      Ankle Inv/Ev    Stretching:       Gastroc/Soleus 30\" x 2     Hamstring  30\" x 2  Deep Water:    Knee Flex Stretch x Jog    Piriformis  x Noodle Hang    Hip Flexor x Traction at [facility]       ITB  Cool Down:    Quad  Fwd Walking    Mid Back   Lat Walking    UT  Retro Walking    Post Shoulder  Noodle float    Ladder Pull      Pec Stretch            Core: Other:    TrA set     Pelvic Tilts     Multifidi Walk outs c paddle      PNF Chop/Lifts                          Aquatic Abbreviation Key  B= Belt DB= Dumbells T= Theratube   H= Hydrotone N= Noodles W= Weights   P= Paddles S= Speedo equipment K= Kickboard       Pt.  Education: Gave pt tour of pool, explained how to use lockers, what to wear, that it would be in group setting, and showed pt aquatic equipment. Modalities:     Pt. Education:  -pt educated on diagnosis, prognosis and expectations for rehab  -all pt questions were answered    Home Exercise Prorgam:  Access Code: [email or web address]  URL: [email or web address]  Date: 12/20/2021  Prepared by: Casper Duane    Exercises  Forward and Backward Stepping at [facility] - 1 x daily - 7 x weekly - 3 sets - 10 reps  Lateral Stepping at [facility] - 1 x daily - 7 x weekly - 3 sets - 10 reps  Heel Toe Raises at [facility] - 1 x daily - 7 x weekly - 3 sets - 10 reps  Standing March at [facility] - 1 x daily - 7 x weekly - 3 sets - 10 reps  Standing Hip Abduction Adduction at [facility] - 1 x daily - 7 x weekly - 3 sets - 10 reps  Standing Hip Flexion Extension at [facility] - 1 x daily - 7 x weekly - 3 sets - 10 reps  Standing Hip Circles at [facility] - 1 x daily - 7 x weekly - 3 sets - 10 reps  Single Leg Stance at [facility] - 1 x daily - 7 x weekly - 1 sets - 10 reps - 10 hold  Tandem Stance - 1 x daily - 7 x weekly - 1 sets - 2 reps - 30 hold  Bilateral Shoulder Horizontal Abduction Adduction AROM at [facility] - 1 x daily - 7 x weekly - 3 sets - 10 reps  Bilateral Shoulder Flexion Extension AROM at [facility] - 1 x daily - 7 x weekly - 3 sets - 10 reps  Bilateral Shoulder Abduction Adduction AROM at Pool Wall - 1 x daily - 7 x weekly - 3 sets - 10 reps  Seated Table Hamstring Stretch - 1 x daily - 7 x weekly - 1 sets - 2 reps - 30 hold  Gastroc Stretch on Wall - 1 x daily - 7 x weekly - 1 sets - 2 reps - 30 hold  Step Up - 1 x daily - 7 x weekly - 3 sets - 10 reps  Lateral Step Up - 1 x daily - 7 x weekly - 3 sets - 10 reps      Access Code: [email or web address]  URL: [email or web address]  Date: 11/03/2021  Prepared by: Teri Morley    Exercises  Seated Table Hamstring Stretch - 3 x daily - 7 x weekly - 1 sets - 3 reps - 20 hold  Supine Piriformis Stretch with Foot on Ground - 3 x daily - 7 x weekly - 1 sets - 3 reps - 20 hold  Supine Lower Trunk Rotation - 2 x daily - 7 x weekly - 1 sets - 20 reps  Supine Shoulder Flexion with Dowel - 2 x daily - 7 x weekly - 1 sets - 10-20 reps  Prone Press Up on Elbows - 2 x daily - 7 x weekly - 1 sets - 10-15 reps      Therapeutic Exercise and NMR EXR  [] (08352) Provided verbal/tactile cueing for activities related to strengthening, flexibility, endurance, ROM  for improvements in proximal hip and core control with self care, mobility, lifting and ambulation.  [] (27569) Provided verbal/tactile cueing for activities related to improving balance, coordination, kinesthetic sense, posture, motor skill, proprioception  to assist with core control in self care, mobility, lifting, and ambulation. [x] (83140) Aquatic therapy with therapeutic exercise. Provided verbal and tactile cueing for activities related to strengthening, flexibility, endurance, ROM for improvements in  [x] LE / lumbar: LE, proximal hip, and core control in self care, mobility, lifting, ambulation and eccentric single leg control. [x] UE / cervical: cervical, scapular, scapulothoracic and UE control with self care, reaching, carrying, lifting, house/yardwork, driving, computer work. [x] (23454) Therapist is in constant attendance of 2 or more patients providing skilled therapy interventions, but not providing any significant amount of measurable one-on-one time to either patient, for improvements in  [x] LE / lumbar: LE, proximal hip, and core control in self care, mobility, lifting, ambulation and eccentric single leg control. [x] UE / cervical: cervical, scapular, scapulothoracic and UE control with self care, reaching, carrying, lifting, house/yardwork, driving, computer work.        Therapeutic Activities:    [] (74031 or 23596) Provided verbal/tactile cueing for activities related to improving balance, coordination, kinesthetic sense, posture, motor skill, proprioception and motor activation to allow for proper function  with self care and ADLs  [] (41329) Provided training and instruction to the patient for proper core and proximal hip recruitment and positioning with ambulation re-education     Home Exercise Program:    [] (91977) Reviewed/Progressed HEP activities related to strengthening, flexibility, endurance, ROM of core, proximal hip and LE for functional self-care, mobility, lifting and ambulation   [] (50538) Reviewed/Progressed HEP activities related to improving balance, coordination, kinesthetic sense, posture, motor skill, proprioception of core, proximal hip and LE for self care, mobility, lifting, and ambulation      Manual Treatments:  PROM / STM / Oscillations-Mobs:  G-I, II, III, IV (PA's, Inf., Post.)  [] (34706) Provided manual therapy to mobilize proximal hip and LS spine soft tissue/joints for the purpose of modulating pain, promoting relaxation,  increasing ROM, reducing/eliminating soft tissue swelling/inflammation/restriction, improving soft tissue extensibility and allowing for proper ROM for normal function with self care, mobility, lifting and ambulation. Charges:  Timed Code Treatment Minutes: 10   Total Treatment Minutes: 50       [] EVAL - LOW (85580)   [] EVAL - MOD (65772)  [] EVAL - HIGH (39352)  [] RE-EVAL (34355)  [] QT(01022) x       [] Ionto  [] NMR (05469) x       [] Vaso  [] Manual (91513) x       [] Ultrasound  [] TA x       [] Mech Traction (20820)  [x] Aquatic Therapy x 1     [] ES (un) (06480):   [] Home Management Training x  [] ES(attended) (69392)   [] Dry Needling 1-2 muscles (03138):  [] Dry Needling 3+ muscles (348255  [x] Group: 1     [] Other:     Goals:   Patient stated goal: independent with HEP to self-manage pain  [x] Progressing: [] Met: [] Not Met: [] Adjusted    Therapist goals for Patient:   Short Term Goals: To be achieved in: 2 weeks  1. Independent in HEP and progression per patient tolerance, in order to prevent re-injury.    [] Progressing: [x] Met: [] Not Met: [] Adjusted  2. Patient will have a decrease in pain to facilitate improvement in movement, function, and ADLs as indicated by Functional Deficits. [] Progressing: [x] Met: [] Not Met: [] Adjusted    Long Term Goals: To be achieved in: 6 weeks  1. Disability index score of 40% or less for the NIKUNJ to assist with reaching prior level of function. [x] Progressing: [] Met: [] Not Met: [] Adjusted  2. Patient will demonstrate increased AROM to WNL, good LS mobility, good hip ROM to allow for proper joint functioning as indicated by patients Functional Deficits. [] Progressing: [] Met: [] Not Met: [] Adjusted  3. Patient will demonstrate an increase in Strength to good proximal hip and core activation to allow for proper functional mobility as indicated by patients Functional Deficits. [] Progressing: [] Met: [] Not Met: [] Adjusted  4. Patient will return to functional activities including sleeping, sitting, and walking without increased symptoms or restriction. [x] Progressing:  [] Met: [] Not Met: [] Adjusted    Overall Progression Towards Functional goals/ Treatment Progress Update:  [] Patient is progressing as expected towards functional goals listed. [] Progression is slowed due to complexities/Impairments listed. [] Progression has been slowed due to co-morbidities. [x] Plan just implemented, too soon to assess goals progression <30days   [] Goals require adjustment due to lack of progress  [] Patient is not progressing as expected and requires additional follow up with physician  [] Other    Persisting Functional Limitations/Impairments:   [x]Sitting [x]Standing   [x]Walking []Squatting/bending    []Stairs []ADL's    [x]Transfers []Reaching  []Housework []Job related tasks  []Driving []Sports/Recreation   [x]Sleeping []Other:    ASSESSMENT: Regressed repetitions this date to manage pt's pain levels, soreness, and endurance as pt just returning to PT after recovering from [place].  Pt performing routine near independently, only requires guidance on progression or regression based on current symptoms and pain levels. Will continue PT alternating land and pool visits for remainder of POC as schedule allows. Continue with strengthening and progress to independence with HEP as tolerated. Treatment/Activity Tolerance:  [x] Patient able to complete tx  [] Patient limited by fatique  [] Patient limited by pain  [] Patient limited by other medical complications  [] Other:     Prognosis: [] Good [x] Fair  [] Poor    Patient Requires Follow-up: [x] Yes  [] No    PLAN: See eval. PT 2x / week for 6 weeks. [x] Continue per plan of care [] Alter current plan (see comments)  [] Plan of care initiated [] Hold pending MD visit [] Discharge    Electronically signed by: Angela Francois, PT, DPT       Note: If patient does not return for scheduled/ recommended follow up visits, this note will serve as a discharge from care along with most recent update on progress.

## 2022-01-24 ENCOUNTER — HOSPITAL ENCOUNTER (OUTPATIENT)
Dept: PHYSICAL THERAPY | Age: 62
Setting detail: THERAPIES SERIES
Discharge: HOME | End: 2022-01-24
Payer: COMMERCIAL

## 2022-01-24 PROCEDURE — 97530 THERAPEUTIC ACTIVITIES: CPT

## 2022-01-24 NOTE — FLOWSHEET NOTE
= 67%  1/24/21    Pain level:  8/10      SUBJECTIVE:   Pt reports he is still getting over being sick and did not sleep well last night. Pt reports he has retained a lot of fluid from being sick and is still dealing with that. Pt reports he just took another Norco prior to the session.      OBJECTIVE:   1/24:   ROM   Comments   Lumbar Flex To shins  decreased from last PN   Lumbar Ext WNL       ROM LEFT RIGHT Comments   Lumbar Side Bend To mid thigh To lateral epicondyle Decreased on L from last PN    Hip Flexion [facility]     Hip ER [facility]     Hip IR WFL WFL     Hamstring Flex NT NT     Piriformis                   Shoulder flexion WFL (pain at [facility]) [facility]  R shoulder - decreased, with functional IR   LE MMT:(seated) Right:  Left:  Hip Flexion:  4  4  Knee Extension: 5  5  Knee Flexion:  4+  4+    Pitting Edema present in R foot 2+  Good capillary refill in B great toes, but increased redness noted B/L  No signs of open wounds on bottoms of feet     12/20:  Palpation: TTP B lumber paraspinals  Functional Mobility/Transfers: independent, slow to perform supine <> sit and sit <> stand transfers  Posture: dec lumbar lordosis  Gait: (include devices/WB status) slow mark, inc ANTONIO, no AD    ROM   Comments   Lumbar Flex To top of ankles     Lumbar Ext Limited 25%        ROM LEFT RIGHT Comments   Lumbar Side Bend To lateral epicondyle (+) To lateral epicondyle L \"because of the hip\"    Lumbar Rotation         Hip Flexion [facility] WFL     Hip Abd         Hip ER [facility]     Hip IR WFL WFL     Hip Extension         Knee Ext         Knee Flex         Hamstring Flex Limited 35 deg Limited 30 deg     Piriformis                   Shoulder flexion WFL (pain at [facility]) [facility]     MMT: grossly 4+/5 BLE's      RESTRICTIONS/PRECAUTIONS: chronic pain syndrome    Exercises/Interventions:     Therapeutic Exercise (84827) Resistance / level Sets / Seconds Reps Notes / Cues   Nustep L1    IB  HR     HSS 6\" stairs    CC 3 way hip              In Stretching:       Gastroc/Soleus 30\" x 2     Hamstring  30\" x 2  Deep Water:    Knee Flex Stretch x Jog    Piriformis  x Noodle Hang    Hip Flexor x Traction at [facility]      DKTC       ITB  Cool Down:    Quad  Fwd Walking    Mid Back   Lat Walking    UT  Retro Walking    Post Shoulder  Noodle float    Ladder Pull      Pec Stretch            Core: Other:    TrA set     Pelvic Tilts     Multifidi Walk outs c paddle      PNF Chop/Lifts                          Aquatic Abbreviation Key  B= Belt DB= Dumbells T= Theratube   H= Hydrotone N= Noodles W= Weights   P= Paddles S= Speedo equipment K= Kickboard       Pt. Education: Gave pt tour of pool, explained how to use lockers, what to wear, that it would be in group setting, and showed pt aquatic equipment. Modalities: * ASK IF PT WANTS [facility] FOLLOWING SESSION*    Pt. Education:  -pt educated on diagnosis, prognosis and expectations for rehab  -all pt questions were answered    Home Exercise Prorgam:  Access Code: [email or web address]  URL: [email or web address]  Date: 12/20/2021  Prepared by: Drea Hightower    Exercises  Forward and Backward Stepping at [facility] - 1 x daily - 7 x weekly - 3 sets - 10 reps  Lateral Stepping at [facility] - 1 x daily - 7 x weekly - 3 sets - 10 reps  Heel Toe Raises at [facility] - 1 x daily - 7 x weekly - 3 sets - 10 reps  Standing March at [address] [place] [address] Rd 1 x daily - 7 x weekly - 3 sets - 10 reps  Standing Hip Abduction Adduction at [facility] - 1 x daily - 7 x weekly - 3 sets - 10 reps  Standing Hip Flexion Extension at [facility] - 1 x daily - 7 x weekly - 3 sets - 10 reps  Standing Hip Circles at [facility] - 1 x daily - 7 x weekly - 3 sets - 10 reps  Single Leg Stance at Pool Wall - 1 x daily - 7 x weekly - 1 sets - 10 reps - 10 hold  Tandem Stance - 1 x daily - 7 x weekly - 1 sets - 2 reps - 30 hold  Bilateral Shoulder Horizontal Abduction Adduction AROM at [employer] - 1 x daily - 7 x weekly - 3 sets - 10 reps  Bilateral Shoulder Flexion Extension AROM at [employer] - 1 x daily - 7 x weekly - 3 sets - 10 reps  Bilateral Shoulder Abduction Adduction AROM at Pool Wall - 1 x daily - 7 x weekly - 3 sets - 10 reps  Seated Table Hamstring Stretch - 1 x daily - 7 x weekly - 1 sets - 2 reps - 30 hold  Gastroc Stretch on Wall - 1 x daily - 7 x weekly - 1 sets - 2 reps - 30 hold  Step Up - 1 x daily - 7 x weekly - 3 sets - 10 reps  Lateral Step Up - 1 x daily - 7 x weekly - 3 sets - 10 reps      Access Code: [email or web address]  URL: [email or web address]  Date: 11/03/2021  Prepared by: Geetha Morley    Exercises  Seated Table Hamstring Stretch - 3 x daily - 7 x weekly - 1 sets - 3 reps - 20 hold  Supine Piriformis Stretch with Foot on Ground - 3 x daily - 7 x weekly - 1 sets - 3 reps - 20 hold  Supine Lower Trunk Rotation - 2 x daily - 7 x weekly - 1 sets - 20 reps  Supine Shoulder Flexion with Dowel - 2 x daily - 7 x weekly - 1 sets - 10-20 reps  Prone Press Up on Elbows - 2 x daily - 7 x weekly - 1 sets - 10-15 reps      Therapeutic Exercise and NMR EXR  [] (18639) Provided verbal/tactile cueing for activities related to strengthening, flexibility, endurance, ROM  for improvements in proximal hip and core control with self care, mobility, lifting and ambulation.  [] (93091) Provided verbal/tactile cueing for activities related to improving balance, coordination, kinesthetic sense, posture, motor skill, proprioception  to assist with core control in self care, mobility, lifting, and ambulation. [x] (54520) Aquatic therapy with therapeutic exercise. Provided verbal and tactile cueing for activities related to strengthening, flexibility, endurance, ROM for improvements in  [x] LE / lumbar: LE, proximal hip, and core control in self care, mobility, lifting, ambulation and eccentric single leg control. [x] UE / cervical: cervical, scapular, scapulothoracic and UE control with self care, reaching, carrying, lifting, house/yardwork, driving, computer work.    [x] (15018) Therapist is in constant attendance of 2 or more patients providing skilled therapy interventions, but not providing any significant amount of measurable one-on-one time to either patient, for improvements in  [x] LE / lumbar: LE, proximal hip, and core control in self care, mobility, lifting, ambulation and eccentric single leg control. [x] UE / cervical: cervical, scapular, scapulothoracic and UE control with self care, reaching, carrying, lifting, house/yardwork, driving, computer work. Therapeutic Activities:    [] (15025 or 21629) Provided verbal/tactile cueing for activities related to improving balance, coordination, kinesthetic sense, posture, motor skill, proprioception and motor activation to allow for proper function  with self care and ADLs  [] (64366) Provided training and instruction to the patient for proper core and proximal hip recruitment and positioning with ambulation re-education     Home Exercise Program:    [] (89661) Reviewed/Progressed HEP activities related to strengthening, flexibility, endurance, ROM of core, proximal hip and LE for functional self-care, mobility, lifting and ambulation   [] (09492) Reviewed/Progressed HEP activities related to improving balance, coordination, kinesthetic sense, posture, motor skill, proprioception of core, proximal hip and LE for self care, mobility, lifting, and ambulation      Manual Treatments:  PROM / STM / Oscillations-Mobs:  G-I, II, III, IV (PA's, Inf., Post.)  [] (29417) Provided manual therapy to mobilize proximal hip and LS spine soft tissue/joints for the purpose of modulating pain, promoting relaxation,  increasing ROM, reducing/eliminating soft tissue swelling/inflammation/restriction, improving soft tissue extensibility and allowing for proper ROM for normal function with self care, mobility, lifting and ambulation.        Charges:  Timed Code Treatment Minutes: 30   Total Treatment Minutes: 30       [] EVAL - LOW (20802) [] EVAL - MOD (08796)  [] EVAL - HIGH (51353)  [] RE-EVAL (29636)  [] KQ(52862) x       [] Ionto  [] NMR (82622) x       [] Vaso  [] Manual (58873) x       [] Ultrasound  [x] TA x  2     [] Mech Traction (96748)  [] Aquatic Therapy x 1     [] ES (un) (72812):   [] Home Management Training x  [] ES(attended) (79393)   [] Dry Needling 1-2 muscles (41137):  [] Dry Needling 3+ muscles (484588  [] Group: 1     [] Other:     Goals:   Patient stated goal: independent with HEP to self-manage pain  [x] Progressing: [] Met: [] Not Met: [] Adjusted    Therapist goals for Patient:   Short Term Goals: To be achieved in: 2 weeks  1. Independent in HEP and progression per patient tolerance, in order to prevent re-injury. [] Progressing: [x] Met: [] Not Met: [] Adjusted  2. Patient will have a decrease in pain to facilitate improvement in movement, function, and ADLs as indicated by Functional Deficits. [] Progressing: [x] Met: [] Not Met: [] Adjusted    Long Term Goals: To be achieved in: 6 weeks  1. Disability index score of 40% or less for the NIKUNJ to assist with reaching prior level of function. [x] Progressing: [] Met: [] Not Met: [] Adjusted  2. Patient will demonstrate increased AROM to WNL, good LS mobility, good hip ROM to allow for proper joint functioning as indicated by patients Functional Deficits. [x] Progressing: [] Met: [] Not Met: [] Adjusted  3. Patient will demonstrate an increase in Strength to good proximal hip and core activation to allow for proper functional mobility as indicated by patients Functional Deficits. [x] Progressing: [] Met: [] Not Met: [] Adjusted  4. Patient will return to functional activities including sleeping, sitting, and walking without increased symptoms or restriction. [x] Progressing:  [] Met: [] Not Met: [] Adjusted    Overall Progression Towards Functional goals/ Treatment Progress Update:  [] Patient is progressing as expected towards functional goals listed.     [] Progression is slowed due to complexities/Impairments listed. [] Progression has been slowed due to co-morbidities. [x] Plan just implemented, too soon to assess goals progression <30days   [] Goals require adjustment due to lack of progress  [] Patient is not progressing as expected and requires additional follow up with physician  [] Other    Persisting Functional Limitations/Impairments:   [x]Sitting [x]Standing   [x]Walking []Squatting/bending    []Stairs []ADL's    [x]Transfers []Reaching  []Housework []Job related tasks  []Driving []Sports/Recreation   [x]Sleeping []Other:    ASSESSMENT: PN completed this date as pt returning to therapy s/p COVID19. Pt is still battling increased fluid from being sick and has had some decrease in ROM and LE strength, which may be due to decreased activity over the last few weeks. Plan is to finish current POC with alternating land and pool appts when possible to facilitate pt's independence with both routines in preparation for DC. Treatment/Activity Tolerance:  [x] Patient able to complete tx  [] Patient limited by fatique  [] Patient limited by pain  [] Patient limited by other medical complications  [] Other:     Prognosis: [] Good [x] Fair  [] Poor    Patient Requires Follow-up: [x] Yes  [] No    PLAN: See eval. PT 2x / week for 6 weeks. [x] Continue per plan of care [] Alter current plan (see comments)  [] Plan of care initiated [] Hold pending MD visit [] Discharge    Electronically signed by: Mahi Rodriguez, PT, DPT       Note: If patient does not return for scheduled/ recommended follow up visits, this note will serve as a discharge from care along with most recent update on progress.

## 2022-01-28 ENCOUNTER — HOSPITAL ENCOUNTER (OUTPATIENT)
Dept: PHYSICAL THERAPY | Age: 62
Setting detail: THERAPIES SERIES
Discharge: HOME | End: 2022-01-28
Payer: COMMERCIAL

## 2022-01-28 NOTE — FLOWSHEET NOTE
Physical Therapy    Physical Therapy  Cancellation/No-show Note  Patient Name:  Milly Wayne  :  1960   Date:  2022  Cancelled visits to date: 6  No-shows to date: 1    Patient status for today's appointment patient:  [x]  Cancelled , 12/3, , 22, 1/10,   []  Rescheduled appointment  [x]  No-show      Reason given by patient:  []  Patient ill  []  Conflicting appointment  []  No transportation    []  Conflict with work  [x]  No reason given  []  Other:     Comments:     Phone call information:   []  Phone call made today to patient at _ time at number provided:      []  Patient answered, conversation as follows:    []  Patient did not answer, message left as follows:  []  Phone call not made today  [x]  Phone call not needed - pt contacted us to cancel and provided reason for cancellation.      Electronically signed by:  Rebekah Owen PT DPT

## 2022-01-29 DIAGNOSIS — E11.65 UNCONTROLLED TYPE 2 DIABETES MELLITUS WITH HYPERGLYCEMIA (HCC): ICD-10-CM

## 2022-01-31 ENCOUNTER — HOSPITAL ENCOUNTER (OUTPATIENT)
Dept: PHYSICAL THERAPY | Age: 62
Setting detail: THERAPIES SERIES
Discharge: HOME | End: 2022-01-31
Payer: COMMERCIAL

## 2022-01-31 PROCEDURE — 97112 NEUROMUSCULAR REEDUCATION: CPT

## 2022-01-31 PROCEDURE — 97110 THERAPEUTIC EXERCISES: CPT

## 2022-01-31 RX ORDER — METFORMIN HYDROCHLORIDE 500 MG/1
TABLET, EXTENDED RELEASE ORAL
Qty: 60 TABLET | Refills: 0 | Status: SHIPPED | OUTPATIENT
Start: 2022-01-31 | End: 2022-02-25

## 2022-01-31 NOTE — FLOWSHEET NOTE
= 67%  1/24/21    Pain level:  7/10      SUBJECTIVE:   Pt reports pain in the low back today. He took his Norco that he normally takes and is planning on going to the [facility] after therapy. Last Friday had a vascular US; pt states it was negative for DVT. Continues to feel weak after having COVID.      OBJECTIVE:   1/24:   ROM   Comments   Lumbar Flex To shins  decreased from last PN   Lumbar Ext WNL       ROM LEFT RIGHT Comments   Lumbar Side Bend To mid thigh To lateral epicondyle Decreased on L from last PN    Hip Flexion [facility]     Hip ER [facility]     Hip IR WFL WFL     Hamstring Flex NT NT     Piriformis                   Shoulder flexion WFL (pain at [facility]) [facility]  R shoulder - decreased, with functional IR   LE MMT:(seated) Right:  Left:  Hip Flexion:  4  4  Knee Extension: 5  5  Knee Flexion:  4+  4+    Pitting Edema present in R foot 2+  Good capillary refill in B great toes, but increased redness noted B/L  No signs of open wounds on bottoms of feet     12/20:  Palpation: TTP B lumber paraspinals  Functional Mobility/Transfers: independent, slow to perform supine <> sit and sit <> stand transfers  Posture: dec lumbar lordosis  Gait: (include devices/WB status) slow mark, inc ANTONIO, no AD    ROM   Comments   Lumbar Flex To top of ankles     Lumbar Ext Limited 25%        ROM LEFT RIGHT Comments   Lumbar Side Bend To lateral epicondyle (+) To lateral epicondyle L \"because of the hip\"    Lumbar Rotation         Hip Flexion [facility] WFL     Hip Abd         Hip ER [facility]     Hip IR WFL WFL     Hip Extension         Knee Ext         Knee Flex         Hamstring Flex Limited 35 deg Limited 30 deg     Piriformis                   Shoulder flexion WFL (pain at [facility]) [facility]     MMT: grossly 4+/5 BLE's      RESTRICTIONS/PRECAUTIONS: chronic pain syndrome    Exercises/Interventions:     Therapeutic Exercise (68243) Resistance / level Sets / Seconds Reps Notes / Cues   Nustep L1 X 5 min    IB  HR  2X30\"   X10    HSS 6\" stairs 2x30\"    CC 3 way hip              [facility]:  -FM (silver/yellow)     - LP 80#     - HS curl 20#     - Quad 10#     - Chest Press 20#     - Lateral raise 10#     - Lat-high row 20#  Cybex (white/black)     - Hip abd 40#     - Hip add 40#  FM (silver/red)     - row 25#     See column to left                         Therapeutic Activities (84201)       Reassessment of objective measures and scheduling of remainder of POC with discussion of plan for remaining visits   1/24   Forward step ups with opp knee drive 6\"  10 B Light hand assist    Lateral step ups with hip abd kick 6\"  10 B                  Neuromuscular Re-ed (75097)       CC weighted walkouts 2.5pl   5 ea  Fwd, retro, R, L    Tiltboard taps - A/P, M/L   20 ea    Tiltboard balance - A/P, M/L  60\" each                   Manual Intervention (12712)       Prone PA       GISTM/STM       Lumbar Manip       SI Manip       Hip belt mobs       Hip LA distraction                              AquaticTherapy Dates of Service: 11/12, 11/26, 12/6, 12/10, 12/13, 12/17, 1/21  Aquatic Visits Exercises/Activities:   Transfers:  [x] Stairs   [] Ramp  [] Chair Lift   % Immersion:            Ambulation/ Warm up:   UE Exercises:       Forward  x3 N Shoulder Shrugs      Lateral   x3 N Shoulder Circles      Retro   x3 N Scapular Retraction      [place]as    Push Downs      Heel/Toe Walking    Punching       Rowing      Elbow Flex/Ext      Shldr Flex/Ext      [employer] aBd/aDd   LE Exercises:  Shldr Horiz aBd/aDd   HR/TR x7 Shldr IR/ER    Marches x7 Arm Circles    Squats x7 PNF Diagonals    Hamstring Curls x7 Wall Push Ups    Hip Flexion (SLR) x7     Hip aBduction (SLR) x7     Hip Extension (SLR) x7      Hip aDduction (SLR)      Hip Circles x7 Functional:    Hip IR/Er  Step up forward 7 added 12/17   Hip Hikes  Step up lateral  7 added 12/17     Step down        Lunges Forward      Lunges Retro      Lunges Lateral     Balance:        SLS  10x10\"      Tandem Stance 2x30\"      NBOS eyes open x Seated:     NBOS eyes closed x Ankle pumps     Hand to Opposite Knee X 10  Ankle Circles     Fwd Step ups to SLS  Knee Flex/Ext    Lateral Step ups to SLS  Hip aBd/aDd    Stop/Go Gait   Bicycle       Ankle DF/PF      Ankle Inv/Ev    Stretching:       Gastroc/Soleus 30\" x 2     Hamstring  30\" x 2  Deep Water:    Knee Flex Stretch x Jog    Piriformis  x Noodle Hang    Hip Flexor x Traction at [facility]       ITB  Cool Down:    Quad  Fwd Walking    Mid Back   Lat Walking    UT  Retro Walking    Post Shoulder  Noodle float    Ladder Pull      Pec Stretch            Core: Other:    TrA set     Pelvic Tilts     Multifidi Walk outs c paddle      PNF Chop/Lifts                          Aquatic Abbreviation Key  B= Belt DB= Dumbells T= Theratube   H= Hydrotone N= Noodles W= Weights   P= Paddles S= Speedo equipment K= Kickboard       Pt. Education: Gave pt tour of pool, explained how to use lockers, what to wear, that it would be in group setting, and showed pt aquatic equipment. Modalities: * ASK IF PT WANTS MHP FOLLOWING SESSION*  1/31/22: MHP to low back in supine with large bolster x 15 min     Pt. Education:  -pt educated on diagnosis, prognosis and expectations for rehab  -all pt questions were answered    Home Exercise Prorgam:  Access Code: [email or web address]  URL: [email or web address]  Date: 12/20/2021  Prepared by: Robert Lyman    Exercises  Forward and Backward Stepping at [facility] - 1 x daily - 7 x weekly - 3 sets - 10 reps  Lateral Stepping at [facility] - 1 x daily - 7 x weekly - 3 sets - 10 reps  Heel Toe Raises at [facility] - 1 x daily - 7 x weekly - 3 sets - 10 reps  Standing March at [address] [place] [address] Rd 1 x daily - 7 x weekly - 3 sets - 10 reps  Standing Hip Abduction Adduction at [facility] - 1 x daily - 7 x weekly - 3 sets - 10 reps  Standing Hip Flexion Extension at [facility] - 1 x daily - 7 x weekly - 3 sets - 10 reps  Standing Hip Circles at [facility] - 1 x daily - 7 x weekly - 3 sets - 10 reps  Single Leg Stance at [place] - 1 x daily - 7 x weekly - 1 sets - 10 reps - 10 hold  Tandem Stance - 1 x daily - 7 x weekly - 1 sets - 2 reps - 30 hold  Bilateral Shoulder Horizontal Abduction Adduction AROM at [facility] - 1 x daily - 7 x weekly - 3 sets - 10 reps  Bilateral Shoulder Flexion Extension AROM at [address] [place] [address] - 1 x daily - 7 x weekly - 3 sets - 10 reps  Bilateral Shoulder Abduction Adduction AROM at [facility] - 1 x daily - 7 x weekly - 3 sets - 10 reps  Seated Table Hamstring Stretch - 1 x daily - 7 x weekly - 1 sets - 2 reps - 30 hold  Gastroc Stretch on Wall - 1 x daily - 7 x weekly - 1 sets - 2 reps - 30 hold  Step Up - 1 x daily - 7 x weekly - 3 sets - 10 reps  Lateral Step Up - 1 x daily - 7 x weekly - 3 sets - 10 reps      Access Code: [email or web address]  URL: [email or web address]  Date: 11/03/2021  Prepared by: [employer]    Exercises  Seated Table Hamstring Stretch - 3 x daily - 7 x weekly - 1 sets - 3 reps - 20 hold  Supine Piriformis Stretch with Foot on Ground - 3 x daily - 7 x weekly - 1 sets - 3 reps - 20 hold  Supine Lower Trunk Rotation - 2 x daily - 7 x weekly - 1 sets - 20 reps  Supine Shoulder Flexion with Dowel - 2 x daily - 7 x weekly - 1 sets - 10-20 reps  Prone Press Up on Elbows - 2 x daily - 7 x weekly - 1 sets - 10-15 reps      Therapeutic Exercise and NMR EXR  [] (52389) Provided verbal/tactile cueing for activities related to strengthening, flexibility, endurance, ROM  for improvements in proximal hip and core control with self care, mobility, lifting and ambulation.  [] (46121) Provided verbal/tactile cueing for activities related to improving balance, coordination, kinesthetic sense, posture, motor skill, proprioception  to assist with core control in self care, mobility, lifting, and ambulation. [x] (79996) Aquatic therapy with therapeutic exercise.  Provided verbal and tactile cueing for activities related to strengthening, flexibility, endurance, ROM for improvements in  [x] LE / lumbar: LE, proximal hip, and core control in self care, mobility, lifting, ambulation and eccentric single leg control. [x] UE / cervical: cervical, scapular, scapulothoracic and UE control with self care, reaching, carrying, lifting, house/yardwork, driving, computer work. [x] (90660) Therapist is in constant attendance of 2 or more patients providing skilled therapy interventions, but not providing any significant amount of measurable one-on-one time to either patient, for improvements in  [x] LE / lumbar: LE, proximal hip, and core control in self care, mobility, lifting, ambulation and eccentric single leg control. [x] UE / cervical: cervical, scapular, scapulothoracic and UE control with self care, reaching, carrying, lifting, house/yardwork, driving, computer work.        Therapeutic Activities:    [] (38568 or 83095) Provided verbal/tactile cueing for activities related to improving balance, coordination, kinesthetic sense, posture, motor skill, proprioception and motor activation to allow for proper function  with self care and ADLs  [] (01327) Provided training and instruction to the patient for proper core and proximal hip recruitment and positioning with ambulation re-education     Home Exercise Program:    [] (63028) Reviewed/Progressed HEP activities related to strengthening, flexibility, endurance, ROM of core, proximal hip and LE for functional self-care, mobility, lifting and ambulation   [] (45316) Reviewed/Progressed HEP activities related to improving balance, coordination, kinesthetic sense, posture, motor skill, proprioception of core, proximal hip and LE for self care, mobility, lifting, and ambulation      Manual Treatments:  PROM / STM / Oscillations-Mobs:  G-I, II, III, IV (PA's, Inf., Post.)  [] (74792) Provided manual therapy to mobilize proximal hip and LS spine soft tissue/joints for the purpose of modulating pain, promoting relaxation,  increasing ROM, reducing/eliminating soft tissue swelling/inflammation/restriction, improving soft tissue extensibility and allowing for proper ROM for normal function with self care, mobility, lifting and ambulation. Charges:  Timed Code Treatment Minutes: 35   Total Treatment Minutes: 50       [] EVAL - LOW (23079)   [] EVAL - MOD (42404)  [] EVAL - HIGH (79231)  [] RE-EVAL (49763)  [x] QE(94663) x 1      [] Ionto  [x] NMR (73132) x 1      [] Vaso  [] Manual (49526) x       [] Ultrasound  [] TA x       [] Mech Traction (68303)  [] Aquatic Therapy x 1     [] ES (un) (18408):   [] Home Management Training x  [] ES(attended) (21214)   [] Dry Needling 1-2 muscles (48176):  [] Dry Needling 3+ muscles (485699  [] Group: 1     [] Other:     Goals:   Patient stated goal: independent with HEP to self-manage pain  [x] Progressing: [] Met: [] Not Met: [] Adjusted    Therapist goals for Patient:   Short Term Goals: To be achieved in: 2 weeks  1. Independent in HEP and progression per patient tolerance, in order to prevent re-injury. [] Progressing: [x] Met: [] Not Met: [] Adjusted  2. Patient will have a decrease in pain to facilitate improvement in movement, function, and ADLs as indicated by Functional Deficits. [] Progressing: [x] Met: [] Not Met: [] Adjusted    Long Term Goals: To be achieved in: 6 weeks  1. Disability index score of 40% or less for the NIKUNJ to assist with reaching prior level of function. [x] Progressing: [] Met: [] Not Met: [] Adjusted  2. Patient will demonstrate increased AROM to WNL, good LS mobility, good hip ROM to allow for proper joint functioning as indicated by patients Functional Deficits. [x] Progressing: [] Met: [] Not Met: [] Adjusted  3. Patient will demonstrate an increase in Strength to good proximal hip and core activation to allow for proper functional mobility as indicated by patients Functional Deficits. [x] Progressing: [] Met: [] Not Met: [] Adjusted  4.  Patient will return to functional activities including sleeping, sitting, and walking without increased symptoms or restriction. [x] Progressing:  [] Met: [] Not Met: [] Adjusted    Overall Progression Towards Functional goals/ Treatment Progress Update:  [] Patient is progressing as expected towards functional goals listed. [] Progression is slowed due to complexities/Impairments listed. [] Progression has been slowed due to co-morbidities. [x] Plan just implemented, too soon to assess goals progression <30days   [] Goals require adjustment due to lack of progress  [] Patient is not progressing as expected and requires additional follow up with physician  [] Other    Persisting Functional Limitations/Impairments:   [x]Sitting [x]Standing   [x]Walking []Squatting/bending    []Stairs []ADL's    [x]Transfers []Reaching  []Housework []Job related tasks  []Driving []Sports/Recreation   [x]Sleeping []Other:    ASSESSMENT: Session was shorter this date as patient fatigued quickly. Pt has been dealing with low stamina and weakness since [place] [id number]. Will continue with alternating land and pool appts to improve strength and stamina. Treatment/Activity Tolerance:  [x] Patient able to complete tx  [] Patient limited by fatique  [] Patient limited by pain  [] Patient limited by other medical complications  [] Other:     Prognosis: [] Good [x] Fair  [] Poor    Patient Requires Follow-up: [x] Yes  [] No    PLAN: See eval. PT 2x / week for 6 weeks. [x] Continue per plan of care [] Alter current plan (see comments)  [] Plan of care initiated [] Hold pending MD visit [] Discharge    Electronically signed by: Charmaine Burr, PT, DPT       Note: If patient does not return for scheduled/ recommended follow up visits, this note will serve as a discharge from care along with most recent update on progress.

## 2022-01-31 NOTE — TELEPHONE ENCOUNTER
Medication:   Requested Prescriptions     Pending Prescriptions Disp Refills    metFORMIN (GLUCOPHAGE-XR) 500 MG extended release tablet [Pharmacy Med Name: METFORMIN HCL  MG TABLET] 60 tablet 0     Sig: TAKE 2 TABLETS BY MOUTH EVERY DAY WITH BREAKFAST       Last Filled:      Patient Phone Number: [phone] (home)     Last appt: 11/23/2021   Next appt: Visit date not found    Last Labs DM:   Lab Results   Component Value Date    LABA1C 8.4 11/23/2021

## 2022-02-07 ENCOUNTER — HOSPITAL ENCOUNTER (OUTPATIENT)
Dept: PHYSICAL THERAPY | Age: 62
Setting detail: THERAPIES SERIES
Discharge: HOME | End: 2022-02-07
Payer: COMMERCIAL

## 2022-02-07 NOTE — FLOWSHEET NOTE
Physical Therapy    Physical Therapy  Cancellation/No-show Note  Patient Name:  Ida Borges  :  1960   Date:  2022  Cancelled visits to date: 7  No-shows to date: 1    Patient status for today's appointment patient:  [x]  Cancelled , 12/3, , 22, 1/10, ,   []  Rescheduled appointment  [x]  No-show      Reason given by patient:  []  Patient ill  [x]  Conflicting appointment  []  No transportation    []  Conflict with work  []  No reason given  []  Other:     Comments:     Phone call information:   []  Phone call made today to patient at _ time at number provided:      []  Patient answered, conversation as follows:    []  Patient did not answer, message left as follows:  []  Phone call not made today  [x]  Phone call not needed - pt contacted us to cancel and provided reason for cancellation.      Electronically signed by:  Burgess Armstrong, PT,  DPT

## 2022-02-09 ENCOUNTER — HOSPITAL ENCOUNTER (OUTPATIENT)
Dept: PHYSICAL THERAPY | Age: 62
Setting detail: THERAPIES SERIES
Discharge: HOME | End: 2022-02-09
Payer: COMMERCIAL

## 2022-02-09 PROCEDURE — 97113 AQUATIC THERAPY/EXERCISES: CPT

## 2022-02-09 PROCEDURE — 97150 GROUP THERAPEUTIC PROCEDURES: CPT

## 2022-02-09 NOTE — FLOWSHEET NOTE
Flor Medley  Phone: [phone]   Fax: [phone]    Physical Therapy Treatment Note/ Progress Report:     Date:  2022    Patient Name:  Brandon Clemons    :  1960  MRN: [id number]  Restrictions/Precautions:    Medical/Treatment Diagnosis Information:  · Diagnosis: G89.4 (ICD-10-CM) - Chronic pain syndrome; M51.36 (ICD-10-CM) - DDD (degenerative disc disease), lumbar; M79.7 (ICD-10-CM) - Fibromyalgia; M54.16 (ICD-10-CM) - Lumbar radiculopathy; M47.817 (ICD-10-CM) - Lumbosacral spondylosis without myelopathy; M79.18 (ICD-10-CM) - Myofascial pain; M25.512, G89.29 (ICD-10-CM) - Chronic left shoulder pain;; M16.12 (ICD-10-CM) - Primary osteoarthritis of left hip  · Treatment Diagnosis: dec ROM and strength in B UE and LE, impaired posture, balance, and gait limiting functional mobility  Insurance/Certification information:  PT Insurance Information: [employer] - 30 visits/yr, auth req  Physician Information:  Referring Practitioner: Deloris Larose MD  Plan of care signed (Y/N): []  Yes [x]  No    Date of Patient follow up with Physician: 21     Progress Report: []  Yes  [x]  No     Date Range for reporting period:  Beginnin/3  POC:    PN:   Ending:     Progress report due (10 Rx/or 30 days whichever is less): visit #10 or / (date)     Recertification due (POC duration/ or 90 days whichever is less): visit #12 or  (date)     Visit # Insurance Allowable Auth required?  Date Range    + 3/12 TBD [x]  Yes - [employer]  []  No 21 -22     UPDATED 22  Units approved Units used Date Range   96 23 21 -22     Latex Allergy:  [x]NO      []YES  Preferred Language for Healthcare:   [x]English       []other:    Functional Scale:       Date assessed:  Oswestry: raw score = 27/45; dysfunction = 60%  11/3/1  Oswestry: raw score = 24/45; dysfunction = 53%  21  Oswestry: raw score = 30/45; dysfunction = 67%  1/24/21    Pain level:  6/10      SUBJECTIVE:   Pt states that he fell on Saturday with the ice and his shoulder has been more sore.      OBJECTIVE:   1/24:   ROM   Comments   Lumbar Flex To shins  decreased from last PN   Lumbar Ext WNL       ROM LEFT RIGHT Comments   Lumbar Side Bend To mid thigh To lateral epicondyle Decreased on L from last PN    Hip Flexion [facility]     Hip ER [facility]     Hip IR WFL WFL     Hamstring Flex NT NT     Piriformis                   Shoulder flexion WFL (pain at [facility]) [facility]  R shoulder - decreased, with functional IR   LE MMT:(seated) Right:  Left:  Hip Flexion:  4  4  Knee Extension: 5  5  Knee Flexion:  4+  4+    Pitting Edema present in R foot 2+  Good capillary refill in B great toes, but increased redness noted B/L  No signs of open wounds on bottoms of feet     12/20:  Palpation: TTP B lumber paraspinals  Functional Mobility/Transfers: independent, slow to perform supine <> sit and sit <> stand transfers  Posture: dec lumbar lordosis  Gait: (include devices/WB status) slow mark, inc ANTONIO, no AD    ROM   Comments   Lumbar Flex To top of ankles     Lumbar Ext Limited 25%        ROM LEFT RIGHT Comments   Lumbar Side Bend To lateral epicondyle (+) To lateral epicondyle L \"because of the hip\"    Lumbar Rotation         Hip Flexion [facility] WFL     Hip Abd         Hip ER [facility]     Hip IR WFL WFL     Hip Extension         Knee Ext         Knee Flex         Hamstring Flex Limited 35 deg Limited 30 deg     Piriformis                   Shoulder flexion WFL (pain at [facility]) [facility]     MMT: grossly 4+/5 BLE's      RESTRICTIONS/PRECAUTIONS: chronic pain syndrome    Exercises/Interventions:     Therapeutic Exercise (34607) Resistance / level Sets / Seconds Reps Notes / Cues   Nustep L1    IB  HR     HSS 6\" stairs    CC 3 way hip              [facility]:  -FM (silver/yellow)     - LP 80#     - HS curl 20#     - Quad 10#     - Chest Press 20#     - Lateral raise 10#     - Lat-high row 20#  Cybex (white/black)     - Hip abd 40#     - Hip add 40#  FM (silver/red)     - row 25#     See column to left                         Therapeutic Activities (98696)       Reassessment of objective measures and scheduling of remainder of POC with discussion of plan for remaining visits   1/24   Forward step ups with opp knee drive 6\" Light hand assist    Lateral step ups with hip abd kick 6\"              Neuromuscular Re-ed (65596)     CC weighted walkouts 2.5pl  Fwd, retro, R, L    Tiltboard taps - A/P, M/L     Tiltboard balance - A/P, M/L                   Manual Intervention (87921)       Prone PA       GISTM/STM       Lumbar Manip       SI Manip       Hip belt mobs       Hip LA distraction                              AquaticTherapy Dates of Service: 11/12, 11/26, 12/6, 12/10, 12/13, 12/17, 1/21, 2/9  Aquatic Visits Exercises/Activities:   Transfers:  [x] Stairs   [] Ramp  [] Chair Lift   % Immersion:            Ambulation/ Warm up:   UE Exercises:       Forward  x3 N Shoulder Shrugs      Lateral   x3 N Shoulder Circles      Retro   x3 N Scapular Retraction      [place]    Push Downs      Heel/Toe Walking    Punching       Rowing      Elbow Flex/Ext      Shldr Flex/Ext      [employer] aBd/aDd   LE Exercises:  Shldr Horiz aBd/aDd   HR/TR x7 Shldr IR/ER    Marches x7 Arm Circles    Squats x7 PNF Diagonals    Hamstring Curls x7 Wall Push Ups    Hip Flexion (SLR) x7     Hip aBduction (SLR) x7     Hip Extension (SLR) x7      Hip aDduction (SLR)      Hip Circles x7 Functional:    Hip IR/Er  Step up forward 7 added 12/17   Hip Hikes  Step up lateral  7 added 12/17     Step down        Lunges Forward      Lunges Retro      Lunges Lateral     Balance:        SLS  10x10\"      Tandem Stance 2x30\"      NBOS eyes open x Seated:     NBOS eyes closed x Ankle pumps     Hand to Opposite Knee X 10  Ankle Circles     Fwd Step ups to SLS  Knee Flex/Ext    Lateral Step ups to SLS  Hip aBd/aDd    Stop/Go Gait   Bicycle       Ankle DF/PF      Ankle Inv/Ev    Stretching:       Gastroc/Soleus 30\" x 2     Hamstring  30\" x 2  [place]:    Knee Flex Stretch x Jog    Piriformis  x Noodle Hang    Hip Flexor x Traction at [facility]       ITB  Cool Down:    Quad  Fwd Walking    Mid Back   Lat Walking    UT  Retro Walking    Post Shoulder  Noodle float    Ladder Pull      Pec Stretch            Core: Other:    TrA set     Pelvic Tilts     Multifidi Walk outs c paddle      PNF Chop/Lifts                          Aquatic Abbreviation Key  B= Belt DB= Dumbells T= Theratube   H= Hydrotone N= Noodles W= Weights   P= Paddles S= Speedo equipment K= Kickboard       Pt. Education: Gave pt tour of pool, explained how to use lockers, what to wear, that it would be in group setting, and showed pt aquatic equipment. Modalities: * ASK IF PT WANTS MHP FOLLOWING SESSION*  1/31/22: MHP to low back in supine with large bolster x 15 min     Pt. Education:  -pt educated on diagnosis, prognosis and expectations for rehab  -all pt questions were answered    Home Exercise Prorgam:  Access Code: [email or web address]  URL: [email or web address]  Date: 12/20/2021  Prepared by: Angela Francois    Exercises  Forward and Backward Stepping at [facility] - 1 x daily - 7 x weekly - 3 sets - 10 reps  Lateral Stepping at [facility] - 1 x daily - 7 x weekly - 3 sets - 10 reps  Heel Toe Raises at [facility] - 1 x daily - 7 x weekly - 3 sets - 10 reps  Standing March at [address] [place] [address] Rd 1 x daily - 7 x weekly - 3 sets - 10 reps  Standing Hip Abduction Adduction at [facility] - 1 x daily - 7 x weekly - 3 sets - 10 reps  Standing Hip Flexion Extension at [facility] - 1 x daily - 7 x weekly - 3 sets - 10 reps  Standing Hip Circles at [facility] - 1 x daily - 7 x weekly - 3 sets - 10 reps  Single Leg Stance at Pool Wall - 1 x daily - 7 x weekly - 1 sets - 10 reps - 10 hold  Tandem Stance - 1 x daily - 7 x weekly - 1 sets - 2 reps - 30 hold  Bilateral Shoulder Horizontal Abduction Adduction AROM at [facility] - 1 x daily - 7 x weekly - 3 sets - 10 reps  Bilateral Shoulder Flexion Extension AROM at Pool Wall - 1 x daily - 7 x weekly - 3 sets - 10 reps  Bilateral Shoulder Abduction Adduction AROM at Pool Wall - 1 x daily - 7 x weekly - 3 sets - 10 reps  Seated Table Hamstring Stretch - 1 x daily - 7 x weekly - 1 sets - 2 reps - 30 hold  Gastroc Stretch on Wall - 1 x daily - 7 x weekly - 1 sets - 2 reps - 30 hold  Step Up - 1 x daily - 7 x weekly - 3 sets - 10 reps  Lateral Step Up - 1 x daily - 7 x weekly - 3 sets - 10 reps      Access Code: [email or web address]  URL: [email or web address]  Date: 11/03/2021  Prepared by: Rommel Morley    Exercises  Seated Table Hamstring Stretch - 3 x daily - 7 x weekly - 1 sets - 3 reps - 20 hold  Supine Piriformis Stretch with Foot on Ground - 3 x daily - 7 x weekly - 1 sets - 3 reps - 20 hold  Supine Lower Trunk Rotation - 2 x daily - 7 x weekly - 1 sets - 20 reps  Supine Shoulder Flexion with Dowel - 2 x daily - 7 x weekly - 1 sets - 10-20 reps  Prone Press Up on Elbows - 2 x daily - 7 x weekly - 1 sets - 10-15 reps      Therapeutic Exercise and NMR EXR  [] (73745) Provided verbal/tactile cueing for activities related to strengthening, flexibility, endurance, ROM  for improvements in proximal hip and core control with self care, mobility, lifting and ambulation.  [] (71970) Provided verbal/tactile cueing for activities related to improving balance, coordination, kinesthetic sense, posture, motor skill, proprioception  to assist with core control in self care, mobility, lifting, and ambulation. [x] (33846) Aquatic therapy with therapeutic exercise. Provided verbal and tactile cueing for activities related to strengthening, flexibility, endurance, ROM for improvements in  [x] LE / lumbar: LE, proximal hip, and core control in self care, mobility, lifting, ambulation and eccentric single leg control.    [x] UE / cervical: cervical, scapular, scapulothoracic and UE control with self care, reaching, carrying, lifting, house/yardwork, driving, computer work. [x] (25693) Therapist is in constant attendance of 2 or more patients providing skilled therapy interventions, but not providing any significant amount of measurable one-on-one time to either patient, for improvements in  [x] LE / lumbar: LE, proximal hip, and core control in self care, mobility, lifting, ambulation and eccentric single leg control. [x] UE / cervical: cervical, scapular, scapulothoracic and UE control with self care, reaching, carrying, lifting, house/yardwork, driving, computer work. Therapeutic Activities:    [] (51380 or 88256) Provided verbal/tactile cueing for activities related to improving balance, coordination, kinesthetic sense, posture, motor skill, proprioception and motor activation to allow for proper function  with self care and ADLs  [] (76279) Provided training and instruction to the patient for proper core and proximal hip recruitment and positioning with ambulation re-education     Home Exercise Program:    [] (93149) Reviewed/Progressed HEP activities related to strengthening, flexibility, endurance, ROM of core, proximal hip and LE for functional self-care, mobility, lifting and ambulation   [] (47827) Reviewed/Progressed HEP activities related to improving balance, coordination, kinesthetic sense, posture, motor skill, proprioception of core, proximal hip and LE for self care, mobility, lifting, and ambulation      Manual Treatments:  PROM / STM / Oscillations-Mobs:  G-I, II, III, IV (PA's, Inf., Post.)  [] (17403) Provided manual therapy to mobilize proximal hip and LS spine soft tissue/joints for the purpose of modulating pain, promoting relaxation,  increasing ROM, reducing/eliminating soft tissue swelling/inflammation/restriction, improving soft tissue extensibility and allowing for proper ROM for normal function with self care, mobility, lifting and ambulation.        Charges:  Timed Code Treatment Minutes: 15   Total Treatment Minutes: 26       [] EVAL - LOW (66674)   [] EVAL - MOD (71968)  [] EVAL - HIGH (29752)  [] RE-EVAL (58671)  [] HN(31582) x 1      [] Ionto  [] NMR (37703) x 1      [] Vaso  [] Manual (82975) x       [] Ultrasound  [] TA x       [] Mech Traction (62014)  [x] Aquatic Therapy x 1     [] ES (un) (01202):   [] Home Management Training x  [] ES(attended) (14916)   [] Dry Needling 1-2 muscles (24904):  [] Dry Needling 3+ muscles (486243  [x] Group: 1     [] Other:     Goals:   Patient stated goal: independent with HEP to self-manage pain  [x] Progressing: [] Met: [] Not Met: [] Adjusted    Therapist goals for Patient:   Short Term Goals: To be achieved in: 2 weeks  1. Independent in HEP and progression per patient tolerance, in order to prevent re-injury. [] Progressing: [x] Met: [] Not Met: [] Adjusted  2. Patient will have a decrease in pain to facilitate improvement in movement, function, and ADLs as indicated by Functional Deficits. [] Progressing: [x] Met: [] Not Met: [] Adjusted    Long Term Goals: To be achieved in: 6 weeks  1. Disability index score of 40% or less for the NIKUNJ to assist with reaching prior level of function. [x] Progressing: [] Met: [] Not Met: [] Adjusted  2. Patient will demonstrate increased AROM to WNL, good LS mobility, good hip ROM to allow for proper joint functioning as indicated by patients Functional Deficits. [x] Progressing: [] Met: [] Not Met: [] Adjusted  3. Patient will demonstrate an increase in Strength to good proximal hip and core activation to allow for proper functional mobility as indicated by patients Functional Deficits. [x] Progressing: [] Met: [] Not Met: [] Adjusted  4. Patient will return to functional activities including sleeping, sitting, and walking without increased symptoms or restriction.    [x] Progressing:  [] Met: [] Not Met: [] Adjusted    Overall Progression Towards Functional goals/ Treatment Progress Update:  [] Patient is progressing as expected towards functional goals listed. [] Progression is slowed due to complexities/Impairments listed. [] Progression has been slowed due to co-morbidities. [x] Plan just implemented, too soon to assess goals progression <30days   [] Goals require adjustment due to lack of progress  [] Patient is not progressing as expected and requires additional follow up with physician  [] Other    Persisting Functional Limitations/Impairments:   [x]Sitting [x]Standing   [x]Walking []Squatting/bending    []Stairs []ADL's    [x]Transfers []Reaching  []Housework []Job related tasks  []Driving []Sports/Recreation   [x]Sleeping []Other:    ASSESSMENT: Pt able to complete aquatic exercises without increased pain or symptoms. Continued to perform exercises with 7 reps due to poor stamina regarding endurance to exercise. NPV in pool increased reps with exercise as Pt tolerates. Treatment/Activity Tolerance:  [x] Patient able to complete tx  [] Patient limited by fatique  [] Patient limited by pain  [] Patient limited by other medical complications  [] Other:     Prognosis: [] Good [x] Fair  [] Poor    Patient Requires Follow-up: [x] Yes  [] No    PLAN: See eval. PT 2x / week for 6 weeks. [x] Continue per plan of care [] Alter current plan (see comments)  [] Plan of care initiated [] Hold pending MD visit [] Discharge    Electronically signed by: Shanthi Garland, PT, DPT       Note: If patient does not return for scheduled/ recommended follow up visits, this note will serve as a discharge from care along with most recent update on progress.

## 2022-02-11 ENCOUNTER — HOSPITAL ENCOUNTER (OUTPATIENT)
Dept: PHYSICAL THERAPY | Age: 62
Setting detail: THERAPIES SERIES
Discharge: HOME | End: 2022-02-11
Payer: COMMERCIAL

## 2022-02-11 PROCEDURE — 97110 THERAPEUTIC EXERCISES: CPT

## 2022-02-11 PROCEDURE — 97112 NEUROMUSCULAR REEDUCATION: CPT

## 2022-02-11 NOTE — FLOWSHEET NOTE
Flor Medley  Phone: [phone]   Fax: [phone]    Physical Therapy Treatment Note/ Progress Report:     Date:  2022    Patient Name:  Richard Hawkins    :  1960  MRN: [id number]  Restrictions/Precautions:    Medical/Treatment Diagnosis Information:  · Diagnosis: G89.4 (ICD-10-CM) - Chronic pain syndrome; M51.36 (ICD-10-CM) - DDD (degenerative disc disease), lumbar; M79.7 (ICD-10-CM) - Fibromyalgia; M54.16 (ICD-10-CM) - Lumbar radiculopathy; M47.817 (ICD-10-CM) - Lumbosacral spondylosis without myelopathy; M79.18 (ICD-10-CM) - Myofascial pain; M25.512, G89.29 (ICD-10-CM) - Chronic left shoulder pain;; M16.12 (ICD-10-CM) - Primary osteoarthritis of left hip  · Treatment Diagnosis: dec ROM and strength in B UE and LE, impaired posture, balance, and gait limiting functional mobility  Insurance/Certification information:  PT Insurance Information: [employer] - 30 visits/yr, auth req  Physician Information:  Referring Practitioner: Rey Pollard MD  Plan of care signed (Y/N): []  Yes [x]  No    Date of Patient follow up with Physician: 21     Progress Report: []  Yes  [x]  No     Date Range for reporting period:  Beginnin/3  POC:    PN:   Ending:     Progress report due (10 Rx/or 30 days whichever is less): visit #10 or / (date)     Recertification due (POC duration/ or 90 days whichever is less): visit #12 or  (date)     Visit # Insurance Allowable Auth required?  Date Range    +  TBD [x]  Yes - [employer]  []  No 21 -22     UPDATED 22  Units approved Units used Date Range   96 25 21 -22     Latex Allergy:  [x]NO      []YES  Preferred Language for Healthcare:   [x]English       []other:    Functional Scale:       Date assessed:  Oswestry: raw score = 27/45; dysfunction = 60%  11/3/1  Oswestry: raw score = 24/45; dysfunction = 53%  21  Oswestry: raw score = 30/45; dysfunction = 67%  1/24/21    Pain level:  6/10      SUBJECTIVE:   Pt reports that he is still a little sore in the shoulder from his fall on ice. No other significant change in symptoms, plans to go to  after therapy session.      OBJECTIVE:   1/24:   ROM   Comments   Lumbar Flex To shins  decreased from last PN   Lumbar Ext WNL       ROM LEFT RIGHT Comments   Lumbar Side Bend To mid thigh To lateral epicondyle Decreased on L from last PN    Hip Flexion [facility]     Hip ER [facility]     Hip IR WFL WFL     Hamstring Flex NT NT     Piriformis                   Shoulder flexion WFL (pain at [facility]) [facility]  R shoulder - decreased, with functional IR   LE MMT:(seated) Right:  Left:  Hip Flexion:  4  4  Knee Extension: 5  5  Knee Flexion:  4+  4+    Pitting Edema present in R foot 2+  Good capillary refill in B great toes, but increased redness noted B/L  No signs of open wounds on bottoms of feet     12/20:  Palpation: TTP B lumber paraspinals  Functional Mobility/Transfers: independent, slow to perform supine <> sit and sit <> stand transfers  Posture: dec lumbar lordosis  Gait: (include devices/WB status) slow mark, inc ANTONIO, no AD    ROM   Comments   Lumbar Flex To top of ankles     Lumbar Ext Limited 25%        ROM LEFT RIGHT Comments   Lumbar Side Bend To lateral epicondyle (+) To lateral epicondyle L \"because of the hip\"    Lumbar Rotation         Hip Flexion [facility] WFL     Hip Abd         Hip ER [facility]     Hip IR WFL WFL     Hip Extension         Knee Ext         Knee Flex         Hamstring Flex Limited 35 deg Limited 30 deg     Piriformis                   Shoulder flexion WFL (pain at [facility]) [facility]     MMT: grossly 4+/5 BLE's      RESTRICTIONS/PRECAUTIONS: chronic pain syndrome    Exercises/Interventions:     Therapeutic Exercise (53833) Resistance / level Sets / Seconds Reps Notes / Cues   Nustep L1 X 5 min 2/11: used Bike this date d/t equipment availability   IB  HR  2X30\"   X10    HSS 6\" stairs    CC 3 way hip  NV            [facility]:  -FM (silver/yellow)     - LP 80#     - HS curl 20#     - Quad 10#     - Chest Press 20#     - Lateral raise 10#     - Lat-high row 20#  Cybex (white/black)     - Hip abd 40#     - Hip add 40#  FM (silver/red)     - row 25#     See column to left    Wall walks   x10 B                  Therapeutic Activities (23999)       Reassessment of objective measures and scheduling of remainder of POC with discussion of plan for remaining visits   1/24   Forward step ups with opp knee drive 6\"  10 B Light hand assist    Lateral step ups with hip abd kick 6\"  10 B                  Neuromuscular Re-ed (26813)       CC weighted walkouts 2.5pl   5 ea  Fwd, retro, R, L    Tiltboard taps - A/P, M/L   20 ea    Tiltboard balance - A/P, M/L  60\" each                   Manual Intervention (13055)       Prone PA       GISTM/STM       Lumbar Manip       SI Manip       Hip belt mobs       Hip LA distraction                              AquaticTherapy Dates of Service: 11/12, 11/26, 12/6, 12/10, 12/13, 12/17, 1/21, 2/9  Aquatic Visits Exercises/Activities:   Transfers:  [x] Stairs   [] Ramp  [] Chair Lift   % Immersion:            Ambulation/ Warm up:   UE Exercises:       Forward  x3 N Shoulder Shrugs      Lateral   x3 N Shoulder Circles      Retro   x3 N Scapular Retraction      [place]ari[place]as    Push Downs      Heel/Toe Walking    Punching       Rowing      Elbow Flex/Ext      Shldr Flex/Ext      [employer] aBd/aDd   LE Exercises:  Shldr Horiz aBd/aDd   HR/TR x7 Shldr IR/ER    Marches x7 Arm Circles    Squats x7 PNF Diagonals    Hamstring Curls x7 Wall Push Ups    Hip Flexion (SLR) x7     Hip aBduction (SLR) x7     Hip Extension (SLR) x7      Hip aDduction (SLR)      Hip Circles x7 Functional:    Hip IR/Er  Step up forward 7 added 12/17   Hip Hikes  Step up lateral  7 added 12/17     Step down        Lunges Forward      Lunges Retro      Lunges Lateral     Balance:        SLS  10x10\"      Tandem Stance 2x30\"      NBOS eyes open x Seated: NBOS eyes closed x Ankle pumps     Hand to Opposite Knee X 10  Ankle Circles     Fwd Step ups to SLS  Knee Flex/Ext    Lateral Step ups to SLS  Hip aBd/aDd    Stop/Go Gait   Bicycle       Ankle DF/PF      Ankle Inv/Ev    Stretching:       Gastroc/Soleus 30\" x 2     Hamstring  30\" x 2  Deep Water:    Knee Flex Stretch x Jog    Piriformis  x Noodle Hang    Hip Flexor x Traction at [address]    SKTC      DKTC       ITB  Cool Down:    Quad  Fwd Walking    Mid Back   Lat Walking    UT  Retro Walking    Post Shoulder  Noodle float    Ladder Pull      Pec Stretch            Core: Other:    TrA set     Pelvic Tilts     Multifidi Walk outs c paddle      PNF Chop/Lifts                          Aquatic Abbreviation Key  B= Belt DB= Dumbells T= Theratube   H= Hydrotone N= Noodles W= Weights   P= Paddles S= Speedo equipment K= Kickboard       Pt. Education: Gave pt tour of pool, explained how to use lockers, what to wear, that it would be in group setting, and showed pt aquatic equipment. Modalities: * ASK IF PT WANTS MHP FOLLOWING SESSION*  2/11: MHP to low back and R GH in supine with large bolster x 15 min  1/31/22: MHP to low back in supine with large bolster x 15 min     Pt. Education:  -pt educated on diagnosis, prognosis and expectations for rehab  -all pt questions were answered    Home Exercise Prorgam:  Access Code: [email or web address]  URL: [email or web address]  Date: 12/20/2021  Prepared by: Herrera Mayorga    Exercises  Forward and Backward Stepping at [facility] - 1 x daily - 7 x weekly - 3 sets - 10 reps  Lateral Stepping at [facility] - 1 x daily - 7 x weekly - 3 sets - 10 reps  Heel Toe Raises at [facility] - 1 x daily - 7 x weekly - 3 sets - 10 reps  Standing March at [address] 1 x daily - 7 x weekly - 3 sets - 10 reps  Standing Hip Abduction Adduction at Pool Wall - 1 x daily - 7 x weekly - 3 sets - 10 reps  Standing Hip Flexion Extension at [facility] - 1 x daily - 7 x weekly - 3 sets - 10 reps  Standing Hip Circles at [facility] - 1 x daily - 7 x weekly - 3 sets - 10 reps  Single Leg Stance at [facility] - 1 x daily - 7 x weekly - 1 sets - 10 reps - 10 hold  Tandem Stance - 1 x daily - 7 x weekly - 1 sets - 2 reps - 30 hold  Bilateral Shoulder Horizontal Abduction Adduction AROM at [facility] - 1 x daily - 7 x weekly - 3 sets - 10 reps  Bilateral Shoulder Flexion Extension AROM at [facility] - 1 x daily - 7 x weekly - 3 sets - 10 reps  Bilateral Shoulder Abduction Adduction AROM at Pool Wall - 1 x daily - 7 x weekly - 3 sets - 10 reps  Seated Table Hamstring Stretch - 1 x daily - 7 x weekly - 1 sets - 2 reps - 30 hold  Gastroc Stretch on Wall - 1 x daily - 7 x weekly - 1 sets - 2 reps - 30 hold  Step Up - 1 x daily - 7 x weekly - 3 sets - 10 reps  Lateral Step Up - 1 x daily - 7 x weekly - 3 sets - 10 reps      Access Code: [email or web address]  URL: [email or web address]  Date: 11/03/2021  Prepared by: Torey Morley    Exercises  Seated Table Hamstring Stretch - 3 x daily - 7 x weekly - 1 sets - 3 reps - 20 hold  Supine Piriformis Stretch with Foot on Ground - 3 x daily - 7 x weekly - 1 sets - 3 reps - 20 hold  Supine Lower Trunk Rotation - 2 x daily - 7 x weekly - 1 sets - 20 reps  Supine Shoulder Flexion with Dowel - 2 x daily - 7 x weekly - 1 sets - 10-20 reps  Prone Press Up on Elbows - 2 x daily - 7 x weekly - 1 sets - 10-15 reps      Therapeutic Exercise and NMR EXR  [] (51460) Provided verbal/tactile cueing for activities related to strengthening, flexibility, endurance, ROM  for improvements in proximal hip and core control with self care, mobility, lifting and ambulation.  [] (57151) Provided verbal/tactile cueing for activities related to improving balance, coordination, kinesthetic sense, posture, motor skill, proprioception  to assist with core control in self care, mobility, lifting, and ambulation. [x] (74055) Aquatic therapy with therapeutic exercise.  Provided verbal and tactile cueing for activities related to strengthening, flexibility, endurance, ROM for improvements in  [x] LE / lumbar: LE, proximal hip, and core control in self care, mobility, lifting, ambulation and eccentric single leg control. [x] UE / cervical: cervical, scapular, scapulothoracic and UE control with self care, reaching, carrying, lifting, house/yardwork, driving, computer work. [x] (47260) Therapist is in constant attendance of 2 or more patients providing skilled therapy interventions, but not providing any significant amount of measurable one-on-one time to either patient, for improvements in  [x] LE / lumbar: LE, proximal hip, and core control in self care, mobility, lifting, ambulation and eccentric single leg control. [x] UE / cervical: cervical, scapular, scapulothoracic and UE control with self care, reaching, carrying, lifting, house/yardwork, driving, computer work.        Therapeutic Activities:    [] (94289 or 06505) Provided verbal/tactile cueing for activities related to improving balance, coordination, kinesthetic sense, posture, motor skill, proprioception and motor activation to allow for proper function  with self care and ADLs  [] (71099) Provided training and instruction to the patient for proper core and proximal hip recruitment and positioning with ambulation re-education     Home Exercise Program:    [] (14315) Reviewed/Progressed HEP activities related to strengthening, flexibility, endurance, ROM of core, proximal hip and LE for functional self-care, mobility, lifting and ambulation   [] (63647) Reviewed/Progressed HEP activities related to improving balance, coordination, kinesthetic sense, posture, motor skill, proprioception of core, proximal hip and LE for self care, mobility, lifting, and ambulation      Manual Treatments:  PROM / STM / Oscillations-Mobs:  G-I, II, III, IV (PA's, Inf., Post.)  [] (81200) Provided manual therapy to mobilize proximal hip and LS spine soft tissue/joints for the purpose of modulating pain, promoting relaxation,  increasing ROM, reducing/eliminating soft tissue swelling/inflammation/restriction, improving soft tissue extensibility and allowing for proper ROM for normal function with self care, mobility, lifting and ambulation. Charges:  Timed Code Treatment Minutes: 25   Total Treatment Minutes: 40       [] EVAL - LOW (20643)   [] EVAL - MOD (69732)  [] EVAL - HIGH (95757)  [] RE-EVAL (07472)  [x] IG(93233) x 1      [] Ionto  [x] NMR (05681) x 1      [] Vaso  [] Manual (90442) x       [] Ultrasound  [] TA x       [] Mech Traction (88923)  [] Aquatic Therapy x 1     [] ES (un) (97475):   [] Home Management Training x  [] ES(attended) (04535)   [] Dry Needling 1-2 muscles (49101):  [] Dry Needling 3+ muscles (299057  [] Group: 1     [] Other:     Goals:   Patient stated goal: independent with HEP to self-manage pain  [x] Progressing: [] Met: [] Not Met: [] Adjusted    Therapist goals for Patient:   Short Term Goals: To be achieved in: 2 weeks  1. Independent in HEP and progression per patient tolerance, in order to prevent re-injury. [] Progressing: [x] Met: [] Not Met: [] Adjusted  2. Patient will have a decrease in pain to facilitate improvement in movement, function, and ADLs as indicated by Functional Deficits. [] Progressing: [x] Met: [] Not Met: [] Adjusted    Long Term Goals: To be achieved in: 6 weeks  1. Disability index score of 40% or less for the NIKUNJ to assist with reaching prior level of function. [x] Progressing: [] Met: [] Not Met: [] Adjusted  2. Patient will demonstrate increased AROM to WNL, good LS mobility, good hip ROM to allow for proper joint functioning as indicated by patients Functional Deficits. [x] Progressing: [] Met: [] Not Met: [] Adjusted  3. Patient will demonstrate an increase in Strength to good proximal hip and core activation to allow for proper functional mobility as indicated by patients Functional Deficits.    [x] Progressing: [] Met: [] Not Met: [] Adjusted  4. Patient will return to functional activities including sleeping, sitting, and walking without increased symptoms or restriction. [x] Progressing:  [] Met: [] Not Met: [] Adjusted    Overall Progression Towards Functional goals/ Treatment Progress Update:  [] Patient is progressing as expected towards functional goals listed. [] Progression is slowed due to complexities/Impairments listed. [] Progression has been slowed due to co-morbidities. [x] Plan just implemented, too soon to assess goals progression <30days   [] Goals require adjustment due to lack of progress  [] Patient is not progressing as expected and requires additional follow up with physician  [] Other    Persisting Functional Limitations/Impairments:   [x]Sitting [x]Standing   [x]Walking []Squatting/bending    []Stairs []ADL's    [x]Transfers []Reaching  []Housework []Job related tasks  []Driving []Sports/Recreation   [x]Sleeping []Other:    ASSESSMENT:  Pt performed session without complication. Reports that his shoulder remains sore. Did not progress this date as it appears that pt requires slow progression, consider gentle land progression NV. Discussed addition of PROM to improve shoulder symptoms however pt reports that he does not typically respond well to PROM or massage, prefers heat only. Treatment/Activity Tolerance:  [x] Patient able to complete tx  [] Patient limited by fatique  [] Patient limited by pain  [] Patient limited by other medical complications  [] Other:     Prognosis: [] Good [x] Fair  [] Poor    Patient Requires Follow-up: [x] Yes  [] No    PLAN: See eval. PT 2x / week for 6 weeks.    [x] Continue per plan of care [] Alter current plan (see comments)  [] Plan of care initiated [] Hold pending MD visit [] Discharge    Electronically signed by: Scott Velez, PT, DPT       Note: If patient does not return for scheduled/ recommended follow up visits, this note will serve as a discharge from care along with most recent update on progress.

## 2022-02-14 ENCOUNTER — HOSPITAL ENCOUNTER (OUTPATIENT)
Dept: PHYSICAL THERAPY | Age: 62
Setting detail: THERAPIES SERIES
Discharge: HOME | End: 2022-02-14
Payer: COMMERCIAL

## 2022-02-14 PROCEDURE — 97113 AQUATIC THERAPY/EXERCISES: CPT

## 2022-02-14 PROCEDURE — 97150 GROUP THERAPEUTIC PROCEDURES: CPT

## 2022-02-14 NOTE — FLOWSHEET NOTE
Flor Medley  Phone: [phone]   Fax: [phone]    Physical Therapy Treatment Note/ Progress Report:     Date:  2022    Patient Name:  Brandon Clemons    :  1960  MRN: [id number]  Restrictions/Precautions:    Medical/Treatment Diagnosis Information:  · Diagnosis: G89.4 (ICD-10-CM) - Chronic pain syndrome; M51.36 (ICD-10-CM) - DDD (degenerative disc disease), lumbar; M79.7 (ICD-10-CM) - Fibromyalgia; M54.16 (ICD-10-CM) - Lumbar radiculopathy; M47.817 (ICD-10-CM) - Lumbosacral spondylosis without myelopathy; M79.18 (ICD-10-CM) - Myofascial pain; M25.512, G89.29 (ICD-10-CM) - Chronic left shoulder pain;; M16.12 (ICD-10-CM) - Primary osteoarthritis of left hip  · Treatment Diagnosis: dec ROM and strength in B UE and LE, impaired posture, balance, and gait limiting functional mobility  Insurance/Certification information:  PT Insurance Information: [employer] - 30 visits/yr, auth req  Physician Information:  Referring Practitioner: Deloris Larose MD  Plan of care signed (Y/N): []  Yes [x]  No    Date of Patient follow up with Physician: 21     Progress Report: []  Yes  [x]  No     Date Range for reporting period:  Beginnin/3  POC:    PN:   Ending:     Progress report due (10 Rx/or 30 days whichever is less): visit #10 or  (date)     Recertification due (POC duration/ or 90 days whichever is less): visit # or  (date)     Visit # Insurance Allowable Auth required?  Date Range    +  TBD [x]  Yes - [employer]  []  No 21 -22       Latex Allergy:  [x]NO      []YES  Preferred Language for Healthcare:   [x]English       []other:    Functional Scale:       Date assessed:  Oswestry: raw score = 27/45; dysfunction = 60%  11/3/1  Oswestry: raw score = 24/45; dysfunction = 53%  21  Oswestry: raw score = 30/45; dysfunction = 67%  21    Pain level:  8/10      SUBJECTIVE:   Pt reports that he is lateral      Step down        Lunges Forward      Lunges Retro      Lunges Lateral     Balance:        SLS       Tandem Stance      NBOS eyes open x Seated:     NBOS eyes closed x Ankle pumps     Hand to Opposite Knee  Ankle Circles     Fwd Step ups to SLS  Knee Flex/Ext    Lateral Step ups to SLS  Hip aBd/aDd    Stop/Go Gait   Bicycle       Ankle DF/PF      Ankle Inv/Ev    Stretching:       Gastroc/Soleus     Hamstring  Deep Water:    Knee Flex Stretch x Jog    Piriformis  x Noodle Hang    Hip Flexor x Traction at [facility]      DKTC       ITB  Cool Down:    Quad  Fwd Walking    Mid Back   Lat Walking    UT  Retro Walking    Post Shoulder  Noodle float    Ladder Pull      Pec Stretch            Core: Other:    TrA set     Pelvic Tilts x10    Multifidi Walk outs c paddle      PNF Chop/Lifts                          Aquatic Abbreviation Key  B= Belt DB= Dumbells T= Theratube   H= Hydrotone N= Noodles W= Weights   P= Paddles S= Speedo equipment K= Kickboard       Pt. Education: Gave pt tour of pool, explained how to use lockers, what to wear, that it would be in group setting, and showed pt aquatic equipment. Modalities: * ASK IF PT WANTS MHP FOLLOWING SESSION*  2/11: MHP to low back and R GH in supine with large bolster x 15 min  1/31/22: MHP to low back in supine with large bolster x 15 min     Pt. Education:  -pt educated on diagnosis, prognosis and expectations for rehab  -all pt questions were answered    Home Exercise Prorgam:  Access Code: [email or web address]  URL: [email or web address]  Date: 12/20/2021  Prepared by: Ivana Mayberry    Exercises  Forward and Backward Stepping at [facility] - 1 x daily - 7 x weekly - 3 sets - 10 reps  Lateral Stepping at [facility] - 1 x daily - 7 x weekly - 3 sets - 10 reps  Heel Toe Raises at [facility] - 1 x daily - 7 x weekly - 3 sets - 10 reps  Standing March at [address] [place] [address] Rd 1 x daily - 7 x weekly - 3 sets - 10 reps  Standing Hip Abduction Adduction at [facility] - 1 x daily - 7 x weekly - 3 sets - 10 reps  Standing Hip Flexion Extension at [employer] - 1 x daily - 7 x weekly - 3 sets - 10 reps  Standing Hip Circles at [employer] - 1 x daily - 7 x weekly - 3 sets - 10 reps  Single Leg Stance at [employer] - 1 x daily - 7 x weekly - 1 sets - 10 reps - 10 hold  Tandem Stance - 1 x daily - 7 x weekly - 1 sets - 2 reps - 30 hold  Bilateral Shoulder Horizontal Abduction Adduction AROM at [employer] - 1 x daily - 7 x weekly - 3 sets - 10 reps  Bilateral Shoulder Flexion Extension AROM at [employer] - 1 x daily - 7 x weekly - 3 sets - 10 reps  Bilateral Shoulder Abduction Adduction AROM at Pool Wall - 1 x daily - 7 x weekly - 3 sets - 10 reps  Seated Table Hamstring Stretch - 1 x daily - 7 x weekly - 1 sets - 2 reps - 30 hold  Gastroc Stretch on Wall - 1 x daily - 7 x weekly - 1 sets - 2 reps - 30 hold  Step Up - 1 x daily - 7 x weekly - 3 sets - 10 reps  Lateral Step Up - 1 x daily - 7 x weekly - 3 sets - 10 reps      Access Code: [email or web address]  URL: [email or web address]  Date: 11/03/2021  Prepared by: Crystal Morley    Exercises  Seated Table Hamstring Stretch - 3 x daily - 7 x weekly - 1 sets - 3 reps - 20 hold  Supine Piriformis Stretch with Foot on Ground - 3 x daily - 7 x weekly - 1 sets - 3 reps - 20 hold  Supine Lower Trunk Rotation - 2 x daily - 7 x weekly - 1 sets - 20 reps  Supine Shoulder Flexion with Dowel - 2 x daily - 7 x weekly - 1 sets - 10-20 reps  Prone Press Up on Elbows - 2 x daily - 7 x weekly - 1 sets - 10-15 reps      Therapeutic Exercise and NMR EXR  [] (12622) Provided verbal/tactile cueing for activities related to strengthening, flexibility, endurance, ROM  for improvements in proximal hip and core control with self care, mobility, lifting and ambulation.  [] (86717) Provided verbal/tactile cueing for activities related to improving balance, coordination, kinesthetic sense, posture, motor skill, proprioception  to assist with core control in self care, mobility, lifting, and ambulation. [x] (70190) Aquatic therapy with therapeutic exercise. Provided verbal and tactile cueing for activities related to strengthening, flexibility, endurance, ROM for improvements in  [x] LE / lumbar: LE, proximal hip, and core control in self care, mobility, lifting, ambulation and eccentric single leg control. [x] UE / cervical: cervical, scapular, scapulothoracic and UE control with self care, reaching, carrying, lifting, house/yardwork, driving, computer work. [x] (66886) Therapist is in constant attendance of 2 or more patients providing skilled therapy interventions, but not providing any significant amount of measurable one-on-one time to either patient, for improvements in  [x] LE / lumbar: LE, proximal hip, and core control in self care, mobility, lifting, ambulation and eccentric single leg control. [x] UE / cervical: cervical, scapular, scapulothoracic and UE control with self care, reaching, carrying, lifting, house/yardwork, driving, computer work.        Therapeutic Activities:    [] (03685 or 65151) Provided verbal/tactile cueing for activities related to improving balance, coordination, kinesthetic sense, posture, motor skill, proprioception and motor activation to allow for proper function  with self care and ADLs  [] (78452) Provided training and instruction to the patient for proper core and proximal hip recruitment and positioning with ambulation re-education     Home Exercise Program:    [] (73912) Reviewed/Progressed HEP activities related to strengthening, flexibility, endurance, ROM of core, proximal hip and LE for functional self-care, mobility, lifting and ambulation   [] (78087) Reviewed/Progressed HEP activities related to improving balance, coordination, kinesthetic sense, posture, motor skill, proprioception of core, proximal hip and LE for self care, mobility, lifting, and ambulation      Manual Treatments:  PROM / STM / Oscillations-Mobs:  G-I, II, III, IV (PA's, Inf., Post.)  [] (16602) Provided manual therapy to mobilize proximal hip and LS spine soft tissue/joints for the purpose of modulating pain, promoting relaxation,  increasing ROM, reducing/eliminating soft tissue swelling/inflammation/restriction, improving soft tissue extensibility and allowing for proper ROM for normal function with self care, mobility, lifting and ambulation. UPDATED 2/14/22  Units approved Units used Date Range   96 27 11/8/21 -2/28/22       Charges:  Timed Code Treatment Minutes: 10   Total Treatment Minutes: 32       [] EVAL - LOW (68260)   [] EVAL - MOD (48541)  [] EVAL - HIGH (04432)  [] RE-EVAL (46505)  [] UE(19058) x       [] Ionto  [] NMR (02705) x      [] Vaso  [] Manual (56299) x       [] Ultrasound  [] TA x       [] Mech Traction (63836)  [x] Aquatic Therapy x 1     [] ES (un) (02058):   [] Home Management Training x  [] ES(attended) (72397)   [] Dry Needling 1-2 muscles (18165):  [] Dry Needling 3+ muscles (016641  [x] Group: 1     [] Other:     Goals:   Patient stated goal: independent with HEP to self-manage pain  [x] Progressing: [] Met: [] Not Met: [] Adjusted    Therapist goals for Patient:   Short Term Goals: To be achieved in: 2 weeks  1. Independent in HEP and progression per patient tolerance, in order to prevent re-injury. [] Progressing: [x] Met: [] Not Met: [] Adjusted  2. Patient will have a decrease in pain to facilitate improvement in movement, function, and ADLs as indicated by Functional Deficits. [] Progressing: [x] Met: [] Not Met: [] Adjusted    Long Term Goals: To be achieved in: 6 weeks  1. Disability index score of 40% or less for the NIKUNJ to assist with reaching prior level of function. [x] Progressing: [] Met: [] Not Met: [] Adjusted  2. Patient will demonstrate increased AROM to WNL, good LS mobility, good hip ROM to allow for proper joint functioning as indicated by patients Functional Deficits. [x] Progressing: [] Met: [] Not Met: [] Adjusted  3. Patient will demonstrate an increase in Strength to good proximal hip and core activation to allow for proper functional mobility as indicated by patients Functional Deficits. [x] Progressing: [] Met: [] Not Met: [] Adjusted  4. Patient will return to functional activities including sleeping, sitting, and walking without increased symptoms or restriction. [x] Progressing:  [] Met: [] Not Met: [] Adjusted    Overall Progression Towards Functional goals/ Treatment Progress Update:  [] Patient is progressing as expected towards functional goals listed. [] Progression is slowed due to complexities/Impairments listed. [] Progression has been slowed due to co-morbidities. [x] Plan just implemented, too soon to assess goals progression <30days   [] Goals require adjustment due to lack of progress  [] Patient is not progressing as expected and requires additional follow up with physician  [] Other    Persisting Functional Limitations/Impairments:   [x]Sitting [x]Standing   [x]Walking []Squatting/bending    []Stairs []ADL's    [x]Transfers []Reaching  []Housework []Job related tasks  []Driving []Sports/Recreation   [x]Sleeping []Other:    ASSESSMENT:  Pt very limited in pool today due to pain in shoulder from a fall 10 days ago. Pt worried he may have done some damage to it and is thinking about going to after hours ortho clinic or ER for care. Does have apt on Friday - encouraged pt to attend and participate in special tests and stretching to help decipher if RTC involved. Will continue POC as able. Treatment/Activity Tolerance:  [x] Patient able to complete tx  [] Patient limited by fatique  [] Patient limited by pain  [] Patient limited by other medical complications  [] Other:     Prognosis: [] Good [x] Fair  [] Poor    Patient Requires Follow-up: [x] Yes  [] No    PLAN: See eval. PT 2x / week for 6 weeks.    [x] Continue per plan of care [] Alter current plan (see comments)  [] Plan of care initiated [] Hold pending MD visit [] Discharge    Electronically signed by: Giana Flaherty, PT, DPT       Note: If patient does not return for scheduled/ recommended follow up visits, this note will serve as a discharge from care along with most recent update on progress.

## 2022-02-17 ENCOUNTER — TELEPHONE (OUTPATIENT)
Dept: PAIN MANAGEMENT | Age: 62
End: 2022-02-17

## 2022-02-17 DIAGNOSIS — G89.4 CHRONIC PAIN SYNDROME: Primary | ICD-10-CM

## 2022-02-17 NOTE — TELEPHONE ENCOUNTER
Patient called and stated he has a fall on the ice an hurt his right shoulder.   His therapist suggested that [address] [place] [address] send and order for an MRI and xray            Please advise

## 2022-02-18 ENCOUNTER — HOSPITAL ENCOUNTER (OUTPATIENT)
Dept: PHYSICAL THERAPY | Age: 62
Setting detail: THERAPIES SERIES
Discharge: HOME | End: 2022-02-18
Payer: COMMERCIAL

## 2022-02-18 ENCOUNTER — HOSPITAL ENCOUNTER (OUTPATIENT)
Dept: GENERAL RADIOLOGY | Age: 62
Discharge: HOME | End: 2022-02-18
Payer: COMMERCIAL

## 2022-02-18 ENCOUNTER — HOSPITAL ENCOUNTER (OUTPATIENT)
Age: 62
Discharge: HOME | End: 2022-02-18
Payer: COMMERCIAL

## 2022-02-18 DIAGNOSIS — G89.4 CHRONIC PAIN SYNDROME: ICD-10-CM

## 2022-02-18 PROCEDURE — 97110 THERAPEUTIC EXERCISES: CPT

## 2022-02-18 PROCEDURE — 73030 X-RAY EXAM OF SHOULDER: CPT

## 2022-02-18 NOTE — PLAN OF CARE
Flor Medley  Phone: [phone]   Fax: [phone]    Physical Therapy Re-Certification Plan of Care    Dear Remington Bullock MD    We had the pleasure of treating the following patient for physical therapy services at [facility]. A summary of our findings can be found in the updated assessment below. This includes our plan of care. If you have any questions or concerns regarding these findings, please do not hesitate to contact me at the office phone number checked above. Thank you for the referral.     Physician Signature:________________________________Date:__________________  By signing above (or electronic signature), therapist's plan is approved by physician      Functional Outcome: not assessed                                                   Sub-sections:   ;   ;       Overall Response to Treatment:   []Patient is responding well to treatment and improvement is noted with regards  to goals   []Patient should continue to improve in reasonable time if they continue HEP   []Patient has plateaued and is no longer responding to skilled PT intervention    []Patient is getting worse and would benefit from return to referring MD   []Patient unable to adhere to initial POC   [x]Other: Pt s/p fall 10 days ago, fell backwards onto R elbow and shoulder. Pt is continuing to report R shoulder pain that limits activity and participation in aquatic PT (currently under POC for dx listed below) with dec repetitions. Upon exam, pt presenting with dec R shoulder ROM and strength, TTP over R supraspinatus mm belly, and impaired posture and s/s consistent with impingement. Pt would benefit from continued skilled PT to further address listed deficits in addition to continuing PT for B LE and lumbar deficits as listed below for return to PLOF and dec pain with functional activities.      Date range of Visits: 11/3/21-22  Total Visits: 14    Recommendation:    [x]Continue PT 2x / wk for 4 weeks. []Hold PT, pending MD visit        Physical Therapy Treatment Note/ Progress Report:     Date:  2022    Patient Name:  Varsha Griffin    :  1960  MRN: [id number]  Restrictions/Precautions:    Medical/Treatment Diagnosis Information:  · Diagnosis: G89.4 (ICD-10-CM) - Chronic pain syndrome; M51.36 (ICD-10-CM) - DDD (degenerative disc disease), lumbar; M79.7 (ICD-10-CM) - Fibromyalgia; M54.16 (ICD-10-CM) - Lumbar radiculopathy; M47.817 (ICD-10-CM) - Lumbosacral spondylosis without myelopathy; M79.18 (ICD-10-CM) - Myofascial pain; M25.512, G89.29 (ICD-10-CM) - Chronic left shoulder pain;; M16.12 (ICD-10-CM) - Primary osteoarthritis of left hip  · Treatment Diagnosis: dec ROM and strength in B UE and LE, impaired posture, balance, and gait limiting functional mobility  Insurance/Certification information:  PT Insurance Information: [employer] - 30 visits/yr, auth req  Physician Information:  Referring Practitioner: Abelardo Haider MD  Plan of care signed (Y/N): []  Yes [x]  No    Date of Patient follow up with Physician: next week     Progress Report: []  Yes  [x]  No     Date Range for reporting period:  Beginnin/3  POC:    PN:   POC:   Ending:     Progress report due (10 Rx/or 30 days whichever is less): visit #10 or  (date)     Recertification due (POC duration/ or 90 days whichever is less): visit #12 or 3/18 (date)     Visit # Insurance Allowable Auth required?  Date Range    +  + 0/8 TBD [x]  Yes - [employer]  []  No 21 -22       Latex Allergy:  [x]NO      []YES  Preferred Language for Healthcare:   [x]English       []other:    Functional Scale:       Date assessed:  Oswestry: raw score = 27/45; dysfunction = 60%  11/3/1  Oswestry: raw score = 24/45; dysfunction = 53%  21  Oswestry: raw score = 30/45; dysfunction = 67%  21  ASSESS AT NPV    Pain level: 8/10      SUBJECTIVE:   Pt still with difficulty of R shoulder pain from fall on ice. Pt is R handed. Has yet to get xray, though it was ordered yesterday. Pt has been taking norco to help control pain. Pt describes pain as horrific - pt is maxing out on his norco, gabapentin, and muscle relaxer and this isn't touching his pain. Pt has also been taking ibuprofen, though states this is contraindicated with his CHF. Pt has also been using tiger balm 2-3x/day and using heat. Pain with getting dressed. Has switched carrying bag and briefcase to L shoulder. Pt has inc pain reaching steering wheel to drive. Pt is protecting shoulder in \"sling\" position. Pt has pain with pulling motion while swimming. Pain is located right in supraspinous muscle belly. Pt is going to get R shoulder xray after PT visit today.     OBJECTIVE:   2/18:   R shoulder  AROM: R shoulder flexion 100 deg, abd 80 deg, functional ER to CT jcn, functional IR difficulty getting to R lateral hip (during subjective hx, pt reached R arm OH demonstrating swim stroke, as well as used R arm for hand gestures while talking, but on objective exam, pt reporting pain limits further ROM at this time)  MMT: painful, but strong (4/5) in shoulder flexion, abd, ER, IR, elbow ext and flexion; ER most painful motion  TTP R supraspinus mm belly  Strong, but painful empty can test R  (+) [facility], unable to test neer due to ROM and pain restrictions  Mild fwd head posture, B rounded shoulders  Lumbar  Flexion: to shins  SB: to lateral epicondyle B  LE MMT:(seated) Right:  Left:  Hip Flexion:  4  4  Knee Extension: 5  5  Knee Flexion:  5  5      1/24:   ROM   Comments   Lumbar Flex To shins  decreased from last PN   Lumbar Ext WNL       ROM LEFT RIGHT Comments   Lumbar Side Bend To mid thigh To lateral epicondyle Decreased on L from last PN    Hip Flexion [facility]     Hip ER [facility]     Hip IR [facility] WFL     Hamstring Flex NT NT     Piriformis                   Shoulder flexion WFL (pain at [facility] [place] [facility]) [facility]  R shoulder - decreased, with functional IR   LE MMT:(seated) Right:  Left:  Hip Flexion:  4  4  Knee Extension: 5  5  Knee Flexion:  4+  4+    Pitting Edema present in R foot 2+  Good capillary refill in B great toes, but increased redness noted B/L  No signs of open wounds on bottoms of feet     12/20:  Palpation: TTP B lumber paraspinals  Functional Mobility/Transfers: independent, slow to perform supine <> sit and sit <> stand transfers  Posture: dec lumbar lordosis  Gait: (include devices/WB status) slow mark, inc ANTONIO, no AD    ROM   Comments   Lumbar Flex To top of ankles     Lumbar Ext Limited 25%        ROM LEFT RIGHT Comments   Lumbar Side Bend To lateral epicondyle (+) To lateral epicondyle L \"because of the hip\"    Lumbar Rotation         Hip Flexion [facility] WFL     Hip Abd         Hip ER [facility] WFL     Hip IR WFL WFL     Hip Extension         Knee Ext         Knee Flex         Hamstring Flex Limited 35 deg Limited 30 deg     Piriformis                   Shoulder flexion WFL (pain at [facility]) [facility]     MMT: grossly 4+/5 BLE's      RESTRICTIONS/PRECAUTIONS: chronic pain syndrome    Exercises/Interventions:     Therapeutic Exercise (85940) Resistance / level Sets / Seconds Reps Notes / Cues   Nustep L1 2/11: used Bike this date d/t equipment availability   IB  HR    X10    HSS 6\" stairs    CC 3 way hip  NV            [facility]:  -FM (silver/yellow)     - LP 80#     - HS curl 20#     - Quad 10#     - Chest Press 20#     - Lateral raise 10#     - Lat-high row 20#  Cybex (white/black)     - Hip abd 40#     - Hip add 40#  FM (silver/red)     - row 25#     See column to left    Wall walks   x10 B                  Reassessment of objective measures, discussed continuation of PT x 4 weeks then d/c [facility]  30'            Therapeutic Activities (45908)       Reassessment of objective measures and scheduling of remainder of POC with discussion of plan for remaining visits   1/24   Forward step ups with opp knee drive Light hand assist    Lateral step ups with hip abd kick            Neuromuscular Re-ed (40425)    CC weighted walkouts Fwd, retro, R, L    Tiltboard taps - A/P, M/L    Tiltboard balance - A/P, M/L                  Manual Intervention (85188)       Prone PA       GISTM/STM       Lumbar Manip       SI Manip       Hip belt mobs       Hip LA distraction                              AquaticTherapy Dates of Service: 11/12, 11/26, 12/6, 12/10, 12/13, 12/17, 1/21, 2/9, 2/14  Aquatic Visits Exercises/Activities:   Transfers:  [x] Stairs   [] Ramp  [] Chair Lift   % Immersion:            Ambulation/ Warm up:   UE Exercises:       Forward  x3 N Shoulder Shrugs      Lateral   x3 N Shoulder Circles      Retro   x3 N Scapular Retraction      [place]    Push Downs      Heel/Toe Walking    Punching       Rowing      Elbow Flex/Ext      Shldr Flex/Ext      [employer] aBd/aDd   LE Exercises:  Shldr Horiz aBd/aDd   HR/TR x3 Shldr IR/ER    Marches x3 Arm Circles X 3 each way   Squats x3 PNF Diagonals    Hamstring Curls x3 Wall Push Ups    Hip Flexion (SLR) x3     Hip aBduction (SLR) x3     Hip Extension (SLR) x3      Hip aDduction (SLR)      Hip Circles x3 Functional:    Hip IR/Er  Step up forward   Hip Hikes  Step up lateral      Step down        Lunges Forward      Lunges Retro      Lunges Lateral     Balance:        SLS       Tandem Stance      NBOS eyes open x Seated:     NBOS eyes closed x Ankle pumps     Hand to Opposite Knee  Ankle Circles     Fwd Step ups to SLS  Knee Flex/Ext    Lateral Step ups to SLS  Hip aBd/aDd    Stop/Go Gait   Bicycle       Ankle DF/PF      Ankle Inv/Ev    Stretching:       Gastroc/Soleus     Hamstring  [place]:    Knee Flex Stretch x Jog    Piriformis  x Noodle Hang    Hip Flexor x Traction at [address]    SKTC      DKTC       ITB  Cool Down:    Quad  Fwd Walking    Mid Back   Lat Walking    UT  Retro Walking    Post Shoulder  Noodle float Ladder Pull      Pec Stretch            Core: Other:    TrA set     Pelvic Tilts x10    Multifidi Walk outs c paddle      PNF Chop/Lifts                          Aquatic Abbreviation Key  B= Belt DB= Dumbells T= Theratube   H= Hydrotone N= Noodles W= Weights   P= Paddles S= Speedo equipment K= Kickboard       Pt. Education: Gave pt tour of pool, explained how to use lockers, what to wear, that it would be in group setting, and showed pt aquatic equipment. Modalities: * ASK IF PT WANTS MHP FOLLOWING SESSION*  2/11: MHP to low back and R GH in supine with large bolster x 15 min  1/31/22: MHP to low back in supine with large bolster x 15 min     Pt. Education:  -pt educated on diagnosis, prognosis and expectations for rehab  -all pt questions were answered    Home Exercise Prorgam:  Access Code: [email or web address]  URL: [email or web address]  Date: 12/20/2021  Prepared by: Cristina Mesa    Exercises  Forward and Backward Stepping at [facility] - 1 x daily - 7 x weekly - 3 sets - 10 reps  Lateral Stepping at [facility] - 1 x daily - 7 x weekly - 3 sets - 10 reps  Heel Toe Raises at [facility] - 1 x daily - 7 x weekly - 3 sets - 10 reps  Standing March at [address] [place] [address] Rd 1 x daily - 7 x weekly - 3 sets - 10 reps  Standing Hip Abduction Adduction at [facility] - 1 x daily - 7 x weekly - 3 sets - 10 reps  Standing Hip Flexion Extension at [facility] - 1 x daily - 7 x weekly - 3 sets - 10 reps  Standing Hip Circles at [facility] - 1 x daily - 7 x weekly - 3 sets - 10 reps  Single Leg Stance at Pool Wall - 1 x daily - 7 x weekly - 1 sets - 10 reps - 10 hold  Tandem Stance - 1 x daily - 7 x weekly - 1 sets - 2 reps - 30 hold  Bilateral Shoulder Horizontal Abduction Adduction AROM at Pool Wall - 1 x daily - 7 x weekly - 3 sets - 10 reps  Bilateral Shoulder Flexion Extension AROM at [facility] - 1 x daily - 7 x weekly - 3 sets - 10 reps  Bilateral Shoulder Abduction Adduction AROM at [place] Wall - 1 x daily - 7 x weekly - 3 sets - 10 reps  Seated Table Hamstring Stretch - 1 x daily - 7 x weekly - 1 sets - 2 reps - 30 hold  Gastroc Stretch on Wall - 1 x daily - 7 x weekly - 1 sets - 2 reps - 30 hold  Step Up - 1 x daily - 7 x weekly - 3 sets - 10 reps  Lateral Step Up - 1 x daily - 7 x weekly - 3 sets - 10 reps      Access Code: [email or web address]  URL: [email or web address]  Date: 11/03/2021  Prepared by: Kanwal Morley    Exercises  Seated Table Hamstring Stretch - 3 x daily - 7 x weekly - 1 sets - 3 reps - 20 hold  Supine Piriformis Stretch with Foot on Ground - 3 x daily - 7 x weekly - 1 sets - 3 reps - 20 hold  Supine Lower Trunk Rotation - 2 x daily - 7 x weekly - 1 sets - 20 reps  Supine Shoulder Flexion with Dowel - 2 x daily - 7 x weekly - 1 sets - 10-20 reps  Prone Press Up on Elbows - 2 x daily - 7 x weekly - 1 sets - 10-15 reps      Therapeutic Exercise and NMR EXR  [x] (63943) Provided verbal/tactile cueing for activities related to strengthening, flexibility, endurance, ROM  for improvements in proximal hip and core control with self care, mobility, lifting and ambulation.  [] (86112) Provided verbal/tactile cueing for activities related to improving balance, coordination, kinesthetic sense, posture, motor skill, proprioception  to assist with core control in self care, mobility, lifting, and ambulation. [x] (94478) Aquatic therapy with therapeutic exercise. Provided verbal and tactile cueing for activities related to strengthening, flexibility, endurance, ROM for improvements in  [x] LE / lumbar: LE, proximal hip, and core control in self care, mobility, lifting, ambulation and eccentric single leg control. [x] UE / cervical: cervical, scapular, scapulothoracic and UE control with self care, reaching, carrying, lifting, house/yardwork, driving, computer work.    [x] (11418) Therapist is in constant attendance of 2 or more patients providing skilled therapy interventions, but not providing any significant amount of measurable one-on-one time to either patient, for improvements in  [x] LE / lumbar: LE, proximal hip, and core control in self care, mobility, lifting, ambulation and eccentric single leg control. [x] UE / cervical: cervical, scapular, scapulothoracic and UE control with self care, reaching, carrying, lifting, house/yardwork, driving, computer work. Therapeutic Activities:    [] (49498 or 65940) Provided verbal/tactile cueing for activities related to improving balance, coordination, kinesthetic sense, posture, motor skill, proprioception and motor activation to allow for proper function  with self care and ADLs  [] (98280) Provided training and instruction to the patient for proper core and proximal hip recruitment and positioning with ambulation re-education     Home Exercise Program:    [] (35091) Reviewed/Progressed HEP activities related to strengthening, flexibility, endurance, ROM of core, proximal hip and LE for functional self-care, mobility, lifting and ambulation   [] (64507) Reviewed/Progressed HEP activities related to improving balance, coordination, kinesthetic sense, posture, motor skill, proprioception of core, proximal hip and LE for self care, mobility, lifting, and ambulation      Manual Treatments:  PROM / STM / Oscillations-Mobs:  G-I, II, III, IV (PA's, Inf., Post.)  [] (26436) Provided manual therapy to mobilize proximal hip and LS spine soft tissue/joints for the purpose of modulating pain, promoting relaxation,  increasing ROM, reducing/eliminating soft tissue swelling/inflammation/restriction, improving soft tissue extensibility and allowing for proper ROM for normal function with self care, mobility, lifting and ambulation.      UPDATED 2/18/22  Units approved Units used Date Range   96 29 11/8/21 -2/28/22       Charges:  Timed Code Treatment Minutes: 30   Total Treatment Minutes: 30       [] EVAL - LOW (95723)   [] EVAL - MOD (85964)  [] EVAL - HIGH (00775)  [] RE-EVAL (72757)  [x] CY(69360) x 2 [] Ionto  [] NMR (09752) x      [] Vaso  [] Manual (16347) x       [] Ultrasound  [] TA x       [] Mech Traction (70027)  [] Aquatic Therapy x      [] ES (un) (79585):   [] Home Management Training x  [] ES(attended) (46092)   [] Dry Needling 1-2 muscles (59824):  [] Dry Needling 3+ muscles (663051  [] Group:      [] Other:     Goals:   Patient stated goal: independent with HEP to self-manage pain  [x] Progressing: [] Met: [] Not Met: [] Adjusted    Therapist goals for Patient:   Short Term Goals: To be achieved in: 2 weeks  1. Independent in HEP and progression per patient tolerance, in order to prevent re-injury. [] Progressing: [x] Met: [] Not Met: [] Adjusted  2. Patient will have a decrease in pain to facilitate improvement in movement, function, and ADLs as indicated by Functional Deficits. [] Progressing: [x] Met: [] Not Met: [] Adjusted    Long Term Goals: To be achieved in: 6 weeks  1. Disability index score of 40% or less for the NIKUNJ to assist with reaching prior level of function. [x] Progressing: [] Met: [] Not Met: [] Adjusted  2. Patient will demonstrate increased AROM to WNL, good LS mobility, good hip ROM to allow for proper joint functioning as indicated by patients Functional Deficits. [x] Progressing: [] Met: [] Not Met: [] Adjusted  3. Patient will demonstrate an increase in Strength to good proximal hip and core activation to allow for proper functional mobility as indicated by patients Functional Deficits. [x] Progressing: [] Met: [] Not Met: [] Adjusted  4. Patient will return to functional activities including sleeping, sitting, and walking without increased symptoms or restriction. [x] Progressing:  [] Met: [] Not Met: [] Adjusted    Overall Progression Towards Functional goals/ Treatment Progress Update:  [] Patient is progressing as expected towards functional goals listed. [x] Progression is slowed due to complexities/Impairments listed.   [] Progression has been slowed due to co-morbidities. [] Plan just implemented, too soon to assess goals progression <30days   [] Goals require adjustment due to lack of progress  [] Patient is not progressing as expected and requires additional follow up with physician  [] Other    Persisting Functional Limitations/Impairments:   [x]Sitting [x]Standing   [x]Walking []Squatting/bending    []Stairs []ADL's    [x]Transfers []Reaching  []Housework []Job related tasks  []Driving []Sports/Recreation   [x]Sleeping []Other:    ASSESSMENT:  POC updated. See above for details. Treatment/Activity Tolerance:  [x] Patient able to complete tx  [] Patient limited by fatique  [] Patient limited by pain  [] Patient limited by other medical complications  [] Other:     Prognosis: [] Good [x] Fair  [] Poor    Patient Requires Follow-up: [x] Yes  [] No    PLAN: See eval. . PT 2x / week for 4 weeks. [] Continue per plan of care [x] Alter current plan (see comments)  [] Plan of care initiated [] Hold pending MD visit [] Discharge    Electronically signed by: Selina Cunningham PT, DPT       Note: If patient does not return for scheduled/ recommended follow up visits, this note will serve as a discharge from care along with most recent update on progress.

## 2022-02-21 ENCOUNTER — HOSPITAL ENCOUNTER (OUTPATIENT)
Dept: PHYSICAL THERAPY | Age: 62
Setting detail: THERAPIES SERIES
Discharge: HOME | End: 2022-02-21
Payer: COMMERCIAL

## 2022-02-21 NOTE — FLOWSHEET NOTE
Physical Therapy    Physical Therapy  Cancellation/No-show Note  Patient Name:  Destini Rudolph  :  1960   Date:  2022  Cancelled visits to date: 7  No-shows to date: 2    Patient status for today's appointment patient:  []  Cancelled , 12/3, , 22, 1/10, ,   []  Rescheduled appointment  [x]  No-show ,      Reason given by patient:  []  Patient ill  []  Conflicting appointment  []  No transportation    []  Conflict with work  [x]  No reason given  []  Other:     Comments:     Phone call information:   []  Phone call made today to patient at _ time at number provided:      []  Patient answered, conversation as follows:    []  Patient did not answer, message left as follows:  [x]  Phone call not made today  [x]  Phone call not needed - pt contacted us to cancel and provided reason for cancellation.      Electronically signed by:  Thalia Robison, PT,  DPT

## 2022-02-24 ENCOUNTER — OFFICE VISIT (OUTPATIENT)
Dept: PAIN MANAGEMENT | Age: 62
End: 2022-02-24
Payer: COMMERCIAL

## 2022-02-24 ENCOUNTER — TELEPHONE (OUTPATIENT)
Dept: PAIN MANAGEMENT | Age: 62
End: 2022-02-24

## 2022-02-24 VITALS
DIASTOLIC BLOOD PRESSURE: 98 MMHG | HEART RATE: 93 BPM | SYSTOLIC BLOOD PRESSURE: 166 MMHG | OXYGEN SATURATION: 98 % | WEIGHT: 230 LBS | BODY MASS INDEX: 37.12 KG/M2

## 2022-02-24 DIAGNOSIS — Z51.81 ENCOUNTER FOR THERAPEUTIC DRUG MONITORING: ICD-10-CM

## 2022-02-24 DIAGNOSIS — G89.29 CHRONIC LEFT SHOULDER PAIN: ICD-10-CM

## 2022-02-24 DIAGNOSIS — M79.18 MYOFASCIAL PAIN: ICD-10-CM

## 2022-02-24 DIAGNOSIS — G89.4 CHRONIC PAIN SYNDROME: Primary | ICD-10-CM

## 2022-02-24 DIAGNOSIS — M51.36 DDD (DEGENERATIVE DISC DISEASE), LUMBAR: ICD-10-CM

## 2022-02-24 DIAGNOSIS — M54.16 LUMBAR RADICULOPATHY: ICD-10-CM

## 2022-02-24 DIAGNOSIS — M79.7 FIBROMYALGIA: ICD-10-CM

## 2022-02-24 DIAGNOSIS — G89.4 CHRONIC PAIN SYNDROME: ICD-10-CM

## 2022-02-24 DIAGNOSIS — M25.512 CHRONIC LEFT SHOULDER PAIN: ICD-10-CM

## 2022-02-24 DIAGNOSIS — M16.12 PRIMARY OSTEOARTHRITIS OF LEFT HIP: ICD-10-CM

## 2022-02-24 DIAGNOSIS — M47.817 LUMBOSACRAL SPONDYLOSIS WITHOUT MYELOPATHY: ICD-10-CM

## 2022-02-24 PROCEDURE — 3017F COLORECTAL CA SCREEN DOC REV: CPT | Performed by: INTERNAL MEDICINE

## 2022-02-24 PROCEDURE — 1036F TOBACCO NON-USER: CPT | Performed by: INTERNAL MEDICINE

## 2022-02-24 PROCEDURE — 99213 OFFICE O/P EST LOW 20 MIN: CPT | Performed by: INTERNAL MEDICINE

## 2022-02-24 PROCEDURE — G8484 FLU IMMUNIZE NO ADMIN: HCPCS | Performed by: INTERNAL MEDICINE

## 2022-02-24 PROCEDURE — G8427 DOCREV CUR MEDS BY ELIG CLIN: HCPCS | Performed by: INTERNAL MEDICINE

## 2022-02-24 PROCEDURE — G8417 CALC BMI ABV UP PARAM F/U: HCPCS | Performed by: INTERNAL MEDICINE

## 2022-02-24 RX ORDER — GABAPENTIN 300 MG/1
CAPSULE ORAL
Qty: 90 CAPSULE | Refills: 1 | Status: SHIPPED | OUTPATIENT
Start: 2022-02-24 | End: 2022-03-24 | Stop reason: SDUPTHER

## 2022-02-24 RX ORDER — MELOXICAM 15 MG/1
TABLET ORAL
Qty: 30 TABLET | Refills: 1 | Status: SHIPPED | OUTPATIENT
Start: 2022-02-24 | End: 2022-03-24 | Stop reason: SDUPTHER

## 2022-02-24 RX ORDER — HYDROCODONE BITARTRATE AND ACETAMINOPHEN 5; 325 MG/1; MG/1
1 TABLET ORAL EVERY 6 HOURS PRN
Qty: 120 TABLET | Refills: 0 | Status: SHIPPED | OUTPATIENT
Start: 2022-02-24 | End: 2022-03-24 | Stop reason: SDUPTHER

## 2022-02-24 RX ORDER — METHOCARBAMOL 500 MG/1
500 TABLET, FILM COATED ORAL 2 TIMES DAILY PRN
Qty: 60 TABLET | Refills: 1 | Status: SHIPPED | OUTPATIENT
Start: 2022-02-24 | End: 2022-03-24 | Stop reason: SDUPTHER

## 2022-02-24 NOTE — PROGRESS NOTES
tablet by mouth every 6 hours as needed for Pain (max 3-4 day) for up to 35 days. 120 tablet 0    meloxicam (MOBIC) 15 MG tablet Take 1 tablet po every Monday,Wednesday, and Friday 30 tablet 1    methocarbamol (ROBAXIN) 500 MG tablet Take 1 tablet by mouth 2 times daily as needed (muscle stiffness) 60 tablet 1    gabapentin (NEURONTIN) 300 MG capsule Take 1 tablet po in the morning and take 2 tablets po in the evening 90 capsule 1     No facility-administered medications prior to visit. REVIEW OF SYSTEMS:    Respiratory: Negative for apnea, chest tightness and shortness of breath or change in baseline breathing. PHYSICAL EXAM:   Nursing note and vitals reviewed. BP (!) 166/98   Pulse 93   Wt 230 lb (104.3 kg)   SpO2 98%   BMI 37.12 kg/m²   Constitutional: He appears well-developed and well-nourished. No acute distress. Cardiovascular: Normal rate, regular rhythm, normal heart sounds, and does not have murmur. Pulmonary/Chest: Effort normal. No respiratory distress. He does not have wheezes in the lung fields. He has no rales. Neurological/Psychiatric:He is alert and oriented to person, place, and time. Coordination is  normal.  His mood isAppropriate and affect is Neutral/Euthymic(normal) . IMPRESSION:   1. DDD (degenerative disc disease), lumbar    2. Myofascial pain    3. Fibromyalgia    4. Chronic left shoulder pain    5. Lumbar radiculopathy    6. Chronic pain syndrome    7. Lumbosacral spondylosis without myelopathy    8. Primary osteoarthritis of left hip    9.  Encounter for therapeutic drug monitoring        PLAN:  Informed verbal consent was obtained  -ROM/Stretching exercises as advised   -He was advised to increase fluids ( 5-7  glasses of fluid daily), limit caffeine, avoid cheese products, increase dietary fiber, increase activity and exercise as tolerated and relax regularly and enjoy meals   -Continue with Norco 3-4 per day   -Continue with all other adjuvants as before Current Outpatient Medications   Medication Sig Dispense Refill    HYDROcodone-acetaminophen (NORCO) 5-325 MG per tablet Take 1 tablet by mouth every 6 hours as needed for Pain (max 3-4 day) for up to 35 days.  120 tablet 0    meloxicam (MOBIC) 15 MG tablet Take 1 tablet po every Monday,Wednesday, and Friday 30 tablet 1    methocarbamol (ROBAXIN) 500 MG tablet Take 1 tablet by mouth 2 times daily as needed (muscle stiffness) 60 tablet 1    gabapentin (NEURONTIN) 300 MG capsule Take 1 tablet po in the morning and take 2 tablets po in the evening 90 capsule 1    metFORMIN (GLUCOPHAGE-XR) 500 MG extended release tablet TAKE 2 TABLETS BY MOUTH EVERY DAY WITH BREAKFAST 60 tablet 0    insulin glargine (LANTUS SOLOSTAR) 100 UNIT/ML injection pen INJECT 46 UNITS UNDER THE SKIN DAILY 5 pen 5    insulin aspart (NOVOLOG FLEXPEN) 100 UNIT/ML injection pen 18-24 units AC TID 10 pen 5    Insulin Pen Needle 32G X 4 MM MISC 1 each by Does not apply route 4 times daily 120 each 3    Dulaglutide (TRULICITY) 1.5 PL/3.6WY SOPN INJECT 1.5 MG UNDER THE SKIN ONCE WEEKLY 4 pen 1    Blood Glucose Monitoring Suppl (FREESTYLE LITE) RO As needed 1 each 0    blood glucose test strips (FREESTYLE LITE) strip USE TO TEST FOUR TIMES A  strip 0    FreeStyle Lancets MISC USE FOUR TIMES A  each 0    lisinopril (PRINIVIL;ZESTRIL) 30 MG tablet Take 30 mg by mouth daily      FreeStyle Lancets MISC USE AS DIRECTED 100 each 3    Insulin Pen Needle (PEN NEEDLES) 31G X 6 MM MISC 1 each by In Vitro route 4 times daily 200 each 3    omeprazole (PRILOSEC) 20 MG delayed release capsule TAKE 1 CAPSULE BY MOUTH EVERY DAY      Cholecalciferol (VITAMIN D PO) Take 1 tablet by mouth every 7 days Pt to clarify dose      furosemide (LASIX) 20 MG tablet Take 40 mg by mouth daily       Fluticasone Propionate (FLONASE NA) 1 spray by Nasal route daily Each nostril      SALINE MIST SPRAY NA 1 spray by Nasal route 3 times daily as needed Each nostril 2-3 times per day      Blood Glucose Monitoring Suppl (ONE TOUCH ULTRA 2) w/Device KIT 1 kit by Does not apply route daily 1 kit 0    Lancets MISC 1 each by Does not apply route 3 times daily 100 each 3    MELATONIN PO Take 1 tablet by mouth nightly       loratadine (CLARITIN) 10 MG tablet Take 10 mg by mouth daily       atorvastatin (LIPITOR) 80 MG tablet Take 80 mg by mouth daily. No current facility-administered medications for this visit. I will continue his current medication regimen  which is part of the above treatment schedule. It has been helping with Mr. Nabeel Carranza chronic  medical problems which for this visit include: The primary encounter diagnosis was Encounter for therapeutic drug monitoring. Diagnoses of DDD (degenerative disc disease), lumbar, Myofascial pain, Fibromyalgia, Chronic left shoulder pain, Lumbar radiculopathy, Chronic pain syndrome, Lumbosacral spondylosis without myelopathy, and Primary osteoarthritis of left hip were also pertinent to this visit. Risks and benefits of the medications and other alternative treatments  including no treatment were discussed with the patient. The common side effects of these medications were also explained to the patient. Informed verbal consent was obtained. Goals of current treatment regimen include improvement in pain, restoration of functioning- with focus on improvement in physical performance, general activity, work or disability,emotional distress, health care utilization and  decreased opioid medication consumption- titrating to the lowest effective dose. Will continue to monitor progress towards achieving/maintaining therapeutic goals with special emphasis on  1. Improvement in perceived interfernce  of pain with ADL's. Ability to do home exercises independently. Ability to do household chores indoor and/or outdoor work and social and leisure activities. Improve psychosocial and physical functioning. - he is showing progression towards this treatment goal with the current regimen. He was advised against drinking alcohol with the narcotic pain medicines, advised against driving or handling machinery while adjusting the dose of medicines or if having cognitive  issues related to the current medications. Risk of overdose and death, if medicines not taken as prescribed, were also discussed. If the patient develops new symptoms or if the symptoms worsen, the patient should call the office. While transcribing every attempt was made to maintain the accuracy of the note in terms of it's contents,there may have been some errors made inadvertently. Thank you for allowing me to participate in the care of this patient.     Joshua Vizcaino MD.    Cc: Chapis Cotton MD

## 2022-02-24 NOTE — TELEPHONE ENCOUNTER
Patient wants to know what the xrays on his shoulder said. Whats the treatment plan would be?     Patient wants [facility] to order extra physical therapy for him    Wants to come in for injection on next appointment        Please advise

## 2022-02-24 NOTE — TELEPHONE ENCOUNTER
Per [facility], xray of right shoulder has arthritis. We can mail PT order, or patient can  from the office. [facility] will discuss injections with patient at his next appt. I called patient, renetta.

## 2022-02-25 ENCOUNTER — APPOINTMENT (OUTPATIENT)
Dept: PHYSICAL THERAPY | Age: 62
End: 2022-02-25
Payer: COMMERCIAL

## 2022-02-25 DIAGNOSIS — E11.65 UNCONTROLLED TYPE 2 DIABETES MELLITUS WITH HYPERGLYCEMIA (HCC): ICD-10-CM

## 2022-02-25 RX ORDER — METFORMIN HYDROCHLORIDE 500 MG/1
TABLET, EXTENDED RELEASE ORAL
Qty: 60 TABLET | Refills: 0 | Status: SHIPPED | OUTPATIENT
Start: 2022-02-25 | End: 2022-03-28

## 2022-02-25 NOTE — TELEPHONE ENCOUNTER
Requested Prescriptions     Pending Prescriptions Disp Refills    metFORMIN (GLUCOPHAGE-XR) 500 MG extended release tablet [Pharmacy Med Name: METFORMIN HCL  MG TABLET] 60 tablet 0     Sig: TAKE 2 TABLETS BY MOUTH EVERY DAY WITH BREAKFAST           LOV 11/23/21  NOV None    Last refill 1/31/22  #60 mo refills

## 2022-02-28 ENCOUNTER — HOSPITAL ENCOUNTER (OUTPATIENT)
Dept: PHYSICAL THERAPY | Age: 62
Setting detail: THERAPIES SERIES
Discharge: HOME | End: 2022-02-28
Payer: COMMERCIAL

## 2022-02-28 NOTE — FLOWSHEET NOTE
Physical Therapy    Physical Therapy  Cancellation/No-show Note  Patient Name:  Varsha Griffin  :  1960   Date:  2022  Cancelled visits to date: 8   No-shows to date: 2    Patient status for today's appointment patient:  [x]  Cancelled , 12/3, , 22, 1/10, , ,    []  Rescheduled appointment  [x]  No-show ,      Reason given by patient:  [x]  Patient ill  []  Conflicting appointment  []  No transportation    []  Conflict with work  []  No reason given  []  Other:     Comments:     Phone call information:   []  Phone call made today to patient at _ time at number provided:      []  Patient answered, conversation as follows:    []  Patient did not answer, message left as follows:  []  Phone call not made today  [x]  Phone call not needed - pt contacted us to cancel and provided reason for cancellation.      Electronically signed by:  Oliverio Owens, PT,  DPT

## 2022-03-24 ENCOUNTER — OFFICE VISIT (OUTPATIENT)
Dept: PAIN MANAGEMENT | Age: 62
End: 2022-03-24
Payer: COMMERCIAL

## 2022-03-24 ENCOUNTER — TELEMEDICINE (OUTPATIENT)
Dept: ENDOCRINOLOGY | Age: 62
End: 2022-03-24
Payer: COMMERCIAL

## 2022-03-24 VITALS
DIASTOLIC BLOOD PRESSURE: 110 MMHG | SYSTOLIC BLOOD PRESSURE: 190 MMHG | BODY MASS INDEX: 37.22 KG/M2 | HEART RATE: 91 BPM | OXYGEN SATURATION: 98 % | WEIGHT: 230.6 LBS

## 2022-03-24 DIAGNOSIS — M79.7 FIBROMYALGIA: ICD-10-CM

## 2022-03-24 DIAGNOSIS — M25.512 CHRONIC LEFT SHOULDER PAIN: ICD-10-CM

## 2022-03-24 DIAGNOSIS — E78.49 OTHER HYPERLIPIDEMIA: ICD-10-CM

## 2022-03-24 DIAGNOSIS — M79.18 MYOFASCIAL PAIN: ICD-10-CM

## 2022-03-24 DIAGNOSIS — E11.65 UNCONTROLLED TYPE 2 DIABETES MELLITUS WITH HYPERGLYCEMIA (HCC): Primary | ICD-10-CM

## 2022-03-24 DIAGNOSIS — M51.36 DDD (DEGENERATIVE DISC DISEASE), LUMBAR: ICD-10-CM

## 2022-03-24 DIAGNOSIS — G89.29 CHRONIC LEFT SHOULDER PAIN: ICD-10-CM

## 2022-03-24 DIAGNOSIS — G89.4 CHRONIC PAIN SYNDROME: ICD-10-CM

## 2022-03-24 DIAGNOSIS — M16.12 PRIMARY OSTEOARTHRITIS OF LEFT HIP: ICD-10-CM

## 2022-03-24 DIAGNOSIS — M47.817 LUMBOSACRAL SPONDYLOSIS WITHOUT MYELOPATHY: ICD-10-CM

## 2022-03-24 DIAGNOSIS — M54.16 LUMBAR RADICULOPATHY: ICD-10-CM

## 2022-03-24 PROCEDURE — 2022F DILAT RTA XM EVC RTNOPTHY: CPT | Performed by: INTERNAL MEDICINE

## 2022-03-24 PROCEDURE — 99213 OFFICE O/P EST LOW 20 MIN: CPT | Performed by: INTERNAL MEDICINE

## 2022-03-24 PROCEDURE — 99214 OFFICE O/P EST MOD 30 MIN: CPT | Performed by: INTERNAL MEDICINE

## 2022-03-24 PROCEDURE — 1036F TOBACCO NON-USER: CPT | Performed by: INTERNAL MEDICINE

## 2022-03-24 PROCEDURE — G8427 DOCREV CUR MEDS BY ELIG CLIN: HCPCS | Performed by: INTERNAL MEDICINE

## 2022-03-24 PROCEDURE — 3017F COLORECTAL CA SCREEN DOC REV: CPT | Performed by: INTERNAL MEDICINE

## 2022-03-24 PROCEDURE — G8417 CALC BMI ABV UP PARAM F/U: HCPCS | Performed by: INTERNAL MEDICINE

## 2022-03-24 PROCEDURE — 3046F HEMOGLOBIN A1C LEVEL >9.0%: CPT | Performed by: INTERNAL MEDICINE

## 2022-03-24 PROCEDURE — G8484 FLU IMMUNIZE NO ADMIN: HCPCS | Performed by: INTERNAL MEDICINE

## 2022-03-24 RX ORDER — METHOCARBAMOL 500 MG/1
500 TABLET, FILM COATED ORAL 2 TIMES DAILY PRN
Qty: 60 TABLET | Refills: 1 | Status: SHIPPED | OUTPATIENT
Start: 2022-03-24 | End: 2022-04-21 | Stop reason: SDUPTHER

## 2022-03-24 RX ORDER — DULAGLUTIDE 1.5 MG/.5ML
INJECTION, SOLUTION SUBCUTANEOUS
Qty: 4 PEN | Refills: 3 | Status: SHIPPED | OUTPATIENT
Start: 2022-03-24 | End: 2022-03-24

## 2022-03-24 RX ORDER — GABAPENTIN 300 MG/1
CAPSULE ORAL
Qty: 90 CAPSULE | Refills: 1 | Status: SHIPPED | OUTPATIENT
Start: 2022-03-24 | End: 2022-04-21 | Stop reason: SDUPTHER

## 2022-03-24 RX ORDER — DULAGLUTIDE 3 MG/.5ML
3 INJECTION, SOLUTION SUBCUTANEOUS WEEKLY
Qty: 4 PEN | Refills: 3 | Status: SHIPPED | OUTPATIENT
Start: 2022-03-24 | End: 2022-07-21 | Stop reason: SDUPTHER

## 2022-03-24 RX ORDER — HYDROCODONE BITARTRATE AND ACETAMINOPHEN 5; 325 MG/1; MG/1
1 TABLET ORAL EVERY 6 HOURS PRN
Qty: 120 TABLET | Refills: 0 | Status: SHIPPED | OUTPATIENT
Start: 2022-03-24 | End: 2022-04-21 | Stop reason: SDUPTHER

## 2022-03-24 RX ORDER — MELOXICAM 15 MG/1
TABLET ORAL
Qty: 30 TABLET | Refills: 1 | Status: SHIPPED | OUTPATIENT
Start: 2022-03-24 | End: 2022-04-21 | Stop reason: SDUPTHER

## 2022-03-24 NOTE — PROGRESS NOTES
Seen as  patient for diabetes      Patient was evaluated through a synchronous (real-time) audio-video  encounter. The patient (or guardian if applicable) is aware that this is a billable service, which includes applicable co-pays. This Virtual Visit was  conducted with patient's (and/or legal guardian's) consent. The visit was conducted pursuant to the emergency declaration under the [address] [place] [address] and the [employer] [facility] General Act. Patient identification was verified,  and a caregiver was present when appropriate. The patient was located in a state where the provider was licensed to provide care.       Interim:     He had Covid-19  Stress taking care of mom    Diagnosed with Type 2 diabetes mellitus in  2010  Known diabetic complications: none     Current diabetic medications     Metformin ER 500mg 2 tab daily  basaglar 50 units   But taking 40 units   Humalog 18 units AC TID    SSI 2 for 50 >150 combined scale    Trulicity 1.5 out of 2-3 weeks        Last A1c 8.4%<-----9.1%<----8.1%<------9.7%<----11.4% <-----13.9%<----- 14.1<--- 11.4 <--- 9.9 <------6.3%     Prior visit with dietician: no  Current diet: on average, 3 meals per day  No CHO counting  Current exercise:yes  Current monitoring regimen: home blood tests - 1-3  times daily     Has brought blood glucose log/meter:yes  Home blood sugar records:  Any episodes of hypoglycemia? 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    SH: [employer], takes care of mom    Past Medical History:   Diagnosis Date    Arthritis     Back, L hip and L shoulder    [facility]congestive [facility] failure) ([facility]     diastolic    Diabetes mellitus ([facility] [facility]     Hyperlipidemia     Hypertension      Past Surgical History:   Procedure Laterality Date    APPENDECTOMY  1969     Current Outpatient Medications   Medication Sig Dispense Refill    metFORMIN (GLUCOPHAGE-XR) 500 MG extended release tablet TAKE 2 TABLETS BY MOUTH EVERY DAY WITH BREAKFAST 60 tablet 0    insulin glargine (LANTUS SOLOSTAR) 100 UNIT/ML injection pen INJECT 46 UNITS UNDER THE SKIN DAILY 5 pen 5    insulin aspart (NOVOLOG FLEXPEN) 100 UNIT/ML injection pen 18-24 units AC TID 10 pen 5    Insulin Pen Needle 32G X 4 MM MISC 1 each by Does not apply route 4 times daily 120 each 3    Dulaglutide (TRULICITY) 1.5 WY/6.5RI SOPN INJECT 1.5 MG UNDER THE SKIN ONCE WEEKLY 4 pen 1    Blood Glucose Monitoring Suppl (FREESTYLE LITE) RO As needed 1 each 0    blood glucose test strips (FREESTYLE LITE) strip USE TO TEST FOUR TIMES A  strip 0    FreeStyle Lancets MISC USE FOUR TIMES A  each 0    lisinopril (PRINIVIL;ZESTRIL) 30 MG tablet Take 30 mg by mouth daily      FreeStyle Lancets MISC USE AS DIRECTED 100 each 3    Insulin Pen Needle (PEN NEEDLES) 31G X 6 MM MISC 1 each by In Vitro route 4 times daily 200 each 3    omeprazole (PRILOSEC) 20 MG delayed release capsule TAKE 1 CAPSULE BY MOUTH EVERY DAY      Cholecalciferol (VITAMIN D PO) Take 1 tablet by mouth every 7 days Pt to clarify dose      furosemide (LASIX) 20 MG tablet Take 40 mg by mouth daily       Fluticasone Propionate (FLONASE NA) 1 spray by Nasal route daily Each nostril      SALINE MIST SPRAY NA 1 spray by Nasal route 3 times daily as needed Each nostril 2-3 times per day      Blood Glucose Monitoring Suppl (ONE TOUCH ULTRA 2) w/Device KIT 1 kit by Does not apply route daily 1 kit 0    Lancets MISC 1 each by Does not apply route 3 times daily 100 each 3    MELATONIN PO Take 1 tablet by mouth nightly       loratadine (CLARITIN) 10 MG tablet Take 10 mg by mouth daily  atorvastatin (LIPITOR) 80 MG tablet Take 80 mg by mouth daily.  mupirocin (BACTROBAN) 2 % ointment Apply 22 g topically 3 times daily Apply topically 3 times daily.  HYDROcodone-acetaminophen (NORCO) 5-325 MG per tablet Take 1 tablet by mouth every 6 hours as needed for Pain (max 3-4 day) for up to 35 days. 120 tablet 0    meloxicam (MOBIC) 15 MG tablet Take 1 tablet po every Monday,Wednesday, and Friday 30 tablet 1    methocarbamol (ROBAXIN) 500 MG tablet Take 1 tablet by mouth 2 times daily as needed (muscle stiffness) 60 tablet 1    gabapentin (NEURONTIN) 300 MG capsule Take 1 tablet po in the morning and take 2 tablets po in the evening 90 capsule 1     No current facility-administered medications for this visit. Review of Systems  Constitutional: Negative for weight loss and malaise/fatigue. Negative for fever and chills. HENT: Negative for hearing loss, ear pain, nosebleeds, neck pain and tinnitus. Eyes: Negative for blurred vision. Negative for double vision, photophobia and pain. Respiratory: Negative for cough and sputum production. Cardiovascular: Negative for chest pain, palpitations and leg swelling. Gastrointestinal: Negative for nausea, vomiting and abdominal pain. Genitourinary: Negative for dysuria, urgency and frequency. Musculoskeletal: + for back pain. No joint pain  Skin: Negative for itching and rash. Neurological: Negative for dizziness. Negative for tingling, tremors, focal weakness and headaches. Endo/Heme/Allergies: see HPI  Psychiatric/Behavioral: Negative for depression and substance abuse.          PHYSICAL EXAMINATION:  [ INSTRUCTIONS:  \"[x]\" Indicates a positive item  \"[]\" Indicates a negative item  -- DELETE ALL ITEMS NOT EXAMINED]  Vital Signs: (As obtained by patient/caregiver or practitioner observation)    Blood pressure-  Heart rate-    Respiratory rate-    Temperature-  Pulse oximetry-     Constitutional Appears well-developed and adjust basaglar   Advised to take humalog even if glucose < 100  Glucose high, need to restart trulicity, increase dose  Increase humalog dose given some post meal high  2. Chronic pain syndrome: On neurontin  3. HLD ; On lipitor, LDL at goal  4. Obesity  5. Elevated BP:High per patient, on lisinopril     Plan:      Basaglar  46 units   Humalog 20 units AC TID   SSI 2 for 50 >150 combined scale   - 8 < 628   Trulicity 3mg weekly   Advise to check blood sugar 3 times a day   Patient to send blood sugar log for titration every week   Advise to exercise regularly. Advise to low simple carbohydrate and protein with each  meal diet. Diabetes Care: recommend yearly eye exam, foot exam and urine microalbumin to   creatinine ratio.  Patient isdue    -Hyperlipidemia, LDL goal is <100 mg/dl   -Daily ASA: 81mg daily

## 2022-03-24 NOTE — PROGRESS NOTES
[facility]  1960  [id number]    HISTORY OF PRESENT ILLNESS:  Mr. Aida Pichardo is a 60 y.o. male returns for a follow up visit for multiple medical problems. His current presenting problems are   1. Chronic pain syndrome    2. Myofascial pain    3. Chronic left shoulder pain    4. Lumbosacral spondylosis without myelopathy    5. DDD (degenerative disc disease), lumbar    6. Fibromyalgia    7. Lumbar radiculopathy    8. Primary osteoarthritis of left hip    . As per information/history obtained from the PADT(patient assessment and documentation tool) - He complains of pain in the lower back with radiation to the shoulders Right, buttocks, hips Left and upper leg Left He rates the pain 6/10 and describes it as burning. Pain is made worse by: movement, walking, standing, sitting, bending, lifting. Current treatment regimen has helped relieve about 40% of the pain. He denies side effects from the current pain regimen. Patient reports that since the last follow up visit the physical functioning is worse, family/social relationships are worse, mood is better and sleep patterns are worse, and that the overall functioning is worse. Patient denies neurological bowel or bladder. Patient denies misusing/abusing his narcotic pain medications or using any illegal drugs. There are No indicators for possible drug abuse, addiction or diversion problems. Upon obtaining the medical history from Mr. Aida Pichardo regarding today's office visit for his presenting problems, patient states he has been doing fair, his pain has been baseline. Mr. Aida Pichardo states he has been thinking of taking a break for a few weeks from work. Patient mentions he is using Norco along with Mobic and Robaxin. He reports he is trying to stay active around the house. ALLERGIES: Patients list of allergies were reviewed     MEDICATIONS: Mr. Aida Pichardo list of medications were reviewed. His current medications are   Outpatient Medications Prior to Visit   Medication Sig Dispense Refill    mupirocin (BACTROBAN) 2 % ointment Apply 22 g topically 3 times daily Apply topically 3 times daily.  metFORMIN (GLUCOPHAGE-XR) 500 MG extended release tablet TAKE 2 TABLETS BY MOUTH EVERY DAY WITH BREAKFAST 60 tablet 0    insulin glargine (LANTUS SOLOSTAR) 100 UNIT/ML injection pen INJECT 46 UNITS UNDER THE SKIN DAILY 5 pen 5    insulin aspart (NOVOLOG FLEXPEN) 100 UNIT/ML injection pen 18-24 units AC TID 10 pen 5    Insulin Pen Needle 32G X 4 MM MISC 1 each by Does not apply route 4 times daily 120 each 3    Blood Glucose Monitoring Suppl (FREESTYLE LITE) RO As needed 1 each 0    blood glucose test strips (FREESTYLE LITE) strip USE TO TEST FOUR TIMES A  strip 0    FreeStyle Lancets MISC USE FOUR TIMES A  each 0    Dulaglutide (TRULICITY) 1.5 XS/4.4IQ SOPN INJECT 1.5 MG UNDER THE SKIN ONCE WEEKLY 4 pen 1    lisinopril (PRINIVIL;ZESTRIL) 30 MG tablet Take 30 mg by mouth daily      FreeStyle Lancets MISC USE AS DIRECTED 100 each 3    Insulin Pen Needle (PEN NEEDLES) 31G X 6 MM MISC 1 each by In Vitro route 4 times daily 200 each 3    omeprazole (PRILOSEC) 20 MG delayed release capsule TAKE 1 CAPSULE BY MOUTH EVERY DAY      Cholecalciferol (VITAMIN D PO) Take 1 tablet by mouth every 7 days Pt to clarify dose      furosemide (LASIX) 20 MG tablet Take 40 mg by mouth daily       Fluticasone Propionate (FLONASE NA) 1 spray by Nasal route daily Each nostril      SALINE MIST SPRAY NA 1 spray by Nasal route 3 times daily as needed Each nostril 2-3 times per day      Blood Glucose Monitoring Suppl (ONE TOUCH ULTRA 2) w/Device KIT 1 kit by Does not apply route daily 1 kit 0    Lancets MISC 1 each by Does not apply route 3 times daily 100 each 3    MELATONIN PO Take 1 tablet by mouth nightly       loratadine (CLARITIN) 10 MG tablet Take 10 mg by mouth daily       atorvastatin (LIPITOR) 80 MG tablet Take 80 mg by mouth daily.       HYDROcodone-acetaminophen ([facility]) 5-325 MG per tablet Take 1 tablet by mouth every 6 hours as needed for Pain (max 3-4 day) for up to 35 days. 120 tablet 0    meloxicam (MOBIC) 15 MG tablet Take 1 tablet po every Monday,Wednesday, and Friday 30 tablet 1    methocarbamol (ROBAXIN) 500 MG tablet Take 1 tablet by mouth 2 times daily as needed (muscle stiffness) 60 tablet 1    gabapentin (NEURONTIN) 300 MG capsule Take 1 tablet po in the morning and take 2 tablets po in the evening 90 capsule 1     No facility-administered medications prior to visit. REVIEW OF SYSTEMS:    Respiratory: Negative for apnea, chest tightness and shortness of breath or change in baseline breathing. PHYSICAL EXAM:   Nursing note and vitals reviewed. BP (!) 190/110   Pulse 91   Wt 230 lb 9.6 oz (104.6 kg)   SpO2 98%   BMI 37.22 kg/m²   Constitutional: He appears well-developed and well-nourished. No acute distress. Cardiovascular: Normal rate, regular rhythm, normal heart sounds, and does not have murmur. Pulmonary/Chest: Effort normal. No respiratory distress. He does not have wheezes in the lung fields. He has no rales. Neurological/Psychiatric:He is alert and oriented to person, place, and time. Coordination is  normal.  His mood isAppropriate and affect is Neutral/Euthymic(normal) . Other: trace +1 edema     IMPRESSION:   1. Chronic pain syndrome    2. Myofascial pain    3. Chronic left shoulder pain    4. Lumbosacral spondylosis without myelopathy    5. DDD (degenerative disc disease), lumbar    6. Fibromyalgia    7. Lumbar radiculopathy    8. Primary osteoarthritis of left hip        PLAN:  Informed verbal consent was obtained  -ROM/Stretching exercises as advised   -Patient's urine drug screen results with GC/MS confirmation were obtained and reviewed and were negative for any illicit drugs. Prescribed medications were within acceptable range.    -Walking and Stretching exercises as advised    -He was advised to increase fluids ( 5-7 glasses of fluid daily), limit caffeine, avoid cheese products, increase dietary fiber, increase activity and exercise as tolerated and relax regularly and enjoy meals   -Continue with Norco 3-4 per day  -Monitor BP regularly and follow up with PCP     Current Outpatient Medications   Medication Sig Dispense Refill    mupirocin (BACTROBAN) 2 % ointment Apply 22 g topically 3 times daily Apply topically 3 times daily.  HYDROcodone-acetaminophen (NORCO) 5-325 MG per tablet Take 1 tablet by mouth every 6 hours as needed for Pain (max 3-4 day) for up to 35 days.  120 tablet 0    meloxicam (MOBIC) 15 MG tablet Take 1 tablet po every Monday,Wednesday, and Friday 30 tablet 1    methocarbamol (ROBAXIN) 500 MG tablet Take 1 tablet by mouth 2 times daily as needed (muscle stiffness) 60 tablet 1    gabapentin (NEURONTIN) 300 MG capsule Take 1 tablet po in the morning and take 2 tablets po in the evening 90 capsule 1    metFORMIN (GLUCOPHAGE-XR) 500 MG extended release tablet TAKE 2 TABLETS BY MOUTH EVERY DAY WITH BREAKFAST 60 tablet 0    insulin glargine (LANTUS SOLOSTAR) 100 UNIT/ML injection pen INJECT 46 UNITS UNDER THE SKIN DAILY 5 pen 5    insulin aspart (NOVOLOG FLEXPEN) 100 UNIT/ML injection pen 18-24 units AC TID 10 pen 5    Insulin Pen Needle 32G X 4 MM MISC 1 each by Does not apply route 4 times daily 120 each 3    Blood Glucose Monitoring Suppl (FREESTYLE LITE) RO As needed 1 each 0    blood glucose test strips (FREESTYLE LITE) strip USE TO TEST FOUR TIMES A  strip 0    FreeStyle Lancets MISC USE FOUR TIMES A  each 0    lisinopril (PRINIVIL;ZESTRIL) 30 MG tablet Take 30 mg by mouth daily      FreeStyle Lancets MISC USE AS DIRECTED 100 each 3    Insulin Pen Needle (PEN NEEDLES) 31G X 6 MM MISC 1 each by In Vitro route 4 times daily 200 each 3    omeprazole (PRILOSEC) 20 MG delayed release capsule TAKE 1 CAPSULE BY MOUTH EVERY DAY      Cholecalciferol (VITAMIN D PO) Take 1 tablet by mouth every 7 days Pt to clarify dose      furosemide (LASIX) 20 MG tablet Take 40 mg by mouth daily       Fluticasone Propionate (FLONASE NA) 1 spray by Nasal route daily Each nostril      SALINE MIST SPRAY NA 1 spray by Nasal route 3 times daily as needed Each nostril 2-3 times per day      Blood Glucose Monitoring Suppl (ONE TOUCH ULTRA 2) w/Device KIT 1 kit by Does not apply route daily 1 kit 0    Lancets MISC 1 each by Does not apply route 3 times daily 100 each 3    MELATONIN PO Take 1 tablet by mouth nightly       loratadine (CLARITIN) 10 MG tablet Take 10 mg by mouth daily       atorvastatin (LIPITOR) 80 MG tablet Take 80 mg by mouth daily.  Dulaglutide (TRULICITY) 3 TF/5.9DE SOPN Inject 3 mg into the skin once a week 4 pen 3     No current facility-administered medications for this visit. I will continue his current medication regimen  which is part of the above treatment schedule. It has been helping with Mr. Barry Finley chronic  medical problems which for this visit include:   Diagnoses of Chronic pain syndrome, Myofascial pain, Chronic left shoulder pain, Lumbosacral spondylosis without myelopathy, DDD (degenerative disc disease), lumbar, Fibromyalgia, Lumbar radiculopathy, and Primary osteoarthritis of left hip were pertinent to this visit. Risks and benefits of the medications and other alternative treatments  including no treatment were discussed with the patient. The common side effects of these medications were also explained to the patient. Informed verbal consent was obtained. Goals of current treatment regimen include improvement in pain, restoration of functioning- with focus on improvement in physical performance, general activity, work or disability,emotional distress, health care utilization and  decreased opioid medication consumption- titrating to the lowest effective dose.  Will continue to monitor progress towards achieving/maintaining therapeutic goals with special emphasis on  1. Improvement in perceived interfernce  of pain with ADL's. Ability to do home exercises independently. Ability to do household chores indoor and/or outdoor work and social and leisure activities. Improve psychosocial and physical functioning. - he is showing progression towards this treatment goal with the current regimen. He was advised against drinking alcohol with the narcotic pain medicines, advised against driving or handling machinery while adjusting the dose of medicines or if having cognitive  issues related to the current medications. Risk of overdose and death, if medicines not taken as prescribed, were also discussed. If the patient develops new symptoms or if the symptoms worsen, the patient should call the office. While transcribing every attempt was made to maintain the accuracy of the note in terms of it's contents,there may have been some errors made inadvertently. Thank you for allowing me to participate in the care of this patient.     Odessa Luu MD.    Cc: Boone Bustillo MD

## 2022-03-28 DIAGNOSIS — E11.65 UNCONTROLLED TYPE 2 DIABETES MELLITUS WITH HYPERGLYCEMIA (HCC): ICD-10-CM

## 2022-03-28 RX ORDER — METFORMIN HYDROCHLORIDE 500 MG/1
TABLET, EXTENDED RELEASE ORAL
Qty: 60 TABLET | Refills: 0 | Status: SHIPPED | OUTPATIENT
Start: 2022-03-28 | End: 2022-04-28

## 2022-03-28 NOTE — TELEPHONE ENCOUNTER
Medication:   Requested Prescriptions     Pending Prescriptions Disp Refills    metFORMIN (GLUCOPHAGE-XR) 500 MG extended release tablet [Pharmacy Med Name: METFORMIN HCL  MG TABLET] 60 tablet 0     Sig: TAKE 2 TABLETS BY MOUTH EVERY DAY WITH BREAKFAST       Last Filled:      Patient Phone Number: [phone] (home)     Last appt: 3/24/22  Next appt:   none      Last Labs DM:   Lab Results   Component Value Date    LABA1C 8.4 11/23/2021         //

## 2022-04-21 ENCOUNTER — OFFICE VISIT (OUTPATIENT)
Dept: PAIN MANAGEMENT | Age: 62
End: 2022-04-21
Payer: COMMERCIAL

## 2022-04-21 VITALS
OXYGEN SATURATION: 95 % | BODY MASS INDEX: 35.67 KG/M2 | HEART RATE: 82 BPM | WEIGHT: 221 LBS | DIASTOLIC BLOOD PRESSURE: 86 MMHG | SYSTOLIC BLOOD PRESSURE: 143 MMHG

## 2022-04-21 DIAGNOSIS — M51.36 DDD (DEGENERATIVE DISC DISEASE), LUMBAR: ICD-10-CM

## 2022-04-21 DIAGNOSIS — G89.4 CHRONIC PAIN SYNDROME: ICD-10-CM

## 2022-04-21 DIAGNOSIS — M47.817 LUMBOSACRAL SPONDYLOSIS WITHOUT MYELOPATHY: ICD-10-CM

## 2022-04-21 DIAGNOSIS — G89.29 CHRONIC LEFT SHOULDER PAIN: ICD-10-CM

## 2022-04-21 DIAGNOSIS — M16.12 PRIMARY OSTEOARTHRITIS OF LEFT HIP: ICD-10-CM

## 2022-04-21 DIAGNOSIS — M79.7 FIBROMYALGIA: ICD-10-CM

## 2022-04-21 DIAGNOSIS — M25.512 CHRONIC LEFT SHOULDER PAIN: ICD-10-CM

## 2022-04-21 DIAGNOSIS — M54.16 LUMBAR RADICULOPATHY: ICD-10-CM

## 2022-04-21 PROCEDURE — 1036F TOBACCO NON-USER: CPT | Performed by: INTERNAL MEDICINE

## 2022-04-21 PROCEDURE — G8417 CALC BMI ABV UP PARAM F/U: HCPCS | Performed by: INTERNAL MEDICINE

## 2022-04-21 PROCEDURE — 3017F COLORECTAL CA SCREEN DOC REV: CPT | Performed by: INTERNAL MEDICINE

## 2022-04-21 PROCEDURE — G8427 DOCREV CUR MEDS BY ELIG CLIN: HCPCS | Performed by: INTERNAL MEDICINE

## 2022-04-21 PROCEDURE — 99213 OFFICE O/P EST LOW 20 MIN: CPT | Performed by: INTERNAL MEDICINE

## 2022-04-21 RX ORDER — HYDROCODONE BITARTRATE AND ACETAMINOPHEN 5; 325 MG/1; MG/1
1 TABLET ORAL EVERY 6 HOURS PRN
Qty: 100 TABLET | Refills: 0 | Status: SHIPPED | OUTPATIENT
Start: 2022-04-21 | End: 2022-05-19 | Stop reason: SDUPTHER

## 2022-04-21 RX ORDER — MELOXICAM 15 MG/1
TABLET ORAL
Qty: 30 TABLET | Refills: 1 | Status: SHIPPED | OUTPATIENT
Start: 2022-04-21 | End: 2022-06-23 | Stop reason: SDUPTHER

## 2022-04-21 RX ORDER — HYDRALAZINE HYDROCHLORIDE 25 MG/1
25 TABLET, FILM COATED ORAL 2 TIMES DAILY
COMMUNITY

## 2022-04-21 RX ORDER — SPIRONOLACTONE 25 MG/1
25 TABLET ORAL DAILY
COMMUNITY

## 2022-04-21 RX ORDER — GABAPENTIN 300 MG/1
CAPSULE ORAL
Qty: 90 CAPSULE | Refills: 1 | Status: SHIPPED | OUTPATIENT
Start: 2022-04-21 | End: 2022-05-19 | Stop reason: SDUPTHER

## 2022-04-21 RX ORDER — METHOCARBAMOL 500 MG/1
500 TABLET, FILM COATED ORAL 2 TIMES DAILY PRN
Qty: 60 TABLET | Refills: 1 | Status: SHIPPED | OUTPATIENT
Start: 2022-04-21 | End: 2022-05-19 | Stop reason: SDUPTHER

## 2022-04-21 NOTE — PROGRESS NOTES
Kyung Farfan  1960  [id number]      HISTORY OF PRESENT ILLNESS:  Mr. Julia Alaniz is a 60 y.o. male returns for a follow up visit for pain management  He has a diagnosis of   1. Chronic pain syndrome    2. Chronic left shoulder pain    3. DDD (degenerative disc disease), lumbar    4. Lumbar radiculopathy    5. Myofascial pain    6. Lumbosacral spondylosis without myelopathy    7. Fibromyalgia    8. Primary osteoarthritis of left hip    . He complains of pain in the right shoulder. lower back, left hip with radiation to the left knee  He rates the pain 6/10 and describes it as aching. Current treatment regimen has helped relieve about 50% of the pain. He denies any side effects from the current pain regimen. Patient reports that since the last follow up visit the physical functioning is unchanged, family/social relationships are unchanged, mood is unchanged sleep patterns are unchanged, and that the overall functioning is unchanged. Patient denies misusing/abusing his narcotic pain medications or using any illegal drugs. There are No indicators for possible drug abuse, addiction or diversion problems, patient states he has been doing his exercises and states he has joined a gym. Mr. Julia Alaniz states he is using Mobic along with Robaxin and Norco 3-4 per day. He mentions he is using Neurontin 900 mg. ALLERGIES: Patients list of allergies were reviewed     MEDICATIONS: Mr. Julia Alaniz list of medications were reviewed. His current medications are   Outpatient Medications Prior to Visit   Medication Sig Dispense Refill    hydrALAZINE (APRESOLINE) 25 MG tablet Take 25 mg by mouth in the morning and at bedtime      spironolactone (ALDACTONE) 25 MG tablet Take 25 mg by mouth daily      metFORMIN (GLUCOPHAGE-XR) 500 MG extended release tablet TAKE 2 TABLETS BY MOUTH EVERY DAY WITH BREAKFAST 60 tablet 0    mupirocin (BACTROBAN) 2 % ointment Apply 22 g topically 3 times daily Apply topically 3 times daily.       HYDROcodone-acetaminophen (NORCO) 5-325 MG per tablet Take 1 tablet by mouth every 6 hours as needed for Pain (max 3-4 day) for up to 35 days.  120 tablet 0    meloxicam (MOBIC) 15 MG tablet Take 1 tablet po every Monday,Wednesday, and Friday 30 tablet 1    methocarbamol (ROBAXIN) 500 MG tablet Take 1 tablet by mouth 2 times daily as needed (muscle stiffness) 60 tablet 1    gabapentin (NEURONTIN) 300 MG capsule Take 1 tablet po in the morning and take 2 tablets po in the evening 90 capsule 1    Dulaglutide (TRULICITY) 3 TN/7.8MY SOPN Inject 3 mg into the skin once a week 4 pen 3    insulin glargine (LANTUS SOLOSTAR) 100 UNIT/ML injection pen INJECT 46 UNITS UNDER THE SKIN DAILY 5 pen 5    insulin aspart (NOVOLOG FLEXPEN) 100 UNIT/ML injection pen 18-24 units AC TID 10 pen 5    Insulin Pen Needle 32G X 4 MM MISC 1 each by Does not apply route 4 times daily 120 each 3    Blood Glucose Monitoring Suppl (FREESTYLE LITE) RO As needed 1 each 0    blood glucose test strips (FREESTYLE LITE) strip USE TO TEST FOUR TIMES A  strip 0    FreeStyle Lancets MISC USE FOUR TIMES A  each 0    lisinopril (PRINIVIL;ZESTRIL) 30 MG tablet Take 30 mg by mouth daily      FreeStyle Lancets MISC USE AS DIRECTED 100 each 3    Insulin Pen Needle (PEN NEEDLES) 31G X 6 MM MISC 1 each by In Vitro route 4 times daily 200 each 3    omeprazole (PRILOSEC) 20 MG delayed release capsule TAKE 1 CAPSULE BY MOUTH EVERY DAY      Cholecalciferol (VITAMIN D PO) Take 1 tablet by mouth every 7 days Pt to clarify dose      furosemide (LASIX) 20 MG tablet Take 40 mg by mouth daily       Fluticasone Propionate (FLONASE NA) 1 spray by Nasal route daily Each nostril      SALINE MIST SPRAY NA 1 spray by Nasal route 3 times daily as needed Each nostril 2-3 times per day      Blood Glucose Monitoring Suppl (ONE TOUCH ULTRA 2) w/Device KIT 1 kit by Does not apply route daily 1 kit 0    Lancets MISC 1 each by Does not apply route 3 (ALDACTONE) 25 MG tablet Take 25 mg by mouth daily      metFORMIN (GLUCOPHAGE-XR) 500 MG extended release tablet TAKE 2 TABLETS BY MOUTH EVERY DAY WITH BREAKFAST 60 tablet 0    mupirocin (BACTROBAN) 2 % ointment Apply 22 g topically 3 times daily Apply topically 3 times daily.  HYDROcodone-acetaminophen (NORCO) 5-325 MG per tablet Take 1 tablet by mouth every 6 hours as needed for Pain (max 3-4 day) for up to 35 days.  120 tablet 0    meloxicam (MOBIC) 15 MG tablet Take 1 tablet po every Monday,Wednesday, and Friday 30 tablet 1    methocarbamol (ROBAXIN) 500 MG tablet Take 1 tablet by mouth 2 times daily as needed (muscle stiffness) 60 tablet 1    gabapentin (NEURONTIN) 300 MG capsule Take 1 tablet po in the morning and take 2 tablets po in the evening 90 capsule 1    Dulaglutide (TRULICITY) 3 GW/9.5CG SOPN Inject 3 mg into the skin once a week 4 pen 3    insulin glargine (LANTUS SOLOSTAR) 100 UNIT/ML injection pen INJECT 46 UNITS UNDER THE SKIN DAILY 5 pen 5    insulin aspart (NOVOLOG FLEXPEN) 100 UNIT/ML injection pen 18-24 units AC TID 10 pen 5    Insulin Pen Needle 32G X 4 MM MISC 1 each by Does not apply route 4 times daily 120 each 3    Blood Glucose Monitoring Suppl (FREESTYLE LITE) RO As needed 1 each 0    blood glucose test strips (FREESTYLE LITE) strip USE TO TEST FOUR TIMES A  strip 0    FreeStyle Lancets MISC USE FOUR TIMES A  each 0    lisinopril (PRINIVIL;ZESTRIL) 30 MG tablet Take 30 mg by mouth daily      FreeStyle Lancets MISC USE AS DIRECTED 100 each 3    Insulin Pen Needle (PEN NEEDLES) 31G X 6 MM MISC 1 each by In Vitro route 4 times daily 200 each 3    omeprazole (PRILOSEC) 20 MG delayed release capsule TAKE 1 CAPSULE BY MOUTH EVERY DAY      Cholecalciferol (VITAMIN D PO) Take 1 tablet by mouth every 7 days Pt to clarify dose      furosemide (LASIX) 20 MG tablet Take 40 mg by mouth daily       Fluticasone Propionate (FLONASE NA) 1 spray by Nasal route daily Each nostril      SALINE MIST SPRAY NA 1 spray by Nasal route 3 times daily as needed Each nostril 2-3 times per day      Blood Glucose Monitoring Suppl (ONE TOUCH ULTRA 2) w/Device KIT 1 kit by Does not apply route daily 1 kit 0    Lancets MISC 1 each by Does not apply route 3 times daily 100 each 3    MELATONIN PO Take 1 tablet by mouth nightly       loratadine (CLARITIN) 10 MG tablet Take 10 mg by mouth daily       atorvastatin (LIPITOR) 80 MG tablet Take 80 mg by mouth daily. No current facility-administered medications for this visit. I will continue his current medication regimen  which is part of the above treatment schedule. It has been helping with Mr. Morro Macario chronic  medical problems which for this visit include:   Diagnoses of Chronic pain syndrome, Chronic left shoulder pain, DDD (degenerative disc disease), lumbar, Lumbar radiculopathy, Myofascial pain, Lumbosacral spondylosis without myelopathy, Fibromyalgia, and Primary osteoarthritis of left hip were pertinent to this visit. Risks and benefits of the medications and other alternative treatments  including no treatment were discussed with the patient. The common side effects of these medications were also explained to the patient. Informed verbal consent was obtained. Goals of current treatment regimen include improvement in pain, restoration of functioning- with focus on improvement in physical performance, general activity, work or disability,emotional distress, health care utilization and  decreased medication consumption. Will continue to monitor progress towards achieving/maintaining therapeutic goals with special emphasis on  1. Improvement in perceived interfernce  of pain with ADL's. Ability to do home exercises independently. Ability to do household chores indoor and/or outdoor work and social and leisure activities. Improve psychosocial and physical functioning. - he is showing progression towards this treatment goal with the current regimen. 2. Ability to focus/concentrate at work and perform the duties required of him at work  Sit through a workday without lower extremity symptoms. Stand 30-60 minutes without lower extremity symptoms. Ability to lift up to 10-20 lbs. Ability to go up and down stairs. Sit 30-60 minutes  Without having to stand up frequently. - he is maintaining/progressing towards his work related goals with the current regimen. He was advised against drinking alcohol with the narcotic pain medicines, advised against driving or handling machinery while adjusting the dose of medicines or if having cognitive  issues related to the current medications. Risk of overdose and death, if medicines not taken as prescribed, were also discussed. If the patient develops new symptoms or if the symptoms worsen, the patient should call the office. While transcribing every attempt was made to maintain the accuracy of the note in terms of it's contents,there may have been some errors made inadvertently. Thank you for allowing me to participate in the care of this patient.     Indira Conn MD.    Cc: Susan Yang MD

## 2022-04-28 DIAGNOSIS — E11.65 UNCONTROLLED TYPE 2 DIABETES MELLITUS WITH HYPERGLYCEMIA (HCC): ICD-10-CM

## 2022-04-28 RX ORDER — METFORMIN HYDROCHLORIDE 500 MG/1
TABLET, EXTENDED RELEASE ORAL
Qty: 60 TABLET | Refills: 0 | Status: SHIPPED | OUTPATIENT
Start: 2022-04-28 | End: 2022-05-31

## 2022-04-28 NOTE — TELEPHONE ENCOUNTER
Medication:   Requested Prescriptions     Pending Prescriptions Disp Refills    metFORMIN (GLUCOPHAGE-XR) 500 MG extended release tablet [Pharmacy Med Name: METFORMIN HCL  MG TABLET] 60 tablet 0     Sig: TAKE 2 TABLETS BY MOUTH EVERY DAY WITH BREAKFAST       Last Filled:      Patient Phone Number: [phone] (home)     Last appt: 11/23/2021   Next appt:08/02/2022  Last Labs DM:   Lab Results   Component Value Date    LABA1C 8.4 11/23/2021

## 2022-05-19 ENCOUNTER — OFFICE VISIT (OUTPATIENT)
Dept: PAIN MANAGEMENT | Age: 62
End: 2022-05-19
Payer: COMMERCIAL

## 2022-05-19 VITALS
HEART RATE: 89 BPM | OXYGEN SATURATION: 96 % | WEIGHT: 220.8 LBS | BODY MASS INDEX: 35.64 KG/M2 | DIASTOLIC BLOOD PRESSURE: 90 MMHG | SYSTOLIC BLOOD PRESSURE: 147 MMHG

## 2022-05-19 DIAGNOSIS — M54.16 LUMBAR RADICULOPATHY: ICD-10-CM

## 2022-05-19 DIAGNOSIS — G89.4 CHRONIC PAIN SYNDROME: ICD-10-CM

## 2022-05-19 DIAGNOSIS — M25.512 CHRONIC LEFT SHOULDER PAIN: ICD-10-CM

## 2022-05-19 DIAGNOSIS — M51.36 DDD (DEGENERATIVE DISC DISEASE), LUMBAR: ICD-10-CM

## 2022-05-19 DIAGNOSIS — G89.29 CHRONIC LEFT SHOULDER PAIN: ICD-10-CM

## 2022-05-19 DIAGNOSIS — M47.817 LUMBOSACRAL SPONDYLOSIS WITHOUT MYELOPATHY: ICD-10-CM

## 2022-05-19 DIAGNOSIS — M79.7 FIBROMYALGIA: ICD-10-CM

## 2022-05-19 DIAGNOSIS — M16.12 PRIMARY OSTEOARTHRITIS OF LEFT HIP: ICD-10-CM

## 2022-05-19 PROCEDURE — 3017F COLORECTAL CA SCREEN DOC REV: CPT | Performed by: INTERNAL MEDICINE

## 2022-05-19 PROCEDURE — 99213 OFFICE O/P EST LOW 20 MIN: CPT | Performed by: INTERNAL MEDICINE

## 2022-05-19 PROCEDURE — G8427 DOCREV CUR MEDS BY ELIG CLIN: HCPCS | Performed by: INTERNAL MEDICINE

## 2022-05-19 PROCEDURE — 1036F TOBACCO NON-USER: CPT | Performed by: INTERNAL MEDICINE

## 2022-05-19 PROCEDURE — G8417 CALC BMI ABV UP PARAM F/U: HCPCS | Performed by: INTERNAL MEDICINE

## 2022-05-19 RX ORDER — GABAPENTIN 300 MG/1
CAPSULE ORAL
Qty: 90 CAPSULE | Refills: 1 | Status: SHIPPED | OUTPATIENT
Start: 2022-05-19 | End: 2022-06-23 | Stop reason: SDUPTHER

## 2022-05-19 RX ORDER — METHOCARBAMOL 500 MG/1
500 TABLET, FILM COATED ORAL 2 TIMES DAILY PRN
Qty: 60 TABLET | Refills: 1 | Status: SHIPPED | OUTPATIENT
Start: 2022-05-19 | End: 2022-06-23 | Stop reason: SDUPTHER

## 2022-05-19 RX ORDER — HYDROCODONE BITARTRATE AND ACETAMINOPHEN 5; 325 MG/1; MG/1
1 TABLET ORAL EVERY 6 HOURS PRN
Qty: 120 TABLET | Refills: 0 | Status: SHIPPED | OUTPATIENT
Start: 2022-05-19 | End: 2022-06-23 | Stop reason: SDUPTHER

## 2022-05-19 NOTE — PROGRESS NOTES
Karla Ham  1960  [id number]      HISTORY OF PRESENT ILLNESS:  Mr. Holley Winslow is a 60 y.o. male returns for a follow up visit for pain management  He has a diagnosis of   1. Chronic pain syndrome    2. DDD (degenerative disc disease), lumbar    3. Lumbosacral spondylosis without myelopathy    4. Primary osteoarthritis of left hip    5. Encounter for therapeutic drug monitoring    6. Chronic left shoulder pain    7. Lumbar radiculopathy    8. Fibromyalgia    9. Myofascial pain    . He complains of pain in the right shoulder, lower back, left hip He rates the pain 7/10 and describes it as aching, burning. Current treatment regimen has helped relieve about 50% of the pain. He denies any side effects from the current pain regimen. Patient reports that since the last follow up visit the physical functioning is unchanged, family/social relationships are better, mood is better sleep patterns are better, and that the overall functioning is unchanged. Patient denies misusing/abusing his narcotic pain medications or using any illegal drugs. There are No indicators for possible drug abuse, addiction or diversion problems, patient states he has been doing fair, his pain has been baseline. Mr. Holley Winslow states he is currently working. He mentions he is using Norco 3-4 per day along with the other adjuvants. Patient denies any constipation symptoms. ALLERGIES: Patients list of allergies were reviewed     MEDICATIONS: Mr. Holley Winslow list of medications were reviewed. His current medications are   Outpatient Medications Prior to Visit   Medication Sig Dispense Refill    metFORMIN (GLUCOPHAGE-XR) 500 MG extended release tablet TAKE 2 TABLETS BY MOUTH EVERY DAY WITH BREAKFAST 60 tablet 0    hydrALAZINE (APRESOLINE) 25 MG tablet Take 25 mg by mouth in the morning and at bedtime      spironolactone (ALDACTONE) 25 MG tablet Take 25 mg by mouth daily      HYDROcodone-acetaminophen (NORCO) 5-325 MG per tablet Take 1 tablet by mouth every 6 hours as needed for Pain (max 3-4 day) for up to 28 days. 100 tablet 0    meloxicam (MOBIC) 15 MG tablet Take 1 tablet po every Monday,Wednesday, and Friday 30 tablet 1    methocarbamol (ROBAXIN) 500 MG tablet Take 1 tablet by mouth 2 times daily as needed (muscle stiffness) 60 tablet 1    gabapentin (NEURONTIN) 300 MG capsule Take 1 tablet po in the morning and take 2 tablets po in the evening 90 capsule 1    mupirocin (BACTROBAN) 2 % ointment Apply 22 g topically 3 times daily Apply topically 3 times daily.       Dulaglutide (TRULICITY) 3 EM/0.2UO SOPN Inject 3 mg into the skin once a week 4 pen 3    insulin glargine (LANTUS SOLOSTAR) 100 UNIT/ML injection pen INJECT 46 UNITS UNDER THE SKIN DAILY 5 pen 5    insulin aspart (NOVOLOG FLEXPEN) 100 UNIT/ML injection pen 18-24 units AC TID 10 pen 5    Insulin Pen Needle 32G X 4 MM MISC 1 each by Does not apply route 4 times daily 120 each 3    Blood Glucose Monitoring Suppl (FREESTYLE LITE) RO As needed 1 each 0    blood glucose test strips (FREESTYLE LITE) strip USE TO TEST FOUR TIMES A  strip 0    FreeStyle Lancets MISC USE FOUR TIMES A  each 0    lisinopril (PRINIVIL;ZESTRIL) 30 MG tablet Take 30 mg by mouth daily      FreeStyle Lancets MISC USE AS DIRECTED 100 each 3    Insulin Pen Needle (PEN NEEDLES) 31G X 6 MM MISC 1 each by In Vitro route 4 times daily 200 each 3    omeprazole (PRILOSEC) 20 MG delayed release capsule TAKE 1 CAPSULE BY MOUTH EVERY DAY      Cholecalciferol (VITAMIN D PO) Take 1 tablet by mouth every 7 days Pt to clarify dose      furosemide (LASIX) 20 MG tablet Take 40 mg by mouth daily       Fluticasone Propionate (FLONASE NA) 1 spray by Nasal route daily Each nostril      SALINE MIST SPRAY NA 1 spray by Nasal route 3 times daily as needed Each nostril 2-3 times per day      Blood Glucose Monitoring Suppl (ONE TOUCH ULTRA 2) w/Device KIT 1 kit by Does not apply route daily 1 kit 0    Lancets MISC 1 each (GLUCOPHAGE-XR) 500 MG extended release tablet TAKE 2 TABLETS BY MOUTH EVERY DAY WITH BREAKFAST 60 tablet 0    hydrALAZINE (APRESOLINE) 25 MG tablet Take 25 mg by mouth in the morning and at bedtime      spironolactone (ALDACTONE) 25 MG tablet Take 25 mg by mouth daily      HYDROcodone-acetaminophen (NORCO) 5-325 MG per tablet Take 1 tablet by mouth every 6 hours as needed for Pain (max 3-4 day) for up to 28 days. 100 tablet 0    meloxicam (MOBIC) 15 MG tablet Take 1 tablet po every Monday,Wednesday, and Friday 30 tablet 1    methocarbamol (ROBAXIN) 500 MG tablet Take 1 tablet by mouth 2 times daily as needed (muscle stiffness) 60 tablet 1    gabapentin (NEURONTIN) 300 MG capsule Take 1 tablet po in the morning and take 2 tablets po in the evening 90 capsule 1    mupirocin (BACTROBAN) 2 % ointment Apply 22 g topically 3 times daily Apply topically 3 times daily.       Dulaglutide (TRULICITY) 3 BF/4.2MP SOPN Inject 3 mg into the skin once a week 4 pen 3    insulin glargine (LANTUS SOLOSTAR) 100 UNIT/ML injection pen INJECT 46 UNITS UNDER THE SKIN DAILY 5 pen 5    insulin aspart (NOVOLOG FLEXPEN) 100 UNIT/ML injection pen 18-24 units AC TID 10 pen 5    Insulin Pen Needle 32G X 4 MM MISC 1 each by Does not apply route 4 times daily 120 each 3    Blood Glucose Monitoring Suppl (FREESTYLE LITE) RO As needed 1 each 0    blood glucose test strips (FREESTYLE LITE) strip USE TO TEST FOUR TIMES A  strip 0    FreeStyle Lancets MISC USE FOUR TIMES A  each 0    lisinopril (PRINIVIL;ZESTRIL) 30 MG tablet Take 30 mg by mouth daily      FreeStyle Lancets MISC USE AS DIRECTED 100 each 3    Insulin Pen Needle (PEN NEEDLES) 31G X 6 MM MISC 1 each by In Vitro route 4 times daily 200 each 3    omeprazole (PRILOSEC) 20 MG delayed release capsule TAKE 1 CAPSULE BY MOUTH EVERY DAY      Cholecalciferol (VITAMIN D PO) Take 1 tablet by mouth every 7 days Pt to clarify dose      furosemide (LASIX) 20 MG tablet Take 40 mg by mouth daily       Fluticasone Propionate (FLONASE NA) 1 spray by Nasal route daily Each nostril      SALINE MIST SPRAY NA 1 spray by Nasal route 3 times daily as needed Each nostril 2-3 times per day      Blood Glucose Monitoring Suppl (ONE TOUCH ULTRA 2) w/Device KIT 1 kit by Does not apply route daily 1 kit 0    Lancets MISC 1 each by Does not apply route 3 times daily 100 each 3    MELATONIN PO Take 1 tablet by mouth nightly       loratadine (CLARITIN) 10 MG tablet Take 10 mg by mouth daily       atorvastatin (LIPITOR) 80 MG tablet Take 80 mg by mouth daily. No current facility-administered medications for this visit. I will continue his current medication regimen  which is part of the above treatment schedule. It has been helping with Mr. Jia Chaves chronic  medical problems which for this visit include:   Diagnoses of Chronic pain syndrome, DDD (degenerative disc disease), lumbar, Lumbosacral spondylosis without myelopathy, Primary osteoarthritis of left hip, Encounter for therapeutic drug monitoring, Chronic left shoulder pain, Lumbar radiculopathy, Fibromyalgia, and Myofascial pain were pertinent to this visit. Risks and benefits of the medications and other alternative treatments  including no treatment were discussed with the patient. The common side effects of these medications were also explained to the patient. Informed verbal consent was obtained. Goals of current treatment regimen include improvement in pain, restoration of functioning- with focus on improvement in physical performance, general activity, work or disability,emotional distress, health care utilization and  decreased medication consumption. Will continue to monitor progress towards achieving/maintaining therapeutic goals with special emphasis on  1. Improvement in perceived interfernce  of pain with ADL's. Ability to do home exercises independently.  Ability to do household chores indoor and/or outdoor work and social and leisure activities. Improve psychosocial and physical functioning. - he is showing progression towards this treatment goal with the current regimen. He was advised against drinking alcohol with the narcotic pain medicines, advised against driving or handling machinery while adjusting the dose of medicines or if having cognitive  issues related to the current medications. Risk of overdose and death, if medicines not taken as prescribed, were also discussed. If the patient develops new symptoms or if the symptoms worsen, the patient should call the office. While transcribing every attempt was made to maintain the accuracy of the note in terms of it's contents,there may have been some errors made inadvertently. Thank you for allowing me to participate in the care of this patient.     Alyssia Godoy MD.    Cc: Irving Lopes MD

## 2022-05-20 ENCOUNTER — TELEPHONE (OUTPATIENT)
Dept: PAIN MANAGEMENT | Age: 62
End: 2022-05-20

## 2022-05-20 DIAGNOSIS — G89.4 CHRONIC PAIN SYNDROME: Primary | ICD-10-CM

## 2022-05-20 NOTE — TELEPHONE ENCOUNTER
Patient called and needed a new physical therapy order in his chart. He said it can be the exact same one just a new date on it since the one from February .     Please advise

## 2022-05-20 NOTE — TELEPHONE ENCOUNTER
Ok to send new PT order per [facility]. External Referral for Physical Therapy was ordered. I mailed order to patient. I called patient, lvm.

## 2022-05-30 DIAGNOSIS — E11.65 UNCONTROLLED TYPE 2 DIABETES MELLITUS WITH HYPERGLYCEMIA (HCC): ICD-10-CM

## 2022-05-31 ENCOUNTER — TELEPHONE (OUTPATIENT)
Dept: PAIN MANAGEMENT | Age: 62
End: 2022-05-31

## 2022-05-31 RX ORDER — METFORMIN HYDROCHLORIDE 500 MG/1
TABLET, EXTENDED RELEASE ORAL
Qty: 60 TABLET | Refills: 0 | Status: SHIPPED | OUTPATIENT
Start: 2022-05-31 | End: 2022-07-05

## 2022-05-31 NOTE — TELEPHONE ENCOUNTER
Medication:   Requested Prescriptions     Pending Prescriptions Disp Refills    metFORMIN (GLUCOPHAGE-XR) 500 mg extended release tablet [Pharmacy Med Name: METFORMIN HCL  MG TABLET] 60 tablet 0     Sig: TAKE 2 TABLETS BY MOUTH EVERY DAY WITH BREAKFAST       Last Filled:      Patient Phone Number: [phone] (home)     Last appt: 11/23/2021   Next appt: 08/02/2022  Last Labs DM:   Lab Results   Component Value Date    LABA1C 8.4 11/23/2021

## 2022-05-31 NOTE — TELEPHONE ENCOUNTER
Patients mother called wanting information on her son?      Marta Winn she needs to speak to [address]     [phone]     Please advise

## 2022-06-03 ENCOUNTER — HOSPITAL ENCOUNTER (OUTPATIENT)
Dept: PHYSICAL THERAPY | Age: 62
Setting detail: THERAPIES SERIES
Discharge: HOME | End: 2022-06-03
Payer: COMMERCIAL

## 2022-06-06 NOTE — FLOWSHEET NOTE
[facility], [facility]  [address] [facility] [place], [facility]  Phone: [phone]   Fax:     [phone]    Physical Therapy  No-show Note for evaluation.   Patient Name:  Pancho Yeh  :  1960   Date:  2022  Cancelled visits to date: 0  No-shows to date:1    Patient status for today's appointment patient:  []  Cancelled  []  Rescheduled appointment  [x]  No-show     Reason given by patient:  []  Patient ill  []  Conflicting appointment  []  No transportation    []  Conflict with work  []  No reason given  []  Other:     Comments:      Phone call information:   []  Phone call made today to patient at _ time at number provided:      []  Patient answered, conversation as follows:    []  Patient did not answer, message left as follows:  []  Phone call not made today    Electronically signed by:  Ros Ramsey

## 2022-06-07 ENCOUNTER — HOSPITAL ENCOUNTER (OUTPATIENT)
Dept: PHYSICAL THERAPY | Age: 62
Setting detail: THERAPIES SERIES
Discharge: HOME | End: 2022-06-07
Payer: COMMERCIAL

## 2022-06-07 PROCEDURE — 97110 THERAPEUTIC EXERCISES: CPT

## 2022-06-07 PROCEDURE — 97163 PT EVAL HIGH COMPLEX 45 MIN: CPT

## 2022-06-07 NOTE — PLAN OF CARE
[facility]  Outpatient Physical Therapy, [facility], [address]  Phone: [phone]   Fax: [phone]                                                     Physical Therapy Certification    Dear  Mariah Gonzales MD,    We had the pleasure of evaluating the following patient for physical therapy services at [address] [place] [address]. A summary of our findings can be found in the initial assessment below. This includes our plan of care. If you have any questions or concerns regarding these findings, please do not hesitate to contact me at the office phone number checked above. Thank you for the referral.       Physician Signature:_______________________________Date:__________________  By signing above (or electronic signature), therapists plan is approved by physician      Patient: Karla Ham   : 1960   MRN: [id number]  Referring Physician:   Mariah Gonzales MD      Evaluation Date: 2022      Medical Diagnosis Information:  Diagnosis: G89.4 (ICD-10-CM) - Chronic pain syndrome   Treatment Diagnosis: dec R shoulder AROM and strength, mild R GHJ hypomobility, impaired posture                                         Insurance information: PT Insurance Information: [facility] [place] req    Precautions/ Contra-indications: none  Latex Allergy:  [x]NO      []YES  Preferred Language for Healthcare:   [x]English       []Other:    C-SSRS Triggered by Intake questionnaire (Past 2 wk assessment ):   [x] No, Questionnaire did not trigger screening.   [] Yes, Patient intake triggered C-SSRS Screening     [] Completed, no further action required. [] Completed, PCP notified via Epic    SUBJECTIVE: Patient had a fall back in 2022, falling bwd onto R elbow and shoulder. Pt reports x ray imaging showed spurs and degenerative changes in R shoulder. Pain is located in posterior R shoulder (pt reaches to R infraspinatus mm belly).  Pt will get N/T here as well. When pain increases, pain feels like he will get an ice pick stabbing sensation. Pt has been using heat on shoulder and this feels good. Heating pad to wrap around his shoulder. Pain does radiate into right side of neck. Pt is aware of his posture and diapragmatic breathing and believes this helps manage his pain. Pain while driving, turning steering wheel, and he doesn't know where to position R UE. Pain with writing, to some extent. Pt has a lap table to help shoulder while writing. Pt has been doing aquatic exercises, though for arm exercises, performs these gently. 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 Relevant Medical History:   Arthritis    Bipolar 1 disorder ([address]    Degenerative disc disease, lumbar    Diabetes mellitus type 2, controlled, without complications ([address]    Essential hypertension    Mixed hyperlipidemia 1/16/2020    Pure hyperglyceridemia    Type II or unspecified type diabetes mellitus without mention of complication, not stated as uncontrolled      Functional Outcome: FOTO physical FS primary measure score = 38; Risk adjusted = 45    Pain Scale: 8/10 with driving, 3.2/16 at rest  Easing factors: heat, diaphragmatic breathing  Provocative factors: driving, maintaining positions     Type: [x]Constant   []Intermittent  []Radiating [x]Localized []other:     Numbness/Tingling: N/T in posterior medial border of R scapula    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 Pt would have rated R shoulder 4/10 prior to injury.        OBJECTIVE:   Hand dominance: right    Palpation: no significant tenderness to palpation    Functional Mobility/Transfers: indep    Posture: mild fwd head posture    Bandages/Dressings/Incisions: n/a       PROM AROM    L R L R   Cervical Flexion        Cervical Extension        Cervical Rotation       Cervical Side-bend       Shoulder Flexion    170 80*   Shoulder Abduction     80* (worse than flexion)   Shoulder External Rotation    T3 CT jcn   Shoulder Internal Rotation    TL jcn R PSIS*   Elbow Flexion     WFL   Elbow Extension    5 deg from neutral 5 deg from neutral   Pronation        Supination        Wrist Flexion        Wrist Extension        Radial Deviation        Ulnar Deviation            Strength (0-5) Left Right    Shoulder Shrug (C4)     Shoulder Flex  2+*   Shoulder Abd (C5)  2+*   Shoulder ER  4*   Shoulder IR  4* Worse than ER   Biceps (C6)  4*   Triceps (C7)  4* Worse than biceps       Joint mobility: mildly hypomobile R GHJ   []Normal    []Hypo   []Hyper      Orthopaedic Special Tests Positive  Negative  NT Comments    Shoulder        Neer   x     Jeffrey-C[place]ell  x     Empty Can x      Drop Arm  x     [place]'s  x     Speeds  x      Sulcus        Apprehension (Anterior / Posterior)       Load and Shift                                                      [x] Patient history, allergies, meds reviewed. Medical chart reviewed. See intake form. Review Of Systems (ROS):  [x]Performed Review of systems (Integumentary, CardioPulmonary, Neurological) by intake and observation. Intake form has been scanned into medical record. Patient has been instructed to contact their primary care physician regarding ROS issues if not already being addressed at this time.       Co-morbidities/Complexities (which will affect course of rehabilitation):   []None        []Hx of COVID   Arthritic conditions   []Rheumatoid arthritis (M05.9)  [x]Osteoarthritis (M19.91)  []Gout   Cardiovascular conditions   [x]Hypertension (I10)  [x]Hyperlipidemia (E78.5)  []Angina pectoris (I20)  []Atherosclerosis (I70)  []Pacemaker  []Hx of CABG/stent/  cardiac surgeries   Musculoskeletal conditions   []Disc pathology []Congenital spine pathologies   []Osteoporosis (M81.8)  []Osteopenia (M85.8)  []Scoliosis       Endocrine conditions   []Hypothyroid (E03.9)  []Hyperthyroid Gastrointestinal conditions   []Constipation (S85.36)   Metabolic conditions   []Morbid obesity (E66.01)  [x]Diabetes type 1(E10.65) or 2 (E11.65)   []Neuropathy (G60.9)     Cardio/Pulmonary conditions   []Asthma (J45)  []Coughing   []COPD (J44.9)  [x]CHF  []A-fib   Psychological Disorders  []Anxiety (F41.9)  []Depression (F32.9)   [x]Other: Bipolar disorder   Developmental Disorders  []Autism (F84.0)  []CP (G80)  []Down Syndrome (Q90.9)  []Developmental delay     Neurological conditions  []Prior Stroke (I69.30)  []Parkinson's (G20)  []Encephalopathy (G93.40)  []MS (G35)  []Post-polio (G14)  []SCI  []TBI  []ALS Other conditions  []Fibromyalgia (M79.7)  []Vertigo  []Syncope  []Kidney Failure  []Cancer      []currently undergoing                treatment  []Pregnancy  []Incontinence   Prior surgeries  []involved limb  []previous spinal surgery  [] section birth  []hysterectomy  []bowel / bladder surgery  []other relevant surgeries   []Other:             Barriers to/and or personal factors that will affect rehab potential:              []Age  []Sex    []Smoker              []Motivation/Lack of Motivation                        [x]Co-Morbidities              []Cognitive Function, education/learning barriers              []Environmental, home barriers              []profession/work barriers  [x]past PT/medical experience  []other:  Justification:      Falls Risk Assessment (30 days):   [x] Falls Risk assessed and no intervention required. [] Falls Risk assessed and Patient requires intervention due to being higher risk   TUG score (>12s at risk):     [] Falls education provided, including       ASSESSMENT: Ninfa Robertson is a 60 y.o. male presenting to physical therapy with R shoulder pain after landing on R UE from a fall in 2022.  Pt demonstrates dec R shoulder AROM and strength, mild R GHJ hypomobility, impaired posture. Pt would benefit from skilled PT to return to PLOF and dec pain with aquatic exercises and care giving tasks for his mother. Functional Impairments   [x]Noted spinal or UE joint hypomobility   []Noted spinal or UE joint hypermobility   [x]Decreased UE functional ROM   [x]Decreased UE functional strength   []Abnormal reflexes/sensation/myotomal/dermatomal deficits   [x]Decreased RC/scapular/core strength and neuromuscular control   []other:      Functional Activity Limitations (from functional questionnaire and intake)   [x]Reduced ability to tolerate prolonged functional positions   [x]Reduced ability or difficulty with changes of positions or transfers between positions   [x]Reduced ability to maintain good posture and demonstrate good body mechanics with sitting, bending, and lifting   [x]Reduced ability or tolerance with driving and/or computer work   [x]Reduced ability to sleep   []Reduced ability to perform lifting, reaching, carrying tasks   [x]Reduced ability to tolerate impact through UE   [x]Reduced ability to reach behind back   []Reduced ability to  or hold objects   []Reduced ability to throw or toss an object   []other:    Participation Restrictions   [x]Reduced participation in self care activities   [x]Reduced participation in home management activities   []Reduced participation in work activities   []Reduced participation in social activities. []Reduced participation in sport/recreation activities. Classification:   []Signs/symptoms consistent with post-surgical status including decreased ROM, strength and function.   []Signs/symptoms consistent with joint sprain/strain   []Signs/symptoms consistent with shoulder impingement   [x]Signs/symptoms consistent with shoulder/elbow/wrist tendinopathy   []Signs/symptoms consistent with Rotator cuff tear   []Signs/symptoms consistent with labral tear   []Signs/symptoms consistent with postural dysfunction    []Signs/symptoms consistent with Glenohumeral IR Deficit - <45 degrees   []Signs/symptoms consistent with facet dysfunction of cervical/thoracic spine    []Signs/symptoms consistent with pathology which may benefit from Dry needling     []other:     Prognosis/Rehab Potential:      []Excellent   []Good    [x]Fair   []Poor    Tolerance of evaluation/treatment:    []Excellent   [x]Good    []Fair   []Poor    Physical Therapy Evaluation Complexity Justification  [x] A history of present problem with:  [] no personal factors and/or comorbidities that impact the plan of care;  []1-2 personal factors and/or comorbidities that impact the plan of care  [x]3 personal factors and/or comorbidities that impact the plan of care  [x] An examination of body systems using standardized tests and measures addressing any of the following: body structures and functions (impairments), activity limitations, and/or participation restrictions;:  [] a total of 1-2 or more elements   [] a total of 3 or more elements   [x] a total of 4 or more elements   [x] A clinical presentation with:  [] stable and/or uncomplicated characteristics   [] evolving clinical presentation with changing characteristics  [x] unstable and unpredictable characteristics;   [x] Clinical decision making of [] low, [] moderate, [x] high complexity using standardized patient assessment instrument and/or measurable assessment of functional outcome.     [] EVAL (LOW) 90158 (typically 15 minutes face-to-face)  [] EVAL (MOD) 52434 (typically 30 minutes face-to-face)  [x] EVAL (HIGH) 66198 (typically 45 minutes face-to-face)  [] RE-EVAL     PLAN:  Frequency/Duration:  2 days per week for 6 Weeks:  INTERVENTIONS:  [x] Therapeutic exercise including: strength training, ROM, for Upper extremity and core   [x]  NMR activation and proprioception for UE, scap and Core   [x] Manual therapy as indicated for shoulder, scapula and spine to include: Dry Needling/IASTM, STM, PROM, Gr I-IV mobilizations, manipulation. [x] Modalities as needed that may include: thermal agents, E-stim, Biofeedback, US, iontophoresis as indicated  [x] Patient education on joint protection, postural re-education, activity modification, progression of HEP. HEP instruction: Written HEP instructions provided and reviewed     GOALS:  Patient stated goal: be able to drive with less pain  [] Progressing: [] Met: [] Not Met: [] Adjusted    Therapist goals for Patient:   Short Term Goals: To be achieved in: 2 weeks  1. Independent in HEP and progression per patient tolerance, in order to prevent re-injury. [] Progressing: [] Met: [] Not Met: [] Adjusted  2. Patient will have a decrease in pain to facilitate improvement in movement, function, and ADLs as indicated by Functional Deficits. [] Progressing: [] Met: [] Not Met: [] Adjusted    Long Term Goals: To be achieved in: 6 weeks  1. FOTO score of at least 58 to assist with reaching prior level of function. [] Progressing: [] Met: [] Not Met: [] Adjusted  2. Patient will demonstrate increased AROM to 140 deg flexion, functional ER to T3, and functional IR to TL jcn to allow for proper joint functioning as indicated by patients Functional Deficits. [] Progressing: [] Met: [] Not Met: [] Adjusted  3. Patient will demonstrate an increase in Strength to 4/5 to allow for proper functional mobility as indicated by patients Functional Deficits. [] Progressing: [] Met: [] Not Met: [] Adjusted  4. Patient will return to functional activities including driving, care giving  without increased symptoms or restriction.    [] Progressing: [] Met: [] Not Met: [] Adjusted      Electronically signed by:  Severiano Law, PT

## 2022-06-07 NOTE — FLOWSHEET NOTE
[address] [place] [address]  Phone: [phone]   Fax: [phone]    Physical Therapy Treatment Note/ Progress Report:     Date:  2022    Patient Name:  Familia Farrell    :  1960  MRN: [id number]  Restrictions/Precautions:    Medical/Treatment Diagnosis Information:  · Diagnosis: G89.4 (ICD-10-CM) - Chronic pain syndrome  · Treatment Diagnosis: dec R shoulder AROM and strength, mild R GHJ hypomobility, impaired posture  Insurance/Certification information:  PT Insurance Information: [id number]  Physician Information:   Brenda Nagy MD  Plan of care signed (Y/N): []  Yes []  No     Date of Patient follow up with Physician:      Progress Report: []  Yes  []  No     Date Range for reporting period:  Beginnin22  Ending:     Progress report due (10 Rx/or 30 days whichever is less): visit #10 or  (date)     Recertification due (POC duration/ or 90 days whichever is less): visit #12 or  (date)     Visit # Insurance Allowable Auth required?  Date Range   eval + TBD TBD [x]  Yes - [employer]  []  No TBD       Units approved Units used Date Range          Latex Allergy:  [x]NO      []YES  Preferred Language for Healthcare:   [x]English       []other:    Functional Scale:           Date assessed:  FOTO physical FS primary measure score = 38; risk adjusted = 45  22     Pain level:  8/10     SUBJECTIVE:  See eval    OBJECTIVE: See eval   Observation:    Test measurements:      RESTRICTIONS/PRECAUTIONS: none    Exercises/Interventions:   Therapeutic Exercise (38220) Resistance / level Sets / Seconds  Reps Notes/Cues   Pulleys              SB wall, shoulder flexion       TB:  - IR iso step outs  - ER iso step outs  - Mid rows  - High rows  - LPD       TB B shoulder ER              Supine shoulder flexion  Supine serratus punch                                   HEP review  Per HEP Per HEP 8'   Therapeutic Activities (79783) Neuromuscular Re-ed (22434)       Supine ABC's       Ball on wall: up/down, L/R, CW, cCW                            Manual Intervention (77132)       Shld /GH Mobs: inf, post GrI&II 2x10\" ea     Post Cap mobs       Thoracic/Rib manipulation       CT MT/Mobs       PROM MT: flexion, abd, IR, ER  4'                Modalities:     Pt. Education:  -pt educated on diagnosis, prognosis and expectations for rehab  -all pt questions were answered    Home Exercise Program:  Access Code: [email or web address]  URL: [email or web address]  Date: 06/07/2022  Prepared by: [employer]    Exercises  Isometric Shoulder Flexion at Wall - 1 x daily - 7 x weekly - 1 sets - 10 reps - 5 hold  Isometric Shoulder Abduction at Wall - 1 x daily - 7 x weekly - 1 sets - 10 reps - 5 hold  Isometric Shoulder Adduction - 1 x daily - 7 x weekly - 1 sets - 10 reps - 5 hold  Seated Shoulder Abduction Towel Slide at Table Top - 2 x daily - 7 x weekly - 1 sets - 20 reps  Seated Shoulder Flexion Towel Slide at Table Top - 2 x daily - 7 x weekly - 1 sets - 20 reps  Supine Shoulder Flexion AAROM with Dowel - 1 x daily - 7 x weekly - 2 sets - 10 reps      Therapeutic Exercise and NMR EXR  [x] (28751) Provided verbal/tactile cueing for activities related to strengthening, flexibility, endurance, ROM  for improvements in scapular, scapulothoracic and UE control with self care, reaching, carrying, lifting, house/yardwork, driving/computer work.    [] (61110) Provided verbal/tactile cueing for activities related to improving balance, coordination, kinesthetic sense, posture, motor skill, proprioception  to assist with  scapular, scapulothoracic and UE control with self care, reaching, carrying, lifting, house/yardwork, driving/computer work.  [] (21874) Therapist is in constant attendance of 2 or more patients providing skilled therapy interventions, but not providing any significant amount of measurable one-on-one time to either patient, for improvements in cervical, scapular, scapulothoracic and UE control with self care, reaching, carrying, lifting, house/yardwork, driving, computer work. Therapeutic Activities:    [] (60135 or 84510) Provided verbal/tactile cueing for activities related to improving balance, coordination, kinesthetic sense, posture, motor skill, proprioception and motor activation to allow for proper function of scapular, scapulothoracic and UE control with self care, carrying, lifting, driving/computer work.      Home Exercise Program:    [x] (67275) Reviewed/Progressed HEP activities related to strengthening, flexibility, endurance, ROM of scapular, scapulothoracic and UE control with self care, reaching, carrying, lifting, house/yardwork, driving/computer work  [] (22468) Reviewed/Progressed HEP activities related to improving balance, coordination, kinesthetic sense, posture, motor skill, proprioception of scapular, scapulothoracic and UE control with self care, reaching, carrying, lifting, house/yardwork, driving/computer work      Manual Treatments:  PROM / STM / Oscillations-Mobs:  G-I, II, III, IV (PA's, Inf., Post.)  [x] (66925) Provided manual therapy to mobilize soft tissue/joints of cervical/CT, scapular GHJ and UE for the purpose of modulating pain, promoting relaxation,  increasing ROM, reducing/eliminating soft tissue swelling/inflammation/restriction, improving soft tissue extensibility and allowing for proper ROM for normal function with self care, reaching, carrying, lifting, house/yardwork, driving/computer work      Charges:  Timed Code Treatment Minutes: 15   Total Treatment Minutes: 35       [] EVAL - LOW (96114)   [] EVAL - MOD (04213)  [x] EVAL - HIGH (28377)  [] RE-EVAL (71058)  [x] NI(55409) x 1      [] Ionto  [] NMR (81291) x       [] Vaso  [] Manual (65588) x       [] Ultrasound  [] TA x        [] Mech Traction (17686)  [] Aquatic Therapy x     [] ES (un) (32806):   [] Home Management Training x  [] ES(attended) (33549)   [] Dry Needling 1-2 muscles (50261):  [] Dry Needling 3+ muscles (825433  [] Group:      [] Other:                    GOALS:  Patient stated goal: be able to drive with less pain  []? Progressing: []? Met: []? Not Met: []? Adjusted     Therapist goals for Patient:   Short Term Goals: To be achieved in: 2 weeks  1. Independent in HEP and progression per patient tolerance, in order to prevent re-injury. []? Progressing: []? Met: []? Not Met: []? Adjusted  2. Patient will have a decrease in pain to facilitate improvement in movement, function, and ADLs as indicated by Functional Deficits. []? Progressing: []? Met: []? Not Met: []? Adjusted     Long Term Goals: To be achieved in: 6 weeks  1. FOTO score of at least 58 to assist with reaching prior level of function. []? Progressing: []? Met: []? Not Met: []? Adjusted  2. Patient will demonstrate increased AROM to 140 deg flexion, functional ER to T3, and functional IR to TL jcn to allow for proper joint functioning as indicated by patients Functional Deficits. []? Progressing: []? Met: []? Not Met: []? Adjusted  3. Patient will demonstrate an increase in Strength to 4/5 to allow for proper functional mobility as indicated by patients Functional Deficits. []? Progressing: []? Met: []? Not Met: []? Adjusted  4. Patient will return to functional activities including driving, care giving  without increased symptoms or restriction. []? Progressing: []? Met: []? Not Met: []? Adjusted    Overall Progression Towards Functional goals/ Treatment Progress Update:  [] Patient is progressing as expected towards functional goals listed. [] Progression is slowed due to complexities/Impairments listed. [] Progression has been slowed due to co-morbidities.   [x] Plan just implemented, too soon to assess goals progression <30days   [] Goals require adjustment due to lack of progress  [] Patient is not progressing as expected and requires additional follow up with physician  [] Other    Persisting Functional Limitations/Impairments:  []Sitting []Standing   [x]Transfers  [x]Sleeping   [x]Reaching [x]Lifting   [x]ADLs [x]Housework  [x]Driving []Job related tasks  []Sports/Recreation []Other:    ASSESSMENT:  See eval    Treatment/Activity Tolerance:  [x] Pt able to complete treatment [] Patient limited by fatique  [] Patient limited by pain  [] Patient limited by other medical complications  [] Other:     Prognosis:  [] Good [x] Fair  [] Poor    Patient Requires Follow-up: [x] Yes  [] No    Return to Play:    [x]  N/A     []  Stage 1: Intro to Strength   []  Stage 2: Dynamic Strength and Intro to Plyometrics   []  Stage 3: Advanced Plyometrics and Intro to Throwing   []  Stage 4: Sport specific Training/Return to Sport     []  Ready to Return to Play, [employer]   []  Not Ready for Return to Sports   Comments:      PLAN: See eval. PT 2x / week for 6 weeks. [] Continue per plan of care [] Alter current plan (see comments)  [x] Plan of care initiated [] Hold pending MD visit [] Discharge    Electronically signed by: Vicenta Styles PT, DPT      Note: If patient does not return for scheduled/ recommended follow up visits, this note will serve as a discharge from care along with most recent update on progress.

## 2022-06-16 ENCOUNTER — HOSPITAL ENCOUNTER (OUTPATIENT)
Dept: PHYSICAL THERAPY | Age: 62
Setting detail: THERAPIES SERIES
Discharge: HOME | End: 2022-06-16
Payer: COMMERCIAL

## 2022-06-16 PROCEDURE — 97110 THERAPEUTIC EXERCISES: CPT

## 2022-06-16 PROCEDURE — 97140 MANUAL THERAPY 1/> REGIONS: CPT

## 2022-06-16 NOTE — FLOWSHEET NOTE
[facility]  Phone: [phone]   Fax: [phone]    Physical Therapy Treatment Note/ Progress Report:     Date:  2022    Patient Name:  Mark Crespo    :  1960  MRN: [id number]  Restrictions/Precautions:    Medical/Treatment Diagnosis Information:  · Diagnosis: G89.4 (ICD-10-CM) - Chronic pain syndrome  · Treatment Diagnosis: dec R shoulder AROM and strength, mild R GHJ hypomobility, impaired posture  Insurance/Certification information:  PT Insurance Information: [id number]  Physician Information:   Sadie Porras MD  Plan of care signed (Y/N): []  Yes [x]  No     Date of Patient follow up with Physician: 22     Progress Report: []  Yes  [x]  No     Date Range for reporting period:  Beginnin22  Ending:     Progress report due (10 Rx/or 30 days whichever is less): visit #10 or  (date)     Recertification due (POC duration/ or 90 days whichever is less): visit #12 or  (date)     Visit # Insurance Allowable Auth required? Date Range   eval +  30 v/yr  96 units this episode [x]  Yes - [employer]  []  No 22 - 22       Latex Allergy:  [x]NO      []YES  Preferred Language for Healthcare:   [x]English       []other:    Functional Scale:           Date assessed:  FOTO physical FS primary measure score = 38; risk adjusted = 45  22     Pain level:  7-8/10     SUBJECTIVE:  Pt reports shoulder is more irritated this date than normal. Would like \"heat treatment. \"     OBJECTIVE:    Observation:    Test measurements:      RESTRICTIONS/PRECAUTIONS: none    Exercises/Interventions:   Therapeutic Exercise (98764) Resistance / level Sets / Seconds  Reps Notes/Cues   Pulleys Flexion 4 min             SB wall  - shoulder flexion  - arches Red swiss ball    10  5     : Stopped, too painful    TB:  - IR iso step outs  - ER iso step outs  - Mid rows  - High rows  - LPD   Cuming TB  Cuming TB  Lime TB  Lime TB  Lime TB       2  2  2   10 R  7 R  10  10  10    TB B shoulder ER              Supine shoulder flexion  Supine serratus punch   10 R  10 R Near full ROM                                      Therapeutic Activities (12309)                                          Neuromuscular Re-ed (37227)       Supine ABC's   1 x R    Ball on wall: up/down, L/R, CW, CCW                            Manual Intervention (70808)       Shld /GH Mobs: inf, post GrI&II      Post Cap mobs       Thoracic/Rib manipulation       CT MT/Mobs       PROM MT: flexion, abd, IR, ER  8 min total                 Modalities:     Pt. Education:  -pt educated on diagnosis, prognosis and expectations for rehab  -all pt questions were answered    Home Exercise Program:  Access Code: [email or web address]  URL: [email or web address]  Date: 06/07/2022  Prepared by: Ayanna Akins    Exercises  Isometric Shoulder Flexion at Wall - 1 x daily - 7 x weekly - 1 sets - 10 reps - 5 hold  Isometric Shoulder Abduction at Wall - 1 x daily - 7 x weekly - 1 sets - 10 reps - 5 hold  Isometric Shoulder Adduction - 1 x daily - 7 x weekly - 1 sets - 10 reps - 5 hold  Seated Shoulder Abduction Towel Slide at Table Top - 2 x daily - 7 x weekly - 1 sets - 20 reps  Seated Shoulder Flexion Towel Slide at Table Top - 2 x daily - 7 x weekly - 1 sets - 20 reps  Supine Shoulder Flexion AAROM with Dowel - 1 x daily - 7 x weekly - 2 sets - 10 reps      Therapeutic Exercise and NMR EXR  [x] (25454) Provided verbal/tactile cueing for activities related to strengthening, flexibility, endurance, ROM  for improvements in scapular, scapulothoracic and UE control with self care, reaching, carrying, lifting, house/yardwork, driving/computer work.    [] (74727) Provided verbal/tactile cueing for activities related to improving balance, coordination, kinesthetic sense, posture, motor skill, proprioception  to assist with  scapular, scapulothoracic and UE control with self care, reaching, carrying, lifting, house/yardwork, driving/computer work.  [] (46468) Therapist is in constant attendance of 2 or more patients providing skilled therapy interventions, but not providing any significant amount of measurable one-on-one time to either patient, for improvements in cervical, scapular, scapulothoracic and UE control with self care, reaching, carrying, lifting, house/yardwork, driving, computer work. Therapeutic Activities:    [] (02922 or 82910) Provided verbal/tactile cueing for activities related to improving balance, coordination, kinesthetic sense, posture, motor skill, proprioception and motor activation to allow for proper function of scapular, scapulothoracic and UE control with self care, carrying, lifting, driving/computer work.      Home Exercise Program:    [] (72603) Reviewed/Progressed HEP activities related to strengthening, flexibility, endurance, ROM of scapular, scapulothoracic and UE control with self care, reaching, carrying, lifting, house/yardwork, driving/computer work  [] (86588) Reviewed/Progressed HEP activities related to improving balance, coordination, kinesthetic sense, posture, motor skill, proprioception of scapular, scapulothoracic and UE control with self care, reaching, carrying, lifting, house/yardwork, driving/computer work      Manual Treatments:  PROM / STM / Oscillations-Mobs:  G-I, II, III, IV (PA's, Inf., Post.)  [x] (48653) Provided manual therapy to mobilize soft tissue/joints of cervical/CT, scapular GHJ and UE for the purpose of modulating pain, promoting relaxation,  increasing ROM, reducing/eliminating soft tissue swelling/inflammation/restriction, improving soft tissue extensibility and allowing for proper ROM for normal function with self care, reaching, carrying, lifting, house/yardwork, driving/computer work      Charges:  Timed Code Treatment Minutes: 40   Total Treatment Minutes: 40     Units approved Units used Date Range   96 3 6/9/22 - 7/23/22       [] EVAL - LOW (74005)   [] EVAL - MOD (80672)  [] EVAL - HIGH (29789)  [] RE-EVAL (63733)  [x] KM(67369) x 2      [] Ionto  [] NMR (55047) x       [] Vaso  [x] Manual (33058) x  1     [] Ultrasound  [] TA x        [] Mech Traction (34546)  [] Aquatic Therapy x     [] ES (un) (18435):   [] Home Management Training x  [] ES(attended) (52033)   [] Dry Needling 1-2 muscles (69281):  [] Dry Needling 3+ muscles (495098  [] Group:      [] Other:                    GOALS:  Patient stated goal: be able to drive with less pain  []? Progressing: []? Met: []? Not Met: []? Adjusted     Therapist goals for Patient:   Short Term Goals: To be achieved in: 2 weeks  1. Independent in HEP and progression per patient tolerance, in order to prevent re-injury. []? Progressing: []? Met: []? Not Met: []? Adjusted  2. Patient will have a decrease in pain to facilitate improvement in movement, function, and ADLs as indicated by Functional Deficits. []? Progressing: []? Met: []? Not Met: []? Adjusted     Long Term Goals: To be achieved in: 6 weeks  1. FOTO score of at least 58 to assist with reaching prior level of function. []? Progressing: []? Met: []? Not Met: []? Adjusted  2. Patient will demonstrate increased AROM to 140 deg flexion, functional ER to T3, and functional IR to TL jcn to allow for proper joint functioning as indicated by patients Functional Deficits. []? Progressing: []? Met: []? Not Met: []? Adjusted  3. Patient will demonstrate an increase in Strength to 4/5 to allow for proper functional mobility as indicated by patients Functional Deficits. []? Progressing: []? Met: []? Not Met: []? Adjusted  4. Patient will return to functional activities including driving, care giving  without increased symptoms or restriction. []? Progressing: []? Met: []? Not Met: []? Adjusted    Overall Progression Towards Functional goals/ Treatment Progress Update:  [] Patient is progressing as expected towards functional goals listed.     [] Progression is slowed due to complexities/Impairments listed. [] Progression has been slowed due to co-morbidities. [x] Plan just implemented, too soon to assess goals progression <30days   [] Goals require adjustment due to lack of progress  [] Patient is not progressing as expected and requires additional follow up with physician  [] Other    Persisting Functional Limitations/Impairments:  []Sitting []Standing   [x]Transfers  [x]Sleeping   [x]Reaching [x]Lifting   [x]ADLs [x]Housework  [x]Driving []Job related tasks  []Sports/Recreation []Other:    ASSESSMENT:  Pt transferring to [facility] office after today's visit. Pt had one more visit scheduled in  but has decided to save approved units for QC office. Cueing given for form throughout session, lilo with TB exercises to avoid [place] compensation. Pt reported slight increase in pain following the session and again mentioned that he thinks having a hot pack will help. Treatment/Activity Tolerance:  [x] Pt able to complete treatment [] Patient limited by fatique  [] Patient limited by pain  [] Patient limited by other medical complications  [] Other:     Prognosis:  [] Good [x] Fair  [] Poor    Patient Requires Follow-up: [x] Yes  [] No    Return to Play:    [x]  N/A     []  Stage 1: Intro to Strength   []  Stage 2: Dynamic Strength and Intro to Plyometrics   []  Stage 3: Advanced Plyometrics and Intro to Throwing   []  Stage 4: Sport specific Training/Return to Sport     []  Ready to Return to Play, [employer]   []  Not Ready for Return to Sports   Comments:      PLAN: See eval. PT 2x / week for 6 weeks.   [x] Continue per plan of care [] Alter current plan (see comments)  [] Plan of care initiated [] Hold pending MD visit [] Discharge    Electronically signed by: Jameel Valles, PT, DPT      Note: If patient does not return for scheduled/ recommended follow up visits, this note will serve as a discharge from care along with most recent update on progress.

## 2022-06-21 ENCOUNTER — HOSPITAL ENCOUNTER (OUTPATIENT)
Dept: PHYSICAL THERAPY | Age: 62
Setting detail: THERAPIES SERIES
Discharge: HOME | End: 2022-06-21
Payer: COMMERCIAL

## 2022-06-21 PROCEDURE — 97110 THERAPEUTIC EXERCISES: CPT

## 2022-06-21 PROCEDURE — 97140 MANUAL THERAPY 1/> REGIONS: CPT

## 2022-06-23 ENCOUNTER — HOSPITAL ENCOUNTER (OUTPATIENT)
Dept: PHYSICAL THERAPY | Age: 62
Setting detail: THERAPIES SERIES
End: 2022-06-23
Payer: COMMERCIAL

## 2022-06-23 ENCOUNTER — OFFICE VISIT (OUTPATIENT)
Dept: PAIN MANAGEMENT | Age: 62
End: 2022-06-23
Payer: COMMERCIAL

## 2022-06-23 VITALS
DIASTOLIC BLOOD PRESSURE: 80 MMHG | WEIGHT: 219.2 LBS | BODY MASS INDEX: 35.23 KG/M2 | HEIGHT: 66 IN | SYSTOLIC BLOOD PRESSURE: 131 MMHG | HEART RATE: 83 BPM | OXYGEN SATURATION: 97 %

## 2022-06-23 DIAGNOSIS — M51.36 DDD (DEGENERATIVE DISC DISEASE), LUMBAR: ICD-10-CM

## 2022-06-23 DIAGNOSIS — M16.12 PRIMARY OSTEOARTHRITIS OF LEFT HIP: ICD-10-CM

## 2022-06-23 DIAGNOSIS — G89.4 CHRONIC PAIN SYNDROME: ICD-10-CM

## 2022-06-23 DIAGNOSIS — M79.7 FIBROMYALGIA: ICD-10-CM

## 2022-06-23 DIAGNOSIS — M54.16 LUMBAR RADICULOPATHY: ICD-10-CM

## 2022-06-23 DIAGNOSIS — M47.817 LUMBOSACRAL SPONDYLOSIS WITHOUT MYELOPATHY: ICD-10-CM

## 2022-06-23 DIAGNOSIS — G89.29 CHRONIC LEFT SHOULDER PAIN: ICD-10-CM

## 2022-06-23 DIAGNOSIS — M25.512 CHRONIC LEFT SHOULDER PAIN: ICD-10-CM

## 2022-06-23 PROCEDURE — 3017F COLORECTAL CA SCREEN DOC REV: CPT | Performed by: INTERNAL MEDICINE

## 2022-06-23 PROCEDURE — 99213 OFFICE O/P EST LOW 20 MIN: CPT | Performed by: INTERNAL MEDICINE

## 2022-06-23 PROCEDURE — 1036F TOBACCO NON-USER: CPT | Performed by: INTERNAL MEDICINE

## 2022-06-23 PROCEDURE — G8427 DOCREV CUR MEDS BY ELIG CLIN: HCPCS | Performed by: INTERNAL MEDICINE

## 2022-06-23 PROCEDURE — G8417 CALC BMI ABV UP PARAM F/U: HCPCS | Performed by: INTERNAL MEDICINE

## 2022-06-23 RX ORDER — MELOXICAM 15 MG/1
TABLET ORAL
Qty: 30 TABLET | Refills: 1 | Status: SHIPPED | OUTPATIENT
Start: 2022-06-23 | End: 2022-07-28 | Stop reason: SDUPTHER

## 2022-06-23 RX ORDER — HYDROCODONE BITARTRATE AND ACETAMINOPHEN 5; 325 MG/1; MG/1
1 TABLET ORAL EVERY 6 HOURS PRN
Qty: 120 TABLET | Refills: 0 | Status: SHIPPED | OUTPATIENT
Start: 2022-06-23 | End: 2022-07-28 | Stop reason: SDUPTHER

## 2022-06-23 RX ORDER — METHOCARBAMOL 500 MG/1
500 TABLET, FILM COATED ORAL 2 TIMES DAILY PRN
Qty: 60 TABLET | Refills: 1 | Status: SHIPPED | OUTPATIENT
Start: 2022-06-23 | End: 2022-07-28 | Stop reason: SDUPTHER

## 2022-06-23 RX ORDER — GABAPENTIN 300 MG/1
CAPSULE ORAL
Qty: 90 CAPSULE | Refills: 1 | Status: SHIPPED | OUTPATIENT
Start: 2022-06-23 | End: 2022-07-28 | Stop reason: SDUPTHER

## 2022-06-23 NOTE — PROGRESS NOTES
Alysa Blair  1960  [id number]      HISTORY OF PRESENT ILLNESS:  Mr. Magi Klein is a 60 y.o. male returns for a follow up visit for pain management  He has a diagnosis of   1. Chronic pain syndrome    2. Primary osteoarthritis of left hip    3. Fibromyalgia    4. Chronic left shoulder pain    5. Myofascial pain    6. DDD (degenerative disc disease), lumbar    7. Lumbosacral spondylosis without myelopathy    8. Lumbar radiculopathy    9. Encounter for therapeutic drug monitoring    . He complains of pain in the lower back, right shoulder, left hip He rates the pain 6/10 and describes it as sharp, aching. Current treatment regimen has helped relieve about 50% of the pain. He denies any side effects from the current pain regimen. Patient reports that since the last follow up visit the physical functioning is better, family/social relationships are better, mood is better sleep patterns are better, and that the overall functioning is better. Patient denies misusing/abusing his narcotic pain medications or using any illegal drugs. There are No indicators for possible drug abuse, addiction or diversion problems, patient states he has been doing fair, his pain has been manageable with the medications. Mr. Magi Klein mentions he is using Norco 3-4 per day. Patient denies any constipation symptoms. Patient denies any constipation symptoms. Patient reports his weight has been stable. ALLERGIES: Patients list of allergies were reviewed     MEDICATIONS: Mr. Magi Klein list of medications were reviewed. His current medications are   Outpatient Medications Prior to Visit   Medication Sig Dispense Refill    metFORMIN (GLUCOPHAGE-XR) 500 mg extended release tablet TAKE 2 TABLETS BY MOUTH EVERY DAY WITH BREAKFAST 60 tablet 0    hydrALAZINE (APRESOLINE) 25 MG tablet Take 25 mg by mouth in the morning and at bedtime      spironolactone (ALDACTONE) 25 MG tablet Take 25 mg by mouth daily      mupirocin (BACTROBAN) 2 % ointment Apply 22 g topically 3 times daily Apply topically 3 times daily.  Dulaglutide (TRULICITY) 3 LY/8.4FO SOPN Inject 3 mg into the skin once a week 4 pen 3    insulin glargine (LANTUS SOLOSTAR) 100 UNIT/ML injection pen INJECT 46 UNITS UNDER THE SKIN DAILY 5 pen 5    insulin aspart (NOVOLOG FLEXPEN) 100 UNIT/ML injection pen 18-24 units AC TID 10 pen 5    Insulin Pen Needle 32G X 4 MM MISC 1 each by Does not apply route 4 times daily 120 each 3    Blood Glucose Monitoring Suppl (FREESTYLE LITE) RO As needed 1 each 0    blood glucose test strips (FREESTYLE LITE) strip USE TO TEST FOUR TIMES A  strip 0    FreeStyle Lancets MISC USE FOUR TIMES A  each 0    lisinopril (PRINIVIL;ZESTRIL) 30 MG tablet Take 30 mg by mouth daily      FreeStyle Lancets MISC USE AS DIRECTED 100 each 3    Insulin Pen Needle (PEN NEEDLES) 31G X 6 MM MISC 1 each by In Vitro route 4 times daily 200 each 3    omeprazole (PRILOSEC) 20 MG delayed release capsule TAKE 1 CAPSULE BY MOUTH EVERY DAY      Cholecalciferol (VITAMIN D PO) Take 1 tablet by mouth every 7 days Pt to clarify dose      furosemide (LASIX) 20 MG tablet Take 40 mg by mouth daily       Fluticasone Propionate (FLONASE NA) 1 spray by Nasal route daily Each nostril      SALINE MIST SPRAY NA 1 spray by Nasal route 3 times daily as needed Each nostril 2-3 times per day      Blood Glucose Monitoring Suppl (ONE TOUCH ULTRA 2) w/Device KIT 1 kit by Does not apply route daily 1 kit 0    Lancets MISC 1 each by Does not apply route 3 times daily 100 each 3    MELATONIN PO Take 1 tablet by mouth nightly       loratadine (CLARITIN) 10 MG tablet Take 10 mg by mouth daily       atorvastatin (LIPITOR) 80 MG tablet Take 80 mg by mouth daily.       gabapentin (NEURONTIN) 300 MG capsule Take 1 tablet po in the morning and take 2 tablets po in the evening 90 capsule 1    methocarbamol (ROBAXIN) 500 MG tablet Take 1 tablet by mouth 2 times daily as needed (muscle stiffness) 60 tablet 1    meloxicam (MOBIC) 15 MG tablet Take 1 tablet po every Monday,Wednesday, and Friday 30 tablet 1     No facility-administered medications prior to visit. REVIEW OF SYSTEMS:    Respiratory: Negative for apnea, chest tightness and shortness of breath or change in baseline breathing. PHYSICAL EXAM:   Nursing note and vitals reviewed. /80   Pulse 83   Ht 5' 6\" (1.676 m)   Wt 219 lb 3.2 oz (99.4 kg)   SpO2 97%   BMI 35.38 kg/m²   Constitutional: He appears well-developed and well-nourished. No acute distress. Cardiovascular: Normal rate, regular rhythm, normal heart sounds, and does not have murmur. Pulmonary/Chest: Effort normal. No respiratory distress. He does not have wheezes in the lung fields. He has no rales. Neurological/Psychiatric:He is alert and oriented to person, place, and time. Coordination is  normal.  His mood isAppropriate and affect is Neutral/Euthymic(normal) . His  Other: using a cane, +limp     IMPRESSION:   1. Chronic pain syndrome    2. Primary osteoarthritis of left hip    3. Fibromyalgia    4. Chronic left shoulder pain    5. DDD (degenerative disc disease), lumbar    6. Lumbosacral spondylosis without myelopathy    7.  Lumbar radiculopathy        PLAN:  Informed verbal consent was obtained  -ROM/Stretching exercises as advised   -He was advised to increase fluids ( 5-7  glasses of fluid daily), limit caffeine, avoid cheese products, increase dietary fiber, increase activity and exercise as tolerated and relax regularly and enjoy meals   -Walking 30 minutes daily as tolerated   -Continue with Norco 3-4 per day  -He was advised weight reduction, diet changes- 800-1200 shad diet, diet diary, exercising, nutritional  consult increased physical activity as tolerated      Current Outpatient Medications   Medication Sig Dispense Refill    HYDROcodone-acetaminophen (NORCO) 5-325 MG per tablet Take 1 tablet by mouth every 6 hours as needed for Pain (max 3-4 day) for up to 30 days. 120 tablet 0    gabapentin (NEURONTIN) 300 MG capsule Take 1 tablet po in the morning and take 2 tablets po in the evening 90 capsule 1    methocarbamol (ROBAXIN) 500 MG tablet Take 1 tablet by mouth 2 times daily as needed (muscle stiffness) 60 tablet 1    meloxicam (MOBIC) 15 MG tablet Take 1 tablet po every Monday,Wednesday, and Friday 30 tablet 1    metFORMIN (GLUCOPHAGE-XR) 500 mg extended release tablet TAKE 2 TABLETS BY MOUTH EVERY DAY WITH BREAKFAST 60 tablet 0    hydrALAZINE (APRESOLINE) 25 MG tablet Take 25 mg by mouth in the morning and at bedtime      spironolactone (ALDACTONE) 25 MG tablet Take 25 mg by mouth daily      mupirocin (BACTROBAN) 2 % ointment Apply 22 g topically 3 times daily Apply topically 3 times daily.       Dulaglutide (TRULICITY) 3 QD/9.3VY SOPN Inject 3 mg into the skin once a week 4 pen 3    insulin glargine (LANTUS SOLOSTAR) 100 UNIT/ML injection pen INJECT 46 UNITS UNDER THE SKIN DAILY 5 pen 5    insulin aspart (NOVOLOG FLEXPEN) 100 UNIT/ML injection pen 18-24 units AC TID 10 pen 5    Insulin Pen Needle 32G X 4 MM MISC 1 each by Does not apply route 4 times daily 120 each 3    Blood Glucose Monitoring Suppl (FREESTYLE LITE) RO As needed 1 each 0    blood glucose test strips (FREESTYLE LITE) strip USE TO TEST FOUR TIMES A  strip 0    FreeStyle Lancets MISC USE FOUR TIMES A  each 0    lisinopril (PRINIVIL;ZESTRIL) 30 MG tablet Take 30 mg by mouth daily      FreeStyle Lancets MISC USE AS DIRECTED 100 each 3    Insulin Pen Needle (PEN NEEDLES) 31G X 6 MM MISC 1 each by In Vitro route 4 times daily 200 each 3    omeprazole (PRILOSEC) 20 MG delayed release capsule TAKE 1 CAPSULE BY MOUTH EVERY DAY      Cholecalciferol (VITAMIN D PO) Take 1 tablet by mouth every 7 days Pt to clarify dose      furosemide (LASIX) 20 MG tablet Take 40 mg by mouth daily       Fluticasone Propionate (FLONASE NA) 1 spray by Nasal route daily Each nostril      SALINE MIST SPRAY NA 1 spray by Nasal route 3 times daily as needed Each nostril 2-3 times per day      Blood Glucose Monitoring Suppl (ONE TOUCH ULTRA 2) w/Device KIT 1 kit by Does not apply route daily 1 kit 0    Lancets MISC 1 each by Does not apply route 3 times daily 100 each 3    MELATONIN PO Take 1 tablet by mouth nightly       loratadine (CLARITIN) 10 MG tablet Take 10 mg by mouth daily       atorvastatin (LIPITOR) 80 MG tablet Take 80 mg by mouth daily. No current facility-administered medications for this visit. I will continue his current medication regimen  which is part of the above treatment schedule. It has been helping with Mr. Jens Muhammad chronic  medical problems which for this visit include:   Diagnoses of Chronic pain syndrome, Primary osteoarthritis of left hip, Fibromyalgia, Chronic left shoulder pain, Myofascial pain, DDD (degenerative disc disease), lumbar, Lumbosacral spondylosis without myelopathy, Lumbar radiculopathy, and Encounter for therapeutic drug monitoring were pertinent to this visit. Risks and benefits of the medications and other alternative treatments  including no treatment were discussed with the patient. The common side effects of these medications were also explained to the patient. Informed verbal consent was obtained. Goals of current treatment regimen include improvement in pain, restoration of functioning- with focus on improvement in physical performance, general activity, work or disability,emotional distress, health care utilization and  decreased medication consumption. Will continue to monitor progress towards achieving/maintaining therapeutic goals with special emphasis on  1. Improvement in perceived interfernce  of pain with ADL's. Ability to do home exercises independently. Ability to do household chores indoor and/or outdoor work and social and leisure activities. Improve psychosocial and physical functioning. - he is showing progression towards this treatment goal with the current regimen. He was advised against drinking alcohol with the narcotic pain medicines, advised against driving or handling machinery while adjusting the dose of medicines or if having cognitive  issues related to the current medications. Risk of overdose and death, if medicines not taken as prescribed, were also discussed. If the patient develops new symptoms or if the symptoms worsen, the patient should call the office. While transcribing every attempt was made to maintain the accuracy of the note in terms of it's contents,there may have been some errors made inadvertently. Thank you for allowing me to participate in the care of this patient.     Queenie Bautista MD.    Cc: Mackenzie Kumari MD

## 2022-06-24 ENCOUNTER — HOSPITAL ENCOUNTER (OUTPATIENT)
Dept: PHYSICAL THERAPY | Age: 62
Setting detail: THERAPIES SERIES
Discharge: HOME | End: 2022-06-24
Payer: COMMERCIAL

## 2022-06-24 PROCEDURE — 97110 THERAPEUTIC EXERCISES: CPT

## 2022-06-24 PROCEDURE — 97140 MANUAL THERAPY 1/> REGIONS: CPT

## 2022-06-24 NOTE — FLOWSHEET NOTE
[address] [place] [address]  Phone: [phone]   Fax: [phone]    Physical Therapy Treatment Note/ Progress Report:     Date:  2022    Patient Name:  Debbie Joshua    :  1960  MRN: [id number]     Restrictions/Precautions:  None  Noted    Medical/Treatment Diagnosis Information:  · Diagnosis: G89.4 (ICD-10-CM) - Chronic pain syndrome  · Treatment Diagnosis: dec R shoulder AROM and strength, mild R GHJ hypomobility, impaired posture  ·   Ins[facility]Certification information: [facility]   Physician Information:   Shahnaz Harris MD  Plan of care signed (Y/N): []  Yes [x]  No     Date of Patient follow up with Physician: 22     Progress Report: []  Yes  [x]  No     Date Range for reporting period:  Beginnin22  Ending:     Progress report due (10 Rx/or 30 days whichever is less): visit #10 or  (date)     Recertification due (POC duration/ or 90 days whichever is less): visit #12 or  (date)     Visit # Insurance Allowable Auth required? Date Range   eval + 3/12 30 v/yr  96 units this episode [x]  Yes - [facility]  []  No 22 - 22     Relevant Medical History:   Arthritis  Bipolar 1 disorder  Degenerative disc disease, lumbar  Diabetes mellitus type 2,    Essential hypertension  Mixed hyperlipidemia 2020  Pure hyperglyceridemia     FINDINGS:   Alignment at the [facility] joint and glenohumeral joint appears normal.  Mild   spurring is seen at the [facility] joint.  Mild spurring seen at the glenohumeral   joint.  No focal lung consolidation noted. Latex Allergy:  [x]NO      []YES  Preferred Language for Healthcare:   [x]English       []other:    Functional Scale:             FOTO physical FS primary measure score = 38; risk adjusted = 45  22     Pain level:  7-8/10      SUBJECTIVE: Patient had a fall back in 2022, falling bwd onto R elbow and shoulder. Pt reports x ray imaging showed spurs and degenerative changes in R shoulder. Pain is located in posterior R shoulder (pt reaches to R infraspinatus mm belly). Pt will get N/T here as well. When pain increases, pain feels like he will get an ice pick stabbing sensation. Pt has been using heat on shoulder and this feels good. Heating pad to wrap around his shoulder. Pain does radiate into right side of neck. Pt is aware of his posture and diapragmatic breathing and believes this helps manage his pain. Pain while driving, turning steering wheel, and he doesn't know where to position R UE. Pain with writing, to some extent. Pt has a lap table to help shoulder while writing. Pt has been doing aquatic exercises, though for arm exercises, performs these gently. 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 6/16   Pt reports shoulder is more irritated this date than normal. Would like \"heat treatment. \"   6/21 - shoulder and UT remain sore.        PROM AROM     L R L R   Shoulder Flexion      170 80*   Shoulder Abduction        80* (worse than flexion)   Shoulder External Rotation      T3 CT jcn   Shoulder Internal Rotation      TL jcn R PSIS*   Elbow Flexion        WFL   Elbow Extension      5 deg from neutral 5 deg from neutral         Strength (0-5) Left Right    Shoulder Shrug (C4)       Shoulder Flex   2+*   Shoulder Abd (C5)   2+*   Shoulder ER   4*   Shoulder IR   4* Worse than ER   Biceps (C6)   4*   Triceps (C7)   4* Worse than biceps         Therapeutic Exercise (37015)  30  Notes/Cues    Right shoulder pain   UBE add    Pulleys 2 min         Tband      Row      Red  2 x 10    extension Red  2 x 10    IR add    ER add         Seated  Cervical side bending  Cervical rotation   X 5 R/L  X 5 R/L                   narciso ER    Standing  Bilateral flexion   Orange 2 x 10      10    Supine     flexion add    serratus add         sidelying     ER adda    Abd add         Therapeutic Activities (33097)                    Neuromuscular Re-ed (82723) Ball on wall: up/down, L/R, CW, CCW add                   Manual Intervention (71301)     12               STM  GH joint/UT 12 min  NORMA         Modalities             8 min       US 1.5 w/cm2 100% RIGHT gh JOINT/UT x 8 min         Hold 2/2 norma                            Pt. Education:  -pt educated on diagnosis, prognosis and expectations for rehab  -all pt questions were answered    Home Exercise Program:  Access Code: [email or web address]  URL: [email or web address]  Date: 06/07/2022  Prepared by: Kanwal Linares    Exercises  Isometric Shoulder Flexion at Wall - 1 x daily - 7 x weekly - 1 sets - 10 reps - 5 hold  Isometric Shoulder Abduction at Wall - 1 x daily - 7 x weekly - 1 sets - 10 reps - 5 hold  Isometric Shoulder Adduction - 1 x daily - 7 x weekly - 1 sets - 10 reps - 5 hold  Seated Shoulder Abduction Towel Slide at Table Top - 2 x daily - 7 x weekly - 1 sets - 20 reps  Seated Shoulder Flexion Towel Slide at Table Top - 2 x daily - 7 x weekly - 1 sets - 20 reps  Supine Shoulder Flexion AAROM with Dowel - 1 x daily - 7 x weekly - 2 sets - 10 reps      Therapeutic Exercise and NMR EXR  [x] (15654) Provided verbal/tactile cueing for activities related to strengthening, flexibility, endurance, ROM  for improvements in scapular, scapulothoracic and UE control with self care, reaching, carrying, lifting, house/yardwork, driving/computer work. [x] (82575) Provided verbal/tactile cueing for activities related to improving balance, coordination, kinesthetic sense, posture, motor skill, proprioception  to assist with  scapular, scapulothoracic and UE control with self care, reaching, carrying, lifting, house/yardwork, driving/computer work.   [x] (04735) Therapist is in constant attendance of 2 or more patients providing skilled therapy interventions, but not providing any significant amount of measurable one-on-one time to either patient, for improvements in cervical, scapular, scapulothoracic and UE control with self care, reaching, carrying, lifting, house/yardwork, driving, computer work. Therapeutic Activities:    [x] (98981 or 64820) Provided verbal/tactile cueing for activities related to improving balance, coordination, kinesthetic sense, posture, motor skill, proprioception and motor activation to allow for proper function of scapular, scapulothoracic and UE control with self care, carrying, lifting, driving/computer work.      Home Exercise Program:    [x] (06290) Reviewed/Progressed HEP activities related to strengthening, flexibility, endurance, ROM of scapular, scapulothoracic and UE control with self care, reaching, carrying, lifting, house/yardwork, driving/computer work  [] (04905) Reviewed/Progressed HEP activities related to improving balance, coordination, kinesthetic sense, posture, motor skill, proprioception of scapular, scapulothoracic and UE control with self care, reaching, carrying, lifting, house/yardwork, driving/computer work      Manual Treatments:  PROM / STM / Oscillations-Mobs:  G-I, II, III, IV (PA's, Inf., Post.)  [x] (30494) Provided manual therapy to mobilize soft tissue/joints of cervical/CT, scapular GHJ and UE for the purpose of modulating pain, promoting relaxation,  increasing ROM, reducing/eliminating soft tissue swelling/inflammation/restriction, improving soft tissue extensibility and allowing for proper ROM for normal function with self care, reaching, carrying, lifting, house/yardwork, driving/computer work      Charges:  Timed Code Treatment Minutes: 45   Total Treatment Minutes: 45     Units approved Units used Date Range   96 3 6/16       [] EVAL - LOW (37810)   [] EVAL - MOD (93707)  [] EVAL - HIGH (82411)  [] RE-EVAL (78074)  [x] EM(77973) x 2      [] Ionto  [] NMR (71841) x       [] Vaso  [x] Manual (86814) x  1     [] Ultrasound  [] TA x        [] Mech Traction (96486)  [] Aquatic Therapy x                      ES (un) (38536):   [] Home Management Training x  [] ES(attended) (33826)   [] Dry Needling 1-2 muscles (78686):  [] Dry Needling 3+ muscles (079208  [] Group:      [] Other:                    GOALS:  Patient stated goal: be able to drive with less pain  []? Progressing: []? Met: []? Not Met: []? Adjusted     Therapist goals for Patient:   Short Term Goals: To be achieved in: 2 weeks  1. Independent in HEP and progression per patient tolerance, in order to prevent re-injury. []? Progressing: []? Met: []? Not Met: []? Adjusted  2. Patient will have a decrease in pain to facilitate improvement in movement, function, and ADLs as indicated by Functional Deficits. []? Progressing: []? Met: []? Not Met: []? Adjusted     Long Term Goals: To be achieved in: 6 weeks  1. FOTO score of at least 58 to assist with reaching prior level of function. []? Progressing: []? Met: []? Not Met: []? Adjusted  2. Patient will demonstrate increased AROM to 140 deg flexion, functional ER to T3, and functional IR to TL jcn to allow for proper joint functioning as indicated by patients Functional Deficits. []? Progressing: []? Met: []? Not Met: []? Adjusted  3. Patient will demonstrate an increase in Strength to 4/5 to allow for proper functional mobility as indicated by patients Functional Deficits. []? Progressing: []? Met: []? Not Met: []? Adjusted  4. Patient will return to functional activities including driving, care giving  without increased symptoms or restriction. []? Progressing: []? Met: []? Not Met: []? Adjusted    Overall Progression Towards Functional goals/ Treatment Progress Update:  [] Patient is progressing as expected towards functional goals listed. [] Progression is slowed due to complexities/Impairments listed. [] Progression has been slowed due to co-morbidities.   [x] Plan just implemented, too soon to assess goals progression <30days   [] Goals require adjustment due to lack of progress  [] Patient is not progressing as expected and requires additional follow up with physician  [] Other    ASSESSMENT:  Pt transferring to [facility] office after today's visit. Pt had one more visit scheduled in  but has decided to save approved units for QC office. Cueing given for form throughout session, lilo with TB exercises to avoid UT compensation. Pt reported slight increase in pain following the session and again mentioned that he thinks having a hot pack will help. Treatment/Activity Tolerance:  [x] Pt able to complete treatment [] Patient limited by fatique  [] Patient limited by pain  [] Patient limited by other medical complications  [] Other:     Prognosis:  [] Good [x] Fair  [] Poor    Patient Requires Follow-up: [x] Yes  [] No      PLAN: See eval. PT 2x / week for 6 weeks. [x] Continue per plan of care [] Alter current plan (see comments)  [] Plan of care initiated [] Hold pending MD visit [] Discharge    Electronically signed by: Robb Worley, PT, DPT      Note: If patient does not return for scheduled/ recommended follow up visits, this note will serve as a discharge from care along with most recent update on progress.

## 2022-06-29 ENCOUNTER — HOSPITAL ENCOUNTER (OUTPATIENT)
Dept: PHYSICAL THERAPY | Age: 62
Setting detail: THERAPIES SERIES
Discharge: HOME | End: 2022-06-29
Payer: COMMERCIAL

## 2022-06-29 PROCEDURE — 97140 MANUAL THERAPY 1/> REGIONS: CPT | Performed by: CHIROPRACTOR

## 2022-06-29 PROCEDURE — 97110 THERAPEUTIC EXERCISES: CPT | Performed by: CHIROPRACTOR

## 2022-06-29 NOTE — FLOWSHEET NOTE
[address] [facility]  Phone: [phone]   Fax: [phone]    Physical Therapy Treatment Note/ Progress Report:     Date:  2022    Patient Name:  Brian Licea    :  1960  MRN: [id number]     Restrictions/Precautions:  None  Noted    Medical/Treatment Diagnosis Information:  · Diagnosis: G89.4 (ICD-10-CM) - Chronic pain syndrome  · Treatment Diagnosis: dec R shoulder AROM and strength, mild R GHJ hypomobility, impaired posture  ·   Insurance/Certification information: [facility]   Physician Information:   Heather Rausch MD  Plan of care signed (Y/N): []  Yes [x]  No     Date of Patient follow up with Physician: 22     Progress Report: []  Yes  [x]  No     Date Range for reporting period:  Beginnin22  Ending:     Progress report due (10 Rx/or 30 days whichever is less): visit #10 or  (date)     Recertification due (POC duration/ or 90 days whichever is less): visit #12 or  (date)     Visit # Insurance Allowable Auth required? Date Range   eval +  30 v/yr  96 units this episode [x]  Yes - [facility]  []  No 22 - 22     Relevant Medical History:   Arthritis  Bipolar 1 disorder  Degenerative disc disease, lumbar  Diabetes mellitus type 2,    Essential hypertension  Mixed hyperlipidemia 2020  Pure hyperglyceridemia     FINDINGS:   Alignment at the [facility] joint and glenohumeral joint appears normal.  Mild   spurring is seen at the [facility] joint.  Mild spurring seen at the glenohumeral   joint.  No focal lung consolidation noted. Latex Allergy:  [x]NO      []YES  Preferred Language for Healthcare:   [x]English       []other:    Functional Scale:             FOTO physical FS primary measure score = 38; risk adjusted = 45  22     Pain level:  10      SUBJECTIVE: Patient had a fall back in 2022, falling bwd onto R elbow and shoulder. Pt reports x ray imaging showed spurs and degenerative changes in R shoulder. Pain is located in posterior R shoulder (pt reaches to R infraspinatus mm belly). Pt will get N/T here as well. When pain increases, pain feels like he will get an ice pick stabbing sensation. Pt has been using heat on shoulder and this feels good. Heating pad to wrap around his shoulder. Pain does radiate into right side of neck. Pt is aware of his posture and diapragmatic breathing and believes this helps manage his pain. Pain while driving, turning steering wheel, and he doesn't know where to position R UE. Pain with writing, to some extent. Pt has a lap table to help shoulder while writing. Pt has been doing aquatic exercises, though for arm exercises, performs these gently. 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 6/16   Pt reports shoulder is more irritated this date than normal. Would like \"heat treatment. \"   6/21 - shoulder and UT remain sore.        PROM AROM     L R L R   Shoulder Flexion      170 80*   Shoulder Abduction        80* (worse than flexion)   Shoulder External Rotation      T3 CT jcn   Shoulder Internal Rotation      TL jcn R PSIS*   Elbow Flexion        WFL   Elbow Extension      5 deg from neutral 5 deg from neutral         Strength (0-5) Left Right    Shoulder Shrug (C4)       Shoulder Flex   2+*   Shoulder Abd (C5)   2+*   Shoulder ER   4*   Shoulder IR   4* Worse than ER   Biceps (C6)   4*   Triceps (C7)   4* Worse than biceps         Therapeutic Exercise (22593)  30  Notes/Cues    Right shoulder pain   UBE 2 min    Pulleys 2 min         Tband      Row      Red  2 x 10    extension Red  2 x 10    IR Red  2 x 10    ER Red  2 x 10         Seated  Cervical side bending  Cervical rotation   X 5 R/L  X 5 R/L                   narciso ER    Standing  Bilateral flexion   Orange 2 x 10      10    Supine     flexion 1# - 1x10    R    serratus 1# - 1x10   R         sidelying     ER add    Abd add         Therapeutic Activities (43335) Neuromuscular Re-ed (64237)          Ball on wall: up/down, L/R, CW, CCW add                   Manual Intervention (80473)     12               STM  GH joint/UT 12 min  NORMA         Modalities             8 min       US 1.5 w/cm2 100% RIGHT gh JOINT/UT x 8 min         Hold 2/2 norma                            Pt. Education:  -pt educated on diagnosis, prognosis and expectations for rehab  -all pt questions were answered    Home Exercise Program:  Access Code: [email or web address]  URL: [email or web address]  Date: 06/07/2022  Prepared by: Mao Grady    Exercises  Isometric Shoulder Flexion at Wall - 1 x daily - 7 x weekly - 1 sets - 10 reps - 5 hold  Isometric Shoulder Abduction at Wall - 1 x daily - 7 x weekly - 1 sets - 10 reps - 5 hold  Isometric Shoulder Adduction - 1 x daily - 7 x weekly - 1 sets - 10 reps - 5 hold  Seated Shoulder Abduction Towel Slide at Table Top - 2 x daily - 7 x weekly - 1 sets - 20 reps  Seated Shoulder Flexion Towel Slide at Table Top - 2 x daily - 7 x weekly - 1 sets - 20 reps  Supine Shoulder Flexion AAROM with Dowel - 1 x daily - 7 x weekly - 2 sets - 10 reps      Therapeutic Exercise and NMR EXR  [x] (13089) Provided verbal/tactile cueing for activities related to strengthening, flexibility, endurance, ROM  for improvements in scapular, scapulothoracic and UE control with self care, reaching, carrying, lifting, house/yardwork, driving/computer work. [x] (69621) Provided verbal/tactile cueing for activities related to improving balance, coordination, kinesthetic sense, posture, motor skill, proprioception  to assist with  scapular, scapulothoracic and UE control with self care, reaching, carrying, lifting, house/yardwork, driving/computer work.   [x] (08822) Therapist is in constant attendance of 2 or more patients providing skilled therapy interventions, but not providing any significant amount of measurable one-on-one time to either patient, for improvements in cervical, scapular, scapulothoracic and UE control with self care, reaching, carrying, lifting, house/yardwork, driving, computer work. Therapeutic Activities:    [x] (64242 or 17144) Provided verbal/tactile cueing for activities related to improving balance, coordination, kinesthetic sense, posture, motor skill, proprioception and motor activation to allow for proper function of scapular, scapulothoracic and UE control with self care, carrying, lifting, driving/computer work.      Home Exercise Program:    [x] (64494) Reviewed/Progressed HEP activities related to strengthening, flexibility, endurance, ROM of scapular, scapulothoracic and UE control with self care, reaching, carrying, lifting, house/yardwork, driving/computer work  [] (89779) Reviewed/Progressed HEP activities related to improving balance, coordination, kinesthetic sense, posture, motor skill, proprioception of scapular, scapulothoracic and UE control with self care, reaching, carrying, lifting, house/yardwork, driving/computer work      Manual Treatments:  PROM / STM / Oscillations-Mobs:  G-I, II, III, IV (PA's, Inf., Post.)  [x] (98937) Provided manual therapy to mobilize soft tissue/joints of cervical/CT, scapular GHJ and UE for the purpose of modulating pain, promoting relaxation,  increasing ROM, reducing/eliminating soft tissue swelling/inflammation/restriction, improving soft tissue extensibility and allowing for proper ROM for normal function with self care, reaching, carrying, lifting, house/yardwork, driving/computer work      Charges:  Timed Code Treatment Minutes: 50   Total Treatment Minutes: 50     Units approved Units used Date Range   96 3 6/16       [] EVAL - LOW (71611)   [] EVAL - MOD (45133)  [] EVAL - HIGH (78681)  [] RE-EVAL (79079)  [x] ARAGON(27594) x 2      [] Ionto  [] NMR (19998) x       [] Vaso  [x] Manual (77881) x  1     [] Ultrasound  [] TA x        [] Mech Traction (10718)  [] Aquatic Therapy x                      ES (un) (16357):   [] Home Management Training x  [] ES(attended) (54160)   [] Dry Needling 1-2 muscles (90744):  [] Dry Needling 3+ muscles (916800  [] Group:      [] Other:                    GOALS:  Patient stated goal: be able to drive with less pain  []? Progressing: []? Met: []? Not Met: []? Adjusted     Therapist goals for Patient:   Short Term Goals: To be achieved in: 2 weeks  1. Independent in HEP and progression per patient tolerance, in order to prevent re-injury. []? Progressing: []? Met: []? Not Met: []? Adjusted  2. Patient will have a decrease in pain to facilitate improvement in movement, function, and ADLs as indicated by Functional Deficits. []? Progressing: []? Met: []? Not Met: []? Adjusted     Long Term Goals: To be achieved in: 6 weeks  1. FOTO score of at least 58 to assist with reaching prior level of function. []? Progressing: []? Met: []? Not Met: []? Adjusted  2. Patient will demonstrate increased AROM to 140 deg flexion, functional ER to T3, and functional IR to TL jcn to allow for proper joint functioning as indicated by patients Functional Deficits. []? Progressing: []? Met: []? Not Met: []? Adjusted  3. Patient will demonstrate an increase in Strength to 4/5 to allow for proper functional mobility as indicated by patients Functional Deficits. []? Progressing: []? Met: []? Not Met: []? Adjusted  4. Patient will return to functional activities including driving, care giving  without increased symptoms or restriction. []? Progressing: []? Met: []? Not Met: []? Adjusted    Overall Progression Towards Functional goals/ Treatment Progress Update:  [] Patient is progressing as expected towards functional goals listed. [] Progression is slowed due to complexities/Impairments listed. [] Progression has been slowed due to co-morbidities.   [x] Plan just implemented, too soon to assess goals progression <30days   [] Goals require adjustment due to lack of progress  [] Patient is not progressing as expected and requires additional follow up with physician  [] Other    ASSESSMENT:  Pt transferring to [facility] office after today's visit. Pt had one more visit scheduled in  but has decided to save approved units for QC office. Cueing given for form throughout session, lilo with TB exercises to avoid UT compensation. Pt reported slight increase in pain following the session and again mentioned that he thinks having a hot pack will help. Treatment/Activity Tolerance:  [x] Pt able to complete treatment [] Patient limited by fatique  [] Patient limited by pain  [] Patient limited by other medical complications  [] Other:     Prognosis:  [] Good [x] Fair  [] Poor    Patient Requires Follow-up: [x] Yes  [] No      PLAN: See eval. PT 2x / week for 6 weeks. [x] Continue per plan of care [] Alter current plan (see comments)  [] Plan of care initiated [] Hold pending MD visit [] Discharge    Electronically signed by: JAVON Bearden#[id number]      Note: If patient does not return for scheduled/ recommended follow up visits, this note will serve as a discharge from care along with most recent update on progress.

## 2022-07-01 ENCOUNTER — HOSPITAL ENCOUNTER (OUTPATIENT)
Dept: PHYSICAL THERAPY | Age: 62
Setting detail: THERAPIES SERIES
Discharge: HOME | End: 2022-07-01
Payer: COMMERCIAL

## 2022-07-01 DIAGNOSIS — E11.65 UNCONTROLLED TYPE 2 DIABETES MELLITUS WITH HYPERGLYCEMIA (HCC): ICD-10-CM

## 2022-07-01 PROCEDURE — 97035 APP MDLTY 1+ULTRASOUND EA 15: CPT

## 2022-07-01 PROCEDURE — 97110 THERAPEUTIC EXERCISES: CPT

## 2022-07-01 PROCEDURE — 97140 MANUAL THERAPY 1/> REGIONS: CPT

## 2022-07-01 NOTE — FLOWSHEET NOTE
[facility]  Phone: [phone]   Fax: [phone]    Physical Therapy Treatment Note/ Progress Report:     Date:  2022    Patient Name:  Alley Marshall    :  1960  MRN: [id number]     Restrictions/Precautions:  None  Noted    Medical/Treatment Diagnosis Information:  · Diagnosis: G89.4 (ICD-10-CM) - Chronic pain syndrome  · Treatment Diagnosis: dec R shoulder AROM and strength, mild R GHJ hypomobility, impaired posture  ·   [facility]   Physician Information:   Naida Lorenzo MD  Plan of care signed (Y/N): []  Yes [x]  No     Date of Patient follow up with Physician: 22     Progress Report: []  Yes  [x]  No     Date Range for reporting period:  Beginnin22  Ending:     Progress report due (10 Rx/or 30 days whichever is less): visit #10 or  (date)     Recertification due (POC duration/ or 90 days whichever is less): visit #12 or  (date)     Visit # Insurance Allowable Auth required? Date Range   eval +  30 v/yr  96 units this episode [x]  Yes - [facility]  []  No 22 - 22     Relevant Medical History:   Arthritis  Bipolar 1 disorder  Degenerative disc disease, lumbar  Diabetes mellitus type 2,    Essential hypertension  Mixed hyperlipidemia 2020  Pure hyperglyceridemia     FINDINGS:   Alignment at the [facility] joint and glenohumeral joint appears normal.  Mild   spurring is seen at the [facility] joint.  Mild spurring seen at the glenohumeral   joint.  No focal lung consolidation noted. Latex Allergy:  [x]NO      []YES  Preferred Language for Healthcare:   [x]English       []other:    Functional Scale:             FOTO physical FS primary measure score = 38; risk adjusted = 45  22     Pain level:  7/10      SUBJECTIVE: Patient had a fall back in 2022, falling bwd onto R elbow and shoulder. Pt reports x ray imaging showed spurs and degenerative changes in R shoulder.  Pain is located in posterior R shoulder (pt reaches to R infraspinatus mm belly). Pt will get N/T here as well. When pain increases, pain feels like he will get an ice pick stabbing sensation. Pt has been using heat on shoulder and this feels good. Heating pad to wrap around his shoulder. Pain does radiate into right side of neck. Pt is aware of his posture and diapragmatic breathing and believes this helps manage his pain. Pain while driving, turning steering wheel, and he doesn't know where to position R UE. Pain with writing, to some extent. Pt has a lap table to help shoulder while writing. Pt has been doing aquatic exercises, though for arm exercises, performs these gently. 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 6/16   Pt reports shoulder is more irritated this date than normal. Would like \"heat treatment. \"   6/21 - shoulder and UT remain sore.   7/1 -  Sore after 6/24 treatment session, feels he may have overdone it        PROM AROM     L R L R   Shoulder Flexion      170 80*   Shoulder Abduction        80* (worse than flexion)   Shoulder External Rotation      T3 CT jcn   Shoulder Internal Rotation      TL jcn R PSIS*   Elbow Flexion        WFL   Elbow Extension      5 deg from neutral 5 deg from neutral         Strength (0-5) Left Right    Shoulder Shrug (C4)       Shoulder Flex   2+*   Shoulder Abd (C5)   2+*   Shoulder ER   4*   Shoulder IR   4* Worse than ER   Biceps (C6)   4*   Triceps (C7)   4* Worse than biceps         Therapeutic Exercise (33061)  20  Notes/Cues    Right shoulder pain   UBE 2 min    Pulleys 2 min         Tband      Row      Red  2 x 10    extension Red  2 x 10    IR Hold today, resume as raji   ER         Seated  Cervical side bending  Cervical rotation   X 5 R/L  X 5 R/L                   narciso ER    Standing  Bilateral flexion   Orange 2 x 10      10    Supine     flexion serratus      sidelying     ER add    Abd add         Therapeutic Activities (12611)                    Neuromuscular Re-ed (14216)          Ball on wall: up/down, L/R, CW, CCW add                   Manual Intervention (51454)     15               STM  GH joint/UT 15 min  NORMA         Modalities             8 min       US 1.5 w/cm2 100% RIGHT gh JOINT/UT x 8 min         Hold 2/2 norma                            Pt. Education:  -pt educated on diagnosis, prognosis and expectations for rehab  -all pt questions were answered    Home Exercise Program:  Access Code: [email or web address]  URL: [employer]/  Date: 06/07/2022  Prepared by: Dominique Herrera    Exercises  Isometric Shoulder Flexion at Wall - 1 x daily - 7 x weekly - 1 sets - 10 reps - 5 hold  Isometric Shoulder Abduction at Wall - 1 x daily - 7 x weekly - 1 sets - 10 reps - 5 hold  Isometric Shoulder Adduction - 1 x daily - 7 x weekly - 1 sets - 10 reps - 5 hold  Seated Shoulder Abduction Towel Slide at Table Top - 2 x daily - 7 x weekly - 1 sets - 20 reps  Seated Shoulder Flexion Towel Slide at Table Top - 2 x daily - 7 x weekly - 1 sets - 20 reps  Supine Shoulder Flexion AAROM with Dowel - 1 x daily - 7 x weekly - 2 sets - 10 reps      Therapeutic Exercise and NMR EXR  [x] (12754) Provided verbal/tactile cueing for activities related to strengthening, flexibility, endurance, ROM  for improvements in scapular, scapulothoracic and UE control with self care, reaching, carrying, lifting, house/yardwork, driving/computer work. [x] (50584) Provided verbal/tactile cueing for activities related to improving balance, coordination, kinesthetic sense, posture, motor skill, proprioception  to assist with  scapular, scapulothoracic and UE control with self care, reaching, carrying, lifting, house/yardwork, driving/computer work.   [x] (00891) Therapist is in constant attendance of 2 or more patients providing skilled therapy interventions, but not providing any significant amount of measurable one-on-one time to either patient, for improvements in cervical, scapular, scapulothoracic and UE control with self care, reaching, carrying, lifting, house/yardwork, driving, computer work. Therapeutic Activities:    [x] (12411 or 44390) Provided verbal/tactile cueing for activities related to improving balance, coordination, kinesthetic sense, posture, motor skill, proprioception and motor activation to allow for proper function of scapular, scapulothoracic and UE control with self care, carrying, lifting, driving/computer work.      Home Exercise Program:    [x] (74138) Reviewed/Progressed HEP activities related to strengthening, flexibility, endurance, ROM of scapular, scapulothoracic and UE control with self care, reaching, carrying, lifting, house/yardwork, driving/computer work  [] (87076) Reviewed/Progressed HEP activities related to improving balance, coordination, kinesthetic sense, posture, motor skill, proprioception of scapular, scapulothoracic and UE control with self care, reaching, carrying, lifting, house/yardwork, driving/computer work      Manual Treatments:  PROM / STM / Oscillations-Mobs:  G-I, II, III, IV (PA's, Inf., Post.)  [x] (58314) Provided manual therapy to mobilize soft tissue/joints of cervical/CT, scapular GHJ and UE for the purpose of modulating pain, promoting relaxation,  increasing ROM, reducing/eliminating soft tissue swelling/inflammation/restriction, improving soft tissue extensibility and allowing for proper ROM for normal function with self care, reaching, carrying, lifting, house/yardwork, driving/computer work      Charges:  Timed Code Treatment Minutes: 43   Total Treatment Minutes: 43     Units approved Units used Date Range   96 12 7/1       [] EVAL - LOW (96353)   [] EVAL - MOD (65299)  [] EVAL - HIGH (05411)  [] RE-EVAL (79284)  [x] HN(27406) x 2      [] Ionto  [] NMR (42685) x       [] Vaso  [x] Manual (83217) x  1     [] Ultrasound  [] TA x        [] Mech Traction (27264)  [] Aquatic Therapy x ES (un) (69275):   [] Home Management Training x  [] ES(attended) (78246)   [] Dry Needling 1-2 muscles (31619):  [] Dry Needling 3+ muscles (783516  [] Group:      [] Other:                    GOALS:  Patient stated goal: be able to drive with less pain  []? Progressing: []? Met: []? Not Met: []? Adjusted     Therapist goals for Patient:   Short Term Goals: To be achieved in: 2 weeks  1. Independent in HEP and progression per patient tolerance, in order to prevent re-injury. []? Progressing: []? Met: []? Not Met: []? Adjusted  2. Patient will have a decrease in pain to facilitate improvement in movement, function, and ADLs as indicated by Functional Deficits. []? Progressing: []? Met: []? Not Met: []? Adjusted     Long Term Goals: To be achieved in: 6 weeks  1. FOTO score of at least 58 to assist with reaching prior level of function. []? Progressing: []? Met: []? Not Met: []? Adjusted  2. Patient will demonstrate increased AROM to 140 deg flexion, functional ER to T3, and functional IR to TL jcn to allow for proper joint functioning as indicated by patients Functional Deficits. []? Progressing: []? Met: []? Not Met: []? Adjusted  3. Patient will demonstrate an increase in Strength to 4/5 to allow for proper functional mobility as indicated by patients Functional Deficits. []? Progressing: []? Met: []? Not Met: []? Adjusted  4. Patient will return to functional activities including driving, care giving  without increased symptoms or restriction. []? Progressing: []? Met: []? Not Met: []? Adjusted    Overall Progression Towards Functional goals/ Treatment Progress Update:  [] Patient is progressing as expected towards functional goals listed. [] Progression is slowed due to complexities/Impairments listed. [] Progression has been slowed due to co-morbidities.   [x] Plan just implemented, too soon to assess goals progression <30days   [] Goals require adjustment due to lack of progress  [] Patient is not progressing as expected and requires additional follow up with physician  [] Other    ASSESSMENT:  Pt transferring to [facility] office after today's visit. Pt had one more visit scheduled in  but has decided to save approved units for [facility] office. Cueing given for form throughout session, lilo with TB exercises to avoid UT compensation. Pt reported slight increase in pain following the session and again mentioned that he thinks having a hot pack will help. Treatment/Activity Tolerance:  [x] Pt able to complete treatment [] Patient limited by fatique  [] Patient limited by pain  [] Patient limited by other medical complications  [] Other:     Prognosis:  [] Good [x] Fair  [] Poor    Patient Requires Follow-up: [x] Yes  [] No      PLAN: See eval. PT 2x / week for 6 weeks. [x] Continue per plan of care [] Alter current plan (see comments)  [] Plan of care initiated [] Hold pending MD visit [] Discharge    Electronically signed by: Ca Boone PT, [id number]      Note: If patient does not return for scheduled/ recommended follow up visits, this note will serve as a discharge from care along with most recent update on progress.

## 2022-07-05 RX ORDER — METFORMIN HYDROCHLORIDE 500 MG/1
TABLET, EXTENDED RELEASE ORAL
Qty: 60 TABLET | Refills: 0 | Status: SHIPPED | OUTPATIENT
Start: 2022-07-05 | End: 2022-07-21 | Stop reason: SDUPTHER

## 2022-07-06 ENCOUNTER — HOSPITAL ENCOUNTER (OUTPATIENT)
Dept: PHYSICAL THERAPY | Age: 62
Setting detail: THERAPIES SERIES
Discharge: HOME | End: 2022-07-06
Payer: COMMERCIAL

## 2022-07-06 NOTE — FLOWSHEET NOTE
[facility]  [address] [facility] [place], [facility]  Phone: [phone]   Fax:     [phone]    Physical Therapy  No-show Note for evaluation.   Patient Name:  Bradford Ren  :  1960   Date:  2022  Cancelled visits to date: 0  No-shows to date:  2    Patient status for today's appointment patient:  []  Cancelled  []  Rescheduled appointment  [x]  No-show     Reason given by patient:  []  Patient ill  []  Conflicting appointment  []  No transportation    []  Conflict with work  []  No reason given  []  Other:     Comments:      Phone call information:   []  Phone call made today to patient at _ time at number provided:      []  Patient answered, conversation as follows:    []  Patient did not answer, message left as follows:  []  Phone call not made today    Electronically signed by:  Hermenia Sandhoff  [id number]

## 2022-07-08 ENCOUNTER — HOSPITAL ENCOUNTER (OUTPATIENT)
Dept: PHYSICAL THERAPY | Age: 62
Setting detail: THERAPIES SERIES
Discharge: HOME | End: 2022-07-08
Payer: COMMERCIAL

## 2022-07-08 NOTE — FLOWSHEET NOTE
[facility]  [address] [facility] [place], [facility]  Phone: [phone]   Fax:     [phone]    Physical Therapy  Cancelled   Patient Name:  Sonia Baker  :  1960   Date:  2022  Cancelled visits to date: 1  No-shows to date:  2    Patient status for today's appointment patient:  covid - next week visits removed  [x]  Cancelled  []  Rescheduled appointment  []  No-show     Reason given by patient:  [x]  Patient ill  []  Conflicting appointment  []  No transportation    []  Conflict with work  []  No reason given  []  Other:     Comments:      Phone call information:   []  Phone call made today to patient at _ time at number provided:      []  Patient answered, conversation as follows:    []  Patient did not answer, message left as follows:  []  Phone call not made today    Electronically signed by:  Oscar Jimenez, Ros Rodriguez

## 2022-07-12 ENCOUNTER — HOSPITAL ENCOUNTER (OUTPATIENT)
Dept: PHYSICAL THERAPY | Age: 62
Setting detail: THERAPIES SERIES
End: 2022-07-12
Payer: COMMERCIAL

## 2022-07-15 ENCOUNTER — APPOINTMENT (OUTPATIENT)
Dept: PHYSICAL THERAPY | Age: 62
End: 2022-07-15
Payer: COMMERCIAL

## 2022-07-19 RX ORDER — DULAGLUTIDE 1.5 MG/.5ML
INJECTION, SOLUTION SUBCUTANEOUS
Refills: 3 | OUTPATIENT
Start: 2022-07-19

## 2022-07-20 ENCOUNTER — HOSPITAL ENCOUNTER (OUTPATIENT)
Dept: PHYSICAL THERAPY | Age: 62
Setting detail: THERAPIES SERIES
Discharge: HOME | End: 2022-07-20
Payer: COMMERCIAL

## 2022-07-20 PROCEDURE — 97035 APP MDLTY 1+ULTRASOUND EA 15: CPT | Performed by: CHIROPRACTOR

## 2022-07-20 PROCEDURE — 97110 THERAPEUTIC EXERCISES: CPT | Performed by: CHIROPRACTOR

## 2022-07-20 PROCEDURE — 97140 MANUAL THERAPY 1/> REGIONS: CPT | Performed by: CHIROPRACTOR

## 2022-07-20 NOTE — FLOWSHEET NOTE
[address] [place] [address]  Phone: [phone]   Fax: [phone]    Physical Therapy Treatment Note/ Progress Report:     Date:  2022    Patient Name:  Marietta Bonilla    :  1960  MRN: [id number]     Restrictions/Precautions:  None  Noted    Medical/Treatment Diagnosis Information:  Diagnosis: G89.4 (ICD-10-CM) - Chronic pain syndrome  Treatment Diagnosis: dec R shoulder AROM and strength, mild R GHJ hypomobility, impaired posture    Insurance/Certification information: [facility]   Physician Information:   Bonnee Siemens, MD  Plan of care signed (Y/N): []  Yes [x]  No     Date of Patient follow up with Physician: 22     Progress Report: []  Yes  [x]  No     Date Range for reporting period:  Beginnin22  Ending:     Progress report due (10 Rx/or 30 days whichever is less): visit #10 or  (date)     Recertification due (POC duration/ or 90 days whichever is less): visit #12 or  (date)     Visit # Insurance Allowable Auth required? Date Range   eval +  30 v/yr  96 units this episode [x]  [facility]  [facility]  No 22 - 22     Relevant Medical History:   Arthritis  Bipolar 1 disorder  Degenerative disc disease, lumbar  Diabetes mellitus type 2,    Essential hypertension  Mixed hyperlipidemia 2020  Pure hyperglyceridemia     FINDINGS:   Alignment at the [facility] joint and glenohumeral joint appears normal.  Mild   spurring is seen at the [facility] joint. Mild spurring seen at the glenohumeral   joint. No focal lung consolidation noted. Latex Allergy:  [x]NO      []YES  Preferred Language for Healthcare:   [x]English       []other:    Functional Scale:             FOTO physical FS primary measure score = 38; risk adjusted = 45  22     Pain level:  7/10      SUBJECTIVE: Patient had a fall back in 2022, falling bwd onto R elbow and shoulder. Pt reports x ray imaging showed spurs and degenerative changes in R shoulder.  Pain is located in posterior R shoulder (pt reaches to R infraspinatus mm belly). Pt will get N/T here as well. When pain increases, pain feels like he will get an ice pick stabbing sensation. Pt has been using heat on shoulder and this feels good. Heating pad to wrap around his shoulder. Pain does radiate into right side of neck. Pt is aware of his posture and diapragmatic breathing and believes this helps manage his pain. Pain while driving, turning steering wheel, and he doesn't know where to position R UE. Pain with writing, to some extent. Pt has a lap table to help shoulder while writing. Pt has been doing aquatic exercises, though for arm exercises, performs these gently. 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 6/16   Pt reports shoulder is more irritated this date than normal. Would like \"heat treatment. \"   6/21 - shoulder and UT remain sore.   7/1 -  Sore after 6/24 treatment session, feels he may have overdone it        PROM AROM     L R L R   Shoulder Flexion      170 80*   Shoulder Abduction        80* (worse than flexion)   Shoulder External Rotation      T3 CT jcn   Shoulder Internal Rotation      TL jcn R PSIS*   Elbow Flexion        WFL   Elbow Extension      5 deg from neutral 5 deg from neutral         Strength (0-5) Left Right    Shoulder Shrug (C4)       Shoulder Flex   2+*   Shoulder Abd (C5)   2+*   Shoulder ER   4*   Shoulder IR   4* Worse than ER   Biceps (C6)   4*   Triceps (C7)   4* Worse than biceps         Therapeutic Exercise (50444)  20  Notes/Cues    Right shoulder pain   UBE 2 min    Pulleys 2 min         Tband      Row      Red  2 x 10    extension Red  2 x 10    IR Hold today, resume as raji   ER         Seated  Cervical side bending  Cervical rotation   X 5 R/L  X 5 R/L                   narciso ER    Standing  Bilateral flexion   Orange 2 x 10      10    Supine     flexion serratus      sidelying     ER add    Abd add         Therapeutic Activities (06104)                    Neuromuscular Re-ed (37299)          Ball on wall: up/down, L/R, CW, CCW add                   Manual Intervention (67260)     15               STM  GH joint/UT 15 min  NORMA         Modalities             8 min       US 1.5 w/cm2 100% RIGHT gh JOINT/UT x 8 min         Hold 2/2 norma                               Pt. Education:  -pt educated on diagnosis, prognosis and expectations for rehab  -all pt questions were answered    Home Exercise Program:  Access Code: [email or web address]  URL: [employer]/  Date: 06/07/2022  Prepared by: Dinah Araujo    Exercises  Isometric Shoulder Flexion at Wall - 1 x daily - 7 x weekly - 1 sets - 10 reps - 5 hold  Isometric Shoulder Abduction at Wall - 1 x daily - 7 x weekly - 1 sets - 10 reps - 5 hold  Isometric Shoulder Adduction - 1 x daily - 7 x weekly - 1 sets - 10 reps - 5 hold  Seated Shoulder Abduction Towel Slide at Table Top - 2 x daily - 7 x weekly - 1 sets - 20 reps  Seated Shoulder Flexion Towel Slide at Table Top - 2 x daily - 7 x weekly - 1 sets - 20 reps  Supine Shoulder Flexion AAROM with Dowel - 1 x daily - 7 x weekly - 2 sets - 10 reps      Therapeutic Exercise and NMR EXR  [x] (41380) Provided verbal/tactile cueing for activities related to strengthening, flexibility, endurance, ROM  for improvements in scapular, scapulothoracic and UE control with self care, reaching, carrying, lifting, house/yardwork, driving/computer work. [x] (56977) Provided verbal/tactile cueing for activities related to improving balance, coordination, kinesthetic sense, posture, motor skill, proprioception  to assist with  scapular, scapulothoracic and UE control with self care, reaching, carrying, lifting, house/yardwork, driving/computer work.   [x] (26390) Therapist is in constant attendance of 2 or more patients providing skilled therapy interventions, but not providing any significant amount of measurable one-on-one time to either patient, for improvements in cervical, scapular, scapulothoracic and UE control with self care, reaching, carrying, lifting, house/yardwork, driving, computer work. Therapeutic Activities:    [x] (93579 or 80756) Provided verbal/tactile cueing for activities related to improving balance, coordination, kinesthetic sense, posture, motor skill, proprioception and motor activation to allow for proper function of scapular, scapulothoracic and UE control with self care, carrying, lifting, driving/computer work.      Home Exercise Program:    [x] (39452) Reviewed/Progressed HEP activities related to strengthening, flexibility, endurance, ROM of scapular, scapulothoracic and UE control with self care, reaching, carrying, lifting, house/yardwork, driving/computer work  [] (22394) Reviewed/Progressed HEP activities related to improving balance, coordination, kinesthetic sense, posture, motor skill, proprioception of scapular, scapulothoracic and UE control with self care, reaching, carrying, lifting, house/yardwork, driving/computer work      Manual Treatments:  PROM / STM / Oscillations-Mobs:  G-I, II, III, IV (PA's, Inf., Post.)  [x] (57804) Provided manual therapy to mobilize soft tissue/joints of cervical/CT, scapular GHJ and UE for the purpose of modulating pain, promoting relaxation,  increasing ROM, reducing/eliminating soft tissue swelling/inflammation/restriction, improving soft tissue extensibility and allowing for proper ROM for normal function with self care, reaching, carrying, lifting, house/yardwork, driving/computer work      Charges:  Timed Code Treatment Minutes: 43   Total Treatment Minutes: 43     Units approved Units used Date Range   96 15 7/1       [] EVAL - LOW (77797)   [] EVAL - MOD (73766)  [] EVAL - HIGH (92865)  [] RE-EVAL (53941)  [x] LG(04746) x      [] Ionto  [] NMR (27300) x       [] Vaso  [x] Manual (96328) x  1     [x] Ultrasound  [] TA x        [] Mech Traction (34024)  [] Aquatic Therapy x ES (un) (78984):   [] Home Management Training x  [] ES(attended) (12662)   [] Dry Needling 1-2 muscles (79928):  [] Dry Needling 3+ muscles (012420  [] Group:      [] Other:                    GOALS:  Patient stated goal: be able to drive with less pain  [] Progressing: [] Met: [] Not Met: [] Adjusted     Therapist goals for Patient:   Short Term Goals: To be achieved in: 2 weeks  1. Independent in HEP and progression per patient tolerance, in order to prevent re-injury. [] Progressing: [] Met: [] Not Met: [] Adjusted  2. Patient will have a decrease in pain to facilitate improvement in movement, function, and ADLs as indicated by Functional Deficits. [] Progressing: [] Met: [] Not Met: [] Adjusted     Long Term Goals: To be achieved in: 6 weeks  1. FOTO score of at least 58 to assist with reaching prior level of function. [] Progressing: [] Met: [] Not Met: [] Adjusted  2. Patient will demonstrate increased AROM to 140 deg flexion, functional ER to T3, and functional IR to TL jcn to allow for proper joint functioning as indicated by patients Functional Deficits. [] Progressing: [] Met: [] Not Met: [] Adjusted  3. Patient will demonstrate an increase in Strength to 4/5 to allow for proper functional mobility as indicated by patients Functional Deficits. [] Progressing: [] Met: [] Not Met: [] Adjusted  4. Patient will return to functional activities including driving, care giving  without increased symptoms or restriction. [] Progressing: [] Met: [] Not Met: [] Adjusted    Overall Progression Towards Functional goals/ Treatment Progress Update:  [] Patient is progressing as expected towards functional goals listed. [] Progression is slowed due to complexities/Impairments listed. [] Progression has been slowed due to co-morbidities.   [x] Plan just implemented, too soon to assess goals progression <30days   [] Goals require adjustment due to lack of progress  [] Patient is not progressing as

## 2022-07-21 DIAGNOSIS — E11.65 UNCONTROLLED TYPE 2 DIABETES MELLITUS WITH HYPERGLYCEMIA (HCC): ICD-10-CM

## 2022-07-21 RX ORDER — DULAGLUTIDE 3 MG/.5ML
3 INJECTION, SOLUTION SUBCUTANEOUS WEEKLY
Qty: 4 PEN | Refills: 0 | Status: SHIPPED | OUTPATIENT
Start: 2022-07-21 | End: 2022-09-13

## 2022-07-21 RX ORDER — METFORMIN HYDROCHLORIDE 500 MG/1
TABLET, EXTENDED RELEASE ORAL
Qty: 60 TABLET | Refills: 0 | Status: SHIPPED | OUTPATIENT
Start: 2022-07-21 | End: 2022-08-30

## 2022-07-21 RX ORDER — INSULIN ASPART 100 [IU]/ML
INJECTION, SOLUTION INTRAVENOUS; SUBCUTANEOUS
Qty: 10 PEN | Refills: 0 | Status: SHIPPED | OUTPATIENT
Start: 2022-07-21 | End: 2022-09-14 | Stop reason: SDUPTHER

## 2022-07-21 RX ORDER — BLOOD-GLUCOSE METER
KIT MISCELLANEOUS
Qty: 100 STRIP | Refills: 0 | Status: SHIPPED | OUTPATIENT
Start: 2022-07-21

## 2022-07-21 RX ORDER — LANCETS 28 GAUGE
EACH MISCELLANEOUS
Qty: 100 EACH | Refills: 0 | Status: SHIPPED | OUTPATIENT
Start: 2022-07-21

## 2022-07-21 NOTE — TELEPHONE ENCOUNTER
Medication:   Requested Prescriptions     Pending Prescriptions Disp Refills    Insulin Pen Needle 32G X 4 MM MISC 120 each 0     Si each by Does not apply route 4 times daily    blood glucose test strips (FREESTYLE LITE) strip 100 strip 0     Sig: USE TO TEST FOUR TIMES A DAY    FreeStyle Lancets MISC 100 each 0     Sig: USE FOUR TIMES A DAY    insulin aspart (NOVOLOG FLEXPEN) 100 UNIT/ML injection pen 10 pen 0     Si-24 units AC TID    Dulaglutide (TRULICITY) 3 TT/0.9WO SOPN 4 pen 0     Sig: Inject 3 mg into the skin once a week    metFORMIN (GLUCOPHAGE-XR) 500 MG extended release tablet 60 tablet 0     Sig: TAKE 2 TABLETS BY MOUTH EVERY DAY WITH BREAKFAST       Last Filled:      Patient Phone Number: [phone] (home)     Last appt: 3/24/22   Next appt: 22    Last Labs DM:   Lab Results   Component Value Date/Time    LABA1C 8.4 2021 12:35 PM

## 2022-07-22 ENCOUNTER — HOSPITAL ENCOUNTER (OUTPATIENT)
Dept: PHYSICAL THERAPY | Age: 62
Setting detail: THERAPIES SERIES
Discharge: HOME | End: 2022-07-22
Payer: COMMERCIAL

## 2022-07-22 PROCEDURE — 97110 THERAPEUTIC EXERCISES: CPT

## 2022-07-22 PROCEDURE — 97035 APP MDLTY 1+ULTRASOUND EA 15: CPT

## 2022-07-22 PROCEDURE — 97140 MANUAL THERAPY 1/> REGIONS: CPT

## 2022-07-22 NOTE — FLOWSHEET NOTE
[address] [place] [address]  Phone: [phone]   Fax: [phone]    Physical Therapy Treatment Note/ Progress Report:     Date:  2022    Patient Name:  Selina Chicas    :  1960  MRN: [id number]     Restrictions/Precautions:  None  Noted    Medical/Treatment Diagnosis Information:  Diagnosis: G89.4 (ICD-10-CM) - Chronic pain syndrome  Treatment Diagnosis: dec R shoulder AROM and strength, mild R GHJ hypomobility, impaired posture    Insurance/Certification information: [facility]   Physician Information:   Norma Mistry MD  Plan of care signed (Y/N): []  Yes [x]  No     Date of Patient follow up with Physician: 22     Progress Report: []  Yes  [x]  No     Date Range for reporting period:  Beginnin22  Ending:     Progress report due (10 Rx/or 30 days whichever is less): visit #10 or  (date)     Recertification due (POC duration/ or 90 days whichever is less): visit #12 or  (date)     Visit # Insurance Allowable Auth required? Date Range   eval +  30 v/yr  96 units this episode [x]  [facility]   22 - 22     Relevant Medical History:   Arthritis  Bipolar 1 disorder  Degenerative disc disease, lumbar  Diabetes mellitus type 2,    Essential hypertension  Mixed hyperlipidemia 2020  Pure hyperglyceridemia     FINDINGS:   Alignment at the [facility] joint and glenohumeral joint appears normal.  Mild   spurring is seen at the [facility] joint. Mild spurring seen at the glenohumeral   joint. No focal lung consolidation noted. Latex Allergy:  [x]NO      []YES  Preferred Language for Healthcare:   [x]English       []other:    Functional Scale:             FOTO physical FS primary measure score = 38; risk adjusted = 45  22     Pain level:  7/10      SUBJECTIVE: initial Eval - Patient had a fall back in 2022, falling bwd onto R elbow and shoulder. Pt reports x ray imaging showed spurs and degenerative changes in R shoulder.  Pain is located in posterior R shoulder (pt reaches to R infraspinatus mm belly). Pt will get N/T here as well. When pain increases, pain feels like he will get an ice pick stabbing sensation. Pt has been using heat on shoulder and this feels good. Heating pad to wrap around his shoulder. Pain does radiate into right side of neck. Pt is aware of his posture and diapragmatic breathing and believes this helps manage his pain. Pain while driving, turning steering wheel, and he doesn't know where to position R UE. Pain with writing, to some extent. Pt has a lap table to help shoulder while writing. Pt has been doing aquatic exercises, though for arm exercises, performs these gently. 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 6/16   Pt reports shoulder is more irritated this date than normal. Would like \"heat treatment. \"   6/21 - shoulder and UT remain sore.   7/1 -  Sore after 6/24 treatment session, feels he may have overdone it  7/22 -        PROM AROM     L R L R  eval                                              7/21   Shoulder Flexion      170 80*                                                  160   Shoulder Abduction        80* (worse than flexion)                 155   Shoulder External Rotation      T3 CT jcn   Shoulder Internal Rotation      TL jcn R PSIS*   Elbow Flexion        WFL   Elbow Extension      5 deg from neutral 5 deg from neutral         Strength (0-5) Left Right   eval                                  7/21   Shoulder Shrug (C4)       Shoulder Flex   2+*                                                     4- no pain   Shoulder Abd (C5)   2+*                                                      4- no pain   Shoulder ER   4*                                                         4 no pain   Shoulder IR   4* Worse than ER                             4+ painful   Biceps (C6)   4*    Triceps (C7)   4* Worse than biceps         Therapeutic Exercise (35261)  20 Notes/Cues    Right shoulder pain   UBE 2 min    Pulleys 2 min      Cane   IR  Pull across               Regions Embarke  2 x 10    extension Red  2 x 10    IR Red  2 x 10 Hold today, resume as raji   ER Red  2 x 10         Seated  Cervical side bending  Cervical rotation   X 5 R/L  X 5 R/L                   narciso ER    Standing  Bilateral flexion   Orange 2 x 10      10    Supine     flexion 1# - 1x10    R Hold today, resume as raji   serratus 1# - 1x10   R         sidelying     ER add    Abd add         Therapeutic Activities (79775)                    Neuromuscular Re-ed (53412)          Ball on wall: up/down, L/R, CW, CCW add                   Manual Intervention (02302)     15               STM  GH joint/UT 15 min  NORMA         Modalities             8 min       US 1.5 w/cm2 100% RIGHT gh JOINT/UT x 8 min         Hold 2/2 norma                               Pt. Education:  -pt educated on diagnosis, prognosis and expectations for rehab  -all pt questions were answered    Home Exercise Program:  Access Code: [email or web address]  URL: [email or web address]  Date: 06/07/2022  Prepared by: Akiko Archer    Exercises  Isometric Shoulder Flexion at Wall - 1 x daily - 7 x weekly - 1 sets - 10 reps - 5 hold  Isometric Shoulder Abduction at Wall - 1 x daily - 7 x weekly - 1 sets - 10 reps - 5 hold  Isometric Shoulder Adduction - 1 x daily - 7 x weekly - 1 sets - 10 reps - 5 hold  Seated Shoulder Abduction Towel Slide at Table Top - 2 x daily - 7 x weekly - 1 sets - 20 reps  Seated Shoulder Flexion Towel Slide at Table Top - 2 x daily - 7 x weekly - 1 sets - 20 reps  Supine Shoulder Flexion AAROM with Dowel - 1 x daily - 7 x weekly - 2 sets - 10 reps      Therapeutic Exercise and NMR EXR  [x] (83768) Provided verbal/tactile cueing for activities related to strengthening, flexibility, endurance, ROM  for improvements in scapular, scapulothoracic and UE control with self care, reaching, carrying, lifting, house/yardwork, driving/computer work. [x] (78976) Provided verbal/tactile cueing for activities related to improving balance, coordination, kinesthetic sense, posture, motor skill, proprioception  to assist with  scapular, scapulothoracic and UE control with self care, reaching, carrying, lifting, house/yardwork, driving/computer work. [x] (07708) Therapist is in constant attendance of 2 or more patients providing skilled therapy interventions, but not providing any significant amount of measurable one-on-one time to either patient, for improvements in cervical, scapular, scapulothoracic and UE control with self care, reaching, carrying, lifting, house/yardwork, driving, computer work. Therapeutic Activities:    [x] (71078 or 70324) Provided verbal/tactile cueing for activities related to improving balance, coordination, kinesthetic sense, posture, motor skill, proprioception and motor activation to allow for proper function of scapular, scapulothoracic and UE control with self care, carrying, lifting, driving/computer work.      Home Exercise Program:    [x] (60272) Reviewed/Progressed HEP activities related to strengthening, flexibility, endurance, ROM of scapular, scapulothoracic and UE control with self care, reaching, carrying, lifting, house/yardwork, driving/computer work  [] (21244) Reviewed/Progressed HEP activities related to improving balance, coordination, kinesthetic sense, posture, motor skill, proprioception of scapular, scapulothoracic and UE control with self care, reaching, carrying, lifting, house/yardwork, driving/computer work      Manual Treatments:  PROM / STM / Oscillations-Mobs:  G-I, II, III, IV (PA's, Inf., Post.)  [x] (66318) Provided manual therapy to mobilize soft tissue/joints of cervical/CT, scapular GHJ and UE for the purpose of modulating pain, promoting relaxation,  increasing ROM, reducing/eliminating soft tissue swelling/inflammation/restriction, improving soft tissue extensibility and allowing for proper ROM for normal function with self care, reaching, carrying, lifting, house/yardwork, driving/computer work      Charges:  Timed Code Treatment Minutes: 43   Total Treatment Minutes: 43     Units approved Units used Date Range   96 18 7/21       [] EVAL - LOW (34791)   [] EVAL - MOD (58452)  [] EVAL - HIGH (14878)  [] RE-EVAL (83921)  [x] DD(30615) x      [] Ionto  [] NMR (57075) x       [] Vaso  [x] Manual (65937) x  1     [x] Ultrasound  [] TA x        [] Mech Traction (31909)  [] Aquatic Therapy x                      ES (un) (85933):   [] Home Management Training x  [] ES(attended) (43608)   [] Dry Needling 1-2 muscles (74578):  [] Dry Needling 3+ muscles (800713  [] Group:      [] Other:                    GOALS:  Patient stated goal: be able to drive with less pain  [] Progressing: [] Met: [] Not Met: [] Adjusted     Therapist goals for Patient:   Short Term Goals: To be achieved in: 2 weeks  1. Independent in HEP and progression per patient tolerance, in order to prevent re-injury. [] Progressing: [] Met: [] Not Met: [] Adjusted  2. Patient will have a decrease in pain to facilitate improvement in movement, function, and ADLs as indicated by Functional Deficits. [] Progressing: [] Met: [] Not Met: [] Adjusted     Long Term Goals: To be achieved in: 6 weeks  1. FOTO score of at least 58 to assist with reaching prior level of function. [] Progressing: [] Met: [] Not Met: [] Adjusted  2. Patient will demonstrate increased AROM to 140 deg flexion, functional ER to T3, and functional IR to TL jcn to allow for proper joint functioning as indicated by patients Functional Deficits. [] Progressing: [] Met: [] Not Met: [] Adjusted  3. Patient will demonstrate an increase in Strength to 4/5 to allow for proper functional mobility as indicated by patients Functional Deficits. [] Progressing: [] Met: [] Not Met: [] Adjusted  4.  Patient will return to functional activities including driving, care giving  without increased symptoms or restriction. [] Progressing: [] Met: [] Not Met: [] Adjusted    Overall Progression Towards Functional goals/ Treatment Progress Update:  [] Patient is progressing as expected towards functional goals listed. [] Progression is slowed due to complexities/Impairments listed. [] Progression has been slowed due to co-morbidities. [x] Plan just implemented, too soon to assess goals progression <30days   [] Goals require adjustment due to lack of progress  [] Patient is not progressing as expected and requires additional follow up with physician  [] Other    ASSESSMENT:  Pt transferring to [facility] office after today's visit. Pt had one more visit scheduled in  but has decided to save approved units for QC office. Cueing given for form throughout session, lilo with TB exercises to avoid UT compensation. Pt reported slight increase in pain following the session and again mentioned that he thinks having a hot pack will help. Treatment/Activity Tolerance:  [x] Pt able to complete treatment [] Patient limited by fatique  [] Patient limited by pain  [] Patient limited by other medical complications  [] Other:     Prognosis:  [] Good [x] Fair  [] Poor    Patient Requires Follow-up: [x] Yes  [] No      PLAN: Continue per POC - Sending for additional authorization - to extend date  [x] Continue per plan of care [] Alter current plan (see comments)  [] Plan of care initiated [] Hold pending MD visit [] Discharge    Electronically signed by: Gabi Belle, PT, [id number]      Note: If patient does not return for scheduled/ recommended follow up visits, this note will serve as a discharge from care along with most recent update on progress.

## 2022-07-28 ENCOUNTER — OFFICE VISIT (OUTPATIENT)
Dept: PAIN MANAGEMENT | Age: 62
End: 2022-07-28
Payer: COMMERCIAL

## 2022-07-28 VITALS
OXYGEN SATURATION: 96 % | SYSTOLIC BLOOD PRESSURE: 145 MMHG | WEIGHT: 222 LBS | DIASTOLIC BLOOD PRESSURE: 87 MMHG | HEART RATE: 88 BPM | BODY MASS INDEX: 35.83 KG/M2

## 2022-07-28 DIAGNOSIS — M54.16 LUMBAR RADICULOPATHY: ICD-10-CM

## 2022-07-28 DIAGNOSIS — M79.7 FIBROMYALGIA: ICD-10-CM

## 2022-07-28 DIAGNOSIS — M51.36 DDD (DEGENERATIVE DISC DISEASE), LUMBAR: ICD-10-CM

## 2022-07-28 DIAGNOSIS — M47.817 LUMBOSACRAL SPONDYLOSIS WITHOUT MYELOPATHY: ICD-10-CM

## 2022-07-28 DIAGNOSIS — M16.12 PRIMARY OSTEOARTHRITIS OF LEFT HIP: ICD-10-CM

## 2022-07-28 DIAGNOSIS — G89.4 CHRONIC PAIN SYNDROME: ICD-10-CM

## 2022-07-28 DIAGNOSIS — G89.29 CHRONIC LEFT SHOULDER PAIN: ICD-10-CM

## 2022-07-28 DIAGNOSIS — M25.512 CHRONIC LEFT SHOULDER PAIN: ICD-10-CM

## 2022-07-28 PROCEDURE — G8427 DOCREV CUR MEDS BY ELIG CLIN: HCPCS | Performed by: INTERNAL MEDICINE

## 2022-07-28 PROCEDURE — 1036F TOBACCO NON-USER: CPT | Performed by: INTERNAL MEDICINE

## 2022-07-28 PROCEDURE — 3017F COLORECTAL CA SCREEN DOC REV: CPT | Performed by: INTERNAL MEDICINE

## 2022-07-28 PROCEDURE — G8417 CALC BMI ABV UP PARAM F/U: HCPCS | Performed by: INTERNAL MEDICINE

## 2022-07-28 PROCEDURE — 99213 OFFICE O/P EST LOW 20 MIN: CPT | Performed by: INTERNAL MEDICINE

## 2022-07-28 RX ORDER — MELOXICAM 15 MG/1
TABLET ORAL
Qty: 30 TABLET | Refills: 1 | Status: SHIPPED | OUTPATIENT
Start: 2022-07-28 | End: 2022-09-01 | Stop reason: SDUPTHER

## 2022-07-28 RX ORDER — HYDROCODONE BITARTRATE AND ACETAMINOPHEN 5; 325 MG/1; MG/1
1 TABLET ORAL EVERY 6 HOURS PRN
Qty: 120 TABLET | Refills: 0 | Status: SHIPPED | OUTPATIENT
Start: 2022-07-28 | End: 2022-09-01 | Stop reason: SDUPTHER

## 2022-07-28 RX ORDER — METHOCARBAMOL 500 MG/1
500 TABLET, FILM COATED ORAL 2 TIMES DAILY PRN
Qty: 60 TABLET | Refills: 1 | Status: SHIPPED | OUTPATIENT
Start: 2022-07-28 | End: 2022-09-01 | Stop reason: SDUPTHER

## 2022-07-28 RX ORDER — GABAPENTIN 300 MG/1
CAPSULE ORAL
Qty: 90 CAPSULE | Refills: 1 | Status: SHIPPED | OUTPATIENT
Start: 2022-07-28 | End: 2022-09-01 | Stop reason: SDUPTHER

## 2022-07-28 NOTE — PROGRESS NOTES
Vikash Mole  1960  [id number]      HISTORY OF PRESENT ILLNESS:  Mr. Stacia English is a 60 y.o. male returns for a follow up visit for pain management  He has a diagnosis of   1. Chronic pain syndrome    2. Fibromyalgia    3. DDD (degenerative disc disease), lumbar    4. Myofascial pain    5. Primary osteoarthritis of left hip    6. Chronic left shoulder pain    7. Lumbar radiculopathy    8. Lumbosacral spondylosis without myelopathy    . He complains of pain in the  Neck, Low back,right shoulder left hip   He rates the pain 8/10 and describes it as sharp, aching. Current treatment regimen has helped relieve about 40% of the pain. He denies any side effects from the current pain regimen. Patient reports that since the last follow up visit the physical functioning is worse, family/social relationships are unchanged, mood is better sleep patterns are better, and that the overall functioning is better. Patient denies misusing/abusing his narcotic pain medications or using any illegal drugs. There are No indicators for possible drug abuse, addiction or diversion problems. Patient states he has been doing fair. He complains of pain in the right shoulder. He says he is has been doing PT exercises, but it still hurts. He mentions she is using Norco 3-4 per day. He says he is using all the other adjuvants. He states his weight has been stable. ALLERGIES: Patients list of allergies were reviewed     MEDICATIONS: Mr. Stacia English list of medications were reviewed. His current medications are   Outpatient Medications Prior to Visit   Medication Sig Dispense Refill    Insulin Pen Needle 32G X 4 MM MISC 1 each by Does not apply route 4 times daily 120 each 0    blood glucose test strips (FREESTYLE LITE) strip USE TO TEST FOUR TIMES A  strip 0    FreeStyle Lancets MISC USE FOUR TIMES A  each 0    insulin aspart (NOVOLOG FLEXPEN) 100 UNIT/ML injection pen 18-24 units AC TID 10 pen 0    Dulaglutide (TRULICITY) 3 MG/0.5ML SOPN Inject 3 mg into the skin once a week 4 pen 0    metFORMIN (GLUCOPHAGE-XR) 500 MG extended release tablet TAKE 2 TABLETS BY MOUTH EVERY DAY WITH BREAKFAST 60 tablet 0    hydrALAZINE (APRESOLINE) 25 MG tablet Take 25 mg by mouth in the morning and at bedtime      spironolactone (ALDACTONE) 25 MG tablet Take 25 mg by mouth daily      mupirocin (BACTROBAN) 2 % ointment Apply 22 g topically 3 times daily Apply topically 3 times daily. insulin glargine (LANTUS SOLOSTAR) 100 UNIT/ML injection pen INJECT 46 UNITS UNDER THE SKIN DAILY 5 pen 5    Blood Glucose Monitoring Suppl (FREESTYLE LITE) RO As needed 1 each 0    lisinopril (PRINIVIL;ZESTRIL) 30 MG tablet Take 30 mg by mouth daily      FreeStyle Lancets MISC USE AS DIRECTED 100 each 3    Insulin Pen Needle (PEN NEEDLES) 31G X 6 MM MISC 1 each by In Vitro route 4 times daily 200 each 3    omeprazole (PRILOSEC) 20 MG delayed release capsule TAKE 1 CAPSULE BY MOUTH EVERY DAY      Cholecalciferol (VITAMIN D PO) Take 1 tablet by mouth every 7 days Pt to clarify dose      furosemide (LASIX) 20 MG tablet Take 40 mg by mouth daily       Fluticasone Propionate (FLONASE NA) 1 spray by Nasal route daily Each nostril      SALINE MIST SPRAY NA 1 spray by Nasal route 3 times daily as needed Each nostril 2-3 times per day      Blood Glucose Monitoring Suppl (ONE TOUCH ULTRA 2) w/Device KIT 1 kit by Does not apply route daily 1 kit 0    Lancets MISC 1 each by Does not apply route 3 times daily 100 each 3    MELATONIN PO Take 1 tablet by mouth nightly       loratadine (CLARITIN) 10 MG tablet Take 10 mg by mouth daily       atorvastatin (LIPITOR) 80 MG tablet Take 80 mg by mouth daily.       gabapentin (NEURONTIN) 300 MG capsule Take 1 tablet po in the morning and take 2 tablets po in the evening 90 capsule 1    methocarbamol (ROBAXIN) 500 MG tablet Take 1 tablet by mouth 2 times daily as needed (muscle stiffness) 60 tablet 1    meloxicam (MOBIC) 15 MG tablet Take 1 tablet po every Monday,Wednesday, and Friday 30 tablet 1     No facility-administered medications prior to visit. REVIEW OF SYSTEMS:    Respiratory: Negative for apnea, chest tightness and shortness of breath or change in baseline breathing. PHYSICAL EXAM:   Nursing note and vitals reviewed. BP (!) 145/87   Pulse 88   Wt 222 lb (100.7 kg)   SpO2 96%   BMI 35.83 kg/m²   Constitutional: He appears well-developed and well-nourished. No acute distress. Cardiovascular: Normal rate, regular rhythm, normal heart sounds, and does not have murmur. Pulmonary/Chest: Effort normal. No respiratory distress. He does not have wheezes in the lung fields. He has no rales. Neurological/Psychiatric:He is alert and oriented to person, place, and time. Coordination is  normal.  His mood isAppropriate and affect is Neutral/Euthymic(normal) . IMPRESSION:   1. Chronic pain syndrome    2. Fibromyalgia    3. DDD (degenerative disc disease), lumbar    4. Primary osteoarthritis of left hip    5. Chronic left shoulder pain    6. Lumbar radiculopathy    7. Lumbosacral spondylosis without myelopathy        PLAN:  Informed verbal consent was obtained  -[place] was obtained and reviewed  for the last one year and no indicators of drug misuse  were found. Any other controlled substance prescriptions  seen on the record have been accounted for, I am aware of the patient receiving these medications. Ingrid Heaton [place] record will be rechecked as part of office protocol.     -MRI right shoulder   -Continue with PT exercises as advised   -He was advised to increase fluids ( 5-7  glasses of fluid daily), limit caffeine, avoid cheese products, increase dietary fiber, increase activity and exercise as tolerated and relax regularly and enjoy meals   -He was advised weight reduction, diet changes- 800-1200 shad diet, diet diary, exercising, nutritional  consult increased physical activity as tolerated   -Walking as tolerated   -Continue with Norco 3-4 per day    Current Outpatient Medications   Medication Sig Dispense Refill    gabapentin (NEURONTIN) 300 MG capsule Take 1 tablet po in the morning and take 2 tablets po in the evening 90 capsule 1    methocarbamol (ROBAXIN) 500 MG tablet Take 1 tablet by mouth 2 times daily as needed (muscle stiffness) 60 tablet 1    meloxicam (MOBIC) 15 MG tablet Take 1 tablet po every Monday,Wednesday, and Friday 30 tablet 1    HYDROcodone-acetaminophen (NORCO) 5-325 MG per tablet Take 1 tablet by mouth every 6 hours as needed for Pain (max 3-4 day) for up to 30 days. 120 tablet 0    Insulin Pen Needle 32G X 4 MM MISC 1 each by Does not apply route 4 times daily 120 each 0    blood glucose test strips (FREESTYLE LITE) strip USE TO TEST FOUR TIMES A  strip 0    FreeStyle Lancets MISC USE FOUR TIMES A  each 0    insulin aspart (NOVOLOG FLEXPEN) 100 UNIT/ML injection pen 18-24 units AC TID 10 pen 0    Dulaglutide (TRULICITY) 3 RC/5.6BW SOPN Inject 3 mg into the skin once a week 4 pen 0    metFORMIN (GLUCOPHAGE-XR) 500 MG extended release tablet TAKE 2 TABLETS BY MOUTH EVERY DAY WITH BREAKFAST 60 tablet 0    hydrALAZINE (APRESOLINE) 25 MG tablet Take 25 mg by mouth in the morning and at bedtime      spironolactone (ALDACTONE) 25 MG tablet Take 25 mg by mouth daily      mupirocin (BACTROBAN) 2 % ointment Apply 22 g topically 3 times daily Apply topically 3 times daily.       insulin glargine (LANTUS SOLOSTAR) 100 UNIT/ML injection pen INJECT 46 UNITS UNDER THE SKIN DAILY 5 pen 5    Blood Glucose Monitoring Suppl (FREESTYLE LITE) RO As needed 1 each 0    lisinopril (PRINIVIL;ZESTRIL) 30 MG tablet Take 30 mg by mouth daily      FreeStyle Lancets MISC USE AS DIRECTED 100 each 3    Insulin Pen Needle (PEN NEEDLES) 31G X 6 MM MISC 1 each by In Vitro route 4 times daily 200 each 3    omeprazole (PRILOSEC) 20 MG delayed release capsule TAKE 1 CAPSULE BY MOUTH EVERY DAY      Cholecalciferol (VITAMIN D PO) Take 1 tablet by mouth every 7 days Pt to clarify dose      furosemide (LASIX) 20 MG tablet Take 40 mg by mouth daily       Fluticasone Propionate (FLONASE NA) 1 spray by Nasal route daily Each nostril      SALINE MIST SPRAY NA 1 spray by Nasal route 3 times daily as needed Each nostril 2-3 times per day      Blood Glucose Monitoring Suppl (ONE TOUCH ULTRA 2) w/Device KIT 1 kit by Does not apply route daily 1 kit 0    Lancets MISC 1 each by Does not apply route 3 times daily 100 each 3    MELATONIN PO Take 1 tablet by mouth nightly       loratadine (CLARITIN) 10 MG tablet Take 10 mg by mouth daily       atorvastatin (LIPITOR) 80 MG tablet Take 80 mg by mouth daily. No current facility-administered medications for this visit. I will continue his current medication regimen  which is part of the above treatment schedule. It has been helping with Mr. Willa Griffin chronic  medical problems which for this visit include:   Diagnoses of Chronic pain syndrome, Fibromyalgia, DDD (degenerative disc disease), lumbar, Myofascial pain, Primary osteoarthritis of left hip, Chronic left shoulder pain, Lumbar radiculopathy, and Lumbosacral spondylosis without myelopathy were pertinent to this visit. Risks and benefits of the medications and other alternative treatments  including no treatment were discussed with the patient. The common side effects of these medications were also explained to the patient. Informed verbal consent was obtained. Goals of current treatment regimen include improvement in pain, restoration of functioning- with focus on improvement in physical performance, general activity, work or disability,emotional distress, health care utilization and  decreased medication consumption. Will continue to monitor progress towards achieving/maintaining therapeutic goals with special emphasis on  1. Improvement in perceived interfernce  of pain with ADL's. Ability to do home exercises independently.  Ability to do household chores indoor and/or outdoor work and social and leisure activities. Improve psychosocial and physical functioning. - he is showing progression towards this treatment goal with the current regimen. 2. Ability to focus/concentrate at work and perform the duties required of him at work  Sit through a workday without lower extremity symptoms. Stand 30-60 minutes without lower extremity symptoms. Ability to lift up to 10-20 lbs. Ability to go up and down stairs. Sit 30-60 minutes  Without having to stand up frequently. - he is maintaining/progressing towards his work related goals with the current regimen. He was advised against drinking alcohol with the narcotic pain medicines, advised against driving or handling machinery while adjusting the dose of medicines or if having cognitive  issues related to the current medications. Risk of overdose and death, if medicines not taken as prescribed, were also discussed. If the patient develops new symptoms or if the symptoms worsen, the patient should call the office. While transcribing every attempt was made to maintain the accuracy of the note in terms of it's contents,there may have been some errors made inadvertently. Thank you for allowing me to participate in the care of this patient.     Venessa Weaver MD.    Cc: Raymond Krishnan MD

## 2022-07-29 ENCOUNTER — HOSPITAL ENCOUNTER (OUTPATIENT)
Dept: PHYSICAL THERAPY | Age: 62
Setting detail: THERAPIES SERIES
Discharge: HOME | End: 2022-07-29
Payer: COMMERCIAL

## 2022-07-29 PROCEDURE — 97140 MANUAL THERAPY 1/> REGIONS: CPT | Performed by: CHIROPRACTOR

## 2022-07-29 PROCEDURE — 97035 APP MDLTY 1+ULTRASOUND EA 15: CPT | Performed by: CHIROPRACTOR

## 2022-07-29 NOTE — FLOWSHEET NOTE
[address] [place] [address]  Phone: [phone]   Fax: [phone]    Physical Therapy Treatment Note/ Progress Report:     Date:  2022    Patient Name:  Julian Candelaria    :  1960  MRN: [id number]     Restrictions/Precautions:  None  Noted    Medical/Treatment Diagnosis Information:  Diagnosis: G89.4 (ICD-10-CM) - Chronic pain syndrome  Treatment Diagnosis: dec R shoulder AROM and strength, mild R GHJ hypomobility, impaired posture    Insurance/Certification information: [facility]   Physician Information:   Juan R Wagner MD  Plan of care signed (Y/N): []  Yes [x]  No     Date of Patient follow up with Physician: 22     Progress Report: []  Yes  [x]  No     Date Range for reporting period:  Beginnin22  Ending:     Progress report due (10 Rx/or 30 days whichever is less): visit #10 or  (date)     Recertification due (POC duration/ or 90 days whichever is less): visit #12 or  (date)     Visit # Insurance Allowable Auth required? Date Range   eval +  30 v/yr  96 units this episode [x]  [facility]   [facility]     Relevant Medical History:   Arthritis  Bipolar 1 disorder  Degenerative disc disease, lumbar  Diabetes mellitus type 2,    Essential hypertension  Mixed hyperlipidemia 2020  Pure hyperglyceridemia     FINDINGS:   Alignment at the [facility] joint and glenohumeral joint appears normal.  Mild   spurring is seen at the [facility] joint. Mild spurring seen at the glenohumeral   joint. No focal lung consolidation noted. Latex Allergy:  [x]NO      []YES  Preferred Language for Healthcare:   [x]English       []other:    Functional Scale:             FOTO physical FS primary measure score = 38; risk adjusted = 45  22     Pain level:  7/10      SUBJECTIVE: initial Eval - Patient had a fall back in 2022, falling bwd onto R elbow and shoulder. Pt reports x ray imaging showed spurs and degenerative changes in R shoulder.  Pain is located in posterior R shoulder (pt reaches to R infraspinatus mm belly). Pt will get N/T here as well. When pain increases, pain feels like he will get an ice pick stabbing sensation. Pt has been using heat on shoulder and this feels good. Heating pad to wrap around his shoulder. Pain does radiate into right side of neck. Pt is aware of his posture and diapragmatic breathing and believes this helps manage his pain. Pain while driving, turning steering wheel, and he doesn't know where to position R UE. Pain with writing, to some extent. Pt has a lap table to help shoulder while writing. Pt has been doing aquatic exercises, though for arm exercises, performs these gently. 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 6/16   Pt reports shoulder is more irritated this date than normal. Would like \"heat treatment. \"   6/21 - shoulder and UT remain sore.   7/1 -  Sore after 6/24 treatment session, feels he may have overdone it  7/22 -        PROM AROM     L R L R  eval                                              7/21   Shoulder Flexion      170 80*                                                  160   Shoulder Abduction        80* (worse than flexion)                 155   Shoulder External Rotation      T3 CT jcn   Shoulder Internal Rotation      TL jcn R PSIS*   Elbow Flexion        WFL   Elbow Extension      5 deg from neutral 5 deg from neutral         Strength (0-5) Left Right   eval                                  7/21   Shoulder Shrug (C4)       Shoulder Flex   2+*                                                     4- no pain   Shoulder Abd (C5)   2+*                                                      4- no pain   Shoulder ER   4*                                                         4 no pain   Shoulder IR   4* Worse than ER                             4+ painful   Biceps (C6)   4*    Triceps (C7)   4* Worse than biceps         Therapeutic Exercise (36164)  20 Notes/Cues    Right shoulder pain   UBE 2 min    Pulleys 2 min      Cane   IR  Pull across               [employer] to Remember  2 x 10    extension Red  2 x 10    IR Red  2 x 10 Hold today, resume as raji   ER Red  2 x 10         Seated  Cervical side bending  Cervical rotation   X 5 R/L  X 5 R/L                   narciso ER    Standing  Bilateral flexion   Orange 2 x 10      10    Supine     flexion 1# - 1x10    R Hold today, resume as raji   serratus 1# - 1x10   R         sidelying  Pt told by MD to hold exer until having MRI   ER add    Abd add         Therapeutic Activities (57825)                    Neuromuscular Re-ed (02344)          Ball on wall: up/down, L/R, CW, CCW add                   Manual Intervention (76879)     15               STM  GH joint/UT 15 min  SHALONDA         Modalities             8 min       US 1.5 w/cm2 100% RIGHT gh JOINT/UT x 8 min         Cervical pack post ex  Seated                                 Pt. Education:  -pt educated on diagnosis, prognosis and expectations for rehab  -all pt questions were answered    Home Exercise Program:  Access Code: [email or web address]  URL: [email or web address]  Date: 06/07/2022  Prepared by: Karlos Arndt    Exercises  Isometric Shoulder Flexion at Wall - 1 x daily - 7 x weekly - 1 sets - 10 reps - 5 hold  Isometric Shoulder Abduction at Wall - 1 x daily - 7 x weekly - 1 sets - 10 reps - 5 hold  Isometric Shoulder Adduction - 1 x daily - 7 x weekly - 1 sets - 10 reps - 5 hold  Seated Shoulder Abduction Towel Slide at Table Top - 2 x daily - 7 x weekly - 1 sets - 20 reps  Seated Shoulder Flexion Towel Slide at Table Top - 2 x daily - 7 x weekly - 1 sets - 20 reps  Supine Shoulder Flexion AAROM with Dowel - 1 x daily - 7 x weekly - 2 sets - 10 reps      Therapeutic Exercise and NMR EXR  [x] (25935) Provided verbal/tactile cueing for activities related to strengthening, flexibility, endurance, ROM  for improvements in scapular, scapulothoracic and UE control with self care, reaching, carrying, lifting, house/yardwork, driving/computer work. [x] (79361) Provided verbal/tactile cueing for activities related to improving balance, coordination, kinesthetic sense, posture, motor skill, proprioception  to assist with  scapular, scapulothoracic and UE control with self care, reaching, carrying, lifting, house/yardwork, driving/computer work. [x] (62677) Therapist is in constant attendance of 2 or more patients providing skilled therapy interventions, but not providing any significant amount of measurable one-on-one time to either patient, for improvements in cervical, scapular, scapulothoracic and UE control with self care, reaching, carrying, lifting, house/yardwork, driving, computer work. Therapeutic Activities:    [x] (38492 or 59980) Provided verbal/tactile cueing for activities related to improving balance, coordination, kinesthetic sense, posture, motor skill, proprioception and motor activation to allow for proper function of scapular, scapulothoracic and UE control with self care, carrying, lifting, driving/computer work.      Home Exercise Program:    [x] (76650) Reviewed/Progressed HEP activities related to strengthening, flexibility, endurance, ROM of scapular, scapulothoracic and UE control with self care, reaching, carrying, lifting, house/yardwork, driving/computer work  [] (06463) Reviewed/Progressed HEP activities related to improving balance, coordination, kinesthetic sense, posture, motor skill, proprioception of scapular, scapulothoracic and UE control with self care, reaching, carrying, lifting, house/yardwork, driving/computer work      Manual Treatments:  PROM / STM / Oscillations-Mobs:  G-I, II, III, IV (PA's, Inf., Post.)  [x] (54767) Provided manual therapy to mobilize soft tissue/joints of cervical/CT, scapular GHJ and UE for the purpose of modulating pain, promoting relaxation,  increasing ROM, reducing/eliminating soft tissue swelling/inflammation/restriction, improving soft tissue extensibility and allowing for proper ROM for normal function with self care, reaching, carrying, lifting, house/yardwork, driving/computer work      Charges:  Timed Code Treatment Minutes: 43   Total Treatment Minutes: 43     Units approved Units used Date Range   96 18 7/21       [] EVAL - LOW (71476)   [] EVAL - MOD (36175)  [] EVAL - HIGH (14500)  [] RE-EVAL (25505)  [x] ZB(12718) x      [] Ionto  [] NMR (34911) x       [] Vaso  [x] Manual (11630) x  1     [x] Ultrasound  [] TA x        [] Mech Traction (53794)  [] Aquatic Therapy x                      ES (un) (76725):   [] Home Management Training x  [] ES(attended) (03769)   [] Dry Needling 1-2 muscles (51879):  [] Dry Needling 3+ muscles (975558  [] Group:      [] Other:                    GOALS:  Patient stated goal: be able to drive with less pain  [] Progressing: [] Met: [] Not Met: [] Adjusted     Therapist goals for Patient:   Short Term Goals: To be achieved in: 2 weeks  1. Independent in HEP and progression per patient tolerance, in order to prevent re-injury. [] Progressing: [] Met: [] Not Met: [] Adjusted  2. Patient will have a decrease in pain to facilitate improvement in movement, function, and ADLs as indicated by Functional Deficits. [] Progressing: [] Met: [] Not Met: [] Adjusted     Long Term Goals: To be achieved in: 6 weeks  1. FOTO score of at least 58 to assist with reaching prior level of function. [] Progressing: [] Met: [] Not Met: [] Adjusted  2. Patient will demonstrate increased AROM to 140 deg flexion, functional ER to T3, and functional IR to TL jcn to allow for proper joint functioning as indicated by patients Functional Deficits. [] Progressing: [] Met: [] Not Met: [] Adjusted  3. Patient will demonstrate an increase in Strength to 4/5 to allow for proper functional mobility as indicated by patients Functional Deficits.    [] Progressing: [] Met: [] Not Met: [] Adjusted  4. Patient will return to functional activities including driving, care giving  without increased symptoms or restriction. [] Progressing: [] Met: [] Not Met: [] Adjusted    Overall Progression Towards Functional goals/ Treatment Progress Update:  [] Patient is progressing as expected towards functional goals listed. [] Progression is slowed due to complexities/Impairments listed. [] Progression has been slowed due to co-morbidities. [x] Plan just implemented, too soon to assess goals progression <30days   [] Goals require adjustment due to lack of progress  [] Patient is not progressing as expected and requires additional follow up with physician  [] Other    ASSESSMENT:  Pt transferring to [facility] office after today's visit. Pt had one more visit scheduled in  but has decided to save approved units for QC office. Cueing given for form throughout session, lilo with TB exercises to avoid UT compensation. Pt reported slight increase in pain following the session and again mentioned that he thinks having a hot pack will help. Treatment/Activity Tolerance:  [x] Pt able to complete treatment [] Patient limited by fatique  [] Patient limited by pain  [] Patient limited by other medical complications  [] Other:     Prognosis:  [] Good [x] Fair  [] Poor    Patient Requires Follow-up: [x] Yes  [] No      PLAN: Continue per POC - Sending for additional authorization - to extend date  [x] Continue per plan of care [] Alter current plan (see comments)  [] Plan of care initiated [] Hold pending MD visit [] Discharge    Electronically signed by: Thong Encarnacion, [id number]      Note: If patient does not return for scheduled/ recommended follow up visits, this note will serve as a discharge from care along with most recent update on progress.

## 2022-08-02 ENCOUNTER — OFFICE VISIT (OUTPATIENT)
Dept: ENDOCRINOLOGY | Age: 62
End: 2022-08-02
Payer: COMMERCIAL

## 2022-08-02 ENCOUNTER — TELEPHONE (OUTPATIENT)
Dept: ENDOCRINOLOGY | Age: 62
End: 2022-08-02

## 2022-08-02 ENCOUNTER — TELEPHONE (OUTPATIENT)
Dept: PAIN MANAGEMENT | Age: 62
End: 2022-08-02

## 2022-08-02 ENCOUNTER — HOSPITAL ENCOUNTER (OUTPATIENT)
Dept: PHYSICAL THERAPY | Age: 62
Setting detail: THERAPIES SERIES
Discharge: HOME | End: 2022-08-02
Payer: COMMERCIAL

## 2022-08-02 VITALS
OXYGEN SATURATION: 96 % | SYSTOLIC BLOOD PRESSURE: 129 MMHG | DIASTOLIC BLOOD PRESSURE: 83 MMHG | BODY MASS INDEX: 35.03 KG/M2 | HEART RATE: 76 BPM | WEIGHT: 218 LBS | HEIGHT: 66 IN

## 2022-08-02 DIAGNOSIS — E11.65 UNCONTROLLED TYPE 2 DIABETES MELLITUS WITH HYPERGLYCEMIA (HCC): Primary | ICD-10-CM

## 2022-08-02 LAB — HBA1C MFR BLD: 7.8 %

## 2022-08-02 PROCEDURE — 97035 APP MDLTY 1+ULTRASOUND EA 15: CPT

## 2022-08-02 PROCEDURE — 83036 HEMOGLOBIN GLYCOSYLATED A1C: CPT | Performed by: INTERNAL MEDICINE

## 2022-08-02 PROCEDURE — 97140 MANUAL THERAPY 1/> REGIONS: CPT

## 2022-08-02 PROCEDURE — 3051F HG A1C>EQUAL 7.0%<8.0%: CPT | Performed by: INTERNAL MEDICINE

## 2022-08-02 PROCEDURE — G8427 DOCREV CUR MEDS BY ELIG CLIN: HCPCS | Performed by: INTERNAL MEDICINE

## 2022-08-02 PROCEDURE — 99214 OFFICE O/P EST MOD 30 MIN: CPT | Performed by: INTERNAL MEDICINE

## 2022-08-02 PROCEDURE — 3017F COLORECTAL CA SCREEN DOC REV: CPT | Performed by: INTERNAL MEDICINE

## 2022-08-02 PROCEDURE — 1036F TOBACCO NON-USER: CPT | Performed by: INTERNAL MEDICINE

## 2022-08-02 PROCEDURE — G8417 CALC BMI ABV UP PARAM F/U: HCPCS | Performed by: INTERNAL MEDICINE

## 2022-08-02 PROCEDURE — 2022F DILAT RTA XM EVC RTNOPTHY: CPT | Performed by: INTERNAL MEDICINE

## 2022-08-02 RX ORDER — BLOOD SUGAR DIAGNOSTIC
STRIP MISCELLANEOUS
Qty: 100 EACH | Refills: 3 | Status: SHIPPED | OUTPATIENT
Start: 2022-08-02

## 2022-08-02 RX ORDER — LANCETS
1 EACH MISCELLANEOUS 4 TIMES DAILY
Qty: 100 EACH | Refills: 6 | Status: SHIPPED | OUTPATIENT
Start: 2022-08-02

## 2022-08-02 RX ORDER — BLOOD-GLUCOSE METER
KIT MISCELLANEOUS
Qty: 1 EACH | Refills: 1 | Status: SHIPPED | OUTPATIENT
Start: 2022-08-02

## 2022-08-02 RX ORDER — BLOOD-GLUCOSE METER
EACH MISCELLANEOUS
Qty: 1 KIT | Refills: 0 | Status: SHIPPED | OUTPATIENT
Start: 2022-08-02

## 2022-08-02 NOTE — TELEPHONE ENCOUNTER
Freestyle lite meter is not covered. PA or switch to alternative? Per [employer] is preferred.

## 2022-08-02 NOTE — FLOWSHEET NOTE
[address] [place] [address]  Phone: [phone]   Fax: [phone]    Physical Therapy Treatment Note/ Progress Report:     Date:  2022    Patient Name:  Makayla Pablo    :  1960  MRN: [id number]     Restrictions/Precautions:  None  Noted    Medical/Treatment Diagnosis Information:  Diagnosis: G89.4 (ICD-10-CM) - Chronic pain syndrome  Treatment Diagnosis: dec R shoulder AROM and strength, mild R GHJ hypomobility, impaired posture    Insurance/Certification information: [facility]   Physician Information:   Jenny Castrejon MD  Plan of care signed (Y/N): []  Yes [x]  No     Date of Patient follow up with Physician: 22     Progress Report: []  Yes  [x]  No     Date Range for reporting period:  Beginnin22  Ending:     Progress report due (10 Rx/or 30 days whichever is less): visit #10 or  (date)     Recertification due (POC duration/ or 90 days whichever is less): visit #12 or  (date)     Visit # Insurance Allowable Auth required? Date Range   eval +  30 v/yr  96 units this episode [x]  Yes - [facility]   22 - 22     Relevant Medical History:   Arthritis  Bipolar 1 disorder  Degenerative disc disease, lumbar  Diabetes mellitus type 2,    Essential hypertension  Mixed hyperlipidemia 2020  Pure hyperglyceridemia     FINDINGS:   Alignment at the [facility] joint and glenohumeral joint appears normal.  Mild   spurring is seen at the [facility] joint. Mild spurring seen at the glenohumeral   joint. No focal lung consolidation noted. Latex Allergy:  [x]NO      []YES  Preferred Language for Healthcare:   [x]English       []other:    Functional Scale:             FOTO physical FS primary measure score = 38; risk adjusted = 45  22     Pain level:  7/10      SUBJECTIVE: initial Eval - Patient had a fall back in 2022, falling bwd onto R elbow and shoulder. Pt reports x ray imaging showed spurs and degenerative changes in R shoulder.  Pain is located in posterior R shoulder (pt reaches to R infraspinatus mm belly). Pt will get N/T here as well. When pain increases, pain feels like he will get an ice pick stabbing sensation. Pt has been using heat on shoulder and this feels good. Heating pad to wrap around his shoulder. Pain does radiate into right side of neck. Pt is aware of his posture and diapragmatic breathing and believes this helps manage his pain. Pain while driving, turning steering wheel, and he doesn't know where to position R UE. Pain with writing, to some extent. Pt has a lap table to help shoulder while writing. Pt has been doing aquatic exercises, though for arm exercises, performs these gently. 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 6/16   Pt reports shoulder is more irritated this date than normal. Would like \"heat treatment. \"   6/21 - shoulder and UT remain sore. 7/1 -  Sore after 6/24 treatment session, feels he may have overdone it  7/22 -  pain remains severe- instructed to contact MD  8/2 - Per pt, MD wants him only to get heat, ultrasound and massage - no exercise until then.         PROM AROM     L R L R  eval                                              7/21   Shoulder Flexion      170 80*                                                  160   Shoulder Abduction        80* (worse than flexion)                 155   Shoulder External Rotation      T3 CT jcn   Shoulder Internal Rotation      TL jcn R PSIS*   Elbow Flexion        WFL   Elbow Extension      5 deg from neutral 5 deg from neutral         Strength (0-5) Left Right   eval                                  7/21   Shoulder Shrug (C4)       Shoulder Flex   2+*                                                     4- no pain   Shoulder Abd (C5)   2+*                                                      4- no pain   Shoulder ER   4*                                                         4 no pain   Shoulder IR   4* Worse than ER                             4+ painful   Biceps (C6)   4*    Triceps (C7)   4* Worse than biceps              Therapeutic Activities (28063)                    Neuromuscular Re-ed (39259)                            Manual Intervention (82326)     15               STM  GH joint/UT 15 min  SHALONDA         Modalities             8 min       US 1.5 w/cm2 100% RIGHT gh JOINT/UT x 8 min                                     Pt. Education:  -pt educated on diagnosis, prognosis and expectations for rehab  -all pt questions were answered    Home Exercise Program:  Access Code: [email or web address]  URL: [email or web address]  Date: 06/07/2022  Prepared by: Marjorie Marrero    Exercises  Isometric Shoulder Flexion at Wall - 1 x daily - 7 x weekly - 1 sets - 10 reps - 5 hold  Isometric Shoulder Abduction at Wall - 1 x daily - 7 x weekly - 1 sets - 10 reps - 5 hold  Isometric Shoulder Adduction - 1 x daily - 7 x weekly - 1 sets - 10 reps - 5 hold  Seated Shoulder Abduction Towel Slide at Table Top - 2 x daily - 7 x weekly - 1 sets - 20 reps  Seated Shoulder Flexion Towel Slide at Table Top - 2 x daily - 7 x weekly - 1 sets - 20 reps  Supine Shoulder Flexion AAROM with Dowel - 1 x daily - 7 x weekly - 2 sets - 10 reps      Therapeutic Exercise and NMR EXR  [x] (44318) Provided verbal/tactile cueing for activities related to strengthening, flexibility, endurance, ROM  for improvements in scapular, scapulothoracic and UE control with self care, reaching, carrying, lifting, house/yardwork, driving/computer work. [x] (31168) Provided verbal/tactile cueing for activities related to improving balance, coordination, kinesthetic sense, posture, motor skill, proprioception  to assist with  scapular, scapulothoracic and UE control with self care, reaching, carrying, lifting, house/yardwork, driving/computer work.   [x] (22257) Therapist is in constant attendance of 2 or more patients providing skilled therapy interventions, but not providing any significant amount of measurable one-on-one time to either patient, for improvements in cervical, scapular, scapulothoracic and UE control with self care, reaching, carrying, lifting, house/yardwork, driving, computer work. Therapeutic Activities:    [x] (84274 or 22827) Provided verbal/tactile cueing for activities related to improving balance, coordination, kinesthetic sense, posture, motor skill, proprioception and motor activation to allow for proper function of scapular, scapulothoracic and UE control with self care, carrying, lifting, driving/computer work.      Home Exercise Program:    [x] (56617) Reviewed/Progressed HEP activities related to strengthening, flexibility, endurance, ROM of scapular, scapulothoracic and UE control with self care, reaching, carrying, lifting, house/yardwork, driving/computer work  [] (62557) Reviewed/Progressed HEP activities related to improving balance, coordination, kinesthetic sense, posture, motor skill, proprioception of scapular, scapulothoracic and UE control with self care, reaching, carrying, lifting, house/yardwork, driving/computer work      Manual Treatments:  PROM / STM / Oscillations-Mobs:  G-I, II, III, IV (PA's, Inf., Post.)  [x] (55719) Provided manual therapy to mobilize soft tissue/joints of cervical/CT, scapular GHJ and UE for the purpose of modulating pain, promoting relaxation,  increasing ROM, reducing/eliminating soft tissue swelling/inflammation/restriction, improving soft tissue extensibility and allowing for proper ROM for normal function with self care, reaching, carrying, lifting, house/yardwork, driving/computer work      Charges:  Timed Code Treatment Minutes: 23   Total Treatment Minutes: 23     Units approved Units used Date Range   96 27 8/2       [] EVAL - LOW (23840)   [] EVAL - MOD (89400)  [] EVAL - HIGH (14330)  [] RE-EVAL (75556)  [] KW(69285) x      [] Ionto  [] NMR (83274) x       [] Vaso  [x] Manual (44868) x  1     [x] Ultrasound  [] TA x        [] [facility] Traction (14519)  [] Aquatic Therapy x                      ES (un) (96796):   [] Home Management Training x  [] ES(attended) (43178)   [] Dry Needling 1-2 muscles (62202):  [] Dry Needling 3+ muscles (827803  [] Group:      [] Other:                    GOALS:  Patient stated goal: be able to drive with less pain  [] Progressing: [] Met: [x] Not Met: [] Adjusted     Therapist goals for Patient:   Short Term Goals: To be achieved in: 2 weeks  1. Independent in HEP and progression per patient tolerance, in order to prevent re-injury. [] Progressing: [x] Met: [] Not Met: [] Adjusted  2. Patient will have a decrease in pain to facilitate improvement in movement, function, and ADLs as indicated by Functional Deficits. [] Progressing: [] Met: [x] Not Met: [] Adjusted     Long Term Goals: To be achieved in: 6 weeks  1. FOTO score of at least 58 to assist with reaching prior level of function. [] Progressing: [] Met: [] Not Met: [x] Adjusted  2. Patient will demonstrate increased AROM to 140 deg flexion, functional ER to T3, and functional IR to TL jcn to allow for proper joint functioning as indicated by patients Functional Deficits. [] Progressing: [x] Met: [] Not Met: [] Adjusted  3. Patient will demonstrate an increase in Strength to 4/5 to allow for proper functional mobility as indicated by patients Functional Deficits. [] Progressing: [] Met: [] Not Met: [x] Adjusted  4. Patient will return to functional activities including driving, care giving  without increased symptoms or restriction. [] Progressing: [] Met: [] Not Met: [x] Adjusted    Overall Progression Towards Functional goals/ Treatment Progress Update:  [] Patient is progressing as expected towards functional goals listed. [x] Progression is slowed due to complexities/Impairments listed. [] Progression has been slowed due to co-morbidities.   [] Plan just implemented, too soon to assess goals progression <30days   [x] Goals require adjustment due to lack of progress  [x] Patient is not progressing as expected and requires additional follow up with physician  [] Other    ASSESSMENT:  Pt transferring to [facility] office after today's visit. Pt had one more visit scheduled in  but has decided to save approved units for QC office. Cueing given for form throughout session, lilo with TB exercises to avoid UT compensation. Pt reported slight increase in pain following the session and again mentioned that he thinks having a hot pack will help. 8/2 -  awaiting MRI        Treatment/Activity Tolerance:  [x] Pt able to complete treatment [] Patient limited by fatique  [] Patient limited by pain  [] Patient limited by other medical complications  [] Other:     Prognosis:  [] Good [x] Fair  [] Poor    Patient Requires Follow-up: [x] Yes  [] No      PLAN: Continue per POC - Sending for additional authorization - to extend date  [x] Continue per plan of care [] Alter current plan (see comments)  [] Plan of care initiated [] Hold pending MD visit [] Discharge    Electronically signed by: Arline Ramires, PT, [id number]      Note: If patient does not return for scheduled/ recommended follow up visits, this note will serve as a discharge from care along with most recent update on progress.

## 2022-08-02 NOTE — TELEPHONE ENCOUNTER
Pt called and was wondering if [facility] could put an order in for xrays on his hands too due to his arthritis.      Has an MRI appt already on Aug 8th for a different MRI , but wanted an MRI order for his hands too     Please advise     Pt requested a call back once order is placed so that he can call to schedule the MRI     [phone]

## 2022-08-02 NOTE — PROGRESS NOTES
Seen as  patient for diabetes      Interim:       Diagnosed with Type 2 diabetes mellitus in  2010  Known diabetic complications: none     Current diabetic medications     Metformin ER 500mg 2 tab daily  basaglar 50 units  he is taking 32 units   Humalog 20 units AC TID    SSI 2 for 50 >150 combined scale    Trulicity 3mg        Last A1c 7.8%<----8.4%<-----9.1%<----8.1%<------9.7%<----11.4% <-----13.9%<----- 14.1<--- 11.4 <--- 9.9 <------6.3%     Prior visit with dietician: no  Current diet: on average, 3 meals per day  No CHO counting  Current exercise:yes  Current monitoring regimen: home blood tests - 1-3  times daily     Has brought blood glucose log/meter:no  Home blood sugar records:  Any episodes of hypoglycemia? 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    Past Medical History:   Diagnosis Date    Arthritis     Back, L hip and L shoulder    CHF (congestive heart failure) (HCC)     diastolic    Diabetes mellitus ([facility] [address]     Hyperlipidemia     Hypertension      Past Surgical History:   Procedure Laterality Date    APPENDECTOMY  1969     Current Outpatient Medications   Medication Sig Dispense Refill    gabapentin (NEURONTIN) 300 MG capsule Take 1 tablet po in the morning and take 2 tablets po in the evening 90 capsule 1    methocarbamol (ROBAXIN) 500 MG tablet Take 1 tablet by mouth 2 times daily as needed (muscle stiffness) 60 tablet 1    meloxicam (MOBIC) 15 MG tablet Take 1 tablet po every Monday,Wednesday, and Friday 30 tablet 1    HYDROcodone-acetaminophen (NORCO) 5-325 MG per tablet Take 1 tablet by mouth every 6 hours as needed for Pain (max 3-4 day) for up to 30 days. 120 tablet 0    Insulin Pen Needle 32G X 4 MM MISC 1 each by Does not apply route 4 times daily 120 each 0    blood glucose test strips (FREESTYLE LITE) strip USE TO TEST FOUR TIMES A  strip 0    FreeStyle Lancets MISC USE FOUR TIMES A  each 0    insulin aspart (NOVOLOG FLEXPEN) 100 UNIT/ML injection pen 18-24 units AC TID 10 pen 0    Dulaglutide (TRULICITY) 3 IY/9.2YQ SOPN Inject 3 mg into the skin once a week 4 pen 0    metFORMIN (GLUCOPHAGE-XR) 500 MG extended release tablet TAKE 2 TABLETS BY MOUTH EVERY DAY WITH BREAKFAST 60 tablet 0    hydrALAZINE (APRESOLINE) 25 MG tablet Take 25 mg by mouth in the morning and at bedtime      spironolactone (ALDACTONE) 25 MG tablet Take 25 mg by mouth daily      mupirocin (BACTROBAN) 2 % ointment Apply 22 g topically 3 times daily Apply topically 3 times daily.       insulin glargine (LANTUS SOLOSTAR) 100 UNIT/ML injection pen INJECT 46 UNITS UNDER THE SKIN DAILY 5 pen 5    Blood Glucose Monitoring Suppl (FREESTYLE LITE) RO As needed 1 each 0    lisinopril (PRINIVIL;ZESTRIL) 30 MG tablet Take 30 mg by mouth daily      FreeStyle Lancets MISC USE AS DIRECTED 100 each 3    Insulin Pen Needle (PEN NEEDLES) 31G X 6 MM MISC 1 each by In Vitro route 4 times daily 200 each 3    omeprazole (PRILOSEC) 20 MG delayed release capsule TAKE 1 CAPSULE BY MOUTH EVERY DAY      Cholecalciferol (VITAMIN D PO) Take 1 tablet by mouth every 7 days Pt to clarify dose      furosemide (LASIX) 20 MG tablet Take 40 mg by mouth daily       Fluticasone Propionate (FLONASE NA) 1 spray by Nasal route daily Each nostril      SALINE MIST SPRAY NA 1 spray by Nasal route 3 times daily as needed Each nostril 2-3 times per day      Blood Glucose Monitoring Suppl (ONE TOUCH ULTRA 2) w/Device KIT 1 kit by Does not apply route daily 1 kit 0    Lancets MISC 1 each by Does not apply route 3 times daily 100 each 3    MELATONIN PO Take 1 tablet by mouth nightly       loratadine (CLARITIN) 10 MG tablet Take 10 mg by mouth daily       atorvastatin (LIPITOR) 80 MG tablet Take 80 mg by mouth daily. No current facility-administered medications for this visit. Review of Systems  Scanned, reviewed    Vitals:    08/02/22 1041   BP: 129/83   Pulse: 76   SpO2: 96%       Constitutional: Well-developed, appears stated age, cooperative, in no acute distress  H/E/N/M/T:atraumatic, normocephalic, external ears, nose, lips normal without lesions  Eyes: Lids, lashes, conjunctivae and sclerae normal, No proptosis, no redness  Neck: supple, symmetrical, no swelling  Skin: No obvious rashes or lesions present. Skin and hair texture normal  Psychiatric: Judgement and Insight:  judgement and insight appear normal  Neuro: Normal without focal findings, speech is normal normal, speech is spontaneous  Chest: No labored breathing, no chest deformity, no stridor  Musculoskeletal: No joint deformity, swelling        11/20 foot exam : monofilament detected, no ulcer    Lab Reviewed   No components found for: CHLPL  No results found for: TRIG  No results found for: HDL  No results found for: LDLCALC  No results found for: LABVLDL  Lab Results   Component Value Date    LABA1C 8.4 11/23/2021       Assessment:     Jesse Morton is a 60 y.o. male with :    1.T2DM : Longstanding, uncontrolled. On metformin. A1c  high , now improved. Discussed needs insulin given hyperglycemia. On basal bolus recommend dietary changes. SGLT-2 inhibitor is not covered, off glipizide. Occasional fasting high, adjust basaglar   Advised to take humalog even if glucose < 100  2. Chronic pain syndrome: On neurontin  3. HLD ; On lipitor, LDL at goal  4. Obesity  5. Elevated BP:High per patient, on lisinopril     Plan:      Lantus 36 units   Humalog 21 units AC TID   SSI 2 for 50 >150 combined scale   - 8 < 649   Trulicity 3mg weekly   Advise to check blood sugar 3 times a day   Patient to send blood sugar log for titration every week   Advise to exercise regularly. Advise to low simple carbohydrate and protein with each  meal diet. Diabetes Care: recommend yearly eye exam, foot exam and urine microalbumin to   creatinine ratio.  Patient isdue    -Hyperlipidemia, LDL goal is <100 mg/dl   -Daily ASA: 81mg daily

## 2022-08-05 ENCOUNTER — HOSPITAL ENCOUNTER (OUTPATIENT)
Dept: PHYSICAL THERAPY | Age: 62
Setting detail: THERAPIES SERIES
Discharge: HOME | End: 2022-08-05
Payer: COMMERCIAL

## 2022-08-05 PROCEDURE — 97035 APP MDLTY 1+ULTRASOUND EA 15: CPT

## 2022-08-05 PROCEDURE — 97140 MANUAL THERAPY 1/> REGIONS: CPT

## 2022-08-05 NOTE — TELEPHONE ENCOUNTER
Per [facility] - The xray doesn't show enough information. Will discuss at the next visit. .      Left the above message on patient voicemail.

## 2022-08-05 NOTE — FLOWSHEET NOTE
[address] [facility]  Phone: [phone]   Fax: [phone]    Physical Therapy Treatment Note/ Progress Report:     Date:  2022    Patient Name:  Jesse Morton    :  1960  MRN: [id number]     Restrictions/Precautions:  None  Noted    Medical/Treatment Diagnosis Information:  Diagnosis: G89.4 (ICD-10-CM) - Chronic pain syndrome  Treatment Diagnosis: dec R shoulder AROM and strength, mild R GHJ hypomobility, impaired posture    Insurance/Certification information: [facility]   Physician Information:   Leroy Zaragoza MD  Plan of care signed (Y/N): []  Yes [x]  No     Date of Patient follow up with Physician: 22     Progress Report: []  Yes  [x]  No     Date Range for reporting period:  Beginnin22  Ending:     Progress report due (10 Rx/or 30 days whichever is less): visit #10 or  (date)     Recertification due (POC duration/ or 90 days whichever is less): visit #12 or  (date)     Visit # Insurance Allowable Auth required? Date Range   eval + 10/ 12 30 v/yr  96 + 96 units this episode [x]  [facility]      Relevant Medical History:   Arthritis  Bipolar 1 disorder  Degenerative disc disease, lumbar  Diabetes mellitus type 2,    Essential hypertension  Mixed hyperlipidemia 2020  Pure hyperglyceridemia     FINDINGS:   Alignment at the [facility] joint and glenohumeral joint appears normal.  Mild   spurring is seen at the [facility] joint. Mild spurring seen at the glenohumeral   joint. No focal lung consolidation noted. Latex Allergy:  [x]NO      []YES  Preferred Language for Healthcare:   [x]English       []other:    Functional Scale:             FOTO physical FS primary measure score = 38; risk adjusted = 45  22     Pain level:  7/10      SUBJECTIVE: initial Eval - Patient had a fall back in 2022, falling bwd onto R elbow and shoulder. Pt reports x ray imaging showed spurs and degenerative changes in R shoulder.  Pain is located in posterior R shoulder (pt reaches to R infraspinatus mm belly). Pt will get N/T here as well. When pain increases, pain feels like he will get an ice pick stabbing sensation. Pt has been using heat on shoulder and this feels good. Heating pad to wrap around his shoulder. Pain does radiate into right side of neck. Pt is aware of his posture and diapragmatic breathing and believes this helps manage his pain. Pain while driving, turning steering wheel, and he doesn't know where to position R UE. Pain with writing, to some extent. Pt has a lap table to help shoulder while writing. Pt has been doing aquatic exercises, though for arm exercises, performs these gently. 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 6/16   Pt reports shoulder is more irritated this date than normal. Would like \"heat treatment. \"   6/21 - shoulder and UT remain sore. 7/1 -  Sore after 6/24 treatment session, feels he may have overdone it  7/22 -  pain remains severe- instructed to contact MD  8/2 - Per pt, MD wants him only to get heat, ultrasound and massage - no exercise until then.   8/5 - no change noted - MRi Monday      PROM AROM     L R L R  eval                                              7/21   Shoulder Flexion      170 80*                                                  160   Shoulder Abduction        80* (worse than flexion)                 155   Shoulder External Rotation      T3 CT jcn   Shoulder Internal Rotation      TL jcn R PSIS*   Elbow Flexion        WFL   Elbow Extension      5 deg from neutral 5 deg from neutral         Strength (0-5) Left Right   eval                                  7/21   Shoulder Shrug (C4)       Shoulder Flex   2+*                                                     4- no pain   Shoulder Abd (C5)   2+*                                                      4- no pain   Shoulder ER   4*                                                         4 no interventions, but not providing any significant amount of measurable one-on-one time to either patient, for improvements in cervical, scapular, scapulothoracic and UE control with self care, reaching, carrying, lifting, house/yardwork, driving, computer work. Therapeutic Activities:    [x] (96048 or 88622) Provided verbal/tactile cueing for activities related to improving balance, coordination, kinesthetic sense, posture, motor skill, proprioception and motor activation to allow for proper function of scapular, scapulothoracic and UE control with self care, carrying, lifting, driving/computer work.      Home Exercise Program:    [x] (42112) Reviewed/Progressed HEP activities related to strengthening, flexibility, endurance, ROM of scapular, scapulothoracic and UE control with self care, reaching, carrying, lifting, house/yardwork, driving/computer work  [] (73276) Reviewed/Progressed HEP activities related to improving balance, coordination, kinesthetic sense, posture, motor skill, proprioception of scapular, scapulothoracic and UE control with self care, reaching, carrying, lifting, house/yardwork, driving/computer work      Manual Treatments:  PROM / STM / Oscillations-Mobs:  G-I, II, III, IV (PA's, Inf., Post.)  [x] (64199) Provided manual therapy to mobilize soft tissue/joints of cervical/CT, scapular GHJ and UE for the purpose of modulating pain, promoting relaxation,  increasing ROM, reducing/eliminating soft tissue swelling/inflammation/restriction, improving soft tissue extensibility and allowing for proper ROM for normal function with self care, reaching, carrying, lifting, house/yardwork, driving/computer work      Charges:  Timed Code Treatment Minutes: 23   Total Treatment Minutes: 23     Units approved Units used Date Range   96 27 8/2       [] EVAL - LOW (60362)   [] EVAL - MOD (02894)  [] EVAL - HIGH (51860)  [] RE-EVAL (19339)  [] IF(26451) x      [] Ionto  [] NMR (43894) x       [] Vaso  [x] Manual (57982) x  1     [x] Ultrasound  [] TA x        [] Mech Traction (69114)  [] Aquatic Therapy x                      ES (un) (88253):   [] Home Management Training x  [] ES(attended) (22878)   [] Dry Needling 1-2 muscles (32202):  [] Dry Needling 3+ muscles (334524  [] Group:      [] Other:                    GOALS:  Patient stated goal: be able to drive with less pain  [] Progressing: [] Met: [x] Not Met: [] Adjusted     Therapist goals for Patient:   Short Term Goals: To be achieved in: 2 weeks  1. Independent in HEP and progression per patient tolerance, in order to prevent re-injury. [] Progressing: [x] Met: [] Not Met: [] Adjusted  2. Patient will have a decrease in pain to facilitate improvement in movement, function, and ADLs as indicated by Functional Deficits. [] Progressing: [] Met: [x] Not Met: [] Adjusted     Long Term Goals: To be achieved in: 6 weeks  1. FOTO score of at least 58 to assist with reaching prior level of function. [] Progressing: [] Met: [] Not Met: [x] Adjusted  2. Patient will demonstrate increased AROM to 140 deg flexion, functional ER to T3, and functional IR to TL jcn to allow for proper joint functioning as indicated by patients Functional Deficits. [] Progressing: [x] Met: [] Not Met: [] Adjusted  3. Patient will demonstrate an increase in Strength to 4/5 to allow for proper functional mobility as indicated by patients Functional Deficits. [] Progressing: [] Met: [] Not Met: [x] Adjusted  4. Patient will return to functional activities including driving, care giving  without increased symptoms or restriction. [] Progressing: [] Met: [] Not Met: [x] Adjusted    Overall Progression Towards Functional goals/ Treatment Progress Update:  [] Patient is progressing as expected towards functional goals listed. [x] Progression is slowed due to complexities/Impairments listed. [] Progression has been slowed due to co-morbidities.   [] Plan just implemented, too soon to assess goals progression <30days   [x] Goals require adjustment due to lack of progress  [x] Patient is not progressing as expected and requires additional follow up with physician  [] Other    ASSESSMENT:  Pt transferring to [facility] office after today's visit. Pt had one more visit scheduled in  but has decided to save approved units for QC office. Cueing given for form throughout session, lilo with TB exercises to avoid UT compensation. Pt reported slight increase in pain following the session and again mentioned that he thinks having a hot pack will help. 8/2 -  awaiting MRI        Treatment/Activity Tolerance:  [x] Pt able to complete treatment [] Patient limited by fatique  [] Patient limited by pain  [] Patient limited by other medical complications  [] Other:     Prognosis:  [] Good [x] Fair  [] Poor    Patient Requires Follow-up: [x] Yes  [] No      PLAN: Continue per POC - Sending for additional authorization - to extend date  [x] Continue per plan of care [] Alter current plan (see comments)  [] Plan of care initiated [] Hold pending MD visit [] Discharge    Electronically signed by: Toro Garzon, PT, [id number]      Note: If patient does not return for scheduled/ recommended follow up visits, this note will serve as a discharge from care along with most recent update on progress.

## 2022-08-08 ENCOUNTER — HOSPITAL ENCOUNTER (OUTPATIENT)
Dept: MRI IMAGING | Age: 62
Discharge: HOME | End: 2022-08-08
Payer: COMMERCIAL

## 2022-08-08 DIAGNOSIS — G89.4 CHRONIC PAIN SYNDROME: ICD-10-CM

## 2022-08-08 PROCEDURE — 73221 MRI JOINT UPR EXTREM W/O DYE: CPT

## 2022-08-09 ENCOUNTER — HOSPITAL ENCOUNTER (OUTPATIENT)
Dept: PHYSICAL THERAPY | Age: 62
Setting detail: THERAPIES SERIES
Discharge: HOME | End: 2022-08-09
Payer: COMMERCIAL

## 2022-08-09 PROCEDURE — 97035 APP MDLTY 1+ULTRASOUND EA 15: CPT

## 2022-08-09 PROCEDURE — 97140 MANUAL THERAPY 1/> REGIONS: CPT

## 2022-08-09 NOTE — FLOWSHEET NOTE
[address] [place] [address]  Phone: [phone]   Fax: [phone]    Physical Therapy Treatment Note/ Progress Report:     Date:  2022    Patient Name:  Bradford Ren    :  1960  MRN: [id number]     Restrictions/Precautions:  None  Noted    Medical/Treatment Diagnosis Information:  Diagnosis: G89.4 (ICD-10-CM) - Chronic pain syndrome  Treatment Diagnosis: dec R shoulder AROM and strength, mild R GHJ hypomobility, impaired posture    Insurance/Certification information: [facility]   Physician Information:   Jeri Carrion MD  Plan of care signed (Y/N): []  Yes [x]  No     Date of Patient follow up with Physician: 22     Progress Report: []  Yes  [x]  No     Date Range for reporting period:  Beginnin22  Ending:     Progress report due (10 Rx/or 30 days whichever is less): visit #10 or  (date)     Recertification due (POC duration/ or 90 days whichever is less): visit #12 or  (date)     Visit # Insurance Allowable Auth required? Date Range   eval +  30 v/yr  96 + 96 units this episode [x]  [facility]      Relevant Medical History:   Arthritis  Bipolar 1 disorder  Degenerative disc disease, lumbar  Diabetes mellitus type 2,    Essential hypertension  Mixed hyperlipidemia 2020  Pure hyperglyceridemia     FINDINGS:   Alignment at the [facility] joint and glenohumeral joint appears normal.  Mild   spurring is seen at the [facility] joint. Mild spurring seen at the glenohumeral   joint. No focal lung consolidation noted. Latex Allergy:  [x]NO      []YES  Preferred Language for Healthcare:   [x]English       []other:    Functional Scale:             FOTO physical FS primary measure score = 38; risk adjusted = 45  22     Pain level:  7/10      SUBJECTIVE: initial Eval - Patient had a fall back in 2022, falling bwd onto R elbow and shoulder. Pt reports x ray imaging showed spurs and degenerative changes in R shoulder. Pain is located in posterior R shoulder (pt reaches to R infraspinatus mm belly). Pt will get N/T here as well. When pain increases, pain feels like he will get an ice pick stabbing sensation. Pt has been using heat on shoulder and this feels good. Heating pad to wrap around his shoulder. Pain does radiate into right side of neck. Pt is aware of his posture and diapragmatic breathing and believes this helps manage his pain. Pain while driving, turning steering wheel, and he doesn't know where to position R UE. Pain with writing, to some extent. Pt has a lap table to help shoulder while writing. Pt has been doing aquatic exercises, though for arm exercises, performs these gently. 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 6/16   Pt reports shoulder is more irritated this date than normal. Would like \"heat treatment. \"   6/21 - shoulder and UT remain sore. 7/1 -  Sore after 6/24 treatment session, feels he may have overdone it  7/22 -  pain remains severe- instructed to contact MD  8/2 - Per pt, MD wants him only to get heat, ultrasound and massage - no exercise until then. 8/5 - no change noted - MRi Monday 8/9 - No change, waiting for MRI results - reviewed importance of MD intervention or exercise progression as we cannot just do modalities/stm. Pt expressed understanding.       PROM AROM     L R L R  eval                                              7/21   Shoulder Flexion      170 80*                                                  160   Shoulder Abduction        80* (worse than flexion)                 155   Shoulder External Rotation      T3 CT jcn   Shoulder Internal Rotation      TL jcn R PSIS*   Elbow Flexion        WFL   Elbow Extension      5 deg from neutral 5 deg from neutral         Strength (0-5) Left Right   eval                                  7/21   Shoulder Shrug (C4)       Shoulder Flex   2+*                                                     4- no pain   Shoulder Abd (C5)   2+*                                                      4- no pain   Shoulder ER   4*                                                         4 no pain   Shoulder IR   4* Worse than ER                             4+ painful   Biceps (C6)   4*    Triceps (C7)   4* Worse than biceps              Therapeutic Activities (75697)                    Neuromuscular Re-ed (01660)                            Manual Intervention (26739)     15               STM  GH joint/UT 15 min  SHALONDA         Modalities             8 min       US 1.5 w/cm2 100% RIGHT gh JOINT/UT x 8 min                                     Pt. Education:  -pt educated on diagnosis, prognosis and expectations for rehab  -all pt questions were answered    Home Exercise Program:  Access Code: [email or web address]  URL: [email or web address]  Date: 06/07/2022  Prepared by: Alejandro Swan    Exercises  Isometric Shoulder Flexion at Wall - 1 x daily - 7 x weekly - 1 sets - 10 reps - 5 hold  Isometric Shoulder Abduction at Wall - 1 x daily - 7 x weekly - 1 sets - 10 reps - 5 hold  Isometric Shoulder Adduction - 1 x daily - 7 x weekly - 1 sets - 10 reps - 5 hold  Seated Shoulder Abduction Towel Slide at Table Top - 2 x daily - 7 x weekly - 1 sets - 20 reps  Seated Shoulder Flexion Towel Slide at Table Top - 2 x daily - 7 x weekly - 1 sets - 20 reps  Supine Shoulder Flexion AAROM with Dowel - 1 x daily - 7 x weekly - 2 sets - 10 reps      Therapeutic Exercise and NMR EXR  [x] (87989) Provided verbal/tactile cueing for activities related to strengthening, flexibility, endurance, ROM  for improvements in scapular, scapulothoracic and UE control with self care, reaching, carrying, lifting, house/yardwork, driving/computer work.     [x] (96302) Provided verbal/tactile cueing for activities related to improving balance, coordination, kinesthetic sense, posture, motor skill, proprioception  to assist with  scapular, scapulothoracic and UE control with self care, reaching, carrying, lifting, house/yardwork, driving/computer work. [x] (54043) Therapist is in constant attendance of 2 or more patients providing skilled therapy interventions, but not providing any significant amount of measurable one-on-one time to either patient, for improvements in cervical, scapular, scapulothoracic and UE control with self care, reaching, carrying, lifting, house/yardwork, driving, computer work. Therapeutic Activities:    [x] (96653 or 56830) Provided verbal/tactile cueing for activities related to improving balance, coordination, kinesthetic sense, posture, motor skill, proprioception and motor activation to allow for proper function of scapular, scapulothoracic and UE control with self care, carrying, lifting, driving/computer work.      Home Exercise Program:    [x] (30104) Reviewed/Progressed HEP activities related to strengthening, flexibility, endurance, ROM of scapular, scapulothoracic and UE control with self care, reaching, carrying, lifting, house/yardwork, driving/computer work  [] (91419) Reviewed/Progressed HEP activities related to improving balance, coordination, kinesthetic sense, posture, motor skill, proprioception of scapular, scapulothoracic and UE control with self care, reaching, carrying, lifting, house/yardwork, driving/computer work      Manual Treatments:  PROM / STM / Oscillations-Mobs:  G-I, II, III, IV (PA's, Inf., Post.)  [x] (32501) Provided manual therapy to mobilize soft tissue/joints of cervical/CT, scapular GHJ and UE for the purpose of modulating pain, promoting relaxation,  increasing ROM, reducing/eliminating soft tissue swelling/inflammation/restriction, improving soft tissue extensibility and allowing for proper ROM for normal function with self care, reaching, carrying, lifting, house/yardwork, driving/computer work      Charges:  Timed Code Treatment Minutes: 23   Total Treatment Minutes: 23     Units approved Units used Date Range listed. [x] Progression is slowed due to complexities/Impairments listed. [] Progression has been slowed due to co-morbidities. [] Plan just implemented, too soon to assess goals progression <30days   [x] Goals require adjustment due to lack of progress  [x] Patient is not progressing as expected and requires additional follow up with physician  [] Other    ASSESSMENT:  Pt transferring to [facility] office after today's visit. Pt had one more visit scheduled in  but has decided to save approved units for QC office. Cueing given for form throughout session, lilo with TB exercises to avoid UT compensation. Pt reported slight increase in pain following the session and again mentioned that he thinks having a hot pack will help. 8/2 -  awaiting MRI  8/9 - awaiting MRI results      Treatment/Activity Tolerance:  [x] Pt able to complete treatment [] Patient limited by fatique  [] Patient limited by pain  [] Patient limited by other medical complications  [] Other:     Prognosis:  [] Good [x] Fair  [] Poor    Patient Requires Follow-up: [x] Yes  [] No      PLAN: Continue per POC - Sending for additional authorization - to extend date  [x] Continue per plan of care [] Alter current plan (see comments)  [] Plan of care initiated [] Hold pending MD visit [] Discharge    Electronically signed by: Andrea Young, PT, [id number]      Note: If patient does not return for scheduled/ recommended follow up visits, this note will serve as a discharge from care along with most recent update on progress.

## 2022-08-12 ENCOUNTER — APPOINTMENT (OUTPATIENT)
Dept: PHYSICAL THERAPY | Age: 62
End: 2022-08-12
Payer: COMMERCIAL

## 2022-08-12 ENCOUNTER — HOSPITAL ENCOUNTER (OUTPATIENT)
Dept: PHYSICAL THERAPY | Age: 62
Setting detail: THERAPIES SERIES
End: 2022-08-12
Payer: COMMERCIAL

## 2022-08-16 ENCOUNTER — HOSPITAL ENCOUNTER (OUTPATIENT)
Dept: PHYSICAL THERAPY | Age: 62
Setting detail: THERAPIES SERIES
Discharge: HOME | End: 2022-08-16
Payer: COMMERCIAL

## 2022-08-16 PROCEDURE — 97110 THERAPEUTIC EXERCISES: CPT

## 2022-08-16 PROCEDURE — 97530 THERAPEUTIC ACTIVITIES: CPT

## 2022-08-16 NOTE — FLOWSHEET NOTE
Physical Therapy Discharge      Dear Dr. Wang Latif    We had the pleasure of treating the following patient for physical therapy services at [address] [place] [address]. A summary of our findings can be found in the updated assessment below. This includes our plan of care. If you have any questions or concerns regarding these findings, please do not hesitate to contact me at the office phone number checked above. Thank you for the referral.     Physician Signature:________________________________Date:__________________  By signing above (or electronic signature), therapists plan is approved by physician    Date Range Of Visits:    -   Total Visits to Date: 15  Overall Response to Treatment:   Pain has remained and is a limiting factor to activity and progression. Strength and ROM have improved and are now functional flexion and abduction  4/5,  IR/ER 4+/5    Recommendation:  Pt has plateaued in terms of PT progress and based on MRI results will benefit from orthopedic evaluation.   [address], [facility]  Phone: [phone]   Fax: [phone]    Physical Therapy Treatment Note/ Progress Report:     Date:  2022    Patient Name:  Edd Solomon    :  1960  MRN: [id number]     Restrictions/Precautions:  None  Noted    Medical/Treatment Diagnosis Information:  Diagnosis: G89.4 (ICD-10-CM) - Chronic pain syndrome  Treatment Diagnosis: dec R shoulder AROM and strength, mild R GHJ hypomobility, impaired posture    Insurance/Certification information: [facility]   Physician Information:   Silva Gaines MD  Plan of care signed (Y/N): []  Yes [x]  No     Date of Patient follow up with Physician: 22     Progress Report: []  Yes  [x]  No     Date Range for reporting period:  Beginnin22  Ending:     Progress report due (10 Rx/or 30 days whichever is less): visit #10 or  (date)     Recertification due (POC duration/ or 90 days whichever is less): visit #12 or 7/23 (date)     Visit # Insurance Allowable Auth required? Date Range   eval + 12 / 12 30 v/yr  96 + 96 units this episode [x]  Yes - [employer]   7/25 to 9/30     Relevant Medical History:   Arthritis  Bipolar 1 disorder  Degenerative disc disease, lumbar  Diabetes mellitus type 2,    Essential hypertension  Mixed hyperlipidemia 1/16/2020  Pure hyperglyceridemia     FINDINGS:   Alignment at the [facility] joint and glenohumeral joint appears normal.  Mild   spurring is seen at the [facility] joint. Mild spurring seen at the glenohumeral   joint. No focal lung consolidation noted. Latex Allergy:  [x]NO      []YES  Preferred Language for Healthcare:   [x]English       []other:    Functional Scale:             FOTO physical FS primary measure score = 38; risk adjusted = 45  6/8/22                                                                               45    8/16  Pain level:  5/-7 / 10      SUBJECTIVE: initial Eval - Patient had a fall back in February 2022, falling bwd onto R elbow and shoulder. Pt reports x ray imaging showed spurs and degenerative changes in R shoulder. Pain is located in posterior R shoulder (pt reaches to R infraspinatus mm belly). Pt will get N/T here as well. When pain increases, pain feels like he will get an ice pick stabbing sensation. Pt has been using heat on shoulder and this feels good. Heating pad to wrap around his shoulder. Pain does radiate into right side of neck. Pt is aware of his posture and diapragmatic breathing and believes this helps manage his pain. Pain while driving, turning steering wheel, and he doesn't know where to position R UE. Pain with writing, to some extent. Pt has a lap table to help shoulder while writing. Pt has been doing aquatic exercises, though for arm exercises, performs these gently. 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     6/16   Pt reports shoulder is more irritated this date than normal. Would like \"heat treatment. \"   6/21 - shoulder and UT remain sore. 7/1 -  Sore after 6/24 treatment session, feels he may have overdone it  7/22 -  pain remains severe- instructed to contact MD  8/2 - Per pt, MD wants him only to get heat, ultrasound and massage - no exercise until then. 8/5 - no change noted - MRi Monday 8/9 - No change, waiting for MRI results - reviewed importance of MD intervention or exercise progression as we cannot just do modalities/stm. Pt expressed understanding.   8/16 -  MRI results reviewed - discussed results - referred to MD see below      PROM AROM     L R L R  eval                                             8/16   Shoulder Flexion      170 80*                                                  165   Shoulder Abduction        80* (worse than flexion)                 155   Shoulder External Rotation      T3 CT jcn                                             CT junction   Shoulder Internal Rotation      TL jcn R PSIS*                                         Right buttock   Elbow Flexion        WFLwfl   Elbow Extension      5 deg from neutral 5 deg foom neutral         Strength (0-5) Left Right   eval                               8/16                                    Shoulder Shrug (C4)       Shoulder Flex   2+*                                                 4 no pain            Shoulder Abd (C5)   2+*                                                  4 no pain   Shoulder ER   4*                                                      4+ no pain   Shoulder IR   4* Worse than ER                             4+ no pain   Biceps (C6)   4*    Triceps (C7)   4* Worse than biceps         Therapeutic Exercise (69403)  20        [employer]  Lat pull down  row    Verbal review of aquatic exercises    Gen ER green TB               30# x 10  25$ x 10    Reviewed aquatic ex    X 10 Notes/Cues    Right shoulder pain   UBE 2 min    Pulleys 2 min      Cane IR  Pull across               Tband      Row      Red  2 x 10    extension Red  2 x 10    IR Red  2 x 10 Hold today, resume as raji   ER Red  2 x 10         Seated  Cervical side bending  Cervical rotation   X 5 R/L  X 5 R/L                   narciso ER    Standing  Bilateral flexion   Orange 2 x 10      10    Supine     flexion 1# - 1x10    R Hold today, resume as raji   serratus 1# - 1x10   R         sidelying  Pt told by MD to hold exer until having MRI   ER add    Abd add         Therapeutic Activities (56734)  25      REview of shoulder anatomy, pathology impingements,    Review of MRI - and possible implications  Referral to Dr. Urban Justin for ortho evaluation  Review of activities to avoid Written handouts given,  referred to Dr. Urban Justin for follow up                      Pt. Education:  -pt educated on diagnosis, prognosis and expectations for rehab  -all pt questions were answered    Home Exercise Program:  Access Code: [email or web address]  URL: [email or web address]  Date: 06/07/2022  Prepared by: Akiko Archer    Exercises  Isometric Shoulder Flexion at Wall - 1 x daily - 7 x weekly - 1 sets - 10 reps - 5 hold  Isometric Shoulder Abduction at Wall - 1 x daily - 7 x weekly - 1 sets - 10 reps - 5 hold  Isometric Shoulder Adduction - 1 x daily - 7 x weekly - 1 sets - 10 reps - 5 hold  Seated Shoulder Abduction Towel Slide at Table Top - 2 x daily - 7 x weekly - 1 sets - 20 reps  Seated Shoulder Flexion Towel Slide at Table Top - 2 x daily - 7 x weekly - 1 sets - 20 reps  Supine Shoulder Flexion AAROM with Dowel - 1 x daily - 7 x weekly - 2 sets - 10 reps      Therapeutic Exercise and NMR EXR  [x] (16961) Provided verbal/tactile cueing for activities related to strengthening, flexibility, endurance, ROM  for improvements in scapular, scapulothoracic and UE control with self care, reaching, carrying, lifting, house/yardwork, driving/computer work.     [x] (05797) Provided verbal/tactile cueing for activities related to improving balance, coordination, kinesthetic sense, posture, motor skill, proprioception  to assist with  scapular, scapulothoracic and UE control with self care, reaching, carrying, lifting, house/yardwork, driving/computer work. [x] (52706) Therapist is in constant attendance of 2 or more patients providing skilled therapy interventions, but not providing any significant amount of measurable one-on-one time to either patient, for improvements in cervical, scapular, scapulothoracic and UE control with self care, reaching, carrying, lifting, house/yardwork, driving, computer work. Therapeutic Activities:    [x] (99127 or 17243) Provided verbal/tactile cueing for activities related to improving balance, coordination, kinesthetic sense, posture, motor skill, proprioception and motor activation to allow for proper function of scapular, scapulothoracic and UE control with self care, carrying, lifting, driving/computer work.      Home Exercise Program:    [x] (41346) Reviewed/Progressed HEP activities related to strengthening, flexibility, endurance, ROM of scapular, scapulothoracic and UE control with self care, reaching, carrying, lifting, house/yardwork, driving/computer work  [] (08328) Reviewed/Progressed HEP activities related to improving balance, coordination, kinesthetic sense, posture, motor skill, proprioception of scapular, scapulothoracic and UE control with self care, reaching, carrying, lifting, house/yardwork, driving/computer work      Manual Treatments:  PROM / STM / Oscillations-Mobs:  G-I, II, III, IV (PA's, Inf., Post.)  [x] (24575) Provided manual therapy to mobilize soft tissue/joints of cervical/CT, scapular GHJ and UE for the purpose of modulating pain, promoting relaxation,  increasing ROM, reducing/eliminating soft tissue swelling/inflammation/restriction, improving soft tissue extensibility and allowing for proper ROM for normal function with self care, reaching, carrying, lifting, house/yardwork, Adjusted    Overall Progression Towards Functional goals/ Treatment Progress Update:  [] Patient is progressing as expected towards functional goals listed. [x] Progression is slowed due to complexities/Impairments listed. [] Progression has been slowed due to co-morbidities. [] Plan just implemented, too soon to assess goals progression <30days   [] Goals require adjustment due to lack of progress  [] Patient is not progressing as expected and requires additional follow up with physician  [] Other    ASSESSMENT:  Pt transferring to [facility] office after today's visit. Pt had one more visit scheduled in  but has decided to save approved units for QC office. Cueing given for form throughout session, lilo with TB exercises to avoid UT compensation. Pt reported slight increase in pain following the session and again mentioned that he thinks having a hot pack will help. 8/2 -  awaiting MRI  8/9 - awaiting MRI results  8/16 - MRI results demonstrated degenerative changes, partial posterior SS tear. Recommend referral to MD 2/2 no change with modalitites    Treatment/Activity Tolerance:  [x] Pt able to complete treatment [] Patient limited by fatique  [] Patient limited by pain  [] Patient limited by other medical complications  [] Other:     Prognosis:  [] Good [x] Fair  [] Poor    Patient Requires Follow-up: [] Yes  [x] No      PLAN: DC 2/2 MRI results - recommended referral to ortho for evaluation  Pt concurs. [] Continue per plan of care [] Alter current plan (see comments)  [] Plan of care initiated [] Hold pending MD visit [x] Discharge    Electronically signed by: Oscar Jimenez, PT, [id number]      Note: If patient does not return for scheduled/ recommended follow up visits, this note will serve as a discharge from care along with most recent update on progress.

## 2022-08-19 ENCOUNTER — APPOINTMENT (OUTPATIENT)
Dept: PHYSICAL THERAPY | Age: 62
End: 2022-08-19
Payer: COMMERCIAL

## 2022-08-30 DIAGNOSIS — E11.65 UNCONTROLLED TYPE 2 DIABETES MELLITUS WITH HYPERGLYCEMIA (HCC): ICD-10-CM

## 2022-08-30 RX ORDER — METFORMIN HYDROCHLORIDE 500 MG/1
TABLET, EXTENDED RELEASE ORAL
Qty: 60 TABLET | Refills: 0 | Status: SHIPPED | OUTPATIENT
Start: 2022-08-30 | End: 2022-09-19

## 2022-09-01 ENCOUNTER — OFFICE VISIT (OUTPATIENT)
Dept: PAIN MANAGEMENT | Age: 62
End: 2022-09-01
Payer: COMMERCIAL

## 2022-09-01 VITALS
DIASTOLIC BLOOD PRESSURE: 92 MMHG | OXYGEN SATURATION: 95 % | HEART RATE: 86 BPM | BODY MASS INDEX: 34.93 KG/M2 | WEIGHT: 223 LBS | SYSTOLIC BLOOD PRESSURE: 145 MMHG

## 2022-09-01 DIAGNOSIS — M79.7 FIBROMYALGIA: ICD-10-CM

## 2022-09-01 DIAGNOSIS — M79.18 MYOFASCIAL PAIN: ICD-10-CM

## 2022-09-01 DIAGNOSIS — G89.4 CHRONIC PAIN SYNDROME: ICD-10-CM

## 2022-09-01 DIAGNOSIS — G89.29 CHRONIC LEFT SHOULDER PAIN: ICD-10-CM

## 2022-09-01 DIAGNOSIS — M16.12 PRIMARY OSTEOARTHRITIS OF LEFT HIP: ICD-10-CM

## 2022-09-01 DIAGNOSIS — M51.36 DDD (DEGENERATIVE DISC DISEASE), LUMBAR: ICD-10-CM

## 2022-09-01 DIAGNOSIS — M25.512 CHRONIC LEFT SHOULDER PAIN: ICD-10-CM

## 2022-09-01 DIAGNOSIS — M47.817 LUMBOSACRAL SPONDYLOSIS WITHOUT MYELOPATHY: ICD-10-CM

## 2022-09-01 DIAGNOSIS — M54.16 LUMBAR RADICULOPATHY: ICD-10-CM

## 2022-09-01 PROCEDURE — 99213 OFFICE O/P EST LOW 20 MIN: CPT | Performed by: INTERNAL MEDICINE

## 2022-09-01 PROCEDURE — G8417 CALC BMI ABV UP PARAM F/U: HCPCS | Performed by: INTERNAL MEDICINE

## 2022-09-01 PROCEDURE — G8427 DOCREV CUR MEDS BY ELIG CLIN: HCPCS | Performed by: INTERNAL MEDICINE

## 2022-09-01 PROCEDURE — 3017F COLORECTAL CA SCREEN DOC REV: CPT | Performed by: INTERNAL MEDICINE

## 2022-09-01 PROCEDURE — 1036F TOBACCO NON-USER: CPT | Performed by: INTERNAL MEDICINE

## 2022-09-01 RX ORDER — HYDROCODONE BITARTRATE AND ACETAMINOPHEN 5; 325 MG/1; MG/1
1 TABLET ORAL EVERY 6 HOURS PRN
Qty: 120 TABLET | Refills: 0 | Status: SHIPPED | OUTPATIENT
Start: 2022-09-01 | End: 2022-09-29 | Stop reason: SDUPTHER

## 2022-09-01 RX ORDER — METHOCARBAMOL 500 MG/1
500 TABLET, FILM COATED ORAL 2 TIMES DAILY PRN
Qty: 60 TABLET | Refills: 1 | Status: SHIPPED | OUTPATIENT
Start: 2022-09-01 | End: 2022-09-29 | Stop reason: SDUPTHER

## 2022-09-01 RX ORDER — MELOXICAM 15 MG/1
TABLET ORAL
Qty: 30 TABLET | Refills: 1 | Status: SHIPPED | OUTPATIENT
Start: 2022-09-01 | End: 2022-09-29 | Stop reason: SDUPTHER

## 2022-09-01 RX ORDER — GABAPENTIN 300 MG/1
CAPSULE ORAL
Qty: 90 CAPSULE | Refills: 1 | Status: SHIPPED | OUTPATIENT
Start: 2022-09-01 | End: 2022-09-29 | Stop reason: SDUPTHER

## 2022-09-01 NOTE — PROGRESS NOTES
Edd Solomon  1960  [id number]      HISTORY OF PRESENT ILLNESS:  Mr. Jesica Aggarwal is a 60 y.o. male returns for a follow up visit for pain management  He has a diagnosis of   1. Chronic pain syndrome    2. Chronic left shoulder pain    3. Fibromyalgia    4. DDD (degenerative disc disease), lumbar    5. Primary osteoarthritis of left hip    6. Lumbar radiculopathy    7. Lumbosacral spondylosis without myelopathy    8. Myofascial pain    . He complains of pain in the  upper back, lower back,left hand with radiation to the right shoulder, left hip  He rates the pain 7/10 and describes it as sharp, aching. Current treatment regimen has helped relieve about 60% of the pain. He denies any side effects from the current pain regimen. Patient reports that since the last follow up visit the physical functioning is worse, family/social relationships are unchanged, mood is better sleep patterns are unchanged, and that the overall functioning is better. Patient denies misusing/abusing his narcotic pain medications or using any illegal drugs. There are No indicators for possible drug abuse, addiction or diversion problems, patient states he has been doing fair, his pain has been somewhat manageable. Mr. Jesica Aggarwal states he has been compliant with his regimen. Patient reports he is doing PT for his shoulder. He states he had a MRI done for the right shoulder. Patient mentions he is using Norco 3-4 per day. ALLERGIES: Patients list of allergies were reviewed     MEDICATIONS: Mr. Jesica Aggarwal list of medications were reviewed. His current medications are   Outpatient Medications Prior to Visit   Medication Sig Dispense Refill    metFORMIN (GLUCOPHAGE-XR) 500 MG extended release tablet TAKE 2 TABLETS BY MOUTH EVERY DAY WITH BREAKFAST 60 tablet 0    Blood Glucose Monitoring Suppl (FREESTYLE LITE) RO As needed 1 each 1    Blood Glucose Monitoring Suppl (ONE TOUCH ULTRA 2) w/Device KIT As needed 1 kit 0    ONE TOUCH ULTRASOFT LANCETS [facility] 1 each by Does not apply route 4 times daily 100 each 6    blood glucose test strips (ONETOUCH ULTRA) strip 4 times a day As needed. 100 each 3    Insulin Pen Needle 32G X 4 MM MISC 1 each by Does not apply route 4 times daily 120 each 0    blood glucose test strips (FREESTYLE LITE) strip USE TO TEST FOUR TIMES A  strip 0    FreeStyle Lancets MISC USE FOUR TIMES A  each 0    insulin aspart (NOVOLOG FLEXPEN) 100 UNIT/ML injection pen 18-24 units AC TID 10 pen 0    Dulaglutide (TRULICITY) 3 DP/9.4KT SOPN Inject 3 mg into the skin once a week 4 pen 0    hydrALAZINE (APRESOLINE) 25 MG tablet Take 25 mg by mouth in the morning and at bedtime      spironolactone (ALDACTONE) 25 MG tablet Take 25 mg by mouth daily      mupirocin (BACTROBAN) 2 % ointment Apply 22 g topically 3 times daily Apply topically 3 times daily.       insulin glargine (LANTUS SOLOSTAR) 100 UNIT/ML injection pen INJECT 46 UNITS UNDER THE SKIN DAILY 5 pen 5    Blood Glucose Monitoring Suppl (FREESTYLE LITE) RO As needed 1 each 0    lisinopril (PRINIVIL;ZESTRIL) 30 MG tablet Take 30 mg by mouth daily      FreeStyle Lancets MISC USE AS DIRECTED 100 each 3    Insulin Pen Needle (PEN NEEDLES) 31G X 6 MM MISC 1 each by In Vitro route 4 times daily 200 each 3    omeprazole (PRILOSEC) 20 MG delayed release capsule TAKE 1 CAPSULE BY MOUTH EVERY DAY      Cholecalciferol (VITAMIN D PO) Take 1 tablet by mouth every 7 days Pt to clarify dose      furosemide (LASIX) 20 MG tablet Take 40 mg by mouth daily       Fluticasone Propionate (FLONASE NA) 1 spray by Nasal route daily Each nostril      SALINE MIST SPRAY NA 1 spray by Nasal route 3 times daily as needed Each nostril 2-3 times per day      Blood Glucose Monitoring Suppl (ONE TOUCH ULTRA 2) w/Device KIT 1 kit by Does not apply route daily 1 kit 0    Lancets MISC 1 each by Does not apply route 3 times daily 100 each 3    MELATONIN PO Take 1 tablet by mouth nightly       loratadine (CLARITIN) 10 MG tablet Take 10 mg by mouth daily       atorvastatin (LIPITOR) 80 MG tablet Take 80 mg by mouth daily. gabapentin (NEURONTIN) 300 MG capsule Take 1 tablet po in the morning and take 2 tablets po in the evening 90 capsule 1    methocarbamol (ROBAXIN) 500 MG tablet Take 1 tablet by mouth 2 times daily as needed (muscle stiffness) 60 tablet 1    meloxicam (MOBIC) 15 MG tablet Take 1 tablet po every Monday,Wednesday, and Friday 30 tablet 1     No facility-administered medications prior to visit. REVIEW OF SYSTEMS:    Respiratory: Negative for apnea, chest tightness and shortness of breath or change in baseline breathing. PHYSICAL EXAM:   Nursing note and vitals reviewed. BP (!) 145/92   Pulse 86   Wt 223 lb (101.2 kg)   SpO2 95%   BMI 34.93 kg/m²   Constitutional: He appears well-developed and well-nourished. No acute distress. Cardiovascular: Normal rate, regular rhythm, normal heart sounds, and does not have murmur. Pulmonary/Chest: Effort normal. No respiratory distress. He does not have wheezes in the lung fields. He has no rales. Neurological/Psychiatric:He is alert and oriented to person, place, and time. Coordination is  normal.  His mood isAppropriate and affect is Neutral/Euthymic(normal) . His    IMPRESSION:   1. Chronic pain syndrome    2. Chronic left shoulder pain    3. Fibromyalgia    4. DDD (degenerative disc disease), lumbar    5. Primary osteoarthritis of left hip    6. Lumbar radiculopathy    7. Lumbosacral spondylosis without myelopathy    8. Myofascial pain        PLAN:  Informed verbal consent was obtained  -[place] record was obtained and reviewed  for the last one year and no indicators of drug misuse  were found. Any other controlled substance prescriptions  seen on the record have been accounted for, I am aware of the patient receiving these medications. Catherinepoornima Walls [place] record will be rechecked as part of office protocol.     -MRI right shoulder reviewed; shows DJD/tendinis and tearing   -All Rx options discussed with patient  -Continue with Norco 3-4 per day  -Continue with Mobic along with Neurontin  Current Outpatient Medications   Medication Sig Dispense Refill    methocarbamol (ROBAXIN) 500 MG tablet Take 1 tablet by mouth 2 times daily as needed (muscle stiffness) 60 tablet 1    meloxicam (MOBIC) 15 MG tablet Take 1 tablet po every Monday,Wednesday, and Friday 30 tablet 1    gabapentin (NEURONTIN) 300 MG capsule Take 1 tablet po in the morning and take 2 tablets po in the evening 90 capsule 1    HYDROcodone-acetaminophen (NORCO) 5-325 MG per tablet Take 1 tablet by mouth every 6 hours as needed for Pain (max 3-4 day) for up to 30 days. 120 tablet 0    metFORMIN (GLUCOPHAGE-XR) 500 MG extended release tablet TAKE 2 TABLETS BY MOUTH EVERY DAY WITH BREAKFAST 60 tablet 0    Blood Glucose Monitoring Suppl (FREESTYLE LITE) RO As needed 1 each 1    Blood Glucose Monitoring Suppl (ONE TOUCH ULTRA 2) w/Device KIT As needed 1 kit 0    ONE TOUCH ULTRASOFT LANCETS MISC 1 each by Does not apply route 4 times daily 100 each 6    blood glucose test strips (ONETOUCH ULTRA) strip 4 times a day As needed. 100 each 3    Insulin Pen Needle 32G X 4 MM MISC 1 each by Does not apply route 4 times daily 120 each 0    blood glucose test strips (FREESTYLE LITE) strip USE TO TEST FOUR TIMES A  strip 0    FreeStyle Lancets MISC USE FOUR TIMES A  each 0    insulin aspart (NOVOLOG FLEXPEN) 100 UNIT/ML injection pen 18-24 units AC TID 10 pen 0    Dulaglutide (TRULICITY) 3 PB/1.4AR SOPN Inject 3 mg into the skin once a week 4 pen 0    hydrALAZINE (APRESOLINE) 25 MG tablet Take 25 mg by mouth in the morning and at bedtime      spironolactone (ALDACTONE) 25 MG tablet Take 25 mg by mouth daily      mupirocin (BACTROBAN) 2 % ointment Apply 22 g topically 3 times daily Apply topically 3 times daily.       insulin glargine (LANTUS SOLOSTAR) 100 UNIT/ML injection pen INJECT 46 UNITS UNDER THE SKIN DAILY 5 pen 5    Blood Glucose Monitoring Suppl (FREESTYLE LITE) RO As needed 1 each 0    lisinopril (PRINIVIL;ZESTRIL) 30 MG tablet Take 30 mg by mouth daily      FreeStyle Lancets MISC USE AS DIRECTED 100 each 3    Insulin Pen Needle (PEN NEEDLES) 31G X 6 MM MISC 1 each by In Vitro route 4 times daily 200 each 3    omeprazole (PRILOSEC) 20 MG delayed release capsule TAKE 1 CAPSULE BY MOUTH EVERY DAY      Cholecalciferol (VITAMIN D PO) Take 1 tablet by mouth every 7 days Pt to clarify dose      furosemide (LASIX) 20 MG tablet Take 40 mg by mouth daily       Fluticasone Propionate (FLONASE NA) 1 spray by Nasal route daily Each nostril      SALINE MIST SPRAY NA 1 spray by Nasal route 3 times daily as needed Each nostril 2-3 times per day      Blood Glucose Monitoring Suppl (ONE TOUCH ULTRA 2) w/Device KIT 1 kit by Does not apply route daily 1 kit 0    Lancets MISC 1 each by Does not apply route 3 times daily 100 each 3    MELATONIN PO Take 1 tablet by mouth nightly       loratadine (CLARITIN) 10 MG tablet Take 10 mg by mouth daily       atorvastatin (LIPITOR) 80 MG tablet Take 80 mg by mouth daily. No current facility-administered medications for this visit. I will continue his current medication regimen  which is part of the above treatment schedule. It has been helping with Mr. En Quiroz chronic  medical problems which for this visit include:   Diagnoses of Chronic pain syndrome, Chronic left shoulder pain, Fibromyalgia, DDD (degenerative disc disease), lumbar, Primary osteoarthritis of left hip, Lumbar radiculopathy, Lumbosacral spondylosis without myelopathy, and Myofascial pain were pertinent to this visit. Risks and benefits of the medications and other alternative treatments  including no treatment were discussed with the patient. The common side effects of these medications were also explained to the patient. Informed verbal consent was obtained.    Goals of current treatment regimen include improvement in pain, restoration of functioning- with focus on improvement in physical performance, general activity, work or disability,emotional distress, health care utilization and  decreased medication consumption. Will continue to monitor progress towards achieving/maintaining therapeutic goals with special emphasis on  1. Improvement in perceived interfernce  of pain with ADL's. Ability to do home exercises independently. Ability to do household chores indoor and/or outdoor work and social and leisure activities. Improve psychosocial and physical functioning. - he is showing progression towards this treatment goal with the current regimen. 2. Ability to focus/concentrate at work and perform the duties required of him at work  Sit through a workday without lower extremity symptoms. Stand 30-60 minutes without lower extremity symptoms. Ability to lift up to 10-20 lbs. Ability to go up and down stairs. Sit 30-60 minutes  Without having to stand up frequently. - he is maintaining/progressing towards his work related goals with the current regimen. He was advised against drinking alcohol with the narcotic pain medicines, advised against driving or handling machinery while adjusting the dose of medicines or if having cognitive  issues related to the current medications. Risk of overdose and death, if medicines not taken as prescribed, were also discussed. If the patient develops new symptoms or if the symptoms worsen, the patient should call the office. While transcribing every attempt was made to maintain the accuracy of the note in terms of it's contents,there may have been some errors made inadvertently. Thank you for allowing me to participate in the care of this patient.     Ayan Santamaria MD.    Cc: Daily Owen MD

## 2022-09-01 NOTE — PROGRESS NOTES
[facility]  1960  [id number]      HISTORY OF PRESENT ILLNESS:  Mr. Rachel Smith is a 60 y.o. male returns for a follow up visit for pain management  He has a diagnosis of   1. Chronic pain syndrome    2. Chronic left shoulder pain    3. Fibromyalgia    4. DDD (degenerative disc disease), lumbar    5. Primary osteoarthritis of left hip    6. Lumbar radiculopathy    7. Lumbosacral spondylosis without myelopathy    8. Myofascial pain    9. Encounter for therapeutic drug monitoring    . He complains of pain in the  upper back, lower back,left hand with radiation to the right shoulder, left hip  He rates the pain 7/10 and describes it as sharp, aching. Current treatment regimen has helped relieve about 60% of the pain. He denies any side effects from the current pain regimen. Patient reports that since the last follow up visit the physical functioning is worse, family/social relationships are unchanged, mood is better sleep patterns are unchanged, and that the overall functioning is better. Patient denies misusing/abusing his narcotic pain medications or using any illegal drugs. There are No indicators for possible drug abuse, addiction or diversion problems, patient states he has been doing fair, his pain has been somewhat manageable. Mr. Rachel Smith states he has been compliant with his regimen. Patient reports he is doing PT for his shoulder. He states he had a MRI done for the right shoulder. Patient mentions he is using Norco 3-4 per day. ALLERGIES: Patients list of allergies were reviewed     MEDICATIONS: Mr. Rachel Smith list of medications were reviewed. His current medications are   Outpatient Medications Prior to Visit   Medication Sig Dispense Refill    metFORMIN (GLUCOPHAGE-XR) 500 MG extended release tablet TAKE 2 TABLETS BY MOUTH EVERY DAY WITH BREAKFAST 60 tablet 0    Blood Glucose Monitoring Suppl (FREESTYLE LITE) RO As needed 1 each 1    Blood Glucose Monitoring Suppl (ONE TOUCH ULTRA 2) w/Device KIT As needed 1 kit 0    ONE TOUCH ULTRASOFT LANCETS MISC 1 each by Does not apply route 4 times daily 100 each 6    blood glucose test strips (ONETOUCH ULTRA) strip 4 times a day As needed. 100 each 3    gabapentin (NEURONTIN) 300 MG capsule Take 1 tablet po in the morning and take 2 tablets po in the evening 90 capsule 1    methocarbamol (ROBAXIN) 500 MG tablet Take 1 tablet by mouth 2 times daily as needed (muscle stiffness) 60 tablet 1    meloxicam (MOBIC) 15 MG tablet Take 1 tablet po every Monday,Wednesday, and Friday 30 tablet 1    Insulin Pen Needle 32G X 4 MM MISC 1 each by Does not apply route 4 times daily 120 each 0    blood glucose test strips (FREESTYLE LITE) strip USE TO TEST FOUR TIMES A  strip 0    FreeStyle Lancets MISC USE FOUR TIMES A  each 0    insulin aspart (NOVOLOG FLEXPEN) 100 UNIT/ML injection pen 18-24 units AC TID 10 pen 0    Dulaglutide (TRULICITY) 3 TL/8.5BV SOPN Inject 3 mg into the skin once a week 4 pen 0    hydrALAZINE (APRESOLINE) 25 MG tablet Take 25 mg by mouth in the morning and at bedtime      spironolactone (ALDACTONE) 25 MG tablet Take 25 mg by mouth daily      mupirocin (BACTROBAN) 2 % ointment Apply 22 g topically 3 times daily Apply topically 3 times daily.       insulin glargine (LANTUS SOLOSTAR) 100 UNIT/ML injection pen INJECT 46 UNITS UNDER THE SKIN DAILY 5 pen 5    Blood Glucose Monitoring Suppl (FREESTYLE LITE) RO As needed 1 each 0    lisinopril (PRINIVIL;ZESTRIL) 30 MG tablet Take 30 mg by mouth daily      FreeStyle Lancets MISC USE AS DIRECTED 100 each 3    Insulin Pen Needle (PEN NEEDLES) 31G X 6 MM MISC 1 each by In Vitro route 4 times daily 200 each 3    omeprazole (PRILOSEC) 20 MG delayed release capsule TAKE 1 CAPSULE BY MOUTH EVERY DAY      Cholecalciferol (VITAMIN D PO) Take 1 tablet by mouth every 7 days Pt to clarify dose      furosemide (LASIX) 20 MG tablet Take 40 mg by mouth daily       Fluticasone Propionate (FLONASE NA) 1 spray by Nasal route daily Each nostril      SALINE MIST SPRAY NA 1 spray by Nasal route 3 times daily as needed Each nostril 2-3 times per day      Blood Glucose Monitoring Suppl (ONE TOUCH ULTRA 2) w/Device KIT 1 kit by Does not apply route daily 1 kit 0    Lancets MISC 1 each by Does not apply route 3 times daily 100 each 3    MELATONIN PO Take 1 tablet by mouth nightly       loratadine (CLARITIN) 10 MG tablet Take 10 mg by mouth daily       atorvastatin (LIPITOR) 80 MG tablet Take 80 mg by mouth daily. No facility-administered medications prior to visit. REVIEW OF SYSTEMS:    Respiratory: Negative for apnea, chest tightness and shortness of breath or change in baseline breathing. PHYSICAL EXAM:   Nursing note and vitals reviewed. BP (!) 145/92   Pulse 86   Wt 223 lb (101.2 kg)   SpO2 95%   BMI 34.93 kg/m²   Constitutional: He appears well-developed and well-nourished. No acute distress. Cardiovascular: Normal rate, regular rhythm, normal heart sounds, and does not have murmur. Pulmonary/Chest: Effort normal. No respiratory distress. He does not have wheezes in the lung fields. He has no rales. Neurological/Psychiatric:He is alert and oriented to person, place, and time. Coordination is  normal.  His mood isAppropriate and affect is Neutral/Euthymic(normal) . His    IMPRESSION:   1. Chronic pain syndrome    2. Chronic left shoulder pain    3. Fibromyalgia    4. DDD (degenerative disc disease), lumbar    5. Primary osteoarthritis of left hip    6. Lumbar radiculopathy    7. Lumbosacral spondylosis without myelopathy    8. Myofascial pain    9. Encounter for therapeutic drug monitoring        PLAN:  Informed verbal consent was obtained  -[place] record was obtained and reviewed  for the last one year and no indicators of drug misuse  were found. Any other controlled substance prescriptions  seen on the record have been accounted for, I am aware of the patient receiving these medications. Yaw Medina  [place] record will be rechecked as part of office protocol. -MRI right shoulder reviewed; shows DJD/tendinis and tearing   -All Rx options discussed with patient  -Continue with Norco 3-4 per day  -Continue with Mobic along with Neurontin  Current Outpatient Medications   Medication Sig Dispense Refill    metFORMIN (GLUCOPHAGE-XR) 500 MG extended release tablet TAKE 2 TABLETS BY MOUTH EVERY DAY WITH BREAKFAST 60 tablet 0    Blood Glucose Monitoring Suppl (FREESTYLE LITE) RO As needed 1 each 1    Blood Glucose Monitoring Suppl (ONE TOUCH ULTRA 2) w/Device KIT As needed 1 kit 0    ONE TOUCH ULTRASOFT LANCETS MISC 1 each by Does not apply route 4 times daily 100 each 6    blood glucose test strips (ONETOUCH ULTRA) strip 4 times a day As needed. 100 each 3    gabapentin (NEURONTIN) 300 MG capsule Take 1 tablet po in the morning and take 2 tablets po in the evening 90 capsule 1    methocarbamol (ROBAXIN) 500 MG tablet Take 1 tablet by mouth 2 times daily as needed (muscle stiffness) 60 tablet 1    meloxicam (MOBIC) 15 MG tablet Take 1 tablet po every Monday,Wednesday, and Friday 30 tablet 1    Insulin Pen Needle 32G X 4 MM MISC 1 each by Does not apply route 4 times daily 120 each 0    blood glucose test strips (FREESTYLE LITE) strip USE TO TEST FOUR TIMES A  strip 0    FreeStyle Lancets MISC USE FOUR TIMES A  each 0    insulin aspart (NOVOLOG FLEXPEN) 100 UNIT/ML injection pen 18-24 units AC TID 10 pen 0    Dulaglutide (TRULICITY) 3 PQ/1.7OB SOPN Inject 3 mg into the skin once a week 4 pen 0    hydrALAZINE (APRESOLINE) 25 MG tablet Take 25 mg by mouth in the morning and at bedtime      spironolactone (ALDACTONE) 25 MG tablet Take 25 mg by mouth daily      mupirocin (BACTROBAN) 2 % ointment Apply 22 g topically 3 times daily Apply topically 3 times daily.       insulin glargine (LANTUS SOLOSTAR) 100 UNIT/ML injection pen INJECT 46 UNITS UNDER THE SKIN DAILY 5 pen 5    Blood Glucose Monitoring Suppl (FREESTYLE LITE) RO As needed 1 each 0    lisinopril (PRINIVIL;ZESTRIL) 30 MG tablet Take 30 mg by mouth daily      FreeStyle Lancets MISC USE AS DIRECTED 100 each 3    Insulin Pen Needle (PEN NEEDLES) 31G X 6 MM MISC 1 each by In Vitro route 4 times daily 200 each 3    omeprazole (PRILOSEC) 20 MG delayed release capsule TAKE 1 CAPSULE BY MOUTH EVERY DAY      Cholecalciferol (VITAMIN D PO) Take 1 tablet by mouth every 7 days Pt to clarify dose      furosemide (LASIX) 20 MG tablet Take 40 mg by mouth daily       Fluticasone Propionate (FLONASE NA) 1 spray by Nasal route daily Each nostril      SALINE MIST SPRAY NA 1 spray by Nasal route 3 times daily as needed Each nostril 2-3 times per day      Blood Glucose Monitoring Suppl (ONE TOUCH ULTRA 2) w/Device KIT 1 kit by Does not apply route daily 1 kit 0    Lancets MISC 1 each by Does not apply route 3 times daily 100 each 3    MELATONIN PO Take 1 tablet by mouth nightly       loratadine (CLARITIN) 10 MG tablet Take 10 mg by mouth daily       atorvastatin (LIPITOR) 80 MG tablet Take 80 mg by mouth daily. No current facility-administered medications for this visit. I will continue his current medication regimen  which is part of the above treatment schedule. It has been helping with Mr. Piter Price chronic  medical problems which for this visit include:   Diagnoses of Chronic pain syndrome, Chronic left shoulder pain, Fibromyalgia, DDD (degenerative disc disease), lumbar, Primary osteoarthritis of left hip, Lumbar radiculopathy, Lumbosacral spondylosis without myelopathy, Myofascial pain, and Encounter for therapeutic drug monitoring were pertinent to this visit. Risks and benefits of the medications and other alternative treatments  including no treatment were discussed with the patient. The common side effects of these medications were also explained to the patient. Informed verbal consent was obtained.    Goals of current treatment regimen include improvement in pain, restoration of functioning- with focus on improvement in physical performance, general activity, work or disability,emotional distress, health care utilization and  decreased medication consumption. Will continue to monitor progress towards achieving/maintaining therapeutic goals with special emphasis on  1. Improvement in perceived interfernce  of pain with ADL's. Ability to do home exercises independently. Ability to do household chores indoor and/or outdoor work and social and leisure activities. Improve psychosocial and physical functioning. - he is showing progression towards this treatment goal with the current regimen. 2. Ability to focus/concentrate at work and perform the duties required of him at work  Sit through a workday without lower extremity symptoms. Stand 30-60 minutes without lower extremity symptoms. Ability to lift up to 10-20 lbs. Ability to go up and down stairs. Sit 30-60 minutes  Without having to stand up frequently. - he is maintaining/progressing towards his work related goals with the current regimen. He was advised against drinking alcohol with the narcotic pain medicines, advised against driving or handling machinery while adjusting the dose of medicines or if having cognitive  issues related to the current medications. Risk of overdose and death, if medicines not taken as prescribed, were also discussed. If the patient develops new symptoms or if the symptoms worsen, the patient should call the office. While transcribing every attempt was made to maintain the accuracy of the note in terms of it's contents,there may have been some errors made inadvertently. Thank you for allowing me to participate in the care of this patient.     Naida Lorenzo MD.    Cc: Carol Horn MD

## 2022-09-13 ENCOUNTER — PATIENT MESSAGE (OUTPATIENT)
Dept: ENDOCRINOLOGY | Age: 62
End: 2022-09-13

## 2022-09-13 DIAGNOSIS — E11.65 UNCONTROLLED TYPE 2 DIABETES MELLITUS WITH HYPERGLYCEMIA (HCC): ICD-10-CM

## 2022-09-13 RX ORDER — DULAGLUTIDE 3 MG/.5ML
3 INJECTION, SOLUTION SUBCUTANEOUS WEEKLY
Qty: 4 ADJUSTABLE DOSE PRE-FILLED PEN SYRINGE | Refills: 3 | Status: SHIPPED | OUTPATIENT
Start: 2022-09-13 | End: 2022-09-14 | Stop reason: SDUPTHER

## 2022-09-13 NOTE — TELEPHONE ENCOUNTER
Medication:   Requested Prescriptions     Pending Prescriptions Disp Refills    TRULICITY 3 UE/3.4HU SOPN [Pharmacy Med Name: Endy Stewart 3 LX/4.9 ML PEN] 4 Adjustable Dose Pre-filled Pen Syringe 3     Sig: INJECT 3 MG INTO THE SKIN ONCE A WEEK       Last Filled:      Patient Phone Number: [phone] (home)     Last appt: 11/23/2021   Next appt: 11/09/2022    Last Labs DM:   Lab Results   Component Value Date/Time    LABA1C 7.8 08/02/2022 11:03 AM

## 2022-09-14 RX ORDER — DULAGLUTIDE 3 MG/.5ML
3 INJECTION, SOLUTION SUBCUTANEOUS WEEKLY
Qty: 4 ADJUSTABLE DOSE PRE-FILLED PEN SYRINGE | Refills: 3 | Status: SHIPPED | OUTPATIENT
Start: 2022-09-14

## 2022-09-14 RX ORDER — INSULIN GLARGINE 100 [IU]/ML
INJECTION, SOLUTION SUBCUTANEOUS
Qty: 5 ADJUSTABLE DOSE PRE-FILLED PEN SYRINGE | Refills: 3 | Status: SHIPPED | OUTPATIENT
Start: 2022-09-14

## 2022-09-14 RX ORDER — INSULIN ASPART 100 [IU]/ML
INJECTION, SOLUTION INTRAVENOUS; SUBCUTANEOUS
Qty: 10 ADJUSTABLE DOSE PRE-FILLED PEN SYRINGE | Refills: 3 | Status: SHIPPED | OUTPATIENT
Start: 2022-09-14
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
## 2022-09-15 NOTE — TELEPHONE ENCOUNTER
Medication:   Requested Prescriptions     Pending Prescriptions Disp Refills    TRULICITY 1.5 LEON/5.8AM SOPN [Pharmacy Med Name: Lilian Marina 1.5 JE/2.8 ML PEN]  3     Sig: INJECT 1.5 MG UNDER THE SKIN ONCE WEEKLY       Last Filled:      Patient Phone Number: [phone] (home)     Last appt: 3/24/2022   Next appt: 11/09/2022    Last Labs DM:   Lab Results   Component Value Date/Time    LABA1C 7.8 08/02/2022 11:03 AM

## 2022-09-16 RX ORDER — DULAGLUTIDE 1.5 MG/.5ML
INJECTION, SOLUTION SUBCUTANEOUS
Qty: 4 ADJUSTABLE DOSE PRE-FILLED PEN SYRINGE | Refills: 3 | Status: SHIPPED | OUTPATIENT
Start: 2022-09-16

## 2022-09-18 DIAGNOSIS — E11.65 UNCONTROLLED TYPE 2 DIABETES MELLITUS WITH HYPERGLYCEMIA (HCC): ICD-10-CM

## 2022-09-19 RX ORDER — METFORMIN HYDROCHLORIDE 500 MG/1
TABLET, EXTENDED RELEASE ORAL
Qty: 60 TABLET | Refills: 0 | Status: SHIPPED | OUTPATIENT
Start: 2022-09-19 | End: 2022-09-27

## 2022-09-19 NOTE — TELEPHONE ENCOUNTER
Medication:   Requested Prescriptions     Pending Prescriptions Disp Refills    metFORMIN (GLUCOPHAGE-XR) 500 MG extended release tablet [Pharmacy Med Name: METFORMIN HCL  MG TABLET] 60 tablet 0     Sig: TAKE TWO TABLETS BY MOUTH DAILY WITH BREAKFAST       Last Filled:      Patient Phone Number: [phone] (home)     Last appt: 8/2/2022   Next appt: 11/9/2022    Last Labs DM:   Lab Results   Component Value Date/Time    LABA1C 7.8 08/02/2022 11:03 AM

## 2022-09-27 DIAGNOSIS — E11.65 UNCONTROLLED TYPE 2 DIABETES MELLITUS WITH HYPERGLYCEMIA (HCC): ICD-10-CM

## 2022-09-27 RX ORDER — METFORMIN HYDROCHLORIDE 500 MG/1
TABLET, EXTENDED RELEASE ORAL
Qty: 60 TABLET | Refills: 0 | Status: SHIPPED | OUTPATIENT
Start: 2022-09-27

## 2022-09-27 NOTE — TELEPHONE ENCOUNTER
Medication:   Requested Prescriptions     Pending Prescriptions Disp Refills    metFORMIN (GLUCOPHAGE-XR) 500 MG extended release tablet [Pharmacy Med Name: METFORMIN HCL  MG TABLET] 60 tablet 0     Sig: TAKE 2 TABLETS BY MOUTH EVERY DAY WITH BREAKFAST       Last Filled:      Patient Phone Number: [phone] (home)     Last appt: 11/23/2021   Next appt: 11/09/2022    Last Labs DM:   Lab Results   Component Value Date/Time    LABA1C 7.8 08/02/2022 11:03 AM

## 2022-09-29 ENCOUNTER — OFFICE VISIT (OUTPATIENT)
Dept: PAIN MANAGEMENT | Age: 62
End: 2022-09-29
Payer: COMMERCIAL

## 2022-09-29 VITALS
HEART RATE: 86 BPM | WEIGHT: 212 LBS | DIASTOLIC BLOOD PRESSURE: 93 MMHG | SYSTOLIC BLOOD PRESSURE: 134 MMHG | BODY MASS INDEX: 33.2 KG/M2 | OXYGEN SATURATION: 97 %

## 2022-09-29 DIAGNOSIS — M47.817 LUMBOSACRAL SPONDYLOSIS WITHOUT MYELOPATHY: ICD-10-CM

## 2022-09-29 DIAGNOSIS — M16.12 PRIMARY OSTEOARTHRITIS OF LEFT HIP: ICD-10-CM

## 2022-09-29 DIAGNOSIS — M25.512 CHRONIC LEFT SHOULDER PAIN: ICD-10-CM

## 2022-09-29 DIAGNOSIS — Z51.81 ENCOUNTER FOR THERAPEUTIC DRUG MONITORING: ICD-10-CM

## 2022-09-29 DIAGNOSIS — G89.4 CHRONIC PAIN SYNDROME: ICD-10-CM

## 2022-09-29 DIAGNOSIS — M79.7 FIBROMYALGIA: ICD-10-CM

## 2022-09-29 DIAGNOSIS — M54.16 LUMBAR RADICULOPATHY: ICD-10-CM

## 2022-09-29 DIAGNOSIS — M51.36 DDD (DEGENERATIVE DISC DISEASE), LUMBAR: ICD-10-CM

## 2022-09-29 DIAGNOSIS — M79.18 MYOFASCIAL PAIN: ICD-10-CM

## 2022-09-29 DIAGNOSIS — G89.29 CHRONIC LEFT SHOULDER PAIN: ICD-10-CM

## 2022-09-29 PROCEDURE — 3017F COLORECTAL CA SCREEN DOC REV: CPT | Performed by: INTERNAL MEDICINE

## 2022-09-29 PROCEDURE — 99213 OFFICE O/P EST LOW 20 MIN: CPT | Performed by: INTERNAL MEDICINE

## 2022-09-29 PROCEDURE — G8417 CALC BMI ABV UP PARAM F/U: HCPCS | Performed by: INTERNAL MEDICINE

## 2022-09-29 PROCEDURE — G8427 DOCREV CUR MEDS BY ELIG CLIN: HCPCS | Performed by: INTERNAL MEDICINE

## 2022-09-29 PROCEDURE — 1036F TOBACCO NON-USER: CPT | Performed by: INTERNAL MEDICINE

## 2022-09-29 RX ORDER — METHOCARBAMOL 500 MG/1
500 TABLET, FILM COATED ORAL 2 TIMES DAILY PRN
Qty: 60 TABLET | Refills: 1 | Status: SHIPPED | OUTPATIENT
Start: 2022-09-29 | End: 2022-10-28 | Stop reason: SDUPTHER

## 2022-09-29 RX ORDER — MELOXICAM 15 MG/1
TABLET ORAL
Qty: 30 TABLET | Refills: 1 | Status: SHIPPED | OUTPATIENT
Start: 2022-09-29 | End: 2022-10-28 | Stop reason: SDUPTHER

## 2022-09-29 RX ORDER — HYDROCODONE BITARTRATE AND ACETAMINOPHEN 5; 325 MG/1; MG/1
1 TABLET ORAL EVERY 6 HOURS PRN
Qty: 100 TABLET | Refills: 0 | Status: SHIPPED | OUTPATIENT
Start: 2022-09-29 | End: 2022-10-28 | Stop reason: SDUPTHER

## 2022-09-29 RX ORDER — GABAPENTIN 300 MG/1
CAPSULE ORAL
Qty: 90 CAPSULE | Refills: 1 | Status: SHIPPED | OUTPATIENT
Start: 2022-09-29 | End: 2022-10-28 | Stop reason: SDUPTHER
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
## 2022-10-06 ENCOUNTER — OFFICE VISIT (OUTPATIENT)
Dept: ORTHOPEDIC SURGERY | Age: 62
End: 2022-10-06
Payer: COMMERCIAL

## 2022-10-06 VITALS — WEIGHT: 207 LBS | RESPIRATION RATE: 16 BRPM | HEIGHT: 67 IN | BODY MASS INDEX: 32.49 KG/M2

## 2022-10-06 DIAGNOSIS — S60.222A CONTUSION OF LEFT HAND, INITIAL ENCOUNTER: Primary | ICD-10-CM

## 2022-10-06 PROCEDURE — G8484 FLU IMMUNIZE NO ADMIN: HCPCS | Performed by: ORTHOPAEDIC SURGERY

## 2022-10-06 PROCEDURE — 1036F TOBACCO NON-USER: CPT | Performed by: ORTHOPAEDIC SURGERY

## 2022-10-06 PROCEDURE — G8417 CALC BMI ABV UP PARAM F/U: HCPCS | Performed by: ORTHOPAEDIC SURGERY

## 2022-10-06 PROCEDURE — 3017F COLORECTAL CA SCREEN DOC REV: CPT | Performed by: ORTHOPAEDIC SURGERY

## 2022-10-06 PROCEDURE — 99204 OFFICE O/P NEW MOD 45 MIN: CPT | Performed by: ORTHOPAEDIC SURGERY

## 2022-10-06 PROCEDURE — G8428 CUR MEDS NOT DOCUMENT: HCPCS | Performed by: ORTHOPAEDIC SURGERY
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
## 2022-10-28 ENCOUNTER — OFFICE VISIT (OUTPATIENT)
Dept: PAIN MANAGEMENT | Age: 62
End: 2022-10-28
Payer: COMMERCIAL

## 2022-10-28 VITALS
DIASTOLIC BLOOD PRESSURE: 83 MMHG | SYSTOLIC BLOOD PRESSURE: 132 MMHG | WEIGHT: 208.8 LBS | OXYGEN SATURATION: 97 % | HEART RATE: 81 BPM | BODY MASS INDEX: 32.7 KG/M2

## 2022-10-28 DIAGNOSIS — G89.4 CHRONIC PAIN SYNDROME: ICD-10-CM

## 2022-10-28 DIAGNOSIS — M79.7 FIBROMYALGIA: ICD-10-CM

## 2022-10-28 DIAGNOSIS — G89.29 CHRONIC LEFT SHOULDER PAIN: ICD-10-CM

## 2022-10-28 DIAGNOSIS — M79.18 MYOFASCIAL PAIN: ICD-10-CM

## 2022-10-28 DIAGNOSIS — M54.16 LUMBAR RADICULOPATHY: ICD-10-CM

## 2022-10-28 DIAGNOSIS — M16.12 PRIMARY OSTEOARTHRITIS OF LEFT HIP: ICD-10-CM

## 2022-10-28 DIAGNOSIS — M47.817 LUMBOSACRAL SPONDYLOSIS WITHOUT MYELOPATHY: ICD-10-CM

## 2022-10-28 DIAGNOSIS — M25.512 CHRONIC LEFT SHOULDER PAIN: ICD-10-CM

## 2022-10-28 DIAGNOSIS — M51.36 DDD (DEGENERATIVE DISC DISEASE), LUMBAR: ICD-10-CM

## 2022-10-28 PROCEDURE — 99213 OFFICE O/P EST LOW 20 MIN: CPT | Performed by: INTERNAL MEDICINE

## 2022-10-28 PROCEDURE — G8427 DOCREV CUR MEDS BY ELIG CLIN: HCPCS | Performed by: INTERNAL MEDICINE

## 2022-10-28 PROCEDURE — 3017F COLORECTAL CA SCREEN DOC REV: CPT | Performed by: INTERNAL MEDICINE

## 2022-10-28 PROCEDURE — G8417 CALC BMI ABV UP PARAM F/U: HCPCS | Performed by: INTERNAL MEDICINE

## 2022-10-28 PROCEDURE — 1036F TOBACCO NON-USER: CPT | Performed by: INTERNAL MEDICINE

## 2022-10-28 PROCEDURE — G8484 FLU IMMUNIZE NO ADMIN: HCPCS | Performed by: INTERNAL MEDICINE

## 2022-10-28 RX ORDER — MELOXICAM 15 MG/1
TABLET ORAL
Qty: 30 TABLET | Refills: 1 | Status: SHIPPED | OUTPATIENT
Start: 2022-10-28

## 2022-10-28 RX ORDER — HYDROCODONE BITARTRATE AND ACETAMINOPHEN 5; 325 MG/1; MG/1
1 TABLET ORAL EVERY 6 HOURS PRN
Qty: 120 TABLET | Refills: 0 | Status: SHIPPED | OUTPATIENT
Start: 2022-10-28 | End: 2022-12-02

## 2022-10-28 RX ORDER — GABAPENTIN 300 MG/1
CAPSULE ORAL
Qty: 90 CAPSULE | Refills: 1 | Status: SHIPPED | OUTPATIENT
Start: 2022-10-28 | End: 2022-12-02

## 2022-10-28 RX ORDER — METHOCARBAMOL 500 MG/1
500 TABLET, FILM COATED ORAL 2 TIMES DAILY PRN
Qty: 60 TABLET | Refills: 1 | Status: SHIPPED | OUTPATIENT
Start: 2022-10-28

## 2022-10-28 NOTE — PROGRESS NOTES
Kacy Georgetown  1960  [id number]      HISTORY OF PRESENT ILLNESS:  Mr. Patricia Rodriguez is a 60 y.o. male returns for a follow up visit for pain management  He has a diagnosis of   1. Chronic pain syndrome    2. Lumbar radiculopathy    3. Lumbosacral spondylosis without myelopathy    4. Chronic left shoulder pain    5. Primary osteoarthritis of left hip    6. Myofascial pain    7. Fibromyalgia    8. DDD (degenerative disc disease), lumbar    9. Encounter for therapeutic drug monitoring    . He complains of pain in the  right shoulder, left hand, lower back  He rates the pain 6/10 and describes it as sharp, aching, burning. Current treatment regimen has helped relieve about 60% of the pain. He denies any side effects from the current pain regimen. Patient reports that since the last follow up visit the physical functioning is unchanged, family/social relationships are worse, mood is better sleep patterns are better, and that the overall functioning is better. Patient denies misusing/abusing his narcotic pain medications or using any illegal drugs. There are No indicators for possible drug abuse, addiction or diversion problems. Patient states he has been managing with the medications. He says he is using Norco 3-4 per day. He denies any constipation symptoms. He reports he is using all the other adjuvants. He complains his Back, hip and hands hurt the most.     ALLERGIES: Patients list of allergies were reviewed     MEDICATIONS: Mr. Patricia Rodriguez list of medications were reviewed. His current medications are   Outpatient Medications Prior to Visit   Medication Sig Dispense Refill    methocarbamol (ROBAXIN) 500 MG tablet Take 1 tablet by mouth 2 times daily as needed (muscle stiffness) 60 tablet 1    meloxicam (MOBIC) 15 MG tablet Take 1 tablet po every Monday,Wednesday, and Friday 30 tablet 1    gabapentin (NEURONTIN) 300 MG capsule Take 1 tablet po in the morning and take 2 tablets po in the evening 90 capsule 1 HYDROcodone-acetaminophen (NORCO) 5-325 MG per tablet Take 1 tablet by mouth every 6 hours as needed for Pain (max 3-4 day) for up to 30 days. 100 tablet 0    metFORMIN (GLUCOPHAGE-XR) 500 MG extended release tablet TAKE 2 TABLETS BY MOUTH EVERY DAY WITH BREAKFAST 60 tablet 0    TRULICITY 1.5 MZ/3.5EK SOPN INJECT 1.5 MG UNDER THE SKIN ONCE WEEKLY 4 Adjustable Dose Pre-filled Pen Syringe 3    insulin aspart (NOVOLOG FLEXPEN) 100 UNIT/ML injection pen 18-24 units AC TID 10 Adjustable Dose Pre-filled Pen Syringe 3    insulin glargine (LANTUS SOLOSTAR) 100 UNIT/ML injection pen INJECT 46 UNITS UNDER THE SKIN DAILY 5 Adjustable Dose Pre-filled Pen Syringe 3    Dulaglutide (TRULICITY) 3 MU/7.2HV SOPN Inject 3 mg into the skin once a week 4 Adjustable Dose Pre-filled Pen Syringe 3    Blood Glucose Monitoring Suppl (FREESTYLE LITE) RO As needed 1 each 1    Blood Glucose Monitoring Suppl (ONE TOUCH ULTRA 2) w/Device KIT As needed 1 kit 0    ONE TOUCH ULTRASOFT LANCETS MISC 1 each by Does not apply route 4 times daily 100 each 6    blood glucose test strips (ONETOUCH ULTRA) strip 4 times a day As needed. 100 each 3    Insulin Pen Needle 32G X 4 MM MISC 1 each by Does not apply route 4 times daily 120 each 0    blood glucose test strips (FREESTYLE LITE) strip USE TO TEST FOUR TIMES A  strip 0    FreeStyle Lancets MISC USE FOUR TIMES A  each 0    hydrALAZINE (APRESOLINE) 25 MG tablet Take 25 mg by mouth in the morning and at bedtime      spironolactone (ALDACTONE) 25 MG tablet Take 25 mg by mouth daily      mupirocin (BACTROBAN) 2 % ointment Apply 22 g topically 3 times daily Apply topically 3 times daily.       Blood Glucose Monitoring Suppl (FREESTYLE LITE) RO As needed 1 each 0    lisinopril (PRINIVIL;ZESTRIL) 30 MG tablet Take 30 mg by mouth daily      FreeStyle Lancets MISC USE AS DIRECTED 100 each 3    Insulin Pen Needle (PEN NEEDLES) 31G X 6 MM MISC 1 each by In Vitro route 4 times daily 200 each 3 omeprazole (PRILOSEC) 20 MG delayed release capsule TAKE 1 CAPSULE BY MOUTH EVERY DAY      Cholecalciferol (VITAMIN D PO) Take 1 tablet by mouth every 7 days Pt to clarify dose      furosemide (LASIX) 20 MG tablet Take 40 mg by mouth daily       Fluticasone Propionate (FLONASE NA) 1 spray by Nasal route daily Each nostril      SALINE MIST SPRAY NA 1 spray by Nasal route 3 times daily as needed Each nostril 2-3 times per day      Blood Glucose Monitoring Suppl (ONE TOUCH ULTRA 2) w/Device KIT 1 kit by Does not apply route daily 1 kit 0    Lancets MISC 1 each by Does not apply route 3 times daily 100 each 3    MELATONIN PO Take 1 tablet by mouth nightly       loratadine (CLARITIN) 10 MG tablet Take 10 mg by mouth daily       atorvastatin (LIPITOR) 80 MG tablet Take 80 mg by mouth daily. No facility-administered medications prior to visit. REVIEW OF SYSTEMS:    Respiratory: Negative for apnea, chest tightness and shortness of breath or change in baseline breathing. PHYSICAL EXAM:   Nursing note and vitals reviewed. /83   Pulse 81   Wt 208 lb 12.8 oz (94.7 kg)   SpO2 97%   BMI 32.70 kg/m²   Constitutional: He appears well-developed and well-nourished. No acute distress. Cardiovascular: Normal rate, regular rhythm, normal heart sounds, and does not have murmur. Pulmonary/Chest: Effort normal. No respiratory distress. He does not have wheezes in the lung fields. He has no rales. Neurological/Psychiatric:He is alert and oriented to person, place, and time. Coordination is  normal.  His mood isAppropriate and affect is Neutral/Euthymic(normal) . IMPRESSION:   1. Chronic pain syndrome    2. Lumbar radiculopathy    3. Lumbosacral spondylosis without myelopathy    4. Chronic left shoulder pain    5. Primary osteoarthritis of left hip    6. Myofascial pain    7. Fibromyalgia    8. DDD (degenerative disc disease), lumbar    9.  Encounter for therapeutic drug monitoring PLAN:  Informed verbal consent was obtained  -ROM/Stretching exercises as advised   -He was advised to increase fluids ( 5-7  glasses of fluid daily), limit caffeine, avoid cheese products, increase dietary fiber, increase activity and exercise as tolerated and relax regularly and enjoy meals   -continue with Norco 3-4 per day   -Walking as tolerated   -Continue with all other adjuvants as before    Current Outpatient Medications   Medication Sig Dispense Refill    methocarbamol (ROBAXIN) 500 MG tablet Take 1 tablet by mouth 2 times daily as needed (muscle stiffness) 60 tablet 1    meloxicam (MOBIC) 15 MG tablet Take 1 tablet po every Monday,Wednesday, and Friday 30 tablet 1    gabapentin (NEURONTIN) 300 MG capsule Take 1 tablet po in the morning and take 2 tablets po in the evening 90 capsule 1    HYDROcodone-acetaminophen (NORCO) 5-325 MG per tablet Take 1 tablet by mouth every 6 hours as needed for Pain (max 3-4 day) for up to 30 days. 100 tablet 0    metFORMIN (GLUCOPHAGE-XR) 500 MG extended release tablet TAKE 2 TABLETS BY MOUTH EVERY DAY WITH BREAKFAST 60 tablet 0    TRULICITY 1.5 HT/4.4MA SOPN INJECT 1.5 MG UNDER THE SKIN ONCE WEEKLY 4 Adjustable Dose Pre-filled Pen Syringe 3    insulin aspart (NOVOLOG FLEXPEN) 100 UNIT/ML injection pen 18-24 units AC TID 10 Adjustable Dose Pre-filled Pen Syringe 3    insulin glargine (LANTUS SOLOSTAR) 100 UNIT/ML injection pen INJECT 46 UNITS UNDER THE SKIN DAILY 5 Adjustable Dose Pre-filled Pen Syringe 3    Dulaglutide (TRULICITY) 3 FZ/9.6EG SOPN Inject 3 mg into the skin once a week 4 Adjustable Dose Pre-filled Pen Syringe 3    Blood Glucose Monitoring Suppl (FREESTYLE LITE) RO As needed 1 each 1    Blood Glucose Monitoring Suppl (ONE TOUCH ULTRA 2) w/Device KIT As needed 1 kit 0    ONE TOUCH ULTRASOFT LANCETS MISC 1 each by Does not apply route 4 times daily 100 each 6    blood glucose test strips (ONETOUCH ULTRA) strip 4 times a day As needed.  100 each 3 Insulin Pen Needle 32G X 4 MM MISC 1 each by Does not apply route 4 times daily 120 each 0    blood glucose test strips (FREESTYLE LITE) strip USE TO TEST FOUR TIMES A  strip 0    FreeStyle Lancets MISC USE FOUR TIMES A  each 0    hydrALAZINE (APRESOLINE) 25 MG tablet Take 25 mg by mouth in the morning and at bedtime      spironolactone (ALDACTONE) 25 MG tablet Take 25 mg by mouth daily      mupirocin (BACTROBAN) 2 % ointment Apply 22 g topically 3 times daily Apply topically 3 times daily. Blood Glucose Monitoring Suppl (FREESTYLE LITE) RO As needed 1 each 0    lisinopril (PRINIVIL;ZESTRIL) 30 MG tablet Take 30 mg by mouth daily      FreeStyle Lancets MISC USE AS DIRECTED 100 each 3    Insulin Pen Needle (PEN NEEDLES) 31G X 6 MM MISC 1 each by In Vitro route 4 times daily 200 each 3    omeprazole (PRILOSEC) 20 MG delayed release capsule TAKE 1 CAPSULE BY MOUTH EVERY DAY      Cholecalciferol (VITAMIN D PO) Take 1 tablet by mouth every 7 days Pt to clarify dose      furosemide (LASIX) 20 MG tablet Take 40 mg by mouth daily       Fluticasone Propionate (FLONASE NA) 1 spray by Nasal route daily Each nostril      SALINE MIST SPRAY NA 1 spray by Nasal route 3 times daily as needed Each nostril 2-3 times per day      Blood Glucose Monitoring Suppl (ONE TOUCH ULTRA 2) w/Device KIT 1 kit by Does not apply route daily 1 kit 0    Lancets MISC 1 each by Does not apply route 3 times daily 100 each 3    MELATONIN PO Take 1 tablet by mouth nightly       loratadine (CLARITIN) 10 MG tablet Take 10 mg by mouth daily       atorvastatin (LIPITOR) 80 MG tablet Take 80 mg by mouth daily. No current facility-administered medications for this visit. I will continue his current medication regimen  which is part of the above treatment schedule.  It has been helping with Mr. Jenn Brantley chronic  medical problems which for this visit include:   Diagnoses of Chronic pain syndrome, Lumbar radiculopathy, Lumbosacral spondylosis without myelopathy, Chronic left shoulder pain, Primary osteoarthritis of left hip, Myofascial pain, Fibromyalgia, DDD (degenerative disc disease), lumbar, and Encounter for therapeutic drug monitoring were pertinent to this visit. Risks and benefits of the medications and other alternative treatments  including no treatment were discussed with the patient. The common side effects of these medications were also explained to the patient. Informed verbal consent was obtained. Goals of current treatment regimen include improvement in pain, restoration of functioning- with focus on improvement in physical performance, general activity, work or disability,emotional distress, health care utilization and  decreased medication consumption. Will continue to monitor progress towards achieving/maintaining therapeutic goals with special emphasis on  1. Improvement in perceived interfernce  of pain with ADL's. Ability to do home exercises independently. Ability to do household chores indoor and/or outdoor work and social and leisure activities. Improve psychosocial and physical functioning. - he is showing progression towards this treatment goal with the current regimen. He was advised against drinking alcohol with the narcotic pain medicines, advised against driving or handling machinery while adjusting the dose of medicines or if having cognitive  issues related to the current medications. Risk of overdose and death, if medicines not taken as prescribed, were also discussed. If the patient develops new symptoms or if the symptoms worsen, the patient should call the office. While transcribing every attempt was made to maintain the accuracy of the note in terms of it's contents,there may have been some errors made inadvertently. Thank you for allowing me to participate in the care of this patient.     Socorro Ardon MD.    Cc: Wally Packer MD

## 2022-11-09 ENCOUNTER — OFFICE VISIT (OUTPATIENT)
Dept: ENDOCRINOLOGY | Age: 62
End: 2022-11-09
Payer: COMMERCIAL

## 2022-11-09 VITALS
TEMPERATURE: 98 F | HEIGHT: 67 IN | SYSTOLIC BLOOD PRESSURE: 137 MMHG | HEART RATE: 89 BPM | BODY MASS INDEX: 32.49 KG/M2 | WEIGHT: 207 LBS | RESPIRATION RATE: 14 BRPM | DIASTOLIC BLOOD PRESSURE: 78 MMHG

## 2022-11-09 DIAGNOSIS — Z79.4 INSULIN LONG-TERM USE (HCC): Primary | ICD-10-CM

## 2022-11-09 DIAGNOSIS — E11.65 UNCONTROLLED TYPE 2 DIABETES MELLITUS WITH HYPERGLYCEMIA (HCC): ICD-10-CM

## 2022-11-09 LAB — HBA1C MFR BLD: 9.2 %

## 2022-11-09 PROCEDURE — 99214 OFFICE O/P EST MOD 30 MIN: CPT | Performed by: INTERNAL MEDICINE

## 2022-11-09 PROCEDURE — G8417 CALC BMI ABV UP PARAM F/U: HCPCS | Performed by: INTERNAL MEDICINE

## 2022-11-09 PROCEDURE — G8427 DOCREV CUR MEDS BY ELIG CLIN: HCPCS | Performed by: INTERNAL MEDICINE

## 2022-11-09 PROCEDURE — 2022F DILAT RTA XM EVC RTNOPTHY: CPT | Performed by: INTERNAL MEDICINE

## 2022-11-09 PROCEDURE — 3051F HG A1C>EQUAL 7.0%<8.0%: CPT | Performed by: INTERNAL MEDICINE

## 2022-11-09 PROCEDURE — 83036 HEMOGLOBIN GLYCOSYLATED A1C: CPT | Performed by: INTERNAL MEDICINE

## 2022-11-09 PROCEDURE — 1036F TOBACCO NON-USER: CPT | Performed by: INTERNAL MEDICINE

## 2022-11-09 PROCEDURE — G8484 FLU IMMUNIZE NO ADMIN: HCPCS | Performed by: INTERNAL MEDICINE

## 2022-11-09 PROCEDURE — 3017F COLORECTAL CA SCREEN DOC REV: CPT | Performed by: INTERNAL MEDICINE

## 2022-11-09 RX ORDER — ARIPIPRAZOLE 10 MG/1
TABLET ORAL
COMMUNITY
Start: 2022-09-19

## 2022-11-09 RX ORDER — CARVEDILOL 3.12 MG/1
TABLET ORAL
COMMUNITY
Start: 2022-02-01

## 2022-11-09 RX ORDER — FLASH GLUCOSE SCANNING READER
EACH MISCELLANEOUS
Qty: 1 EACH | Refills: 0 | Status: SHIPPED | OUTPATIENT
Start: 2022-11-09

## 2022-11-09 RX ORDER — INSULIN ASPART 100 [IU]/ML
INJECTION, SOLUTION INTRAVENOUS; SUBCUTANEOUS
Qty: 10 ADJUSTABLE DOSE PRE-FILLED PEN SYRINGE | Refills: 3 | Status: SHIPPED | OUTPATIENT
Start: 2022-11-09

## 2022-11-09 RX ORDER — METFORMIN HYDROCHLORIDE 500 MG/1
TABLET, EXTENDED RELEASE ORAL
Qty: 60 TABLET | Refills: 5 | Status: SHIPPED | OUTPATIENT
Start: 2022-11-09

## 2022-11-09 RX ORDER — ACETAMINOPHEN 160 MG
TABLET,DISINTEGRATING ORAL
COMMUNITY
Start: 2022-10-18 | End: 2022-11-09 | Stop reason: ALTCHOICE

## 2022-11-09 RX ORDER — FLASH GLUCOSE SENSOR
KIT MISCELLANEOUS
Qty: 2 EACH | Refills: 3 | Status: SHIPPED | OUTPATIENT
Start: 2022-11-09

## 2022-11-09 RX ORDER — FLASH GLUCOSE SENSOR
KIT MISCELLANEOUS
Qty: 2 EACH | Refills: 3 | Status: SHIPPED | OUTPATIENT
Start: 2022-11-09 | End: 2022-11-09 | Stop reason: SDUPTHER

## 2022-11-09 RX ORDER — FLASH GLUCOSE SCANNING READER
EACH MISCELLANEOUS
Qty: 1 EACH | Refills: 0 | Status: SHIPPED | OUTPATIENT
Start: 2022-11-09 | End: 2022-11-09 | Stop reason: SDUPTHER

## 2022-11-09 RX ORDER — OMEPRAZOLE 20 MG/1
20 CAPSULE, DELAYED RELEASE ORAL DAILY
COMMUNITY
End: 2022-11-09

## 2022-11-09 RX ORDER — AMLODIPINE BESYLATE 10 MG/1
TABLET ORAL
COMMUNITY
Start: 2022-08-19

## 2022-11-09 RX ORDER — POTASSIUM CHLORIDE 1500 MG/1
TABLET, EXTENDED RELEASE ORAL
COMMUNITY
Start: 2022-10-18

## 2022-11-09 RX ORDER — LANCETS 28 GAUGE
EACH MISCELLANEOUS
Qty: 100 EACH | Refills: 3 | Status: SHIPPED | OUTPATIENT
Start: 2022-11-09

## 2022-11-09 RX ORDER — INSULIN GLARGINE 100 [IU]/ML
INJECTION, SOLUTION SUBCUTANEOUS
Qty: 5 ADJUSTABLE DOSE PRE-FILLED PEN SYRINGE | Refills: 3 | Status: SHIPPED | OUTPATIENT
Start: 2022-11-09

## 2022-11-09 RX ORDER — BUSPIRONE HYDROCHLORIDE 10 MG/1
TABLET ORAL
COMMUNITY
Start: 2022-10-17

## 2022-11-09 RX ORDER — DULAGLUTIDE 3 MG/.5ML
3 INJECTION, SOLUTION SUBCUTANEOUS WEEKLY
Qty: 4 ADJUSTABLE DOSE PRE-FILLED PEN SYRINGE | Refills: 3 | Status: SHIPPED | OUTPATIENT
Start: 2022-11-09

## 2022-11-09 RX ORDER — HYDRALAZINE HYDROCHLORIDE 50 MG/1
TABLET, FILM COATED ORAL
COMMUNITY
Start: 2022-03-28 | End: 2022-11-09

## 2022-11-09 RX ORDER — BLOOD-GLUCOSE METER
KIT MISCELLANEOUS
Qty: 100 STRIP | Refills: 0 | Status: SHIPPED | OUTPATIENT
Start: 2022-11-09

## 2022-11-09 NOTE — PROGRESS NOTES
Seen as  patient for diabetes      Interim:     Fair control  16 lb weight loss    Diagnosed with Type 2 diabetes mellitus in  2010  Known diabetic complications: none     Current diabetic medications     Metformin ER 500mg 2 tab daily  basaglar 46   Humalog 21 units AC TID    SSI 2 for 50 >150 combined scale    Trulicity 3mg        Last A1c 9.2%<------ 7.8%<----8.4%<-----9.1%<----8.1%<------9.7%<----11.4% <-----13.9%<----- 14.1<--- 11.4 <--- 9.9 <------6.3%     Prior visit with dietician: no  Current diet: on average, 3 meals per day  No CHO counting  Current exercise:yes  Current monitoring regimen: home blood tests - 1-3  times daily     Has brought blood glucose log/meter:yes  Home blood sugar records: 1 reading in last 30 days  Any episodes of hypoglycemia? 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    SH: [employer], takes care of mom    Past Medical History:   Diagnosis Date    Arthritis     Back, L hip and L shoulder    CHF (congestive heart failure) (HCC)     diastolic    Diabetes mellitus ([facility] [address]     Hyperlipidemia     Hypertension      Past Surgical History:   Procedure Laterality Date    APPENDECTOMY  1969     Current Outpatient Medications   Medication Sig Dispense Refill    ARIPiprazole (ABILIFY) 10 MG tablet       carvedilol (COREG) 3.125 MG tablet TAKE 1 TABLET BY MOUTH TWICE A DAY WITH MEALS      KLOR-CON M20 20 MEQ extended release tablet TAKE 1 TABLET BY MOUTH EVERY DAY      busPIRone (BUSPAR) 10 MG tablet TAKE 1 TABLET BY MOUTH THREE TIMES A DAY      amLODIPine (NORVASC) 10 MG tablet TAKE 1 TABLET BY MOUTH EVERY DAY      methocarbamol (ROBAXIN) 500 MG tablet Take 1 tablet by mouth 2 times daily as needed (muscle stiffness) 60 tablet 1    meloxicam (MOBIC) 15 MG tablet Take 1 tablet po every Monday,Wednesday, and Friday 30 tablet 1    gabapentin (NEURONTIN) 300 MG capsule Take 1 tablet po in the morning and take 2 tablets po in the evening 90 capsule 1    HYDROcodone-acetaminophen (NORCO) 5-325 MG per tablet Take 1 tablet by mouth every 6 hours as needed for Pain (max 3-4 day) for up to 35 days. 120 tablet 0    metFORMIN (GLUCOPHAGE-XR) 500 MG extended release tablet TAKE 2 TABLETS BY MOUTH EVERY DAY WITH BREAKFAST 60 tablet 0    insulin aspart (NOVOLOG FLEXPEN) 100 UNIT/ML injection pen 18-24 units AC TID 10 Adjustable Dose Pre-filled Pen Syringe 3    insulin glargine (LANTUS SOLOSTAR) 100 UNIT/ML injection pen INJECT 46 UNITS UNDER THE SKIN DAILY 5 Adjustable Dose Pre-filled Pen Syringe 3    Dulaglutide (TRULICITY) 3 TX/8.3SK SOPN Inject 3 mg into the skin once a week 4 Adjustable Dose Pre-filled Pen Syringe 3    Blood Glucose Monitoring Suppl (FREESTYLE LITE) RO As needed 1 each 1    ONE TOUCH ULTRASOFT LANCETS MISC 1 each by Does not apply route 4 times daily 100 each 6    Insulin Pen Needle 32G X 4 MM MISC 1 each by Does not apply route 4 times daily 120 each 0    blood glucose test strips (FREESTYLE LITE) strip USE TO TEST FOUR TIMES A  strip 0    FreeStyle Lancets MISC USE FOUR TIMES A  each 0    spironolactone (ALDACTONE) 25 MG tablet Take 25 mg by mouth daily      mupirocin (BACTROBAN) 2 % ointment Apply 22 g topically 3 times daily Apply topically 3 times daily.       Blood Glucose Monitoring Suppl (FREESTYLE LITE) RO As needed 1 each 0    lisinopril (PRINIVIL;ZESTRIL) 30 MG tablet Take 30 mg by mouth daily      FreeStyle Lancets MISC USE AS DIRECTED 100 each 3    Insulin Pen Needle (PEN NEEDLES) 31G X 6 MM MISC 1 each by In Vitro route 4 times daily 200 each 3    omeprazole (PRILOSEC) 20 MG delayed release capsule TAKE 1 CAPSULE BY MOUTH EVERY DAY Cholecalciferol (VITAMIN D PO) Take 1 tablet by mouth every 7 days Pt to clarify dose      furosemide (LASIX) 20 MG tablet Take 40 mg by mouth daily       Fluticasone Propionate (FLONASE NA) 1 spray by Nasal route daily Each nostril      SALINE MIST SPRAY NA 1 spray by Nasal route 3 times daily as needed Each nostril 2-3 times per day      Lancets MISC 1 each by Does not apply route 3 times daily 100 each 3    MELATONIN PO Take 1 tablet by mouth nightly       loratadine (CLARITIN) 10 MG tablet Take 10 mg by mouth daily       atorvastatin (LIPITOR) 80 MG tablet Take 80 mg by mouth daily. No current facility-administered medications for this visit. Review of Systems  Scanned, reviewed    Vitals:    11/09/22 1044   BP: 137/78   Pulse: 89   Resp: 14   Temp: 98 °F (36.7 °C)       Constitutional: Well-developed, appears stated age, cooperative, in no acute distress  H/E/N/M/T:atraumatic, normocephalic, external ears, nose, lips normal without lesions  Eyes: Lids, lashes, conjunctivae and sclerae normal, No proptosis, no redness  Neck: supple, symmetrical, no swelling  Skin: No obvious rashes or lesions present. Skin and hair texture normal  Psychiatric: Judgement and Insight:  judgement and insight appear normal  Neuro: Normal without focal findings, speech is normal normal, speech is spontaneous  Chest: No labored breathing, no chest deformity, no stridor  Musculoskeletal: No joint deformity, swelling        11/22  foot exam : monofilament detected, no ulcer    Lab Reviewed   No components found for: CHLPL  No results found for: TRIG  No results found for: HDL  No results found for: LDLCALC  No results found for: LABVLDL  Lab Results   Component Value Date    LABA1C 7.8 08/02/2022       Assessment:     Josue Gilbert is a 60 y.o. male with :    1.T2DM : Longstanding, uncontrolled. On metformin. A1c  high. Discussed needs insulin given hyperglycemia. On basal bolus recommend dietary changes.  SGLT-2 inhibitor is not covered, off glipizide. Order CGM for better monitoring, may help A1c. Advised to take humalog even if glucose < 100  Adjust insulin  2. Chronic pain syndrome: On neurontin  3. HLD ; On lipitor, LDL at goal  4. Obesity  5. Elevated BP:High per patient, on lisinopril     Plan:      Lantus 48 units   Humalog 22 units AC TID   SSI 2 for 50 >150 combined scale   - 8 < 817   Trulicity 3mg weekly   Advise to check blood sugar 3 times a day   Patient to send blood sugar log for titration every week   Advise to exercise regularly. Advise to low simple carbohydrate and protein with each  meal diet. Diabetes Care: recommend yearly eye exam, foot exam and urine microalbumin to   creatinine ratio.  Patient isdue    -Hyperlipidemia, LDL goal is <100 mg/dl   -Daily ASA: 81mg daily

## 2022-12-02 ENCOUNTER — OFFICE VISIT (OUTPATIENT)
Dept: PAIN MANAGEMENT | Age: 62
End: 2022-12-02

## 2022-12-02 VITALS
BODY MASS INDEX: 33.99 KG/M2 | SYSTOLIC BLOOD PRESSURE: 137 MMHG | WEIGHT: 217 LBS | DIASTOLIC BLOOD PRESSURE: 86 MMHG | HEART RATE: 106 BPM

## 2022-12-02 DIAGNOSIS — M54.16 LUMBAR RADICULOPATHY: ICD-10-CM

## 2022-12-02 DIAGNOSIS — G89.29 CHRONIC LEFT SHOULDER PAIN: ICD-10-CM

## 2022-12-02 DIAGNOSIS — M47.817 LUMBOSACRAL SPONDYLOSIS WITHOUT MYELOPATHY: ICD-10-CM

## 2022-12-02 DIAGNOSIS — M79.18 MYOFASCIAL PAIN: ICD-10-CM

## 2022-12-02 DIAGNOSIS — G89.4 CHRONIC PAIN SYNDROME: ICD-10-CM

## 2022-12-02 DIAGNOSIS — M16.12 PRIMARY OSTEOARTHRITIS OF LEFT HIP: ICD-10-CM

## 2022-12-02 DIAGNOSIS — M25.512 CHRONIC LEFT SHOULDER PAIN: ICD-10-CM

## 2022-12-02 DIAGNOSIS — M51.36 DDD (DEGENERATIVE DISC DISEASE), LUMBAR: ICD-10-CM

## 2022-12-02 DIAGNOSIS — M79.7 FIBROMYALGIA: ICD-10-CM

## 2022-12-02 RX ORDER — MELOXICAM 15 MG/1
TABLET ORAL
Qty: 30 TABLET | Refills: 1 | Status: SHIPPED | OUTPATIENT
Start: 2022-12-02

## 2022-12-02 RX ORDER — GABAPENTIN 300 MG/1
CAPSULE ORAL
Qty: 90 CAPSULE | Refills: 1 | Status: SHIPPED | OUTPATIENT
Start: 2022-12-02 | End: 2023-01-06

## 2022-12-02 RX ORDER — METHOCARBAMOL 500 MG/1
500 TABLET, FILM COATED ORAL 2 TIMES DAILY PRN
Qty: 60 TABLET | Refills: 1 | Status: SHIPPED | OUTPATIENT
Start: 2022-12-02

## 2022-12-02 RX ORDER — HYDROCODONE BITARTRATE AND ACETAMINOPHEN 5; 325 MG/1; MG/1
1 TABLET ORAL EVERY 6 HOURS PRN
Qty: 100 TABLET | Refills: 0 | Status: SHIPPED | OUTPATIENT
Start: 2022-12-02 | End: 2022-12-30

## 2022-12-03 NOTE — PROGRESS NOTES
Salomon Finderne  1960  [id number]      HISTORY OF PRESENT ILLNESS:  Mr. Denise Brown is a 60 y.o. male returns for a follow up visit for pain management  He has a diagnosis of   1. Chronic pain syndrome    2. Lumbar radiculopathy    3. Lumbosacral spondylosis without myelopathy    4. Primary osteoarthritis of left hip    5. Fibromyalgia    6. DDD (degenerative disc disease), lumbar    7. Myofascial pain    8. Chronic left shoulder pain    . He complains of pain in the  right shoulder, upper back, lower back, left hip  He rates the pain 6/10 and describes it as sharp, aching. Current treatment regimen has helped relieve about 50% of the pain. He denies any side effects from the current pain regimen. Patient reports that since the last follow up visit the physical functioning is worse, family/social relationships are worse, mood is better sleep patterns are better, and that the overall functioning is better. Patient denies misusing/abusing his narcotic pain medications or using any illegal drugs. There are No indicators for possible drug abuse, addiction or diversion problems. Patient states he is doing fair, pain has been manageable. He says he is using all his medications as before. He complains of pain in the right shoulder. He mentions she is using Norco 3-4 per day. He denies any constipation symptoms. He reports his blood sugar has been slightly high. ALLERGIES: Patients list of allergies were reviewed     MEDICATIONS: Mr. Denise Brown list of medications were reviewed. His current medications are   Outpatient Medications Prior to Visit   Medication Sig Dispense Refill    ARIPiprazole (ABILIFY) 10 MG tablet       carvedilol (COREG) 3.125 MG tablet TAKE 1 TABLET BY MOUTH TWICE A DAY WITH MEALS      KLOR-CON M20 20 MEQ extended release tablet TAKE 1 TABLET BY MOUTH EVERY DAY      busPIRone (BUSPAR) 10 MG tablet TAKE 1 TABLET BY MOUTH THREE TIMES A DAY      amLODIPine (NORVASC) 10 MG tablet TAKE 1 TABLET BY MOUTH EVERY DAY      blood glucose test strips (FREESTYLE LITE) strip USE TO TEST FOUR TIMES A  strip 0    Dulaglutide (TRULICITY) 3 OA/9.6JY SOPN Inject 3 mg into the skin once a week 4 Adjustable Dose Pre-filled Pen Syringe 3    FreeStyle Lancets MISC USE AS DIRECTED 100 each 3    insulin aspart (NOVOLOG FLEXPEN) 100 UNIT/ML injection pen 21-27  units AC TID 10 Adjustable Dose Pre-filled Pen Syringe 3    insulin glargine (LANTUS SOLOSTAR) 100 UNIT/ML injection pen INJECT 46 UNITS UNDER THE SKIN DAILY 5 Adjustable Dose Pre-filled Pen Syringe 3    Insulin Pen Needle 32G X 4 MM MISC 1 each by Does not apply route 4 times daily 120 each 0    metFORMIN (GLUCOPHAGE-XR) 500 MG extended release tablet TAKE 2 TABLETS BY MOUTH EVERY DAY WITH BREAKFAST 60 tablet 5    Continuous Blood Gluc  (FREESTYLE MANE 2 READER) RO As needed 1 each 0    Continuous Blood Gluc Sensor (FREESTYLE MANE 2 SENSOR) MISC Every 2 weeks 2 each 3    Blood Glucose Monitoring Suppl (FREESTYLE LITE) RO As needed 1 each 1    ONE TOUCH ULTRASOFT LANCETS MISC 1 each by Does not apply route 4 times daily 100 each 6    FreeStyle Lancets MISC USE FOUR TIMES A  each 0    spironolactone (ALDACTONE) 25 MG tablet Take 25 mg by mouth daily      mupirocin (BACTROBAN) 2 % ointment Apply 22 g topically 3 times daily Apply topically 3 times daily.       Blood Glucose Monitoring Suppl (FREESTYLE LITE) RO As needed 1 each 0    lisinopril (PRINIVIL;ZESTRIL) 30 MG tablet Take 30 mg by mouth daily      Insulin Pen Needle (PEN NEEDLES) 31G X 6 MM MISC 1 each by In Vitro route 4 times daily 200 each 3    omeprazole (PRILOSEC) 20 MG delayed release capsule TAKE 1 CAPSULE BY MOUTH EVERY DAY      Cholecalciferol (VITAMIN D PO) Take 1 tablet by mouth every 7 days Pt to clarify dose      furosemide (LASIX) 20 MG tablet Take 40 mg by mouth daily       Fluticasone Propionate (FLONASE NA) 1 spray by Nasal route daily Each nostril      SALINE MIST SPRAY NA 1 spray by Nasal route 3 times daily as needed Each nostril 2-3 times per day      Lancets MISC 1 each by Does not apply route 3 times daily 100 each 3    MELATONIN PO Take 1 tablet by mouth nightly       loratadine (CLARITIN) 10 MG tablet Take 10 mg by mouth daily       atorvastatin (LIPITOR) 80 MG tablet Take 80 mg by mouth daily. methocarbamol (ROBAXIN) 500 MG tablet Take 1 tablet by mouth 2 times daily as needed (muscle stiffness) 60 tablet 1    meloxicam (MOBIC) 15 MG tablet Take 1 tablet po every Monday,Wednesday, and Friday 30 tablet 1    gabapentin (NEURONTIN) 300 MG capsule Take 1 tablet po in the morning and take 2 tablets po in the evening 90 capsule 1    HYDROcodone-acetaminophen (NORCO) 5-325 MG per tablet Take 1 tablet by mouth every 6 hours as needed for Pain (max 3-4 day) for up to 35 days. 120 tablet 0     No facility-administered medications prior to visit. REVIEW OF SYSTEMS:    Respiratory: Negative for apnea, chest tightness and shortness of breath or change in baseline breathing. PHYSICAL EXAM:   Nursing note and vitals reviewed. /86   Pulse (!) 106   Wt 217 lb (98.4 kg)   BMI 33.99 kg/m²   Constitutional: He appears well-developed and well-nourished. No acute distress. Cardiovascular: Normal rate, regular rhythm, normal heart sounds, and does not have murmur. Pulmonary/Chest: Effort normal. No respiratory distress. He does not have wheezes in the lung fields. He has no rales. Neurological/Psychiatric:He is alert and oriented to person, place, and time. Coordination is  normal.  His mood isAppropriate and affect is Neutral/Euthymic(normal) . His  Other: trace edema   IMPRESSION:   1. Chronic pain syndrome    2. Lumbar radiculopathy    3. Lumbosacral spondylosis without myelopathy    4. Primary osteoarthritis of left hip    5. Fibromyalgia    6. DDD (degenerative disc disease), lumbar    7. Myofascial pain    8.  Chronic left shoulder pain        PLAN:  Informed UNIT/ML injection pen 21-27  units AC TID 10 Adjustable Dose Pre-filled Pen Syringe 3    insulin glargine (LANTUS SOLOSTAR) 100 UNIT/ML injection pen INJECT 46 UNITS UNDER THE SKIN DAILY 5 Adjustable Dose Pre-filled Pen Syringe 3    Insulin Pen Needle 32G X 4 MM MISC 1 each by Does not apply route 4 times daily 120 each 0    metFORMIN (GLUCOPHAGE-XR) 500 MG extended release tablet TAKE 2 TABLETS BY MOUTH EVERY DAY WITH BREAKFAST 60 tablet 5    Continuous Blood Gluc  (FREESTYLE MANE 2 READER) RO As needed 1 each 0    Continuous Blood Gluc Sensor (FREESTYLE MANE 2 SENSOR) MISC Every 2 weeks 2 each 3    Blood Glucose Monitoring Suppl (FREESTYLE LITE) RO As needed 1 each 1    ONE TOUCH ULTRASOFT LANCETS MISC 1 each by Does not apply route 4 times daily 100 each 6    FreeStyle Lancets MISC USE FOUR TIMES A  each 0    spironolactone (ALDACTONE) 25 MG tablet Take 25 mg by mouth daily      mupirocin (BACTROBAN) 2 % ointment Apply 22 g topically 3 times daily Apply topically 3 times daily.       Blood Glucose Monitoring Suppl (FREESTYLE LITE) RO As needed 1 each 0    lisinopril (PRINIVIL;ZESTRIL) 30 MG tablet Take 30 mg by mouth daily      Insulin Pen Needle (PEN NEEDLES) 31G X 6 MM MISC 1 each by In Vitro route 4 times daily 200 each 3    omeprazole (PRILOSEC) 20 MG delayed release capsule TAKE 1 CAPSULE BY MOUTH EVERY DAY      Cholecalciferol (VITAMIN D PO) Take 1 tablet by mouth every 7 days Pt to clarify dose      furosemide (LASIX) 20 MG tablet Take 40 mg by mouth daily       Fluticasone Propionate (FLONASE NA) 1 spray by Nasal route daily Each nostril      SALINE MIST SPRAY NA 1 spray by Nasal route 3 times daily as needed Each nostril 2-3 times per day      Lancets MISC 1 each by Does not apply route 3 times daily 100 each 3    MELATONIN PO Take 1 tablet by mouth nightly       loratadine (CLARITIN) 10 MG tablet Take 10 mg by mouth daily       atorvastatin (LIPITOR) 80 MG tablet Take 80 mg by mouth daily. No current facility-administered medications for this visit. I will continue his current medication regimen  which is part of the above treatment schedule. It has been helping with Mr. Veronica Arriola chronic  medical problems which for this visit include:   Diagnoses of Chronic pain syndrome, Lumbar radiculopathy, Lumbosacral spondylosis without myelopathy, Primary osteoarthritis of left hip, Fibromyalgia, DDD (degenerative disc disease), lumbar, Myofascial pain, and Chronic left shoulder pain were pertinent to this visit. Risks and benefits of the medications and other alternative treatments  including no treatment were discussed with the patient. The common side effects of these medications were also explained to the patient. Informed verbal consent was obtained. Goals of current treatment regimen include improvement in pain, restoration of functioning- with focus on improvement in physical performance, general activity, work or disability,emotional distress, health care utilization and  decreased medication consumption. Will continue to monitor progress towards achieving/maintaining therapeutic goals with special emphasis on  1. Improvement in perceived interfernce  of pain with ADL's. Ability to do home exercises independently. Ability to do household chores indoor and/or outdoor work and social and leisure activities. Improve psychosocial and physical functioning. - he is showing progression towards this treatment goal with the current regimen. He was advised against drinking alcohol with the narcotic pain medicines, advised against driving or handling machinery while adjusting the dose of medicines or if having cognitive  issues related to the current medications. Risk of overdose and death, if medicines not taken as prescribed, were also discussed. If the patient develops new symptoms or if the symptoms worsen, the patient should call the office.     While transcribing every attempt was made to maintain the accuracy of the note in terms of it's contents,there may have been some errors made inadvertently. Thank you for allowing me to participate in the care of this patient.     Maricarmen Truong MD.    Cc: Denzel Brasher MD

## 2022-12-14 ENCOUNTER — HOSPITAL ENCOUNTER (OUTPATIENT)
Dept: PHYSICAL THERAPY | Age: 62
Setting detail: THERAPIES SERIES
Discharge: HOME | End: 2022-12-14
Payer: COMMERCIAL

## 2022-12-14 PROCEDURE — 97110 THERAPEUTIC EXERCISES: CPT

## 2022-12-14 PROCEDURE — 97161 PT EVAL LOW COMPLEX 20 MIN: CPT
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
## 2022-12-15 ENCOUNTER — HOSPITAL ENCOUNTER (OUTPATIENT)
Dept: PHYSICAL THERAPY | Age: 62
Setting detail: THERAPIES SERIES
Discharge: HOME | End: 2022-12-15
Payer: COMMERCIAL

## 2022-12-15 PROCEDURE — 97113 AQUATIC THERAPY/EXERCISES: CPT
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
## 2022-12-20 ENCOUNTER — HOSPITAL ENCOUNTER (OUTPATIENT)
Dept: PHYSICAL THERAPY | Age: 62
Setting detail: THERAPIES SERIES
Discharge: HOME | End: 2022-12-20
Payer: COMMERCIAL

## 2022-12-20 PROCEDURE — 97113 AQUATIC THERAPY/EXERCISES: CPT
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
## 2022-12-22 ENCOUNTER — HOSPITAL ENCOUNTER (OUTPATIENT)
Dept: PHYSICAL THERAPY | Age: 62
Setting detail: THERAPIES SERIES
Discharge: HOME | End: 2022-12-22
Payer: COMMERCIAL

## 2022-12-22 PROCEDURE — 97150 GROUP THERAPEUTIC PROCEDURES: CPT

## 2022-12-22 PROCEDURE — 97113 AQUATIC THERAPY/EXERCISES: CPT
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
## 2022-12-27 ENCOUNTER — HOSPITAL ENCOUNTER (OUTPATIENT)
Dept: PHYSICAL THERAPY | Age: 62
Setting detail: THERAPIES SERIES
Discharge: HOME | End: 2022-12-27
Payer: COMMERCIAL

## 2022-12-27 PROCEDURE — 97113 AQUATIC THERAPY/EXERCISES: CPT
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
## 2022-12-29 ENCOUNTER — HOSPITAL ENCOUNTER (OUTPATIENT)
Dept: PHYSICAL THERAPY | Age: 62
Setting detail: THERAPIES SERIES
Discharge: HOME | End: 2022-12-29
Payer: COMMERCIAL

## 2022-12-29 PROCEDURE — 97113 AQUATIC THERAPY/EXERCISES: CPT
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
## 2022-12-30 ENCOUNTER — OFFICE VISIT (OUTPATIENT)
Dept: PAIN MANAGEMENT | Age: 62
End: 2022-12-30

## 2022-12-30 VITALS
HEART RATE: 100 BPM | BODY MASS INDEX: 32.26 KG/M2 | DIASTOLIC BLOOD PRESSURE: 80 MMHG | WEIGHT: 206 LBS | SYSTOLIC BLOOD PRESSURE: 130 MMHG

## 2022-12-30 DIAGNOSIS — M79.7 FIBROMYALGIA: ICD-10-CM

## 2022-12-30 DIAGNOSIS — G89.4 CHRONIC PAIN SYNDROME: ICD-10-CM

## 2022-12-30 DIAGNOSIS — G89.29 CHRONIC LEFT SHOULDER PAIN: ICD-10-CM

## 2022-12-30 DIAGNOSIS — M47.817 LUMBOSACRAL SPONDYLOSIS WITHOUT MYELOPATHY: ICD-10-CM

## 2022-12-30 DIAGNOSIS — M25.512 CHRONIC LEFT SHOULDER PAIN: ICD-10-CM

## 2022-12-30 DIAGNOSIS — M16.12 PRIMARY OSTEOARTHRITIS OF LEFT HIP: ICD-10-CM

## 2022-12-30 DIAGNOSIS — M51.36 DDD (DEGENERATIVE DISC DISEASE), LUMBAR: ICD-10-CM

## 2022-12-30 DIAGNOSIS — M54.16 LUMBAR RADICULOPATHY: ICD-10-CM

## 2022-12-30 DIAGNOSIS — M79.18 MYOFASCIAL PAIN: ICD-10-CM

## 2022-12-30 RX ORDER — MELOXICAM 15 MG/1
TABLET ORAL
Qty: 30 TABLET | Refills: 1 | Status: SHIPPED | OUTPATIENT
Start: 2022-12-30

## 2022-12-30 RX ORDER — METHOCARBAMOL 500 MG/1
500 TABLET, FILM COATED ORAL 2 TIMES DAILY PRN
Qty: 60 TABLET | Refills: 1 | Status: SHIPPED | OUTPATIENT
Start: 2022-12-30

## 2022-12-30 RX ORDER — GABAPENTIN 300 MG/1
CAPSULE ORAL
Qty: 90 CAPSULE | Refills: 1 | Status: SHIPPED | OUTPATIENT
Start: 2022-12-30 | End: 2023-02-03

## 2022-12-30 RX ORDER — HYDROCODONE BITARTRATE AND ACETAMINOPHEN 5; 325 MG/1; MG/1
1 TABLET ORAL EVERY 6 HOURS PRN
Qty: 100 TABLET | Refills: 0 | Status: SHIPPED | OUTPATIENT
Start: 2022-12-30 | End: 2023-01-27

## 2022-12-30 NOTE — PROGRESS NOTES
Monday,Wednesday, and Friday 30 tablet 1    gabapentin (NEURONTIN) 300 MG capsule Take 1 tablet po in the morning and take 2 tablets po in the evening 90 capsule 1    diclofenac sodium (VOLTAREN) 1 % GEL Apply 2-4 grams to affected area  g 3    ARIPiprazole (ABILIFY) 10 MG tablet       carvedilol (COREG) 3.125 MG tablet TAKE 1 TABLET BY MOUTH TWICE A DAY WITH MEALS      KLOR-CON M20 20 MEQ extended release tablet TAKE 1 TABLET BY MOUTH EVERY DAY      busPIRone (BUSPAR) 10 MG tablet TAKE 1 TABLET BY MOUTH THREE TIMES A DAY      amLODIPine (NORVASC) 10 MG tablet TAKE 1 TABLET BY MOUTH EVERY DAY      blood glucose test strips (FREESTYLE LITE) strip USE TO TEST FOUR TIMES A  strip 0    Dulaglutide (TRULICITY) 3 TQ/5.6MP SOPN Inject 3 mg into the skin once a week 4 Adjustable Dose Pre-filled Pen Syringe 3    FreeStyle Lancets MISC USE AS DIRECTED 100 each 3    insulin aspart (NOVOLOG FLEXPEN) 100 UNIT/ML injection pen 21-27  units AC TID 10 Adjustable Dose Pre-filled Pen Syringe 3    insulin glargine (LANTUS SOLOSTAR) 100 UNIT/ML injection pen INJECT 46 UNITS UNDER THE SKIN DAILY 5 Adjustable Dose Pre-filled Pen Syringe 3    Insulin Pen Needle 32G X 4 MM MISC 1 each by Does not apply route 4 times daily 120 each 0    metFORMIN (GLUCOPHAGE-XR) 500 MG extended release tablet TAKE 2 TABLETS BY MOUTH EVERY DAY WITH BREAKFAST 60 tablet 5    Continuous Blood Gluc  (FREESTYLE MANE 2 READER) RO As needed 1 each 0    Continuous Blood Gluc Sensor (FREESTYLE MANE 2 SENSOR) MISC Every 2 weeks 2 each 3    Blood Glucose Monitoring Suppl (FREESTYLE LITE) RO As needed 1 each 1    ONE TOUCH ULTRASOFT LANCETS MISC 1 each by Does not apply route 4 times daily 100 each 6    FreeStyle Lancets MISC USE FOUR TIMES A  each 0    spironolactone (ALDACTONE) 25 MG tablet Take 25 mg by mouth daily      mupirocin (BACTROBAN) 2 % ointment Apply 22 g topically 3 times daily Apply topically 3 times daily.       Blood (degenerative disc disease), lumbar    7. Myofascial pain    8. Chronic left shoulder pain        PLAN:  Informed verbal consent was obtained  -ROM/Stretching exercises as advised   -Erik exercises given   -Continue with Norco 3-4 per day   -He was advised to increase fluids ( 5-7  glasses of fluid daily), limit caffeine, avoid cheese products, increase dietary fiber, increase activity and exercise as tolerated and relax regularly and enjoy meals    Current Outpatient Medications   Medication Sig Dispense Refill    HYDROcodone-acetaminophen (NORCO) 5-325 MG per tablet Take 1 tablet by mouth every 6 hours as needed for Pain (max 3-4 day) for up to 28 days.  100 tablet 0    methocarbamol (ROBAXIN) 500 MG tablet Take 1 tablet by mouth 2 times daily as needed (muscle stiffness) 60 tablet 1    meloxicam (MOBIC) 15 MG tablet Take 1 tablet po every Monday,Wednesday, and Friday 30 tablet 1    gabapentin (NEURONTIN) 300 MG capsule Take 1 tablet po in the morning and take 2 tablets po in the evening 90 capsule 1    diclofenac sodium (VOLTAREN) 1 % GEL Apply 2-4 grams to affected area  g 3    ARIPiprazole (ABILIFY) 10 MG tablet       carvedilol (COREG) 3.125 MG tablet TAKE 1 TABLET BY MOUTH TWICE A DAY WITH MEALS      KLOR-CON M20 20 MEQ extended release tablet TAKE 1 TABLET BY MOUTH EVERY DAY      busPIRone (BUSPAR) 10 MG tablet TAKE 1 TABLET BY MOUTH THREE TIMES A DAY      amLODIPine (NORVASC) 10 MG tablet TAKE 1 TABLET BY MOUTH EVERY DAY      blood glucose test strips (FREESTYLE LITE) strip USE TO TEST FOUR TIMES A  strip 0    Dulaglutide (TRULICITY) 3 LR/4.5DE SOPN Inject 3 mg into the skin once a week 4 Adjustable Dose Pre-filled Pen Syringe 3    FreeStyle Lancets MISC USE AS DIRECTED 100 each 3    insulin aspart (NOVOLOG FLEXPEN) 100 UNIT/ML injection pen 21-27  units AC TID 10 Adjustable Dose Pre-filled Pen Syringe 3    insulin glargine (LANTUS SOLOSTAR) 100 UNIT/ML injection pen INJECT 46 UNITS UNDER THE SKIN DAILY 5 Adjustable Dose Pre-filled Pen Syringe 3    Insulin Pen Needle 32G X 4 MM MISC 1 each by Does not apply route 4 times daily 120 each 0    metFORMIN (GLUCOPHAGE-XR) 500 MG extended release tablet TAKE 2 TABLETS BY MOUTH EVERY DAY WITH BREAKFAST 60 tablet 5    Continuous Blood Gluc  (FREESTYLE MANE 2 READER) RO As needed 1 each 0    Continuous Blood Gluc Sensor (FREESTYLE MANE 2 SENSOR) MISC Every 2 weeks 2 each 3    Blood Glucose Monitoring Suppl (FREESTYLE LITE) RO As needed 1 each 1    ONE TOUCH ULTRASOFT LANCETS MISC 1 each by Does not apply route 4 times daily 100 each 6    FreeStyle Lancets MISC USE FOUR TIMES A  each 0    spironolactone (ALDACTONE) 25 MG tablet Take 25 mg by mouth daily      mupirocin (BACTROBAN) 2 % ointment Apply 22 g topically 3 times daily Apply topically 3 times daily. Blood Glucose Monitoring Suppl (FREESTYLE LITE) RO As needed 1 each 0    lisinopril (PRINIVIL;ZESTRIL) 30 MG tablet Take 30 mg by mouth daily      Insulin Pen Needle (PEN NEEDLES) 31G X 6 MM MISC 1 each by In Vitro route 4 times daily 200 each 3    omeprazole (PRILOSEC) 20 MG delayed release capsule TAKE 1 CAPSULE BY MOUTH EVERY DAY      Cholecalciferol (VITAMIN D PO) Take 1 tablet by mouth every 7 days Pt to clarify dose      furosemide (LASIX) 20 MG tablet Take 40 mg by mouth daily       Fluticasone Propionate (FLONASE NA) 1 spray by Nasal route daily Each nostril      SALINE MIST SPRAY NA 1 spray by Nasal route 3 times daily as needed Each nostril 2-3 times per day      Lancets MISC 1 each by Does not apply route 3 times daily 100 each 3    MELATONIN PO Take 1 tablet by mouth nightly       loratadine (CLARITIN) 10 MG tablet Take 10 mg by mouth daily       atorvastatin (LIPITOR) 80 MG tablet Take 80 mg by mouth daily. No current facility-administered medications for this visit.      I will continue his current medication regimen  which is part of the above treatment schedule. It has been helping with Mr. Clive Engel chronic  medical problems which for this visit include:   Diagnoses of Chronic pain syndrome, Lumbar radiculopathy, Lumbosacral spondylosis without myelopathy, Primary osteoarthritis of left hip, Fibromyalgia, DDD (degenerative disc disease), lumbar, Myofascial pain, and Chronic left shoulder pain were pertinent to this visit. Risks and benefits of the medications and other alternative treatments  including no treatment were discussed with the patient. The common side effects of these medications were also explained to the patient. Informed verbal consent was obtained. Goals of current treatment regimen include improvement in pain, restoration of functioning- with focus on improvement in physical performance, general activity, work or disability,emotional distress, health care utilization and  decreased medication consumption. Will continue to monitor progress towards achieving/maintaining therapeutic goals with special emphasis on  1. Improvement in perceived interfernce  of pain with ADL's. Ability to do home exercises independently. Ability to do household chores indoor and/or outdoor work and social and leisure activities. Improve psychosocial and physical functioning. - he is showing progression towards this treatment goal with the current regimen. He was advised against drinking alcohol with the narcotic pain medicines, advised against driving or handling machinery while adjusting the dose of medicines or if having cognitive  issues related to the current medications. Risk of overdose and death, if medicines not taken as prescribed, were also discussed. If the patient develops new symptoms or if the symptoms worsen, the patient should call the office. While transcribing every attempt was made to maintain the accuracy of the note in terms of it's contents,there may have been some errors made inadvertently.   Thank you for allowing me to participate in the care of this patient.     Qian Michel MD.    Cc: Micha Siddiqui MD

## 2023-01-03 ENCOUNTER — HOSPITAL ENCOUNTER (OUTPATIENT)
Dept: PHYSICAL THERAPY | Age: 63
Setting detail: THERAPIES SERIES
Discharge: HOME | End: 2023-01-03
Payer: COMMERCIAL

## 2023-01-03 PROCEDURE — 97113 AQUATIC THERAPY/EXERCISES: CPT
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
## 2023-01-05 ENCOUNTER — HOSPITAL ENCOUNTER (OUTPATIENT)
Dept: PHYSICAL THERAPY | Age: 63
Setting detail: THERAPIES SERIES
Discharge: HOME | End: 2023-01-05
Payer: COMMERCIAL

## 2023-01-05 PROCEDURE — 97113 AQUATIC THERAPY/EXERCISES: CPT

## 2023-01-05 PROCEDURE — 97530 THERAPEUTIC ACTIVITIES: CPT
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
## 2023-01-10 ENCOUNTER — TELEPHONE (OUTPATIENT)
Dept: PAIN MANAGEMENT | Age: 63
End: 2023-01-10

## 2023-01-10 NOTE — TELEPHONE ENCOUNTER
Patient called and stated that the sciatica pain in his right leg is not getting better it is actually getting worse. Patient is wanting to know what [facility] suggests him to do. Patient is wanting to know if he should have an xray / mri done, aquatic therapy, or an injection done. Patient is requesting a callback.

## 2023-01-12 ENCOUNTER — PATIENT MESSAGE (OUTPATIENT)
Dept: PAIN MANAGEMENT | Age: 63
End: 2023-01-12

## 2023-01-12 DIAGNOSIS — G89.4 CHRONIC PAIN SYNDROME: Primary | ICD-10-CM

## 2023-01-12 DIAGNOSIS — M47.817 LUMBOSACRAL SPONDYLOSIS WITHOUT MYELOPATHY: ICD-10-CM

## 2023-01-12 DIAGNOSIS — M54.16 LUMBAR RADICULOPATHY: ICD-10-CM

## 2023-01-12 NOTE — TELEPHONE ENCOUNTER
Patient called and stated that he saw his PCP and got scheduled for an MRI but the patient is still wanting to know what [facility] would like for him to do. Advised that [address] [place] [address] will call the patient in a Medrol Dose Pack for the pain.

## 2023-01-16 ENCOUNTER — HOSPITAL ENCOUNTER (OUTPATIENT)
Dept: MRI IMAGING | Age: 63
Discharge: HOME | End: 2023-01-16
Payer: COMMERCIAL

## 2023-01-16 DIAGNOSIS — G89.29 CHRONIC RIGHT-SIDED LOW BACK PAIN WITH RIGHT-SIDED SCIATICA: ICD-10-CM

## 2023-01-16 DIAGNOSIS — M48.061 SPINAL STENOSIS, LUMBAR REGION, WITHOUT NEUROGENIC CLAUDICATION: ICD-10-CM

## 2023-01-16 DIAGNOSIS — M54.41 CHRONIC RIGHT-SIDED LOW BACK PAIN WITH RIGHT-SIDED SCIATICA: ICD-10-CM

## 2023-01-16 PROCEDURE — 72148 MRI LUMBAR SPINE W/O DYE: CPT

## 2023-01-16 RX ORDER — METHYLPREDNISOLONE 4 MG/1
TABLET ORAL
Qty: 1 KIT | Refills: 0 | Status: SHIPPED | OUTPATIENT
Start: 2023-01-16

## 2023-01-17 ENCOUNTER — HOSPITAL ENCOUNTER (OUTPATIENT)
Dept: PHYSICAL THERAPY | Age: 63
Setting detail: THERAPIES SERIES
Discharge: HOME | End: 2023-01-17
Payer: COMMERCIAL

## 2023-01-17 ENCOUNTER — TELEPHONE (OUTPATIENT)
Dept: PAIN MANAGEMENT | Age: 63
End: 2023-01-17

## 2023-01-17 PROCEDURE — 97140 MANUAL THERAPY 1/> REGIONS: CPT

## 2023-01-17 PROCEDURE — 97162 PT EVAL MOD COMPLEX 30 MIN: CPT

## 2023-01-17 PROCEDURE — 97110 THERAPEUTIC EXERCISES: CPT

## 2023-01-17 NOTE — PLAN OF CARE
[address] [address], [address] [place] [address] Drive  Phone: [phone]   Fax: [phone]     Physical Therapy Certification    Dear Yessi Samano MD  ,    We had the pleasure of evaluating the following patient for physical therapy services at [address] [place] [address]. A summary of our findings can be found in the initial assessment below. This includes our plan of care. If you have any questions or concerns regarding these findings, please do not hesitate to contact me at the office phone number checked above. Thank you for the referral.       Physician Signature:_______________________________Date:__________________  By signing above (or electronic signature), therapists plan is approved by physician      Patient: Ninfa Robertson   : 1960   MRN: [id number]  Referring Physician: Yessi Samano MD        Evaluation Date: 2023      Medical Diagnosis Information:  Lumbago with sciatica, right side [M54.41]  Other chronic pain [G89.29]  Spinal stenosis, lumbar region without neurogenic claudication [M48.061]   PT diagnosis:  RLE proximal hip muscle weakness, decrease lumbar mobility, core strength, lumbopelvic malalignment. Insurance information: PT Insurance Information: [employer]     Precautions/ Contra-indications:   Latex Allergy:  [x]NO      []YES  Preferred Language for Healthcare:   [x]English       []Other:    C-SSRS Triggered by Intake questionnaire (Past 2 wk assessment ):   [x] No, Questionnaire did not trigger screening.   [] Yes, Patient intake triggered C-SSRS Screening     [] Completed, no further action required.    [] Completed, PCP notified via Epic    SUBJECTIVE: Patient stated complaint:  Patient reports that on  he was shoveling snow in the driveway and noticed later some right side sciatica down his right LE  Admits to having a long history of back pain, but this recent irritation is different. Patient had been prescribed a steroid (norco)for his back recently. In 2022 , patient had received PT for chronic low back pain Aquatic therapy and for R rotator cuff tear. Fear avoidance: I should not do physical activities that (might) make my pain worse   [] True   [x] False     Relevant Medical History: R rotator cuff tear      Functional Scale:       Date assessed:  NIKUNJ: raw score = 28; dysfunction = 62%  1/172023    Pain Scale: 8/10  Easing factors: Norco  Provocative factors:  standing, sitting, bending , sleeping     Type: [x]Constant   []Intermittent  []Radiating []Localized []other:     Numbness/Tingling: R foot     Occupation/School: [employer]     Living Status/Prior Level of Function: Prior to this injury / incident, pt was independent with ADLs and IADLs,  Patient lives with mom in a two story house with one flight of steps inside( 14) .     OBJECTIVE:   Palpation: + slump and SLR on RLE    Functional Mobility/Transfers: mod I     Posture: rounded shoulders    Inspection: Decrease DF of right foot    Gait: (include devices/WB status)  ambulates with straight cane    Bandages/Dressings/Incisions: NA    Dermatomes Normal Abnormal Comments   inguinal area (L1)       anterior mid-thigh (L2) x     distal ant thigh/med knee (L3) x     medial lower leg and foot (L4) x     lateral lower leg and foot (L5)  x    posterior calf (S1)      medial calcaneus (S2) x         Reflexes Normal Abnormal Comments   S1-2 Seated achilles   Diminished B   S1-2 Prone knee bend      L3-4 Patellar tendon x LLE  R diminished    Clonus x     Babinski          ROM  Comments   Lumbar Flex 70    Lumbar Ext 14      ROM LEFT RIGHT Comments   Lumbar Side Bend 19 @hip 29 radicular symptoms Painful both direction;    Lumbar Rotation      Hip Flexion      Hip Abd      Hip ER      Hip IR      Hip Extension      Knee Ext      Knee Flex      Hamstring Flex      Piriformis                      Joint mobility:    []Normal    [x]Hypo   []Hyper      Strength / Myotomes LEFT RIGHT Comments   Multifidus      Transverse Ab      Hip Flexors (L1-2)      Hip Abductors      Hip Extensors      Hip Internal Rotators      Hip External Rotators      Quads (L2-4)      Hamstrings       Ankle Plantarflexion (S1-2)      Ankle Dorsiflexion (L4-5)      Ankle Inversion      Ankle Eversion (S1-2)      Great Toe Extension (L5)              Neural dynamic tension testing Normal Abnormal Comments   Slump Test  - Degree of knee flexion:   x RLE   SLR       0-30      30-70  x RLE   Femoral nerve (L2-4)          Orthopedic Special Tests:    Normal Abnormal N/A Comments   Toe walk         Heel Walk       Fwd Bend-aberrant or innominate mvmt)  x     Standing Flexion test  x     Trendelenburg  x     Kemps/Quadrant       Stork       KASEY/Gustavo       Hip scour       SLR  x     Crossed SLR x      Supine to sit  x     Hip thrust       SI distraction/compression       PA/Spring       Prone Instability test       Prone knee bend       Sacral Spring/thrust                  [x] Patient history, allergies, meds reviewed. Medical chart reviewed. See intake form. Review Of Systems (ROS):  [x]Performed Review of systems (Integumentary, CardioPulmonary, Neurological) by intake and observation. Intake form has been scanned into medical record. Patient has been instructed to contact their primary care physician regarding ROS issues if not already being addressed at this time.       Co-morbidities/Complexities (which will affect course of rehabilitation):   []None        []Hx of COVID   Arthritic conditions   []Rheumatoid arthritis (M05.9)  []Osteoarthritis (M19.91)  []Gout   Cardiovascular conditions   [x]Hypertension (I10)  []Hyperlipidemia (E78.5)  []Angina pectoris (I20)  []Atherosclerosis (I70)  []Pacemaker  []Hx of CABG/stent/  cardiac surgeries   Musculoskeletal conditions   []Disc pathology   []Congenital spine pathologies   []Osteoporosis (M81.8)  []Osteopenia (M85.8)  []Scoliosis       Endocrine conditions   []Hypothyroid (E03.9)  []Hyperthyroid Gastrointestinal conditions   []Constipation (U77.48)   Metabolic conditions   []Morbid obesity (E66.01)  [x]Diabetes type 1(E10.65) or 2 (E11.65)   []Neuropathy (G60.9)     Cardio/Pulmonary conditions   []Asthma (J45)  []Coughing   []COPD (J44.9)  [x]CHF  []A-fib   Psychological Disorders  []Anxiety (F41.9)  []Depression (F32.9)   []Other:   Developmental Disorders  []Autism (F84.0)  []CP (G80)  []Down Syndrome (Q90.9)  []Developmental delay     Neurological conditions  []Prior Stroke (I69.30)  []Parkinson's (G20)  []Encephalopathy (G93.40)  []MS (G35)  []Post-polio (G14)  []SCI  []TBI  []ALS Other conditions  []Fibromyalgia (M79.7)  []Vertigo  []Syncope  []Kidney Failure  []Cancer      []currently undergoing                treatment  []Pregnancy  []Incontinence   Prior surgeries  []involved limb  []previous spinal surgery  [] section birth  []hysterectomy  []bowel / bladder surgery  []other relevant surgeries   []Other:               Barriers to/and or personal factors that will affect rehab potential:              []Age  []Sex    []Smoker              []Motivation/Lack of Motivation                        [x]Co-Morbidities              []Cognitive Function, education/learning barriers              []Environmental, home barriers              []profession/work barriers  []past PT/medical experience  []other:  Justification:  R RTC    Falls Risk Assessment (30 days):   [x] Falls Risk assessed and no intervention required. [] Falls Risk assessed and Patient requires intervention due to being higher risk   TUG score (>12s at risk):     [] Falls education provided, including         ASSESSMENT:  Patient is a 61 yo male who presents with right sided radicular symptoms from the low back /hip region. He has an right elevated right inominate, + slump and R SLR test above.  He has hx of chronic back pain from spinal stenosis. He does have RLE proximal hip weakness. The patient to benefit from skilled PT to address the impairments above to reduce radicular symptoms. Functional Impairments:     []Noted lumbar/proximal hip hypomobility   []Noted lumbosacral and/or generalized hypermobility   [x]Decreased Lumbosacral/hip/LE functional ROM   [x]Decreased core/proximal hip strength and neuromuscular control    [x]Decreased LE functional strength    []Abnormal reflexes/sensation/myotomal/dermatomal deficits  []Reduced balance/proprioceptive control    []other:      Functional Activity Limitations (from functional questionnaire and intake)   []Reduced ability to tolerate prolonged functional positions   [x]Reduced ability or difficulty with changes of positions or transfers between positions   [x]Reduced ability to maintain good posture and demonstrate good body mechanics with sitting, bending, and lifting   [x]Reduced ability to sleep   [] Reduced ability or tolerance with driving and/or computer work   []Reduced ability to perform lifting, reaching, carrying tasks   [x]Reduced ability to squat   []Reduced ability to forward bend   []Reduced ability to ambulate prolonged functional periods/distances/surfaces   []Reduced ability to ascend/descend stairs   []other:       Participation Restrictions   []Reduced participation in self care activities   []Reduced participation in home management activities   []Reduced participation in work activities   [x]Reduced participation in social activities. []Reduced participation in sport/recreational activities. Classification:   []Signs/symptoms consistent with Lumbar instability/stabilization subgroup. []Signs/symptoms consistent with Lumbar mobilization/manipulation subgroup, myotomes and dermatomes intact. Meets manipulation criteria.     []Signs/symptoms consistent with Lumbar direction specific/centralization subgroup   []Signs/symptoms consistent with Lumbar traction subgroup     []Signs/symptoms consistent with lumbar facet dysfunction   [x]Signs/symptoms consistent with lumbar stenosis type dysfunction   [x]Signs/symptoms consistent with nerve root involvement including myotome & dermatome dysfunction   []Signs/symptoms consistent with post-surgical status including: decreased ROM, strength and function. []signs/symptoms consistent with pathology which may benefit from Dry needling     []other:      Prognosis/Rehab Potential:      [x]Excellent   []Good    []Fair   []Poor    Tolerance of evaluation/treatment:    []Excellent   []Good    []Fair   []Poor     Physical Therapy Evaluation Complexity Justification  [x] A history of present problem with:  [] no personal factors and/or comorbidities that impact the plan of care;  [x]1-2 personal factors and/or comorbidities that impact the plan of care  []3 personal factors and/or comorbidities that impact the plan of care  [x] An examination of body systems using standardized tests and measures addressing any of the following: body structures and functions (impairments), activity limitations, and/or participation restrictions;:  [x] a total of 1-2 or more elements   [] a total of 3 or more elements   [] a total of 4 or more elements   [x] A clinical presentation with:  [] stable and/or uncomplicated characteristics   [] evolving clinical presentation with changing characteristics  [] unstable and unpredictable characteristics;   [x] Clinical decision making of [] low, [x] moderate, [] high complexity using standardized patient assessment instrument and/or measurable assessment of functional outcome.     [] EVAL (LOW) 88206 (typically 15 minutes face-to-face)  [x] EVAL (MOD) 54523 (typically 30 minutes face-to-face)  [] EVAL (HIGH) 29128 (typically 45 minutes face-to-face)  [] RE-EVAL     PLAN: Begin PT focusing on: proximal hip mobilizations, LB mobs, LB core activation, proximal hip activation, and HEP    Frequency/Duration: 2 days per week for 6 Weeks:  Interventions:  [x]  Therapeutic exercise including: strength training, ROM, for LE, Glutes and core   [x]  NMR activation and proprioception for glutes , LE and Core   [x]  Manual therapy as indicated for Hip complex, LE and spine to include: Dry Needling/IASTM, STM, PROM, Gr I-IV mobilizations, manipulation. [x]  Modalities as needed that may include: thermal agents, E-stim, Biofeedback, US, iontophoresis as indicated  [x]  Patient education on joint protection, postural re-education, activity modification, progression of HEP. HEP instruction: Written HEP instructions provided and reviewed. GOALS:  Patient stated goal: To improve low back pain reducing radicular symptoms    [] Progressing: [] Met: [] Not Met: [] Adjusted    Therapist goals for Patient:   Short Term Goals: To be achieved in: 2 weeks  1. Independent in HEP and progression per patient tolerance, in order to prevent re-injury. [] Progressing: [] Met: [] Not Met: [] Adjusted  2. Patient will have a decrease in pain  4/10 at worst to facilitate improvement in movement, function, and ADLs as indicated by Functional Deficits. [] Progressing: [] Met: [] Not Met: [] Adjusted    Long Term Goals: To be achieved in:  6 weeks/ DC   1. Pt will improve NIKUNJ by 10 points to reduce disability and progress towards PLOF. [] Progressing: [] Met: [] Not Met: [] Adjusted  2. Patient will demonstrate increased AROM to  lumbar flexion /SB by 5 deg to allow for proper joint functioning as indicated by patients Functional Deficits. [] Progressing: [] Met: [] Not Met: [] Adjusted  3. Patient will demonstrate an increase in Strength right proximal hip  to +4/5  to allow for proper functional mobility as indicated by patients Functional Deficits. [] Progressing: [] Met: [] Not Met: [] Adjusted  4. Patient will return to functional activities including standing and walking  without increased symptoms or restriction.    [] Progressing: [] Met: [] Not Met: [] Adjusted  5. Patient to be able to sleep through the night without interruption .    [] Progressing: [] Met: [] Not Met: [] Adjusted     Electronically signed by:  Karen Xiao PT

## 2023-01-17 NOTE — FLOWSHEET NOTE
[address] [place] [address]  Phone: [phone]   Fax: [phone]    Physical Therapy Daily Treatment Note    Date:  2023     Patient Name:  Kyung Farfan    :  1960  MRN: [id number]  Medical Diagnosis:  Lumbago with sciatica, right side [M54.41]  Other chronic pain [G89.29]  Spinal stenosis, lumbar region without neurogenic claudication [M48.061]  Treatment Diagnosis:  RLE proximal hip muscle weakness, decrease lumbar mobility, core strength, lumbopelvic malalignment  Insurance/Certification information:  PT Insurance Information: [employer] VP[employer]  Physician Information:  France Lowe MD    Plan of care signed (Y/N): []  Yes [x]  No     Date of Patient follow up with Physician:      Progress Report: []  Yes  [x]  No     Date Range for reporting period:  Beginnin2023  Ending:     Progress report due (10 Rx/or 30 days whichever is less): visit #13 or       Recertification due (POC duration/ or 90 days whichever is less): visit # or      Visit # Insurance Allowable Auth required?  Date Range    Pending [x]  Yes  []  No Pending        Units approved Units used Date Range   pending       Latex Allergy:  [x]NO      []YES  Preferred Language for Healthcare:   [x]English       []other:    Functional Scale:       Date assessed:  NIKUNJ: raw score = 28; dysfunction = 62%  2023    Pain level:  8/10     SUBJECTIVE:  See eval    OBJECTIVE: See eval  Observation:   Test measurements:      RESTRICTIONS/PRECAUTIONS: Chronic LBP, R RTC tear      Treatment based classification:    [] mobilization/manipulation   [x] stabilization   [] extension based   [x] flexion based   [] lateral shift   [x] traction   [] unspecified Components:   [] thoracolumbar   [x] pelvic   [] SIJ   [x] sacral   [] hip         Comparable sign: axel sign right hip    Exercises/Interventions:     Therapeutic Exercise (34495) Resistance / level Sets / Seconds Reps Zelda / Roc Abbasi Reviewed HEP  Supine piriformis stretch  Rocking                                                        Therapeutic Activities (49289)                                          Neuromuscular Re-ed (90224)                                          Manual Intervention (98458)       Prone PA       GISTM/STM       Lumbar Manip       SI Manip       Hip belt mobs       Hip LA distraction-long axis distraction 8 min                             Modalities:     Pt. Education:  -pt educated on diagnosis, prognosis and expectations for rehab  -all pt questions were answered    Home Exercise Prorgam:  See above     Therapeutic Exercise and NMR EXR  [x] (43647) Provided verbal/tactile cueing for activities related to strengthening, flexibility, endurance, ROM  for improvements in proximal hip and core control with self care, mobility, lifting and ambulation.  [] (56359) Provided verbal/tactile cueing for activities related to improving balance, coordination, kinesthetic sense, posture, motor skill, proprioception  to assist with core control in self care, mobility, lifting, and ambulation.   [] (36054) Therapist is in constant attendance of 2 or more patients providing skilled therapy interventions, but not providing any significant amount of measurable one-on-one time to either patient, for improvements in LE, proximal hip, and core control in self care, mobility, lifting, ambulation and eccentric single leg control.      Therapeutic Activities:    [x] (35596 or 30535) Provided verbal/tactile cueing for activities related to improving balance, coordination, kinesthetic sense, posture, motor skill, proprioception and motor activation to allow for proper function  with self care and ADLs  [] (76635) Provided training and instruction to the patient for proper core and proximal hip recruitment and positioning with ambulation re-education     Home Exercise Program:    [x] (07937) Reviewed/Progressed HEP activities related to strengthening, flexibility, endurance, ROM of core, proximal hip and LE for functional self-care, mobility, lifting and ambulation   [] (29537) Reviewed/Progressed HEP activities related to improving balance, coordination, kinesthetic sense, posture, motor skill, proprioception of core, proximal hip and LE for self care, mobility, lifting, and ambulation      Manual Treatments:  PROM / STM / Oscillations-Mobs:  G-I, II, III, IV (PA's, Inf., Post.)  [x] (73004) Provided manual therapy to mobilize proximal hip and LS spine soft tissue/joints for the purpose of modulating pain, promoting relaxation,  increasing ROM, reducing/eliminating soft tissue swelling/inflammation/restriction, improving soft tissue extensibility and allowing for proper ROM for normal function with self care, mobility, lifting and ambulation. Charges:  Timed Code Treatment Minutes: 25   Total Treatment Minutes: 42       [] EVAL - LOW (29480)   [x] EVAL - MOD (47685)  [] EVAL - HIGH (30881)  [] RE-EVAL (58707)  [] GR(35507) x       [] Ionto  [] NMR (71184) x       [] Vaso  [x] Manual (66510) x 1       [] Ultrasound  [x] TA x  1      [] Mech Traction (58906)  [] Aquatic Therapy x     [] ES (un) (86641):   [] Home Management Training x  [] ES(attended) (46320)   [] Dry Needling 1-2 muscles (08451):  [] Dry Needling 3+ muscles (022741  [] Group:      [] Other:     Goals:   Patient stated goal: To improve low back pain reducing radicular symptoms     [] Progressing: [] Met: [] Not Met: [] Adjusted     Therapist goals for Patient:   Short Term Goals: To be achieved in: 2 weeks  1. Independent in HEP and progression per patient tolerance, in order to prevent re-injury. [] Progressing: [] Met: [] Not Met: [] Adjusted  2. Patient will have a decrease in pain  4/10 at worst to facilitate improvement in movement, function, and ADLs as indicated by Functional Deficits. [] Progressing: [] Met: [] Not Met: [] Adjusted     Long Term Goals:  To be achieved in:  6 weeks/ DC   1. Pt will improve NIKUNJ by 10 points to reduce disability and progress towards PLOF. [] Progressing: [] Met: [] Not Met: [] Adjusted  2. Patient will demonstrate increased AROM to  lumbar flexion /SB by 5 deg to allow for proper joint functioning as indicated by patients Functional Deficits. [] Progressing: [] Met: [] Not Met: [] Adjusted  3. Patient will demonstrate an increase in Strength right proximal hip  to +4/5  to allow for proper functional mobility as indicated by patients Functional Deficits. [] Progressing: [] Met: [] Not Met: [] Adjusted  4. Patient will return to functional activities including standing and walking  without increased symptoms or restriction. [] Progressing: [] Met: [] Not Met: [] Adjusted  5. Patient to be able to sleep through the night without interruption . [] Progressing: [] Met: [] Not Met: [] Adjusted         Overall Progression Towards Functional goals/ Treatment Progress Update:  [] Patient is progressing as expected towards functional goals listed. [] Progression is slowed due to complexities/Impairments listed. [] Progression has been slowed due to co-morbidities.   [x] Plan just implemented, too soon to assess goals progression <30days   [] Goals require adjustment due to lack of progress  [] Patient is not progressing as expected and requires additional follow up with physician  [] Other    Persisting Functional Limitations/Impairments:  [x]Sitting [x]Standing   [x]Walking [x]Squatting/bending    [x]Stairs []ADL's    [x]Transfers []Reaching  [x]Housework []Job related tasks  []Driving []Sports/Recreation   [x]Sleeping [x]Other:    ASSESSMENT:  See eval  Treatment/Activity Tolerance:  [] Patient able to complete tx  [] Patient limited by fatique  [] Patient limited by pain  [] Patient limited by other medical complications  [] Other:     Prognosis: [] Good [] Fair  [] Poor    Patient Requires Follow-up: [x] Yes  [] No    PLAN: See renuka. PT 2x / week for 6 weeks. [] Continue per plan of care [] Alter current plan (see comments)  [x] Plan of care initiated [] Hold pending MD visit [] Discharge    Electronically signed by: Dilia Lester, PT, DPT      Note: If patient does not return for scheduled/ recommended follow up visits, this note will serve as a discharge from care along with most recent update on progress.

## 2023-01-23 ENCOUNTER — OFFICE VISIT (OUTPATIENT)
Dept: PAIN MANAGEMENT | Age: 63
End: 2023-01-23
Payer: COMMERCIAL

## 2023-01-23 VITALS
HEART RATE: 79 BPM | SYSTOLIC BLOOD PRESSURE: 130 MMHG | DIASTOLIC BLOOD PRESSURE: 83 MMHG | BODY MASS INDEX: 32.73 KG/M2 | WEIGHT: 209 LBS

## 2023-01-23 DIAGNOSIS — M25.512 CHRONIC LEFT SHOULDER PAIN: ICD-10-CM

## 2023-01-23 DIAGNOSIS — M54.16 LUMBAR RADICULOPATHY: ICD-10-CM

## 2023-01-23 DIAGNOSIS — M16.12 PRIMARY OSTEOARTHRITIS OF LEFT HIP: ICD-10-CM

## 2023-01-23 DIAGNOSIS — M47.817 LUMBOSACRAL SPONDYLOSIS WITHOUT MYELOPATHY: ICD-10-CM

## 2023-01-23 DIAGNOSIS — M51.36 DDD (DEGENERATIVE DISC DISEASE), LUMBAR: ICD-10-CM

## 2023-01-23 DIAGNOSIS — M79.7 FIBROMYALGIA: ICD-10-CM

## 2023-01-23 DIAGNOSIS — G89.29 CHRONIC LEFT SHOULDER PAIN: ICD-10-CM

## 2023-01-23 DIAGNOSIS — G89.4 CHRONIC PAIN SYNDROME: ICD-10-CM

## 2023-01-23 DIAGNOSIS — M79.18 MYOFASCIAL PAIN: ICD-10-CM

## 2023-01-23 PROCEDURE — 3017F COLORECTAL CA SCREEN DOC REV: CPT | Performed by: INTERNAL MEDICINE

## 2023-01-23 PROCEDURE — G8427 DOCREV CUR MEDS BY ELIG CLIN: HCPCS | Performed by: INTERNAL MEDICINE

## 2023-01-23 PROCEDURE — 1036F TOBACCO NON-USER: CPT | Performed by: INTERNAL MEDICINE

## 2023-01-23 PROCEDURE — 99214 OFFICE O/P EST MOD 30 MIN: CPT | Performed by: INTERNAL MEDICINE

## 2023-01-23 PROCEDURE — G8484 FLU IMMUNIZE NO ADMIN: HCPCS | Performed by: INTERNAL MEDICINE

## 2023-01-23 PROCEDURE — G8417 CALC BMI ABV UP PARAM F/U: HCPCS | Performed by: INTERNAL MEDICINE

## 2023-01-23 RX ORDER — NORTRIPTYLINE HYDROCHLORIDE 25 MG/1
25-50 CAPSULE ORAL NIGHTLY
Qty: 60 CAPSULE | Refills: 0 | Status: SHIPPED | OUTPATIENT
Start: 2023-01-23

## 2023-01-23 RX ORDER — MELOXICAM 15 MG/1
TABLET ORAL
Qty: 30 TABLET | Refills: 1 | Status: SHIPPED | OUTPATIENT
Start: 2023-01-23

## 2023-01-23 RX ORDER — METHOCARBAMOL 500 MG/1
500 TABLET, FILM COATED ORAL 2 TIMES DAILY PRN
Qty: 60 TABLET | Refills: 1 | Status: SHIPPED | OUTPATIENT
Start: 2023-01-23

## 2023-01-23 RX ORDER — HYDROCODONE BITARTRATE AND ACETAMINOPHEN 5; 325 MG/1; MG/1
1 TABLET ORAL EVERY 6 HOURS PRN
Qty: 100 TABLET | Refills: 0 | Status: SHIPPED | OUTPATIENT
Start: 2023-01-23 | End: 2023-02-20

## 2023-01-23 RX ORDER — GABAPENTIN 300 MG/1
CAPSULE ORAL
Qty: 120 CAPSULE | Refills: 1 | Status: SHIPPED | OUTPATIENT
Start: 2023-01-23 | End: 2023-02-27

## 2023-01-23 RX ORDER — METHYLPREDNISOLONE 4 MG/1
TABLET ORAL
Qty: 1 KIT | Refills: 0 | Status: SHIPPED | OUTPATIENT
Start: 2023-01-23

## 2023-01-23 NOTE — PROGRESS NOTES
Makayla Neighbor  1960  [id number]    HISTORY OF PRESENT ILLNESS:  Mr. Prasad Pena is a 60 y.o. male returns for a follow up visit for multiple medical problems. His  presenting problems are   1. Chronic pain syndrome    2. Lumbar radiculopathy    3. Primary osteoarthritis of left hip    4. Lumbosacral spondylosis without myelopathy    5. Fibromyalgia    6. DDD (degenerative disc disease), lumbar    7. Myofascial pain    8. Chronic left shoulder pain    . As per information/history obtained from the PADT(patient assessment and documentation tool) -  He complains of pain in the lower back with radiation to the buttocks, hips Right, upper leg Right, knees Right, lower leg Right, ankles Right, and feet Right He rates the pain 10/10 and describes it as sharp, aching. Pain is made worse by: movement, walking, standing, sitting, bending, lifting. Current treatment regimen has helped relieve about 10% of the pain. He denies side effects from the current pain regimen. Patient reports that since the last follow up visit the physical functioning is worse, family/social relationships are unchanged, mood is unchanged and sleep patterns are worse, and that the overall functioning is worse. Patient denies neurological bowel or bladder. Patient denies misusing/abusing his narcotic pain medications or using any illegal drugs. There are No indicators for possible drug abuse, addiction or diversion problems. Upon obtaining the medical history from Mr. Prasad Pena regarding today's office visit for his presenting problems, patient states he is having terrible bout of sciatica right side. Mr. Prasad Pena reports he had a MRI done recently. He says he saw his PCP. He states his pain goes into the right leg, he denies any accidents, injury or trauma, no constant bending or twisting. Patient mentions he is using Norco along with Mobic and Neurontin 900 mg. Patient states his sleep is fair. Has fairly normal sleep latency.  Averages about 4-6 hours of sleep a night. Denies any signs of sleep apnea. Feels somewhat rested in the morning. Patient reports his blood sugar has been okay. Patient's  subjective report of his mood is fair. he describes occasional symptoms of depression, occasional  irritability and some mood swings. Describes his mood as being neutral and reports some pleasure in his daily activities. Reports  fair  appetite, energy and concentration. Able to function well in different aspects of his daily activities. Denies suicidal or homicidal ideation. Denies any complaints of increased tension, does   Worry sometimes and occasional  irritability  he denies any c/o increased anxiety, No c/o panic attacks or symptoms of PTSD. ALLERGIES/PAST MED/FAM/SOC HISTORY: Mr. Suzan Buckley allergies, past medical, family and social history were reviewed in the chart. Mr. Suzan Buckley current medications are   Outpatient Medications Prior to Visit   Medication Sig Dispense Refill    methylPREDNISolone (MEDROL, VIPUL,) 4 MG tablet Use as directed 1 kit 0    HYDROcodone-acetaminophen (NORCO) 5-325 MG per tablet Take 1 tablet by mouth every 6 hours as needed for Pain (max 3-4 day) for up to 28 days.  100 tablet 0    methocarbamol (ROBAXIN) 500 MG tablet Take 1 tablet by mouth 2 times daily as needed (muscle stiffness) 60 tablet 1    meloxicam (MOBIC) 15 MG tablet Take 1 tablet po every Monday,Wednesday, and Friday 30 tablet 1    gabapentin (NEURONTIN) 300 MG capsule Take 1 tablet po in the morning and take 2 tablets po in the evening 90 capsule 1    diclofenac sodium (VOLTAREN) 1 % GEL Apply 2-4 grams to affected area  g 3    ARIPiprazole (ABILIFY) 10 MG tablet       carvedilol (COREG) 3.125 MG tablet TAKE 1 TABLET BY MOUTH TWICE A DAY WITH MEALS      KLOR-CON M20 20 MEQ extended release tablet TAKE 1 TABLET BY MOUTH EVERY DAY      busPIRone (BUSPAR) 10 MG tablet TAKE 1 TABLET BY MOUTH THREE TIMES A DAY      amLODIPine (NORVASC) 10 MG tablet TAKE 1 TABLET BY MOUTH EVERY DAY      blood glucose test strips (FREESTYLE LITE) strip USE TO TEST FOUR TIMES A  strip 0    Dulaglutide (TRULICITY) 3 PQ/2.9RZ SOPN Inject 3 mg into the skin once a week 4 Adjustable Dose Pre-filled Pen Syringe 3    FreeStyle Lancets MISC USE AS DIRECTED 100 each 3    insulin aspart (NOVOLOG FLEXPEN) 100 UNIT/ML injection pen 21-27  units AC TID 10 Adjustable Dose Pre-filled Pen Syringe 3    insulin glargine (LANTUS SOLOSTAR) 100 UNIT/ML injection pen INJECT 46 UNITS UNDER THE SKIN DAILY 5 Adjustable Dose Pre-filled Pen Syringe 3    Insulin Pen Needle 32G X 4 MM MISC 1 each by Does not apply route 4 times daily 120 each 0    metFORMIN (GLUCOPHAGE-XR) 500 MG extended release tablet TAKE 2 TABLETS BY MOUTH EVERY DAY WITH BREAKFAST 60 tablet 5    Continuous Blood Gluc  (FREESTYLE MANE 2 READER) RO As needed 1 each 0    Continuous Blood Gluc Sensor (FREESTYLE MANE 2 SENSOR) MISC Every 2 weeks 2 each 3    Blood Glucose Monitoring Suppl (FREESTYLE LITE) RO As needed 1 each 1    ONE TOUCH ULTRASOFT LANCETS MISC 1 each by Does not apply route 4 times daily 100 each 6    FreeStyle Lancets MISC USE FOUR TIMES A  each 0    spironolactone (ALDACTONE) 25 MG tablet Take 25 mg by mouth daily      mupirocin (BACTROBAN) 2 % ointment Apply 22 g topically 3 times daily Apply topically 3 times daily.       Blood Glucose Monitoring Suppl (FREESTYLE LITE) RO As needed 1 each 0    lisinopril (PRINIVIL;ZESTRIL) 30 MG tablet Take 30 mg by mouth daily      Insulin Pen Needle (PEN NEEDLES) 31G X 6 MM MISC 1 each by In Vitro route 4 times daily 200 each 3    omeprazole (PRILOSEC) 20 MG delayed release capsule TAKE 1 CAPSULE BY MOUTH EVERY DAY      Cholecalciferol (VITAMIN D PO) Take 1 tablet by mouth every 7 days Pt to clarify dose      furosemide (LASIX) 20 MG tablet Take 40 mg by mouth daily       Fluticasone Propionate (FLONASE NA) 1 spray by Nasal route daily Each nostril      SALINE MIST SPRAY NA 1 spray by Nasal route 3 times daily as needed Each nostril 2-3 times per day      Lancets MISC 1 each by Does not apply route 3 times daily 100 each 3    MELATONIN PO Take 1 tablet by mouth nightly       loratadine (CLARITIN) 10 MG tablet Take 10 mg by mouth daily       atorvastatin (LIPITOR) 80 MG tablet Take 80 mg by mouth daily. No facility-administered medications prior to visit. REVIEW OF SYSTEMS: .   Respiratory: Negative for shortness of breath. Cardiovascular: Negative for chest pain, palpitations  Gastrointestinal: Negative for blood in stool, abdominal distention, nausea, vomiting, abdominal pain, diarrhea,constipation. Neurological: Negative for speech difficulty, weakness and light-headedness, dizziness, tremors, sleepiness  Psychiatric/Behavioral: Negative for suicidal ideas, hallucinations, behavioral problems, self-injury, decreased concentration/cognition, agitation, confusion. PHYSICAL EXAM:   Nursing note and vitals reviewed. /83   Pulse 79   Wt 209 lb (94.8 kg)   BMI 32.73 kg/m²   General Appearance: Patient is well nourished, well developed, well groomed and in no acute distress. Skin: Skin is warm and dry, good turgor . No rash or lesions noted. He is not diaphoretic. Pulmonary/Chest: Effort normal. No respiratory distress or use of accessory muscles. Auscultation revealing normal air entry. He does not have wheezes in the lung fields. He has no rales. Cardiovascular: Normal rate, regular rhythm, normal heart sounds, and does not have murmur. Exam reveals no gallop and no friction rub. Musculoskeletal / Extremities: Range of motion is normal. Gait is normal, assistive devices use: cane. He exhibits edema: none, and no tenderness. Neurological/Psychiatric:He is alert and oriented to person, place, and time. Coordination is  normal.   Judgement and Insight is normal  His mood is Appropriate and affect is Neutral/Euthymic(normal) .  His behavior is normal. thought content normal.        IMPRESSION:     1. Chronic pain syndrome    2. Lumbar radiculopathy    3. Primary osteoarthritis of left hip    4. Lumbosacral spondylosis without myelopathy    5. Fibromyalgia    6. DDD (degenerative disc disease), lumbar    7. Myofascial pain    8. Chronic left shoulder pain        PLAN:  Informed verbal consent was obtained. -ROM/Stretching exercises as advised   -He was advised to increase fluids ( 5-7  glasses of fluid daily), limit caffeine, avoid cheese products, increase dietary fiber, increase activity and exercise as tolerated and relax regularly and enjoy meals   -Continue with Norco 3-4 per day   -MRI of Lumbar spine reviewed, shows severe stenosis L3-L4, L4-L5   -Start medrol pack  to help, consider GODWIN   -Start Pamelor 1-2 at night   -he was advised proper sleep hygiene-told to avoid:use of caffeine or other stimulants after noon, alcohol use near bedtime, long or frequent naps during the day, erratic sleep schedule, heavy meals near bedtime, vigorous exercise near bedtime and use of electronic devices near bedtime   -Continue with Neurontin 900 mg increase to 1200 mg   Mr. Arin Rodriguez will be prescribed  the medications  listed below which are for treatment of his presenting  medical problems which for this visit include:   Diagnoses of Chronic pain syndrome, Lumbar radiculopathy, Primary osteoarthritis of left hip, Lumbosacral spondylosis without myelopathy, Fibromyalgia, DDD (degenerative disc disease), lumbar, Myofascial pain, and Chronic left shoulder pain were pertinent to this visit. Medications/orders associated with this visit:    Current Outpatient Medications   Medication Sig Dispense Refill    methylPREDNISolone (MEDROL, VIPUL,) 4 MG tablet Use as directed 1 kit 0    HYDROcodone-acetaminophen (NORCO) 5-325 MG per tablet Take 1 tablet by mouth every 6 hours as needed for Pain (max 3-4 day) for up to 28 days.  100 tablet 0    methocarbamol (ROBAXIN) 500 MG tablet Take 1 tablet by mouth 2 times daily as needed (muscle stiffness) 60 tablet 1    meloxicam (MOBIC) 15 MG tablet Take 1 tablet po every Monday,Wednesday, and Friday 30 tablet 1    gabapentin (NEURONTIN) 300 MG capsule Take 1 tablet po in the morning and take 2 tablets po in the evening 90 capsule 1    diclofenac sodium (VOLTAREN) 1 % GEL Apply 2-4 grams to affected area  g 3    ARIPiprazole (ABILIFY) 10 MG tablet       carvedilol (COREG) 3.125 MG tablet TAKE 1 TABLET BY MOUTH TWICE A DAY WITH MEALS      KLOR-CON M20 20 MEQ extended release tablet TAKE 1 TABLET BY MOUTH EVERY DAY      busPIRone (BUSPAR) 10 MG tablet TAKE 1 TABLET BY MOUTH THREE TIMES A DAY      amLODIPine (NORVASC) 10 MG tablet TAKE 1 TABLET BY MOUTH EVERY DAY      blood glucose test strips (FREESTYLE LITE) strip USE TO TEST FOUR TIMES A  strip 0    Dulaglutide (TRULICITY) 3 JR/3.5FQ SOPN Inject 3 mg into the skin once a week 4 Adjustable Dose Pre-filled Pen Syringe 3    FreeStyle Lancets MISC USE AS DIRECTED 100 each 3    insulin aspart (NOVOLOG FLEXPEN) 100 UNIT/ML injection pen 21-27  units AC TID 10 Adjustable Dose Pre-filled Pen Syringe 3    insulin glargine (LANTUS SOLOSTAR) 100 UNIT/ML injection pen INJECT 46 UNITS UNDER THE SKIN DAILY 5 Adjustable Dose Pre-filled Pen Syringe 3    Insulin Pen Needle 32G X 4 MM MISC 1 each by Does not apply route 4 times daily 120 each 0    metFORMIN (GLUCOPHAGE-XR) 500 MG extended release tablet TAKE 2 TABLETS BY MOUTH EVERY DAY WITH BREAKFAST 60 tablet 5    Continuous Blood Gluc  (FREESTYLE MANE 2 READER) RO As needed 1 each 0    Continuous Blood Gluc Sensor (FREESTYLE MANE 2 SENSOR) MISC Every 2 weeks 2 each 3    Blood Glucose Monitoring Suppl (FREESTYLE LITE) RO As needed 1 each 1    ONE TOUCH ULTRASOFT LANCETS MISC 1 each by Does not apply route 4 times daily 100 each 6    FreeStyle Lancets MISC USE FOUR TIMES A  each 0    spironolactone (ALDACTONE) 25 MG tablet Take 25 mg by mouth daily      mupirocin (BACTROBAN) 2 % ointment Apply 22 g topically 3 times daily Apply topically 3 times daily. Blood Glucose Monitoring Suppl (FREESTYLE LITE) RO As needed 1 each 0    lisinopril (PRINIVIL;ZESTRIL) 30 MG tablet Take 30 mg by mouth daily      Insulin Pen Needle (PEN NEEDLES) 31G X 6 MM MISC 1 each by In Vitro route 4 times daily 200 each 3    omeprazole (PRILOSEC) 20 MG delayed release capsule TAKE 1 CAPSULE BY MOUTH EVERY DAY      Cholecalciferol (VITAMIN D PO) Take 1 tablet by mouth every 7 days Pt to clarify dose      furosemide (LASIX) 20 MG tablet Take 40 mg by mouth daily       Fluticasone Propionate (FLONASE NA) 1 spray by Nasal route daily Each nostril      SALINE MIST SPRAY NA 1 spray by Nasal route 3 times daily as needed Each nostril 2-3 times per day      Lancets MISC 1 each by Does not apply route 3 times daily 100 each 3    MELATONIN PO Take 1 tablet by mouth nightly       loratadine (CLARITIN) 10 MG tablet Take 10 mg by mouth daily       atorvastatin (LIPITOR) 80 MG tablet Take 80 mg by mouth daily. No current facility-administered medications for this visit. Goals of current treatment regimen include improvement in pain, restoration of functioning- with focus on improvement in physical performance, general activity, work or disability,emotional distress, health care utilization and  decreased medication consumption. Will continue to monitor progress towards achieving/maintaining therapeutic goals with special emphasis on  1. Improvement in perceived interfernce  of pain with ADL's. Ability to do home exercises independently. Ability to do household chores indoor and/or outdoor work and social and leisure activities. To increase flexibility/ROM, strength and endurance.  Improve psychosocial and physical functioning.- he is not showing any significant progress/or showing regression  towards this goal and reassessment and adjustment of goals/treatment have been made.   2. Improving sleep to 6-7 hours a night. Improve mood/ anxiety and depression symptoms such as crying spells, low energy, problems with concentration, motivation.- he is not showing any significant progress/or showing regression  towards this goal and reassessment and adjustment of goals/treatment have been made. 3. Reduction of reliance on opioid analgesia/more appropriate opioid use. - he is showing progression towards this treatment goal with the current regimen. 4. Ability to focus/concentrate at work and perform the duties required of him at work  Sit through a workday without lower extremity symptoms. Stand 30-60 minutes without lower extremity symptoms. Ability to lift up to 10-20 lbs. Ability to go up and down stairs. Sit 30-60 minutes  Without having to stand up frequently. - he is maintaining/progressing towards his work related goals with the current regimen. Risks and benefits of the medications and other alternative treatments have been/were  discussed with the patient. Any questions on the  common side effects of these medications were also answered. He was advised against drinking alcohol with the narcotic pain medicines, advised against driving or handling machinery when  starting or adjusting the dose of medicines, feeling groggy or drowsy, or if having any cognitive issues related to the current medications. Heis fully aware of the risk of overdose and death, if medicines are misused and not taken as prescribed. If he develops new symptoms or if the symptoms worsen, he was told to call the office. .  Thank you for allowing me to participate in the care of this patient.     Sanket Schneider MD    Cc: Juan Ramirez MD

## 2023-01-24 ENCOUNTER — HOSPITAL ENCOUNTER (OUTPATIENT)
Dept: PHYSICAL THERAPY | Age: 63
Setting detail: THERAPIES SERIES
Discharge: HOME | End: 2023-01-24
Payer: COMMERCIAL

## 2023-01-24 ENCOUNTER — TELEPHONE (OUTPATIENT)
Dept: ENDOCRINOLOGY | Age: 63
End: 2023-01-24

## 2023-01-24 PROCEDURE — 97110 THERAPEUTIC EXERCISES: CPT

## 2023-01-24 NOTE — FLOWSHEET NOTE
[address] [place] [address] Janny Abebe  Phone: [phone]   Fax: [phone]    Physical Therapy Daily Treatment Note    Date:  2023     Patient Name:  Luis Angel Naik    :  1960  MRN: [id number]  Medical Diagnosis:  Lumbago with sciatica, right side [M54.41]  Other chronic pain [G89.29]  Spinal stenosis, lumbar region without neurogenic claudication [M48.061]  Treatment Diagnosis:  RLE proximal hip muscle weakness, decrease lumbar mobility, core strength, lumbopelvic malalignment  Insurance/Certification information:  PT Insurance Information: [employer] VPY  Physician Information:  Amanda Amaya MD    Plan of care signed (Y/N): []  Yes [x]  No     Date of Patient follow up with Physician:      Progress Report: []  Yes  [x]  No     Date Range for reporting period:  Beginnin2023  Ending:     Progress report due (10 Rx/or 30 days whichever is less): visit #80 or       Recertification due (POC duration/ or 90 days whichever is less): visit # or      Visit # Insurance Allowable Auth required? Date Range     [x]  Yes  []  No 23-3/17/23         Units approved Units used Date Range   48  -3/17     Latex Allergy:  [x]NO      []YES  Preferred Language for Healthcare:   [x]English       []other:    Functional Scale:       Date assessed:  NIKUNJ: raw score = 28; dysfunction = 62%  2023    Pain level:  8/10     SUBJECTIVE:  Patient reports that her pain is about the same .      OBJECTIVE: See eval  Observation:   Test measurements:      RESTRICTIONS/PRECAUTIONS: Chronic LBP, R RTC tear      Treatment based classification:    [] mobilization/manipulation   [x] stabilization   [] extension based   [x] flexion based   [] lateral shift   [x] traction   [] unspecified Components:   [] thoracolumbar   [x] pelvic   [] SIJ   [x] sacral   [] hip         Comparable sign: axel sign right hip    Exercises/Interventions:     Therapeutic Exercise (18489) Resistance / level Sets / Seconds Reps Notes / Cues              Rocking       Nustep warmup        IB  2/30\"     HSS  Hip flexion   2/20\"  2/20'     SKTC   Supine piriformis stretch  LTR  2/20'  2/20'  2/20\"                          Therapeutic Activities (36461)                                          Neuromuscular Re-ed (11425)                                          Manual Intervention ([id number])       Prone PA       GISTM/STM       Lumbar Manip       SI Manip       Hip belt mobs       Hip LA distraction-long axis distraction       Ball/Belt traction  5 min                      Modalities:     Pt. Education:  -pt educated on diagnosis, prognosis and expectations for rehab  -all pt questions were answered    Home Exercise Prorgam:  See above     Therapeutic Exercise and NMR EXR  [x] (34026) Provided verbal/tactile cueing for activities related to strengthening, flexibility, endurance, ROM  for improvements in proximal hip and core control with self care, mobility, lifting and ambulation.  [] (19962) Provided verbal/tactile cueing for activities related to improving balance, coordination, kinesthetic sense, posture, motor skill, proprioception  to assist with core control in self care, mobility, lifting, and ambulation.   [] (77456) Therapist is in constant attendance of 2 or more patients providing skilled therapy interventions, but not providing any significant amount of measurable one-on-one time to either patient, for improvements in LE, proximal hip, and core control in self care, mobility, lifting, ambulation and eccentric single leg control.      Therapeutic Activities:    [x] (39026 or 56078) Provided verbal/tactile cueing for activities related to improving balance, coordination, kinesthetic sense, posture, motor skill, proprioception and motor activation to allow for proper function  with self care and ADLs  [] (73220) Provided training and instruction to the patient for proper core and proximal hip recruitment and positioning with ambulation re-education     Home Exercise Program:    [x] (04547) Reviewed/Progressed HEP activities related to strengthening, flexibility, endurance, ROM of core, proximal hip and LE for functional self-care, mobility, lifting and ambulation   [] (35941) Reviewed/Progressed HEP activities related to improving balance, coordination, kinesthetic sense, posture, motor skill, proprioception of core, proximal hip and LE for self care, mobility, lifting, and ambulation      Manual Treatments:  PROM / STM / Oscillations-Mobs:  G-I, II, III, IV (PA's, Inf., Post.)  [x] (52795) Provided manual therapy to mobilize proximal hip and LS spine soft tissue/joints for the purpose of modulating pain, promoting relaxation,  increasing ROM, reducing/eliminating soft tissue swelling/inflammation/restriction, improving soft tissue extensibility and allowing for proper ROM for normal function with self care, mobility, lifting and ambulation. Charges:  Timed Code Treatment Minutes: 40   Total Treatment Minutes: 40       [] EVAL - LOW (27875)   [] EVAL - MOD (74432)  [] EVAL - HIGH (97484)  [] RE-EVAL (90808)  [x] TM(08670) x  3     [] Ionto  [] NMR (17806) x       [] Vaso  [x] Manual (84926) x       [] Ultrasound  [] TA x        [] Mech Traction (50678)  [] Aquatic Therapy x     [] ES (un) (07554):   [] Home Management Training x  [] ES(attended) (39660)   [] Dry Needling 1-2 muscles (71118):  [] Dry Needling 3+ muscles (972837  [] Group:      [] Other:     Goals:   Patient stated goal: To improve low back pain reducing radicular symptoms     [] Progressing: [] Met: [] Not Met: [] Adjusted     Therapist goals for Patient:   Short Term Goals: To be achieved in: 2 weeks  1. Independent in HEP and progression per patient tolerance, in order to prevent re-injury. [] Progressing: [] Met: [] Not Met: [] Adjusted  2.  Patient will have a decrease in pain  4/10 at worst to facilitate improvement in movement, function, and ADLs as indicated by Functional Deficits. [] Progressing: [] Met: [] Not Met: [] Adjusted     Long Term Goals: To be achieved in:  6 weeks/ DC   1. Pt will improve NIKUNJ by 10 points to reduce disability and progress towards PLOF. [] Progressing: [] Met: [] Not Met: [] Adjusted  2. Patient will demonstrate increased AROM to  lumbar flexion /SB by 5 deg to allow for proper joint functioning as indicated by patients Functional Deficits. [] Progressing: [] Met: [] Not Met: [] Adjusted  3. Patient will demonstrate an increase in Strength right proximal hip  to +4/5  to allow for proper functional mobility as indicated by patients Functional Deficits. [] Progressing: [] Met: [] Not Met: [] Adjusted  4. Patient will return to functional activities including standing and walking  without increased symptoms or restriction. [] Progressing: [] Met: [] Not Met: [] Adjusted  5. Patient to be able to sleep through the night without interruption . [] Progressing: [] Met: [] Not Met: [] Adjusted         Overall Progression Towards Functional goals/ Treatment Progress Update:  [] Patient is progressing as expected towards functional goals listed. [] Progression is slowed due to complexities/Impairments listed. [] Progression has been slowed due to co-morbidities. [x] Plan just implemented, too soon to assess goals progression <30days   [] Goals require adjustment due to lack of progress  [] Patient is not progressing as expected and requires additional follow up with physician  [] Other    Persisting Functional Limitations/Impairments:  [x]Sitting [x]Standing   [x]Walking [x]Squatting/bending    [x]Stairs []ADL's    [x]Transfers []Reaching  [x]Housework []Job related tasks  []Driving []Sports/Recreation   [x]Sleeping [x]Other:    ASSESSMENT: The patient began core and hip muscle exercises with minimal discomfort. Instructed patient in hip stretching as tolerated and educated pt on the pathology of spinal stenosis.  He received ball/belt traction for spinal decompression. Will monitor patient response to treatment . Patient to benefit from ongoing PT flexion preference exercises and hip strengthening as tolerated. Treatment/Activity Tolerance:  [] Patient able to complete tx  [] Patient limited by fatique  [] Patient limited by pain  [] Patient limited by other medical complications  [] Other:     Prognosis: [] Good [] Fair  [] Poor    Patient Requires Follow-up: [x] Yes  [] No    PLAN: See eval. PT 2x / week for 6 weeks. [] Continue per plan of care [] Alter current plan (see comments)  [x] Plan of care initiated [] Hold pending MD visit [] Discharge    Electronically signed by: Butch Muniz PT, DPT      Note: If patient does not return for scheduled/ recommended follow up visits, this note will serve as a discharge from care along with most recent update on progress.

## 2023-01-24 NOTE — TELEPHONE ENCOUNTER
Pt calling and asking about helping him set up his Estela Valery.  He had asked the pharmacy but they were unable to assist    CB# [phone]

## 2023-01-24 NOTE — TELEPHONE ENCOUNTER
Can you call the patient and schedule him to see me for Patrick education this Friday anytime from 12 to 2 or next Wednesday any time from 2-4? Thanks!

## 2023-01-26 ENCOUNTER — HOSPITAL ENCOUNTER (OUTPATIENT)
Dept: PHYSICAL THERAPY | Age: 63
Setting detail: THERAPIES SERIES
Discharge: HOME | End: 2023-01-26
Payer: COMMERCIAL

## 2023-01-26 PROCEDURE — 97110 THERAPEUTIC EXERCISES: CPT

## 2023-01-26 PROCEDURE — 97140 MANUAL THERAPY 1/> REGIONS: CPT

## 2023-01-26 NOTE — FLOWSHEET NOTE
[address] [place] [address]  Phone: [phone]   Fax: [phone]    Physical Therapy Daily Treatment Note    Date:  2023     Patient Name:  Ramiro Ndiaye    :  1960  MRN: [id number]  Medical Diagnosis:  Lumbago with sciatica, right side [M54.41]  Other chronic pain [G89.29]  Spinal stenosis, lumbar region without neurogenic claudication [M48.061]  Treatment Diagnosis:  RLE proximal hip muscle weakness, decrease lumbar mobility, core strength, lumbopelvic malalignment  Insurance/Certification information:  PT Insurance Information: [employer] 30 VPY  Physician Information:  Estrella Garcia MD    Plan of care signed (Y/N): []  Yes [x]  No     Date of Patient follow up with Physician:      Progress Report: []  Yes  [x]  No     Date Range for reporting period:  Beginnin2023  Ending:     Progress report due (10 Rx/or 30 days whichever is less): visit #87 or       Recertification due (POC duration/ or 90 days whichever is less): visit # or      Visit # Insurance Allowable Auth required? Date Range     [x]  Yes  []  No 23-3/17/23         Units approved Units used Date Range   48  -3/17     Latex Allergy:  [x]NO      []YES  Preferred Language for Healthcare:   [x]English       []other:    Functional Scale:       Date assessed:  NIKUNJ: raw score = 28; dysfunction = 62%  2023    Pain level:  8/10     SUBJECTIVE:  Patient reports that her pain is about the same .      OBJECTIVE: See eval  Observation:   Test measurements:      RESTRICTIONS/PRECAUTIONS: Chronic LBP, R RTC tear      Treatment based classification:    [] mobilization/manipulation   [x] stabilization   [] extension based   [x] flexion based   [] lateral shift   [x] traction   [] unspecified Components:   [] thoracolumbar   [x] pelvic   [] SIJ   [x] sacral   [] hip         Comparable sign: axel sign right hip    Exercises/Interventions:     Therapeutic Exercise (65883) Resistance / level Sets / Seconds Reps Notes / Cues              Rocking       Nustep warmup   5     IB  2/30\"     HSS  Hip flexion   2/20\"  2/20'     SKTC   Supine piriformis stretch  LTR  Iso hip abd   Iso hip add (ball)   Hooklying TrA contraction   Bridging   2/20'  2/20'  2/20\"        3'       10  10  15  10    Mini-squats    10                  Therapeutic Activities (36832)                                          Neuromuscular Re-ed (56590)                                          Manual Intervention ([phone])       Prone PA       GISTM/STM       Lumbar Manip       SI Manip       Hip belt mobs       Hip LA distraction-long axis distraction       Ball/Belt traction  9 min                      Modalities:     Pt. Education:  -pt educated on diagnosis, prognosis and expectations for rehab  -all pt questions were answered    Home Exercise Prorgam:  See above     Therapeutic Exercise and NMR EXR  [x] (19148) Provided verbal/tactile cueing for activities related to strengthening, flexibility, endurance, ROM  for improvements in proximal hip and core control with self care, mobility, lifting and ambulation.  [] (88222) Provided verbal/tactile cueing for activities related to improving balance, coordination, kinesthetic sense, posture, motor skill, proprioception  to assist with core control in self care, mobility, lifting, and ambulation.   [] (21197) Therapist is in constant attendance of 2 or more patients providing skilled therapy interventions, but not providing any significant amount of measurable one-on-one time to either patient, for improvements in LE, proximal hip, and core control in self care, mobility, lifting, ambulation and eccentric single leg control.      Therapeutic Activities:    [x] (60825 or 52202) Provided verbal/tactile cueing for activities related to improving balance, coordination, kinesthetic sense, posture, motor skill, proprioception and motor activation to allow for proper function  with self care and ADLs  [] (34839) Provided training and instruction to the patient for proper core and proximal hip recruitment and positioning with ambulation re-education     Home Exercise Program:    [x] (94056) Reviewed/Progressed HEP activities related to strengthening, flexibility, endurance, ROM of core, proximal hip and LE for functional self-care, mobility, lifting and ambulation   [] (66021) Reviewed/Progressed HEP activities related to improving balance, coordination, kinesthetic sense, posture, motor skill, proprioception of core, proximal hip and LE for self care, mobility, lifting, and ambulation      Manual Treatments:  PROM / STM / Oscillations-Mobs:  G-I, II, III, IV (PA's, Inf., Post.)  [x] (98477) Provided manual therapy to mobilize proximal hip and LS spine soft tissue/joints for the purpose of modulating pain, promoting relaxation,  increasing ROM, reducing/eliminating soft tissue swelling/inflammation/restriction, improving soft tissue extensibility and allowing for proper ROM for normal function with self care, mobility, lifting and ambulation. Charges:  Timed Code Treatment Minutes: 43   Total Treatment Minutes: 43       [] EVAL - LOW (19597)   [] EVAL - MOD (26114)  [] EVAL - HIGH (59095)  [] RE-EVAL (97670)  [x] WV(65105) x  2     [] Ionto  [] NMR (57151) x       [] Vaso  [x] Manual (62673) x  1    [] Ultrasound  [] TA x        [] Mech Traction (45719)  [] Aquatic Therapy x     [] ES (un) (36379):   [] Home Management Training x  [] ES(attended) (93094)   [] Dry Needling 1-2 muscles (05615):  [] Dry Needling 3+ muscles (414577  [] Group:      [] Other:     Goals:   Patient stated goal: To improve low back pain reducing radicular symptoms     [] Progressing: [] Met: [] Not Met: [] Adjusted     Therapist goals for Patient:   Short Term Goals: To be achieved in: 2 weeks  1. Independent in HEP and progression per patient tolerance, in order to prevent re-injury.    [] Progressing: [] Met: [] Not Met: [] Adjusted  2. Patient will have a decrease in pain  4/10 at worst to facilitate improvement in movement, function, and ADLs as indicated by Functional Deficits.  [] Progressing: [] Met: [] Not Met: [] Adjusted     Long Term Goals: To be achieved in:  6 weeks/ DC   1. Pt will improve NIKUNJ by 10 points to reduce disability and progress towards PLOF.   [] Progressing: [] Met: [] Not Met: [] Adjusted  2. Patient will demonstrate increased AROM to  lumbar flexion /SB by 5 deg to allow for proper joint functioning as indicated by patients Functional Deficits.   [] Progressing: [] Met: [] Not Met: [] Adjusted  3. Patient will demonstrate an increase in Strength right proximal hip  to +4/5  to allow for proper functional mobility as indicated by patients Functional Deficits.   [] Progressing: [] Met: [] Not Met: [] Adjusted  4. Patient will return to functional activities including standing and walking  without increased symptoms or restriction.   [] Progressing: [] Met: [] Not Met: [] Adjusted  5. Patient to be able to sleep through the night without interruption .   [] Progressing: [] Met: [] Not Met: [] Adjusted         Overall Progression Towards Functional goals/ Treatment Progress Update:  [] Patient is progressing as expected towards functional goals listed.    [] Progression is slowed due to complexities/Impairments listed.  [] Progression has been slowed due to co-morbidities.  [x] Plan just implemented, too soon to assess goals progression <30days   [] Goals require adjustment due to lack of progress  [] Patient is not progressing as expected and requires additional follow up with physician  [] Other    Persisting Functional Limitations/Impairments:  [x]Sitting [x]Standing   [x]Walking [x]Squatting/bending    [x]Stairs []ADL's    [x]Transfers []Reaching  [x]Housework []Job related tasks  []Driving []Sports/Recreation   [x]Sleeping [x]Other:    ASSESSMENT: The patient continued with flexion preference core  exercises . Added bridging and mini-squats to engage hips. Ended session again with ball/belt traction for spinal decompression. He will further benefit from PT addressing lumbar spine pain and centralizing symptoms as tolerated. The patient began core and hip muscle exercises with minimal discomfort. Instructed patient in hip stretching as tolerated and educated pt on the pathology of spinal stenosis. He received ball/belt traction for spinal decompression. Will monitor patient response to treatment . Patient to benefit from ongoing PT flexion preference exercises and hip strengthening as tolerated. Treatment/Activity Tolerance:  [] Patient able to complete tx  [] Patient limited by fatique  [] Patient limited by pain  [] Patient limited by other medical complications  [] Other:     Prognosis: [] Good [] Fair  [] Poor    Patient Requires Follow-up: [x] Yes  [] No    PLAN: See eval. PT 2x / week for 6 weeks. [x] Continue per plan of care [] Alter current plan (see comments)  [] Plan of care initiated [] Hold pending MD visit [] Discharge    Electronically signed by: Lennette Gilford, PT, DPT      Note: If patient does not return for scheduled/ recommended follow up visits, this note will serve as a discharge from care along with most recent update on progress.

## 2023-01-30 ENCOUNTER — TELEPHONE (OUTPATIENT)
Dept: PAIN MANAGEMENT | Age: 63
End: 2023-01-30

## 2023-01-30 NOTE — TELEPHONE ENCOUNTER
BENITA.   Per patient the pharmacy informed him there is a shortage on [address] [facility]. Patient only received 80 out of the 100 that was prescribe.

## 2023-01-31 ENCOUNTER — HOSPITAL ENCOUNTER (OUTPATIENT)
Dept: PHYSICAL THERAPY | Age: 63
Setting detail: THERAPIES SERIES
Discharge: HOME | End: 2023-01-31
Payer: COMMERCIAL

## 2023-01-31 PROCEDURE — 97140 MANUAL THERAPY 1/> REGIONS: CPT

## 2023-01-31 PROCEDURE — 97110 THERAPEUTIC EXERCISES: CPT

## 2023-01-31 NOTE — FLOWSHEET NOTE
[address] [facility]  Phone: [phone]   Fax: [phone]    Physical Therapy Daily Treatment Note    Date:  2023     Patient Name:  Bryn Sutton    :  1960  MRN: [id number]  Medical Diagnosis:  Lumbago with sciatica, right side [M54.41]  Other chronic pain [G89.29]  Spinal stenosis, lumbar region without neurogenic claudication [M48.061]  Treatment Diagnosis:  RLE proximal hip muscle weakness, decrease lumbar mobility, core strength, lumbopelvic malalignment  Insurance/Certification information:  PT Insurance Information: [employer] 30 VPY  Physician Information:  Shorty Martin MD    Plan of care signed (Y/N): []  Yes [x]  No     Date of Patient follow up with Physician:      Progress Report: []  Yes  [x]  No     Date Range for reporting period:  Beginnin2023  Ending:     Progress report due (10 Rx/or 30 days whichever is less): visit #85 or       Recertification due (POC duration/ or 90 days whichever is less): visit # or      Visit # Insurance Allowable Auth required? Date Range   1/1  3/12  [x]  Yes  []  No 23-3/17/23         Units approved Units used Date Range   48  -3/17     Latex Allergy:  [x]NO      []YES  Preferred Language for Healthcare:   [x]English       []other:    Functional Scale:       Date assessed:  NIKUNJ: raw score = 28; dysfunction = 62%  2023    Pain level:  8/10     SUBJECTIVE:  Patient reports that her pain is about the same .      OBJECTIVE: See eval  Observation:   Test measurements:      RESTRICTIONS/PRECAUTIONS: Chronic LBP, R RTC tear      Treatment based classification:    [] mobilization/manipulation   [x] stabilization   [] extension based   [x] flexion based   [] lateral shift   [x] traction   [] unspecified Components:   [] thoracolumbar   [x] pelvic   [] SIJ   [x] sacral   [] hip         Comparable sign: axel sign right hip    Exercises/Interventions:     Therapeutic Exercise (21633) Resistance / level Sets / Seconds Reps Notes / Cues              Rocking       Nustep warmup   5     IB  2/30\"     HSS  Hip flexion   2/20\"  2/20'     SKTC   Supine piriformis stretch  LTR  Iso hip abd   Iso hip add (ball)   Hooklying TrA contraction   Bridging   2/20'  2/20'  2/20\"        2/3'       15  10    Mini-squats    10                  Therapeutic Activities (17894)                                          Neuromuscular Re-ed (39647)                                          Manual Intervention (69582)       Prone PA       GISTM/STM       Lumbar Manip       SI Manip       Hip belt mobs       Hip LA distraction-long axis distraction 8 min      Ball/Belt traction                       Modalities:     Pt. Education:  -pt educated on diagnosis, prognosis and expectations for rehab  -all pt questions were answered    Home Exercise Prorgam:  See above     Therapeutic Exercise and NMR EXR  [x] (04385) Provided verbal/tactile cueing for activities related to strengthening, flexibility, endurance, ROM  for improvements in proximal hip and core control with self care, mobility, lifting and ambulation.  [] (98507) Provided verbal/tactile cueing for activities related to improving balance, coordination, kinesthetic sense, posture, motor skill, proprioception  to assist with core control in self care, mobility, lifting, and ambulation.   [] (28739) Therapist is in constant attendance of 2 or more patients providing skilled therapy interventions, but not providing any significant amount of measurable one-on-one time to either patient, for improvements in LE, proximal hip, and core control in self care, mobility, lifting, ambulation and eccentric single leg control.      Therapeutic Activities:    [x] (33685 or 32267) Provided verbal/tactile cueing for activities related to improving balance, coordination, kinesthetic sense, posture, motor skill, proprioception and motor activation to allow for proper function  with self care and ADLs  [] (29365) Provided training and instruction to the patient for proper core and proximal hip recruitment and positioning with ambulation re-education     Home Exercise Program:    [x] (59862) Reviewed/Progressed HEP activities related to strengthening, flexibility, endurance, ROM of core, proximal hip and LE for functional self-care, mobility, lifting and ambulation   [] (53257) Reviewed/Progressed HEP activities related to improving balance, coordination, kinesthetic sense, posture, motor skill, proprioception of core, proximal hip and LE for self care, mobility, lifting, and ambulation      Manual Treatments:  PROM / STM / Oscillations-Mobs:  G-I, II, III, IV (PA's, Inf., Post.)  [x] (76710) Provided manual therapy to mobilize proximal hip and LS spine soft tissue/joints for the purpose of modulating pain, promoting relaxation,  increasing ROM, reducing/eliminating soft tissue swelling/inflammation/restriction, improving soft tissue extensibility and allowing for proper ROM for normal function with self care, mobility, lifting and ambulation. Charges:  Timed Code Treatment Minutes: 40   Total Treatment Minutes: 40       [] EVAL - LOW (03891)   [] EVAL - MOD (59573)  [] EVAL - HIGH (03691)  [] RE-EVAL (90665)  [x] IO(09071) x  2     [] Ionto  [] NMR (12313) x       [] Vaso  [x] Manual (35736) x  1    [] Ultrasound  [] TA x        [] Mech Traction (47355)  [] Aquatic Therapy x     [] ES (un) (96712):   [] Home Management Training x  [] ES(attended) (69038)   [] Dry Needling 1-2 muscles (69836):  [] Dry Needling 3+ muscles (269052  [] Group:      [] Other:     Goals:   Patient stated goal: To improve low back pain reducing radicular symptoms     [] Progressing: [] Met: [] Not Met: [] Adjusted     Therapist goals for Patient:   Short Term Goals: To be achieved in: 2 weeks  1. Independent in HEP and progression per patient tolerance, in order to prevent re-injury.    [] Progressing: [] Met: [] Not Met: [] Adjusted  2. Patient will have a decrease in pain  4/10 at worst to facilitate improvement in movement, function, and ADLs as indicated by Functional Deficits. [] Progressing: [] Met: [] Not Met: [] Adjusted     Long Term Goals: To be achieved in:  6 weeks/ DC   1. Pt will improve NIKUNJ by 10 points to reduce disability and progress towards PLOF. [] Progressing: [] Met: [] Not Met: [] Adjusted  2. Patient will demonstrate increased AROM to  lumbar flexion /SB by 5 deg to allow for proper joint functioning as indicated by patients Functional Deficits. [] Progressing: [] Met: [] Not Met: [] Adjusted  3. Patient will demonstrate an increase in Strength right proximal hip  to +4/5  to allow for proper functional mobility as indicated by patients Functional Deficits. [] Progressing: [] Met: [] Not Met: [] Adjusted  4. Patient will return to functional activities including standing and walking  without increased symptoms or restriction. [] Progressing: [] Met: [] Not Met: [] Adjusted  5. Patient to be able to sleep through the night without interruption . [] Progressing: [] Met: [] Not Met: [] Adjusted         Overall Progression Towards Functional goals/ Treatment Progress Update:  [] Patient is progressing as expected towards functional goals listed. [] Progression is slowed due to complexities/Impairments listed. [] Progression has been slowed due to co-morbidities. [x] Plan just implemented, too soon to assess goals progression <30days   [] Goals require adjustment due to lack of progress  [] Patient is not progressing as expected and requires additional follow up with physician  [] Other    Persisting Functional Limitations/Impairments:  [x]Sitting [x]Standing   [x]Walking [x]Squatting/bending    [x]Stairs []ADL's    [x]Transfers []Reaching  [x]Housework []Job related tasks  []Driving []Sports/Recreation   [x]Sleeping [x]Other:    ASSESSMENT: The patient continued with core stability exercises. Emphasized stretching and nerve glides this date to reduce neurotension in right LE. Patient again received ball/belt traction. Will further progress core and hip strengthening as tolerated. The patient continued with flexion preference core exercises . Added bridging and mini-squats to engage hips. Ended session again with ball/belt traction for spinal decompression. He will further benefit from PT addressing lumbar spine pain and centralizing symptoms as tolerated. The patient began core and hip muscle exercises with minimal discomfort. Instructed patient in hip stretching as tolerated and educated pt on the pathology of spinal stenosis. He received ball/belt traction for spinal decompression. Will monitor patient response to treatment . Patient to benefit from ongoing PT flexion preference exercises and hip strengthening as tolerated. Treatment/Activity Tolerance:  [] Patient able to complete tx  [] Patient limited by fatique  [] Patient limited by pain  [] Patient limited by other medical complications  [] Other:     Prognosis: [] Good [] Fair  [] Poor    Patient Requires Follow-up: [x] Yes  [] No    PLAN: See eval. PT 2x / week for 6 weeks. [x] Continue per plan of care [] Alter current plan (see comments)  [] Plan of care initiated [] Hold pending MD visit [] Discharge    Electronically signed by: Ray Kay, PT, DPT      Note: If patient does not return for scheduled/ recommended follow up visits, this note will serve as a discharge from care along with most recent update on progress.

## 2023-02-01 ENCOUNTER — NURSE ONLY (OUTPATIENT)
Dept: ENDOCRINOLOGY | Age: 63
End: 2023-02-01

## 2023-02-01 DIAGNOSIS — E11.65 UNCONTROLLED TYPE 2 DIABETES MELLITUS WITH HYPERGLYCEMIA (HCC): Primary | ICD-10-CM

## 2023-02-02 ENCOUNTER — HOSPITAL ENCOUNTER (OUTPATIENT)
Dept: PHYSICAL THERAPY | Age: 63
Setting detail: THERAPIES SERIES
Discharge: HOME | End: 2023-02-02
Payer: COMMERCIAL

## 2023-02-02 PROCEDURE — 97110 THERAPEUTIC EXERCISES: CPT

## 2023-02-02 PROCEDURE — 97140 MANUAL THERAPY 1/> REGIONS: CPT

## 2023-02-02 PROCEDURE — 97112 NEUROMUSCULAR REEDUCATION: CPT

## 2023-02-02 NOTE — FLOWSHEET NOTE
[address] [place] [address]  Phone: [phone]   Fax: [phone]    Physical Therapy Daily Treatment Note    Date:  2023     Patient Name:  Bryn Sutton    :  1960  MRN: [id number]  Medical Diagnosis:  Lumbago with sciatica, right side [M54.41]  Other chronic pain [G89.29]  Spinal stenosis, lumbar region without neurogenic claudication [M48.061]  Treatment Diagnosis:  RLE proximal hip muscle weakness, decrease lumbar mobility, core strength, lumbopelvic malalignment  Insurance/Certification information:  PT Insurance Information: [employer] 30 VPY  Physician Information:  Shorty Martin MD    Plan of care signed (Y/N): []  Yes [x]  No     Date of Patient follow up with Physician:      Progress Report: []  Yes  [x]  No     Date Range for reporting period:  Beginnin2023  Ending:     Progress report due (10 Rx/or 30 days whichever is less): visit #52 or       Recertification due (POC duration/ or 90 days whichever is less): visit # or      Visit # Insurance Allowable Auth required? Date Range     [x]  Yes  []  No 23-3/17/23         Units approved Units used Date Range   48  -3/17     Latex Allergy:  [x]NO      []YES  Preferred Language for Healthcare:   [x]English       []other:    Functional Scale:       Date assessed:  NIKUNJ: raw score = 28; dysfunction = 62%  2023    Pain level:  8/10     SUBJECTIVE:  Patient reports  reports his pain is about 7/10 not as intense as before.       OBJECTIVE: See eval  Observation:   Test measurements:      RESTRICTIONS/PRECAUTIONS: Chronic LBP, R RTC tear      Treatment based classification:    [] mobilization/manipulation   [x] stabilization   [] extension based   [x] flexion based   [] lateral shift   [x] traction   [] unspecified Components:   [] thoracolumbar   [x] pelvic   [] SIJ   [x] sacral   [] hip         Comparable sign: axel sign right hip    Exercises/Interventions:     Therapeutic Exercise (23488) Resistance / level Sets / Seconds Reps Notes / Cues              Rocking       Nustep warmup   4     IB  2/30\"     HSS  Hip flexion   2/20\"  2/20'     SKTC   Supine piriformis stretch  LTR  Iso hip abd   Iso hip add (ball)   Hooklying TrA contraction   Bridging   2/20'  2/20'  2/20\"        2/3'       15  10    Mini-squats    10    TG   15           Therapeutic Activities (85344)                                          Neuromuscular Re-ed (73698)       Hip abd/exten with contralateral LE on Airex  2 10    Heel taps 4\" 2 10                         Manual Intervention (90085)       Prone PA       GISTM/STM       Lumbar Manip       SI Manip       Hip belt mobs       Hip LA distraction-long axis distraction      Ball/Belt traction  10 min                      Modalities:     Pt. Education:  -pt educated on diagnosis, prognosis and expectations for rehab  -all pt questions were answered    Home Exercise Prorgam:  See above     Therapeutic Exercise and NMR EXR  [x] (96272) Provided verbal/tactile cueing for activities related to strengthening, flexibility, endurance, ROM  for improvements in proximal hip and core control with self care, mobility, lifting and ambulation.  [] (05446) Provided verbal/tactile cueing for activities related to improving balance, coordination, kinesthetic sense, posture, motor skill, proprioception  to assist with core control in self care, mobility, lifting, and ambulation.   [] (66512) Therapist is in constant attendance of 2 or more patients providing skilled therapy interventions, but not providing any significant amount of measurable one-on-one time to either patient, for improvements in LE, proximal hip, and core control in self care, mobility, lifting, ambulation and eccentric single leg control.      Therapeutic Activities:    [x] (79348 or 54828) Provided verbal/tactile cueing for activities related to improving balance, coordination, kinesthetic sense, posture, motor skill, proprioception and motor activation to allow for proper function  with self care and ADLs  [] (37691) Provided training and instruction to the patient for proper core and proximal hip recruitment and positioning with ambulation re-education     Home Exercise Program:    [x] (24148) Reviewed/Progressed HEP activities related to strengthening, flexibility, endurance, ROM of core, proximal hip and LE for functional self-care, mobility, lifting and ambulation   [] (09081) Reviewed/Progressed HEP activities related to improving balance, coordination, kinesthetic sense, posture, motor skill, proprioception of core, proximal hip and LE for self care, mobility, lifting, and ambulation      Manual Treatments:  PROM / STM / Oscillations-Mobs:  G-I, II, III, IV (PA's, Inf., Post.)  [x] (71105) Provided manual therapy to mobilize proximal hip and LS spine soft tissue/joints for the purpose of modulating pain, promoting relaxation,  increasing ROM, reducing/eliminating soft tissue swelling/inflammation/restriction, improving soft tissue extensibility and allowing for proper ROM for normal function with self care, mobility, lifting and ambulation. Charges:  Timed Code Treatment Minutes: 50   Total Treatment Minutes: 50       [] EVAL - LOW (51295)   [] EVAL - MOD (55304)  [] EVAL - HIGH (58152)  [] RE-EVAL (89485)  [x] UE(25139) x  1     [] Ionto  [x] NMR (59442) x 1       [] Vaso  [x] Manual (35508) x  1    [] Ultrasound  [] TA x        [] Mech Traction (82792)  [] Aquatic Therapy x     [] ES (un) (02440):   [] Home Management Training x  [] ES(attended) (48137)   [] Dry Needling 1-2 muscles (21744):  [] Dry Needling 3+ muscles (971806  [] Group:      [] Other:     Goals:   Patient stated goal: To improve low back pain reducing radicular symptoms     [] Progressing: [] Met: [] Not Met: [] Adjusted     Therapist goals for Patient:   Short Term Goals: To be achieved in: 2 weeks  1.  Independent in HEP and progression per patient tolerance, in order to prevent re-injury. [] Progressing: [] Met: [] Not Met: [] Adjusted  2. Patient will have a decrease in pain  4/10 at worst to facilitate improvement in movement, function, and ADLs as indicated by Functional Deficits. [] Progressing: [] Met: [] Not Met: [] Adjusted     Long Term Goals: To be achieved in:  6 weeks/ DC   1. Pt will improve NIKUNJ by 10 points to reduce disability and progress towards PLOF. [] Progressing: [] Met: [] Not Met: [] Adjusted  2. Patient will demonstrate increased AROM to  lumbar flexion /SB by 5 deg to allow for proper joint functioning as indicated by patients Functional Deficits. [] Progressing: [] Met: [] Not Met: [] Adjusted  3. Patient will demonstrate an increase in Strength right proximal hip  to +4/5  to allow for proper functional mobility as indicated by patients Functional Deficits. [] Progressing: [] Met: [] Not Met: [] Adjusted  4. Patient will return to functional activities including standing and walking  without increased symptoms or restriction. [] Progressing: [] Met: [] Not Met: [] Adjusted  5. Patient to be able to sleep through the night without interruption . [] Progressing: [] Met: [] Not Met: [] Adjusted         Overall Progression Towards Functional goals/ Treatment Progress Update:  [] Patient is progressing as expected towards functional goals listed. [] Progression is slowed due to complexities/Impairments listed. [] Progression has been slowed due to co-morbidities.   [x] Plan just implemented, too soon to assess goals progression <30days   [] Goals require adjustment due to lack of progress  [] Patient is not progressing as expected and requires additional follow up with physician  [] Other    Persisting Functional Limitations/Impairments:  [x]Sitting [x]Standing   [x]Walking [x]Squatting/bending    [x]Stairs []ADL's    [x]Transfers []Reaching  [x]Housework []Job related tasks  []Driving []Sports/Recreation [x]Sleeping [x]Other:    ASSESSMENT:  The patient continued with core and  hip strengthening this date. The emphasis this date was on hip strengthening to support the spine. He did demonstrate weakness in B hips evident with exercises technique and fatigue. Patient again received ball/belt traction for spinal decompression. Will trial mechanical traction next sesssion. The patient continued with core stability exercises. Emphasized stretching and nerve glides this date to reduce neurotension in right LE. Patient again received ball/belt traction. Will further progress core and hip strengthening as tolerated. Treatment/Activity Tolerance:  [] Patient able to complete tx  [] Patient limited by fatique  [] Patient limited by pain  [] Patient limited by other medical complications  [] Other:     Prognosis: [] Good [] Fair  [] Poor    Patient Requires Follow-up: [x] Yes  [] No    PLAN: See renuka. PT 2x / week for 6 weeks. [x] Continue per plan of care [] Alter current plan (see comments)  [] Plan of care initiated [] Hold pending MD visit [] Discharge    Electronically signed by: Wyatt Mejia PT, DPT      Note: If patient does not return for scheduled/ recommended follow up visits, this note will serve as a discharge from care along with most recent update on progress.

## 2023-02-07 ENCOUNTER — HOSPITAL ENCOUNTER (OUTPATIENT)
Dept: PHYSICAL THERAPY | Age: 63
Setting detail: THERAPIES SERIES
Discharge: HOME | End: 2023-02-07
Payer: COMMERCIAL

## 2023-02-07 NOTE — FLOWSHEET NOTE
[address] [place] [address]     Physical Therapy  Cancellation/No-show Note  Patient Name:  Elissa Craven  :  1960   Date:  2023  Cancelled visits to date: 1  No-shows to date: 0    Patient status for today's appointment patient:  [x]  Cancelled  2023  []  Rescheduled appointment  []  No-show     Reason given by patient:  [x]  Patient ill   []  Conflicting appointment  []  No transportation    []  Conflict with work  []  No reason given  []  Other:     Comments:      Phone call information:   []  Phone call made today to patient at _ time at number provided:      []  Patient answered, conversation as follows:    []  Patient did not answer, message left as follows:  []  Phone call not made today  [x]  Phone call not needed - pt contacted us to cancel and provided reason for cancellation.      Electronically signed by:  Tresa Armenta PT

## 2023-02-09 ENCOUNTER — HOSPITAL ENCOUNTER (OUTPATIENT)
Dept: PHYSICAL THERAPY | Age: 63
Setting detail: THERAPIES SERIES
Discharge: HOME | End: 2023-02-09
Payer: COMMERCIAL

## 2023-02-09 NOTE — FLOWSHEET NOTE
[facility]     Physical Therapy  Cancellation/No-show Note  Patient Name:  Ene Marcos  :  1960   Date:  2023  Cancelled visits to date: 2  No-shows to date: 0    Patient status for today's appointment patient:  [x]  Cancelled  2023,   []  Rescheduled appointment  []  No-show     Reason given by patient:  [x]  Patient ill   []  Conflicting appointment  []  No transportation    []  Conflict with work  []  No reason given  []  Other:     Comments:      Phone call information:   []  Phone call made today to patient at _ time at number provided:      []  Patient answered, conversation as follows:    []  Patient did not answer, message left as follows:  []  Phone call not made today  [x]  Phone call not needed - pt contacted us to cancel and provided reason for cancellation.      Electronically signed by:  Adri Altman PT

## 2023-02-12 DIAGNOSIS — E11.65 UNCONTROLLED TYPE 2 DIABETES MELLITUS WITH HYPERGLYCEMIA (HCC): ICD-10-CM

## 2023-02-13 RX ORDER — METFORMIN HYDROCHLORIDE 500 MG/1
TABLET, EXTENDED RELEASE ORAL
Qty: 60 TABLET | Refills: 5 | Status: SHIPPED | OUTPATIENT
Start: 2023-02-13

## 2023-02-13 NOTE — TELEPHONE ENCOUNTER
Medication:   Requested Prescriptions     Pending Prescriptions Disp Refills    metFORMIN (GLUCOPHAGE-XR) 500 MG extended release tablet [Pharmacy Med Name: METFORMIN HCL  MG TABLET] 60 tablet 5     Sig: TAKE TWO TABLETS BY MOUTH DAILY WITH BREAKFAST       Last Filled:      Patient Phone Number: [phone] (home)     Last appt: 11/9/2022   Next appt: 3/16/23    Last Labs DM:   Lab Results   Component Value Date/Time    LABA1C 9.2 11/09/2022 11:05 AM

## 2023-02-17 ENCOUNTER — HOSPITAL ENCOUNTER (OUTPATIENT)
Dept: PHYSICAL THERAPY | Age: 63
Setting detail: THERAPIES SERIES
Discharge: HOME | End: 2023-02-17
Payer: COMMERCIAL

## 2023-02-17 PROCEDURE — 97140 MANUAL THERAPY 1/> REGIONS: CPT

## 2023-02-17 PROCEDURE — 97110 THERAPEUTIC EXERCISES: CPT

## 2023-02-17 PROCEDURE — 97112 NEUROMUSCULAR REEDUCATION: CPT

## 2023-02-17 NOTE — FLOWSHEET NOTE
[address] [place], [place] [address]  Phone: [phone]   Fax: [phone]    Physical Therapy Daily Treatment Note    Date:  2023     Patient Name:  Vikash Duran    :  1960  MRN: [id number]  Medical Diagnosis:  Lumbago with sciatica, right side [M54.41]  Other chronic pain [G89.29]  Spinal stenosis, lumbar region without neurogenic claudication [M48.061]  Treatment Diagnosis:  RLE proximal hip muscle weakness, decrease lumbar mobility, core strength, lumbopelvic malalignment  Insurance/Certification information:  PT Insurance Information: [employer] 30 VPY  Physician Information:  Felton Hunt MD    Plan of care signed (Y/N): []  Yes [x]  No     Date of Patient follow up with Physician:      Progress Report: []  Yes  [x]  No     Date Range for reporting period:  Beginnin2023  Ending:     Progress report due (10 Rx/or 30 days whichever is less): visit #68 or       Recertification due (POC duration/ or 90 days whichever is less): visit # or      Visit # Insurance Allowable Auth required? Date Range     [x]  Yes  []  No 23-3/17/23         Units approved Units used Date Range   48  -3/17     Latex Allergy:  [x]NO      []YES  Preferred Language for Healthcare:   [x]English       []other:    Functional Scale:       Date assessed:  NIKUNJ: raw score = 28; dysfunction = 62%  2023    Pain level:  8/10     SUBJECTIVE:  Patient reports that he is getting over his cold. States that his back has not been too bad .    OBJECTIVE: See eval  Observation:   Test measurements:      RESTRICTIONS/PRECAUTIONS: Chronic LBP, R RTC tear      Treatment based classification:    [] mobilization/manipulation   [x] stabilization   [] extension based   [x] flexion based   [] lateral shift   [x] traction   [] unspecified Components:   [] thoracolumbar   [x] pelvic   [] SIJ   [x] sacral   [] hip         Comparable sign: axel sign right hip    Exercises/Interventions:     Therapeutic Exercise (59318) Resistance / level Sets / Seconds Reps Notes / Cues              Rocking       Nustep warmup   4     IB  2/30\"     HSS  Hip flexion   2/20\"  2/20'     SKTC   Supine piriformis stretch  LTR  Iso hip abd   Iso hip add (ball)   Hooklying TrA contraction   Bridging              Green nerf ball 2/20'  2/20'  2/20\"        2/3'       10    Mini-squats    10    TG   15    SLR    10 B 2/17   Therapeutic Activities (37876)                                          Neuromuscular Re-ed (83062)       Hip abd/exten with contralateral LE on Airex  2 10    Heel taps 4\"                       Manual Intervention (20719)       Prone PA       GISTM/STM       Lumbar Manip       SI Manip       Hip belt mobs       Hip LA distraction-long axis distraction      Ball/Belt traction  10 min                      Modalities:     Pt. Education:  -pt educated on diagnosis, prognosis and expectations for rehab  -all pt questions were answered    Home Exercise Prorgam:  See above     Therapeutic Exercise and NMR EXR  [x] (44628) Provided verbal/tactile cueing for activities related to strengthening, flexibility, endurance, ROM  for improvements in proximal hip and core control with self care, mobility, lifting and ambulation.  [] (40391) Provided verbal/tactile cueing for activities related to improving balance, coordination, kinesthetic sense, posture, motor skill, proprioception  to assist with core control in self care, mobility, lifting, and ambulation.   [] (58280) Therapist is in constant attendance of 2 or more patients providing skilled therapy interventions, but not providing any significant amount of measurable one-on-one time to either patient, for improvements in LE, proximal hip, and core control in self care, mobility, lifting, ambulation and eccentric single leg control.     Therapeutic Activities:    [x] (68514 or 80714) Provided verbal/tactile cueing for activities related to improving balance,  coordination, kinesthetic sense, posture, motor skill, proprioception and motor activation to allow for proper function  with self care and ADLs  [] (35176) Provided training and instruction to the patient for proper core and proximal hip recruitment and positioning with ambulation re-education     Home Exercise Program:    [x] (35075) Reviewed/Progressed HEP activities related to strengthening, flexibility, endurance, ROM of core, proximal hip and LE for functional self-care, mobility, lifting and ambulation   [] (66369) Reviewed/Progressed HEP activities related to improving balance, coordination, kinesthetic sense, posture, motor skill, proprioception of core, proximal hip and LE for self care, mobility, lifting, and ambulation      Manual Treatments:  PROM / STM / Oscillations-Mobs:  G-I, II, III, IV (PA's, Inf., Post.)  [x] (86488) Provided manual therapy to mobilize proximal hip and LS spine soft tissue/joints for the purpose of modulating pain, promoting relaxation,  increasing ROM, reducing/eliminating soft tissue swelling/inflammation/restriction, improving soft tissue extensibility and allowing for proper ROM for normal function with self care, mobility, lifting and ambulation. Charges:  Timed Code Treatment Minutes: 42   Total Treatment Minutes: 42       [] EVAL - LOW (18204)   [] EVAL - MOD (93847)  [] EVAL - HIGH (20238)  [] RE-EVAL (35539)  [x] WZ(77323) x  1     [] Ionto  [x] NMR (62717) x 1       [] Vaso  [x] Manual (85868) x  1    [] Ultrasound  [] TA x        [] Mech Traction (90839)  [] Aquatic Therapy x     [] ES (un) (13470):   [] Home Management Training x  [] ES(attended) (80485)   [] Dry Needling 1-2 muscles (10027):  [] Dry Needling 3+ muscles (181248  [] Group:      [] Other:     Goals:   Patient stated goal: To improve low back pain reducing radicular symptoms     [] Progressing: [] Met: [] Not Met: [] Adjusted     Therapist goals for Patient:   Short Term Goals:  To be achieved in: 2 weeks  1. Independent in HEP and progression per patient tolerance, in order to prevent re-injury. [] Progressing: [] Met: [] Not Met: [] Adjusted  2. Patient will have a decrease in pain  4/10 at worst to facilitate improvement in movement, function, and ADLs as indicated by Functional Deficits. [] Progressing: [] Met: [] Not Met: [] Adjusted     Long Term Goals: To be achieved in:  6 weeks/ DC   1. Pt will improve NIKUNJ by 10 points to reduce disability and progress towards PLOF. [] Progressing: [] Met: [] Not Met: [] Adjusted  2. Patient will demonstrate increased AROM to  lumbar flexion /SB by 5 deg to allow for proper joint functioning as indicated by patients Functional Deficits. [] Progressing: [] Met: [] Not Met: [] Adjusted  3. Patient will demonstrate an increase in Strength right proximal hip  to +4/5  to allow for proper functional mobility as indicated by patients Functional Deficits. [] Progressing: [] Met: [] Not Met: [] Adjusted  4. Patient will return to functional activities including standing and walking  without increased symptoms or restriction. [] Progressing: [] Met: [] Not Met: [] Adjusted  5. Patient to be able to sleep through the night without interruption . [] Progressing: [] Met: [] Not Met: [] Adjusted         Overall Progression Towards Functional goals/ Treatment Progress Update:  [] Patient is progressing as expected towards functional goals listed. [] Progression is slowed due to complexities/Impairments listed. [] Progression has been slowed due to co-morbidities.   [x] Plan just implemented, too soon to assess goals progression <30days   [] Goals require adjustment due to lack of progress  [] Patient is not progressing as expected and requires additional follow up with physician  [] Other    Persisting Functional Limitations/Impairments:  [x]Sitting [x]Standing   [x]Walking [x]Squatting/bending    [x]Stairs []ADL's    [x]Transfers []Reaching  [x]Housework []Job related tasks  []Driving []Sports/Recreation   [x]Sleeping [x]Other:    ASSESSMENT:  Patient returned to PT after missing a week. He continued with core and hip strengthening. Added supine core training exercises above in bold. He received ball /belt traction with good response after treatment. Will continue with ongoing PT addressing core and hip mm weakness to support lumbar spine during daily activities. The patient continued with core and  hip strengthening this date. The emphasis this date was on hip strengthening to support the spine. He did demonstrate weakness in B hips evident with exercises technique and fatigue. Patient again received ball/belt traction for spinal decompression. Will trial mechanical traction next sesssion. The patient continued with core stability exercises. Emphasized stretching and nerve glides this date to reduce neurotension in right LE. Patient again received ball/belt traction. Will further progress core and hip strengthening as tolerated. Treatment/Activity Tolerance:  [] Patient able to complete tx  [] Patient limited by fatique  [] Patient limited by pain  [] Patient limited by other medical complications  [] Other:     Prognosis: [] Good [] Fair  [] Poor    Patient Requires Follow-up: [x] Yes  [] No    PLAN: See eval. PT 2x / week for 6 weeks. [x] Continue per plan of care [] Alter current plan (see comments)  [] Plan of care initiated [] Hold pending MD visit [] Discharge    Electronically signed by: Nargis Downs PT, DPT      Note: If patient does not return for scheduled/ recommended follow up visits, this note will serve as a discharge from care along with most recent update on progress.

## 2023-02-20 ENCOUNTER — OFFICE VISIT (OUTPATIENT)
Dept: PAIN MANAGEMENT | Age: 63
End: 2023-02-20
Payer: COMMERCIAL

## 2023-02-20 VITALS
BODY MASS INDEX: 32.33 KG/M2 | WEIGHT: 206 LBS | DIASTOLIC BLOOD PRESSURE: 77 MMHG | OXYGEN SATURATION: 96 % | HEIGHT: 67 IN | SYSTOLIC BLOOD PRESSURE: 122 MMHG | HEART RATE: 92 BPM

## 2023-02-20 DIAGNOSIS — M79.7 FIBROMYALGIA: ICD-10-CM

## 2023-02-20 DIAGNOSIS — G89.4 CHRONIC PAIN SYNDROME: ICD-10-CM

## 2023-02-20 DIAGNOSIS — M79.18 MYOFASCIAL PAIN: ICD-10-CM

## 2023-02-20 DIAGNOSIS — M25.512 CHRONIC LEFT SHOULDER PAIN: ICD-10-CM

## 2023-02-20 DIAGNOSIS — M51.36 DDD (DEGENERATIVE DISC DISEASE), LUMBAR: ICD-10-CM

## 2023-02-20 DIAGNOSIS — M54.16 LUMBAR RADICULOPATHY: ICD-10-CM

## 2023-02-20 DIAGNOSIS — M47.817 LUMBOSACRAL SPONDYLOSIS WITHOUT MYELOPATHY: ICD-10-CM

## 2023-02-20 DIAGNOSIS — M16.12 PRIMARY OSTEOARTHRITIS OF LEFT HIP: ICD-10-CM

## 2023-02-20 DIAGNOSIS — G89.29 CHRONIC LEFT SHOULDER PAIN: ICD-10-CM

## 2023-02-20 PROCEDURE — 3017F COLORECTAL CA SCREEN DOC REV: CPT | Performed by: INTERNAL MEDICINE

## 2023-02-20 PROCEDURE — 1036F TOBACCO NON-USER: CPT | Performed by: INTERNAL MEDICINE

## 2023-02-20 PROCEDURE — 99213 OFFICE O/P EST LOW 20 MIN: CPT | Performed by: INTERNAL MEDICINE

## 2023-02-20 PROCEDURE — G8417 CALC BMI ABV UP PARAM F/U: HCPCS | Performed by: INTERNAL MEDICINE

## 2023-02-20 PROCEDURE — G8427 DOCREV CUR MEDS BY ELIG CLIN: HCPCS | Performed by: INTERNAL MEDICINE

## 2023-02-20 PROCEDURE — G8484 FLU IMMUNIZE NO ADMIN: HCPCS | Performed by: INTERNAL MEDICINE

## 2023-02-20 RX ORDER — NORTRIPTYLINE HYDROCHLORIDE 25 MG/1
25-50 CAPSULE ORAL NIGHTLY
Qty: 60 CAPSULE | Refills: 0 | OUTPATIENT
Start: 2023-02-20

## 2023-02-20 RX ORDER — HYDROCODONE BITARTRATE AND ACETAMINOPHEN 5; 325 MG/1; MG/1
1 TABLET ORAL EVERY 6 HOURS PRN
Qty: 120 TABLET | Refills: 0 | Status: SHIPPED | OUTPATIENT
Start: 2023-02-20 | End: 2023-03-27

## 2023-02-20 RX ORDER — METHOCARBAMOL 500 MG/1
500 TABLET, FILM COATED ORAL 2 TIMES DAILY PRN
Qty: 60 TABLET | Refills: 1 | Status: SHIPPED | OUTPATIENT
Start: 2023-02-20

## 2023-02-20 RX ORDER — NORTRIPTYLINE HYDROCHLORIDE 25 MG/1
25-50 CAPSULE ORAL NIGHTLY
Qty: 60 CAPSULE | Refills: 0 | Status: SHIPPED | OUTPATIENT
Start: 2023-02-20

## 2023-02-20 RX ORDER — MELOXICAM 15 MG/1
TABLET ORAL
Qty: 30 TABLET | Refills: 1 | Status: SHIPPED | OUTPATIENT
Start: 2023-02-20

## 2023-02-20 RX ORDER — GABAPENTIN 300 MG/1
CAPSULE ORAL
Qty: 120 CAPSULE | Refills: 1 | Status: SHIPPED | OUTPATIENT
Start: 2023-02-20 | End: 2023-03-27

## 2023-02-20 NOTE — PROGRESS NOTES
Usha Mishra  1960  [id number]      HISTORY OF PRESENT ILLNESS:  Mr. Jamison Toscano is a 58 y.o. male returns for a follow up visit for pain management  He has a diagnosis of   1. Lumbar radiculopathy    2. Primary osteoarthritis of left hip    3. Chronic pain syndrome    4. Myofascial pain    5. Fibromyalgia    6. Chronic left shoulder pain    7. DDD (degenerative disc disease), lumbar    8. Lumbosacral spondylosis without myelopathy    . He complains of pain in the  lower back, right knee with radiation to the right hip, right leg, right ankle, right foot  He rates the pain 8/10 and describes it as aching, burning. Current treatment regimen has helped relieve about 40% of the pain. He denies any side effects from the current pain regimen. Patient reports that since the last follow up visit the physical functioning is unchanged, family/social relationships are unchanged, mood is unchanged sleep patterns are worse, and that the overall functioning is unchanged. Patient denies misusing/abusing his narcotic pain medications or using any illegal drugs. There are No indicators for possible drug abuse, addiction or diversion problems. Patient states he has been doing fair, he states Physical therapy is helping with the sciatics. Mr. Jamison Toscano states every bone in the body is hurting. Patient denies any constipation symptoms. He reports he is working full time still. Patient reports his weight has been stable. ALLERGIES: Patients list of allergies were reviewed     MEDICATIONS: Mr. Jamison Toscano list of medications were reviewed. His current medications are   Outpatient Medications Prior to Visit   Medication Sig Dispense Refill    metFORMIN (GLUCOPHAGE-XR) 500 MG extended release tablet TAKE TWO TABLETS BY MOUTH DAILY WITH BREAKFAST 60 tablet 5    HYDROcodone-acetaminophen (NORCO) 5-325 MG per tablet Take 1 tablet by mouth every 6 hours as needed for Pain (max 3-4 day) for up to 28 days.  Max Daily Amount: 4 tablets 100 tablet 0    methocarbamol (ROBAXIN) 500 MG tablet Take 1 tablet by mouth 2 times daily as needed (muscle stiffness) 60 tablet 1    meloxicam (MOBIC) 15 MG tablet Take 1 tablet po every Monday,Wednesday, and Friday 30 tablet 1    gabapentin (NEURONTIN) 300 MG capsule Take 1 capsule po in the morning, 1 capsule in afternoon and take 2 capsules po in the evening 120 capsule 1    nortriptyline (PAMELOR) 25 MG capsule Take 1-2 capsules by mouth nightly 60 capsule 0    diclofenac sodium (VOLTAREN) 1 % GEL Apply 2-4 grams to affected area  g 3    ARIPiprazole (ABILIFY) 10 MG tablet       carvedilol (COREG) 3.125 MG tablet TAKE 1 TABLET BY MOUTH TWICE A DAY WITH MEALS      KLOR-CON M20 20 MEQ extended release tablet TAKE 1 TABLET BY MOUTH EVERY DAY      busPIRone (BUSPAR) 10 MG tablet TAKE 1 TABLET BY MOUTH THREE TIMES A DAY      amLODIPine (NORVASC) 10 MG tablet TAKE 1 TABLET BY MOUTH EVERY DAY      blood glucose test strips (FREESTYLE LITE) strip USE TO TEST FOUR TIMES A  strip 0    Dulaglutide (TRULICITY) 3 LS/1.4ZF SOPN Inject 3 mg into the skin once a week 4 Adjustable Dose Pre-filled Pen Syringe 3    FreeStyle Lancets MISC USE AS DIRECTED 100 each 3    insulin aspart (NOVOLOG FLEXPEN) 100 UNIT/ML injection pen 21-27  units AC TID 10 Adjustable Dose Pre-filled Pen Syringe 3    insulin glargine (LANTUS SOLOSTAR) 100 UNIT/ML injection pen INJECT 46 UNITS UNDER THE SKIN DAILY 5 Adjustable Dose Pre-filled Pen Syringe 3    Insulin Pen Needle 32G X 4 MM MISC 1 each by Does not apply route 4 times daily 120 each 0    Continuous Blood Gluc  (FREESTYLE MANE 2 READER) RO As needed 1 each 0    Continuous Blood Gluc Sensor (FREESTYLE MANE 2 SENSOR) MISC Every 2 weeks 2 each 3    Blood Glucose Monitoring Suppl (FREESTYLE LITE) RO As needed 1 each 1    ONE TOUCH ULTRASOFT LANCETS MISC 1 each by Does not apply route 4 times daily 100 each 6    FreeStyle Lancets MISC USE FOUR TIMES A  each 0 spironolactone (ALDACTONE) 25 MG tablet Take 25 mg by mouth daily      mupirocin (BACTROBAN) 2 % ointment Apply 22 g topically 3 times daily Apply topically 3 times daily. Blood Glucose Monitoring Suppl (FREESTYLE LITE) RO As needed 1 each 0    lisinopril (PRINIVIL;ZESTRIL) 30 MG tablet Take 30 mg by mouth daily      Insulin Pen Needle (PEN NEEDLES) 31G X 6 MM MISC 1 each by In Vitro route 4 times daily 200 each 3    omeprazole (PRILOSEC) 20 MG delayed release capsule TAKE 1 CAPSULE BY MOUTH EVERY DAY      Cholecalciferol (VITAMIN D PO) Take 1 tablet by mouth every 7 days Pt to clarify dose      furosemide (LASIX) 20 MG tablet Take 40 mg by mouth daily       Fluticasone Propionate (FLONASE NA) 1 spray by Nasal route daily Each nostril      SALINE MIST SPRAY NA 1 spray by Nasal route 3 times daily as needed Each nostril 2-3 times per day      Lancets MISC 1 each by Does not apply route 3 times daily 100 each 3    MELATONIN PO Take 1 tablet by mouth nightly       loratadine (CLARITIN) 10 MG tablet Take 10 mg by mouth daily       atorvastatin (LIPITOR) 80 MG tablet Take 80 mg by mouth daily. methylPREDNISolone (MEDROL, VIPUL,) 4 MG tablet Use as directed 1 kit 0     No facility-administered medications prior to visit. REVIEW OF SYSTEMS:    Respiratory: Negative for apnea, chest tightness and shortness of breath or change in baseline breathing. PHYSICAL EXAM:   Nursing note and vitals reviewed. /77   Pulse 92   Ht 5' 7\" (1.702 m)   Wt 206 lb (93.4 kg)   SpO2 96%   BMI 32.26 kg/m²   Constitutional: He appears well-developed and well-nourished. No acute distress. Cardiovascular: Normal rate, regular rhythm, normal heart sounds, and does not have murmur. Pulmonary/Chest: Effort normal. No respiratory distress. He does not have wheezes in the lung fields. He has no rales. Neurological/Psychiatric:He is alert and oriented to person, place, and time.  Coordination is  normal.  His mood isAppropriate and affect is Neutral/Euthymic(normal) . His    IMPRESSION:   1. Lumbar radiculopathy    2. Primary osteoarthritis of left hip    3. Chronic pain syndrome    4. Myofascial pain    5. Fibromyalgia    6. Chronic left shoulder pain    7. DDD (degenerative disc disease), lumbar    8. Lumbosacral spondylosis without myelopathy        PLAN:  Informed verbal consent was obtained  -ROM/Stretching exercises as advised   -He was advised to increase fluids ( 5-7  glasses of fluid daily), limit caffeine, avoid cheese products, increase dietary fiber, increase activity and exercise as tolerated and relax regularly and enjoy meals   -Continue with Norco 3-4 per day  -Continue with all other adjuvant medications as before    Current Outpatient Medications   Medication Sig Dispense Refill    metFORMIN (GLUCOPHAGE-XR) 500 MG extended release tablet TAKE TWO TABLETS BY MOUTH DAILY WITH BREAKFAST 60 tablet 5    HYDROcodone-acetaminophen (NORCO) 5-325 MG per tablet Take 1 tablet by mouth every 6 hours as needed for Pain (max 3-4 day) for up to 28 days. Max Daily Amount: 4 tablets 100 tablet 0    methocarbamol (ROBAXIN) 500 MG tablet Take 1 tablet by mouth 2 times daily as needed (muscle stiffness) 60 tablet 1    meloxicam (MOBIC) 15 MG tablet Take 1 tablet po every Monday,Wednesday, and Friday 30 tablet 1    gabapentin (NEURONTIN) 300 MG capsule Take 1 capsule po in the morning, 1 capsule in afternoon and take 2 capsules po in the evening 120 capsule 1    nortriptyline (PAMELOR) 25 MG capsule Take 1-2 capsules by mouth nightly 60 capsule 0    diclofenac sodium (VOLTAREN) 1 % GEL Apply 2-4 grams to affected area  g 3    ARIPiprazole (ABILIFY) 10 MG tablet       carvedilol (COREG) 3.125 MG tablet TAKE 1 TABLET BY MOUTH TWICE A DAY WITH MEALS      KLOR-CON M20 20 MEQ extended release tablet TAKE 1 TABLET BY MOUTH EVERY DAY      busPIRone (BUSPAR) 10 MG tablet TAKE 1 TABLET BY MOUTH THREE TIMES A DAY       amLODIPine (NORVASC) 10 MG tablet TAKE 1 TABLET BY MOUTH EVERY DAY      blood glucose test strips (FREESTYLE LITE) strip USE TO TEST FOUR TIMES A  strip 0    Dulaglutide (TRULICITY) 3 VJ/7.4MF SOPN Inject 3 mg into the skin once a week 4 Adjustable Dose Pre-filled Pen Syringe 3    FreeStyle Lancets MISC USE AS DIRECTED 100 each 3    insulin aspart (NOVOLOG FLEXPEN) 100 UNIT/ML injection pen 21-27  units AC TID 10 Adjustable Dose Pre-filled Pen Syringe 3    insulin glargine (LANTUS SOLOSTAR) 100 UNIT/ML injection pen INJECT 46 UNITS UNDER THE SKIN DAILY 5 Adjustable Dose Pre-filled Pen Syringe 3    Insulin Pen Needle 32G X 4 MM MISC 1 each by Does not apply route 4 times daily 120 each 0    Continuous Blood Gluc  (FREESTYLE MANE 2 READER) RO As needed 1 each 0    Continuous Blood Gluc Sensor (FREESTYLE MANE 2 SENSOR) MISC Every 2 weeks 2 each 3    Blood Glucose Monitoring Suppl (FREESTYLE LITE) RO As needed 1 each 1    ONE TOUCH ULTRASOFT LANCETS MISC 1 each by Does not apply route 4 times daily 100 each 6    FreeStyle Lancets MISC USE FOUR TIMES A  each 0    spironolactone (ALDACTONE) 25 MG tablet Take 25 mg by mouth daily      mupirocin (BACTROBAN) 2 % ointment Apply 22 g topically 3 times daily Apply topically 3 times daily.       Blood Glucose Monitoring Suppl (FREESTYLE LITE) RO As needed 1 each 0    lisinopril (PRINIVIL;ZESTRIL) 30 MG tablet Take 30 mg by mouth daily      Insulin Pen Needle (PEN NEEDLES) 31G X 6 MM MISC 1 each by In Vitro route 4 times daily 200 each 3    omeprazole (PRILOSEC) 20 MG delayed release capsule TAKE 1 CAPSULE BY MOUTH EVERY DAY      Cholecalciferol (VITAMIN D PO) Take 1 tablet by mouth every 7 days Pt to clarify dose      furosemide (LASIX) 20 MG tablet Take 40 mg by mouth daily       Fluticasone Propionate (FLONASE NA) 1 spray by Nasal route daily Each nostril      SALINE MIST SPRAY NA 1 spray by Nasal route 3 times daily as needed Each nostril 2-3 times per day      Lancets MISC 1 each by Does not apply route 3 times daily 100 each 3    MELATONIN PO Take 1 tablet by mouth nightly       loratadine (CLARITIN) 10 MG tablet Take 10 mg by mouth daily       atorvastatin (LIPITOR) 80 MG tablet Take 80 mg by mouth daily. No current facility-administered medications for this visit. I will continue his current medication regimen  which is part of the above treatment schedule. It has been helping with Mr. Joshua Hill chronic  medical problems which for this visit include:   Diagnoses of Lumbar radiculopathy, Primary osteoarthritis of left hip, Chronic pain syndrome, Myofascial pain, Fibromyalgia, Chronic left shoulder pain, DDD (degenerative disc disease), lumbar, and Lumbosacral spondylosis without myelopathy were pertinent to this visit. Risks and benefits of the medications and other alternative treatments  including no treatment were discussed with the patient. The common side effects of these medications were also explained to the patient. Informed verbal consent was obtained. Goals of current treatment regimen include improvement in pain, restoration of functioning- with focus on improvement in physical performance, general activity, work or disability,emotional distress, health care utilization and  decreased medication consumption. Will continue to monitor progress towards achieving/maintaining therapeutic goals with special emphasis on  1. Improvement in perceived interfernce  of pain with ADL's. Ability to do home exercises independently. Ability to do household chores indoor and/or outdoor work and social and leisure activities. Improve psychosocial and physical functioning. - he is showing progression towards this treatment goal with the current regimen.      He was advised against drinking alcohol with the narcotic pain medicines, advised against driving or handling machinery while adjusting the dose of medicines or if having cognitive  issues related to the current medications. Risk of overdose and death, if medicines not taken as prescribed, were also discussed. If the patient develops new symptoms or if the symptoms worsen, the patient should call the office. While transcribing every attempt was made to maintain the accuracy of the note in terms of it's contents,there may have been some errors made inadvertently. Thank you for allowing me to participate in the care of this patient.     Tesha Jensen MD.    Cc: Prema Hennessy MD

## 2023-02-21 ENCOUNTER — HOSPITAL ENCOUNTER (OUTPATIENT)
Dept: PHYSICAL THERAPY | Age: 63
Setting detail: THERAPIES SERIES
Discharge: HOME | End: 2023-02-21
Payer: COMMERCIAL

## 2023-02-21 PROCEDURE — 97140 MANUAL THERAPY 1/> REGIONS: CPT

## 2023-02-21 PROCEDURE — 97112 NEUROMUSCULAR REEDUCATION: CPT

## 2023-02-21 PROCEDURE — 97110 THERAPEUTIC EXERCISES: CPT

## 2023-02-21 NOTE — PROGRESS NOTES
[address] [place] [address]  Phone: [phone]   Fax: [phone]    Physical Therapy Daily Treatment Note    Date:  2023     Patient Name:  Bryn Sutton    :  1960  MRN: [id number]  Medical Diagnosis:  Lumbago with sciatica, right side [M54.41]  Other chronic pain [G89.29]  Spinal stenosis, lumbar region without neurogenic claudication [M48.061]  Treatment Diagnosis:  RLE proximal hip muscle weakness, decrease lumbar mobility, core strength, lumbopelvic malalignment  Insurance/Certification information:  PT Insurance Information: [employer] VPY  Physician Information:  Shorty Martin MD    Plan of care signed (Y/N): []  Yes [x]  No     Date of Patient follow up with Physician:      Progress Report: []  Yes  [x]  No     Date Range for reporting period:  Beginnin2023  Ending:     Progress report due (10 Rx/or 30 days whichever is less): visit #56 or       Recertification due (POC duration/ or 90 days whichever is less): visit # or      Visit # Insurance Allowable Auth required? Date Range     [x]  Yes  []  No 23-3/17/23         Units approved Units used Date Range   48 15 -3/17     Latex Allergy:  [x]NO      []YES  Preferred Language for Healthcare:   [x]English       []other:    Functional Scale:       Date assessed:  NIKUNJ: raw score = 28; dysfunction = 62%  2023  NIKUNJ: raw score = 32; dysfunction =64 %                2023  Pain level:  8/10     SUBJECTIVE:  Patient reports that is having more stiffness in his low back.      OBJECTIVE:   Observation:      Comments   Lumbar Flex 85     Lumbar Ext 22        ROM LEFT RIGHT Comments   Lumbar Side Bend 19 @hip 20  no radicular symptoms Tension  not so much pain; no radicular symptoms     Test measurements:      RESTRICTIONS/PRECAUTIONS: Chronic LBP, R RTC tear      Treatment based classification:    [] mobilization/manipulation   [x] stabilization   [] extension based   [x] flexion based   [] lateral shift   [x] traction   [] unspecified Components:   [] thoracolumbar   [x] pelvic   [] SIJ   [x] sacral   [] hip         Comparable sign: axel sign right hip    Exercises/Interventions:     Therapeutic Exercise (60901) Resistance / level Sets / Seconds Reps Notes / Cues              Rocking       Nustep warmup / Bike  4     IB  2/30\"     HSS  Hip flexion   2/20\"  2/20'     SKTC   Supine piriformis stretch  LTR  Iso hip abd   Iso hip add (ball)   Hooklying TrA contraction   Bridging              Green nerf ball 2/20'  2/20'  2/20\"        2/3'       10    Mini-squats       TG   15    SLR   2 10 B 2/17   Therapeutic Activities (73654)                                          Neuromuscular Re-ed (08646)       Hip abd/exten with contralateral LE on Airex  2 10    Heel taps 4\"    Top table modified plantigrade hip extension   2 10                  Manual Intervention (76259)       Prone PA       GISTM/STM       Lumbar Manip       SI Manip       Hip belt mobs       Hip LA distraction-long axis distraction, sidelying lumbar roll/gapping B 10 min      Ball/Belt traction                       Modalities:     Pt. Education:  -pt educated on diagnosis, prognosis and expectations for rehab  -all pt questions were answered    Home Exercise Prorgam:  See above     Therapeutic Exercise and NMR EXR  [x] (68847) Provided verbal/tactile cueing for activities related to strengthening, flexibility, endurance, ROM  for improvements in proximal hip and core control with self care, mobility, lifting and ambulation.  [] (23097) Provided verbal/tactile cueing for activities related to improving balance, coordination, kinesthetic sense, posture, motor skill, proprioception  to assist with core control in self care, mobility, lifting, and ambulation.   [] (38514) Therapist is in constant attendance of 2 or more patients providing skilled therapy interventions, but not providing any significant amount of measurable one-on-one time to either patient, for improvements in LE, proximal hip, and core control in self care, mobility, lifting, ambulation and eccentric single leg control. Therapeutic Activities:    [x] (00390 or 69913) Provided verbal/tactile cueing for activities related to improving balance, coordination, kinesthetic sense, posture, motor skill, proprioception and motor activation to allow for proper function  with self care and ADLs  [] (31653) Provided training and instruction to the patient for proper core and proximal hip recruitment and positioning with ambulation re-education     Home Exercise Program:    [x] (12496) Reviewed/Progressed HEP activities related to strengthening, flexibility, endurance, ROM of core, proximal hip and LE for functional self-care, mobility, lifting and ambulation   [] (82445) Reviewed/Progressed HEP activities related to improving balance, coordination, kinesthetic sense, posture, motor skill, proprioception of core, proximal hip and LE for self care, mobility, lifting, and ambulation      Manual Treatments:  PROM / STM / Oscillations-Mobs:  G-I, II, III, IV (PA's, Inf., Post.)  [x] (48590) Provided manual therapy to mobilize proximal hip and LS spine soft tissue/joints for the purpose of modulating pain, promoting relaxation,  increasing ROM, reducing/eliminating soft tissue swelling/inflammation/restriction, improving soft tissue extensibility and allowing for proper ROM for normal function with self care, mobility, lifting and ambulation.        Charges:  Timed Code Treatment Minutes: 40   Total Treatment Minutes: 40       [] EVAL - LOW (47198)   [] EVAL - MOD (58321)  [] EVAL - HIGH (89959)  [] RE-EVAL (98287)  [x] HU(38312) x  1     [] Ionto  [x] NMR (13771) x 1       [] Vaso  [x] Manual (49592) x  1    [] Ultrasound  [] TA x        [] Mech Traction (16568)  [] Aquatic Therapy x     [] ES (un) (17704):   [] Home Management Training x  [] ES(attended) (19801)   [] Dry Needling 1-2 muscles (96570):  [] Dry Needling 3+ muscles (750226  [] Group:      [] Other:     Goals:   Patient stated goal: To improve low back pain reducing radicular symptoms     [] Progressing: [] Met: [] Not Met: [] Adjusted     Therapist goals for Patient:   Short Term Goals: To be achieved in: 2 weeks  1. Independent in HEP and progression per patient tolerance, in order to prevent re-injury. [] Progressing: [] Met: [] Not Met: [] Adjusted  2. Patient will have a decrease in pain  4/10 at worst to facilitate improvement in movement, function, and ADLs as indicated by Functional Deficits. [] Progressing: [] Met: [] Not Met: [] Adjusted     Long Term Goals: To be achieved in:  6 weeks/ DC   1. Pt will improve NIKUNJ by 10 points to reduce disability and progress towards PLOF. [] Progressing: [] Met: [] Not Met: [] Adjusted  2. Patient will demonstrate increased AROM to  lumbar flexion /SB by 5 deg to allow for proper joint functioning as indicated by patients Functional Deficits. [] Progressing: [] Met: [] Not Met: [] Adjusted  3. Patient will demonstrate an increase in Strength right proximal hip  to +4/5  to allow for proper functional mobility as indicated by patients Functional Deficits. [] Progressing: [] Met: [] Not Met: [] Adjusted  4. Patient will return to functional activities including standing and walking  without increased symptoms or restriction. [] Progressing: [] Met: [] Not Met: [] Adjusted  5. Patient to be able to sleep through the night without interruption . [] Progressing: [] Met: [] Not Met: [] Adjusted         Overall Progression Towards Functional goals/ Treatment Progress Update:  [] Patient is progressing as expected towards functional goals listed. [] Progression is slowed due to complexities/Impairments listed. [] Progression has been slowed due to co-morbidities.   [x] Plan just implemented, too soon to assess goals progression <30days   [] Goals require adjustment due to lack of progress  [] Patient is not progressing as expected and requires additional follow up with physician  [] Other    Persisting Functional Limitations/Impairments:  [x]Sitting [x]Standing   [x]Walking [x]Squatting/bending    [x]Stairs []ADL's    [x]Transfers []Reaching  [x]Housework []Job related tasks  []Driving []Sports/Recreation   [x]Sleeping [x]Other:    ASSESSMENT:  Patient continued with core and hip strengthening. Increased reps with hip strengthening as tolerated. He received manual intervention for stiffness to reduce in lumbar spine ; long axis distraction and sidelying gapping. Improvement was seen with lumbar flexion/SB/ extension. Though his outcome didn't reflect his improvement pt objectively seen pain reduction overall. Patient will decide to continue therapy reduce frequency as needed. Patient returned to PT after missing a week. He continued with core and hip strengthening. Added supine core training exercises above in bold. He received ball /belt traction with good response after treatment. Will continue with ongoing PT addressing core and hip mm weakness to support lumbar spine during daily activities. Treatment/Activity Tolerance:  [] Patient able to complete tx  [] Patient limited by fatique  [] Patient limited by pain  [] Patient limited by other medical complications  [] Other:     Prognosis: [] Good [] Fair  [] Poor    Patient Requires Follow-up: [x] Yes  [] No    PLAN: See eval. PT 2x / week for 6 weeks. [x] Continue per plan of care [] Alter current plan (see comments)  [] Plan of care initiated [] Hold pending MD visit [] Discharge    Electronically signed by: Lilibeth Patterson PT, DPT      Note: If patient does not return for scheduled/ recommended follow up visits, this note will serve as a discharge from care along with most recent update on progress.

## 2023-02-23 ENCOUNTER — HOSPITAL ENCOUNTER (OUTPATIENT)
Dept: PHYSICAL THERAPY | Age: 63
Setting detail: THERAPIES SERIES
Discharge: HOME | End: 2023-02-23
Payer: COMMERCIAL

## 2023-02-23 PROCEDURE — 97110 THERAPEUTIC EXERCISES: CPT

## 2023-02-23 PROCEDURE — 97012 MECHANICAL TRACTION THERAPY: CPT

## 2023-02-23 NOTE — FLOWSHEET NOTE
[address] [place] [address]  Phone: [phone]   Fax: [phone]    Physical Therapy Daily Treatment Note    Date:  2023     Patient Name:  Blane Kim    :  1960  MRN: [id number]  Medical Diagnosis:  Lumbago with sciatica, right side [M54.41]  Other chronic pain [G89.29]  Spinal stenosis, lumbar region without neurogenic claudication [M48.061]  Treatment Diagnosis:  RLE proximal hip muscle weakness, decrease lumbar mobility, core strength, lumbopelvic malalignment  Insurance/Certification information:  PT Insurance Information: [employer] VPY  Physician Information:  Carlo Clifton MD    Plan of care signed (Y/N): []  Yes [x]  No     Date of Patient follow up with Physician:      Progress Report: []  Yes  [x]  No     Date Range for reporting period:  Beginnin2023  Ending:     Progress report due (10 Rx/or 30 days whichever is less): visit #80 or       Recertification due (POC duration/ or 90 days whichever is less): visit # or      Visit # Insurance Allowable Auth required? Date Range     [x]  Yes  []  No 23-3/17/23         Units approved Units used Date Range   48 15 -3/17     Latex Allergy:  [x]NO      []YES  Preferred Language for Healthcare:   [x]English       []other:    Functional Scale:       Date assessed:  NIKUNJ: raw score = 28; dysfunction = 62%  2023  NIKUNJ: raw score = 32; dysfunction =64 %                2023  Pain level:  8/10     SUBJECTIVE:  Patient reports having some buttock pain and mild radicular symptoms down right leg.       OBJECTIVE:   Observation:      Comments   Lumbar Flex 85     Lumbar Ext 22        ROM LEFT RIGHT Comments   Lumbar Side Bend 19 @hip 20  no radicular symptoms Tension  not so much pain; no radicular symptoms     Test measurements:      RESTRICTIONS/PRECAUTIONS: Chronic LBP, R RTC tear      Treatment based classification:    [] mobilization/manipulation   [x] stabilization   [] extension based   [x] flexion based   [] lateral shift   [x] traction   [] unspecified Components:   [] thoracolumbar   [x] pelvic   [] SIJ   [x] sacral   [] hip         Comparable sign: axel sign right hip    Exercises/Interventions:     Therapeutic Exercise (30028) Resistance / level Sets / Seconds Reps Notes / Cues              Rocking       Nustep warmup / Bike      IB      HSS  Hip flexion   2/20\"  2/20'     SKTC   Supine piriformis stretch  LTR  Iso hip abd   Iso hip add (ball)   Hooklying TrA contraction   Bridging              Green nerf ball 2/20'  2/20'  2/20\"        2/3'       10    Mini-squats  Lime 2 10    TG   15    SLR   2 10 B 2/17   Therapeutic Activities (68501)                                          Neuromuscular Re-ed (04028)       Hip abd/exten with contralateral LE on Airex  2 10 2/23 on airex   Heel taps 4\"    Top table modified plantigrade hip extension                    Manual Intervention (47317)       Prone PA       GISTM/STM       Lumbar Manip       SI Manip       Hip belt mobs       Hip LA distraction-long axis distraction, sidelying lumbar roll/gapping B      Ball/Belt traction                       Modalities:   2/23/23 Pt was set up on lumbar traction in supine with bolsters under B knees with parameters of 95/65 lbs with on/off time of 30/10, for a total time of 13 minutes. Pt was given panic button as well as instructed how to use it if experiencing pain as well as given bell to call for needs.      Pt. Education:  -pt educated on diagnosis, prognosis and expectations for rehab  -all pt questions were answered    Home Exercise Prorgam:  See above     Therapeutic Exercise and NMR EXR  [x] (10661) Provided verbal/tactile cueing for activities related to strengthening, flexibility, endurance, ROM  for improvements in proximal hip and core control with self care, mobility, lifting and ambulation.  [] (43654) Provided verbal/tactile cueing for activities related to improving  balance, coordination, kinesthetic sense, posture, motor skill, proprioception  to assist with core control in self care, mobility, lifting, and ambulation.   [] (18337) Therapist is in constant attendance of 2 or more patients providing skilled therapy interventions, but not providing any significant amount of measurable one-on-one time to either patient, for improvements in LE, proximal hip, and core control in self care, mobility, lifting, ambulation and eccentric single leg control. Therapeutic Activities:    [x] (51355 or 67677) Provided verbal/tactile cueing for activities related to improving balance, coordination, kinesthetic sense, posture, motor skill, proprioception and motor activation to allow for proper function  with self care and ADLs  [] (96971) Provided training and instruction to the patient for proper core and proximal hip recruitment and positioning with ambulation re-education     Home Exercise Program:    [x] (32644) Reviewed/Progressed HEP activities related to strengthening, flexibility, endurance, ROM of core, proximal hip and LE for functional self-care, mobility, lifting and ambulation   [] (61517) Reviewed/Progressed HEP activities related to improving balance, coordination, kinesthetic sense, posture, motor skill, proprioception of core, proximal hip and LE for self care, mobility, lifting, and ambulation      Manual Treatments:  PROM / STM / Oscillations-Mobs:  G-I, II, III, IV (PA's, Inf., Post.)  [x] (53386) Provided manual therapy to mobilize proximal hip and LS spine soft tissue/joints for the purpose of modulating pain, promoting relaxation,  increasing ROM, reducing/eliminating soft tissue swelling/inflammation/restriction, improving soft tissue extensibility and allowing for proper ROM for normal function with self care, mobility, lifting and ambulation.        Charges:  Timed Code Treatment Minutes: 32   Total Treatment Minutes: 44       [] EVAL - LOW (33882)   [] EVAL - MOD (20925)  [] EVAL - HIGH (07662)  [] RE-EVAL (09169)  [x] FS(36864) x  2     [] Ionto  [x] NMR (24559) x       [] Vaso  [] Manual (17359) x      [] Ultrasound  [] TA x        [x] Mech Traction (32690)  [] Aquatic Therapy x     [] ES (un) (74427):   [] Home Management Training x  [] ES(attended) (93417)   [] Dry Needling 1-2 muscles (67427):  [] Dry Needling 3+ muscles (342959  [] Group:      [] Other:     Goals:   Patient stated goal: To improve low back pain reducing radicular symptoms     [] Progressing: [] Met: [] Not Met: [] Adjusted     Therapist goals for Patient:   Short Term Goals: To be achieved in: 2 weeks  1. Independent in HEP and progression per patient tolerance, in order to prevent re-injury. [] Progressing: [] Met: [] Not Met: [] Adjusted  2. Patient will have a decrease in pain  4/10 at worst to facilitate improvement in movement, function, and ADLs as indicated by Functional Deficits. [] Progressing: [] Met: [] Not Met: [] Adjusted     Long Term Goals: To be achieved in:  6 weeks/ DC   1. Pt will improve NIKUNJ by 10 points to reduce disability and progress towards PLOF. [] Progressing: [] Met: [] Not Met: [] Adjusted  2. Patient will demonstrate increased AROM to  lumbar flexion /SB by 5 deg to allow for proper joint functioning as indicated by patients Functional Deficits. [] Progressing: [] Met: [] Not Met: [] Adjusted  3. Patient will demonstrate an increase in Strength right proximal hip  to +4/5  to allow for proper functional mobility as indicated by patients Functional Deficits. [] Progressing: [] Met: [] Not Met: [] Adjusted  4. Patient will return to functional activities including standing and walking  without increased symptoms or restriction. [] Progressing: [] Met: [] Not Met: [] Adjusted  5. Patient to be able to sleep through the night without interruption .    [] Progressing: [] Met: [] Not Met: [] Adjusted         Overall Progression Towards Functional goals/ Treatment Progress Update:  [] Patient is progressing as expected towards functional goals listed. [] Progression is slowed due to complexities/Impairments listed. [] Progression has been slowed due to co-morbidities. [x] Plan just implemented, too soon to assess goals progression <30days   [] Goals require adjustment due to lack of progress  [] Patient is not progressing as expected and requires additional follow up with physician  [] Other    Persisting Functional Limitations/Impairments:  [x]Sitting [x]Standing   [x]Walking [x]Squatting/bending    [x]Stairs []ADL's    [x]Transfers []Reaching  [x]Housework []Job related tasks  []Driving []Sports/Recreation   [x]Sleeping [x]Other:    ASSESSMENT:  Session began late this date. Pt performed hip and core strengthening exercises. Trialed mechanical traction this date to reduce increase neurotension down right LE. Patient appeared to have good relief after session. Will monitor progress over the next few visits. Patient continued with core and hip strengthening. Increased reps with hip strengthening as tolerated. He received manual intervention for stiffness to reduce in lumbar spine ; long axis distraction and sidelying gapping. Improvement was seen with lumbar flexion/SB/ extension. Though his outcome didn't reflect his improvement pt objectively seen pain reduction overall. Patient will decide to continue therapy reduce frequency as needed. Patient returned to PT after missing a week. He continued with core and hip strengthening. Added supine core training exercises above in bold. He received ball /belt traction with good response after treatment. Will continue with ongoing PT addressing core and hip mm weakness to support lumbar spine during daily activities.        Treatment/Activity Tolerance:  [] Patient able to complete tx  [] Patient limited by fatique  [] Patient limited by pain  [] Patient limited by other medical complications  [] Other: Prognosis: [] Good [] Fair  [] Poor    Patient Requires Follow-up: [x] Yes  [] No    PLAN: See eval. PT 2x / week for 6 weeks. [x] Continue per plan of care [] Alter current plan (see comments)  [] Plan of care initiated [] Hold pending MD visit [] Discharge    Electronically signed by: Destinee Bolanos, PT, DPT      Note: If patient does not return for scheduled/ recommended follow up visits, this note will serve as a discharge from care along with most recent update on progress.

## 2023-03-02 ENCOUNTER — APPOINTMENT (OUTPATIENT)
Dept: PHYSICAL THERAPY | Age: 63
End: 2023-03-02
Payer: COMMERCIAL

## 2023-03-07 ENCOUNTER — HOSPITAL ENCOUNTER (OUTPATIENT)
Dept: PHYSICAL THERAPY | Age: 63
Setting detail: THERAPIES SERIES
Discharge: HOME | End: 2023-03-07
Payer: COMMERCIAL

## 2023-03-07 PROCEDURE — 97140 MANUAL THERAPY 1/> REGIONS: CPT

## 2023-03-07 PROCEDURE — 97110 THERAPEUTIC EXERCISES: CPT

## 2023-03-07 NOTE — FLOWSHEET NOTE
[facility]  Phone: [phone]   Fax: [phone]    Physical Therapy Daily Treatment Note    Date:  2023     Patient Name:  Nannette Hernández    :  1960  MRN: [id number]  Medical Diagnosis:  Lumbago with sciatica, right side [M54.41]  Other chronic pain [G89.29]  Spinal stenosis, lumbar region without neurogenic claudication [M48.061]  Treatment Diagnosis:  RLE proximal hip muscle weakness, decrease lumbar mobility, core strength, lumbopelvic malalignment  Insurance/Certification information:  PT Insurance Information: [employer] VP[employer]  Physician Information:  Didier Smith MD    Plan of care signed (Y/N): []  Yes [x]  No     Date of Patient follow up with Physician:      Progress Report: []  Yes  [x]  No     Date Range for reporting period:  Beginnin2023  PN:   Ending:     Progress report due (10 Rx/or 30 days whichever is less): visit #39 or       Recertification due (POC duration/ or 90 days whichever is less): visit # or      Visit # Insurance Allowable Auth required? Date Range     [x]  Yes  []  No 23-3/17/23         Units approved Units used Date Range   48 15 -3/17     Latex Allergy:  [x]NO      []YES  Preferred Language for Healthcare:   [x]English       []other:    Functional Scale:       Date assessed:  NIKUNJ: raw score = 28; dysfunction = 62%  2023  NIKUNJ: raw score = 32; dysfunction =64 %                2023  Pain level:  8/10     SUBJECTIVE:  Patient reports that he has increase pain in low back and sciatic symptoms.    OBJECTIVE:   Observation:      Comments   Lumbar Flex 85     Lumbar Ext 22        ROM LEFT RIGHT Comments   Lumbar Side Bend 19 @hip 20  no radicular symptoms Tension  not so much pain; no radicular symptoms     Test measurements:      RESTRICTIONS/PRECAUTIONS: Chronic LBP, R RTC tear      Treatment based classification:    [] mobilization/manipulation   [x] stabilization   [] extension based   [x] flexion based   [] lateral shift   [x] traction   [] unspecified Components:   [] thoracolumbar   [x] pelvic   [] SIJ   [x] sacral   [] hip         Comparable sign: axel sign right hip    Exercises/Interventions:     Therapeutic Exercise (26589) Resistance / level Sets / Seconds Reps Notes / Cues              Rocking       Nustep warmup / Bike      IB      HSS  Hip flexion   2/20\"  2/20'     SKTC   Supine piriformis stretch on SB   LTR  on SB  Iso hip abd   Iso hip add (ball)   Hooklying TrA contraction   Bridging              Green nerf ball 2/20'  2/20'          2/3'     10  10    Mini-squats  Lime 2 10    TG   15    SLR   2 10 B 2/17   Therapeutic Activities (88053)                                          Neuromuscular Re-ed (98727)       Hip abd/exten with contralateral LE on Airex  2 10 2/23 on airex   Heel taps 4\"    Top table modified plantigrade hip extension                    Manual Intervention (18328)       Prone PA       GISTM/STM       Lumbar Manip       SI Manip       Hip belt mobs       Hip LA distraction-long axis distraction, sidelying lumbar roll/gapping B      Ball/Belt traction                       Modalities:   2/23/23 Pt was set up on lumbar traction in supine with bolsters under B knees with parameters of 95/65 lbs with on/off time of 30/10, for a total time of 13 minutes. Pt was given panic button as well as instructed how to use it if experiencing pain as well as given bell to call for needs.       Pt. Education:  -pt educated on diagnosis, prognosis and expectations for rehab  -all pt questions were answered    Home Exercise Prorgam:  See above     Therapeutic Exercise and NMR EXR  [x] (07286) Provided verbal/tactile cueing for activities related to strengthening, flexibility, endurance, ROM  for improvements in proximal hip and core control with self care, mobility, lifting and ambulation.  [] (08926) Provided verbal/tactile cueing for activities related to improving balance, coordination, kinesthetic sense, posture, motor skill, proprioception  to assist with core control in self care, mobility, lifting, and ambulation.   [] (62860) Therapist is in constant attendance of 2 or more patients providing skilled therapy interventions, but not providing any significant amount of measurable one-on-one time to either patient, for improvements in LE, proximal hip, and core control in self care, mobility, lifting, ambulation and eccentric single leg control. Therapeutic Activities:    [x] (83485 or 24718) Provided verbal/tactile cueing for activities related to improving balance, coordination, kinesthetic sense, posture, motor skill, proprioception and motor activation to allow for proper function  with self care and ADLs  [] (34934) Provided training and instruction to the patient for proper core and proximal hip recruitment and positioning with ambulation re-education     Home Exercise Program:    [x] (86991) Reviewed/Progressed HEP activities related to strengthening, flexibility, endurance, ROM of core, proximal hip and LE for functional self-care, mobility, lifting and ambulation   [] (84204) Reviewed/Progressed HEP activities related to improving balance, coordination, kinesthetic sense, posture, motor skill, proprioception of core, proximal hip and LE for self care, mobility, lifting, and ambulation      Manual Treatments:  PROM / STM / Oscillations-Mobs:  G-I, II, III, IV (PA's, Inf., Post.)  [x] (58051) Provided manual therapy to mobilize proximal hip and LS spine soft tissue/joints for the purpose of modulating pain, promoting relaxation,  increasing ROM, reducing/eliminating soft tissue swelling/inflammation/restriction, improving soft tissue extensibility and allowing for proper ROM for normal function with self care, mobility, lifting and ambulation.        Charges:  Timed Code Treatment Minutes: 32   Total Treatment Minutes: 44       [] EVAL - LOW (83566)   [] EVAL - MOD (20121)  [] EVAL - HIGH (80007)  [] RE-EVAL (56472)  [x] SR(20342) x  2     [] Ionto  [x] NMR (47257) x       [] Vaso  [] Manual (95043) x      [] Ultrasound  [] TA x        [x] Mech Traction (98998)  [] Aquatic Therapy x     [] ES (un) (26274):   [] Home Management Training x  [] ES(attended) (65103)   [] Dry Needling 1-2 muscles (86304):  [] Dry Needling 3+ muscles (589527  [] Group:      [] Other:     Goals:   Patient stated goal: To improve low back pain reducing radicular symptoms     [] Progressing: [] Met: [] Not Met: [] Adjusted     Therapist goals for Patient:   Short Term Goals: To be achieved in: 2 weeks  1. Independent in HEP and progression per patient tolerance, in order to prevent re-injury. [] Progressing: [] Met: [] Not Met: [] Adjusted  2. Patient will have a decrease in pain  4/10 at worst to facilitate improvement in movement, function, and ADLs as indicated by Functional Deficits. [] Progressing: [] Met: [] Not Met: [] Adjusted     Long Term Goals: To be achieved in:  6 weeks/ DC   1. Pt will improve NIKUNJ by 10 points to reduce disability and progress towards PLOF. [] Progressing: [] Met: [] Not Met: [] Adjusted  2. Patient will demonstrate increased AROM to  lumbar flexion /SB by 5 deg to allow for proper joint functioning as indicated by patients Functional Deficits. [] Progressing: [] Met: [] Not Met: [] Adjusted  3. Patient will demonstrate an increase in Strength right proximal hip  to +4/5  to allow for proper functional mobility as indicated by patients Functional Deficits. [] Progressing: [] Met: [] Not Met: [] Adjusted  4. Patient will return to functional activities including standing and walking  without increased symptoms or restriction. [] Progressing: [] Met: [] Not Met: [] Adjusted  5. Patient to be able to sleep through the night without interruption .    [] Progressing: [] Met: [] Not Met: [] Adjusted         Overall Progression Towards Functional goals/ Treatment Progress Update:  [] Patient is progressing as expected towards functional goals listed. [] Progression is slowed due to complexities/Impairments listed. [] Progression has been slowed due to co-morbidities. [x] Plan just implemented, too soon to assess goals progression <30days   [] Goals require adjustment due to lack of progress  [] Patient is not progressing as expected and requires additional follow up with physician  [] Other    Persisting Functional Limitations/Impairments:  [x]Sitting [x]Standing   [x]Walking [x]Squatting/bending    [x]Stairs []ADL's    [x]Transfers []Reaching  [x]Housework []Job related tasks  []Driving []Sports/Recreation   [x]Sleeping [x]Other:    ASSESSMENT:  Patient again late for session. Focused on stretching and lumbar flexibility. Passive lumbar mobility/flexibility was performed for gapping . Pt modified due to increase  symptoms. He wants reduce frequency worked toward SeeWhy began late this date. Pt performed hip and core strengthening exercises. Trialed mechanical traction this date to reduce increase neurotension down right LE. Patient appeared to have good relief after session. Will monitor progress over the next few visits. Patient continued with core and hip strengthening. Increased reps with hip strengthening as tolerated. He received manual intervention for stiffness to reduce in lumbar spine ; long axis distraction and sidelying gapping. Improvement was seen with lumbar flexion/SB/ extension. Though his outcome didn't reflect his improvement pt objectively seen pain reduction overall. Patient will decide to continue therapy reduce frequency as needed. Patient returned to PT after missing a week. He continued with core and hip strengthening. Added supine core training exercises above in bold. He received ball /belt traction with good response after treatment.  Will continue with ongoing PT addressing core and hip mm weakness to support lumbar spine during daily activities. Treatment/Activity Tolerance:  [] Patient able to complete tx  [] Patient limited by fatique  [] Patient limited by pain  [] Patient limited by other medical complications  [] Other:     Prognosis: [] Good [] Fair  [] Poor    Patient Requires Follow-up: [x] Yes  [] No    PLAN: See eval. PT 2x / week for 6 weeks. [x] Continue per plan of care [] Alter current plan (see comments)  [] Plan of care initiated [] Hold pending MD visit [] Discharge    Electronically signed by: Eder Mccullough, PT, DPT      Note: If patient does not return for scheduled/ recommended follow up visits, this note will serve as a discharge from care along with most recent update on progress.

## 2023-03-09 ENCOUNTER — HOSPITAL ENCOUNTER (OUTPATIENT)
Dept: PHYSICAL THERAPY | Age: 63
Setting detail: THERAPIES SERIES
Discharge: HOME | End: 2023-03-09
Payer: COMMERCIAL

## 2023-03-09 PROCEDURE — 97530 THERAPEUTIC ACTIVITIES: CPT

## 2023-03-09 PROCEDURE — 97110 THERAPEUTIC EXERCISES: CPT

## 2023-03-09 NOTE — FLOWSHEET NOTE
[address] [place] [address]  Phone: [phone]   Fax: [phone]    Physical Therapy Daily Treatment Note    Date:  2023     Patient Name:  Marietta Bonilla    :  1960  MRN: [id number]  Medical Diagnosis:  Lumbago with sciatica, right side [M54.41]  Other chronic pain [G89.29]  Spinal stenosis, lumbar region without neurogenic claudication [M48.061]  Treatment Diagnosis:  RLE proximal hip muscle weakness, decrease lumbar mobility, core strength, lumbopelvic malalignment  Insurance/Certification information:  PT Insurance Information: [employer] VPY  Physician Information:  Vidal Luther MD    Plan of care signed (Y/N): []  Yes [x]  No     Date of Patient follow up with Physician:      Progress Report: []  Yes  [x]  No     Date Range for reporting period:  Beginnin2023  PN:   Ending:     Progress report due (10 Rx/or 30 days whichever is less): visit #91 or       Recertification due (POC duration/ or 90 days whichever is less): visit # or      Visit # Insurance Allowable Auth required? Date Range     [x]  Yes  []  No 23-3/17/23         Units approved Units used Date Range   48 15 -3/17     Latex Allergy:  [x]NO      []YES  Preferred Language for Healthcare:   [x]English       []other:    Functional Scale:       Date assessed:  NIKUNJ: raw score = 28; dysfunction = 62%  2023  NIKUNJ: raw score = 32; dysfunction =64 %                2023  Pain level:  8/10     SUBJECTIVE:  Patient reports that he has two falls in the last 24 hours one with bleeding . States that his pain is about 15/10. Says he close to going to the ER.      OBJECTIVE:   Observation:      Comments   Lumbar Flex 85     Lumbar Ext 22        ROM LEFT RIGHT Comments   Lumbar Side Bend 19 @hip 20  no radicular symptoms Tension  not so much pain; no radicular symptoms     Test measurements:      RESTRICTIONS/PRECAUTIONS: Chronic LBP, R RTC tear      Treatment based classification:    [] mobilization/manipulation   [x] stabilization   [] extension based   [x] flexion based   [] lateral shift   [x] traction   [] unspecified Components:   [] thoracolumbar   [x] pelvic   [] SIJ   [x] sacral   [] hip         Comparable sign: axel sign right hip    Exercises/Interventions:     Therapeutic Exercise (23139) Resistance / level Sets / Seconds Reps Notes / Cues              Rocking       Nustep warmup / Bike      IB      HSS  Hip flexion       SKTC   Supine piriformis stretch on SB   LTR  on SB  Iso hip abd   Iso hip add (ball)   Hooklying TrA contraction w/ shoulder flx/ext  Bridging              Green nerf ball 2/20'  2/20'          2/3'     10  10  10  10    Mini-squats  Lime    TG     SLR   2/17   Therapeutic Activities (56118)                                          Neuromuscular Re-ed (41782)       Hip abd/exten with contralateral LE on Airex  2 10 2/23 on airex   Heel taps 4\"    Top table modified plantigrade hip extension                    Manual Intervention (47004)       Prone PA       GISTM/STM       Lumbar Manip       SI Manip       Hip belt mobs       Hip LA distraction-long axis distraction, sidelying lumbar roll/gapping B      Ball/Belt traction                       Modalities:   2/23/23 Pt was set up on lumbar traction in supine with bolsters under B knees with parameters of 95/65 lbs with on/off time of 30/10, for a total time of 13 minutes. Pt was given panic button as well as instructed how to use it if experiencing pain as well as given bell to call for needs.       Pt. Education:  -3/9: Patient  educated on pathology   -pt educated on diagnosis, prognosis and expectations for rehab  -all pt questions were answered    Home Exercise Prorgam:  See above     Therapeutic Exercise and NMR EXR  [x] (48429) Provided verbal/tactile cueing for activities related to strengthening, flexibility, endurance, ROM  for improvements in proximal hip and core control with self care, mobility, lifting and ambulation.  [] (03944) Provided verbal/tactile cueing for activities related to improving balance, coordination, kinesthetic sense, posture, motor skill, proprioception  to assist with core control in self care, mobility, lifting, and ambulation.   [] (76767) Therapist is in constant attendance of 2 or more patients providing skilled therapy interventions, but not providing any significant amount of measurable one-on-one time to either patient, for improvements in LE, proximal hip, and core control in self care, mobility, lifting, ambulation and eccentric single leg control. Therapeutic Activities:    [x] (29706 or 82812) Provided verbal/tactile cueing for activities related to improving balance, coordination, kinesthetic sense, posture, motor skill, proprioception and motor activation to allow for proper function  with self care and ADLs  [] (92164) Provided training and instruction to the patient for proper core and proximal hip recruitment and positioning with ambulation re-education     Home Exercise Program:    [x] (16466) Reviewed/Progressed HEP activities related to strengthening, flexibility, endurance, ROM of core, proximal hip and LE for functional self-care, mobility, lifting and ambulation   [] (22781) Reviewed/Progressed HEP activities related to improving balance, coordination, kinesthetic sense, posture, motor skill, proprioception of core, proximal hip and LE for self care, mobility, lifting, and ambulation      Manual Treatments:  PROM / STM / Oscillations-Mobs:  G-I, II, III, IV (PA's, Inf., Post.)  [x] (48265) Provided manual therapy to mobilize proximal hip and LS spine soft tissue/joints for the purpose of modulating pain, promoting relaxation,  increasing ROM, reducing/eliminating soft tissue swelling/inflammation/restriction, improving soft tissue extensibility and allowing for proper ROM for normal function with self care, mobility, lifting and ambulation. Charges:  Timed Code Treatment Minutes: 39   Total Treatment Minutes: 39       [] EVAL - LOW (70612)   [] EVAL - MOD (83829)  [] EVAL - HIGH (75222)  [] RE-EVAL (42752)  [x] MG(06876) x  2    [] Ionto  [] NMR (35307) x       [] Vaso  [] Manual (59263) x      [] Ultrasound  [x] TA x 1       [] Mech Traction (66272)  [] Aquatic Therapy x     [] ES (un) (85947):   [] Home Management Training x  [] ES(attended) (86648)   [] Dry Needling 1-2 muscles (82309):  [] Dry Needling 3+ muscles (572836  [] Group:      [] Other:     Goals:   Patient stated goal: To improve low back pain reducing radicular symptoms     [] Progressing: [] Met: [] Not Met: [] Adjusted     Therapist goals for Patient:   Short Term Goals: To be achieved in: 2 weeks  1. Independent in HEP and progression per patient tolerance, in order to prevent re-injury. [] Progressing: [] Met: [] Not Met: [] Adjusted  2. Patient will have a decrease in pain  4/10 at worst to facilitate improvement in movement, function, and ADLs as indicated by Functional Deficits. [] Progressing: [] Met: [] Not Met: [] Adjusted     Long Term Goals: To be achieved in:  6 weeks/ DC   1. Pt will improve NIKUNJ by 10 points to reduce disability and progress towards PLOF. [] Progressing: [] Met: [] Not Met: [] Adjusted  2. Patient will demonstrate increased AROM to  lumbar flexion /SB by 5 deg to allow for proper joint functioning as indicated by patients Functional Deficits. [] Progressing: [] Met: [] Not Met: [] Adjusted  3. Patient will demonstrate an increase in Strength right proximal hip  to +4/5  to allow for proper functional mobility as indicated by patients Functional Deficits. [] Progressing: [] Met: [] Not Met: [] Adjusted  4. Patient will return to functional activities including standing and walking  without increased symptoms or restriction. [] Progressing: [] Met: [] Not Met: [] Adjusted  5.  Patient to be able to sleep through the night without interruption .   [] Progressing: [] Met: [] Not Met: [] Adjusted         Overall Progression Towards Functional goals/ Treatment Progress Update:  [] Patient is progressing as expected towards functional goals listed. [] Progression is slowed due to complexities/Impairments listed. [] Progression has been slowed due to co-morbidities. [x] Plan just implemented, too soon to assess goals progression <30days   [] Goals require adjustment due to lack of progress  [] Patient is not progressing as expected and requires additional follow up with physician  [] Other    Persisting Functional Limitations/Impairments:  [x]Sitting [x]Standing   [x]Walking [x]Squatting/bending    [x]Stairs []ADL's    [x]Transfers []Reaching  [x]Housework []Job related tasks  []Driving []Sports/Recreation   [x]Sleeping [x]Other:    ASSESSMENT:   Today session focused on hip and lumbar flexibility to reduce neurotension and pain. 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 Says he will consulting his PCP next Monday regarding his back pain. PT advised that pt only utilize [facility] pool vs land base machines. Discussed making next session last one due to increase in pain over the last two weeks. Patient is aware that his condition may warrant further medical management. Patient again late for session. Focused on stretching and lumbar flexibility. Passive lumbar mobility/flexibility was performed for gapping . Pt modified due to increase  symptoms. He wants reduce frequency worked toward [employer] began late this date. Pt performed hip and core strengthening exercises. Trialed mechanical traction this date to reduce increase neurotension down right LE. Patient appeared to have good relief after session. Will monitor progress over the next few visits.          Treatment/Activity Tolerance:  [] Patient able to complete tx  [] Patient limited by fatique  [] Patient limited by pain  [] Patient limited by other medical complications  [] Other:     Prognosis: [] Good [] Fair  [] Poor    Patient Requires Follow-up: [x] Yes  [] No    PLAN: See eval. PT 2x / week for 6 weeks. [x] Continue per plan of care [] Alter current plan (see comments)  [] Plan of care initiated [] Hold pending MD visit [] Discharge    Electronically signed by: Ronit Jeronimo PT, DPT      Note: If patient does not return for scheduled/ recommended follow up visits, this note will serve as a discharge from care along with most recent update on progress.

## 2023-03-10 ENCOUNTER — APPOINTMENT (OUTPATIENT)
Dept: PHYSICAL THERAPY | Age: 63
End: 2023-03-10
Payer: COMMERCIAL

## 2023-03-14 ENCOUNTER — HOSPITAL ENCOUNTER (OUTPATIENT)
Dept: PHYSICAL THERAPY | Age: 63
Setting detail: THERAPIES SERIES
Discharge: HOME | End: 2023-03-14
Payer: COMMERCIAL

## 2023-03-14 NOTE — FLOWSHEET NOTE
[address]     Physical Therapy  Cancellation/No-show Note  Patient Name:  Bolivar Ernst  :  1960   Date:  3/14/2023  Cancelled visits to date: 3  No-shows to date: 0    Patient status for today's appointment patient:  [x]  Cancelled  2023, , 3/14  []  Rescheduled appointment  []  No-show     Reason given by patient:  [x]  Patient ill   []  Conflicting appointment  []  No transportation    []  Conflict with work  []  No reason given  [x]  Other:  3/14 Pastoral emergency    Comments:      Phone call information:   []  Phone call made today to patient at _ time at number provided:      []  Patient answered, conversation as follows:    []  Patient did not answer, message left as follows:  []  Phone call not made today  [x]  Phone call not needed - pt contacted us to cancel and provided reason for cancellation.      Electronically signed by:  Laurie Powers PT

## 2023-03-16 ENCOUNTER — TELEMEDICINE (OUTPATIENT)
Dept: ENDOCRINOLOGY | Age: 63
End: 2023-03-16
Payer: COMMERCIAL

## 2023-03-16 DIAGNOSIS — Z79.4 INSULIN LONG-TERM USE (HCC): ICD-10-CM

## 2023-03-16 DIAGNOSIS — E11.65 UNCONTROLLED TYPE 2 DIABETES MELLITUS WITH HYPERGLYCEMIA (HCC): ICD-10-CM

## 2023-03-16 PROCEDURE — 3017F COLORECTAL CA SCREEN DOC REV: CPT | Performed by: INTERNAL MEDICINE

## 2023-03-16 PROCEDURE — 3046F HEMOGLOBIN A1C LEVEL >9.0%: CPT | Performed by: INTERNAL MEDICINE

## 2023-03-16 PROCEDURE — G8427 DOCREV CUR MEDS BY ELIG CLIN: HCPCS | Performed by: INTERNAL MEDICINE

## 2023-03-16 PROCEDURE — 99214 OFFICE O/P EST MOD 30 MIN: CPT | Performed by: INTERNAL MEDICINE

## 2023-03-16 PROCEDURE — 2022F DILAT RTA XM EVC RTNOPTHY: CPT | Performed by: INTERNAL MEDICINE

## 2023-03-16 RX ORDER — HYDRALAZINE HYDROCHLORIDE 50 MG/1
TABLET, FILM COATED ORAL
COMMUNITY
Start: 2023-02-12

## 2023-03-16 RX ORDER — FLASH GLUCOSE SENSOR
KIT MISCELLANEOUS
Qty: 2 EACH | Refills: 4 | Status: SHIPPED | OUTPATIENT
Start: 2023-03-16

## 2023-03-16 RX ORDER — LANCETS 28 GAUGE
EACH MISCELLANEOUS
Qty: 100 EACH | Refills: 0 | Status: SHIPPED | OUTPATIENT
Start: 2023-03-16

## 2023-03-16 NOTE — PROGRESS NOTES
Seen as  patient for diabetes      Patient was evaluated through a synchronous (real-time) audio-video  encounter. The patient (or guardian if applicable) is aware that this is a billable service, which includes applicable co-pays. This Virtual Visit was  conducted with patient's (and/or legal guardian's) consent. The visit was conducted pursuant to the emergency declaration under the [facility] [address] and the [employer] Act. Patient identification was verified,  and a caregiver was present when appropriate. The patient was located in a state where the provider was licensed to provide care.   Patient home  Provider home    Interim:     Started CGM    Diagnosed with Type 2 diabetes mellitus in  2010  Known diabetic complications: none     Current diabetic medications     Metformin ER 500mg 2 tab daily  basaglar 46   Humalog 22 units AC TID    SSI 2 for 50 >150 combined scale    Trulicity 3mg        Last A1c 9.2%<------ 7.8%<----8.4%<-----9.1%<----8.1%<------9.7%<----11.4% <-----13.9%<----- 14.1<--- 11.4 <--- 9.9 <------6.3%     Prior visit with dietician: no  Current diet: on average, 3 meals per day  No CHO counting  Current exercise:yes  Current monitoring regimen: home blood tests - CGM    Has brought blood glucose log/meter:yes  Home blood sugar records:100s  Any episodes of hypoglycemia? 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    Past Medical History:   Diagnosis Date    Arthritis     Back, L hip and L shoulder    CHF (congestive heart failure) ([address] diastolic    Diabetes mellitus ([facility]     Hyperlipidemia     Hypertension      Past Surgical History:   Procedure Laterality Date    APPENDECTOMY  1969     Current Outpatient Medications   Medication Sig Dispense Refill    hydrALAZINE (APRESOLINE) 50 MG tablet TAKE 1 TABLET BY MOUTH TWICE A DAY      HYDROcodone-acetaminophen (NORCO) 5-325 MG per tablet Take 1 tablet by mouth every 6 hours as needed for Pain (max 3-4 day) for up to 35 days.  120 tablet 0    methocarbamol (ROBAXIN) 500 MG tablet Take 1 tablet by mouth 2 times daily as needed (muscle stiffness) 60 tablet 1    meloxicam (MOBIC) 15 MG tablet Take 1 tablet po every Monday,Wednesday, and Friday 30 tablet 1    gabapentin (NEURONTIN) 300 MG capsule Take 1 capsule po in the morning, 1 capsule in afternoon and take 2 capsules po in the evening 120 capsule 1    nortriptyline (PAMELOR) 25 MG capsule Take 1-2 capsules by mouth nightly 60 capsule 0    diclofenac sodium (VOLTAREN) 1 % GEL Apply 2-4 grams to affected area  g 3    metFORMIN (GLUCOPHAGE-XR) 500 MG extended release tablet TAKE TWO TABLETS BY MOUTH DAILY WITH BREAKFAST 60 tablet 5    ARIPiprazole (ABILIFY) 10 MG tablet       carvedilol (COREG) 3.125 MG tablet TAKE 1 TABLET BY MOUTH TWICE A DAY WITH MEALS      KLOR-CON M20 20 MEQ extended release tablet TAKE 1 TABLET BY MOUTH EVERY DAY      busPIRone (BUSPAR) 10 MG tablet TAKE 1 TABLET BY MOUTH THREE TIMES A DAY      amLODIPine (NORVASC) 10 MG tablet TAKE 1 TABLET BY MOUTH EVERY DAY      blood glucose test strips (FREESTYLE LITE) strip USE TO TEST FOUR TIMES A  strip 0    Dulaglutide (TRULICITY) 3 VX/9.8OP SOPN Inject 3 mg into the skin once a week 4 Adjustable Dose Pre-filled Pen Syringe 3    FreeStyle Lancets MISC USE AS DIRECTED 100 each 3    insulin aspart (NOVOLOG FLEXPEN) 100 UNIT/ML injection pen 21-27  units AC TID 10 Adjustable Dose Pre-filled Pen Syringe 3    insulin glargine (LANTUS SOLOSTAR) 100 UNIT/ML injection pen INJECT 46 UNITS UNDER THE SKIN DAILY 5 Adjustable Dose Pre-filled Pen Syringe 3    Insulin Pen Needle 32G X 4 MM MISC 1 each by Does not apply route 4 times daily 120 each 0    Continuous Blood Gluc  (FREESTYLE MANE 2 READER) RO As needed 1 each 0    Continuous Blood Gluc Sensor (FREESTYLE MANE 2 SENSOR) MISC Every 2 weeks 2 each 3    Blood Glucose Monitoring Suppl (FREESTYLE LITE) RO As needed 1 each 1    ONE TOUCH ULTRASOFT LANCETS MISC 1 each by Does not apply route 4 times daily 100 each 6    FreeStyle Lancets MISC USE FOUR TIMES A  each 0    spironolactone (ALDACTONE) 25 MG tablet Take 25 mg by mouth daily      mupirocin (BACTROBAN) 2 % ointment Apply 22 g topically 3 times daily Apply topically 3 times daily.      Blood Glucose Monitoring Suppl (FREESTYLE LITE) RO As needed 1 each 0    lisinopril (PRINIVIL;ZESTRIL) 30 MG tablet Take 30 mg by mouth daily      Insulin Pen Needle (PEN NEEDLES) 31G X 6 MM MISC 1 each by In Vitro route 4 times daily 200 each 3    omeprazole (PRILOSEC) 20 MG delayed release capsule TAKE 1 CAPSULE BY MOUTH EVERY DAY      Cholecalciferol (VITAMIN D PO) Take 1 tablet by mouth every 7 days Pt to clarify dose      furosemide (LASIX) 20 MG tablet Take 40 mg by mouth daily       Fluticasone Propionate (FLONASE NA) 1 spray by Nasal route daily Each nostril      SALINE MIST SPRAY NA 1 spray by Nasal route 3 times daily as needed Each nostril 2-3 times per day      Lancets MISC 1 each by Does not apply route 3 times daily 100 each 3    MELATONIN PO Take 1 tablet by mouth nightly       loratadine (CLARITIN) 10 MG tablet Take 10 mg by mouth daily       atorvastatin (LIPITOR) 80 MG tablet Take 80 mg by mouth daily.       No current facility-administered medications for this visit.       Review of Systems    Constitutional: Negative for weight loss and malaise/fatigue. Negative for fever and chills.   HENT: Negative for hearing loss, ear pain, nosebleeds, neck pain and tinnitus.   Eyes: Negative for blurred vision. Negative for double vision, photophobia and pain. Respiratory: Negative for cough and sputum production. Cardiovascular: Negative for chest pain, palpitations and leg swelling. Gastrointestinal: Negative for nausea, vomiting and abdominal pain. Genitourinary: Negative for dysuria, urgency and frequency. Musculoskeletal: + for back pain. No joint pain  Skin: Negative for itching and rash. Neurological: Negative for dizziness. Negative for tingling, tremors, focal weakness and headaches. Endo/Heme/Allergies: see HPI  Psychiatric/Behavioral: + for depression and -substance abuse. PHYSICAL EXAMINATION:  [ INSTRUCTIONS:  \"[x]\" Indicates a positive item  \"[]\" Indicates a negative item  -- DELETE ALL ITEMS NOT EXAMINED]  Vital Signs: (As obtained by patient/caregiver or practitioner observation)    Blood pressure-  Heart rate-    Respiratory rate-    Temperature-  Pulse oximetry-     Constitutional Appears well-developed and well-nourished No apparent distress        Mental status  Alert and awake  Oriented to person/place/time  Able to follow commands      Eyes:  EOM    [x]  Normal    Sclera  [x]  Normal           Discharge [x]  None visible      HENT:   [x] Normocephalic, atraumatic.     [x] Mouth/Throat: Mucous membranes are moist.     External Ears [x] Normal  no discharge    Neck: [x] No visualized mass  no swelling    Pulmonary/Chest: [x] Respiratory effort normal.  [x] No visualized signs of difficulty breathing or respiratory distress             Musculoskeletal:   [x] Normal gait with no signs of ataxia         [x] Normal range of motion of neck          Head and neck stable, appears normal ROM, strength good    Neurological:        [x] No Facial Asymmetry (Cranial nerve 7 motor function) (limited exam to video visit)          [x] No gaze palsy                Skin:        [x] No significant exanthematous lesions or discoloration noted on facial skin Psychiatric:       [x] Normal Affect [x] No Hallucinations            Other pertinent observable physical exam findings-     Due to this being a TeleHealth encounter, evaluation of the following organ systems is limited: Vitals/Constitutional/EENT/Resp/CV/GI//MS/Neuro/Skin/Heme-Lymph-Imm. Services were provided through a video synchronous discussion virtually to substitute for in-person clinic visit. 11/22  foot exam : monofilament detected, no ulcer    Lab Reviewed   No components found for: CHLPL  Lab Results   Component Value Date    TRIG 223 (H) 12/13/2022     Lab Results   Component Value Date    HDL 35 (L) 12/13/2022     Lab Results   Component Value Date    LDLCALC 110 (H) 12/13/2022     Lab Results   Component Value Date    LABVLDL 45 12/13/2022     Lab Results   Component Value Date    LABA1C 9.2 11/09/2022       Assessment:     Neena Alfred is a 58 y.o. male with :    1.T2DM : Longstanding, uncontrolled. On metformin. A1c  high. Discussed needs insulin given hyperglycemia. On basal bolus recommend dietary changes. SGLT-2 inhibitor is not covered, off glipizide. Ordered CGM for better monitoring, Check A1c. Advised to come for download   Advised to take humalog even if glucose < 100  Adjust insulin  2. Chronic pain syndrome: On neurontin  3. HLD ; On lipitor, LDL at goal  4. Obesity  5. Elevated BP:High per patient, on lisinopril     Plan:      Lantus 48 units   Humalog 22 units AC TID   SSI 2 for 50 >150 combined scale   - 8 < 834   Trulicity 3mg weekly   Advise to check blood sugar 3 times a day   Patient to send blood sugar log for titration every week   Advise to exercise regularly. Advise to low simple carbohydrate and protein with each  meal diet. Diabetes Care: recommend yearly eye exam, foot exam and urine microalbumin to   creatinine ratio.  Patient isdue    -Hyperlipidemia, LDL goal is <100 mg/dl   -Daily ASA: 81mg daily

## 2023-03-18 DIAGNOSIS — G89.4 CHRONIC PAIN SYNDROME: ICD-10-CM

## 2023-03-21 RX ORDER — NORTRIPTYLINE HYDROCHLORIDE 25 MG/1
CAPSULE ORAL
Qty: 60 CAPSULE | Refills: 0 | OUTPATIENT
Start: 2023-03-21

## 2023-03-22 ENCOUNTER — HOSPITAL ENCOUNTER (OUTPATIENT)
Dept: PHYSICAL THERAPY | Age: 63
Setting detail: THERAPIES SERIES
Discharge: HOME | End: 2023-03-22
Payer: COMMERCIAL

## 2023-03-22 NOTE — FLOWSHEET NOTE
[address]     Physical Therapy  Cancellation/No-show Note  Patient Name:  Frances Whiteside  :  1960   Date:  3/22/2023  Cancelled visits to date: 3  No-shows to date: 0    Patient status for today's appointment patient:  [x]  Cancelled  2023, , 3/14, 3/22  []  Rescheduled appointment  []  No-show     Reason given by patient:  [x]  Patient ill   []  Conflicting appointment  []  No transportation    []  Conflict with work  []  No reason given  [x]  Other:  3/22: Sore throat and fever   Comments:      Phone call information:   []  Phone call made today to patient at _ time at number provided:      []  Patient answered, conversation as follows:    []  Patient did not answer, message left as follows:  []  Phone call not made today  [x]  Phone call not needed - pt contacted us to cancel and provided reason for cancellation.      Electronically signed by:  Demond Bain PT

## 2023-03-27 ENCOUNTER — OFFICE VISIT (OUTPATIENT)
Dept: PAIN MANAGEMENT | Age: 63
End: 2023-03-27
Payer: COMMERCIAL

## 2023-03-27 VITALS
WEIGHT: 209 LBS | HEART RATE: 84 BPM | SYSTOLIC BLOOD PRESSURE: 128 MMHG | DIASTOLIC BLOOD PRESSURE: 82 MMHG | BODY MASS INDEX: 32.73 KG/M2

## 2023-03-27 DIAGNOSIS — M16.12 PRIMARY OSTEOARTHRITIS OF LEFT HIP: ICD-10-CM

## 2023-03-27 DIAGNOSIS — M25.512 CHRONIC LEFT SHOULDER PAIN: ICD-10-CM

## 2023-03-27 DIAGNOSIS — M51.36 DDD (DEGENERATIVE DISC DISEASE), LUMBAR: ICD-10-CM

## 2023-03-27 DIAGNOSIS — M47.817 LUMBOSACRAL SPONDYLOSIS WITHOUT MYELOPATHY: ICD-10-CM

## 2023-03-27 DIAGNOSIS — M54.16 LUMBAR RADICULOPATHY: ICD-10-CM

## 2023-03-27 DIAGNOSIS — G89.29 CHRONIC LEFT SHOULDER PAIN: ICD-10-CM

## 2023-03-27 DIAGNOSIS — G89.4 CHRONIC PAIN SYNDROME: ICD-10-CM

## 2023-03-27 DIAGNOSIS — M79.18 MYOFASCIAL PAIN: ICD-10-CM

## 2023-03-27 DIAGNOSIS — M79.7 FIBROMYALGIA: ICD-10-CM

## 2023-03-27 PROCEDURE — 1036F TOBACCO NON-USER: CPT | Performed by: INTERNAL MEDICINE

## 2023-03-27 PROCEDURE — 99213 OFFICE O/P EST LOW 20 MIN: CPT | Performed by: INTERNAL MEDICINE

## 2023-03-27 PROCEDURE — G8484 FLU IMMUNIZE NO ADMIN: HCPCS | Performed by: INTERNAL MEDICINE

## 2023-03-27 PROCEDURE — G8417 CALC BMI ABV UP PARAM F/U: HCPCS | Performed by: INTERNAL MEDICINE

## 2023-03-27 PROCEDURE — 3017F COLORECTAL CA SCREEN DOC REV: CPT | Performed by: INTERNAL MEDICINE

## 2023-03-27 PROCEDURE — G8427 DOCREV CUR MEDS BY ELIG CLIN: HCPCS | Performed by: INTERNAL MEDICINE

## 2023-03-27 RX ORDER — HYDROCODONE BITARTRATE AND ACETAMINOPHEN 5; 325 MG/1; MG/1
1 TABLET ORAL EVERY 6 HOURS PRN
Qty: 100 TABLET | Refills: 0 | Status: SHIPPED | OUTPATIENT
Start: 2023-03-27 | End: 2023-04-24

## 2023-03-27 RX ORDER — NORTRIPTYLINE HYDROCHLORIDE 25 MG/1
25-50 CAPSULE ORAL NIGHTLY
Qty: 60 CAPSULE | Refills: 0 | Status: SHIPPED | OUTPATIENT
Start: 2023-03-27

## 2023-03-27 RX ORDER — METHOCARBAMOL 500 MG/1
500 TABLET, FILM COATED ORAL 2 TIMES DAILY PRN
Qty: 60 TABLET | Refills: 1 | Status: SHIPPED | OUTPATIENT
Start: 2023-03-27

## 2023-03-27 RX ORDER — GABAPENTIN 300 MG/1
CAPSULE ORAL
Qty: 120 CAPSULE | Refills: 1 | Status: SHIPPED | OUTPATIENT
Start: 2023-03-27 | End: 2023-05-01

## 2023-03-27 RX ORDER — MELOXICAM 15 MG/1
TABLET ORAL
Qty: 30 TABLET | Refills: 1 | Status: SHIPPED | OUTPATIENT
Start: 2023-03-27

## 2023-03-27 NOTE — PROGRESS NOTES
mg by mouth daily       Fluticasone Propionate (FLONASE NA) 1 spray by Nasal route daily Each nostril      SALINE MIST SPRAY NA 1 spray by Nasal route 3 times daily as needed Each nostril 2-3 times per day      Lancets MISC 1 each by Does not apply route 3 times daily 100 each 3    MELATONIN PO Take 1 tablet by mouth nightly       loratadine (CLARITIN) 10 MG tablet Take 10 mg by mouth daily       atorvastatin (LIPITOR) 80 MG tablet Take 80 mg by mouth daily. No current facility-administered medications for this visit. I will continue his current medication regimen  which is part of the above treatment schedule. It has been helping with Mr. Ellis Marshall chronic  medical problems which for this visit include:   Diagnoses of Chronic pain syndrome, Myofascial pain, DDD (degenerative disc disease), lumbar, Lumbar radiculopathy, Fibromyalgia, Lumbosacral spondylosis without myelopathy, Primary osteoarthritis of left hip, Chronic left shoulder pain, and Encounter for therapeutic drug monitoring were pertinent to this visit. Risks and benefits of the medications and other alternative treatments  including no treatment were discussed with the patient. The common side effects of these medications were also explained to the patient. Informed verbal consent was obtained. Goals of current treatment regimen include improvement in pain, restoration of functioning- with focus on improvement in physical performance, general activity, work or disability,emotional distress, health care utilization and  decreased medication consumption. Will continue to monitor progress towards achieving/maintaining therapeutic goals with special emphasis on  1. Improvement in perceived interfernce  of pain with ADL's. Ability to do home exercises independently. Ability to do household chores indoor and/or outdoor work and social and leisure activities. Improve psychosocial and physical functioning. - he is showing progression towards this

## 2023-03-28 DIAGNOSIS — G89.4 CHRONIC PAIN SYNDROME: ICD-10-CM

## 2023-03-29 DIAGNOSIS — E11.65 UNCONTROLLED TYPE 2 DIABETES MELLITUS WITH HYPERGLYCEMIA (HCC): ICD-10-CM

## 2023-03-29 DIAGNOSIS — G89.4 CHRONIC PAIN SYNDROME: ICD-10-CM

## 2023-03-30 RX ORDER — INSULIN GLARGINE 100 [IU]/ML
INJECTION, SOLUTION SUBCUTANEOUS
Qty: 15 ML | Refills: 3 | Status: SHIPPED | OUTPATIENT
Start: 2023-03-30

## 2023-03-30 NOTE — TELEPHONE ENCOUNTER
Medication:   Requested Prescriptions     Pending Prescriptions Disp Refills    LANTUS SOLOSTAR 100 UNIT/ML injection pen [Pharmacy Med Name: Nancy Britton 100 UNIT/ML]  3     Sig: INJECT 55 UNITS UNDER THE SKIN DAILY       Last Filled:      Patient Phone Number: [phone] (home)     Last appt: 03/16/2023  Next appt: Visit date not found    Last Labs DM:   Lab Results   Component Value Date/Time    LABA1C 9.2 11/09/2022 11:05 AM

## 2023-04-10 RX ORDER — NORTRIPTYLINE HYDROCHLORIDE 25 MG/1
CAPSULE ORAL
Qty: 60 CAPSULE | Refills: 0 | OUTPATIENT
Start: 2023-04-10

## 2023-04-10 RX ORDER — NORTRIPTYLINE HYDROCHLORIDE 25 MG/1
25-50 CAPSULE ORAL NIGHTLY
Qty: 60 CAPSULE | Refills: 0 | OUTPATIENT
Start: 2023-04-10

## 2023-05-01 ENCOUNTER — OFFICE VISIT (OUTPATIENT)
Dept: PAIN MANAGEMENT | Age: 63
End: 2023-05-01

## 2023-05-01 VITALS
DIASTOLIC BLOOD PRESSURE: 87 MMHG | HEART RATE: 90 BPM | SYSTOLIC BLOOD PRESSURE: 147 MMHG | BODY MASS INDEX: 33.67 KG/M2 | WEIGHT: 215 LBS

## 2023-05-01 DIAGNOSIS — M79.7 FIBROMYALGIA: ICD-10-CM

## 2023-05-01 DIAGNOSIS — M51.36 DDD (DEGENERATIVE DISC DISEASE), LUMBAR: ICD-10-CM

## 2023-05-01 DIAGNOSIS — M16.12 PRIMARY OSTEOARTHRITIS OF LEFT HIP: ICD-10-CM

## 2023-05-01 DIAGNOSIS — M54.16 LUMBAR RADICULOPATHY: ICD-10-CM

## 2023-05-01 DIAGNOSIS — G89.4 CHRONIC PAIN SYNDROME: ICD-10-CM

## 2023-05-01 DIAGNOSIS — M25.512 CHRONIC LEFT SHOULDER PAIN: ICD-10-CM

## 2023-05-01 DIAGNOSIS — G89.29 CHRONIC LEFT SHOULDER PAIN: ICD-10-CM

## 2023-05-01 DIAGNOSIS — M79.18 MYOFASCIAL PAIN: ICD-10-CM

## 2023-05-01 DIAGNOSIS — M47.817 LUMBOSACRAL SPONDYLOSIS WITHOUT MYELOPATHY: ICD-10-CM

## 2023-05-01 RX ORDER — METHOCARBAMOL 500 MG/1
500 TABLET, FILM COATED ORAL DAILY PRN
Qty: 30 TABLET | Refills: 1 | Status: SHIPPED | OUTPATIENT
Start: 2023-05-01

## 2023-05-01 RX ORDER — MELOXICAM 15 MG/1
TABLET ORAL
Qty: 30 TABLET | Refills: 1 | Status: SHIPPED | OUTPATIENT
Start: 2023-05-01

## 2023-05-01 RX ORDER — GABAPENTIN 300 MG/1
CAPSULE ORAL
Qty: 120 CAPSULE | Refills: 1 | Status: SHIPPED | OUTPATIENT
Start: 2023-05-01 | End: 2023-06-05

## 2023-05-01 RX ORDER — NORTRIPTYLINE HYDROCHLORIDE 25 MG/1
25-50 CAPSULE ORAL NIGHTLY
Qty: 60 CAPSULE | Refills: 0 | Status: SHIPPED | OUTPATIENT
Start: 2023-05-01

## 2023-05-01 RX ORDER — HYDROCODONE BITARTRATE AND ACETAMINOPHEN 5; 325 MG/1; MG/1
1 TABLET ORAL EVERY 6 HOURS PRN
Qty: 120 TABLET | Refills: 0 | Status: SHIPPED | OUTPATIENT
Start: 2023-05-01 | End: 2023-06-05

## 2023-05-01 NOTE — PROGRESS NOTES
mupirocin (BACTROBAN) 2 % ointment Apply 22 g topically 3 times daily Apply topically 3 times daily. Blood Glucose Monitoring Suppl (FREESTYLE LITE) RO As needed 1 each 0    lisinopril (PRINIVIL;ZESTRIL) 30 MG tablet Take 1 tablet by mouth daily      Insulin Pen Needle (PEN NEEDLES) 31G X 6 MM MISC 1 each by In Vitro route 4 times daily 200 each 3    omeprazole (PRILOSEC) 20 MG delayed release capsule TAKE 1 CAPSULE BY MOUTH EVERY DAY      Cholecalciferol (VITAMIN D PO) Take 1 tablet by mouth every 7 days Pt to clarify dose      furosemide (LASIX) 20 MG tablet Take 2 tablets by mouth daily      Fluticasone Propionate (FLONASE NA) 1 spray by Nasal route daily Each nostril      SALINE MIST SPRAY NA 1 spray by Nasal route 3 times daily as needed Each nostril 2-3 times per day      Lancets MISC 1 each by Does not apply route 3 times daily 100 each 3    MELATONIN PO Take 1 tablet by mouth nightly       loratadine (CLARITIN) 10 MG tablet Take 1 tablet by mouth daily      atorvastatin (LIPITOR) 80 MG tablet Take 1 tablet by mouth daily       No facility-administered medications prior to visit. REVIEW OF SYSTEMS:    Respiratory: Negative for apnea, chest tightness and shortness of breath or change in baseline breathing. PHYSICAL EXAM:   Nursing note and vitals reviewed. BP (!) 147/87   Pulse 90   Wt 215 lb (97.5 kg)   BMI 33.67 kg/m²   Constitutional: He appears well-developed and well-nourished. No acute distress. Cardiovascular: Normal rate, regular rhythm, normal heart sounds, and does not have murmur. Pulmonary/Chest: Effort normal. No respiratory distress. He does not have wheezes in the lung fields. He has no rales. Neurological/Psychiatric:He is alert and oriented to person, place, and time. Coordination is  normal.  His mood isAppropriate and affect is Neutral/Euthymic(normal) . Other: + trace edema, using cane     IMPRESSION:   1. Chronic pain syndrome    2.  Myofascial pain

## 2023-05-17 DIAGNOSIS — E11.65 UNCONTROLLED TYPE 2 DIABETES MELLITUS WITH HYPERGLYCEMIA (HCC): ICD-10-CM

## 2023-05-17 RX ORDER — METFORMIN HYDROCHLORIDE 500 MG/1
TABLET, EXTENDED RELEASE ORAL
Qty: 180 TABLET | Refills: 3 | Status: SHIPPED | OUTPATIENT
Start: 2023-05-17

## 2023-05-17 NOTE — TELEPHONE ENCOUNTER
Medication:   Requested Prescriptions     Pending Prescriptions Disp Refills    metFORMIN (GLUCOPHAGE-XR) 500 MG extended release tablet [Pharmacy Med Name: METFORMIN HCL  MG TABLET] 180 tablet      Sig: TAKE TWO TABLETS BY MOUTH DAILY WITH BREAKFAST       Last Filled:      Patient Phone Number: [phone] (home)     Last appt: 3/16/2023   Next appt: Visit date not found    Last Labs DM:   Lab Results   Component Value Date/Time    LABA1C 9.2 11/09/2022 11:05 AM

## 2023-06-05 ENCOUNTER — OFFICE VISIT (OUTPATIENT)
Dept: PAIN MANAGEMENT | Age: 63
End: 2023-06-05

## 2023-06-05 VITALS
SYSTOLIC BLOOD PRESSURE: 120 MMHG | HEART RATE: 84 BPM | WEIGHT: 210 LBS | DIASTOLIC BLOOD PRESSURE: 80 MMHG | BODY MASS INDEX: 32.89 KG/M2

## 2023-06-05 DIAGNOSIS — F51.01 PRIMARY INSOMNIA: ICD-10-CM

## 2023-06-05 DIAGNOSIS — M54.16 LUMBAR RADICULOPATHY: ICD-10-CM

## 2023-06-05 DIAGNOSIS — M51.36 DDD (DEGENERATIVE DISC DISEASE), LUMBAR: ICD-10-CM

## 2023-06-05 DIAGNOSIS — M25.512 CHRONIC LEFT SHOULDER PAIN: ICD-10-CM

## 2023-06-05 DIAGNOSIS — M79.18 MYOFASCIAL PAIN: ICD-10-CM

## 2023-06-05 DIAGNOSIS — M16.12 PRIMARY OSTEOARTHRITIS OF LEFT HIP: ICD-10-CM

## 2023-06-05 DIAGNOSIS — G89.4 CHRONIC PAIN SYNDROME: ICD-10-CM

## 2023-06-05 DIAGNOSIS — M79.7 FIBROMYALGIA: ICD-10-CM

## 2023-06-05 DIAGNOSIS — M47.817 LUMBOSACRAL SPONDYLOSIS WITHOUT MYELOPATHY: ICD-10-CM

## 2023-06-05 DIAGNOSIS — G89.29 CHRONIC LEFT SHOULDER PAIN: ICD-10-CM

## 2023-06-05 DIAGNOSIS — E11.42 DIABETIC POLYNEUROPATHY ASSOCIATED WITH TYPE 2 DIABETES MELLITUS (HCC): ICD-10-CM

## 2023-06-05 RX ORDER — HYDROCODONE BITARTRATE AND ACETAMINOPHEN 5; 325 MG/1; MG/1
1 TABLET ORAL 3 TIMES DAILY PRN
Qty: 84 TABLET | Refills: 0 | Status: SHIPPED | OUTPATIENT
Start: 2023-06-05 | End: 2023-07-03

## 2023-06-05 RX ORDER — GABAPENTIN 300 MG/1
CAPSULE ORAL
Qty: 120 CAPSULE | Refills: 1 | Status: SHIPPED | OUTPATIENT
Start: 2023-06-05 | End: 2023-07-10

## 2023-06-05 RX ORDER — METHOCARBAMOL 500 MG/1
500 TABLET, FILM COATED ORAL DAILY PRN
Qty: 30 TABLET | Refills: 1 | Status: SHIPPED | OUTPATIENT
Start: 2023-06-05

## 2023-06-05 RX ORDER — MELOXICAM 15 MG/1
TABLET ORAL
Qty: 30 TABLET | Refills: 1 | Status: SHIPPED | OUTPATIENT
Start: 2023-06-05

## 2023-06-05 RX ORDER — NORTRIPTYLINE HYDROCHLORIDE 25 MG/1
25-50 CAPSULE ORAL NIGHTLY
Qty: 60 CAPSULE | Refills: 0 | Status: SHIPPED | OUTPATIENT
Start: 2023-06-05

## 2023-06-05 NOTE — PROGRESS NOTES
consult increased physical activity as tolerated   Mr. Hilda Sanderson will be prescribed  the medications  listed below which are for treatment of his presenting  medical problems which for this visit include:   Diagnoses of Chronic pain syndrome, Primary osteoarthritis of left hip, Lumbar radiculopathy, Fibromyalgia, Chronic left shoulder pain, Encounter for therapeutic drug monitoring, Lumbosacral spondylosis without myelopathy, DDD (degenerative disc disease), lumbar, Diabetic polyneuropathy associated with type 2 diabetes mellitus ([address] and Primary insomnia were pertinent to this visit. Medications/orders associated with this visit:    Current Outpatient Medications   Medication Sig Dispense Refill    metFORMIN (GLUCOPHAGE-XR) 500 MG extended release tablet TAKE TWO TABLETS BY MOUTH DAILY WITH BREAKFAST 180 tablet 3    HYDROcodone-acetaminophen (NORCO) 5-325 MG per tablet Take 1 tablet by mouth every 6 hours as needed for Pain (max 3-4 day) for up to 35 days.  120 tablet 0    methocarbamol (ROBAXIN) 500 MG tablet Take 1 tablet by mouth daily as needed (muscle stiffness) 30 tablet 1    meloxicam (MOBIC) 15 MG tablet Take 1 tablet po every Monday,Wednesday, and Friday 30 tablet 1    gabapentin (NEURONTIN) 300 MG capsule Take 1 capsule po in the morning, 1 capsule in afternoon and take 2 capsules po in the evening 120 capsule 1    nortriptyline (PAMELOR) 25 MG capsule Take 1-2 capsules by mouth nightly 60 capsule 0    diclofenac sodium (VOLTAREN) 1 % GEL Apply 2-4 grams to affected area  g 3    blood glucose test strips (ONETOUCH ULTRA) strip 4 TIMES A DAY AS NEEDED. 100 strip 3    LANTUS SOLOSTAR 100 UNIT/ML injection pen INJECT 46 UNITS UNDER THE SKIN DAILY 15 mL 3    hydrALAZINE (APRESOLINE) 50 MG tablet TAKE 1 TABLET BY MOUTH TWICE A DAY      Continuous Blood Gluc Sensor (FREESTYLE MANE 2 SENSOR) MISC Every 2 weeks 2 each 4    FreeStyle Lancets MISC USE FOUR TIMES A  each 0    ARIPiprazole (ABILIFY) 10 MG

## 2023-06-26 ENCOUNTER — OFFICE VISIT (OUTPATIENT)
Dept: PAIN MANAGEMENT | Age: 63
End: 2023-06-26
Payer: COMMERCIAL

## 2023-06-26 ENCOUNTER — TELEPHONE (OUTPATIENT)
Dept: PAIN MANAGEMENT | Age: 63
End: 2023-06-26

## 2023-06-26 VITALS
WEIGHT: 206 LBS | OXYGEN SATURATION: 96 % | SYSTOLIC BLOOD PRESSURE: 142 MMHG | HEART RATE: 96 BPM | BODY MASS INDEX: 32.26 KG/M2 | DIASTOLIC BLOOD PRESSURE: 95 MMHG

## 2023-06-26 DIAGNOSIS — G89.4 CHRONIC PAIN SYNDROME: ICD-10-CM

## 2023-06-26 DIAGNOSIS — M25.512 CHRONIC LEFT SHOULDER PAIN: ICD-10-CM

## 2023-06-26 DIAGNOSIS — M79.7 FIBROMYALGIA: ICD-10-CM

## 2023-06-26 DIAGNOSIS — M54.16 LUMBAR RADICULOPATHY: ICD-10-CM

## 2023-06-26 DIAGNOSIS — E11.65 UNCONTROLLED TYPE 2 DIABETES MELLITUS WITH HYPERGLYCEMIA (HCC): ICD-10-CM

## 2023-06-26 DIAGNOSIS — M79.18 MYOFASCIAL PAIN: ICD-10-CM

## 2023-06-26 DIAGNOSIS — M47.817 LUMBOSACRAL SPONDYLOSIS WITHOUT MYELOPATHY: ICD-10-CM

## 2023-06-26 DIAGNOSIS — M51.36 DDD (DEGENERATIVE DISC DISEASE), LUMBAR: ICD-10-CM

## 2023-06-26 DIAGNOSIS — M16.12 PRIMARY OSTEOARTHRITIS OF LEFT HIP: ICD-10-CM

## 2023-06-26 DIAGNOSIS — E11.42 DIABETIC POLYNEUROPATHY ASSOCIATED WITH TYPE 2 DIABETES MELLITUS (HCC): ICD-10-CM

## 2023-06-26 DIAGNOSIS — Z51.81 ENCOUNTER FOR THERAPEUTIC DRUG MONITORING: ICD-10-CM

## 2023-06-26 DIAGNOSIS — G89.29 CHRONIC LEFT SHOULDER PAIN: ICD-10-CM

## 2023-06-26 PROCEDURE — G8417 CALC BMI ABV UP PARAM F/U: HCPCS | Performed by: INTERNAL MEDICINE

## 2023-06-26 PROCEDURE — G8427 DOCREV CUR MEDS BY ELIG CLIN: HCPCS | Performed by: INTERNAL MEDICINE

## 2023-06-26 PROCEDURE — 3017F COLORECTAL CA SCREEN DOC REV: CPT | Performed by: INTERNAL MEDICINE

## 2023-06-26 PROCEDURE — 2022F DILAT RTA XM EVC RTNOPTHY: CPT | Performed by: INTERNAL MEDICINE

## 2023-06-26 PROCEDURE — 1036F TOBACCO NON-USER: CPT | Performed by: INTERNAL MEDICINE

## 2023-06-26 PROCEDURE — 3046F HEMOGLOBIN A1C LEVEL >9.0%: CPT | Performed by: INTERNAL MEDICINE

## 2023-06-26 PROCEDURE — 99213 OFFICE O/P EST LOW 20 MIN: CPT | Performed by: INTERNAL MEDICINE

## 2023-06-26 RX ORDER — METHOCARBAMOL 500 MG/1
500 TABLET, FILM COATED ORAL DAILY PRN
Qty: 30 TABLET | Refills: 1 | Status: SHIPPED | OUTPATIENT
Start: 2023-06-26

## 2023-06-26 RX ORDER — NORTRIPTYLINE HYDROCHLORIDE 25 MG/1
25-50 CAPSULE ORAL NIGHTLY
Qty: 60 CAPSULE | Refills: 0 | Status: SHIPPED | OUTPATIENT
Start: 2023-06-26

## 2023-06-26 RX ORDER — MELOXICAM 15 MG/1
TABLET ORAL
Qty: 30 TABLET | Refills: 1 | Status: SHIPPED | OUTPATIENT
Start: 2023-06-26

## 2023-06-26 RX ORDER — HYDROCODONE BITARTRATE AND ACETAMINOPHEN 5; 325 MG/1; MG/1
1 TABLET ORAL 3 TIMES DAILY PRN
Qty: 84 TABLET | Refills: 0 | Status: SHIPPED
Start: 2023-06-26 | End: 2023-06-26 | Stop reason: CLARIF

## 2023-06-26 RX ORDER — HYDROCODONE BITARTRATE AND ACETAMINOPHEN 5; 325 MG/1; MG/1
1 TABLET ORAL 3 TIMES DAILY PRN
Qty: 120 TABLET | Refills: 0 | Status: SHIPPED | OUTPATIENT
Start: 2023-06-26 | End: 2023-07-31

## 2023-06-26 RX ORDER — GABAPENTIN 300 MG/1
CAPSULE ORAL
Qty: 120 CAPSULE | Refills: 1 | Status: SHIPPED | OUTPATIENT
Start: 2023-06-26 | End: 2023-07-31

## 2023-06-26 NOTE — TELEPHONE ENCOUNTER
Called patient left a voice messge to inform him if he would like to come in a week early or we can send in the remaining medication, call us when you are a week low. Please be advised.

## 2023-06-26 NOTE — TELEPHONE ENCOUNTER
Patient said he usually gets 120 tablets of the [facility], but when he went to his pharmacy the script was written for tablets. Patient was scheduled for 5 wks out, so he says he won't have enough to last him until next FU.          Please advise

## 2023-06-27 RX ORDER — INSULIN ASPART 100 [IU]/ML
INJECTION, SOLUTION INTRAVENOUS; SUBCUTANEOUS
Qty: 30 ML | Refills: 3 | Status: SHIPPED | OUTPATIENT
Start: 2023-06-27

## 2023-06-27 RX ORDER — HYDROCODONE BITARTRATE AND ACETAMINOPHEN 5; 325 MG/1; MG/1
1 TABLET ORAL EVERY 6 HOURS PRN
Qty: 120 TABLET | Refills: 0 | Status: SHIPPED | OUTPATIENT
Start: 2023-06-27 | End: 2023-07-31 | Stop reason: SDUPTHER

## 2023-06-27 NOTE — TELEPHONE ENCOUNTER
Norco the 84 tablets was cancelled at the pharmacy and a new script was sent over for the Norco 5/325 # 120 tablets    Called and spoke to patient to see if he got his [facility], he stated [facility] will not fill it because it says 3 per day not 3-4 per day, he states we need to change it to 3-4 per day. Called and spoke to Radha the Pharmacist to have his [employer], [facility] Rx was sent to [facility] in [place].

## 2023-07-31 ENCOUNTER — OFFICE VISIT (OUTPATIENT)
Dept: PAIN MANAGEMENT | Age: 63
End: 2023-07-31
Payer: COMMERCIAL

## 2023-07-31 VITALS
WEIGHT: 209.6 LBS | OXYGEN SATURATION: 98 % | HEART RATE: 76 BPM | DIASTOLIC BLOOD PRESSURE: 79 MMHG | SYSTOLIC BLOOD PRESSURE: 131 MMHG | BODY MASS INDEX: 32.83 KG/M2

## 2023-07-31 DIAGNOSIS — M79.7 FIBROMYALGIA: ICD-10-CM

## 2023-07-31 DIAGNOSIS — G89.4 CHRONIC PAIN SYNDROME: ICD-10-CM

## 2023-07-31 DIAGNOSIS — M79.18 MYOFASCIAL PAIN: ICD-10-CM

## 2023-07-31 DIAGNOSIS — M51.36 DDD (DEGENERATIVE DISC DISEASE), LUMBAR: ICD-10-CM

## 2023-07-31 DIAGNOSIS — M47.817 LUMBOSACRAL SPONDYLOSIS WITHOUT MYELOPATHY: ICD-10-CM

## 2023-07-31 DIAGNOSIS — E11.42 DIABETIC POLYNEUROPATHY ASSOCIATED WITH TYPE 2 DIABETES MELLITUS (HCC): ICD-10-CM

## 2023-07-31 DIAGNOSIS — M25.512 CHRONIC LEFT SHOULDER PAIN: ICD-10-CM

## 2023-07-31 DIAGNOSIS — M54.16 LUMBAR RADICULOPATHY: ICD-10-CM

## 2023-07-31 DIAGNOSIS — G89.29 CHRONIC LEFT SHOULDER PAIN: ICD-10-CM

## 2023-07-31 DIAGNOSIS — M16.12 PRIMARY OSTEOARTHRITIS OF LEFT HIP: ICD-10-CM

## 2023-07-31 PROCEDURE — 3017F COLORECTAL CA SCREEN DOC REV: CPT | Performed by: INTERNAL MEDICINE

## 2023-07-31 PROCEDURE — 3046F HEMOGLOBIN A1C LEVEL >9.0%: CPT | Performed by: INTERNAL MEDICINE

## 2023-07-31 PROCEDURE — 2022F DILAT RTA XM EVC RTNOPTHY: CPT | Performed by: INTERNAL MEDICINE

## 2023-07-31 PROCEDURE — 1036F TOBACCO NON-USER: CPT | Performed by: INTERNAL MEDICINE

## 2023-07-31 PROCEDURE — 99213 OFFICE O/P EST LOW 20 MIN: CPT | Performed by: INTERNAL MEDICINE

## 2023-07-31 PROCEDURE — G8417 CALC BMI ABV UP PARAM F/U: HCPCS | Performed by: INTERNAL MEDICINE

## 2023-07-31 PROCEDURE — G8427 DOCREV CUR MEDS BY ELIG CLIN: HCPCS | Performed by: INTERNAL MEDICINE

## 2023-07-31 RX ORDER — HYDROCODONE BITARTRATE AND ACETAMINOPHEN 5; 325 MG/1; MG/1
1 TABLET ORAL EVERY 6 HOURS PRN
Qty: 120 TABLET | Refills: 0 | Status: SHIPPED | OUTPATIENT
Start: 2023-07-31 | End: 2023-09-04

## 2023-07-31 RX ORDER — NORTRIPTYLINE HYDROCHLORIDE 25 MG/1
25-50 CAPSULE ORAL NIGHTLY
Qty: 60 CAPSULE | Refills: 1 | Status: SHIPPED | OUTPATIENT
Start: 2023-07-31

## 2023-07-31 RX ORDER — METHOCARBAMOL 500 MG/1
500 TABLET, FILM COATED ORAL DAILY PRN
Qty: 30 TABLET | Refills: 1 | Status: SHIPPED | OUTPATIENT
Start: 2023-07-31

## 2023-07-31 RX ORDER — MELOXICAM 15 MG/1
TABLET ORAL
Qty: 30 TABLET | Refills: 1 | Status: SHIPPED | OUTPATIENT
Start: 2023-07-31

## 2023-07-31 RX ORDER — GABAPENTIN 300 MG/1
CAPSULE ORAL
Qty: 120 CAPSULE | Refills: 1 | Status: SHIPPED | OUTPATIENT
Start: 2023-07-31 | End: 2023-09-04

## 2023-07-31 NOTE — PROGRESS NOTES
psychosocial and physical functioning. - he is showing progression towards this treatment goal with the current regimen. He was advised against drinking alcohol with the narcotic pain medicines, advised against driving or handling machinery while adjusting the dose of medicines or if having cognitive  issues related to the current medications. Risk of overdose and death, if medicines not taken as prescribed, were also discussed. If the patient develops new symptoms or if the symptoms worsen, the patient should call the office. While transcribing every attempt was made to maintain the accuracy of the note in terms of it's contents,there may have been some errors made inadvertently. Thank you for allowing me to participate in the care of this patient.     Monique Lorenzo MD.    Cc: Lashonda Brock MD

## 2023-08-09 DIAGNOSIS — M16.12 PRIMARY OSTEOARTHRITIS OF LEFT HIP: ICD-10-CM

## 2023-08-09 DIAGNOSIS — M79.18 MYOFASCIAL PAIN: ICD-10-CM

## 2023-08-09 DIAGNOSIS — M47.817 LUMBOSACRAL SPONDYLOSIS WITHOUT MYELOPATHY: ICD-10-CM

## 2023-08-09 DIAGNOSIS — G89.4 CHRONIC PAIN SYNDROME: ICD-10-CM

## 2023-08-09 DIAGNOSIS — M25.512 CHRONIC LEFT SHOULDER PAIN: ICD-10-CM

## 2023-08-09 DIAGNOSIS — G89.29 CHRONIC LEFT SHOULDER PAIN: ICD-10-CM

## 2023-09-01 ENCOUNTER — OFFICE VISIT (OUTPATIENT)
Dept: PAIN MANAGEMENT | Age: 63
End: 2023-09-01
Payer: COMMERCIAL

## 2023-09-01 VITALS
WEIGHT: 212 LBS | SYSTOLIC BLOOD PRESSURE: 132 MMHG | OXYGEN SATURATION: 94 % | DIASTOLIC BLOOD PRESSURE: 85 MMHG | BODY MASS INDEX: 33.2 KG/M2 | HEART RATE: 78 BPM

## 2023-09-01 DIAGNOSIS — M16.12 PRIMARY OSTEOARTHRITIS OF LEFT HIP: ICD-10-CM

## 2023-09-01 DIAGNOSIS — M79.7 FIBROMYALGIA: ICD-10-CM

## 2023-09-01 DIAGNOSIS — M79.18 MYOFASCIAL PAIN: ICD-10-CM

## 2023-09-01 DIAGNOSIS — M47.817 LUMBOSACRAL SPONDYLOSIS WITHOUT MYELOPATHY: ICD-10-CM

## 2023-09-01 DIAGNOSIS — M51.36 DDD (DEGENERATIVE DISC DISEASE), LUMBAR: ICD-10-CM

## 2023-09-01 DIAGNOSIS — M25.512 CHRONIC LEFT SHOULDER PAIN: ICD-10-CM

## 2023-09-01 DIAGNOSIS — G89.29 CHRONIC LEFT SHOULDER PAIN: ICD-10-CM

## 2023-09-01 DIAGNOSIS — M54.16 LUMBAR RADICULOPATHY: ICD-10-CM

## 2023-09-01 DIAGNOSIS — G89.4 CHRONIC PAIN SYNDROME: ICD-10-CM

## 2023-09-01 DIAGNOSIS — E11.42 DIABETIC POLYNEUROPATHY ASSOCIATED WITH TYPE 2 DIABETES MELLITUS (HCC): ICD-10-CM

## 2023-09-01 DIAGNOSIS — Z91.89 AT RISK FOR RESPIRATORY DEPRESSION DUE TO OPIOID: ICD-10-CM

## 2023-09-01 PROCEDURE — 1036F TOBACCO NON-USER: CPT | Performed by: INTERNAL MEDICINE

## 2023-09-01 PROCEDURE — 99213 OFFICE O/P EST LOW 20 MIN: CPT | Performed by: INTERNAL MEDICINE

## 2023-09-01 PROCEDURE — 2022F DILAT RTA XM EVC RTNOPTHY: CPT | Performed by: INTERNAL MEDICINE

## 2023-09-01 PROCEDURE — G8417 CALC BMI ABV UP PARAM F/U: HCPCS | Performed by: INTERNAL MEDICINE

## 2023-09-01 PROCEDURE — G8427 DOCREV CUR MEDS BY ELIG CLIN: HCPCS | Performed by: INTERNAL MEDICINE

## 2023-09-01 PROCEDURE — 3046F HEMOGLOBIN A1C LEVEL >9.0%: CPT | Performed by: INTERNAL MEDICINE

## 2023-09-01 PROCEDURE — 3017F COLORECTAL CA SCREEN DOC REV: CPT | Performed by: INTERNAL MEDICINE

## 2023-09-01 RX ORDER — GABAPENTIN 300 MG/1
CAPSULE ORAL
Qty: 120 CAPSULE | Refills: 1 | Status: SHIPPED | OUTPATIENT
Start: 2023-09-01 | End: 2023-10-06

## 2023-09-01 RX ORDER — MELOXICAM 15 MG/1
TABLET ORAL
Qty: 12 TABLET | Refills: 4 | OUTPATIENT
Start: 2023-09-01

## 2023-09-01 RX ORDER — HYDROCODONE BITARTRATE AND ACETAMINOPHEN 5; 325 MG/1; MG/1
1 TABLET ORAL EVERY 6 HOURS PRN
Qty: 120 TABLET | Refills: 0 | Status: SHIPPED | OUTPATIENT
Start: 2023-09-01 | End: 2023-10-06

## 2023-09-01 RX ORDER — NALOXONE HYDROCHLORIDE 4 MG/.1ML
1 SPRAY NASAL PRN
Qty: 1 EACH | Refills: 0 | Status: SHIPPED | OUTPATIENT
Start: 2023-09-01

## 2023-09-01 RX ORDER — NORTRIPTYLINE HYDROCHLORIDE 25 MG/1
25-50 CAPSULE ORAL NIGHTLY
Qty: 60 CAPSULE | Refills: 1 | Status: SHIPPED | OUTPATIENT
Start: 2023-09-01

## 2023-09-01 RX ORDER — MELOXICAM 15 MG/1
TABLET ORAL
Qty: 30 TABLET | Refills: 1 | Status: SHIPPED | OUTPATIENT
Start: 2023-09-01

## 2023-09-01 NOTE — PROGRESS NOTES
Daniel Walters  1960  [id number]      HISTORY OF PRESENT ILLNESS:  Mr. Adriana Brower is a 58 y.o. male returns for a follow up visit for pain management  He has a diagnosis of   1. Chronic pain syndrome    2. Primary osteoarthritis of left hip    3. Lumbar radiculopathy    4. Myofascial pain    5. Chronic left shoulder pain    6. Fibromyalgia    7. Primary insomnia    8. Lumbosacral spondylosis without myelopathy    9. DDD (degenerative disc disease), lumbar    10. Diabetic polyneuropathy associated with type 2 diabetes mellitus ([address])    . As per information/history obtained from the [facility]AD[facility], and knees Lefttation tool) -  He complains of pain in the buttocks and hips Bilateral with radiation to the  He rates the pain 7/10 and describes it as sharp, aching. Pain is made worse by: movement, walking, standing, sitting, bending, lifting. He denies any side effects from the current pain regimen. Patient reports that since starting the current regimen under my care the physical functioning is better, family/social relationships are unchanged, mood is better sleep patterns are better, and that the overall functioning is better. Patient denies misusing/abusing his narcotic pain medications or using any illegal drugs. There are No indicators for possible drug abuse, addiction or diversion problems. Patient states he has been doing okay. Mr. Adriana Brower states [place]. He mentions he is using Mobic along with Voltaren Gel PRN and Norco 3-4 per day. Patient denies any constipation symptoms. Patient reports he has been tyring to do his exercises and is working full time. ALLERGIES: Patients list of allergies were reviewed     MEDICATIONS: Mr. Adriana Brower list of medications were reviewed. His current medications are   Outpatient Medications Prior to Visit   Medication Sig Dispense Refill    HYDROcodone-acetaminophen (NORCO) 5-325 MG per tablet Take 1

## 2023-09-05 DIAGNOSIS — E11.65 POORLY CONTROLLED DIABETES MELLITUS (HCC): ICD-10-CM

## 2023-09-05 RX ORDER — PEN NEEDLE, DIABETIC 31 G X1/4"
1 NEEDLE, DISPOSABLE MISCELLANEOUS 4 TIMES DAILY
Qty: 200 EACH | Refills: 3 | Status: SHIPPED | OUTPATIENT
Start: 2023-09-05

## 2023-09-05 NOTE — TELEPHONE ENCOUNTER
Medication:   Requested Prescriptions     Pending Prescriptions Disp Refills    Insulin Pen Needle (PEN NEEDLES) 31G X 6 MM MISC 200 each 3     Si each by In Vitro route 4 times daily       Last Filled:      Patient Phone Number: [phone] (home)     Last appt: 2022   Next appt: 10/17/2023    Last Labs DM:   Lab Results   Component Value Date/Time    LABA1C 9.2 2022 11:05 AM

## 2023-09-28 DIAGNOSIS — E11.65 UNCONTROLLED TYPE 2 DIABETES MELLITUS WITH HYPERGLYCEMIA (HCC): ICD-10-CM

## 2023-09-28 RX ORDER — BLOOD SUGAR DIAGNOSTIC
STRIP MISCELLANEOUS
Qty: 100 STRIP | Refills: 3 | Status: SHIPPED | OUTPATIENT
Start: 2023-09-28

## 2023-09-28 NOTE — TELEPHONE ENCOUNTER
Medication:   Requested Prescriptions     Pending Prescriptions Disp Refills    ONETOUCH ULTRA strip [Pharmacy Med Name: ONE TOUCH ULTRA BLUE TEST STRP] 100 strip 3     Si TIMES A DAY AS NEEDED.        Last Filled:      Patient Phone Number: [phone] (home)     Last appt: 2022   Next appt: 10/17/2023    Last Labs DM:   Lab Results   Component Value Date/Time    LABA1C 9.2 2022 11:05 AM

## 2023-10-06 ENCOUNTER — OFFICE VISIT (OUTPATIENT)
Dept: PAIN MANAGEMENT | Age: 63
End: 2023-10-06

## 2023-10-06 VITALS
OXYGEN SATURATION: 95 % | HEART RATE: 85 BPM | BODY MASS INDEX: 34.46 KG/M2 | SYSTOLIC BLOOD PRESSURE: 143 MMHG | WEIGHT: 220 LBS | DIASTOLIC BLOOD PRESSURE: 90 MMHG

## 2023-10-06 DIAGNOSIS — G89.29 CHRONIC LEFT SHOULDER PAIN: ICD-10-CM

## 2023-10-06 DIAGNOSIS — M16.12 PRIMARY OSTEOARTHRITIS OF LEFT HIP: ICD-10-CM

## 2023-10-06 DIAGNOSIS — M47.817 LUMBOSACRAL SPONDYLOSIS WITHOUT MYELOPATHY: ICD-10-CM

## 2023-10-06 DIAGNOSIS — M79.18 MYOFASCIAL PAIN: ICD-10-CM

## 2023-10-06 DIAGNOSIS — E11.42 DIABETIC POLYNEUROPATHY ASSOCIATED WITH TYPE 2 DIABETES MELLITUS (HCC): ICD-10-CM

## 2023-10-06 DIAGNOSIS — G89.4 CHRONIC PAIN SYNDROME: ICD-10-CM

## 2023-10-06 DIAGNOSIS — M54.16 LUMBAR RADICULOPATHY: ICD-10-CM

## 2023-10-06 DIAGNOSIS — E11.65 UNCONTROLLED TYPE 2 DIABETES MELLITUS WITH HYPERGLYCEMIA (HCC): ICD-10-CM

## 2023-10-06 DIAGNOSIS — Z51.81 ENCOUNTER FOR THERAPEUTIC DRUG MONITORING: ICD-10-CM

## 2023-10-06 DIAGNOSIS — M25.512 CHRONIC LEFT SHOULDER PAIN: ICD-10-CM

## 2023-10-06 DIAGNOSIS — Z79.4 INSULIN LONG-TERM USE (HCC): ICD-10-CM

## 2023-10-06 DIAGNOSIS — M51.36 DDD (DEGENERATIVE DISC DISEASE), LUMBAR: ICD-10-CM

## 2023-10-06 DIAGNOSIS — M79.7 FIBROMYALGIA: ICD-10-CM

## 2023-10-06 RX ORDER — MELOXICAM 15 MG/1
TABLET ORAL
Qty: 30 TABLET | Refills: 1 | Status: SHIPPED | OUTPATIENT
Start: 2023-10-06

## 2023-10-06 RX ORDER — GABAPENTIN 300 MG/1
CAPSULE ORAL
Qty: 120 CAPSULE | Refills: 1 | Status: SHIPPED | OUTPATIENT
Start: 2023-10-06 | End: 2023-11-10

## 2023-10-06 RX ORDER — NORTRIPTYLINE HYDROCHLORIDE 25 MG/1
25-50 CAPSULE ORAL NIGHTLY
Qty: 60 CAPSULE | Refills: 1 | Status: SHIPPED | OUTPATIENT
Start: 2023-10-06

## 2023-10-06 RX ORDER — HYDROCODONE BITARTRATE AND ACETAMINOPHEN 5; 325 MG/1; MG/1
1 TABLET ORAL EVERY 6 HOURS PRN
Qty: 120 TABLET | Refills: 0 | Status: SHIPPED | OUTPATIENT
Start: 2023-10-06 | End: 2023-11-10

## 2023-10-06 RX ORDER — METFORMIN HYDROCHLORIDE 500 MG/1
TABLET, EXTENDED RELEASE ORAL
Qty: 180 TABLET | Refills: 3 | Status: SHIPPED | OUTPATIENT
Start: 2023-10-06

## 2023-10-06 NOTE — PROGRESS NOTES
each 1    ONE TOUCH ULTRASOFT LANCETS MISC 1 each by Does not apply route 4 times daily 100 each 6    spironolactone (ALDACTONE) 25 MG tablet Take 1 tablet by mouth daily      mupirocin (BACTROBAN) 2 % ointment Apply 22 g topically 3 times daily Apply topically 3 times daily. Blood Glucose Monitoring Suppl (FREESTYLE LITE) RO As needed 1 each 0    lisinopril (PRINIVIL;ZESTRIL) 30 MG tablet Take 1 tablet by mouth daily      omeprazole (PRILOSEC) 20 MG delayed release capsule TAKE 1 CAPSULE BY MOUTH EVERY DAY      Cholecalciferol (VITAMIN D PO) Take 1 tablet by mouth every 7 days Pt to clarify dose      furosemide (LASIX) 20 MG tablet Take 2 tablets by mouth daily      Fluticasone Propionate (FLONASE NA) 1 spray by Nasal route daily Each nostril      SALINE MIST SPRAY NA 1 spray by Nasal route 3 times daily as needed Each nostril 2-3 times per day      Lancets MISC 1 each by Does not apply route 3 times daily 100 each 3    MELATONIN PO Take 1 tablet by mouth nightly       loratadine (CLARITIN) 10 MG tablet Take 1 tablet by mouth daily      atorvastatin (LIPITOR) 80 MG tablet Take 1 tablet by mouth daily       No current facility-administered medications for this visit. General Goals of current treatment regimen include improvement in pain, restoration of functioning- with focus on improvement in physical performance, general activity, work or disability,emotional distress, health care utilization and  decreased medication consumption. Will continue to monitor progress towards achieving/maintaining therapeutic goals with special emphasis on  1. Improvement in perceived interfernce  of pain with ADL's. Ability to do home exercises independently. Ability to do household chores indoor and/or outdoor work and social and leisure activities. Improve psychosocial and physical functioning. - he is maintaining his treatment goal with the current regimen.       He was advised against drinking alcohol with the

## 2023-10-06 NOTE — TELEPHONE ENCOUNTER
Medication:   Requested Prescriptions     Pending Prescriptions Disp Refills    Continuous Blood Gluc  (FREESTYLE MANE 2 READER) RO 1 each 0     Sig: As needed    metFORMIN (GLUCOPHAGE-XR) 500 MG extended release tablet 180 tablet 3       Last Filled:      Patient Phone Number: [phone] (home)     Last appt: 11/9/2022   Next appt: 10/17/2023    Last Labs DM:   Lab Results   Component Value Date/Time    LABA1C 9.2 11/09/2022 11:05 AM

## 2023-10-17 ENCOUNTER — OFFICE VISIT (OUTPATIENT)
Dept: ENDOCRINOLOGY | Age: 63
End: 2023-10-17
Payer: COMMERCIAL

## 2023-10-17 VITALS
BODY MASS INDEX: 33.59 KG/M2 | HEART RATE: 85 BPM | HEIGHT: 67 IN | WEIGHT: 214 LBS | DIASTOLIC BLOOD PRESSURE: 87 MMHG | RESPIRATION RATE: 14 BRPM | SYSTOLIC BLOOD PRESSURE: 136 MMHG | TEMPERATURE: 98 F

## 2023-10-17 DIAGNOSIS — E11.65 UNCONTROLLED TYPE 2 DIABETES MELLITUS WITH HYPERGLYCEMIA (HCC): Primary | ICD-10-CM

## 2023-10-17 LAB — HBA1C MFR BLD: 8.6 %

## 2023-10-17 PROCEDURE — 2022F DILAT RTA XM EVC RTNOPTHY: CPT | Performed by: INTERNAL MEDICINE

## 2023-10-17 PROCEDURE — 99214 OFFICE O/P EST MOD 30 MIN: CPT | Performed by: INTERNAL MEDICINE

## 2023-10-17 PROCEDURE — G8417 CALC BMI ABV UP PARAM F/U: HCPCS | Performed by: INTERNAL MEDICINE

## 2023-10-17 PROCEDURE — G8427 DOCREV CUR MEDS BY ELIG CLIN: HCPCS | Performed by: INTERNAL MEDICINE

## 2023-10-17 PROCEDURE — 3017F COLORECTAL CA SCREEN DOC REV: CPT | Performed by: INTERNAL MEDICINE

## 2023-10-17 PROCEDURE — 1036F TOBACCO NON-USER: CPT | Performed by: INTERNAL MEDICINE

## 2023-10-17 PROCEDURE — 3052F HG A1C>EQUAL 8.0%<EQUAL 9.0%: CPT | Performed by: INTERNAL MEDICINE

## 2023-10-17 PROCEDURE — G8484 FLU IMMUNIZE NO ADMIN: HCPCS | Performed by: INTERNAL MEDICINE

## 2023-10-17 PROCEDURE — 83036 HEMOGLOBIN GLYCOSYLATED A1C: CPT | Performed by: INTERNAL MEDICINE

## 2023-10-17 PROCEDURE — 95251 CONT GLUC MNTR ANALYSIS I&R: CPT | Performed by: INTERNAL MEDICINE

## 2023-10-17 RX ORDER — LISINOPRIL 20 MG/1
TABLET ORAL
COMMUNITY
Start: 2023-08-09

## 2023-10-17 RX ORDER — INSULIN ASPART 100 [IU]/ML
INJECTION, SOLUTION INTRAVENOUS; SUBCUTANEOUS
Qty: 30 ML | Refills: 3 | Status: SHIPPED | OUTPATIENT
Start: 2023-10-17

## 2023-10-17 RX ORDER — DULAGLUTIDE 3 MG/.5ML
3 INJECTION, SOLUTION SUBCUTANEOUS WEEKLY
Qty: 4 ADJUSTABLE DOSE PRE-FILLED PEN SYRINGE | Refills: 3 | Status: SHIPPED | OUTPATIENT
Start: 2023-10-17

## 2023-10-17 RX ORDER — METFORMIN HYDROCHLORIDE 500 MG/1
TABLET, EXTENDED RELEASE ORAL
Qty: 180 TABLET | Refills: 3 | Status: SHIPPED | OUTPATIENT
Start: 2023-10-17

## 2023-10-17 RX ORDER — METHOCARBAMOL 500 MG/1
TABLET, FILM COATED ORAL
COMMUNITY
Start: 2023-09-01

## 2023-10-17 NOTE — PROGRESS NOTES
capsule Take 1 capsule po in the morning, 1 capsule in afternoon and take 2 capsules po in the evening 120 capsule 1    nortriptyline (PAMELOR) 25 MG capsule Take 1-2 capsules by mouth nightly 60 capsule 1    diclofenac sodium (VOLTAREN) 1 % GEL Apply 2-4 grams to affected area  g 3    Continuous Blood Gluc  (FREESTYLE MANE 2 READER) RO As needed 1 each 0    metFORMIN (GLUCOPHAGE-XR) 500 MG extended release tablet 2 tab PO daily 180 tablet 3    ONETOUCH ULTRA strip 4 TIMES A DAY AS NEEDED. 100 strip 3    naloxone (NARCAN) 4 MG/0.1ML LIQD nasal spray 1 spray by Nasal route as needed for Opioid Reversal 1 each 0    insulin aspart (NOVOLOG FLEXPEN) 100 UNIT/ML injection pen INJECT 21-27 UNITS UNDER THE SKIN 3 TIMES A DAY BEFORE MEALS 30 mL 3    hydrALAZINE (APRESOLINE) 50 MG tablet TAKE 1 TABLET BY MOUTH TWICE A DAY      Continuous Blood Gluc Sensor (FREESTYLE MANE 2 SENSOR) MISC Every 2 weeks 2 each 4    ARIPiprazole (ABILIFY) 10 MG tablet       carvedilol (COREG) 3.125 MG tablet TAKE 1 TABLET BY MOUTH TWICE A DAY WITH MEALS      KLOR-CON M20 20 MEQ extended release tablet TAKE 1 TABLET BY MOUTH EVERY DAY      busPIRone (BUSPAR) 10 MG tablet TAKE 1 TABLET BY MOUTH THREE TIMES A DAY      amLODIPine (NORVASC) 10 MG tablet TAKE 1 TABLET BY MOUTH EVERY DAY      Dulaglutide (TRULICITY) 3 JL/2.7OW SOPN Inject 3 mg into the skin once a week 4 Adjustable Dose Pre-filled Pen Syringe 3    FreeStyle Lancets MISC USE AS DIRECTED 100 each 3    Insulin Pen Needle 32G X 4 MM MISC 1 each by Does not apply route 4 times daily 120 each 0    Blood Glucose Monitoring Suppl (FREESTYLE LITE) RO As needed 1 each 1    ONE TOUCH ULTRASOFT LANCETS MISC 1 each by Does not apply route 4 times daily 100 each 6    spironolactone (ALDACTONE) 25 MG tablet Take 1 tablet by mouth daily      mupirocin (BACTROBAN) 2 % ointment Apply 22 g topically 3 times daily Apply topically 3 times daily.       Blood Glucose Monitoring Suppl

## 2023-11-06 ENCOUNTER — OFFICE VISIT (OUTPATIENT)
Dept: PAIN MANAGEMENT | Age: 63
End: 2023-11-06
Payer: COMMERCIAL

## 2023-11-06 VITALS
WEIGHT: 217 LBS | HEART RATE: 85 BPM | DIASTOLIC BLOOD PRESSURE: 80 MMHG | OXYGEN SATURATION: 97 % | BODY MASS INDEX: 33.99 KG/M2 | SYSTOLIC BLOOD PRESSURE: 132 MMHG

## 2023-11-06 DIAGNOSIS — M54.16 LUMBAR RADICULOPATHY: ICD-10-CM

## 2023-11-06 DIAGNOSIS — G89.29 CHRONIC LEFT SHOULDER PAIN: ICD-10-CM

## 2023-11-06 DIAGNOSIS — M51.36 DDD (DEGENERATIVE DISC DISEASE), LUMBAR: ICD-10-CM

## 2023-11-06 DIAGNOSIS — G89.4 CHRONIC PAIN SYNDROME: ICD-10-CM

## 2023-11-06 DIAGNOSIS — M25.512 CHRONIC LEFT SHOULDER PAIN: ICD-10-CM

## 2023-11-06 DIAGNOSIS — M79.18 MYOFASCIAL PAIN: ICD-10-CM

## 2023-11-06 DIAGNOSIS — M16.12 PRIMARY OSTEOARTHRITIS OF LEFT HIP: ICD-10-CM

## 2023-11-06 DIAGNOSIS — M47.817 LUMBOSACRAL SPONDYLOSIS WITHOUT MYELOPATHY: ICD-10-CM

## 2023-11-06 DIAGNOSIS — M79.7 FIBROMYALGIA: ICD-10-CM

## 2023-11-06 DIAGNOSIS — E11.42 DIABETIC POLYNEUROPATHY ASSOCIATED WITH TYPE 2 DIABETES MELLITUS (HCC): ICD-10-CM

## 2023-11-06 PROCEDURE — 99213 OFFICE O/P EST LOW 20 MIN: CPT | Performed by: INTERNAL MEDICINE

## 2023-11-06 PROCEDURE — 1036F TOBACCO NON-USER: CPT | Performed by: INTERNAL MEDICINE

## 2023-11-06 PROCEDURE — G8427 DOCREV CUR MEDS BY ELIG CLIN: HCPCS | Performed by: INTERNAL MEDICINE

## 2023-11-06 PROCEDURE — 2022F DILAT RTA XM EVC RTNOPTHY: CPT | Performed by: INTERNAL MEDICINE

## 2023-11-06 PROCEDURE — G8484 FLU IMMUNIZE NO ADMIN: HCPCS | Performed by: INTERNAL MEDICINE

## 2023-11-06 PROCEDURE — G8417 CALC BMI ABV UP PARAM F/U: HCPCS | Performed by: INTERNAL MEDICINE

## 2023-11-06 PROCEDURE — 3052F HG A1C>EQUAL 8.0%<EQUAL 9.0%: CPT | Performed by: INTERNAL MEDICINE

## 2023-11-06 PROCEDURE — 3017F COLORECTAL CA SCREEN DOC REV: CPT | Performed by: INTERNAL MEDICINE

## 2023-11-06 RX ORDER — NORTRIPTYLINE HYDROCHLORIDE 25 MG/1
25-50 CAPSULE ORAL NIGHTLY
Qty: 60 CAPSULE | Refills: 1 | Status: SHIPPED | OUTPATIENT
Start: 2023-11-06

## 2023-11-06 RX ORDER — MELOXICAM 15 MG/1
TABLET ORAL
Qty: 30 TABLET | Refills: 1 | Status: SHIPPED | OUTPATIENT
Start: 2023-11-06

## 2023-11-06 RX ORDER — GABAPENTIN 300 MG/1
CAPSULE ORAL
Qty: 120 CAPSULE | Refills: 1 | Status: SHIPPED | OUTPATIENT
Start: 2023-11-06 | End: 2023-12-11

## 2023-11-06 RX ORDER — HYDROCODONE BITARTRATE AND ACETAMINOPHEN 5; 325 MG/1; MG/1
1 TABLET ORAL EVERY 6 HOURS PRN
Qty: 120 TABLET | Refills: 0 | Status: SHIPPED | OUTPATIENT
Start: 2023-11-06 | End: 2023-12-11

## 2023-11-06 NOTE — PROGRESS NOTES
Robert Maira  1960  [id number]    HISTORY OF PRESENT ILLNESS:  Mr. Rochel Mohs is a 58 y.o. male returns for a follow up visit for multiple medical problems. His  presenting problems are   1. Chronic pain syndrome    2. Primary osteoarthritis of left hip    3. Diabetic polyneuropathy associated with type 2 diabetes mellitus ([address])    4. Myofascial pain    5. DDD (degenerative disc disease), lumbar    6. Lumbar radiculopathy    7. Lumbosacral spondylosis without myelopathy    8. Chronic left shoulder pain    9. Fibromyalgia    10. Primary insomnia    . As per information/history obtained from the PADT(patient assessment and documentation tool) -  He complains of pain in the shoulders Right and lower back with radiation to the buttocks and hips Left He rates the pain 7/10 and describes it as aching, burning. Pain is made worse by: walking, standing. Current treatment regimen has helped relieve about 50% of the pain. He denies side effects from the current pain regimen. Patient reports that since last follow up visit the physical functioning is better, family/social relationships are better, mood is better sleep patterns are better. Mr. Rochel Mohs states that since starting the treatment with the current regimen the  overall functioning  in the above aspects is  better,Patient denies neurological bowel or bladder. Patient denies misusing/abusing his narcotic pain medications or using any illegal drugs. There are No indicators for possible drug abuse, addiction or diversion problems. Upon obtaining the medical history from Mr. Rochel Mohs regarding today's office visit for his presenting problems, patient states he has been doing fair. Mr. Rochel Mohs mentions he is using Norco 3-4 per day, he denies any side effects with the medications. He says he is using Mobic 3 times a week along with other adjuvants. Patient states he is on Diuretics.        ALLERGIES: Patients list of allergies were reviewed     MEDICATIONS: Mr. Rochel Mohs list of

## 2023-11-26 DIAGNOSIS — E11.65 UNCONTROLLED TYPE 2 DIABETES MELLITUS WITH HYPERGLYCEMIA (HCC): ICD-10-CM

## 2023-11-27 RX ORDER — INSULIN GLARGINE 100 [IU]/ML
INJECTION, SOLUTION SUBCUTANEOUS
Qty: 45 ML | Refills: 3 | Status: SHIPPED | OUTPATIENT
Start: 2023-11-27

## 2023-11-27 NOTE — TELEPHONE ENCOUNTER
Medication:   Requested Prescriptions     Pending Prescriptions Disp Refills    LANTUS SOLOSTAR 100 UNIT/ML injection pen [Pharmacy Med Name: Karla Moncada 100 UNIT/ML]  3     Sig: INJECT 46 UNITS UNDER THE SKIN DAILY       Last Filled:      Patient Phone Number: [phone] (home)     Last appt: 10/17/2023   Next appt: 2/6/2024    Last Labs DM:   Lab Results   Component Value Date/Time    LABA1C 8.6 10/17/2023 03:00 PM

## 2024-01-03 ENCOUNTER — PATIENT MESSAGE (OUTPATIENT)
Dept: ENDOCRINOLOGY | Age: 64
End: 2024-01-03

## 2024-01-03 DIAGNOSIS — Z79.4 INSULIN LONG-TERM USE (HCC): ICD-10-CM

## 2024-01-03 DIAGNOSIS — E11.65 UNCONTROLLED TYPE 2 DIABETES MELLITUS WITH HYPERGLYCEMIA (HCC): ICD-10-CM

## 2024-01-03 NOTE — TELEPHONE ENCOUNTER
From: Renan Tidwell  To: Dr. Mikey Mata  Sent: 1/3/2024 2:35 PM EST  Subject: Free Style [employer] Sensors    I need a refill on my Free Style Re Sensors.    Thank you.    Rev. Renan Tidwell  [phone]

## 2024-01-03 NOTE — TELEPHONE ENCOUNTER
Pt requesting a refill pt stated called pharmacy and they said there was no refills       Continuous Blood Gluc Sensor (FREESTYLE MANE 2 SENSOR) [facility] [place]        [id number] - [place], [place] - [address] - P [phone] - F [phone] [address] [address]  Phone: [phone]  Fax: [phone]

## 2024-01-05 DIAGNOSIS — E11.65 UNCONTROLLED TYPE 2 DIABETES MELLITUS WITH HYPERGLYCEMIA (HCC): ICD-10-CM

## 2024-01-05 DIAGNOSIS — Z79.4 INSULIN LONG-TERM USE (HCC): ICD-10-CM

## 2024-01-08 NOTE — TELEPHONE ENCOUNTER
Medication:   Requested Prescriptions     Pending Prescriptions Disp Refills    Continuous Blood Gluc Sensor (FREESTYLE MANE 2 SENSOR) MISC [Pharmacy Med Name: FREESTYLE MANE 2 SENSOR]  4     Sig: SWITCH SENSOR EVERY 14 DAYS       Last Filled:      Patient Phone Number: [phone] (home)     Last appt: 10/17/2023  Next appt: 2/6/2024    Last Labs DM:   Lab Results   Component Value Date/Time    LABA1C 8.6 10/17/2023 03:00 PM

## 2024-01-22 ENCOUNTER — TELEPHONE (OUTPATIENT)
Dept: PAIN MANAGEMENT | Age: 64
End: 2024-01-22

## 2024-01-22 ENCOUNTER — OFFICE VISIT (OUTPATIENT)
Dept: PAIN MANAGEMENT | Age: 64
End: 2024-01-22
Payer: COMMERCIAL

## 2024-01-22 VITALS
BODY MASS INDEX: 33.67 KG/M2 | OXYGEN SATURATION: 96 % | WEIGHT: 215 LBS | SYSTOLIC BLOOD PRESSURE: 135 MMHG | HEART RATE: 91 BPM | DIASTOLIC BLOOD PRESSURE: 87 MMHG

## 2024-01-22 DIAGNOSIS — M16.12 PRIMARY OSTEOARTHRITIS OF LEFT HIP: ICD-10-CM

## 2024-01-22 DIAGNOSIS — M47.817 LUMBOSACRAL SPONDYLOSIS WITHOUT MYELOPATHY: ICD-10-CM

## 2024-01-22 DIAGNOSIS — M51.36 DDD (DEGENERATIVE DISC DISEASE), LUMBAR: ICD-10-CM

## 2024-01-22 DIAGNOSIS — G89.29 CHRONIC LEFT SHOULDER PAIN: ICD-10-CM

## 2024-01-22 DIAGNOSIS — G89.4 CHRONIC PAIN SYNDROME: ICD-10-CM

## 2024-01-22 DIAGNOSIS — G89.29 CHRONIC RIGHT SHOULDER PAIN: ICD-10-CM

## 2024-01-22 DIAGNOSIS — M79.18 MYOFASCIAL PAIN: ICD-10-CM

## 2024-01-22 DIAGNOSIS — M79.7 FIBROMYALGIA: ICD-10-CM

## 2024-01-22 DIAGNOSIS — E11.42 DIABETIC POLYNEUROPATHY ASSOCIATED WITH TYPE 2 DIABETES MELLITUS (HCC): ICD-10-CM

## 2024-01-22 DIAGNOSIS — M25.512 CHRONIC LEFT SHOULDER PAIN: ICD-10-CM

## 2024-01-22 DIAGNOSIS — M54.16 LUMBAR RADICULOPATHY: ICD-10-CM

## 2024-01-22 DIAGNOSIS — M25.511 CHRONIC RIGHT SHOULDER PAIN: ICD-10-CM

## 2024-01-22 PROCEDURE — G8417 CALC BMI ABV UP PARAM F/U: HCPCS | Performed by: INTERNAL MEDICINE

## 2024-01-22 PROCEDURE — G8484 FLU IMMUNIZE NO ADMIN: HCPCS | Performed by: INTERNAL MEDICINE

## 2024-01-22 PROCEDURE — 99213 OFFICE O/P EST LOW 20 MIN: CPT | Performed by: INTERNAL MEDICINE

## 2024-01-22 PROCEDURE — G8427 DOCREV CUR MEDS BY ELIG CLIN: HCPCS | Performed by: INTERNAL MEDICINE

## 2024-01-22 PROCEDURE — 3017F COLORECTAL CA SCREEN DOC REV: CPT | Performed by: INTERNAL MEDICINE

## 2024-01-22 PROCEDURE — 2022F DILAT RTA XM EVC RTNOPTHY: CPT | Performed by: INTERNAL MEDICINE

## 2024-01-22 PROCEDURE — 1036F TOBACCO NON-USER: CPT | Performed by: INTERNAL MEDICINE

## 2024-01-22 PROCEDURE — 3046F HEMOGLOBIN A1C LEVEL >9.0%: CPT | Performed by: INTERNAL MEDICINE

## 2024-01-22 RX ORDER — MELOXICAM 15 MG/1
TABLET ORAL
Qty: 30 TABLET | Refills: 1 | Status: SHIPPED | OUTPATIENT
Start: 2024-01-22

## 2024-01-22 RX ORDER — HYDROCODONE BITARTRATE AND ACETAMINOPHEN 5; 325 MG/1; MG/1
1 TABLET ORAL EVERY 6 HOURS PRN
Qty: 120 TABLET | Refills: 0 | Status: SHIPPED | OUTPATIENT
Start: 2024-01-22 | End: 2024-02-26

## 2024-01-22 RX ORDER — NORTRIPTYLINE HYDROCHLORIDE 25 MG/1
25-50 CAPSULE ORAL NIGHTLY
Qty: 60 CAPSULE | Refills: 1 | Status: SHIPPED | OUTPATIENT
Start: 2024-01-22

## 2024-01-22 RX ORDER — GABAPENTIN 300 MG/1
CAPSULE ORAL
Qty: 120 CAPSULE | Refills: 1 | Status: SHIPPED | OUTPATIENT
Start: 2024-01-22 | End: 2024-02-26

## 2024-01-22 NOTE — TELEPHONE ENCOUNTER
Reason for refill request:Prescriptions have not been called in from visit today       Name of medication: Norco 5/325 mg       [facility] [place]       Phone number:   [phone]    Last refill: 12/18/24      Next appointment: 2/6    Patient confirmed the above pharmacy has their medication in stock

## 2024-02-06 ENCOUNTER — OFFICE VISIT (OUTPATIENT)
Dept: ENDOCRINOLOGY | Age: 64
End: 2024-02-06
Payer: COMMERCIAL

## 2024-02-06 VITALS
SYSTOLIC BLOOD PRESSURE: 122 MMHG | TEMPERATURE: 98 F | HEIGHT: 67 IN | RESPIRATION RATE: 15 BRPM | HEART RATE: 80 BPM | DIASTOLIC BLOOD PRESSURE: 82 MMHG | BODY MASS INDEX: 33.59 KG/M2 | OXYGEN SATURATION: 96 % | WEIGHT: 214 LBS

## 2024-02-06 DIAGNOSIS — Z79.4 INSULIN LONG-TERM USE (HCC): ICD-10-CM

## 2024-02-06 DIAGNOSIS — E11.65 UNCONTROLLED TYPE 2 DIABETES MELLITUS WITH HYPERGLYCEMIA (HCC): ICD-10-CM

## 2024-02-06 PROCEDURE — G8417 CALC BMI ABV UP PARAM F/U: HCPCS | Performed by: INTERNAL MEDICINE

## 2024-02-06 PROCEDURE — 2022F DILAT RTA XM EVC RTNOPTHY: CPT | Performed by: INTERNAL MEDICINE

## 2024-02-06 PROCEDURE — G8484 FLU IMMUNIZE NO ADMIN: HCPCS | Performed by: INTERNAL MEDICINE

## 2024-02-06 PROCEDURE — G8427 DOCREV CUR MEDS BY ELIG CLIN: HCPCS | Performed by: INTERNAL MEDICINE

## 2024-02-06 PROCEDURE — 3017F COLORECTAL CA SCREEN DOC REV: CPT | Performed by: INTERNAL MEDICINE

## 2024-02-06 PROCEDURE — 3046F HEMOGLOBIN A1C LEVEL >9.0%: CPT | Performed by: INTERNAL MEDICINE

## 2024-02-06 PROCEDURE — 1036F TOBACCO NON-USER: CPT | Performed by: INTERNAL MEDICINE

## 2024-02-06 PROCEDURE — 99214 OFFICE O/P EST MOD 30 MIN: CPT | Performed by: INTERNAL MEDICINE

## 2024-02-06 RX ORDER — METFORMIN HYDROCHLORIDE 500 MG/1
TABLET, EXTENDED RELEASE ORAL
Qty: 180 TABLET | Refills: 3 | Status: SHIPPED | OUTPATIENT
Start: 2024-02-06

## 2024-02-06 RX ORDER — INSULIN ASPART 100 [IU]/ML
INJECTION, SOLUTION INTRAVENOUS; SUBCUTANEOUS
Qty: 30 ML | Refills: 3 | Status: SHIPPED | OUTPATIENT
Start: 2024-02-06

## 2024-02-06 RX ORDER — DULAGLUTIDE 3 MG/.5ML
3 INJECTION, SOLUTION SUBCUTANEOUS WEEKLY
Qty: 4 ADJUSTABLE DOSE PRE-FILLED PEN SYRINGE | Refills: 3 | Status: SHIPPED | OUTPATIENT
Start: 2024-02-06

## 2024-02-06 RX ORDER — INSULIN GLARGINE 100 [IU]/ML
INJECTION, SOLUTION SUBCUTANEOUS
Qty: 45 ML | Refills: 3 | Status: SHIPPED | OUTPATIENT
Start: 2024-02-06

## 2024-02-06 NOTE — PROGRESS NOTES
1 tablet po every Monday,Wednesday, and Friday 30 tablet 1    nortriptyline (PAMELOR) 25 MG capsule Take 1-2 capsules by mouth nightly 60 capsule 1    Continuous Blood Gluc Sensor (FREESTYLE MANE 2 SENSOR) [facility] SWITCH SENSOR EVERY 14 DAYS 2 each 4    LANTUS SOLOSTAR 100 UNIT/ML injection pen INJECT 46 UNITS UNDER THE SKIN DAILY 45 mL 3    lisinopril (PRINIVIL;ZESTRIL) 20 MG tablet       methocarbamol (ROBAXIN) 500 MG tablet       metFORMIN (GLUCOPHAGE-XR) 500 MG extended release tablet 2 tab PO daily 180 tablet 3    insulin aspart (NOVOLOG FLEXPEN) 100 UNIT/ML injection pen INJECT 21-27 UNITS UNDER THE SKIN 3 TIMES A DAY BEFORE MEALS 30 mL 3    Dulaglutide (TRULICITY) 3 MG/0.5ML SOPN Inject 3 mg into the skin once a week 4 Adjustable Dose Pre-filled Pen Syringe 3    Insulin Pen Needle 32G X 4 MM MISC 1 each by Does not apply route 4 times daily 120 each 5    Meals on Wheels MISC by Does not apply route Diabetic Meal     Dx DM 1 each 0    Continuous Blood Gluc  (FREESTYLE MANE 2 READER) RO As needed 1 each 0    ONETOUCH ULTRA strip 4 TIMES A DAY AS NEEDED. 100 strip 3    naloxone (NARCAN) 4 MG/0.1ML LIQD nasal spray 1 spray by Nasal route as needed for Opioid Reversal 1 each 0    hydrALAZINE (APRESOLINE) 50 MG tablet TAKE 1 TABLET BY MOUTH TWICE A DAY      ARIPiprazole (ABILIFY) 10 MG tablet       carvedilol (COREG) 3.125 MG tablet TAKE 1 TABLET BY MOUTH TWICE A DAY WITH MEALS      KLOR-CON M20 20 MEQ extended release tablet TAKE 1 TABLET BY MOUTH EVERY DAY      busPIRone (BUSPAR) 10 MG tablet TAKE 1 TABLET BY MOUTH THREE TIMES A DAY      amLODIPine (NORVASC) 10 MG tablet TAKE 1 TABLET BY MOUTH EVERY DAY      FreeStyle Lancets MISC USE AS DIRECTED 100 each 3    Blood Glucose Monitoring Suppl (FREESTYLE LITE) RO As needed 1 each 1    ONE TOUCH ULTRASOFT LANCETS MISC 1 each by Does not apply route 4 times daily 100 each 6    spironolactone (ALDACTONE) 25 MG tablet Take 1 tablet by mouth daily

## 2024-02-07 ENCOUNTER — TELEPHONE (OUTPATIENT)
Dept: ENDOCRINOLOGY | Age: 64
End: 2024-02-07

## 2024-02-07 RX ORDER — INSULIN LISPRO 100 [IU]/ML
INJECTION, SOLUTION INTRAVENOUS; SUBCUTANEOUS
Qty: 30 ML | Refills: 3 | Status: SHIPPED | OUTPATIENT
Start: 2024-02-07

## 2024-02-26 ENCOUNTER — OFFICE VISIT (OUTPATIENT)
Dept: PAIN MANAGEMENT | Age: 64
End: 2024-02-26
Payer: COMMERCIAL

## 2024-02-26 VITALS
OXYGEN SATURATION: 97 % | SYSTOLIC BLOOD PRESSURE: 154 MMHG | BODY MASS INDEX: 34.93 KG/M2 | HEART RATE: 86 BPM | DIASTOLIC BLOOD PRESSURE: 88 MMHG | WEIGHT: 223 LBS

## 2024-02-26 DIAGNOSIS — M79.7 FIBROMYALGIA: ICD-10-CM

## 2024-02-26 DIAGNOSIS — M47.817 LUMBOSACRAL SPONDYLOSIS WITHOUT MYELOPATHY: ICD-10-CM

## 2024-02-26 DIAGNOSIS — M79.18 MYOFASCIAL PAIN: ICD-10-CM

## 2024-02-26 DIAGNOSIS — E11.42 DIABETIC POLYNEUROPATHY ASSOCIATED WITH TYPE 2 DIABETES MELLITUS (HCC): ICD-10-CM

## 2024-02-26 DIAGNOSIS — G89.29 CHRONIC RIGHT SHOULDER PAIN: ICD-10-CM

## 2024-02-26 DIAGNOSIS — G89.29 CHRONIC LEFT SHOULDER PAIN: ICD-10-CM

## 2024-02-26 DIAGNOSIS — M25.512 CHRONIC LEFT SHOULDER PAIN: ICD-10-CM

## 2024-02-26 DIAGNOSIS — M54.16 LUMBAR RADICULOPATHY: ICD-10-CM

## 2024-02-26 DIAGNOSIS — G89.4 CHRONIC PAIN SYNDROME: ICD-10-CM

## 2024-02-26 DIAGNOSIS — M25.511 CHRONIC RIGHT SHOULDER PAIN: ICD-10-CM

## 2024-02-26 DIAGNOSIS — M51.36 DDD (DEGENERATIVE DISC DISEASE), LUMBAR: ICD-10-CM

## 2024-02-26 DIAGNOSIS — M16.12 PRIMARY OSTEOARTHRITIS OF LEFT HIP: ICD-10-CM

## 2024-02-26 PROCEDURE — 3046F HEMOGLOBIN A1C LEVEL >9.0%: CPT | Performed by: INTERNAL MEDICINE

## 2024-02-26 PROCEDURE — G8427 DOCREV CUR MEDS BY ELIG CLIN: HCPCS | Performed by: INTERNAL MEDICINE

## 2024-02-26 PROCEDURE — 1123F ACP DISCUSS/DSCN MKR DOCD: CPT | Performed by: INTERNAL MEDICINE

## 2024-02-26 PROCEDURE — G8417 CALC BMI ABV UP PARAM F/U: HCPCS | Performed by: INTERNAL MEDICINE

## 2024-02-26 PROCEDURE — 3017F COLORECTAL CA SCREEN DOC REV: CPT | Performed by: INTERNAL MEDICINE

## 2024-02-26 PROCEDURE — G8484 FLU IMMUNIZE NO ADMIN: HCPCS | Performed by: INTERNAL MEDICINE

## 2024-02-26 PROCEDURE — 1036F TOBACCO NON-USER: CPT | Performed by: INTERNAL MEDICINE

## 2024-02-26 PROCEDURE — 2022F DILAT RTA XM EVC RTNOPTHY: CPT | Performed by: INTERNAL MEDICINE

## 2024-02-26 PROCEDURE — 99213 OFFICE O/P EST LOW 20 MIN: CPT | Performed by: INTERNAL MEDICINE

## 2024-02-26 RX ORDER — MELOXICAM 15 MG/1
TABLET ORAL
Qty: 30 TABLET | Refills: 1 | Status: SHIPPED | OUTPATIENT
Start: 2024-02-26

## 2024-02-26 RX ORDER — HYDROCODONE BITARTRATE AND ACETAMINOPHEN 5; 325 MG/1; MG/1
1 TABLET ORAL EVERY 6 HOURS PRN
Qty: 120 TABLET | Refills: 0 | Status: SHIPPED | OUTPATIENT
Start: 2024-02-26 | End: 2024-04-01

## 2024-02-26 RX ORDER — NORTRIPTYLINE HYDROCHLORIDE 25 MG/1
25-50 CAPSULE ORAL NIGHTLY
Qty: 60 CAPSULE | Refills: 1 | Status: SHIPPED | OUTPATIENT
Start: 2024-02-26

## 2024-02-26 RX ORDER — GABAPENTIN 300 MG/1
CAPSULE ORAL
Qty: 120 CAPSULE | Refills: 1 | Status: SHIPPED | OUTPATIENT
Start: 2024-02-26 | End: 2024-04-01

## 2024-02-26 NOTE — PROGRESS NOTES
TABLET BY MOUTH THREE TIMES A DAY      amLODIPine (NORVASC) 10 MG tablet TAKE 1 TABLET BY MOUTH EVERY DAY      FreeStyle Lancets MISC USE AS DIRECTED 100 each 3    Blood Glucose Monitoring Suppl (FREESTYLE LITE) RO As needed 1 each 1    ONE TOUCH ULTRASOFT LANCETS MISC 1 each by Does not apply route 4 times daily 100 each 6    spironolactone (ALDACTONE) 25 MG tablet Take 1 tablet by mouth daily      mupirocin (BACTROBAN) 2 % ointment Apply 22 g topically 3 times daily Apply topically 3 times daily.      Blood Glucose Monitoring Suppl (FREESTYLE LITE) RO As needed 1 each 0    omeprazole (PRILOSEC) 20 MG delayed release capsule TAKE 1 CAPSULE BY MOUTH EVERY DAY      Cholecalciferol (VITAMIN D PO) Take 1 tablet by mouth every 7 days Pt to clarify dose      furosemide (LASIX) 20 MG tablet Take 2 tablets by mouth daily      Fluticasone Propionate (FLONASE NA) 1 spray by Nasal route daily Each nostril      SALINE MIST SPRAY NA 1 spray by Nasal route 3 times daily as needed Each nostril 2-3 times per day      Lancets MISC 1 each by Does not apply route 3 times daily 100 each 3    loratadine (CLARITIN) 10 MG tablet Take 1 tablet by mouth daily      atorvastatin (LIPITOR) 80 MG tablet Take 1 tablet by mouth daily       No current facility-administered medications for this visit.       General Goals of current treatment regimen include improvement in pain, restoration of functioning- with focus on improvement in physical performance, general activity, work or disability,emotional distress, health care utilization and  decreased medication consumption.   Will continue to monitor progress towards achieving/maintaining therapeutic goals with special emphasis on  1. Improvement in perceived interfernce  of pain with ADL's. Ability to do home exercises independently. Ability to do household chores indoor and/or outdoor work and social and leisure activities.Improve psychosocial and physical functioning. - he is maintaining his

## 2024-04-01 ENCOUNTER — OFFICE VISIT (OUTPATIENT)
Dept: PAIN MANAGEMENT | Age: 64
End: 2024-04-01

## 2024-04-01 VITALS
HEART RATE: 75 BPM | WEIGHT: 218 LBS | DIASTOLIC BLOOD PRESSURE: 79 MMHG | BODY MASS INDEX: 34.14 KG/M2 | SYSTOLIC BLOOD PRESSURE: 119 MMHG | OXYGEN SATURATION: 98 %

## 2024-04-01 DIAGNOSIS — M47.817 LUMBOSACRAL SPONDYLOSIS WITHOUT MYELOPATHY: ICD-10-CM

## 2024-04-01 DIAGNOSIS — M79.18 MYOFASCIAL PAIN: ICD-10-CM

## 2024-04-01 DIAGNOSIS — E11.42 DIABETIC POLYNEUROPATHY ASSOCIATED WITH TYPE 2 DIABETES MELLITUS (HCC): ICD-10-CM

## 2024-04-01 DIAGNOSIS — G89.4 CHRONIC PAIN SYNDROME: ICD-10-CM

## 2024-04-01 DIAGNOSIS — M16.12 PRIMARY OSTEOARTHRITIS OF LEFT HIP: ICD-10-CM

## 2024-04-01 DIAGNOSIS — M25.512 CHRONIC LEFT SHOULDER PAIN: ICD-10-CM

## 2024-04-01 DIAGNOSIS — G89.29 CHRONIC RIGHT SHOULDER PAIN: ICD-10-CM

## 2024-04-01 DIAGNOSIS — M79.7 FIBROMYALGIA: ICD-10-CM

## 2024-04-01 DIAGNOSIS — M51.36 DDD (DEGENERATIVE DISC DISEASE), LUMBAR: ICD-10-CM

## 2024-04-01 DIAGNOSIS — M25.511 CHRONIC RIGHT SHOULDER PAIN: ICD-10-CM

## 2024-04-01 DIAGNOSIS — M54.16 LUMBAR RADICULOPATHY: ICD-10-CM

## 2024-04-01 DIAGNOSIS — G89.29 CHRONIC LEFT SHOULDER PAIN: ICD-10-CM

## 2024-04-01 RX ORDER — GABAPENTIN 300 MG/1
CAPSULE ORAL
Qty: 120 CAPSULE | Refills: 1 | Status: SHIPPED | OUTPATIENT
Start: 2024-04-01 | End: 2024-05-06

## 2024-04-01 RX ORDER — NORTRIPTYLINE HYDROCHLORIDE 25 MG/1
25-50 CAPSULE ORAL NIGHTLY
Qty: 60 CAPSULE | Refills: 1 | Status: SHIPPED | OUTPATIENT
Start: 2024-04-01

## 2024-04-01 RX ORDER — HYDROCODONE BITARTRATE AND ACETAMINOPHEN 5; 325 MG/1; MG/1
1 TABLET ORAL EVERY 6 HOURS PRN
Qty: 120 TABLET | Refills: 0 | Status: SHIPPED | OUTPATIENT
Start: 2024-04-01 | End: 2024-05-06

## 2024-04-01 RX ORDER — MELOXICAM 15 MG/1
TABLET ORAL
Qty: 30 TABLET | Refills: 1 | Status: SHIPPED | OUTPATIENT
Start: 2024-04-01

## 2024-04-01 NOTE — PROGRESS NOTES
Renan Tidwell  1960  [id number]    HISTORY OF PRESENT ILLNESS:  Mr. Tidwell is a 64 y.o. male returns for a follow up visit for multiple medical problems.  His  presenting problems are   1. Chronic pain syndrome    2. Myofascial pain    3. Lumbosacral spondylosis without myelopathy    4. DDD (degenerative disc disease), lumbar    5. Primary osteoarthritis of left hip    6. Fibromyalgia    7. Diabetic polyneuropathy associated with type 2 diabetes mellitus (HCC)    8. Chronic right shoulder pain    9. Chronic left shoulder pain    10. Lumbar radiculopathy    11. Primary insomnia    .    As per information/history obtained from the PADT(patient assessment and documentation tool) -  He complains of pain in the neck and lower back with radiation to the shoulders Right, buttocks, hips Bilateral, ankles Bilateral, and feet Bilateral He rates the pain 6/10 and describes it as sharp, aching.  Pain is made worse by: movement, walking, standing, sitting, bending, lifting.  Current treatment regimen has helped relieve about 60% of the pain.  He denies side effects from the current pain regimen.   Patient reports that since last follow up visit the physical functioning is better, family/social relationships are better, mood is better sleep patterns are better.  Mr. Tidwell states that since starting the treatment with the current regimen the  overall functioning  in the above aspects is  better,Patient denies neurological bowel or bladder. Patient denies misusing/abusing his narcotic pain medications or using any illegal drugs.  There are No indicators for possible drug abuse, addiction or diversion problems. Upon obtaining the medical history from Mr. Tidwell regarding today's office visit for his presenting problems,Patient reports he has been doing fair, states he's managing with the medications.He mentions he's using Norco 3-4 x per day. He says he's using all the other adjuvants. He reports his weight has been stable. He states

## 2024-05-06 ENCOUNTER — OFFICE VISIT (OUTPATIENT)
Dept: PAIN MANAGEMENT | Age: 64
End: 2024-05-06

## 2024-05-06 VITALS
OXYGEN SATURATION: 96 % | BODY MASS INDEX: 34.83 KG/M2 | DIASTOLIC BLOOD PRESSURE: 80 MMHG | SYSTOLIC BLOOD PRESSURE: 127 MMHG | HEART RATE: 78 BPM | WEIGHT: 222.4 LBS

## 2024-05-06 DIAGNOSIS — G89.4 CHRONIC PAIN SYNDROME: ICD-10-CM

## 2024-05-06 DIAGNOSIS — G89.29 CHRONIC LEFT SHOULDER PAIN: ICD-10-CM

## 2024-05-06 DIAGNOSIS — E11.42 DIABETIC POLYNEUROPATHY ASSOCIATED WITH TYPE 2 DIABETES MELLITUS (HCC): ICD-10-CM

## 2024-05-06 DIAGNOSIS — M16.12 PRIMARY OSTEOARTHRITIS OF LEFT HIP: ICD-10-CM

## 2024-05-06 DIAGNOSIS — M79.18 MYOFASCIAL PAIN: ICD-10-CM

## 2024-05-06 DIAGNOSIS — M25.511 CHRONIC RIGHT SHOULDER PAIN: ICD-10-CM

## 2024-05-06 DIAGNOSIS — M47.817 LUMBOSACRAL SPONDYLOSIS WITHOUT MYELOPATHY: ICD-10-CM

## 2024-05-06 DIAGNOSIS — M54.16 LUMBAR RADICULOPATHY: ICD-10-CM

## 2024-05-06 DIAGNOSIS — M25.512 CHRONIC LEFT SHOULDER PAIN: ICD-10-CM

## 2024-05-06 DIAGNOSIS — M51.36 DDD (DEGENERATIVE DISC DISEASE), LUMBAR: ICD-10-CM

## 2024-05-06 DIAGNOSIS — M79.7 FIBROMYALGIA: ICD-10-CM

## 2024-05-06 DIAGNOSIS — G89.29 CHRONIC RIGHT SHOULDER PAIN: ICD-10-CM

## 2024-05-06 RX ORDER — MELOXICAM 15 MG/1
TABLET ORAL
Qty: 30 TABLET | Refills: 1 | Status: SHIPPED | OUTPATIENT
Start: 2024-05-06

## 2024-05-06 RX ORDER — GABAPENTIN 300 MG/1
CAPSULE ORAL
Qty: 120 CAPSULE | Refills: 1 | Status: SHIPPED | OUTPATIENT
Start: 2024-05-06 | End: 2024-06-10

## 2024-05-06 RX ORDER — NORTRIPTYLINE HYDROCHLORIDE 25 MG/1
25-50 CAPSULE ORAL NIGHTLY
Qty: 60 CAPSULE | Refills: 1 | Status: SHIPPED | OUTPATIENT
Start: 2024-05-06

## 2024-05-06 RX ORDER — HYDROCODONE BITARTRATE AND ACETAMINOPHEN 5; 325 MG/1; MG/1
1 TABLET ORAL EVERY 6 HOURS PRN
Qty: 120 TABLET | Refills: 0 | Status: SHIPPED | OUTPATIENT
Start: 2024-05-06 | End: 2024-06-17

## 2024-05-06 RX ORDER — EZETIMIBE 10 MG/1
10 TABLET ORAL DAILY
COMMUNITY
Start: 2024-03-05 | End: 2025-02-28

## 2024-05-06 NOTE — PROGRESS NOTES
Renan Tidwell  1960  [id number]      HISTORY OF PRESENT ILLNESS:  Mr. Tidwell is a 64 y.o. male returns for a follow up visit for pain management  He has a diagnosis of   1. Chronic pain syndrome    2. Fibromyalgia    3. Primary insomnia    4. Diabetic polyneuropathy associated with type 2 diabetes mellitus (HCC)    5. Myofascial pain    6. Lumbosacral spondylosis without myelopathy    7. DDD (degenerative disc disease), lumbar    8. Chronic left shoulder pain    9. Chronic right shoulder pain    10. Primary osteoarthritis of left hip    11. Lumbar radiculopathy    .      As per information/history obtained from the PADT(patient assessment and documentation tool) -  He complains of pain in the neck and lower back with radiation to the shoulders Right, buttocks, hips Left, ankles Bilateral, and feet Bilateral He rates the pain 6/10 and describes it as sharp, aching, numbness.  Pain is made worse by: movement, walking, standing, sitting, bending. He denies any side effects from the current pain regimen. Patient reports that since last follow up visit the physical functioning is unchanged, family/social relationships are better, mood is better sleep patterns are better. Mr. Tidwell states that since starting the treatment with the current regimen the  overall functioning  in the above aspects is  better, Patient denies misusing/abusing his narcotic pain medications or using any illegal drugs.  There are No indicators for possible drug abuse, addiction or diversion problems. Upon obtaining the medical history from Mr. Tidwell regarding today's office visit for his presenting problems, patient states he has been doing fair. Mr. Tidwell states he is still volunteering at his [employer]. He mentions he is using Norco 3-4 per day along with Mobic and Voltaren Gel. Patient states the pain is worse in the back than the legs. Patient complains of his left hip flaring up occasionally. Patient reports his weight has been stable. He states

## 2024-06-10 ENCOUNTER — OFFICE VISIT (OUTPATIENT)
Dept: ENDOCRINOLOGY | Age: 64
End: 2024-06-10
Payer: COMMERCIAL

## 2024-06-10 VITALS
RESPIRATION RATE: 15 BRPM | HEIGHT: 67 IN | DIASTOLIC BLOOD PRESSURE: 84 MMHG | SYSTOLIC BLOOD PRESSURE: 136 MMHG | HEART RATE: 88 BPM | OXYGEN SATURATION: 97 % | BODY MASS INDEX: 35.16 KG/M2 | WEIGHT: 224 LBS

## 2024-06-10 DIAGNOSIS — E11.65 UNCONTROLLED TYPE 2 DIABETES MELLITUS WITH HYPERGLYCEMIA (HCC): ICD-10-CM

## 2024-06-10 DIAGNOSIS — Z79.4 INSULIN LONG-TERM USE (HCC): ICD-10-CM

## 2024-06-10 LAB — HBA1C MFR BLD: 8 %

## 2024-06-10 PROCEDURE — 83036 HEMOGLOBIN GLYCOSYLATED A1C: CPT | Performed by: INTERNAL MEDICINE

## 2024-06-10 PROCEDURE — 1123F ACP DISCUSS/DSCN MKR DOCD: CPT | Performed by: INTERNAL MEDICINE

## 2024-06-10 PROCEDURE — 2022F DILAT RTA XM EVC RTNOPTHY: CPT | Performed by: INTERNAL MEDICINE

## 2024-06-10 PROCEDURE — 95251 CONT GLUC MNTR ANALYSIS I&R: CPT | Performed by: INTERNAL MEDICINE

## 2024-06-10 PROCEDURE — 3052F HG A1C>EQUAL 8.0%<EQUAL 9.0%: CPT | Performed by: INTERNAL MEDICINE

## 2024-06-10 PROCEDURE — 99214 OFFICE O/P EST MOD 30 MIN: CPT | Performed by: INTERNAL MEDICINE

## 2024-06-10 PROCEDURE — 3017F COLORECTAL CA SCREEN DOC REV: CPT | Performed by: INTERNAL MEDICINE

## 2024-06-10 PROCEDURE — 1036F TOBACCO NON-USER: CPT | Performed by: INTERNAL MEDICINE

## 2024-06-10 PROCEDURE — G8427 DOCREV CUR MEDS BY ELIG CLIN: HCPCS | Performed by: INTERNAL MEDICINE

## 2024-06-10 PROCEDURE — G8417 CALC BMI ABV UP PARAM F/U: HCPCS | Performed by: INTERNAL MEDICINE

## 2024-06-10 RX ORDER — INSULIN ASPART 100 [IU]/ML
INJECTION, SOLUTION INTRAVENOUS; SUBCUTANEOUS
Qty: 30 ML | Refills: 3 | Status: SHIPPED | OUTPATIENT
Start: 2024-06-10

## 2024-06-10 RX ORDER — TIRZEPATIDE 2.5 MG/.5ML
2.5 INJECTION, SOLUTION SUBCUTANEOUS WEEKLY
Qty: 4 EACH | Refills: 0 | Status: SHIPPED | OUTPATIENT
Start: 2024-06-10

## 2024-06-10 RX ORDER — DULAGLUTIDE 3 MG/.5ML
3 INJECTION, SOLUTION SUBCUTANEOUS WEEKLY
Qty: 4 ADJUSTABLE DOSE PRE-FILLED PEN SYRINGE | Refills: 3 | Status: CANCELLED | OUTPATIENT
Start: 2024-06-10

## 2024-06-10 RX ORDER — DOXYCYCLINE HYCLATE 100 MG
100 TABLET ORAL 2 TIMES DAILY
Qty: 14 TABLET | Refills: 0 | Status: SHIPPED | OUTPATIENT
Start: 2024-06-10 | End: 2024-06-17

## 2024-06-10 RX ORDER — INSULIN GLARGINE 100 [IU]/ML
INJECTION, SOLUTION SUBCUTANEOUS
Qty: 45 ML | Refills: 3 | Status: SHIPPED | OUTPATIENT
Start: 2024-06-10

## 2024-06-10 NOTE — PROGRESS NOTES
tablet TAKE 1 TABLET BY MOUTH THREE TIMES A DAY      amLODIPine (NORVASC) 10 MG tablet TAKE 1 TABLET BY MOUTH EVERY DAY      FreeStyle Lancets MISC USE AS DIRECTED 100 each 3    Blood Glucose Monitoring Suppl (FREESTYLE LITE) RO As needed 1 each 1    ONE TOUCH ULTRASOFT LANCETS MISC 1 each by Does not apply route 4 times daily 100 each 6    spironolactone (ALDACTONE) 25 MG tablet Take 1 tablet by mouth daily      mupirocin (BACTROBAN) 2 % ointment Apply 22 g topically 3 times daily Apply topically 3 times daily.      Blood Glucose Monitoring Suppl (FREESTYLE LITE) RO As needed 1 each 0    omeprazole (PRILOSEC) 20 MG delayed release capsule TAKE 1 CAPSULE BY MOUTH EVERY DAY      Cholecalciferol (VITAMIN D PO) Take 1 tablet by mouth every 7 days Pt to clarify dose      furosemide (LASIX) 20 MG tablet Take 2 tablets by mouth daily      Fluticasone Propionate (FLONASE NA) 1 spray by Nasal route daily Each nostril      SALINE MIST SPRAY NA 1 spray by Nasal route 3 times daily as needed Each nostril 2-3 times per day      Lancets MISC 1 each by Does not apply route 3 times daily 100 each 3    loratadine (CLARITIN) 10 MG tablet Take 1 tablet by mouth daily      atorvastatin (LIPITOR) 80 MG tablet Take 1 tablet by mouth daily       No current facility-administered medications for this visit.       Review of Systems    Scanned, reviewed    Vitals:    06/10/24 1438   BP: 136/84   Pulse: 88   Resp: 15   SpO2: 97%         Constitutional: Well-developed, appears stated age, cooperative, in no acute distress  H/E/N/M/T:atraumatic, normocephalic, external ears, nose, lips normal without lesions  Eyes: Lids, lashes, conjunctivae and sclerae normal, No proptosis, no redness  Neck: supple, symmetrical, no swelling  Skin: No obvious rashes or lesions present.  Skin and hair texture normal   Psychiatric: Judgement and Insight:  judgement and insight appear normal  Neuro: Normal without focal findings, speech is normal normal,

## 2024-06-11 ENCOUNTER — TELEPHONE (OUTPATIENT)
Dept: ENDOCRINOLOGY | Age: 64
End: 2024-06-11

## 2024-06-11 RX ORDER — INSULIN LISPRO 100 [IU]/ML
INJECTION, SOLUTION INTRAVENOUS; SUBCUTANEOUS
Qty: 30 ML | Refills: 3 | Status: SHIPPED | OUTPATIENT
Start: 2024-06-11

## 2024-06-17 ENCOUNTER — OFFICE VISIT (OUTPATIENT)
Dept: PAIN MANAGEMENT | Age: 64
End: 2024-06-17

## 2024-06-17 VITALS
HEART RATE: 78 BPM | BODY MASS INDEX: 34.3 KG/M2 | SYSTOLIC BLOOD PRESSURE: 135 MMHG | OXYGEN SATURATION: 97 % | WEIGHT: 219 LBS | DIASTOLIC BLOOD PRESSURE: 83 MMHG

## 2024-06-17 DIAGNOSIS — F51.01 PRIMARY INSOMNIA: ICD-10-CM

## 2024-06-17 DIAGNOSIS — M16.12 PRIMARY OSTEOARTHRITIS OF LEFT HIP: ICD-10-CM

## 2024-06-17 DIAGNOSIS — G89.4 CHRONIC PAIN SYNDROME: ICD-10-CM

## 2024-06-17 DIAGNOSIS — G89.29 CHRONIC LEFT SHOULDER PAIN: ICD-10-CM

## 2024-06-17 DIAGNOSIS — M51.36 DDD (DEGENERATIVE DISC DISEASE), LUMBAR: ICD-10-CM

## 2024-06-17 DIAGNOSIS — M79.7 FIBROMYALGIA: ICD-10-CM

## 2024-06-17 DIAGNOSIS — E11.42 DIABETIC POLYNEUROPATHY ASSOCIATED WITH TYPE 2 DIABETES MELLITUS (HCC): ICD-10-CM

## 2024-06-17 DIAGNOSIS — M47.817 LUMBOSACRAL SPONDYLOSIS WITHOUT MYELOPATHY: ICD-10-CM

## 2024-06-17 DIAGNOSIS — M25.511 CHRONIC RIGHT SHOULDER PAIN: ICD-10-CM

## 2024-06-17 DIAGNOSIS — M79.18 MYOFASCIAL PAIN: ICD-10-CM

## 2024-06-17 DIAGNOSIS — G89.29 CHRONIC RIGHT SHOULDER PAIN: ICD-10-CM

## 2024-06-17 DIAGNOSIS — M54.16 LUMBAR RADICULOPATHY: ICD-10-CM

## 2024-06-17 DIAGNOSIS — Z51.81 ENCOUNTER FOR THERAPEUTIC DRUG MONITORING: ICD-10-CM

## 2024-06-17 DIAGNOSIS — M25.512 CHRONIC LEFT SHOULDER PAIN: ICD-10-CM

## 2024-06-17 RX ORDER — MELOXICAM 15 MG/1
TABLET ORAL
Qty: 30 TABLET | Refills: 1 | Status: SHIPPED | OUTPATIENT
Start: 2024-06-17

## 2024-06-17 RX ORDER — HYDROCODONE BITARTRATE AND ACETAMINOPHEN 5; 325 MG/1; MG/1
1 TABLET ORAL EVERY 6 HOURS PRN
Qty: 120 TABLET | Refills: 0 | Status: SHIPPED | OUTPATIENT
Start: 2024-06-17 | End: 2024-07-22

## 2024-06-17 RX ORDER — GABAPENTIN 300 MG/1
CAPSULE ORAL
Qty: 120 CAPSULE | Refills: 1 | Status: SHIPPED | OUTPATIENT
Start: 2024-06-17 | End: 2024-07-22

## 2024-06-17 RX ORDER — NORTRIPTYLINE HYDROCHLORIDE 25 MG/1
25-50 CAPSULE ORAL NIGHTLY
Qty: 60 CAPSULE | Refills: 1 | Status: SHIPPED | OUTPATIENT
Start: 2024-06-17

## 2024-06-17 NOTE — PROGRESS NOTES
Renan Tidwell  1960  [id number]    HISTORY OF PRESENT ILLNESS:  Mr. Tidwell is a 64 y.o. male returns for a follow up visit for multiple medical problems.  His  presenting problems are   1. Chronic pain syndrome    2. Myofascial pain    3. Chronic left shoulder pain    4. Lumbar radiculopathy    5. Chronic right shoulder pain    6. Fibromyalgia    7. Lumbosacral spondylosis without myelopathy    8. Primary insomnia    9. Diabetic polyneuropathy associated with type 2 diabetes mellitus (HCC)    10. DDD (degenerative disc disease), lumbar    11. Primary osteoarthritis of left hip    .    As per information/history obtained from the PADT(patient assessment and documentation tool) -  He complains of pain in the head, neck, upper back, and lower back with radiation to the shoulders Right, buttocks, hips Left, ankles Bilateral, and feet Bilateral He rates the pain 7/10 and describes it as aching, pins and needles.  Pain is made worse by: nothing, movement, walking, standing, sitting, bending, lifting.  Current treatment regimen has helped relieve about 60% of the pain.  He denies side effects from the current pain regimen.   Patient reports that since last follow up visit the physical functioning is better, family/social relationships are better, mood is better sleep patterns are better.  Mr. Tidwell states that since starting the treatment with the current regimen the  overall functioning  in the above aspects is  better,Patient denies neurological bowel or bladder. Patient denies misusing/abusing his narcotic pain medications or using any illegal drugs.  There are No indicators for possible drug abuse, addiction or diversion problems.   Upon obtaining the medical history from Mr. Tidwell regarding today's office visit for his presenting problems, patient states he has been doing okay. Mr. Tidwell states he had a fall last night, he states he slipped in the bathroom, he states he did not go to the ER, he states he is doing okay.

## 2024-06-21 RX ORDER — DOXYCYCLINE HYCLATE 100 MG
100 TABLET ORAL 2 TIMES DAILY
Qty: 14 TABLET | Refills: 0 | Status: SHIPPED | OUTPATIENT
Start: 2024-06-21 | End: 2024-06-28

## 2024-06-21 NOTE — TELEPHONE ENCOUNTER
Medication:   Requested Prescriptions     Pending Prescriptions Disp Refills    doxycycline hyclate (VIBRA-TABS) 100 MG tablet [Pharmacy Med Name: DOXYCYCLINE HYCLATE 100 MG TAB] 14 tablet 0     Sig: TAKE 1 TABLET BY MOUTH TWICE A DAY FOR 7 DAYS        Last Filled:      Patient Phone Number: [phone] (home)     Last appt: 6/10/2024   Next appt: 12/10/2024    Last OARRS:       5/29/2019    11:33 AM   RX Monitoring   Attestation The Prescription Monitoring Report for this patient was reviewed today.

## 2024-07-04 DIAGNOSIS — E11.65 UNCONTROLLED TYPE 2 DIABETES MELLITUS WITH HYPERGLYCEMIA (HCC): Primary | ICD-10-CM

## 2024-07-05 RX ORDER — TIRZEPATIDE 5 MG/.5ML
INJECTION, SOLUTION SUBCUTANEOUS
Qty: 2 ML | Refills: 0 | Status: SHIPPED | OUTPATIENT
Start: 2024-07-05

## 2024-07-05 NOTE — TELEPHONE ENCOUNTER
LMOM for patient to call office. Need to know if he is doing okay on Mounjaro? Any significant side effects? Once that is verified we can send in the 5 mg dose.

## 2024-07-20 DIAGNOSIS — M54.16 LUMBAR RADICULOPATHY: ICD-10-CM

## 2024-07-20 DIAGNOSIS — M16.12 PRIMARY OSTEOARTHRITIS OF LEFT HIP: ICD-10-CM

## 2024-07-20 DIAGNOSIS — G89.29 CHRONIC LEFT SHOULDER PAIN: ICD-10-CM

## 2024-07-20 DIAGNOSIS — G89.4 CHRONIC PAIN SYNDROME: ICD-10-CM

## 2024-07-20 DIAGNOSIS — M25.511 CHRONIC RIGHT SHOULDER PAIN: ICD-10-CM

## 2024-07-20 DIAGNOSIS — M25.512 CHRONIC LEFT SHOULDER PAIN: ICD-10-CM

## 2024-07-20 DIAGNOSIS — G89.29 CHRONIC RIGHT SHOULDER PAIN: ICD-10-CM

## 2024-07-22 ENCOUNTER — OFFICE VISIT (OUTPATIENT)
Dept: PAIN MANAGEMENT | Age: 64
End: 2024-07-22

## 2024-07-22 VITALS
BODY MASS INDEX: 34.77 KG/M2 | OXYGEN SATURATION: 99 % | SYSTOLIC BLOOD PRESSURE: 128 MMHG | WEIGHT: 222 LBS | HEART RATE: 67 BPM | DIASTOLIC BLOOD PRESSURE: 79 MMHG

## 2024-07-22 DIAGNOSIS — M47.817 LUMBOSACRAL SPONDYLOSIS WITHOUT MYELOPATHY: ICD-10-CM

## 2024-07-22 DIAGNOSIS — M79.7 FIBROMYALGIA: ICD-10-CM

## 2024-07-22 DIAGNOSIS — G89.29 CHRONIC RIGHT SHOULDER PAIN: ICD-10-CM

## 2024-07-22 DIAGNOSIS — M16.12 PRIMARY OSTEOARTHRITIS OF LEFT HIP: ICD-10-CM

## 2024-07-22 DIAGNOSIS — M79.18 MYOFASCIAL PAIN: ICD-10-CM

## 2024-07-22 DIAGNOSIS — M51.36 DDD (DEGENERATIVE DISC DISEASE), LUMBAR: ICD-10-CM

## 2024-07-22 DIAGNOSIS — M25.512 CHRONIC LEFT SHOULDER PAIN: ICD-10-CM

## 2024-07-22 DIAGNOSIS — E11.42 DIABETIC POLYNEUROPATHY ASSOCIATED WITH TYPE 2 DIABETES MELLITUS (HCC): ICD-10-CM

## 2024-07-22 DIAGNOSIS — G89.29 CHRONIC LEFT SHOULDER PAIN: ICD-10-CM

## 2024-07-22 DIAGNOSIS — M54.16 LUMBAR RADICULOPATHY: ICD-10-CM

## 2024-07-22 DIAGNOSIS — G89.4 CHRONIC PAIN SYNDROME: ICD-10-CM

## 2024-07-22 DIAGNOSIS — M25.511 CHRONIC RIGHT SHOULDER PAIN: ICD-10-CM

## 2024-07-22 RX ORDER — GABAPENTIN 300 MG/1
CAPSULE ORAL
Qty: 120 CAPSULE | Refills: 1 | Status: SHIPPED | OUTPATIENT
Start: 2024-07-22 | End: 2024-08-26

## 2024-07-22 RX ORDER — HYDROCODONE BITARTRATE AND ACETAMINOPHEN 5; 325 MG/1; MG/1
1 TABLET ORAL EVERY 6 HOURS PRN
Qty: 100 TABLET | Refills: 0 | Status: SHIPPED | OUTPATIENT
Start: 2024-07-22 | End: 2024-08-19

## 2024-07-22 RX ORDER — NORTRIPTYLINE HYDROCHLORIDE 25 MG/1
25-50 CAPSULE ORAL NIGHTLY
Qty: 60 CAPSULE | Refills: 1 | Status: SHIPPED | OUTPATIENT
Start: 2024-07-22

## 2024-07-22 RX ORDER — MELOXICAM 15 MG/1
TABLET ORAL
Qty: 30 TABLET | Refills: 1 | Status: SHIPPED | OUTPATIENT
Start: 2024-07-22

## 2024-07-22 NOTE — PROGRESS NOTES
Renan Tiwdell  1960  [id number]      HISTORY OF PRESENT ILLNESS:  Mr. Tidwell is a 64 y.o. male returns for a follow up visit for pain management  He has a diagnosis of   1. Chronic pain syndrome    2. Chronic right shoulder pain    3. Fibromyalgia    4. Diabetic polyneuropathy associated with type 2 diabetes mellitus (HCC)    5. DDD (degenerative disc disease), lumbar    6. Lumbosacral spondylosis without myelopathy    7. Primary osteoarthritis of left hip    8. Chronic left shoulder pain    9. Lumbar radiculopathy    10. Myofascial pain    11. Primary insomnia    .      As per information/history obtained from the PADT(patient assessment and documentation tool) -  He complains of pain in the shoulders Right and lower back with radiation to the buttocks, hips Bilateral, and upper leg Bilateral He rates the pain 6/10 and describes it as sharp, aching.  Pain is made worse by: walking, standing. He denies any side effects from the current pain regimen. Patient reports that since last follow up visit the physical functioning is unchanged, family/social relationships are better, mood is better sleep patterns are unchanged. Mr. Tidwell states that since starting the treatment with the current regimen the  overall functioning  in the above aspects is  better, Patient denies misusing/abusing his narcotic pain medications or using any illegal drugs.  There are No indicators for possible drug abuse, addiction or diversion problems. Upon obtaining the medical history from Mr. Tidwell regarding today's office visit for his presenting problems, patient states he has been doing fair, he is managing with the medications. Mr. Tidwell mentions he is using Norco 3-4 per day along with other adjuvants. Patient denies any constipation symptoms. Patient reports his weight has been stable.       ALLERGIES: Patients list of allergies were reviewed     MEDICATIONS: Mr. Tidwell list of medications were reviewed.His current medications are

## 2024-08-06 DIAGNOSIS — E11.65 UNCONTROLLED TYPE 2 DIABETES MELLITUS WITH HYPERGLYCEMIA (HCC): ICD-10-CM

## 2024-08-07 RX ORDER — TIRZEPATIDE 5 MG/.5ML
INJECTION, SOLUTION SUBCUTANEOUS
Qty: 4 ADJUSTABLE DOSE PRE-FILLED PEN SYRINGE | Refills: 2 | Status: SHIPPED | OUTPATIENT
Start: 2024-08-07

## 2024-08-07 NOTE — TELEPHONE ENCOUNTER
Medication:   Requested Prescriptions     Signed Prescriptions Disp Refills    Tirzepatide (MOUNJARO) 5 MG/0.5ML SOPN SC injection 4 Adjustable Dose Pre-filled Pen Syringe 2     Sig: INJECT 5 MG WEEKLY     Authorizing Provider: ISELA FUENTES     Ordering User: ILANA NARAYAN       Last Filled:      Patient Phone Number: [phone] (home)     Last appt: 6/10/2024   Next appt: 12/10/2024    Last Labs DM:   Lab Results   Component Value Date/Time    LABA1C 8.0 06/10/2024 03:20 PM

## 2024-08-19 ENCOUNTER — OFFICE VISIT (OUTPATIENT)
Dept: PAIN MANAGEMENT | Age: 64
End: 2024-08-19

## 2024-08-19 VITALS
WEIGHT: 217 LBS | HEART RATE: 85 BPM | DIASTOLIC BLOOD PRESSURE: 80 MMHG | SYSTOLIC BLOOD PRESSURE: 122 MMHG | OXYGEN SATURATION: 96 % | BODY MASS INDEX: 33.99 KG/M2

## 2024-08-19 DIAGNOSIS — G89.4 CHRONIC PAIN SYNDROME: ICD-10-CM

## 2024-08-19 DIAGNOSIS — M25.512 CHRONIC LEFT SHOULDER PAIN: ICD-10-CM

## 2024-08-19 DIAGNOSIS — M25.511 CHRONIC RIGHT SHOULDER PAIN: ICD-10-CM

## 2024-08-19 DIAGNOSIS — M47.817 LUMBOSACRAL SPONDYLOSIS WITHOUT MYELOPATHY: ICD-10-CM

## 2024-08-19 DIAGNOSIS — M16.12 PRIMARY OSTEOARTHRITIS OF LEFT HIP: ICD-10-CM

## 2024-08-19 DIAGNOSIS — M79.18 MYOFASCIAL PAIN: ICD-10-CM

## 2024-08-19 DIAGNOSIS — G89.29 CHRONIC RIGHT SHOULDER PAIN: ICD-10-CM

## 2024-08-19 DIAGNOSIS — M79.7 FIBROMYALGIA: ICD-10-CM

## 2024-08-19 DIAGNOSIS — G89.29 CHRONIC LEFT SHOULDER PAIN: ICD-10-CM

## 2024-08-19 DIAGNOSIS — M51.36 DDD (DEGENERATIVE DISC DISEASE), LUMBAR: ICD-10-CM

## 2024-08-19 DIAGNOSIS — E11.42 DIABETIC POLYNEUROPATHY ASSOCIATED WITH TYPE 2 DIABETES MELLITUS (HCC): ICD-10-CM

## 2024-08-19 DIAGNOSIS — M54.16 LUMBAR RADICULOPATHY: ICD-10-CM

## 2024-08-19 RX ORDER — NORTRIPTYLINE HYDROCHLORIDE 25 MG/1
25-50 CAPSULE ORAL NIGHTLY
Qty: 60 CAPSULE | Refills: 1 | Status: SHIPPED | OUTPATIENT
Start: 2024-08-19

## 2024-08-19 RX ORDER — HYDROCODONE BITARTRATE AND ACETAMINOPHEN 5; 325 MG/1; MG/1
1 TABLET ORAL EVERY 6 HOURS PRN
Qty: 120 TABLET | Refills: 0 | Status: SHIPPED | OUTPATIENT
Start: 2024-08-19 | End: 2024-09-23

## 2024-08-19 RX ORDER — GABAPENTIN 300 MG/1
CAPSULE ORAL
Qty: 120 CAPSULE | Refills: 1 | Status: SHIPPED | OUTPATIENT
Start: 2024-08-19 | End: 2024-09-23

## 2024-08-19 RX ORDER — MELOXICAM 15 MG/1
TABLET ORAL
Qty: 30 TABLET | Refills: 1 | Status: SHIPPED | OUTPATIENT
Start: 2024-08-19

## 2024-08-19 NOTE — PROGRESS NOTES
Renan Tidwell  1960  [id number]      HISTORY OF PRESENT ILLNESS:  Mr. Tidwell is a 64 y.o. male returns for a follow up visit for pain management  He has a diagnosis of   1. Chronic pain syndrome    2. Fibromyalgia    3. Chronic right shoulder pain    4. Diabetic polyneuropathy associated with type 2 diabetes mellitus (HCC)    5. DDD (degenerative disc disease), lumbar    6. Lumbosacral spondylosis without myelopathy    7. Lumbar radiculopathy    8. Primary osteoarthritis of left hip    9. Myofascial pain    10. Chronic left shoulder pain    11. Primary insomnia    .      As per information/history obtained from the PADT(patient assessment and documentation tool) -  He complains of pain in the shoulders Right and lower back with radiation to the buttocks and hips Left He rates the pain 6/10 and describes it as aching.  Pain is made worse by: nothing, movement, walking, standing, sitting, bending, lifting. He denies any side effects from the current pain regimen. Patient reports that since last follow up visit the physical functioning is better, family/social relationships are better, mood is better sleep patterns are better. Mr. Tidwell states that since starting the treatment with the current regimen the  overall functioning  in the above aspects is  better, Patient denies misusing/abusing his narcotic pain medications or using any illegal drugs.  There are No indicators for possible drug abuse, addiction or diversion problems. Upon obtaining the medical history from Mr. Tidwell regarding today's office visit for his presenting problems, patient states patient reports that the pain is fairly well tolerated with the current regimen has had some exacerbations, but overall has been tolerable. Mr. Tidwell states that  he has been staying with his exercise routine and working indoors and/or outdoors as tolerated, performing household chores, she is managing with the medications. Mr. Tidwell states he has been volunteering for

## 2024-08-22 DIAGNOSIS — E11.65 UNCONTROLLED TYPE 2 DIABETES MELLITUS WITH HYPERGLYCEMIA (HCC): ICD-10-CM

## 2024-08-23 RX ORDER — METFORMIN HYDROCHLORIDE 500 MG/1
TABLET, EXTENDED RELEASE ORAL
Qty: 180 TABLET | Refills: 3 | Status: SHIPPED | OUTPATIENT
Start: 2024-08-23

## 2024-08-23 NOTE — TELEPHONE ENCOUNTER
Medication:   Requested Prescriptions     Signed Prescriptions Disp Refills    metFORMIN (GLUCOPHAGE-XR) 500 MG extended release tablet 180 tablet 3     Sig: TAKE TWO TABLETS BY MOUTH DAILY WITH BREAKFAST     Authorizing Provider: ISELA FUENTES     Ordering User: ILANA NARAYAN       Last Filled:      Patient Phone Number: [phone] (home)     Last appt: 3/16/2023   Next appt: 12/10/2024    Last Labs DM:   Lab Results   Component Value Date/Time    LABA1C 8.0 06/10/2024 03:20 PM

## 2024-09-23 ENCOUNTER — OFFICE VISIT (OUTPATIENT)
Dept: PAIN MANAGEMENT | Age: 64
End: 2024-09-23
Payer: COMMERCIAL

## 2024-09-23 VITALS
DIASTOLIC BLOOD PRESSURE: 89 MMHG | HEART RATE: 98 BPM | SYSTOLIC BLOOD PRESSURE: 139 MMHG | OXYGEN SATURATION: 99 % | BODY MASS INDEX: 34.86 KG/M2 | WEIGHT: 222.6 LBS

## 2024-09-23 DIAGNOSIS — M25.511 CHRONIC RIGHT SHOULDER PAIN: ICD-10-CM

## 2024-09-23 DIAGNOSIS — E11.42 DIABETIC POLYNEUROPATHY ASSOCIATED WITH TYPE 2 DIABETES MELLITUS (HCC): ICD-10-CM

## 2024-09-23 DIAGNOSIS — M79.7 FIBROMYALGIA: ICD-10-CM

## 2024-09-23 DIAGNOSIS — M54.16 LUMBAR RADICULOPATHY: ICD-10-CM

## 2024-09-23 DIAGNOSIS — M79.18 MYOFASCIAL PAIN: ICD-10-CM

## 2024-09-23 DIAGNOSIS — M25.512 CHRONIC LEFT SHOULDER PAIN: ICD-10-CM

## 2024-09-23 DIAGNOSIS — M47.817 LUMBOSACRAL SPONDYLOSIS WITHOUT MYELOPATHY: ICD-10-CM

## 2024-09-23 DIAGNOSIS — Z91.89 AT RISK FOR RESPIRATORY DEPRESSION DUE TO OPIOID: ICD-10-CM

## 2024-09-23 DIAGNOSIS — G89.29 CHRONIC RIGHT SHOULDER PAIN: ICD-10-CM

## 2024-09-23 DIAGNOSIS — M51.36 DDD (DEGENERATIVE DISC DISEASE), LUMBAR: ICD-10-CM

## 2024-09-23 DIAGNOSIS — M16.12 PRIMARY OSTEOARTHRITIS OF LEFT HIP: ICD-10-CM

## 2024-09-23 DIAGNOSIS — Z51.81 ENCOUNTER FOR THERAPEUTIC DRUG MONITORING: ICD-10-CM

## 2024-09-23 DIAGNOSIS — G89.29 CHRONIC LEFT SHOULDER PAIN: ICD-10-CM

## 2024-09-23 DIAGNOSIS — G89.4 CHRONIC PAIN SYNDROME: ICD-10-CM

## 2024-09-23 PROCEDURE — 1123F ACP DISCUSS/DSCN MKR DOCD: CPT | Performed by: INTERNAL MEDICINE

## 2024-09-23 PROCEDURE — 99213 OFFICE O/P EST LOW 20 MIN: CPT | Performed by: INTERNAL MEDICINE

## 2024-09-23 PROCEDURE — 2022F DILAT RTA XM EVC RTNOPTHY: CPT | Performed by: INTERNAL MEDICINE

## 2024-09-23 PROCEDURE — G8417 CALC BMI ABV UP PARAM F/U: HCPCS | Performed by: INTERNAL MEDICINE

## 2024-09-23 PROCEDURE — 3052F HG A1C>EQUAL 8.0%<EQUAL 9.0%: CPT | Performed by: INTERNAL MEDICINE

## 2024-09-23 PROCEDURE — 4004F PT TOBACCO SCREEN RCVD TLK: CPT | Performed by: INTERNAL MEDICINE

## 2024-09-23 PROCEDURE — 3017F COLORECTAL CA SCREEN DOC REV: CPT | Performed by: INTERNAL MEDICINE

## 2024-09-23 PROCEDURE — G8427 DOCREV CUR MEDS BY ELIG CLIN: HCPCS | Performed by: INTERNAL MEDICINE

## 2024-09-23 RX ORDER — NORTRIPTYLINE HCL 25 MG
25-50 CAPSULE ORAL NIGHTLY
Qty: 60 CAPSULE | Refills: 1 | Status: SHIPPED | OUTPATIENT
Start: 2024-09-23

## 2024-09-23 RX ORDER — MELOXICAM 15 MG/1
TABLET ORAL
Qty: 30 TABLET | Refills: 1 | Status: SHIPPED | OUTPATIENT
Start: 2024-09-23

## 2024-09-23 RX ORDER — GABAPENTIN 300 MG/1
CAPSULE ORAL
Qty: 120 CAPSULE | Refills: 1 | Status: SHIPPED | OUTPATIENT
Start: 2024-09-23 | End: 2024-10-28

## 2024-09-23 RX ORDER — HYDROCODONE BITARTRATE AND ACETAMINOPHEN 5; 325 MG/1; MG/1
1 TABLET ORAL EVERY 6 HOURS PRN
Qty: 120 TABLET | Refills: 0 | Status: SHIPPED | OUTPATIENT
Start: 2024-09-23 | End: 2024-10-28

## 2024-09-26 ENCOUNTER — TELEPHONE (OUTPATIENT)
Dept: PAIN MANAGEMENT | Age: 64
End: 2024-09-26

## 2024-09-26 ENCOUNTER — TELEPHONE (OUTPATIENT)
Dept: ENDOCRINOLOGY | Age: 64
End: 2024-09-26

## 2024-09-26 NOTE — TELEPHONE ENCOUNTER
Pt was in Monday and he discussed aquatic and physical therapy but he did not get an order for them due to needing to leave right away. Could he come pick those orders up? Please call pt when they are ready

## 2024-09-26 NOTE — TELEPHONE ENCOUNTER
Called patient to inform him that he can go to any [facility] to do his physical therapy, the referrals orders are in the [employer] network.

## 2024-10-03 ENCOUNTER — HOSPITAL ENCOUNTER (OUTPATIENT)
Dept: PHYSICAL THERAPY | Age: 64
Setting detail: THERAPIES SERIES
Discharge: HOME | End: 2024-10-03

## 2024-10-28 ENCOUNTER — OFFICE VISIT (OUTPATIENT)
Dept: PAIN MANAGEMENT | Age: 64
End: 2024-10-28
Payer: COMMERCIAL

## 2024-10-28 VITALS
HEART RATE: 77 BPM | DIASTOLIC BLOOD PRESSURE: 76 MMHG | SYSTOLIC BLOOD PRESSURE: 134 MMHG | WEIGHT: 226.2 LBS | BODY MASS INDEX: 35.43 KG/M2 | OXYGEN SATURATION: 96 %

## 2024-10-28 DIAGNOSIS — M79.18 MYOFASCIAL PAIN: ICD-10-CM

## 2024-10-28 DIAGNOSIS — M25.512 CHRONIC LEFT SHOULDER PAIN: ICD-10-CM

## 2024-10-28 DIAGNOSIS — G89.4 CHRONIC PAIN SYNDROME: ICD-10-CM

## 2024-10-28 DIAGNOSIS — M16.12 PRIMARY OSTEOARTHRITIS OF LEFT HIP: ICD-10-CM

## 2024-10-28 DIAGNOSIS — M51.362 DEGENERATION OF INTERVERTEBRAL DISC OF LUMBAR REGION WITH DISCOGENIC BACK PAIN AND LOWER EXTREMITY PAIN: ICD-10-CM

## 2024-10-28 DIAGNOSIS — E11.42 DIABETIC POLYNEUROPATHY ASSOCIATED WITH TYPE 2 DIABETES MELLITUS (HCC): ICD-10-CM

## 2024-10-28 DIAGNOSIS — M47.817 LUMBOSACRAL SPONDYLOSIS WITHOUT MYELOPATHY: ICD-10-CM

## 2024-10-28 DIAGNOSIS — G89.29 CHRONIC RIGHT SHOULDER PAIN: ICD-10-CM

## 2024-10-28 DIAGNOSIS — M25.511 CHRONIC RIGHT SHOULDER PAIN: ICD-10-CM

## 2024-10-28 DIAGNOSIS — M79.7 FIBROMYALGIA: ICD-10-CM

## 2024-10-28 DIAGNOSIS — G89.29 CHRONIC LEFT SHOULDER PAIN: ICD-10-CM

## 2024-10-28 DIAGNOSIS — M54.16 LUMBAR RADICULOPATHY: ICD-10-CM

## 2024-10-28 PROCEDURE — G8484 FLU IMMUNIZE NO ADMIN: HCPCS | Performed by: INTERNAL MEDICINE

## 2024-10-28 PROCEDURE — 4004F PT TOBACCO SCREEN RCVD TLK: CPT | Performed by: INTERNAL MEDICINE

## 2024-10-28 PROCEDURE — G8417 CALC BMI ABV UP PARAM F/U: HCPCS | Performed by: INTERNAL MEDICINE

## 2024-10-28 PROCEDURE — 99213 OFFICE O/P EST LOW 20 MIN: CPT | Performed by: INTERNAL MEDICINE

## 2024-10-28 PROCEDURE — 2022F DILAT RTA XM EVC RTNOPTHY: CPT | Performed by: INTERNAL MEDICINE

## 2024-10-28 PROCEDURE — G8427 DOCREV CUR MEDS BY ELIG CLIN: HCPCS | Performed by: INTERNAL MEDICINE

## 2024-10-28 PROCEDURE — 1123F ACP DISCUSS/DSCN MKR DOCD: CPT | Performed by: INTERNAL MEDICINE

## 2024-10-28 PROCEDURE — 3017F COLORECTAL CA SCREEN DOC REV: CPT | Performed by: INTERNAL MEDICINE

## 2024-10-28 PROCEDURE — 3052F HG A1C>EQUAL 8.0%<EQUAL 9.0%: CPT | Performed by: INTERNAL MEDICINE

## 2024-10-28 RX ORDER — GABAPENTIN 300 MG/1
CAPSULE ORAL
Qty: 120 CAPSULE | Refills: 1 | Status: SHIPPED | OUTPATIENT
Start: 2024-10-28 | End: 2024-12-02

## 2024-10-28 RX ORDER — HYDROCODONE BITARTRATE AND ACETAMINOPHEN 5; 325 MG/1; MG/1
1 TABLET ORAL EVERY 6 HOURS PRN
Qty: 120 TABLET | Refills: 0 | Status: SHIPPED | OUTPATIENT
Start: 2024-10-28 | End: 2024-12-02

## 2024-10-28 RX ORDER — MELOXICAM 15 MG/1
TABLET ORAL
Qty: 30 TABLET | Refills: 0 | Status: SHIPPED | OUTPATIENT
Start: 2024-10-28

## 2024-10-28 RX ORDER — NORTRIPTYLINE HYDROCHLORIDE 25 MG/1
25-50 CAPSULE ORAL NIGHTLY
Qty: 60 CAPSULE | Refills: 1 | Status: SHIPPED | OUTPATIENT
Start: 2024-10-28

## 2024-10-28 NOTE — PROGRESS NOTES
Renan Tidwell  1960  [id number]      HISTORY OF PRESENT ILLNESS:  Mr. Tidwell is a 64 y.o. male returns for a follow up visit for pain management  He has a diagnosis of   1. Chronic pain syndrome    2. Fibromyalgia    3. Chronic right shoulder pain    4. Diabetic polyneuropathy associated with type 2 diabetes mellitus (HCC)    5. Degeneration of intervertebral disc of lumbar region with discogenic back pain and lower extremity pain    6. Lumbosacral spondylosis without myelopathy    7. Lumbar radiculopathy    8. Myofascial pain    9. Primary osteoarthritis of left hip    10. Chronic left shoulder pain    11. Primary insomnia    .      As per information/history obtained from the PADT(patient assessment and documentation tool) -  He complains of pain in the neck, mid back, and lower back with radiation to the shoulders Right, buttocks, and hips Left He rates the pain 6/10 and describes it as burning.  Pain is made worse by: movement, standing, sitting. He denies any side effects from the current pain regimen. Patient reports that since last follow up visit the physical functioning is unchanged, family/social relationships are better, mood is better sleep patterns are unchanged. Mr. Tidwell states that since starting the treatment with the current regimen the  overall functioning  in the above aspects is  better, Patient denies misusing/abusing his narcotic pain medications or using any illegal drugs.  There are No indicators for possible drug abuse, addiction or diversion problems.   Upon obtaining the medical history from Mr. Tidwell regarding today's office visit for his presenting problems, patient states he has been doing fair, his pain has been manageable somewhat. Mr. Tidwell states he is doing his swimming exercises 2-3 times per week and walking with a cane. He mentions he is using Norco 3-4 per day.     ALLERGIES: Patients list of allergies were reviewed     MEDICATIONS: Mr. Tidwell list of medications were

## 2024-11-01 DIAGNOSIS — E11.65 UNCONTROLLED TYPE 2 DIABETES MELLITUS WITH HYPERGLYCEMIA (HCC): ICD-10-CM

## 2024-11-01 RX ORDER — PEN NEEDLE, DIABETIC 32GX 5/32"
NEEDLE, DISPOSABLE MISCELLANEOUS
Qty: 100 EACH | Refills: 2 | Status: SHIPPED | OUTPATIENT
Start: 2024-11-01

## 2024-11-16 DIAGNOSIS — E11.65 UNCONTROLLED TYPE 2 DIABETES MELLITUS WITH HYPERGLYCEMIA (HCC): ICD-10-CM

## 2024-11-18 ENCOUNTER — TELEPHONE (OUTPATIENT)
Dept: ENDOCRINOLOGY | Age: 64
End: 2024-11-18

## 2024-11-18 DIAGNOSIS — E11.65 UNCONTROLLED TYPE 2 DIABETES MELLITUS WITH HYPERGLYCEMIA (HCC): ICD-10-CM

## 2024-11-18 DIAGNOSIS — Z79.4 INSULIN LONG-TERM USE (HCC): ICD-10-CM

## 2024-11-18 RX ORDER — TIRZEPATIDE 5 MG/.5ML
INJECTION, SOLUTION SUBCUTANEOUS
Qty: 2 ML | Refills: 1 | Status: SHIPPED | OUTPATIENT
Start: 2024-11-18

## 2024-11-18 NOTE — TELEPHONE ENCOUNTER
Pt calling in to get a refill request in for RFIDeas Re 2 sensors.  Can you please send this over to the pharmacy.     [facility]/pharmacy #[id number] - [place], [place] - [address] - P [phone] - F [phone]  [facility] [facility] [address]  Phone: [phone]  Fax: [phone]

## 2024-12-02 ENCOUNTER — OFFICE VISIT (OUTPATIENT)
Dept: PAIN MANAGEMENT | Age: 64
End: 2024-12-02
Payer: COMMERCIAL

## 2024-12-02 VITALS
WEIGHT: 230.2 LBS | HEART RATE: 86 BPM | DIASTOLIC BLOOD PRESSURE: 91 MMHG | BODY MASS INDEX: 36.05 KG/M2 | OXYGEN SATURATION: 98 % | SYSTOLIC BLOOD PRESSURE: 171 MMHG

## 2024-12-02 DIAGNOSIS — G89.29 CHRONIC LEFT SHOULDER PAIN: ICD-10-CM

## 2024-12-02 DIAGNOSIS — M25.511 CHRONIC RIGHT SHOULDER PAIN: ICD-10-CM

## 2024-12-02 DIAGNOSIS — M16.12 PRIMARY OSTEOARTHRITIS OF LEFT HIP: ICD-10-CM

## 2024-12-02 DIAGNOSIS — E11.42 DIABETIC POLYNEUROPATHY ASSOCIATED WITH TYPE 2 DIABETES MELLITUS (HCC): ICD-10-CM

## 2024-12-02 DIAGNOSIS — G89.4 CHRONIC PAIN SYNDROME: ICD-10-CM

## 2024-12-02 DIAGNOSIS — M79.18 MYOFASCIAL PAIN: ICD-10-CM

## 2024-12-02 DIAGNOSIS — G89.29 CHRONIC RIGHT SHOULDER PAIN: ICD-10-CM

## 2024-12-02 DIAGNOSIS — M51.362 DEGENERATION OF INTERVERTEBRAL DISC OF LUMBAR REGION WITH DISCOGENIC BACK PAIN AND LOWER EXTREMITY PAIN: ICD-10-CM

## 2024-12-02 DIAGNOSIS — M54.16 LUMBAR RADICULOPATHY: ICD-10-CM

## 2024-12-02 DIAGNOSIS — M79.7 FIBROMYALGIA: ICD-10-CM

## 2024-12-02 DIAGNOSIS — M25.512 CHRONIC LEFT SHOULDER PAIN: ICD-10-CM

## 2024-12-02 DIAGNOSIS — M47.817 LUMBOSACRAL SPONDYLOSIS WITHOUT MYELOPATHY: ICD-10-CM

## 2024-12-02 PROCEDURE — 2022F DILAT RTA XM EVC RTNOPTHY: CPT | Performed by: INTERNAL MEDICINE

## 2024-12-02 PROCEDURE — 3017F COLORECTAL CA SCREEN DOC REV: CPT | Performed by: INTERNAL MEDICINE

## 2024-12-02 PROCEDURE — G8484 FLU IMMUNIZE NO ADMIN: HCPCS | Performed by: INTERNAL MEDICINE

## 2024-12-02 PROCEDURE — 4004F PT TOBACCO SCREEN RCVD TLK: CPT | Performed by: INTERNAL MEDICINE

## 2024-12-02 PROCEDURE — G8417 CALC BMI ABV UP PARAM F/U: HCPCS | Performed by: INTERNAL MEDICINE

## 2024-12-02 PROCEDURE — 1123F ACP DISCUSS/DSCN MKR DOCD: CPT | Performed by: INTERNAL MEDICINE

## 2024-12-02 PROCEDURE — 99213 OFFICE O/P EST LOW 20 MIN: CPT | Performed by: INTERNAL MEDICINE

## 2024-12-02 PROCEDURE — 3052F HG A1C>EQUAL 8.0%<EQUAL 9.0%: CPT | Performed by: INTERNAL MEDICINE

## 2024-12-02 PROCEDURE — G8427 DOCREV CUR MEDS BY ELIG CLIN: HCPCS | Performed by: INTERNAL MEDICINE

## 2024-12-02 RX ORDER — MELOXICAM 15 MG/1
TABLET ORAL
Qty: 30 TABLET | Refills: 0 | Status: SHIPPED | OUTPATIENT
Start: 2024-12-02

## 2024-12-02 RX ORDER — NORTRIPTYLINE HYDROCHLORIDE 25 MG/1
25-50 CAPSULE ORAL NIGHTLY
Qty: 60 CAPSULE | Refills: 1 | Status: SHIPPED | OUTPATIENT
Start: 2024-12-02

## 2024-12-02 RX ORDER — GABAPENTIN 300 MG/1
CAPSULE ORAL
Qty: 120 CAPSULE | Refills: 1 | Status: SHIPPED | OUTPATIENT
Start: 2024-12-02 | End: 2025-01-06

## 2024-12-02 RX ORDER — HYDROCODONE BITARTRATE AND ACETAMINOPHEN 5; 325 MG/1; MG/1
1 TABLET ORAL EVERY 6 HOURS PRN
Qty: 120 TABLET | Refills: 0 | Status: SHIPPED | OUTPATIENT
Start: 2024-12-02 | End: 2025-01-06

## 2024-12-02 NOTE — PROGRESS NOTES
Renan Tidwell  1960  [id number]      HISTORY OF PRESENT ILLNESS:  Mr. Tidwell is a 64 y.o. male returns for a follow up visit for pain management  He has a diagnosis of   1. Chronic pain syndrome    2. Fibromyalgia    3. Chronic right shoulder pain    4. Diabetic polyneuropathy associated with type 2 diabetes mellitus (HCC)    5. Degeneration of intervertebral disc of lumbar region with discogenic back pain and lower extremity pain    6. Lumbosacral spondylosis without myelopathy    7. Lumbar radiculopathy    8. Myofascial pain    9. Primary osteoarthritis of left hip    10. Chronic left shoulder pain    11. Primary insomnia    .      As per information/history obtained from the PADT(patient assessment and documentation tool) -  He complains of pain in the neck, mid back, and lower back with radiation to the shoulders Right, buttocks, and hips Left He rates the pain 7/10 and describes it as aching.  Pain is made worse by: movement, walking, standing, sitting, bending, lifting. He denies any side effects from the current pain regimen. Patient reports that since last follow up visit the physical functioning is worse, family/social relationships are better, mood is better sleep patterns are worse. Mr. Tidwell states that since starting the treatment with the current regimen the  overall functioning  in the above aspects is  better, Patient denies misusing/abusing his narcotic pain medications or using any illegal drugs.  There are No indicators for possible drug abuse, addiction or diversion problems.   Upon obtaining the medical history from Mr. Tidwell regarding today's office visit for his presenting problems, patient states he has been doing fair, he states he has been compliant with the medications. Mr. Tidwell mentions he is using Neurontin along with Norco 3-4 per day, he denies any side effects. Patients BP is high today, he is on medications for it. He says he is Volunteering with his [employer]. Patient reports his

## 2024-12-10 ENCOUNTER — OFFICE VISIT (OUTPATIENT)
Dept: ENDOCRINOLOGY | Age: 64
End: 2024-12-10
Payer: COMMERCIAL

## 2024-12-10 VITALS
RESPIRATION RATE: 15 BRPM | WEIGHT: 222 LBS | DIASTOLIC BLOOD PRESSURE: 80 MMHG | BODY MASS INDEX: 34.77 KG/M2 | OXYGEN SATURATION: 97 % | HEART RATE: 80 BPM | SYSTOLIC BLOOD PRESSURE: 131 MMHG

## 2024-12-10 DIAGNOSIS — E11.65 UNCONTROLLED TYPE 2 DIABETES MELLITUS WITH HYPERGLYCEMIA (HCC): ICD-10-CM

## 2024-12-10 DIAGNOSIS — I10 PRIMARY HYPERTENSION: Primary | ICD-10-CM

## 2024-12-10 LAB — HBA1C MFR BLD: 8.6 %

## 2024-12-10 PROCEDURE — 3075F SYST BP GE 130 - 139MM HG: CPT | Performed by: INTERNAL MEDICINE

## 2024-12-10 PROCEDURE — 4004F PT TOBACCO SCREEN RCVD TLK: CPT | Performed by: INTERNAL MEDICINE

## 2024-12-10 PROCEDURE — G8427 DOCREV CUR MEDS BY ELIG CLIN: HCPCS | Performed by: INTERNAL MEDICINE

## 2024-12-10 PROCEDURE — G8417 CALC BMI ABV UP PARAM F/U: HCPCS | Performed by: INTERNAL MEDICINE

## 2024-12-10 PROCEDURE — 83036 HEMOGLOBIN GLYCOSYLATED A1C: CPT | Performed by: INTERNAL MEDICINE

## 2024-12-10 PROCEDURE — 3079F DIAST BP 80-89 MM HG: CPT | Performed by: INTERNAL MEDICINE

## 2024-12-10 PROCEDURE — 99214 OFFICE O/P EST MOD 30 MIN: CPT | Performed by: INTERNAL MEDICINE

## 2024-12-10 PROCEDURE — 1123F ACP DISCUSS/DSCN MKR DOCD: CPT | Performed by: INTERNAL MEDICINE

## 2024-12-10 PROCEDURE — 3017F COLORECTAL CA SCREEN DOC REV: CPT | Performed by: INTERNAL MEDICINE

## 2024-12-10 PROCEDURE — 3052F HG A1C>EQUAL 8.0%<EQUAL 9.0%: CPT | Performed by: INTERNAL MEDICINE

## 2024-12-10 PROCEDURE — 2022F DILAT RTA XM EVC RTNOPTHY: CPT | Performed by: INTERNAL MEDICINE

## 2024-12-10 PROCEDURE — G8484 FLU IMMUNIZE NO ADMIN: HCPCS | Performed by: INTERNAL MEDICINE

## 2024-12-10 RX ORDER — TIRZEPATIDE 7.5 MG/.5ML
INJECTION, SOLUTION SUBCUTANEOUS
Qty: 2 ML | Refills: 3 | Status: SHIPPED | OUTPATIENT
Start: 2024-12-10

## 2024-12-10 RX ORDER — INSULIN LISPRO 100 [IU]/ML
INJECTION, SOLUTION INTRAVENOUS; SUBCUTANEOUS
Qty: 30 ML | Refills: 3 | Status: SHIPPED | OUTPATIENT
Start: 2024-12-10

## 2024-12-10 RX ORDER — INSULIN GLARGINE 100 [IU]/ML
INJECTION, SOLUTION SUBCUTANEOUS
Qty: 45 ML | Refills: 3 | Status: SHIPPED | OUTPATIENT
Start: 2024-12-10

## 2024-12-10 RX ORDER — TIRZEPATIDE 5 MG/.5ML
INJECTION, SOLUTION SUBCUTANEOUS
Qty: 2 ML | Refills: 1 | Status: CANCELLED | OUTPATIENT
Start: 2024-12-10

## 2024-12-10 NOTE — PROGRESS NOTES
speech is spontaneous  Chest: No labored breathing, no chest deformity, no stridor  Musculoskeletal: No joint deformity, swelling  Left upper buttock area of scab/healed lesion surrounding by redness. No fluid palpable/fluctuant    2/24  foot exam : monofilament detected, no ulcer    Lab Reviewed   No components found for: \"CHLPL\"  Lab Results   Component Value Date    TRIG 223 (H) 12/13/2022     Lab Results   Component Value Date    HDL 35 (L) 12/13/2022     No components found for: \"LDLCALC\"    No components found for: \"LABVLDL\"    Lab Results   Component Value Date    LABA1C 8.0 06/10/2024       Assessment:     Renan Tidwell is a 64 y.o. male with :    1.T2DM : Longstanding, uncontrolled. On metformin. A1c  high.On basal bolus recommend dietary changes. SGLT-2 inhibitor is not covered, off glipizide.  Ordered CGM for better monitoring.Needs to bring reader  Increase lantus as taking lower dose. Tolerating mounjaro , will try higher dose.   Advised to take humalog even if glucose < 100  A1c mismatch with CGM. Check fructosamine , not done  2.Chronic pain syndrome: On neurontin  3.HLD ; On lipitor, LDL at goal  4.Obesity  5.Elevated BP:At goal, on lisinopril       Plan:      Lantus 18 units   Humalog 20 units AC TID   SSI 2 for 50 >150 combined scale   - 10 < 80   Advise to check blood sugar 3 times a day   Patient to send blood sugar log for titration every week   Advise to exercise regularly. Advise to low simple carbohydrate and protein with each  meal diet.    Diabetes Care: recommend yearly eye exam, foot exam and urine microalbumin to   creatinine ratio. Patient isdue    -Hyperlipidemia, LDL goal is <100 mg/dl   -Daily ASA: 81mg daily

## 2025-01-09 ENCOUNTER — OFFICE VISIT (OUTPATIENT)
Dept: PAIN MANAGEMENT | Age: 65
End: 2025-01-09

## 2025-01-09 VITALS
WEIGHT: 223 LBS | HEART RATE: 86 BPM | SYSTOLIC BLOOD PRESSURE: 122 MMHG | DIASTOLIC BLOOD PRESSURE: 69 MMHG | BODY MASS INDEX: 34.93 KG/M2 | OXYGEN SATURATION: 96 %

## 2025-01-09 DIAGNOSIS — M25.512 CHRONIC LEFT SHOULDER PAIN: ICD-10-CM

## 2025-01-09 DIAGNOSIS — G89.4 CHRONIC PAIN SYNDROME: ICD-10-CM

## 2025-01-09 DIAGNOSIS — M79.7 FIBROMYALGIA: ICD-10-CM

## 2025-01-09 DIAGNOSIS — G89.29 CHRONIC RIGHT SHOULDER PAIN: ICD-10-CM

## 2025-01-09 DIAGNOSIS — M47.817 LUMBOSACRAL SPONDYLOSIS WITHOUT MYELOPATHY: ICD-10-CM

## 2025-01-09 DIAGNOSIS — M16.12 PRIMARY OSTEOARTHRITIS OF LEFT HIP: ICD-10-CM

## 2025-01-09 DIAGNOSIS — E11.42 DIABETIC POLYNEUROPATHY ASSOCIATED WITH TYPE 2 DIABETES MELLITUS (HCC): ICD-10-CM

## 2025-01-09 DIAGNOSIS — M79.18 MYOFASCIAL PAIN: ICD-10-CM

## 2025-01-09 DIAGNOSIS — M54.16 LUMBAR RADICULOPATHY: ICD-10-CM

## 2025-01-09 DIAGNOSIS — M51.362 DEGENERATION OF INTERVERTEBRAL DISC OF LUMBAR REGION WITH DISCOGENIC BACK PAIN AND LOWER EXTREMITY PAIN: ICD-10-CM

## 2025-01-09 DIAGNOSIS — G89.29 CHRONIC LEFT SHOULDER PAIN: ICD-10-CM

## 2025-01-09 DIAGNOSIS — M25.511 CHRONIC RIGHT SHOULDER PAIN: ICD-10-CM

## 2025-01-09 RX ORDER — HYDROCODONE BITARTRATE AND ACETAMINOPHEN 5; 325 MG/1; MG/1
1 TABLET ORAL EVERY 6 HOURS PRN
Qty: 120 TABLET | Refills: 0 | Status: SHIPPED | OUTPATIENT
Start: 2025-01-09 | End: 2025-02-20

## 2025-01-09 NOTE — PROGRESS NOTES
Renan Tidwell  1960  [id number]      HISTORY OF PRESENT ILLNESS:  Mr. Tidwell is a 64 y.o. male returns for a follow up visit for pain management  He has a diagnosis of   1. Chronic pain syndrome    2. Diabetic polyneuropathy associated with type 2 diabetes mellitus (HCC)    3. Lumbar radiculopathy    4. Chronic left shoulder pain    5. Fibromyalgia    6. Degeneration of intervertebral disc of lumbar region with discogenic back pain and lower extremity pain    7. Myofascial pain    8. Chronic right shoulder pain    9. Lumbosacral spondylosis without myelopathy    10. Primary osteoarthritis of left hip    .      As per information/history obtained from the PADT(patient assessment and documentation tool) -  He complains of pain in the neck, lower back, and feet Bilateral with radiation to the shoulders Right, buttocks, and hips Left He rates the pain 7/10 and describes it as aching, numbness.  Pain is made worse by: movement, walking, standing, sitting, bending, lifting. He denies any side effects from the current pain regimen. Patient reports that since last follow up visit the physical functioning is better, family/social relationships are better, mood is better sleep patterns are better. Mr. Tidwell states that since starting the treatment with the current regimen the  overall functioning  in the above aspects is  better, Patient denies misusing/abusing his narcotic pain medications or using any illegal drugs.  There are No indicators for possible drug abuse, addiction or diversion problems.   Upon obtaining the medical history from Mr. Tidwell regarding today's office visit for his presenting problems, patient states she has been doing fair, she is managing with the medications. Mr. Tidwell mentions he is using Norco 3-4 per day along with other adjuvants. He states he has been compliant with his medications. Patient reports he is managing his house chores and volunteering at the [employer].       ALLERGIES: Patients list

## 2025-02-02 DIAGNOSIS — G89.4 CHRONIC PAIN SYNDROME: ICD-10-CM

## 2025-02-03 RX ORDER — NORTRIPTYLINE HYDROCHLORIDE 25 MG/1
25-50 CAPSULE ORAL NIGHTLY
Qty: 180 CAPSULE | Refills: 1 | OUTPATIENT
Start: 2025-02-03

## 2025-02-26 ENCOUNTER — TELEPHONE (OUTPATIENT)
Dept: ENDOCRINOLOGY | Age: 65
End: 2025-02-26

## 2025-02-27 NOTE — TELEPHONE ENCOUNTER
Pt has medicare. Please have pharmacy [place] part [place] [facility]    If this requires a response please respond to the pool ( P [facility] MEDICATION PRE-AUTH).      Thank you please advise patient.

## 2025-03-03 ENCOUNTER — OFFICE VISIT (OUTPATIENT)
Dept: PAIN MANAGEMENT | Age: 65
End: 2025-03-03
Payer: COMMERCIAL

## 2025-03-03 VITALS
SYSTOLIC BLOOD PRESSURE: 113 MMHG | OXYGEN SATURATION: 98 % | BODY MASS INDEX: 35.4 KG/M2 | HEART RATE: 79 BPM | DIASTOLIC BLOOD PRESSURE: 68 MMHG | WEIGHT: 226 LBS

## 2025-03-03 DIAGNOSIS — M54.16 LUMBAR RADICULOPATHY: ICD-10-CM

## 2025-03-03 DIAGNOSIS — M47.817 LUMBOSACRAL SPONDYLOSIS WITHOUT MYELOPATHY: ICD-10-CM

## 2025-03-03 DIAGNOSIS — M16.12 PRIMARY OSTEOARTHRITIS OF LEFT HIP: ICD-10-CM

## 2025-03-03 DIAGNOSIS — E11.42 DIABETIC POLYNEUROPATHY ASSOCIATED WITH TYPE 2 DIABETES MELLITUS (HCC): ICD-10-CM

## 2025-03-03 DIAGNOSIS — M25.511 CHRONIC RIGHT SHOULDER PAIN: ICD-10-CM

## 2025-03-03 DIAGNOSIS — G89.29 CHRONIC LEFT SHOULDER PAIN: ICD-10-CM

## 2025-03-03 DIAGNOSIS — M79.7 FIBROMYALGIA: ICD-10-CM

## 2025-03-03 DIAGNOSIS — M79.18 MYOFASCIAL PAIN: ICD-10-CM

## 2025-03-03 DIAGNOSIS — M25.512 CHRONIC LEFT SHOULDER PAIN: ICD-10-CM

## 2025-03-03 DIAGNOSIS — G89.29 CHRONIC RIGHT SHOULDER PAIN: ICD-10-CM

## 2025-03-03 DIAGNOSIS — G89.4 CHRONIC PAIN SYNDROME: ICD-10-CM

## 2025-03-03 DIAGNOSIS — M51.362 DEGENERATION OF INTERVERTEBRAL DISC OF LUMBAR REGION WITH DISCOGENIC BACK PAIN AND LOWER EXTREMITY PAIN: ICD-10-CM

## 2025-03-03 PROCEDURE — 1123F ACP DISCUSS/DSCN MKR DOCD: CPT | Performed by: INTERNAL MEDICINE

## 2025-03-03 PROCEDURE — 2022F DILAT RTA XM EVC RTNOPTHY: CPT | Performed by: INTERNAL MEDICINE

## 2025-03-03 PROCEDURE — 3017F COLORECTAL CA SCREEN DOC REV: CPT | Performed by: INTERNAL MEDICINE

## 2025-03-03 PROCEDURE — 3046F HEMOGLOBIN A1C LEVEL >9.0%: CPT | Performed by: INTERNAL MEDICINE

## 2025-03-03 PROCEDURE — G8427 DOCREV CUR MEDS BY ELIG CLIN: HCPCS | Performed by: INTERNAL MEDICINE

## 2025-03-03 PROCEDURE — G8417 CALC BMI ABV UP PARAM F/U: HCPCS | Performed by: INTERNAL MEDICINE

## 2025-03-03 PROCEDURE — 4004F PT TOBACCO SCREEN RCVD TLK: CPT | Performed by: INTERNAL MEDICINE

## 2025-03-03 PROCEDURE — 99213 OFFICE O/P EST LOW 20 MIN: CPT | Performed by: INTERNAL MEDICINE

## 2025-03-03 PROCEDURE — 1159F MED LIST DOCD IN RCRD: CPT | Performed by: INTERNAL MEDICINE

## 2025-03-03 RX ORDER — GABAPENTIN 300 MG/1
CAPSULE ORAL
Qty: 120 CAPSULE | Refills: 1 | Status: SHIPPED | OUTPATIENT
Start: 2025-03-03 | End: 2025-04-07

## 2025-03-03 RX ORDER — HYDROCODONE BITARTRATE AND ACETAMINOPHEN 5; 325 MG/1; MG/1
1 TABLET ORAL EVERY 6 HOURS PRN
Qty: 120 TABLET | Refills: 0 | Status: SHIPPED | OUTPATIENT
Start: 2025-03-03 | End: 2025-04-07

## 2025-03-03 RX ORDER — NORTRIPTYLINE HYDROCHLORIDE 25 MG/1
25-50 CAPSULE ORAL NIGHTLY
Qty: 60 CAPSULE | Refills: 1 | Status: SHIPPED | OUTPATIENT
Start: 2025-03-03

## 2025-03-03 RX ORDER — MELOXICAM 15 MG/1
TABLET ORAL
Qty: 30 TABLET | Refills: 0 | Status: SHIPPED | OUTPATIENT
Start: 2025-03-03

## 2025-03-03 NOTE — PROGRESS NOTES
Dx DM 1 each 0    Continuous Blood Gluc  (FREESTYLE MANE 2 READER) RO As needed 1 each 0    ONETOUCH ULTRA strip 4 TIMES A DAY AS NEEDED. 100 strip 3    hydrALAZINE (APRESOLINE) 50 MG tablet TAKE 1 TABLET BY MOUTH TWICE A DAY      ARIPiprazole (ABILIFY) 10 MG tablet       carvedilol (COREG) 3.125 MG tablet TAKE 1 TABLET BY MOUTH TWICE A DAY WITH MEALS      KLOR-CON M20 20 MEQ extended release tablet TAKE 1 TABLET BY MOUTH EVERY DAY      busPIRone (BUSPAR) 10 MG tablet TAKE 1 TABLET BY MOUTH THREE TIMES A DAY      amLODIPine (NORVASC) 10 MG tablet TAKE 1 TABLET BY MOUTH EVERY DAY      FreeStyle Lancets MISC USE AS DIRECTED 100 each 3    Blood Glucose Monitoring Suppl (FREESTYLE LITE) RO As needed 1 each 1    ONE TOUCH ULTRASOFT LANCETS MISC 1 each by Does not apply route 4 times daily 100 each 6    spironolactone (ALDACTONE) 25 MG tablet Take 1 tablet by mouth daily      mupirocin (BACTROBAN) 2 % ointment Apply 22 g topically 3 times daily Apply topically 3 times daily.      Blood Glucose Monitoring Suppl (FREESTYLE LITE) RO As needed 1 each 0    omeprazole (PRILOSEC) 20 MG delayed release capsule TAKE 1 CAPSULE BY MOUTH EVERY DAY      Cholecalciferol (VITAMIN D PO) Take 1 tablet by mouth every 7 days Pt to clarify dose      furosemide (LASIX) 20 MG tablet Take 2 tablets by mouth daily      Fluticasone Propionate (FLONASE NA) 1 spray by Nasal route daily Each nostril      SALINE MIST SPRAY NA 1 spray by Nasal route 3 times daily as needed Each nostril 2-3 times per day      Lancets MISC 1 each by Does not apply route 3 times daily 100 each 3    loratadine (CLARITIN) 10 MG tablet Take 1 tablet by mouth daily      atorvastatin (LIPITOR) 80 MG tablet Take 1 tablet by mouth daily      gabapentin (NEURONTIN) 300 MG capsule Take 1 capsule po in the morning, 1 capsule in afternoon and take 2 capsules po in the evening 120 capsule 1    ezetimibe (ZETIA) 10 MG tablet Take 1 tablet by mouth daily       No

## 2025-03-17 ENCOUNTER — OFFICE VISIT (OUTPATIENT)
Dept: ENDOCRINOLOGY | Age: 65
End: 2025-03-17
Payer: COMMERCIAL

## 2025-03-17 VITALS
BODY MASS INDEX: 35.08 KG/M2 | WEIGHT: 224 LBS | OXYGEN SATURATION: 97 % | HEART RATE: 86 BPM | SYSTOLIC BLOOD PRESSURE: 134 MMHG | RESPIRATION RATE: 16 BRPM | DIASTOLIC BLOOD PRESSURE: 89 MMHG

## 2025-03-17 DIAGNOSIS — E11.65 UNCONTROLLED TYPE 2 DIABETES MELLITUS WITH HYPERGLYCEMIA (HCC): ICD-10-CM

## 2025-03-17 DIAGNOSIS — I10 PRIMARY HYPERTENSION: ICD-10-CM

## 2025-03-17 DIAGNOSIS — Z79.4 INSULIN LONG-TERM USE (HCC): ICD-10-CM

## 2025-03-17 DIAGNOSIS — E78.49 OTHER HYPERLIPIDEMIA: Primary | ICD-10-CM

## 2025-03-17 LAB — HBA1C MFR BLD: 9 %

## 2025-03-17 PROCEDURE — 3046F HEMOGLOBIN A1C LEVEL >9.0%: CPT | Performed by: INTERNAL MEDICINE

## 2025-03-17 PROCEDURE — 83036 HEMOGLOBIN GLYCOSYLATED A1C: CPT | Performed by: INTERNAL MEDICINE

## 2025-03-17 PROCEDURE — 1159F MED LIST DOCD IN RCRD: CPT | Performed by: INTERNAL MEDICINE

## 2025-03-17 PROCEDURE — 3017F COLORECTAL CA SCREEN DOC REV: CPT | Performed by: INTERNAL MEDICINE

## 2025-03-17 PROCEDURE — 2022F DILAT RTA XM EVC RTNOPTHY: CPT | Performed by: INTERNAL MEDICINE

## 2025-03-17 PROCEDURE — 99214 OFFICE O/P EST MOD 30 MIN: CPT | Performed by: INTERNAL MEDICINE

## 2025-03-17 PROCEDURE — 4004F PT TOBACCO SCREEN RCVD TLK: CPT | Performed by: INTERNAL MEDICINE

## 2025-03-17 PROCEDURE — G8417 CALC BMI ABV UP PARAM F/U: HCPCS | Performed by: INTERNAL MEDICINE

## 2025-03-17 PROCEDURE — 1123F ACP DISCUSS/DSCN MKR DOCD: CPT | Performed by: INTERNAL MEDICINE

## 2025-03-17 PROCEDURE — G8427 DOCREV CUR MEDS BY ELIG CLIN: HCPCS | Performed by: INTERNAL MEDICINE

## 2025-03-17 RX ORDER — TIRZEPATIDE 7.5 MG/.5ML
INJECTION, SOLUTION SUBCUTANEOUS
Qty: 2 ML | Refills: 3 | Status: CANCELLED | OUTPATIENT
Start: 2025-03-17

## 2025-03-17 RX ORDER — METFORMIN HYDROCHLORIDE 500 MG/1
TABLET, EXTENDED RELEASE ORAL
Qty: 180 TABLET | Refills: 3 | Status: SHIPPED | OUTPATIENT
Start: 2025-03-17

## 2025-03-17 RX ORDER — INSULIN GLARGINE 100 [IU]/ML
INJECTION, SOLUTION SUBCUTANEOUS
Qty: 45 ML | Refills: 3 | Status: SHIPPED | OUTPATIENT
Start: 2025-03-17

## 2025-03-17 RX ORDER — INSULIN LISPRO 100 [IU]/ML
INJECTION, SOLUTION INTRAVENOUS; SUBCUTANEOUS
Qty: 30 ML | Refills: 3 | Status: SHIPPED | OUTPATIENT
Start: 2025-03-17

## 2025-03-17 NOTE — PROGRESS NOTES
foot exam : monofilament detected, no ulcer    Lab Reviewed   No components found for: \"CHLPL\"  Lab Results   Component Value Date    TRIG 223 (H) 12/13/2022     Lab Results   Component Value Date    HDL 35 (L) 12/13/2022     No components found for: \"LDLCALC\"    No components found for: \"LABVLDL\"    Lab Results   Component Value Date    LABA1C 8.6 12/10/2024       Assessment:     Renan Tidwell is a 65 y.o. male with :    1.T2DM : Longstanding, uncontrolled. On metformin. A1c  high.On basal bolus recommend dietary changes. SGLT-2 inhibitor is not covered, off glipizide.  Ordered CGM for better monitoring.Needs to bring reader  Increase lantus as taking lower dose. Tolerating mounjaro , will try higher dose.   Advised to take humalog even if glucose < 100  A1c mismatch with CGM. Check fructosamine , not done  2.Chronic pain syndrome: On neurontin  3.HLD ; On lipitor, LDL at goal  4.Obesity  5.Elevated BP:At goal, on lisinopril       Plan:      Lantus 20 units   Humalog 22 units AC TID   SSI 2 for 50 >150 combined scale   - 10 < 100   Advise to check blood sugar 3 times a day   Patient to send blood sugar log for titration every week   Advise to exercise regularly. Advise to low simple carbohydrate and protein with each  meal diet.    Diabetes Care: recommend yearly eye exam, foot exam and urine microalbumin to   creatinine ratio. Patient isdue

## 2025-03-18 LAB
CREAT UR-MCNC: 167 MG/DL (ref 39–259)
MICROALBUMIN UR DL<=1MG/L-MCNC: 7.41 MG/DL
MICROALBUMIN/CREAT UR: 44.4 MG/G (ref 0–30)

## 2025-03-19 ENCOUNTER — RESULTS FOLLOW-UP (OUTPATIENT)
Dept: ENDOCRINOLOGY | Age: 65
End: 2025-03-19

## 2025-03-19 LAB — FRUCTOSAMINE SERPL-SCNC: 427 UMOL/L (ref 205–285)

## 2025-03-21 NOTE — TELEPHONE ENCOUNTER
----- Message from Dr. Mikey Mata MD sent at 3/19/2025  5:23 PM EDT -----  Please advise patient the lab test also showed high glucose. He can bring the reader at next visit

## 2025-03-30 DIAGNOSIS — M25.512 CHRONIC LEFT SHOULDER PAIN: ICD-10-CM

## 2025-03-30 DIAGNOSIS — G89.4 CHRONIC PAIN SYNDROME: ICD-10-CM

## 2025-03-30 DIAGNOSIS — G89.29 CHRONIC RIGHT SHOULDER PAIN: ICD-10-CM

## 2025-03-30 DIAGNOSIS — M16.12 PRIMARY OSTEOARTHRITIS OF LEFT HIP: ICD-10-CM

## 2025-03-30 DIAGNOSIS — M25.511 CHRONIC RIGHT SHOULDER PAIN: ICD-10-CM

## 2025-03-30 DIAGNOSIS — M79.18 MYOFASCIAL PAIN: ICD-10-CM

## 2025-03-30 DIAGNOSIS — G89.29 CHRONIC LEFT SHOULDER PAIN: ICD-10-CM

## 2025-03-31 RX ORDER — MELOXICAM 15 MG/1
TABLET ORAL
Qty: 15 TABLET | Refills: 1 | OUTPATIENT
Start: 2025-03-31

## 2025-04-08 ENCOUNTER — OFFICE VISIT (OUTPATIENT)
Dept: PAIN MANAGEMENT | Age: 65
End: 2025-04-08
Payer: COMMERCIAL

## 2025-04-08 VITALS
HEART RATE: 91 BPM | DIASTOLIC BLOOD PRESSURE: 96 MMHG | OXYGEN SATURATION: 98 % | WEIGHT: 218 LBS | SYSTOLIC BLOOD PRESSURE: 170 MMHG | BODY MASS INDEX: 34.14 KG/M2

## 2025-04-08 DIAGNOSIS — M47.817 LUMBOSACRAL SPONDYLOSIS WITHOUT MYELOPATHY: ICD-10-CM

## 2025-04-08 DIAGNOSIS — M79.18 MYOFASCIAL PAIN: ICD-10-CM

## 2025-04-08 DIAGNOSIS — G89.4 CHRONIC PAIN SYNDROME: ICD-10-CM

## 2025-04-08 DIAGNOSIS — M54.16 LUMBAR RADICULOPATHY: ICD-10-CM

## 2025-04-08 DIAGNOSIS — E11.42 DIABETIC POLYNEUROPATHY ASSOCIATED WITH TYPE 2 DIABETES MELLITUS: ICD-10-CM

## 2025-04-08 DIAGNOSIS — M16.12 PRIMARY OSTEOARTHRITIS OF LEFT HIP: ICD-10-CM

## 2025-04-08 DIAGNOSIS — F51.01 PRIMARY INSOMNIA: ICD-10-CM

## 2025-04-08 DIAGNOSIS — M79.7 FIBROMYALGIA: ICD-10-CM

## 2025-04-08 DIAGNOSIS — Z51.81 ENCOUNTER FOR THERAPEUTIC DRUG MONITORING: ICD-10-CM

## 2025-04-08 DIAGNOSIS — M51.362 DEGENERATION OF INTERVERTEBRAL DISC OF LUMBAR REGION WITH DISCOGENIC BACK PAIN AND LOWER EXTREMITY PAIN: ICD-10-CM

## 2025-04-08 PROCEDURE — G8417 CALC BMI ABV UP PARAM F/U: HCPCS | Performed by: INTERNAL MEDICINE

## 2025-04-08 PROCEDURE — 99214 OFFICE O/P EST MOD 30 MIN: CPT | Performed by: INTERNAL MEDICINE

## 2025-04-08 PROCEDURE — 4004F PT TOBACCO SCREEN RCVD TLK: CPT | Performed by: INTERNAL MEDICINE

## 2025-04-08 PROCEDURE — 1123F ACP DISCUSS/DSCN MKR DOCD: CPT | Performed by: INTERNAL MEDICINE

## 2025-04-08 PROCEDURE — 1159F MED LIST DOCD IN RCRD: CPT | Performed by: INTERNAL MEDICINE

## 2025-04-08 PROCEDURE — 2022F DILAT RTA XM EVC RTNOPTHY: CPT | Performed by: INTERNAL MEDICINE

## 2025-04-08 PROCEDURE — 3017F COLORECTAL CA SCREEN DOC REV: CPT | Performed by: INTERNAL MEDICINE

## 2025-04-08 PROCEDURE — G8427 DOCREV CUR MEDS BY ELIG CLIN: HCPCS | Performed by: INTERNAL MEDICINE

## 2025-04-08 PROCEDURE — 3052F HG A1C>EQUAL 8.0%<EQUAL 9.0%: CPT | Performed by: INTERNAL MEDICINE

## 2025-04-08 RX ORDER — HYDROCODONE BITARTRATE AND ACETAMINOPHEN 5; 325 MG/1; MG/1
1 TABLET ORAL EVERY 6 HOURS PRN
Qty: 120 TABLET | Refills: 0 | Status: SHIPPED | OUTPATIENT
Start: 2025-04-08 | End: 2025-05-13

## 2025-04-08 RX ORDER — NORTRIPTYLINE HYDROCHLORIDE 25 MG/1
25-50 CAPSULE ORAL NIGHTLY
Qty: 60 CAPSULE | Refills: 1 | Status: SHIPPED | OUTPATIENT
Start: 2025-04-08

## 2025-04-08 RX ORDER — MELOXICAM 15 MG/1
TABLET ORAL
Qty: 30 TABLET | Refills: 0 | Status: SHIPPED | OUTPATIENT
Start: 2025-04-08

## 2025-04-08 RX ORDER — GABAPENTIN 300 MG/1
CAPSULE ORAL
Qty: 120 CAPSULE | Refills: 1 | Status: SHIPPED | OUTPATIENT
Start: 2025-04-08 | End: 2025-05-13

## 2025-04-08 NOTE — PROGRESS NOTES
bedtime, vigorous exercise near bedtime and use of electronic devices near bedtime   -Continue with Pamelor   -Continue with Neurontin 1200 mg   -Walking as tolerated   -Urine drug screen with GC/MS for opiates and drugs of abuse was ordered and will follow up on results.   Medications/orders associated with this visit:  Current Outpatient Medications   Medication Sig Dispense Refill    diclofenac sodium (VOLTAREN) 1 % GEL Apply 2-4 grams to affected area  g 1    gabapentin (NEURONTIN) 300 MG capsule Take 1 capsule po in the morning, 1 capsule in afternoon and take 2 capsules po in the evening 120 capsule 1    HYDROcodone-acetaminophen (NORCO) 5-325 MG per tablet Take 1 tablet by mouth every 6 hours as needed for Pain (max 3-4 day) for up to 35 days. 120 tablet 0    meloxicam (MOBIC) 15 MG tablet Take 1 tablet po every Monday,Wednesday, and Friday 30 tablet 0    nortriptyline (PAMELOR) 25 MG capsule Take 1-2 capsules by mouth nightly 60 capsule 1    Continuous Glucose Sensor (FREESTYLE MANE 2 SENSOR) MISC SWITCH SENSOR EVERY 14 DAYS 2 each 1    insulin glargine (LANTUS SOLOSTAR) 100 UNIT/ML injection pen INJECT 20 UNITS UNDER THE SKIN DAILY 45 mL 3    insulin lispro, 1 Unit Dial, (HUMALOG KWIKPEN) 100 UNIT/ML SOPN 20-26  units AC TID 30 mL 3    metFORMIN (GLUCOPHAGE-XR) 500 MG extended release tablet TAKE TWO TABLETS BY MOUTH DAILY WITH BREAKFAST 180 tablet 3    Tirzepatide 10 MG/0.5ML SOAJ Inject 10 mg into the skin every 7 days 2 mL 5    Insulin Pen Needle (BD PEN NEEDLE MILLER 2ND GEN) 32G X 4 MM MISC USE 4 TIMES A  each 2    naloxone (NARCAN) 4 MG/0.1ML LIQD nasal spray 1 spray by Nasal route as needed for Opioid Reversal 1 each 0    insulin aspart (NOVOLOG FLEXPEN) 100 UNIT/ML injection pen INJECT 18 -27 UNITS UNDER THE SKIN 3 TIMES A DAY BEFORE MEALS 30 mL 3    lisinopril (PRINIVIL;ZESTRIL) 20 MG tablet       methocarbamol (ROBAXIN) 500 MG tablet       Meals on Wheels MISC by Does not apply route

## 2025-04-11 LAB
1,3 CHLOROPHENYL PIPERAZINE, URINE: NOT DETECTED
6-MONOACETYLMORPHINE: NEGATIVE NG/ML
7-AMINOCLONAZEPAM/CREATININE URINE: NOT DETECTED NG/MG CREAT
8-HYDROXYAMOXAPINE, URINE: NOT DETECTED
8-HYDROXYLOXAPINE, URINE: NOT DETECTED
ACETAMINOPHEN, URINE: NORMAL UG/ML
ACETAMINOPHEN, URINE: PRESENT
ALPHA HYDROXYALPRAZOLAM/CREATININE URINE: NOT DETECTED NG/MG CREAT
ALPHA HYDROXYTRIAZOLAM/CREAT UR CFM: NOT DETECTED NG/MG CREAT
ALPHA-HYDROXYMIDAZOLAM, URINE: NOT DETECTED NG/MG CREAT
ALPRAZOLAM/CREATININE URINE: NOT DETECTED NG/MG CREAT
AMINO CHLOROPYRIDINE, URINE: NOT DETECTED
AMITRIPTYLINE: NOT DETECTED
AMOXAPINE, URINE: NOT DETECTED
AMPHETAMINE SCREEN URINE: NEGATIVE NG/ML
ANALGESICS SCREEN URINE: NORMAL
ANTIDEPRESSANTS SCREEN URINE: NEGATIVE
ANTIPSYCHOTICS SCREEN URINE: NEGATIVE
ARIPIPRAZOLE, URINE: NOT DETECTED
ASENAPINE, URINE: NOT DETECTED
BACLOFEN, URINE: NOT DETECTED
BARBITURATE SCREEN URINE: NEGATIVE NG/ML
BENZODIAZEPINE SCREEN, URINE: NEGATIVE
BUPRENORPHINE URINE: NEGATIVE
BUPRENORPHINE/CREATININE URINE: NOT DETECTED NG/MG CREAT
BUPROPION, URINE: NOT DETECTED
CANNABINOID SCREEN URINE: NEGATIVE NG/ML
CARBAMAZEPINE, URINE: NOT DETECTED
CARISOPRODOL, UR: NOT DETECTED
CHLORPROMAZINE, URINE: NOT DETECTED
CITALOPRAM, URINE: NOT DETECTED
CLOBAZAM, URINE: NOT DETECTED
CLOMIPRAMINE, URINE: NOT DETECTED
CLONAZEPAM/CREATININE URINE: NOT DETECTED NG/MG CREAT
CLOZAPINE, URINE: NOT DETECTED
COCAINE METABOLITE SCREEN URINE: NEGATIVE NG/ML
CODEINE, URINE: NOT DETECTED NG/MG CREAT
CREATININE URINE: 49 MG/DL
CYCLOBENZAPRINE, URINE: NOT DETECTED
DESALKYLFLURAZEPAM/CREATININE URINE: NOT DETECTED NG/MG CREAT
DESIPRAMINE: NOT DETECTED
DESMETHYLCITALOPRAM, URINE: NOT DETECTED
DESMETHYLCLOMIPRAMINE, URINE: NOT DETECTED
DESMETHYLCLOZAPINE, URINE: NOT DETECTED
DESMETHYLCYCLOBENZAPRINE, URINE: NOT DETECTED
DESMETHYLDOXEPIN, URINE: NOT DETECTED
DESMETHYLFLUNITRAZEPAM, URINE: NOT DETECTED NG/MG CREAT
DESMETHYLSERTRALINE, URINE: NOT DETECTED
DESMETHYLVENLAFAXINE, URINE: NOT DETECTED
DIAZEPAM/CREATININE URINE: NOT DETECTED NG/MG CREAT
DIHYDROCODEINE/CREATININE URINE: NOT DETECTED NG/MG CREAT
DOXEPIN, URINE: NOT DETECTED
DULOXETINE, URINE: NOT DETECTED
EZOGABINE, URINE: NOT DETECTED
FENTANYL / ANALOGUES SCREEN URINE: NEGATIVE
FENTANYL/CREATININE URINE: NOT DETECTED NG/MG CREAT
FLUNITRAZEPAM, URINE: NOT DETECTED NG/MG CREAT
FLUOXETINE, URINE: NOT DETECTED
FLUPHENAZINE, URINE: NOT DETECTED
FLUVOXAMINE, URINE: NOT DETECTED
GABAPENTIN: PRESENT
HALOPERIDOL, URINE: NOT DETECTED
HYDROCODONE GC/MS CONF: 382 NG/MG CREAT
HYDROMORPHONE GC/MS CONF: 482 NG/MG CREAT
HYDROXYBUPROPION, URINE: NOT DETECTED
ILOPERIDONE, URINE: NOT DETECTED
IMIPRAMINE, URINE: NOT DETECTED
KETAMINE, URINE: NOT DETECTED
LABORATORY REPORT: NORMAL
LAMOTRIGINE, URINE: NOT DETECTED
LEVETIRACETAM, URINE: NOT DETECTED
LORAZEPAM/CREATININE URINE: NOT DETECTED NG/MG CREAT
LOXAPINE, URINE: NOT DETECTED
LURASIDONE, URINE: NOT DETECTED
MAPROTILINE, URINE: NOT DETECTED
MEPERIDINE: NEGATIVE NG/ML
MEPROBAMATE, URINE: NOT DETECTED
MESORIDAZINE, URINE: NOT DETECTED
METAXALONE, URINE: NOT DETECTED
METHADONE AND METABOLITES, URINE: NEGATIVE NG/ML
METHADONE SCREEN, URINE: NEGATIVE NG/ML
METHOCARBAMOL, URINE: PRESENT
METHYLPHENIDATE, URINE: NOT DETECTED
MIDAZOLAM/CREATININE URINE: NOT DETECTED NG/MG CREAT
MIRTAZAPINE, URINE: NOT DETECTED
MOLINDONE, URINE: NOT DETECTED
MORPHINE GC/MS CONF: NOT DETECTED NG/MG CREAT
MUSCLE RELAXANTS SCREEN URINE: NORMAL
NEFAZODONE, URINE: NOT DETECTED
NORBUPRENORPHINE/CREATININE URINE: NOT DETECTED NG/MG CREAT
NORCODEINE/CREATININE URINE: NOT DETECTED NG/MG CREAT
NORDIAZEPAM/CREATININE URINE: NOT DETECTED NG/MG CREAT
NORFENTANYL/CREATININE URINE: NOT DETECTED NG/MG CREAT
NORFLUOXETINE, URINE: NOT DETECTED
NORHYDROCODONE (LCMSMS): 610 NG/MG CREAT
NORKETAMINE, URINE: NOT DETECTED
NORMORPHINE URINE: NOT DETECTED NG/MG CREAT
NORTRIPTYLINE: NOT DETECTED
OLANZAPINE, URINE: NOT DETECTED
OPIATE SCREEN URINE: NORMAL NG/ML
OPIATES: NORMAL
ORPHENADRINE, URINE: NOT DETECTED
OTHER HALLUCINOGENS SCREEN URINE: NEGATIVE
OXAZEPAM/CREATININE URINE: NOT DETECTED NG/MG CREAT
OXCARBAZEPINE MHD, URINE: NOT DETECTED
OXYCODONE SCREEN URINE: NEGATIVE NG/ML
PAROXETINE, URINE: NOT DETECTED
PERPHENAZINE, URINE: NOT DETECTED
PHENCYCLIDINE SCREEN URINE: NEGATIVE NG/ML
PHENYTOIN, URINE: NOT DETECTED
PIMOZIDE, URINE: NOT DETECTED
PREGABALIN, URINE: NOT DETECTED
PREGABALIN: NORMAL
PRESCRIBED MEDICATIONS: NORMAL
PRIMIDONE, URINE: NOT DETECTED
PROCHLORPERAZINE, URINE: NOT DETECTED
PROPOXYPHENE: NEGATIVE NG/ML
PROTRIPTYLINE, URINE: NOT DETECTED
QUETIAPINE, URINE: NOT DETECTED
RISPERIDONE, URINE: NOT DETECTED
RITALINIC ACID, UR: NOT DETECTED
RUFINAMIDE, URINE: NOT DETECTED
SEDATIVES/HYPNOTICS SCREEN URINE: NEGATIVE
SERTRALINE, URINE: NOT DETECTED
SYMPATHOMIMETICS SCREEN URINE: NEGATIVE
TAPENTADOL SCREEN URINE: NEGATIVE NG/ML
TEMAZEPAM/CREATININE URINE: NOT DETECTED NG/MG CREAT
THIORIDAZINE, URINE: NOT DETECTED
THIOTHIXENE, URINE: NOT DETECTED
TIAGABINE, URINE: NOT DETECTED
TIZANIDINE, URINE: NOT DETECTED
TOPIRAMATE, URINE: NOT DETECTED
TRAMADOL SCREEN URINE: NEGATIVE NG/ML
TRAZODONE, URINE: NOT DETECTED
TRIFLUOPERAZINE, URINE: NOT DETECTED
TRIMIPRAMINE, URINE: NOT DETECTED
VENLAFAXINE, URINE: NOT DETECTED
VILAZODONE, URINE: NOT DETECTED
ZALEPLON, URINE: NOT DETECTED
ZIPRASIDONE, URINE: NOT DETECTED
ZOLPIDEM ACID, URINE: NOT DETECTED
ZOLPIDEM, URINE: NOT DETECTED
ZONISAMIDE, URINE: NOT DETECTED
ZOPICLONE/ESZOPICLONE, URINE: NOT DETECTED

## 2025-05-13 ENCOUNTER — OFFICE VISIT (OUTPATIENT)
Dept: PAIN MANAGEMENT | Age: 65
End: 2025-05-13
Payer: COMMERCIAL

## 2025-05-13 VITALS
DIASTOLIC BLOOD PRESSURE: 83 MMHG | OXYGEN SATURATION: 96 % | WEIGHT: 226 LBS | HEART RATE: 76 BPM | BODY MASS INDEX: 35.4 KG/M2 | SYSTOLIC BLOOD PRESSURE: 156 MMHG

## 2025-05-13 DIAGNOSIS — M51.362 DEGENERATION OF INTERVERTEBRAL DISC OF LUMBAR REGION WITH DISCOGENIC BACK PAIN AND LOWER EXTREMITY PAIN: ICD-10-CM

## 2025-05-13 DIAGNOSIS — M79.7 FIBROMYALGIA: ICD-10-CM

## 2025-05-13 DIAGNOSIS — E11.42 DIABETIC POLYNEUROPATHY ASSOCIATED WITH TYPE 2 DIABETES MELLITUS (HCC): ICD-10-CM

## 2025-05-13 DIAGNOSIS — M47.817 LUMBOSACRAL SPONDYLOSIS WITHOUT MYELOPATHY: ICD-10-CM

## 2025-05-13 DIAGNOSIS — M54.16 LUMBAR RADICULOPATHY: ICD-10-CM

## 2025-05-13 DIAGNOSIS — M79.18 MYOFASCIAL PAIN: ICD-10-CM

## 2025-05-13 DIAGNOSIS — G89.4 CHRONIC PAIN SYNDROME: ICD-10-CM

## 2025-05-13 DIAGNOSIS — M16.12 PRIMARY OSTEOARTHRITIS OF LEFT HIP: ICD-10-CM

## 2025-05-13 PROCEDURE — G8417 CALC BMI ABV UP PARAM F/U: HCPCS | Performed by: INTERNAL MEDICINE

## 2025-05-13 PROCEDURE — 2022F DILAT RTA XM EVC RTNOPTHY: CPT | Performed by: INTERNAL MEDICINE

## 2025-05-13 PROCEDURE — 3052F HG A1C>EQUAL 8.0%<EQUAL 9.0%: CPT | Performed by: INTERNAL MEDICINE

## 2025-05-13 PROCEDURE — G8427 DOCREV CUR MEDS BY ELIG CLIN: HCPCS | Performed by: INTERNAL MEDICINE

## 2025-05-13 PROCEDURE — 1159F MED LIST DOCD IN RCRD: CPT | Performed by: INTERNAL MEDICINE

## 2025-05-13 PROCEDURE — 1123F ACP DISCUSS/DSCN MKR DOCD: CPT | Performed by: INTERNAL MEDICINE

## 2025-05-13 PROCEDURE — 99213 OFFICE O/P EST LOW 20 MIN: CPT | Performed by: INTERNAL MEDICINE

## 2025-05-13 PROCEDURE — 4004F PT TOBACCO SCREEN RCVD TLK: CPT | Performed by: INTERNAL MEDICINE

## 2025-05-13 PROCEDURE — 3017F COLORECTAL CA SCREEN DOC REV: CPT | Performed by: INTERNAL MEDICINE

## 2025-05-13 RX ORDER — MELOXICAM 15 MG/1
TABLET ORAL
Qty: 30 TABLET | Refills: 0 | Status: SHIPPED | OUTPATIENT
Start: 2025-05-13

## 2025-05-13 RX ORDER — HYDROCODONE BITARTRATE AND ACETAMINOPHEN 5; 325 MG/1; MG/1
1 TABLET ORAL EVERY 6 HOURS PRN
Qty: 120 TABLET | Refills: 0 | Status: SHIPPED | OUTPATIENT
Start: 2025-05-13 | End: 2025-06-17

## 2025-05-13 RX ORDER — NORTRIPTYLINE HYDROCHLORIDE 25 MG/1
25-50 CAPSULE ORAL NIGHTLY
Qty: 60 CAPSULE | Refills: 1 | Status: SHIPPED | OUTPATIENT
Start: 2025-05-13

## 2025-05-13 RX ORDER — GABAPENTIN 300 MG/1
CAPSULE ORAL
Qty: 120 CAPSULE | Refills: 1 | Status: SHIPPED | OUTPATIENT
Start: 2025-05-13 | End: 2025-06-17

## 2025-05-13 NOTE — PROGRESS NOTES
nostril 2-3 times per day      Lancets MISC 1 each by Does not apply route 3 times daily 100 each 3    loratadine (CLARITIN) 10 MG tablet Take 1 tablet by mouth daily      atorvastatin (LIPITOR) 80 MG tablet Take 1 tablet by mouth daily      ezetimibe (ZETIA) 10 MG tablet Take 1 tablet by mouth daily       No current facility-administered medications for this visit.       General Goals of current treatment regimen include improvement in pain, restoration of functioning- with focus on improvement in physical performance, general activity, work or disability,emotional distress, health care utilization and  decreased medication consumption.   Will continue to monitor progress towards achieving/maintaining therapeutic goals with special emphasis on  1. -Improvement in perceived interfernce  of pain with ADL's. YES  -Ability to do home exercises independently. YES  -Ability to do household chores, indoor work and social and leisure activities. YES  -Improve psychosocial and physical functioning.YES  -Ability to do outside chores/ yard work YES    He was advised against drinking alcohol with the narcotic pain medicines, advised against driving or handling machinery while adjusting the dose of medicines or if having cognitive  issues related to the current medications.Risk of overdose and death, if medicines not taken as prescribed, were also discussed. If the patient develops new symptoms or if the symptoms worsen, the patient should call the office.    Thank you for allowing me to participate in the care of this patient.      Cc: Lesli Cotton MD

## 2025-06-04 DIAGNOSIS — G89.4 CHRONIC PAIN SYNDROME: ICD-10-CM

## 2025-06-17 ENCOUNTER — OFFICE VISIT (OUTPATIENT)
Dept: PAIN MANAGEMENT | Age: 65
End: 2025-06-17
Payer: COMMERCIAL

## 2025-06-17 VITALS
BODY MASS INDEX: 34.3 KG/M2 | OXYGEN SATURATION: 94 % | HEART RATE: 83 BPM | WEIGHT: 219 LBS | SYSTOLIC BLOOD PRESSURE: 136 MMHG | DIASTOLIC BLOOD PRESSURE: 89 MMHG

## 2025-06-17 DIAGNOSIS — M79.18 MYOFASCIAL PAIN: ICD-10-CM

## 2025-06-17 DIAGNOSIS — M47.817 LUMBOSACRAL SPONDYLOSIS WITHOUT MYELOPATHY: ICD-10-CM

## 2025-06-17 DIAGNOSIS — E11.42 DIABETIC POLYNEUROPATHY ASSOCIATED WITH TYPE 2 DIABETES MELLITUS (HCC): ICD-10-CM

## 2025-06-17 DIAGNOSIS — M16.12 PRIMARY OSTEOARTHRITIS OF LEFT HIP: ICD-10-CM

## 2025-06-17 DIAGNOSIS — M79.7 FIBROMYALGIA: ICD-10-CM

## 2025-06-17 DIAGNOSIS — M51.362 DEGENERATION OF INTERVERTEBRAL DISC OF LUMBAR REGION WITH DISCOGENIC BACK PAIN AND LOWER EXTREMITY PAIN: ICD-10-CM

## 2025-06-17 DIAGNOSIS — G89.4 CHRONIC PAIN SYNDROME: ICD-10-CM

## 2025-06-17 DIAGNOSIS — M54.16 LUMBAR RADICULOPATHY: ICD-10-CM

## 2025-06-17 PROCEDURE — 3017F COLORECTAL CA SCREEN DOC REV: CPT | Performed by: INTERNAL MEDICINE

## 2025-06-17 PROCEDURE — G8417 CALC BMI ABV UP PARAM F/U: HCPCS | Performed by: INTERNAL MEDICINE

## 2025-06-17 PROCEDURE — 3046F HEMOGLOBIN A1C LEVEL >9.0%: CPT | Performed by: INTERNAL MEDICINE

## 2025-06-17 PROCEDURE — G8427 DOCREV CUR MEDS BY ELIG CLIN: HCPCS | Performed by: INTERNAL MEDICINE

## 2025-06-17 PROCEDURE — 4004F PT TOBACCO SCREEN RCVD TLK: CPT | Performed by: INTERNAL MEDICINE

## 2025-06-17 PROCEDURE — 1123F ACP DISCUSS/DSCN MKR DOCD: CPT | Performed by: INTERNAL MEDICINE

## 2025-06-17 PROCEDURE — 2022F DILAT RTA XM EVC RTNOPTHY: CPT | Performed by: INTERNAL MEDICINE

## 2025-06-17 PROCEDURE — 99213 OFFICE O/P EST LOW 20 MIN: CPT | Performed by: INTERNAL MEDICINE

## 2025-06-17 RX ORDER — GABAPENTIN 300 MG/1
CAPSULE ORAL
Qty: 120 CAPSULE | Refills: 1 | Status: SHIPPED | OUTPATIENT
Start: 2025-06-17 | End: 2025-07-22

## 2025-06-17 RX ORDER — NORTRIPTYLINE HYDROCHLORIDE 25 MG/1
25-50 CAPSULE ORAL NIGHTLY
Qty: 180 CAPSULE | Refills: 1 | OUTPATIENT
Start: 2025-06-17

## 2025-06-17 RX ORDER — HYDROCODONE BITARTRATE AND ACETAMINOPHEN 5; 325 MG/1; MG/1
1 TABLET ORAL EVERY 6 HOURS PRN
Qty: 120 TABLET | Refills: 0 | Status: SHIPPED | OUTPATIENT
Start: 2025-06-17 | End: 2025-07-22

## 2025-06-17 RX ORDER — NORTRIPTYLINE HYDROCHLORIDE 25 MG/1
25-50 CAPSULE ORAL NIGHTLY
Qty: 60 CAPSULE | Refills: 1 | Status: SHIPPED | OUTPATIENT
Start: 2025-06-17

## 2025-06-17 RX ORDER — MELOXICAM 15 MG/1
TABLET ORAL
Qty: 30 TABLET | Refills: 0 | Status: SHIPPED | OUTPATIENT
Start: 2025-06-17

## 2025-06-17 NOTE — PROGRESS NOTES
tablet by mouth daily       No current facility-administered medications for this visit.       General Goals of current treatment regimen include improvement in pain, restoration of functioning- with focus on improvement in physical performance, general activity, work or disability,emotional distress, health care utilization and  decreased medication consumption.   Will continue to monitor progress towards achieving/maintaining therapeutic goals with special emphasis on  1. -Improvement in perceived interfernce  of pain with ADL's. YES  -Ability to do home exercises independently. YES  -Ability to do household chores, indoor work and social and leisure activities. YES  -Improve psychosocial and physical functioning.YES  -Ability to do outside chores/ yard work YES    He was advised against drinking alcohol with the narcotic pain medicines, advised against driving or handling machinery while adjusting the dose of medicines or if having cognitive  issues related to the current medications.Risk of overdose and death, if medicines not taken as prescribed, were also discussed. If the patient develops new symptoms or if the symptoms worsen, the patient should call the office.    While transcribing every attempt was made to maintain the accuracy of the note in terms of it's contents,there may have been some errors made inadvertently.  Thank you for allowing me to participate in the care of this patient.    Gio Esposito MD.    Cc: Lesli Cottno MD

## 2025-06-25 ENCOUNTER — OFFICE VISIT (OUTPATIENT)
Dept: ENDOCRINOLOGY | Age: 65
End: 2025-06-25
Payer: COMMERCIAL

## 2025-06-25 VITALS
BODY MASS INDEX: 34.77 KG/M2 | OXYGEN SATURATION: 96 % | HEART RATE: 82 BPM | DIASTOLIC BLOOD PRESSURE: 81 MMHG | SYSTOLIC BLOOD PRESSURE: 131 MMHG | WEIGHT: 222 LBS

## 2025-06-25 DIAGNOSIS — E78.49 OTHER HYPERLIPIDEMIA: ICD-10-CM

## 2025-06-25 DIAGNOSIS — E11.65 UNCONTROLLED TYPE 2 DIABETES MELLITUS WITH HYPERGLYCEMIA (HCC): Primary | ICD-10-CM

## 2025-06-25 LAB — HBA1C MFR BLD: 14 %

## 2025-06-25 PROCEDURE — 4004F PT TOBACCO SCREEN RCVD TLK: CPT | Performed by: INTERNAL MEDICINE

## 2025-06-25 PROCEDURE — G2211 COMPLEX E/M VISIT ADD ON: HCPCS | Performed by: INTERNAL MEDICINE

## 2025-06-25 PROCEDURE — G8427 DOCREV CUR MEDS BY ELIG CLIN: HCPCS | Performed by: INTERNAL MEDICINE

## 2025-06-25 PROCEDURE — 1123F ACP DISCUSS/DSCN MKR DOCD: CPT | Performed by: INTERNAL MEDICINE

## 2025-06-25 PROCEDURE — 1159F MED LIST DOCD IN RCRD: CPT | Performed by: INTERNAL MEDICINE

## 2025-06-25 PROCEDURE — 3052F HG A1C>EQUAL 8.0%<EQUAL 9.0%: CPT | Performed by: INTERNAL MEDICINE

## 2025-06-25 PROCEDURE — 3017F COLORECTAL CA SCREEN DOC REV: CPT | Performed by: INTERNAL MEDICINE

## 2025-06-25 PROCEDURE — 2022F DILAT RTA XM EVC RTNOPTHY: CPT | Performed by: INTERNAL MEDICINE

## 2025-06-25 PROCEDURE — 99214 OFFICE O/P EST MOD 30 MIN: CPT | Performed by: INTERNAL MEDICINE

## 2025-06-25 PROCEDURE — G8417 CALC BMI ABV UP PARAM F/U: HCPCS | Performed by: INTERNAL MEDICINE

## 2025-06-25 PROCEDURE — 83036 HEMOGLOBIN GLYCOSYLATED A1C: CPT | Performed by: INTERNAL MEDICINE

## 2025-06-25 PROCEDURE — 95251 CONT GLUC MNTR ANALYSIS I&R: CPT | Performed by: INTERNAL MEDICINE

## 2025-06-25 RX ORDER — HYDROCHLOROTHIAZIDE 12.5 MG/1
CAPSULE ORAL
Qty: 2 EACH | Refills: 5 | Status: SHIPPED | OUTPATIENT
Start: 2025-06-25

## 2025-06-25 RX ORDER — INSULIN ASPART 100 [IU]/ML
INJECTION, SOLUTION INTRAVENOUS; SUBCUTANEOUS
Qty: 45 ML | Refills: 3 | Status: SHIPPED | OUTPATIENT
Start: 2025-06-25

## 2025-06-25 RX ORDER — KETOROLAC TROMETHAMINE 30 MG/ML
INJECTION, SOLUTION INTRAMUSCULAR; INTRAVENOUS
Qty: 1 EACH | Refills: 3 | Status: SHIPPED | OUTPATIENT
Start: 2025-06-25

## 2025-06-25 NOTE — PROGRESS NOTES
Seen as  patient for diabetes      Interim:     He has CGM  But ran out of sensors  Ran out of mounjaro for 2 weeks    Diagnosed with Type 2 diabetes mellitus in  2010  Known diabetic complications:retinopathy- injections    Moderate uncontrolled    Current diabetic medications     Metformin ER 500mg 2 tab daily  Lantus 20  but taking 16   Humalog 22 units AC TID   but taking 24   SSI 2 for 50 >150 combined scale    Mounjaro 10mg      Last A1c >14 % <----9%<----8.6%<----8%<---- 8.6%<-----9.2%<------ 7.8%<----8.4%<-----9.1%<----8.1%<------9.7%<----11.4% <-----13.9%<----- 14.1<--- 11.4 <--- 9.9 <------6.3%     Prior visit with dietician: no  Current diet: on average, 3 meals per day  No CHO counting  Current exercise:yes  Current monitoring regimen: home blood tests - CGM    Last 2 weeks  Average 342  3 % in range   97 % high  121-400    Has brought blood glucose log/meter:yes    Any episodes of hypoglycemia? 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    SH: [employer], takes care of mom    Past Medical History:   Diagnosis Date    Arthritis     Back, L hip and L shoulder    CHF (congestive heart failure) (HCC)     diastolic    Diabetes mellitus (HCC)     Hyperlipidemia     Hypertension      Past Surgical History:   Procedure Laterality Date    APPENDECTOMY  1969     Current Outpatient Medications   Medication Sig Dispense Refill    gabapentin (NEURONTIN) 300 MG capsule Take one capsule by mouth in the morning, take two capsules by mouth in the afternoon, take one capsule by mouth in the evening 120 capsule 1    HYDROcodone-acetaminophen (NORCO) 5-325 MG per tablet Take 1

## 2025-07-08 DIAGNOSIS — G89.4 CHRONIC PAIN SYNDROME: ICD-10-CM

## 2025-07-08 DIAGNOSIS — M16.12 PRIMARY OSTEOARTHRITIS OF LEFT HIP: ICD-10-CM

## 2025-07-08 DIAGNOSIS — M79.18 MYOFASCIAL PAIN: ICD-10-CM

## 2025-07-08 RX ORDER — MELOXICAM 15 MG/1
TABLET ORAL
Qty: 12 TABLET | Refills: 2 | OUTPATIENT
Start: 2025-07-08

## 2025-07-09 ENCOUNTER — TELEPHONE (OUTPATIENT)
Dept: PAIN MANAGEMENT | Age: 65
End: 2025-07-09

## 2025-07-09 NOTE — TELEPHONE ENCOUNTER
Pt calling because he needs a ofrm or letter stating he needs the chair to stay in the bathroom due to being a fall risk. The landlord took it out and will not put ot back so he needs some medical documanttion to get it put back. Please mera.

## 2025-07-11 NOTE — TELEPHONE ENCOUNTER
Tried calling pt but received no answer,I let a voicemail Per [facility] He can buy a shower chair from [employer] or [employer]

## 2025-07-14 ENCOUNTER — PATIENT MESSAGE (OUTPATIENT)
Dept: ENDOCRINOLOGY | Age: 65
End: 2025-07-14

## 2025-07-14 ENCOUNTER — TELEPHONE (OUTPATIENT)
Dept: ENDOCRINOLOGY | Age: 65
End: 2025-07-14

## 2025-07-14 NOTE — TELEPHONE ENCOUNTER
It is okay to give letter  Stating patient is on multiple injections of insulin, advise a chair for him so can easily administer it

## 2025-07-14 NOTE — TELEPHONE ENCOUNTER
Pt is calling and asking if a letter can be type up regarding him needing a chair so he can administer his insulin in the bathroom at [facility] so he can sit down and not fall. Pt states he has a hard time standing up and trying to inject the needle into his abdomen so he would like a chair.   He states they did construction on the building that the share w/ [employer] and they do not have a chair at that location (in [place]) old office was in [facility].     CB# [phone]

## 2025-07-29 ENCOUNTER — OFFICE VISIT (OUTPATIENT)
Dept: PAIN MANAGEMENT | Age: 65
End: 2025-07-29
Payer: COMMERCIAL

## 2025-07-29 VITALS
WEIGHT: 220 LBS | BODY MASS INDEX: 34.46 KG/M2 | DIASTOLIC BLOOD PRESSURE: 97 MMHG | SYSTOLIC BLOOD PRESSURE: 164 MMHG | HEART RATE: 98 BPM | OXYGEN SATURATION: 96 %

## 2025-07-29 DIAGNOSIS — M47.817 LUMBOSACRAL SPONDYLOSIS WITHOUT MYELOPATHY: ICD-10-CM

## 2025-07-29 DIAGNOSIS — M51.362 DEGENERATION OF INTERVERTEBRAL DISC OF LUMBAR REGION WITH DISCOGENIC BACK PAIN AND LOWER EXTREMITY PAIN: ICD-10-CM

## 2025-07-29 DIAGNOSIS — M16.12 PRIMARY OSTEOARTHRITIS OF LEFT HIP: ICD-10-CM

## 2025-07-29 DIAGNOSIS — M79.7 FIBROMYALGIA: ICD-10-CM

## 2025-07-29 DIAGNOSIS — Z91.89 AT RISK FOR RESPIRATORY DEPRESSION DUE TO OPIOID: ICD-10-CM

## 2025-07-29 DIAGNOSIS — E11.42 DIABETIC POLYNEUROPATHY ASSOCIATED WITH TYPE 2 DIABETES MELLITUS (HCC): ICD-10-CM

## 2025-07-29 DIAGNOSIS — M54.16 LUMBAR RADICULOPATHY: ICD-10-CM

## 2025-07-29 DIAGNOSIS — G89.4 CHRONIC PAIN SYNDROME: ICD-10-CM

## 2025-07-29 DIAGNOSIS — F51.01 PRIMARY INSOMNIA: ICD-10-CM

## 2025-07-29 DIAGNOSIS — M79.18 MYOFASCIAL PAIN: ICD-10-CM

## 2025-07-29 PROCEDURE — 4004F PT TOBACCO SCREEN RCVD TLK: CPT | Performed by: INTERNAL MEDICINE

## 2025-07-29 PROCEDURE — 99214 OFFICE O/P EST MOD 30 MIN: CPT | Performed by: INTERNAL MEDICINE

## 2025-07-29 PROCEDURE — G8417 CALC BMI ABV UP PARAM F/U: HCPCS | Performed by: INTERNAL MEDICINE

## 2025-07-29 PROCEDURE — 3046F HEMOGLOBIN A1C LEVEL >9.0%: CPT | Performed by: INTERNAL MEDICINE

## 2025-07-29 PROCEDURE — 1159F MED LIST DOCD IN RCRD: CPT | Performed by: INTERNAL MEDICINE

## 2025-07-29 PROCEDURE — 2022F DILAT RTA XM EVC RTNOPTHY: CPT | Performed by: INTERNAL MEDICINE

## 2025-07-29 PROCEDURE — G8427 DOCREV CUR MEDS BY ELIG CLIN: HCPCS | Performed by: INTERNAL MEDICINE

## 2025-07-29 PROCEDURE — 3017F COLORECTAL CA SCREEN DOC REV: CPT | Performed by: INTERNAL MEDICINE

## 2025-07-29 PROCEDURE — 1123F ACP DISCUSS/DSCN MKR DOCD: CPT | Performed by: INTERNAL MEDICINE

## 2025-07-29 RX ORDER — GABAPENTIN 300 MG/1
CAPSULE ORAL
Qty: 120 CAPSULE | Refills: 1 | Status: SHIPPED | OUTPATIENT
Start: 2025-07-29 | End: 2025-09-02

## 2025-07-29 RX ORDER — NORTRIPTYLINE HYDROCHLORIDE 25 MG/1
25-50 CAPSULE ORAL NIGHTLY
Qty: 60 CAPSULE | Refills: 1 | Status: SHIPPED | OUTPATIENT
Start: 2025-07-29

## 2025-07-29 RX ORDER — HYDROCODONE BITARTRATE AND ACETAMINOPHEN 5; 325 MG/1; MG/1
1 TABLET ORAL EVERY 6 HOURS PRN
Qty: 120 TABLET | Refills: 0 | Status: SHIPPED | OUTPATIENT
Start: 2025-07-29 | End: 2025-08-28

## 2025-07-29 RX ORDER — MELOXICAM 15 MG/1
TABLET ORAL
Qty: 30 TABLET | Refills: 0 | Status: SHIPPED | OUTPATIENT
Start: 2025-07-29

## 2025-08-08 ENCOUNTER — TELEMEDICINE (OUTPATIENT)
Dept: ENDOCRINOLOGY | Age: 65
End: 2025-08-08
Payer: COMMERCIAL

## 2025-08-08 DIAGNOSIS — E11.65 UNCONTROLLED TYPE 2 DIABETES MELLITUS WITH HYPERGLYCEMIA (HCC): ICD-10-CM

## 2025-08-08 PROCEDURE — G8427 DOCREV CUR MEDS BY ELIG CLIN: HCPCS | Performed by: INTERNAL MEDICINE

## 2025-08-08 PROCEDURE — 3017F COLORECTAL CA SCREEN DOC REV: CPT | Performed by: INTERNAL MEDICINE

## 2025-08-08 PROCEDURE — 2022F DILAT RTA XM EVC RTNOPTHY: CPT | Performed by: INTERNAL MEDICINE

## 2025-08-08 PROCEDURE — 99214 OFFICE O/P EST MOD 30 MIN: CPT | Performed by: INTERNAL MEDICINE

## 2025-08-08 PROCEDURE — 1123F ACP DISCUSS/DSCN MKR DOCD: CPT | Performed by: INTERNAL MEDICINE

## 2025-08-08 PROCEDURE — 1159F MED LIST DOCD IN RCRD: CPT | Performed by: INTERNAL MEDICINE

## 2025-08-08 PROCEDURE — 3046F HEMOGLOBIN A1C LEVEL >9.0%: CPT | Performed by: INTERNAL MEDICINE

## 2025-08-08 RX ORDER — INSULIN LISPRO 100 [IU]/ML
INJECTION, SOLUTION INTRAVENOUS; SUBCUTANEOUS
Qty: 30 ML | Refills: 3 | Status: SHIPPED | OUTPATIENT
Start: 2025-08-08

## 2025-08-08 RX ORDER — HYDROCHLOROTHIAZIDE 12.5 MG/1
CAPSULE ORAL
Qty: 2 EACH | Refills: 5 | Status: SHIPPED | OUTPATIENT
Start: 2025-08-08

## 2025-08-08 RX ORDER — INSULIN GLARGINE 100 [IU]/ML
INJECTION, SOLUTION SUBCUTANEOUS
Qty: 45 ML | Refills: 3 | Status: SHIPPED | OUTPATIENT
Start: 2025-08-08

## 2025-08-08 RX ORDER — METFORMIN HYDROCHLORIDE 500 MG/1
TABLET, EXTENDED RELEASE ORAL
Qty: 180 TABLET | Refills: 3 | Status: SHIPPED | OUTPATIENT
Start: 2025-08-08

## 2025-09-02 ENCOUNTER — OFFICE VISIT (OUTPATIENT)
Dept: PAIN MANAGEMENT | Age: 65
End: 2025-09-02
Payer: COMMERCIAL

## 2025-09-02 VITALS
SYSTOLIC BLOOD PRESSURE: 136 MMHG | DIASTOLIC BLOOD PRESSURE: 81 MMHG | WEIGHT: 218 LBS | BODY MASS INDEX: 34.14 KG/M2 | HEART RATE: 70 BPM | OXYGEN SATURATION: 97 %

## 2025-09-02 DIAGNOSIS — M51.362 DEGENERATION OF INTERVERTEBRAL DISC OF LUMBAR REGION WITH DISCOGENIC BACK PAIN AND LOWER EXTREMITY PAIN: ICD-10-CM

## 2025-09-02 DIAGNOSIS — G89.4 CHRONIC PAIN SYNDROME: ICD-10-CM

## 2025-09-02 DIAGNOSIS — E11.42 DIABETIC POLYNEUROPATHY ASSOCIATED WITH TYPE 2 DIABETES MELLITUS (HCC): ICD-10-CM

## 2025-09-02 DIAGNOSIS — F51.01 PRIMARY INSOMNIA: ICD-10-CM

## 2025-09-02 DIAGNOSIS — M79.18 MYOFASCIAL PAIN: ICD-10-CM

## 2025-09-02 DIAGNOSIS — M16.12 PRIMARY OSTEOARTHRITIS OF LEFT HIP: ICD-10-CM

## 2025-09-02 DIAGNOSIS — M54.16 LUMBAR RADICULOPATHY: ICD-10-CM

## 2025-09-02 DIAGNOSIS — M47.817 LUMBOSACRAL SPONDYLOSIS WITHOUT MYELOPATHY: ICD-10-CM

## 2025-09-02 DIAGNOSIS — M79.7 FIBROMYALGIA: ICD-10-CM

## 2025-09-02 PROCEDURE — 2022F DILAT RTA XM EVC RTNOPTHY: CPT | Performed by: INTERNAL MEDICINE

## 2025-09-02 PROCEDURE — 4004F PT TOBACCO SCREEN RCVD TLK: CPT | Performed by: INTERNAL MEDICINE

## 2025-09-02 PROCEDURE — G8417 CALC BMI ABV UP PARAM F/U: HCPCS | Performed by: INTERNAL MEDICINE

## 2025-09-02 PROCEDURE — 1123F ACP DISCUSS/DSCN MKR DOCD: CPT | Performed by: INTERNAL MEDICINE

## 2025-09-02 PROCEDURE — G8427 DOCREV CUR MEDS BY ELIG CLIN: HCPCS | Performed by: INTERNAL MEDICINE

## 2025-09-02 PROCEDURE — 3017F COLORECTAL CA SCREEN DOC REV: CPT | Performed by: INTERNAL MEDICINE

## 2025-09-02 PROCEDURE — 99213 OFFICE O/P EST LOW 20 MIN: CPT | Performed by: INTERNAL MEDICINE

## 2025-09-02 PROCEDURE — 1159F MED LIST DOCD IN RCRD: CPT | Performed by: INTERNAL MEDICINE

## 2025-09-02 PROCEDURE — 3046F HEMOGLOBIN A1C LEVEL >9.0%: CPT | Performed by: INTERNAL MEDICINE

## 2025-09-02 RX ORDER — HYDROCODONE BITARTRATE AND ACETAMINOPHEN 5; 325 MG/1; MG/1
1 TABLET ORAL EVERY 6 HOURS PRN
Qty: 120 TABLET | Refills: 0 | Status: SHIPPED | OUTPATIENT
Start: 2025-09-02 | End: 2025-10-02

## 2025-09-02 RX ORDER — NORTRIPTYLINE HYDROCHLORIDE 25 MG/1
25-50 CAPSULE ORAL NIGHTLY
Qty: 60 CAPSULE | Refills: 1 | Status: SHIPPED | OUTPATIENT
Start: 2025-09-02

## 2025-09-02 RX ORDER — GABAPENTIN 300 MG/1
CAPSULE ORAL
Qty: 120 CAPSULE | Refills: 1 | Status: SHIPPED | OUTPATIENT
Start: 2025-09-02 | End: 2025-10-07

## 2025-09-02 RX ORDER — MELOXICAM 15 MG/1
TABLET ORAL
Qty: 30 TABLET | Refills: 0 | Status: SHIPPED | OUTPATIENT
Start: 2025-09-02